# Patient Record
Sex: MALE | Race: BLACK OR AFRICAN AMERICAN | NOT HISPANIC OR LATINO | URBAN - METROPOLITAN AREA
[De-identification: names, ages, dates, MRNs, and addresses within clinical notes are randomized per-mention and may not be internally consistent; named-entity substitution may affect disease eponyms.]

---

## 2019-05-20 ENCOUNTER — INPATIENT (INPATIENT)
Facility: HOSPITAL | Age: 59
LOS: 24 days | Discharge: HOME | DRG: 853 | End: 2019-06-14
Attending: INTERNAL MEDICINE | Admitting: INTERNAL MEDICINE
Payer: COMMERCIAL

## 2019-05-20 VITALS
RESPIRATION RATE: 18 BRPM | DIASTOLIC BLOOD PRESSURE: 64 MMHG | OXYGEN SATURATION: 97 % | SYSTOLIC BLOOD PRESSURE: 121 MMHG | HEIGHT: 72 IN | HEART RATE: 99 BPM | TEMPERATURE: 99 F | WEIGHT: 246.04 LBS

## 2019-05-20 DIAGNOSIS — E11.621 TYPE 2 DIABETES MELLITUS WITH FOOT ULCER: ICD-10-CM

## 2019-05-20 DIAGNOSIS — Z90.49 ACQUIRED ABSENCE OF OTHER SPECIFIED PARTS OF DIGESTIVE TRACT: Chronic | ICD-10-CM

## 2019-05-20 LAB
ALBUMIN SERPL ELPH-MCNC: 3.6 G/DL — SIGNIFICANT CHANGE UP (ref 3.3–5)
ALP SERPL-CCNC: 130 U/L — HIGH (ref 40–120)
ALT FLD-CCNC: 12 U/L — SIGNIFICANT CHANGE UP (ref 10–45)
ANION GAP SERPL CALC-SCNC: 18 MMOL/L — HIGH (ref 5–17)
AST SERPL-CCNC: 12 U/L — SIGNIFICANT CHANGE UP (ref 10–40)
BASE EXCESS BLDV CALC-SCNC: -2.1 MMOL/L — LOW (ref -2–2)
BASOPHILS # BLD AUTO: 0.2 K/UL — SIGNIFICANT CHANGE UP (ref 0–0.2)
BASOPHILS NFR BLD AUTO: 1.1 % — SIGNIFICANT CHANGE UP (ref 0–2)
BILIRUB SERPL-MCNC: 0.7 MG/DL — SIGNIFICANT CHANGE UP (ref 0.2–1.2)
BUN SERPL-MCNC: 29 MG/DL — HIGH (ref 7–23)
CA-I SERPL-SCNC: 1.16 MMOL/L — SIGNIFICANT CHANGE UP (ref 1.12–1.3)
CALCIUM SERPL-MCNC: 9.5 MG/DL — SIGNIFICANT CHANGE UP (ref 8.4–10.5)
CHLORIDE BLDV-SCNC: 100 MMOL/L — SIGNIFICANT CHANGE UP (ref 96–108)
CHLORIDE SERPL-SCNC: 93 MMOL/L — LOW (ref 96–108)
CO2 BLDV-SCNC: 24 MMOL/L — SIGNIFICANT CHANGE UP (ref 22–30)
CO2 SERPL-SCNC: 21 MMOL/L — LOW (ref 22–31)
CREAT SERPL-MCNC: 1.99 MG/DL — HIGH (ref 0.5–1.3)
CRP SERPL-MCNC: 32.62 MG/DL — HIGH (ref 0–0.4)
EOSINOPHIL # BLD AUTO: 0 K/UL — SIGNIFICANT CHANGE UP (ref 0–0.5)
EOSINOPHIL NFR BLD AUTO: 0.2 % — SIGNIFICANT CHANGE UP (ref 0–6)
ERYTHROCYTE [SEDIMENTATION RATE] IN BLOOD: 83 MM/HR — HIGH (ref 0–20)
GAS PNL BLDV: 126 MMOL/L — LOW (ref 136–145)
GAS PNL BLDV: SIGNIFICANT CHANGE UP
GAS PNL BLDV: SIGNIFICANT CHANGE UP
GLUCOSE BLDC GLUCOMTR-MCNC: 395 MG/DL — HIGH (ref 70–99)
GLUCOSE BLDC GLUCOMTR-MCNC: 414 MG/DL — HIGH (ref 70–99)
GLUCOSE BLDC GLUCOMTR-MCNC: 439 MG/DL — HIGH (ref 70–99)
GLUCOSE BLDC GLUCOMTR-MCNC: 453 MG/DL — CRITICAL HIGH (ref 70–99)
GLUCOSE BLDV-MCNC: 409 MG/DL — HIGH (ref 70–99)
GLUCOSE SERPL-MCNC: 429 MG/DL — HIGH (ref 70–99)
HCO3 BLDV-SCNC: 23 MMOL/L — SIGNIFICANT CHANGE UP (ref 21–29)
HCT VFR BLD CALC: 41.6 % — SIGNIFICANT CHANGE UP (ref 39–50)
HCT VFR BLDA CALC: 45 % — SIGNIFICANT CHANGE UP (ref 39–50)
HGB BLD CALC-MCNC: 14.6 G/DL — SIGNIFICANT CHANGE UP (ref 13–17)
HGB BLD-MCNC: 14 G/DL — SIGNIFICANT CHANGE UP (ref 13–17)
LACTATE BLDV-MCNC: 2.2 MMOL/L — HIGH (ref 0.7–2)
LYMPHOCYTES # BLD AUTO: 0.3 K/UL — LOW (ref 1–3.3)
LYMPHOCYTES # BLD AUTO: 2.1 % — LOW (ref 13–44)
MCHC RBC-ENTMCNC: 30.8 PG — SIGNIFICANT CHANGE UP (ref 27–34)
MCHC RBC-ENTMCNC: 33.8 GM/DL — SIGNIFICANT CHANGE UP (ref 32–36)
MCV RBC AUTO: 91.3 FL — SIGNIFICANT CHANGE UP (ref 80–100)
MONOCYTES # BLD AUTO: 0.5 K/UL — SIGNIFICANT CHANGE UP (ref 0–0.9)
MONOCYTES NFR BLD AUTO: 3.6 % — SIGNIFICANT CHANGE UP (ref 2–14)
NEUTROPHILS # BLD AUTO: 13.8 K/UL — HIGH (ref 1.8–7.4)
NEUTROPHILS NFR BLD AUTO: 93.1 % — HIGH (ref 43–77)
OTHER CELLS CSF MANUAL: 10 ML/DL — LOW (ref 18–22)
PCO2 BLDV: 40 MMHG — SIGNIFICANT CHANGE UP (ref 35–50)
PH BLDV: 7.37 — SIGNIFICANT CHANGE UP (ref 7.35–7.45)
PLAT MORPH BLD: NORMAL — SIGNIFICANT CHANGE UP
PLATELET # BLD AUTO: 247 K/UL — SIGNIFICANT CHANGE UP (ref 150–400)
PO2 BLDV: 28 MMHG — SIGNIFICANT CHANGE UP (ref 25–45)
POTASSIUM BLDV-SCNC: 4.6 MMOL/L — SIGNIFICANT CHANGE UP (ref 3.5–5.3)
POTASSIUM SERPL-MCNC: 4.9 MMOL/L — SIGNIFICANT CHANGE UP (ref 3.5–5.3)
POTASSIUM SERPL-SCNC: 4.9 MMOL/L — SIGNIFICANT CHANGE UP (ref 3.5–5.3)
PROT SERPL-MCNC: 7.5 G/DL — SIGNIFICANT CHANGE UP (ref 6–8.3)
RBC # BLD: 4.56 M/UL — SIGNIFICANT CHANGE UP (ref 4.2–5.8)
RBC # FLD: 11.8 % — SIGNIFICANT CHANGE UP (ref 10.3–14.5)
RBC BLD AUTO: SIGNIFICANT CHANGE UP
SAO2 % BLDV: 48 % — LOW (ref 67–88)
SODIUM SERPL-SCNC: 132 MMOL/L — LOW (ref 135–145)
WBC # BLD: 14.8 K/UL — HIGH (ref 3.8–10.5)
WBC # FLD AUTO: 14.8 K/UL — HIGH (ref 3.8–10.5)

## 2019-05-20 PROCEDURE — 73630 X-RAY EXAM OF FOOT: CPT | Mod: 26,RT

## 2019-05-20 PROCEDURE — 99255 IP/OBS CONSLTJ NEW/EST HI 80: CPT | Mod: GC

## 2019-05-20 PROCEDURE — 99285 EMERGENCY DEPT VISIT HI MDM: CPT

## 2019-05-20 RX ORDER — INSULIN LISPRO 100/ML
20 VIAL (ML) SUBCUTANEOUS
Refills: 0 | Status: DISCONTINUED | OUTPATIENT
Start: 2019-05-20 | End: 2019-05-21

## 2019-05-20 RX ORDER — SODIUM CHLORIDE 9 MG/ML
1000 INJECTION INTRAMUSCULAR; INTRAVENOUS; SUBCUTANEOUS
Refills: 0 | Status: DISCONTINUED | OUTPATIENT
Start: 2019-05-20 | End: 2019-05-22

## 2019-05-20 RX ORDER — HEPARIN SODIUM 5000 [USP'U]/ML
5000 INJECTION INTRAVENOUS; SUBCUTANEOUS EVERY 8 HOURS
Refills: 0 | Status: DISCONTINUED | OUTPATIENT
Start: 2019-05-20 | End: 2019-05-22

## 2019-05-20 RX ORDER — INSULIN LISPRO 100/ML
VIAL (ML) SUBCUTANEOUS
Refills: 0 | Status: DISCONTINUED | OUTPATIENT
Start: 2019-05-20 | End: 2019-05-22

## 2019-05-20 RX ORDER — DEXTROSE 50 % IN WATER 50 %
25 SYRINGE (ML) INTRAVENOUS ONCE
Refills: 0 | Status: DISCONTINUED | OUTPATIENT
Start: 2019-05-20 | End: 2019-05-22

## 2019-05-20 RX ORDER — DEXTROSE 50 % IN WATER 50 %
15 SYRINGE (ML) INTRAVENOUS ONCE
Refills: 0 | Status: DISCONTINUED | OUTPATIENT
Start: 2019-05-20 | End: 2019-05-22

## 2019-05-20 RX ORDER — FUROSEMIDE 40 MG
80 TABLET ORAL DAILY
Refills: 0 | Status: DISCONTINUED | OUTPATIENT
Start: 2019-05-20 | End: 2019-05-20

## 2019-05-20 RX ORDER — DEXTROSE 50 % IN WATER 50 %
12.5 SYRINGE (ML) INTRAVENOUS ONCE
Refills: 0 | Status: DISCONTINUED | OUTPATIENT
Start: 2019-05-20 | End: 2019-05-22

## 2019-05-20 RX ORDER — SODIUM CHLORIDE 9 MG/ML
1000 INJECTION, SOLUTION INTRAVENOUS
Refills: 0 | Status: DISCONTINUED | OUTPATIENT
Start: 2019-05-20 | End: 2019-05-22

## 2019-05-20 RX ORDER — HYDROMORPHONE HYDROCHLORIDE 2 MG/ML
0.5 INJECTION INTRAMUSCULAR; INTRAVENOUS; SUBCUTANEOUS ONCE
Refills: 0 | Status: DISCONTINUED | OUTPATIENT
Start: 2019-05-20 | End: 2019-05-20

## 2019-05-20 RX ORDER — ACETAMINOPHEN 500 MG
650 TABLET ORAL EVERY 6 HOURS
Refills: 0 | Status: DISCONTINUED | OUTPATIENT
Start: 2019-05-20 | End: 2019-05-22

## 2019-05-20 RX ORDER — INSULIN DETEMIR 100/ML (3)
20 INSULIN PEN (ML) SUBCUTANEOUS AT BEDTIME
Refills: 0 | Status: DISCONTINUED | OUTPATIENT
Start: 2019-05-20 | End: 2019-05-20

## 2019-05-20 RX ORDER — SODIUM CHLORIDE 9 MG/ML
1000 INJECTION INTRAMUSCULAR; INTRAVENOUS; SUBCUTANEOUS ONCE
Refills: 0 | Status: COMPLETED | OUTPATIENT
Start: 2019-05-20 | End: 2019-05-20

## 2019-05-20 RX ORDER — HYDRALAZINE HCL 50 MG
10 TABLET ORAL ONCE
Refills: 0 | Status: COMPLETED | OUTPATIENT
Start: 2019-05-20 | End: 2019-05-20

## 2019-05-20 RX ORDER — SODIUM HYPOCHLORITE 0.125 %
1 SOLUTION, NON-ORAL MISCELLANEOUS DAILY
Refills: 0 | Status: DISCONTINUED | OUTPATIENT
Start: 2019-05-20 | End: 2019-05-22

## 2019-05-20 RX ORDER — LISINOPRIL 2.5 MG/1
40 TABLET ORAL DAILY
Refills: 0 | Status: DISCONTINUED | OUTPATIENT
Start: 2019-05-20 | End: 2019-05-22

## 2019-05-20 RX ORDER — INSULIN LISPRO 100/ML
VIAL (ML) SUBCUTANEOUS AT BEDTIME
Refills: 0 | Status: DISCONTINUED | OUTPATIENT
Start: 2019-05-20 | End: 2019-05-22

## 2019-05-20 RX ORDER — ASPIRIN/CALCIUM CARB/MAGNESIUM 324 MG
81 TABLET ORAL DAILY
Refills: 0 | Status: DISCONTINUED | OUTPATIENT
Start: 2019-05-20 | End: 2019-05-22

## 2019-05-20 RX ORDER — VANCOMYCIN HCL 1 G
1000 VIAL (EA) INTRAVENOUS EVERY 24 HOURS
Refills: 0 | Status: DISCONTINUED | OUTPATIENT
Start: 2019-05-21 | End: 2019-05-22

## 2019-05-20 RX ORDER — INSULIN GLARGINE 100 [IU]/ML
20 INJECTION, SOLUTION SUBCUTANEOUS AT BEDTIME
Refills: 0 | Status: DISCONTINUED | OUTPATIENT
Start: 2019-05-20 | End: 2019-05-21

## 2019-05-20 RX ORDER — PIPERACILLIN AND TAZOBACTAM 4; .5 G/20ML; G/20ML
3.38 INJECTION, POWDER, LYOPHILIZED, FOR SOLUTION INTRAVENOUS EVERY 8 HOURS
Refills: 0 | Status: DISCONTINUED | OUTPATIENT
Start: 2019-05-20 | End: 2019-05-22

## 2019-05-20 RX ORDER — GLUCAGON INJECTION, SOLUTION 0.5 MG/.1ML
1 INJECTION, SOLUTION SUBCUTANEOUS ONCE
Refills: 0 | Status: DISCONTINUED | OUTPATIENT
Start: 2019-05-20 | End: 2019-05-22

## 2019-05-20 RX ORDER — VANCOMYCIN HCL 1 G
1000 VIAL (EA) INTRAVENOUS ONCE
Refills: 0 | Status: COMPLETED | OUTPATIENT
Start: 2019-05-20 | End: 2019-05-20

## 2019-05-20 RX ORDER — PIPERACILLIN AND TAZOBACTAM 4; .5 G/20ML; G/20ML
3.38 INJECTION, POWDER, LYOPHILIZED, FOR SOLUTION INTRAVENOUS ONCE
Refills: 0 | Status: COMPLETED | OUTPATIENT
Start: 2019-05-20 | End: 2019-05-20

## 2019-05-20 RX ADMIN — Medication 250 MILLIGRAM(S): at 16:21

## 2019-05-20 RX ADMIN — PIPERACILLIN AND TAZOBACTAM 25 GRAM(S): 4; .5 INJECTION, POWDER, LYOPHILIZED, FOR SOLUTION INTRAVENOUS at 21:44

## 2019-05-20 RX ADMIN — PIPERACILLIN AND TAZOBACTAM 200 GRAM(S): 4; .5 INJECTION, POWDER, LYOPHILIZED, FOR SOLUTION INTRAVENOUS at 14:06

## 2019-05-20 RX ADMIN — Medication 3: at 23:30

## 2019-05-20 RX ADMIN — Medication 10 MILLIGRAM(S): at 19:02

## 2019-05-20 RX ADMIN — INSULIN GLARGINE 20 UNIT(S): 100 INJECTION, SOLUTION SUBCUTANEOUS at 23:29

## 2019-05-20 RX ADMIN — HYDROMORPHONE HYDROCHLORIDE 0.5 MILLIGRAM(S): 2 INJECTION INTRAMUSCULAR; INTRAVENOUS; SUBCUTANEOUS at 19:02

## 2019-05-20 RX ADMIN — Medication 650 MILLIGRAM(S): at 21:44

## 2019-05-20 RX ADMIN — Medication 20 UNIT(S): at 20:11

## 2019-05-20 RX ADMIN — Medication 650 MILLIGRAM(S): at 22:29

## 2019-05-20 RX ADMIN — Medication 6: at 20:11

## 2019-05-20 RX ADMIN — HEPARIN SODIUM 5000 UNIT(S): 5000 INJECTION INTRAVENOUS; SUBCUTANEOUS at 21:44

## 2019-05-20 RX ADMIN — SODIUM CHLORIDE 1000 MILLILITER(S): 9 INJECTION INTRAMUSCULAR; INTRAVENOUS; SUBCUTANEOUS at 14:06

## 2019-05-20 NOTE — H&P ADULT - NSICDXPASTSURGICALHX_GEN_ALL_CORE_FT
PAST SURGICAL HISTORY:  History of cholecystectomy     S/P laparoscopic cholecystectomy     Status post incision and drainage Rt groin abscess

## 2019-05-20 NOTE — H&P ADULT - HISTORY OF PRESENT ILLNESS
59 y/o male with history of IDDM, HTN, presents to the ED sent by Podiatrist (Dr. Giovanni Hanna) for evaluation and treatment of infected R foot ulcer, failing outpatient Augmentin. Patient reports that he has been followed by Podiatry outpatient for R plantar foot ulcer x3-4 weeks. Approx 2 weeks ago pt developed fever, chills, diaphoresis, malaise, decreased PO intake and nausea. Treated with Augmentin for the last 1 week without improvement. Unable to control DM at home. Also reports orthopnea . Denies chest pain, dyspnea on exertion, abdominal pain, vomiting, diarrhea, dysuria, hematuria, frequency, back pain.

## 2019-05-20 NOTE — H&P ADULT - NSHPLABSRESULTS_GEN_ALL_CORE
LABS:                        14.0   14.8  )-----------( 247      ( 20 May 2019 14:16 )             41.6     05-20    132<L>  |  93<L>  |  29<H>  ----------------------------<  429<H>  4.9   |  21<L>  |  1.99<H>    Ca    9.5      20 May 2019 14:16    TPro  7.5  /  Alb  3.6  /  TBili  0.7  /  DBili  x   /  AST  12  /  ALT  12  /  AlkPhos  130<H>  05-20              RADIOLOGY & ADDITIONAL TESTS:

## 2019-05-20 NOTE — CONSULT NOTE ADULT - ASSESSMENT
58 M with IDDM, neuropathy presenting with diabetic foor ulcer.   Failed outpatient augmentin.  Has had subjective fevers, and chills, nausea, decreased PO intake and uncontrollable sugars.   Here he is afebrile, leukocytosis 14k.  xray showing edema but not OM  Seen by podiatry who did a bedside debridement. No probe to bone.   Received one dose of vanco and zosyn in ED 58 M with IDDM, neuropathy presenting with diabetic foor ulcer.   Failed outpatient augmentin.  Has had subjective fevers, and chills, nausea, decreased PO intake and uncontrollable sugars.   Here he is afebrile, leukocytosis 14k.  xray showing edema but not OM  Seen by podiatry who did a bedside debridement. No probe to bone.   Received one dose of vanco and zosyn in ED    Overall, 58M with diabetic foot ulcer with surrounding cellulitis     Recommendations:  -Start Piperacillin/tazobactam 3.375 grams every 8 hours   -Start Vancomycin 1g q24hrs  -check vancomycin trough before the 3rd dose   -monitor creatinine   -follow up on blood and wound cultures   -patient may need MRI of the foot 58 M with IDDM, neuropathy presenting with diabetic foor ulcer.   Failed outpatient augmentin.  Has had subjective fevers, and chills, nausea, decreased PO intake and uncontrollable sugars.   Here he is afebrile, leukocytosis 14k.  xray showing edema but not OM  Seen by podiatry who did a bedside debridement. No probe to bone.   Received one dose of vanco and zosyn in ED    Overall, 58M with diabetic foot ulcer with surrounding cellulitis     Recommendations:  -Start Piperacillin/tazobactam 3.375 grams every 8 hours   -Start Vancomycin 1g q24hrs  -check vancomycin trough before the 3rd dose   -monitor creatinine   -follow up on blood and wound cultures   -patient may need MRI of the foot   -tight glycemic control to help with healing->consider endocrinology consult

## 2019-05-20 NOTE — ED ADULT NURSE NOTE - NSIMPLEMENTINTERV_GEN_ALL_ED
Implemented All Fall Risk Interventions:  [place] to call system. Call bell, personal items and telephone within reach. Instruct patient to call for assistance. Room bathroom lighting operational. Non-slip footwear when patient is off stretcher. Physically safe environment: no spills, clutter or unnecessary equipment. Stretcher in lowest position, wheels locked, appropriate side rails in place. Provide visual cue, wrist band, yellow gown, etc. Monitor gait and stability. Monitor for mental status changes and reorient to person, place, and time. Review medications for side effects contributing to fall risk. Reinforce activity limits and safety measures with patient and family.

## 2019-05-20 NOTE — H&P ADULT - NSICDXFAMILYHX_GEN_ALL_CORE_FT
FAMILY HISTORY:  Father  Still living? Unknown  Paternal family history of emphysema, Age at diagnosis: Age Unknown    Mother  Still living? Unknown  Family history of diabetes mellitus (DM), Age at diagnosis: Age Unknown    Grandparent  Still living? Unknown  Family history of diabetes mellitus (DM), Age at diagnosis: Age Unknown

## 2019-05-20 NOTE — PATIENT PROFILE ADULT - NSPROMEDSBROUGHTTOHOSP_GEN_A_NUR
Received message below on patient; forwarding message to Dr. Page [facility] office of whom is preforming surgery on patient. Please read message above; Dr Nabila Pedraza to perform surgery on patient he may have more answers to questions on patients surgery. no

## 2019-05-20 NOTE — CONSULT NOTE ADULT - SUBJECTIVE AND OBJECTIVE BOX
Patient is a 58y old  Male who presents with a chief complaint of     HPI: 57 y/o male with history of IDDM, HTN, presents to the ED sent by Podiatrist (Dr. Giovanni Hanna) for evaluation and treatment of infected R foot ulcer, failing outpatient Augmentin. Patient reports that he has been followed by Podiatry outpatient for R plantar foot ulcer x3-4 weeks. Approx 2 weeks ago pt developed fever, chills, diaphoresis, malaise, decreased PO intake and nausea. Treated with Augmentin for the last 1 week without improvement. Unable to control DM at home. Also reports orthopnea . Denies chest pain, dyspnea on exertion, abdominal pain, vomiting, diarrhea, dysuria, hematuria, frequency, back pain.      PAST MEDICAL & SURGICAL HISTORY:  Hypertension  Insulin dependent diabetes mellitus  History of cholecystectomy      MEDICATIONS  (STANDING):  vancomycin  IVPB 1000 milliGRAM(s) IV Intermittent once    MEDICATIONS  (PRN):      Allergies    No Known Allergies    Intolerances        VITALS:    Vital Signs Last 24 Hrs  T(C): 37.1 (20 May 2019 13:41), Max: 37.1 (20 May 2019 12:11)  T(F): 98.8 (20 May 2019 13:41), Max: 98.8 (20 May 2019 13:41)  HR: 91 (20 May 2019 14:07) (91 - 99)  BP: 163/95 (20 May 2019 14:07) (121/64 - 163/95)  BP(mean): --  RR: 17 (20 May 2019 14:07) (17 - 18)  SpO2: 100% (20 May 2019 14:07) (97% - 100%)    LABS:                          14.0   14.8  )-----------( 247      ( 20 May 2019 14:16 )             41.6       05-20    132<L>  |  93<L>  |  29<H>  ----------------------------<  429<H>  4.9   |  21<L>  |  1.99<H>    Ca    9.5      20 May 2019 14:16    TPro  7.5  /  Alb  3.6  /  TBili  0.7  /  DBili  x   /  AST  12  /  ALT  12  /  AlkPhos  130<H>  05-20      CAPILLARY BLOOD GLUCOSE              LOWER EXTREMITY PHYSICAL EXAM:    Vascular: DP/PT 2/4, B/L, CFT <3 seconds B/L, Temperature gradient increased to RLE, WNL to LLE.   Neuro: Epicritic sensation diminished  to the level of midfoot, B/L.  Skin: +2 pitting RLE, +1 pitting LLE, erythema focal to periwound with skin slough likely postinflammatory, serous drainage noted from ulceration  Wound #1:   Location: plantar midfoot right foot  Size: 3.0cm x 2.5cm  Depth: fascia  Wound bed: fibrogranular   Drainage: serous minimal scant purulence  Odor: none  Periwound: macerated/postinflammatory skin slough  Etiology: diabetic/neuropathic    RADIOLOGY & ADDITIONAL STUDIES:    < from: Xray Foot AP + Lateral + Oblique, Right (05.20.19 @ 14:41) >    EXAM:  FOOT COMPLETE RIGHT (MIN 3 VIEW)                            PROCEDURE DATE:  05/20/2019            INTERPRETATION:  EXAMINATION: 3 views of the right foot    CLINICAL INFORMATION: Evaluate for osteomyelitis, infection, fever.    COMPARISON: None available.    IMPRESSION:   Soft tissue defect, likely ulcer at the plantar aspect of the midfoot. No   acute cortical erosive changes are seen. No acute fracture or dislocation   is seen. If clinical concern for osteomyelitis persists, MRI is more   sensitive for evaluation.                    DRE JACK M.D., ATTENDING RADIOLOGIST  This document has been electronically signed. May 20 2019  3:05PM                < end of copied text >

## 2019-05-20 NOTE — ED PROVIDER NOTE - OBJECTIVE STATEMENT
59 y/o male with history of IDDM, HTN, presents to the ED sent by Podiatrist (Dr. Giovanni Hanna) for evaluation and treatment of infected R foot ulcer, failing outpatient Augmentin. Patient reports that he has been followed by Podiatry outpatient for R plantar foot ulcer x3-4 weeks. Approx 2 weeks ago pt developed fever, chills, diaphoresis, malaise, decreased PO intake and nausea. Treated with Augmentin for the last 1 week without improvement. Unable to control DM at home. Also reports orthopnea . Denies chest pain, dyspnea on exertion, abdominal pain, vomiting, diarrhea, dysuria, hematuria, frequency, back pain.  primary care doctor DR Qureshi

## 2019-05-20 NOTE — H&P ADULT - ASSESSMENT
59 y/o male with history of IDDM, HTN, presents to the ED sent by Podiatrist (Dr. Giovanni Hanna) for evaluation and treatment of infected R foot ulcer, failing outpatient Augmentin. Patient reports that he has been followed by Podiatry outpatient for R plantar foot ulcer x3-4 weeks. Approx 2 weeks ago pt developed fever, chills, diaphoresis, malaise, decreased PO intake and nausea. Treated with Augmentin for the last 1 week without improvement. Unable to control DM at home. Also reports orthopnea . Denies chest pain, dyspnea on exertion, abdominal pain, vomiting, diarrhea, dysuria, hematuria, frequency, back pain.      diabetic foot ulcer with surrounding cellulitis   -Start Piperacillin/tazobactam 3.375 grams every 8 hours   -Start Vancomycin 1g q24hrs  -check vancomycin trough before the 3rd dose   -monitor creatinine   -follow up on blood and wound cultures   -patient may need MRI of the foot     uncontrolled Diabetes  - cont levemir  - novolog 20 units qac  - hgb a1c  - diabetic diet  - FS qid  - endo consult     MARIKA  - stop lasix and lisinopril  - gentle hydration-    hyponatremia  - NO salt restriction  - c/w NS  - may be falsely low due to hyperglycemia 59 y/o male with history of IDDM, HTN, presents to the ED sent by Podiatrist (Dr. Giovanni Hanna) for evaluation and treatment of infected R foot ulcer, failing outpatient Augmentin. Patient reports that he has been followed by Podiatry outpatient for R plantar foot ulcer x3-4 weeks. Approx 2 weeks ago pt developed fever, chills, diaphoresis, malaise, decreased PO intake and nausea. Treated with Augmentin for the last 1 week without improvement. Unable to control DM at home. Also reports orthopnea . Denies chest pain, dyspnea on exertion, abdominal pain, vomiting, diarrhea, dysuria, hematuria, frequency, back pain.      diabetic foot ulcer with surrounding cellulitis   -Start Piperacillin/tazobactam 3.375 grams every 8 hours   -Start Vancomycin 1g q24hrs  -check vancomycin trough before the 3rd dose   -monitor creatinine   -follow up on blood and wound cultures   -patient may need MRI of the foot     uncontrolled Diabetes  - cont levemir  - novolog 20 units qac  - hgb a1c  - diabetic diet  - FS qid  - endo consult     MARIKA  - stop lasix and lisinopril  - gentle hydration-    uncontrolled HTN  -  start Norvasc    hyponatremia  - NO salt restriction  - c/w NS  - may be falsely low due to hyperglycemia

## 2019-05-20 NOTE — CONSULT NOTE ADULT - ASSESSMENT
59yo M w/ right foot infection w/ ulceration to fascia  ·	Pt seen evaluated  ·	Excisional debridement fascia right foot using sterile #15 blade  ·	Culture taken/ordered  ·	Continue IV abx, was on oral abx (amoxicillin only?) with possible improvement in appearance, although pt with persistent fevers at home  ·	No abscess appreciable after exam/debridement, likely only cellulitis with necrotic ulceration  ·	If no vast improvement on abx alone, will likely order MRI to further evaluate deep abscess  ·	Concern for OM remains low no probing deeper than fascia  ·	Admit for IV abx, no need to consult ID at this time, will follow cultures  ·	d/w attending

## 2019-05-20 NOTE — ED PROVIDER NOTE - GASTROINTESTINAL, MLM
Clear bilaterally, pupils equal, round and reactive to light. Abdomen soft, non-tender, no guarding.

## 2019-05-20 NOTE — CONSULT NOTE ADULT - SUBJECTIVE AND OBJECTIVE BOX
Patient is a 58y old  Male who presents with a chief complaint of     HPI:   59 y/o male with history of IDDM, HTN, presents to the ED sent by Podiatrist (Dr. Giovanni Hanna) for evaluation and treatment of infected R foot ulcer, failing outpatient Augmentin. Patient reports that he has been followed by Podiatry outpatient for R plantar foot ulcer x3-4 weeks. Approx 2 weeks ago pt developed fever, chills, diaphoresis, malaise, decreased PO intake and nausea. Treated with Augmentin for the last 1 week without improvement. Unable to control DM at home with very high finger sticks despite not eating. Also reports orthopnea . Denies chest pain, dyspnea on exertion, abdominal pain, vomiting, diarrhea, dysuria, hematuria, frequency, back pain. Pt denies any trauma to the foot but remembers there being a blister which eventually popped and then progressively got worse. He has had DM for 30 years and has been on insulin for the last 20 yers. His last A1C was >10. His ideal body weight is 77kg. This will be used to calculate vanco dosing.     ID consulted for antibiotics management.       PAST MEDICAL & SURGICAL HISTORY:  Hypertension  Insulin dependent diabetes mellitus  Venous insufficiency of both lower extremities: R &gt; L  HTN (hypertension)  BPH (benign prostatic hypertrophy)  Diabetes  History of cholecystectomy  S/P laparoscopic cholecystectomy  Status post incision and drainage: Rt groin abscess      Allergies  No Known Allergies        ANTIMICROBIALS:      MEDICATIONS  (STANDING):  piperacillin/tazobactam IVPB.   200 mL/Hr IV Intermittent (05-20-19 @ 14:06)    vancomycin  IVPB   250 mL/Hr IV Intermittent (05-20-19 @ 16:21)        OTHER MEDS: MEDICATIONS  (STANDING):      SOCIAL HISTORY:     Denies smoking drinking etoh or drugs     FAMILY HISTORY:  Paternal family history of emphysema (Father)  Family history of diabetes mellitus (DM) (Mother, Grandparent)      REVIEW OF SYSTEMS  [  ] ROS unobtainable because:    [ X ] All other systems negative except as noted below:	    Constitutional:  [X ] fever [X ] chills  [ ] weight loss  [ ] weakness  Skin:  [ ] rash [ ] phlebitis	  Eyes: [ ] icterus [ ] pain  [ ] discharge	  ENMT: [ ] sore throat  [ ] thrush [ ] ulcers [ ] exudates  Respiratory: [ ] dyspnea [ ] hemoptysis [ ] cough [ ] sputum	  Cardiovascular:  [ ] chest pain [ ] palpitations [ ] edema	  Gastrointestinal:  [X ] nausea [ ] vomiting [ ] diarrhea [ ] constipation [ ] pain	  Genitourinary:  [ ] dysuria [X ] frequency [ ] hematuria [ ] discharge [ ] flank pain  [ ] incontinence  Musculoskeletal:  [ ] myalgias [ ] arthralgias [ ] arthritis  [ ] foot pain and swelling   Neurological:  [ ] headache [ ] seizures  [ ] confusion/altered mental status  Psychiatric:  [ ] anxiety [ ] depression	  Hematology/Lymphatics:  [ ] lymphadenopathy  Endocrine:  [ ] adrenal [ ] thyroid  Allergic/Immunologic:	 [ ] transplant [ ] seasonal    Vital Signs Last 24 Hrs  T(F): 98.8 (05-20-19 @ 13:41), Max: 98.8 (05-20-19 @ 13:41)    Vital Signs Last 24 Hrs  HR: 91 (05-20-19 @ 14:07) (91 - 99)  BP: 163/95 (05-20-19 @ 14:07) (121/64 - 163/95)  RR: 17 (05-20-19 @ 14:07)  SpO2: 100% (05-20-19 @ 14:07) (97% - 100%)  Wt(kg): --    PHYSICAL EXAM:  General: non-toxic, obese AA male   HEAD/EYES: anicteric, PERRL  ENT:  supple  Cardiovascular:   S1, S2, no murmurs   Respiratory:  clear bilaterally  GI:  soft, non-tender, normal bowel sounds  :  no CVA tenderness   Musculoskeletal:  no synovitis  Neurologic:  grossly non-focal  Skin:  no rash, bleeding ulcer on the base of the right foot surrounded with gauze, non malodorous with surrounding edema and warmth    Lymph: no lymphadenopathy  Psychiatric:  appropriate affect  Vascular:  no phlebitis          WBC Count: 14.8 K/uL (05-20 @ 14:16)                            14.0   14.8  )-----------( 247      ( 20 May 2019 14:16 )             41.6       05-20    132<L>  |  93<L>  |  29<H>  ----------------------------<  429<H>  4.9   |  21<L>  |  1.99<H>    Ca    9.5      20 May 2019 14:16    TPro  7.5  /  Alb  3.6  /  TBili  0.7  /  DBili  x   /  AST  12  /  ALT  12  /  AlkPhos  130<H>  05-20      Creatinine Trend: 1.99<--        MICROBIOLOGY:          RADIOLOGY: Patient is a 58y old  Male who presents with a chief complaint of     HPI:   59 y/o male with history of IDDM, HTN, presents to the ED sent by Podiatrist (Dr. Giovanni Hanna) for evaluation and treatment of infected R foot ulcer, failing outpatient Augmentin. Patient reports that he has been followed by Podiatry outpatient for R plantar foot ulcer x3-4 weeks. Approx 2 weeks ago pt developed fever, chills, diaphoresis, malaise, decreased PO intake and nausea. Treated with Augmentin for the last 1 week without improvement. Unable to control DM at home with very high finger sticks despite not eating. Also reports orthopnea . Denies chest pain, dyspnea on exertion, abdominal pain, vomiting, diarrhea, dysuria, hematuria, frequency, back pain. Pt denies any trauma to the foot but remembers there being a blister which eventually popped and then progressively got worse. He has had DM for 30 years and has been on insulin for the last 20 yers. His last A1C was >10. His ideal body weight is 77kg. This will be used to calculate vanco dosing.     ID consulted for antibiotics management.       PAST MEDICAL & SURGICAL HISTORY:  Hypertension  Insulin dependent diabetes mellitus  Venous insufficiency of both lower extremities: R &gt; L  HTN (hypertension)  BPH (benign prostatic hypertrophy)  Diabetes  History of cholecystectomy  S/P laparoscopic cholecystectomy  Status post incision and drainage: Rt groin abscess      Allergies  No Known Allergies        ANTIMICROBIALS:      MEDICATIONS  (STANDING):  piperacillin/tazobactam IVPB.   200 mL/Hr IV Intermittent (05-20-19 @ 14:06)    vancomycin  IVPB   250 mL/Hr IV Intermittent (05-20-19 @ 16:21)        OTHER MEDS: MEDICATIONS  (STANDING):      SOCIAL HISTORY:     Denies smoking drinking etoh or drugs     FAMILY HISTORY:  Paternal family history of emphysema (Father)  Family history of diabetes mellitus (DM) (Mother, Grandparent)      REVIEW OF SYSTEMS  [  ] ROS unobtainable because:    [ X ] All other systems negative except as noted below:	    Constitutional:  [X ] fever [X ] chills  [ ] weight loss  [ ] weakness  Skin:  [ ] rash [ ] phlebitis	  Eyes: [ ] icterus [ ] pain  [ ] discharge	  ENMT: [ ] sore throat  [ ] thrush [ ] ulcers [ ] exudates  Respiratory: [ ] dyspnea [ ] hemoptysis [ ] cough [ ] sputum	  Cardiovascular:  [ ] chest pain [ ] palpitations [ ] edema	  Gastrointestinal:  [X ] nausea [ ] vomiting [ ] diarrhea [ ] constipation [ ] pain	  Genitourinary:  [ ] dysuria [X ] frequency [ ] hematuria [ ] discharge [ ] flank pain  [ ] incontinence  Musculoskeletal:  [ ] myalgias [ ] arthralgias [ ] arthritis  [ ] foot pain and swelling   Neurological:  [ ] headache [ ] seizures  [ ] confusion/altered mental status  Psychiatric:  [ ] anxiety [ ] depression	  Hematology/Lymphatics:  [ ] lymphadenopathy  Endocrine:  [ ] adrenal [ ] thyroid  Allergic/Immunologic:	 [ ] transplant [ ] seasonal    Vital Signs Last 24 Hrs  T(F): 98.8 (05-20-19 @ 13:41), Max: 98.8 (05-20-19 @ 13:41)    Vital Signs Last 24 Hrs  HR: 91 (05-20-19 @ 14:07) (91 - 99)  BP: 163/95 (05-20-19 @ 14:07) (121/64 - 163/95)  RR: 17 (05-20-19 @ 14:07)  SpO2: 100% (05-20-19 @ 14:07) (97% - 100%)  Wt(kg): --    PHYSICAL EXAM:  General: non-toxic, obese AA male   HEAD/EYES: anicteric, PERRL  ENT:  supple  Cardiovascular:   S1, S2, no murmurs   Respiratory:  clear bilaterally  GI:  soft, non-tender, normal bowel sounds  :  no CVA tenderness   Musculoskeletal:  no synovitis  Neurologic:  grossly non-focal  Skin:  no rash, bleeding ulcer on the base of the right foot surrounded with gauze, non malodorous with surrounding edema and warmth    Lymph: no lymphadenopathy  Psychiatric:  appropriate affect  Vascular:  no phlebitis          WBC Count: 14.8 K/uL (05-20 @ 14:16)                            14.0   14.8  )-----------( 247      ( 20 May 2019 14:16 )             41.6       05-20    132<L>  |  93<L>  |  29<H>  ----------------------------<  429<H>  4.9   |  21<L>  |  1.99<H>    Ca    9.5      20 May 2019 14:16    TPro  7.5  /  Alb  3.6  /  TBili  0.7  /  DBili  x   /  AST  12  /  ALT  12  /  AlkPhos  130<H>  05-20      Creatinine Trend: 1.99<--        MICROBIOLOGY:          RADIOLOGY:    < from: Xray Foot AP + Lateral + Oblique, Right (05.20.19 @ 14:41) >  Soft tissue defect, likely ulcer at the plantar aspect of the midfoot. No   acute cortical erosive changes are seen. No acute fracture or dislocation   is seen. If clinical concern for osteomyelitis persists, MRI is more   sensitive for evaluation.    < end of copied text >

## 2019-05-20 NOTE — ED ADULT NURSE NOTE - OBJECTIVE STATEMENT
59 y/o male PMH diabetes presents to ED reporting fever for two weeks and R foot pain. Pt reports going to podiatrist today for R foot ulcer who recommend pt come to ED. On exam, AOx3, speaking in complete sentences. R foot ulcer to midline, bottom of foot, ulcer covered by dressing but open site. R foot warmer than L foot, +2 peripheral pulses, capillary refill less than 2 seconds. Lung sounds CTA, NAD. Abdomen soft, non-tender, non-distended, normoactive bowel sounds in all 4 quadrants. Pt denies CP, SOB, n/v/d, urinary symptoms at this time. Family at bedside. Heplock placed, labs sent. Seen and evaluated by MD.

## 2019-05-20 NOTE — ED PROVIDER NOTE - CLINICAL SUMMARY MEDICAL DECISION MAKING FREE TEXT BOX
57 y/o male with a PMHx IDDM presents to the ED sent by Podiatrist (Dr. Giovanni Hanna) for further evaluation and management of infected diabetic foot ulcer of the R heel. Failing out patient Augmentin. Infected diabetic foot ulcer vs osteomyelitis. Will r/o osteomyelitis. Plan for blood work including blood cultures, ESR and CRP, x-ray of R foot. Will start IV Vancomycin and Zosyn. Will reach out to Dr. Issa per podiatry request. Admit. ZR

## 2019-05-20 NOTE — H&P ADULT - NSICDXPASTMEDICALHX_GEN_ALL_CORE_FT
PAST MEDICAL HISTORY:  BPH (benign prostatic hypertrophy)     Diabetes     HTN (hypertension)     Hypertension     Insulin dependent diabetes mellitus     Venous insufficiency of both lower extremities R > L

## 2019-05-20 NOTE — ED PROVIDER NOTE - SKIN, MLM
Complex multistage calcaneal ulcer with purulent drainage, surrounding erythema and warmth. No streaking.

## 2019-05-21 ENCOUNTER — TRANSCRIPTION ENCOUNTER (OUTPATIENT)
Age: 59
End: 2019-05-21

## 2019-05-21 DIAGNOSIS — E11.621 TYPE 2 DIABETES MELLITUS WITH FOOT ULCER: ICD-10-CM

## 2019-05-21 DIAGNOSIS — L03.119 CELLULITIS OF UNSPECIFIED PART OF LIMB: ICD-10-CM

## 2019-05-21 DIAGNOSIS — D72.829 ELEVATED WHITE BLOOD CELL COUNT, UNSPECIFIED: ICD-10-CM

## 2019-05-21 DIAGNOSIS — R50.81 FEVER PRESENTING WITH CONDITIONS CLASSIFIED ELSEWHERE: ICD-10-CM

## 2019-05-21 LAB
ANION GAP SERPL CALC-SCNC: 14 MMOL/L — SIGNIFICANT CHANGE UP (ref 5–17)
BUN SERPL-MCNC: 27 MG/DL — HIGH (ref 7–23)
CALCIUM SERPL-MCNC: 9.2 MG/DL — SIGNIFICANT CHANGE UP (ref 8.4–10.5)
CHLORIDE SERPL-SCNC: 98 MMOL/L — SIGNIFICANT CHANGE UP (ref 96–108)
CO2 SERPL-SCNC: 22 MMOL/L — SIGNIFICANT CHANGE UP (ref 22–31)
CREAT SERPL-MCNC: 2.08 MG/DL — HIGH (ref 0.5–1.3)
GLUCOSE BLDC GLUCOMTR-MCNC: 138 MG/DL — HIGH (ref 70–99)
GLUCOSE BLDC GLUCOMTR-MCNC: 201 MG/DL — HIGH (ref 70–99)
GLUCOSE BLDC GLUCOMTR-MCNC: 226 MG/DL — HIGH (ref 70–99)
GLUCOSE BLDC GLUCOMTR-MCNC: 318 MG/DL — HIGH (ref 70–99)
GLUCOSE SERPL-MCNC: 166 MG/DL — HIGH (ref 70–99)
HBA1C BLD-MCNC: 11.8 % — HIGH (ref 4–5.6)
HBA1C BLD-MCNC: 11.9 % — HIGH (ref 4–5.6)
HCT VFR BLD CALC: 38.1 % — LOW (ref 39–50)
HCT VFR BLD CALC: 39.7 % — SIGNIFICANT CHANGE UP (ref 39–50)
HCV AB S/CO SERPL IA: 0.05 S/CO — SIGNIFICANT CHANGE UP (ref 0–0.99)
HCV AB SERPL-IMP: SIGNIFICANT CHANGE UP
HGB BLD-MCNC: 12.5 G/DL — LOW (ref 13–17)
HGB BLD-MCNC: 13.1 G/DL — SIGNIFICANT CHANGE UP (ref 13–17)
MCHC RBC-ENTMCNC: 28.4 PG — SIGNIFICANT CHANGE UP (ref 27–34)
MCHC RBC-ENTMCNC: 31.4 GM/DL — LOW (ref 32–36)
MCHC RBC-ENTMCNC: 31.4 PG — SIGNIFICANT CHANGE UP (ref 27–34)
MCHC RBC-ENTMCNC: 34.3 GM/DL — SIGNIFICANT CHANGE UP (ref 32–36)
MCV RBC AUTO: 90.4 FL — SIGNIFICANT CHANGE UP (ref 80–100)
MCV RBC AUTO: 91.6 FL — SIGNIFICANT CHANGE UP (ref 80–100)
PLATELET # BLD AUTO: 259 K/UL — SIGNIFICANT CHANGE UP (ref 150–400)
PLATELET # BLD AUTO: 266 K/UL — SIGNIFICANT CHANGE UP (ref 150–400)
POTASSIUM SERPL-MCNC: 3.9 MMOL/L — SIGNIFICANT CHANGE UP (ref 3.5–5.3)
POTASSIUM SERPL-SCNC: 3.9 MMOL/L — SIGNIFICANT CHANGE UP (ref 3.5–5.3)
RBC # BLD: 4.15 M/UL — LOW (ref 4.2–5.8)
RBC # BLD: 4.39 M/UL — SIGNIFICANT CHANGE UP (ref 4.2–5.8)
RBC # FLD: 11.6 % — SIGNIFICANT CHANGE UP (ref 10.3–14.5)
RBC # FLD: 11.9 % — SIGNIFICANT CHANGE UP (ref 10.3–14.5)
SODIUM SERPL-SCNC: 134 MMOL/L — LOW (ref 135–145)
WBC # BLD: 15.8 K/UL — HIGH (ref 3.8–10.5)
WBC # BLD: 16.1 K/UL — HIGH (ref 3.8–10.5)
WBC # FLD AUTO: 15.8 K/UL — HIGH (ref 3.8–10.5)
WBC # FLD AUTO: 16.1 K/UL — HIGH (ref 3.8–10.5)

## 2019-05-21 PROCEDURE — 71045 X-RAY EXAM CHEST 1 VIEW: CPT | Mod: 26

## 2019-05-21 PROCEDURE — 99232 SBSQ HOSP IP/OBS MODERATE 35: CPT

## 2019-05-21 PROCEDURE — 73720 MRI LWR EXTREMITY W/O&W/DYE: CPT | Mod: 26,RT

## 2019-05-21 RX ORDER — INSULIN LISPRO 100/ML
20 VIAL (ML) SUBCUTANEOUS
Refills: 0 | Status: DISCONTINUED | OUTPATIENT
Start: 2019-05-21 | End: 2019-05-22

## 2019-05-21 RX ORDER — INSULIN GLARGINE 100 [IU]/ML
30 INJECTION, SOLUTION SUBCUTANEOUS AT BEDTIME
Refills: 0 | Status: DISCONTINUED | OUTPATIENT
Start: 2019-05-21 | End: 2019-05-21

## 2019-05-21 RX ORDER — HYDROMORPHONE HYDROCHLORIDE 2 MG/ML
0.5 INJECTION INTRAMUSCULAR; INTRAVENOUS; SUBCUTANEOUS ONCE
Refills: 0 | Status: DISCONTINUED | OUTPATIENT
Start: 2019-05-21 | End: 2019-05-21

## 2019-05-21 RX ORDER — ACETAMINOPHEN 500 MG
650 TABLET ORAL EVERY 6 HOURS
Refills: 0 | Status: DISCONTINUED | OUTPATIENT
Start: 2019-05-21 | End: 2019-05-22

## 2019-05-21 RX ORDER — INSULIN GLARGINE 100 [IU]/ML
25 INJECTION, SOLUTION SUBCUTANEOUS AT BEDTIME
Refills: 0 | Status: DISCONTINUED | OUTPATIENT
Start: 2019-05-21 | End: 2019-05-22

## 2019-05-21 RX ORDER — SODIUM CHLORIDE 9 MG/ML
1000 INJECTION INTRAMUSCULAR; INTRAVENOUS; SUBCUTANEOUS
Refills: 0 | Status: DISCONTINUED | OUTPATIENT
Start: 2019-05-21 | End: 2019-05-22

## 2019-05-21 RX ORDER — INSULIN LISPRO 100/ML
12 VIAL (ML) SUBCUTANEOUS ONCE
Refills: 0 | Status: DISCONTINUED | OUTPATIENT
Start: 2019-05-21 | End: 2019-05-21

## 2019-05-21 RX ORDER — INSULIN LISPRO 100/ML
23 VIAL (ML) SUBCUTANEOUS
Refills: 0 | Status: DISCONTINUED | OUTPATIENT
Start: 2019-05-21 | End: 2019-05-21

## 2019-05-21 RX ADMIN — Medication 650 MILLIGRAM(S): at 11:32

## 2019-05-21 RX ADMIN — PIPERACILLIN AND TAZOBACTAM 25 GRAM(S): 4; .5 INJECTION, POWDER, LYOPHILIZED, FOR SOLUTION INTRAVENOUS at 05:29

## 2019-05-21 RX ADMIN — Medication 2: at 13:26

## 2019-05-21 RX ADMIN — PIPERACILLIN AND TAZOBACTAM 25 GRAM(S): 4; .5 INJECTION, POWDER, LYOPHILIZED, FOR SOLUTION INTRAVENOUS at 22:29

## 2019-05-21 RX ADMIN — PIPERACILLIN AND TAZOBACTAM 25 GRAM(S): 4; .5 INJECTION, POWDER, LYOPHILIZED, FOR SOLUTION INTRAVENOUS at 13:25

## 2019-05-21 RX ADMIN — HYDROMORPHONE HYDROCHLORIDE 0.5 MILLIGRAM(S): 2 INJECTION INTRAMUSCULAR; INTRAVENOUS; SUBCUTANEOUS at 20:30

## 2019-05-21 RX ADMIN — HEPARIN SODIUM 5000 UNIT(S): 5000 INJECTION INTRAVENOUS; SUBCUTANEOUS at 13:26

## 2019-05-21 RX ADMIN — Medication 650 MILLIGRAM(S): at 10:32

## 2019-05-21 RX ADMIN — Medication 250 MILLIGRAM(S): at 16:06

## 2019-05-21 RX ADMIN — INSULIN GLARGINE 25 UNIT(S): 100 INJECTION, SOLUTION SUBCUTANEOUS at 22:42

## 2019-05-21 RX ADMIN — HYDROMORPHONE HYDROCHLORIDE 0.5 MILLIGRAM(S): 2 INJECTION INTRAMUSCULAR; INTRAVENOUS; SUBCUTANEOUS at 23:23

## 2019-05-21 RX ADMIN — Medication 20 UNIT(S): at 13:26

## 2019-05-21 RX ADMIN — Medication 4: at 08:55

## 2019-05-21 RX ADMIN — HYDROMORPHONE HYDROCHLORIDE 0.5 MILLIGRAM(S): 2 INJECTION INTRAMUSCULAR; INTRAVENOUS; SUBCUTANEOUS at 23:49

## 2019-05-21 RX ADMIN — Medication 81 MILLIGRAM(S): at 13:26

## 2019-05-21 RX ADMIN — HYDROMORPHONE HYDROCHLORIDE 0.5 MILLIGRAM(S): 2 INJECTION INTRAMUSCULAR; INTRAVENOUS; SUBCUTANEOUS at 20:02

## 2019-05-21 RX ADMIN — Medication 20 UNIT(S): at 08:55

## 2019-05-21 RX ADMIN — HEPARIN SODIUM 5000 UNIT(S): 5000 INJECTION INTRAVENOUS; SUBCUTANEOUS at 05:30

## 2019-05-21 RX ADMIN — SODIUM CHLORIDE 75 MILLILITER(S): 9 INJECTION INTRAMUSCULAR; INTRAVENOUS; SUBCUTANEOUS at 06:00

## 2019-05-21 RX ADMIN — HEPARIN SODIUM 5000 UNIT(S): 5000 INJECTION INTRAVENOUS; SUBCUTANEOUS at 22:30

## 2019-05-21 RX ADMIN — LISINOPRIL 40 MILLIGRAM(S): 2.5 TABLET ORAL at 05:29

## 2019-05-21 NOTE — PROGRESS NOTE ADULT - ASSESSMENT
57yo M w/ right foot infection w/ ulceration to fascia  ·	Pt seen evaluated  ·	Pt spiked fever to 102.2 overnight, CRP elevated at 32mg/dl, ESR 80, acute infection possibly resolving after bedside I&D although will need OR debridement and further I&D based on extent necrotic tissue, purulence expressed today scant, but persistent  ·	Booked for OR tomorrow 1PM, medical optimization requested appreciate if documented, if pt continues to be unstable may need to go regardless  ·	Culture pending  ·	Continue IV abx  ·	MRI today to determine if any further abscess/osseous involvement, preop orders placed  ·	d/w attending

## 2019-05-21 NOTE — PROGRESS NOTE ADULT - SUBJECTIVE AND OBJECTIVE BOX
Patient is a 58y old  Male who presents with a chief complaint of right foot ulcer (21 May 2019 09:02)      SUBJECTIVE / OVERNIGHT EVENTS: still having chills.  Tmax 102.2    MEDICATIONS  (STANDING):  aspirin enteric coated 81 milliGRAM(s) Oral daily  Dakins Solution - 1/4 Strength 1 Application(s) Topical daily  dextrose 5%. 1000 milliLiter(s) (50 mL/Hr) IV Continuous <Continuous>  dextrose 50% Injectable 12.5 Gram(s) IV Push once  dextrose 50% Injectable 25 Gram(s) IV Push once  dextrose 50% Injectable 25 Gram(s) IV Push once  heparin  Injectable 5000 Unit(s) SubCutaneous every 8 hours  insulin glargine Injectable (LANTUS) 20 Unit(s) SubCutaneous at bedtime  insulin lispro (HumaLOG) corrective regimen sliding scale   SubCutaneous three times a day before meals  insulin lispro (HumaLOG) corrective regimen sliding scale   SubCutaneous at bedtime  insulin lispro Injectable (HumaLOG) 20 Unit(s) SubCutaneous three times a day before meals  lisinopril 40 milliGRAM(s) Oral daily  piperacillin/tazobactam IVPB. 3.375 Gram(s) IV Intermittent every 8 hours  sodium chloride 0.9%. 1000 milliLiter(s) (75 mL/Hr) IV Continuous <Continuous>  sodium chloride 0.9%. 1000 milliLiter(s) (45 mL/Hr) IV Continuous <Continuous>  vancomycin  IVPB 1000 milliGRAM(s) IV Intermittent every 24 hours    MEDICATIONS  (PRN):  acetaminophen   Tablet .. 650 milliGRAM(s) Oral every 6 hours PRN Temp greater or equal to 38C (100.4F)  acetaminophen   Tablet .. 650 milliGRAM(s) Oral every 6 hours PRN Mild Pain (1 - 3)  dextrose 40% Gel 15 Gram(s) Oral once PRN Blood Glucose LESS THAN 70 milliGRAM(s)/deciliter  glucagon  Injectable 1 milliGRAM(s) IntraMuscular once PRN Glucose LESS THAN 70 milligrams/deciliter      Vital Signs Last 24 Hrs  T(C): 37.6 (21 May 2019 05:28), Max: 39 (20 May 2019 20:30)  T(F): 99.7 (21 May 2019 05:28), Max: 102.2 (20 May 2019 20:30)  HR: 96 (21 May 2019 05:28) (91 - 104)  BP: 175/92 (21 May 2019 05:28) (121/64 - 194/74)  BP(mean): --  RR: 18 (21 May 2019 05:28) (17 - 18)  SpO2: 95% (21 May 2019 05:28) (95% - 100%)  CAPILLARY BLOOD GLUCOSE      POCT Blood Glucose.: 318 mg/dL (21 May 2019 08:44)  POCT Blood Glucose.: 395 mg/dL (20 May 2019 22:47)  POCT Blood Glucose.: 414 mg/dL (20 May 2019 20:03)  POCT Blood Glucose.: 439 mg/dL (20 May 2019 18:42)  POCT Blood Glucose.: 453 mg/dL (20 May 2019 18:40)    I&O's Summary    20 May 2019 07:01  -  21 May 2019 07:00  --------------------------------------------------------  IN: 440 mL / OUT: 0 mL / NET: 440 mL    21 May 2019 07:01  -  21 May 2019 12:03  --------------------------------------------------------  IN: 240 mL / OUT: 100 mL / NET: 140 mL        PHYSICAL EXAM:  GENERAL: NAD, well-developed  HEAD:  Atraumatic, Normocephalic  EYES: EOMI, PERRLA, conjunctiva and sclera clear  NECK: Supple, No JVD  CHEST/LUNG: Clear to auscultation bilaterally; No wheeze  HEART: Regular rate and rhythm; No murmurs, rubs, or gallops  ABDOMEN: Soft, Nontender, Nondistended; Bowel sounds present  EXTREMITIES:  2+ Peripheral Pulses, No clubbing, cyanosis, or edema,  right foot ulcer  PSYCH: AAOx3  NEUROLOGY: non-focal  SKIN: No rashes or lesions    LABS:                        12.5   16.1  )-----------( 266      ( 21 May 2019 09:07 )             39.7     05-20    132<L>  |  93<L>  |  29<H>  ----------------------------<  429<H>  4.9   |  21<L>  |  1.99<H>    Ca    9.5      20 May 2019 14:16    TPro  7.5  /  Alb  3.6  /  TBili  0.7  /  DBili  x   /  AST  12  /  ALT  12  /  AlkPhos  130<H>  05-20              RADIOLOGY & ADDITIONAL TESTS:    Imaging Personally Reviewed:    Consultant(s) Notes Reviewed:      Care Discussed with Consultants/Other Providers:

## 2019-05-21 NOTE — PROGRESS NOTE ADULT - ASSESSMENT
59 y/o male with history of IDDM, HTN, presents to the ED sent by Podiatrist (Dr. Giovanni Hanna) for evaluation and treatment of infected R foot ulcer, failing outpatient Augmentin. Patient reports that he has been followed by Podiatry outpatient for R plantar foot ulcer x3-4 weeks. Approx 2 weeks ago pt developed fever, chills, diaphoresis, malaise, decreased PO intake and nausea. Treated with Augmentin for the last 1 week without improvement. Unable to control DM at home. Also reports orthopnea . Denies chest pain, dyspnea on exertion, abdominal pain, vomiting, diarrhea, dysuria, hematuria, frequency, back pain.      diabetic foot ulcer with surrounding cellulitis   -Piperacillin/tazobactam 3.375 grams every 8 hours   - Vancomycin 1g q24hrs  -check vancomycin trough before the 3rd dose   -monitor creatinine   -follow up on blood and wound cultures   -patient needs MRI of the foot   - poss or tomorrow for debridement  - pt has no medical contraindications for the planned procedure    uncontrolled Diabetes  - cont levemir  - novolog 20 units qac  - hgb a1c  11.9  - diabetic diet  - FS qid  - endo consult called    MARIKA  - stop lasix and lisinopril  - gentle hydration-  - follow creatinine    uncontrolled HTN  -  start Norvasc    hyponatremia  - NO salt restriction  - c/w NS  - may be falsely low due to hyperglycemia

## 2019-05-21 NOTE — PROGRESS NOTE ADULT - SUBJECTIVE AND OBJECTIVE BOX
Patient is a 58y old  Male who presents with a chief complaint of right foot ulcer (20 May 2019 18:44)       INTERVAL HPI/OVERNIGHT EVENTS:  Patient seen and evaluated at bedside.  Pt is resting comfortable in NAD. Denies N/V/F/C.      Allergies    No Known Allergies    Intolerances        Vital Signs Last 24 Hrs  T(C): 37.6 (21 May 2019 05:28), Max: 39 (20 May 2019 20:30)  T(F): 99.7 (21 May 2019 05:28), Max: 102.2 (20 May 2019 20:30)  HR: 96 (21 May 2019 05:28) (91 - 104)  BP: 175/92 (21 May 2019 05:28) (121/64 - 194/74)  BP(mean): --  RR: 18 (21 May 2019 05:28) (17 - 18)  SpO2: 95% (21 May 2019 05:28) (95% - 100%)    LABS:                        14.0   14.8  )-----------( 247      ( 20 May 2019 14:16 )             41.6     05-20    132<L>  |  93<L>  |  29<H>  ----------------------------<  429<H>  4.9   |  21<L>  |  1.99<H>    Ca    9.5      20 May 2019 14:16    TPro  7.5  /  Alb  3.6  /  TBili  0.7  /  DBili  x   /  AST  12  /  ALT  12  /  AlkPhos  130<H>  05-20        CAPILLARY BLOOD GLUCOSE      POCT Blood Glucose.: 318 mg/dL (21 May 2019 08:44)  POCT Blood Glucose.: 395 mg/dL (20 May 2019 22:47)  POCT Blood Glucose.: 414 mg/dL (20 May 2019 20:03)  POCT Blood Glucose.: 439 mg/dL (20 May 2019 18:42)  POCT Blood Glucose.: 453 mg/dL (20 May 2019 18:40)      Lower Extremity Physical Exam:  Vascular: DP/PT 2/4, B/L, CFT <3 seconds B/L, Temperature gradient increased to RLE, WNL to LLE.   Neuro: Epicritic sensation diminished  to the level of midfoot, B/L.  Skin: +2 pitting RLE, +1 pitting LLE, erythema focal to periwound with skin slough likely postinflammatory, serous drainage noted from ulceration  Wound #1:   Location: plantar midfoot right foot  Size: 3.0cm x 2.5cm  Depth: fascia  Wound bed: fibrogranular   Drainage: serous minimal scant purulence  Odor: none  Periwound: macerated/postinflammatory skin slough  Etiology: diabetic/neuropathic  RADIOLOGY & ADDITIONAL TESTS:

## 2019-05-21 NOTE — PROGRESS NOTE ADULT - ASSESSMENT
58 M with IDDM, neuropathy presenting with diabetic foor ulcer.   Failed outpatient augmentin.  Has had subjective fevers, and chills, nausea, decreased PO intake and uncontrollable sugars.   Here he is afebrile, leukocytosis 14k.  xray showing edema but not OM  Seen by podiatry who did a bedside debridement. No probe to bone.   Received one dose of vanco and zosyn in ED    Overall, 58M with diabetic foot ulcer with surrounding cellulitis     Recommendations:  -Start Piperacillin/tazobactam 3.375 grams every 8 hours   -Start Vancomycin 1g q24hrs  -check vancomycin trough before the 3rd dose   -monitor creatinine   -follow up on blood and wound cultures   -patient may need MRI of the foot   -tight glycemic control to help with healing->consider endocrinology consult

## 2019-05-21 NOTE — PROGRESS NOTE ADULT - SUBJECTIVE AND OBJECTIVE BOX
AGUSTIN BRAR 58y MRN-[id number]    Patient is a 58y old  Male who presents with a chief complaint of right foot ulcer (21 May 2019 12:03)      Follow Up/CC:  ID following for foot ulcer    Interval History/ROS: fever+, planning for OR tomorrow per podiatry    Allergies    No Known Allergies    Intolerances        ANTIMICROBIALS:  piperacillin/tazobactam IVPB. 3.375 every 8 hours  vancomycin  IVPB 1000 every 24 hours      MEDICATIONS  (STANDING):  aspirin enteric coated 81 milliGRAM(s) Oral daily  Dakins Solution - 1/4 Strength 1 Application(s) Topical daily  dextrose 5%. 1000 milliLiter(s) (50 mL/Hr) IV Continuous <Continuous>  dextrose 50% Injectable 12.5 Gram(s) IV Push once  dextrose 50% Injectable 25 Gram(s) IV Push once  dextrose 50% Injectable 25 Gram(s) IV Push once  heparin  Injectable 5000 Unit(s) SubCutaneous every 8 hours  insulin glargine Injectable (LANTUS) 30 Unit(s) SubCutaneous at bedtime  insulin lispro (HumaLOG) corrective regimen sliding scale   SubCutaneous three times a day before meals  insulin lispro (HumaLOG) corrective regimen sliding scale   SubCutaneous at bedtime  insulin lispro Injectable (HumaLOG) 23 Unit(s) SubCutaneous three times a day before meals  lisinopril 40 milliGRAM(s) Oral daily  piperacillin/tazobactam IVPB. 3.375 Gram(s) IV Intermittent every 8 hours  sodium chloride 0.9%. 1000 milliLiter(s) (75 mL/Hr) IV Continuous <Continuous>  sodium chloride 0.9%. 1000 milliLiter(s) (45 mL/Hr) IV Continuous <Continuous>  vancomycin  IVPB 1000 milliGRAM(s) IV Intermittent every 24 hours    MEDICATIONS  (PRN):  acetaminophen   Tablet .. 650 milliGRAM(s) Oral every 6 hours PRN Temp greater or equal to 38C (100.4F)  acetaminophen   Tablet .. 650 milliGRAM(s) Oral every 6 hours PRN Mild Pain (1 - 3)  dextrose 40% Gel 15 Gram(s) Oral once PRN Blood Glucose LESS THAN 70 milliGRAM(s)/deciliter  glucagon  Injectable 1 milliGRAM(s) IntraMuscular once PRN Glucose LESS THAN 70 milligrams/deciliter        Vital Signs Last 24 Hrs  T(C): 37.2 (21 May 2019 12:17), Max: 39 (20 May 2019 20:30)  T(F): 99 (21 May 2019 12:17), Max: 102.2 (20 May 2019 20:30)  HR: 83 (21 May 2019 12:17) (83 - 104)  BP: 124/77 (21 May 2019 12:17) (124/77 - 194/74)  BP(mean): --  RR: 18 (21 May 2019 12:17) (18 - 18)  SpO2: 96% (21 May 2019 12:17) (95% - 98%)    CBC Full  -  ( 21 May 2019 09:07 )  WBC Count : 16.1 K/uL  RBC Count : 4.39 M/uL  Hemoglobin : 12.5 g/dL  Hematocrit : 39.7 %  Platelet Count - Automated : 266 K/uL  Mean Cell Volume : 90.4 fl  Mean Cell Hemoglobin : 28.4 pg  Mean Cell Hemoglobin Concentration : 31.4 gm/dL  Auto Neutrophil # : x  Auto Lymphocyte # : x  Auto Monocyte # : x  Auto Eosinophil # : x  Auto Basophil # : x  Auto Neutrophil % : x  Auto Lymphocyte % : x  Auto Monocyte % : x  Auto Eosinophil % : x  Auto Basophil % : x    05-20    132<L>  |  93<L>  |  29<H>  ----------------------------<  429<H>  4.9   |  21<L>  |  1.99<H>    Ca    9.5      20 May 2019 14:16    TPro  7.5  /  Alb  3.6  /  TBili  0.7  /  DBili  x   /  AST  12  /  ALT  12  /  AlkPhos  130<H>  05-20    LIVER FUNCTIONS - ( 20 May 2019 14:16 )  Alb: 3.6 g/dL / Pro: 7.5 g/dL / ALK PHOS: 130 U/L / ALT: 12 U/L / AST: 12 U/L / GGT: x               MICROBIOLOGY:        RADIOLOGY    < from: Xray Chest 1 View- PORTABLE-Urgent (05.21.19 @ 09:52) >  The lungs are clear. There is no pleural effusion or pneumothorax.

## 2019-05-22 ENCOUNTER — RESULT REVIEW (OUTPATIENT)
Age: 59
End: 2019-05-22

## 2019-05-22 DIAGNOSIS — E11.9 TYPE 2 DIABETES MELLITUS WITHOUT COMPLICATIONS: ICD-10-CM

## 2019-05-22 DIAGNOSIS — I10 ESSENTIAL (PRIMARY) HYPERTENSION: ICD-10-CM

## 2019-05-22 DIAGNOSIS — I87.2 VENOUS INSUFFICIENCY (CHRONIC) (PERIPHERAL): ICD-10-CM

## 2019-05-22 LAB
-  AMIKACIN: SIGNIFICANT CHANGE UP
-  AMPICILLIN/SULBACTAM: SIGNIFICANT CHANGE UP
-  AMPICILLIN: SIGNIFICANT CHANGE UP
-  AZTREONAM: SIGNIFICANT CHANGE UP
-  CEFEPIME: SIGNIFICANT CHANGE UP
-  CEFOXITIN: SIGNIFICANT CHANGE UP
-  CEFTRIAXONE: SIGNIFICANT CHANGE UP
-  CIPROFLOXACIN: SIGNIFICANT CHANGE UP
-  ERTAPENEM: SIGNIFICANT CHANGE UP
-  GENTAMICIN: SIGNIFICANT CHANGE UP
-  IMIPENEM: SIGNIFICANT CHANGE UP
-  LEVOFLOXACIN: SIGNIFICANT CHANGE UP
-  MEROPENEM: SIGNIFICANT CHANGE UP
-  PIPERACILLIN/TAZOBACTAM: SIGNIFICANT CHANGE UP
-  TOBRAMYCIN: SIGNIFICANT CHANGE UP
-  TRIMETHOPRIM/SULFAMETHOXAZOLE: SIGNIFICANT CHANGE UP
24R-OH-CALCIDIOL SERPL-MCNC: 4.4 NG/ML — LOW (ref 30–80)
ANION GAP SERPL CALC-SCNC: 12 MMOL/L — SIGNIFICANT CHANGE UP (ref 5–17)
APTT BLD: 26.9 SEC — LOW (ref 27.5–36.3)
BLD GP AB SCN SERPL QL: NEGATIVE — SIGNIFICANT CHANGE UP
BUN SERPL-MCNC: 27 MG/DL — HIGH (ref 7–23)
CALCIUM SERPL-MCNC: 8.8 MG/DL — SIGNIFICANT CHANGE UP (ref 8.4–10.5)
CHLORIDE SERPL-SCNC: 99 MMOL/L — SIGNIFICANT CHANGE UP (ref 96–108)
CO2 SERPL-SCNC: 20 MMOL/L — LOW (ref 22–31)
CREAT SERPL-MCNC: 2.07 MG/DL — HIGH (ref 0.5–1.3)
GLUCOSE BLDC GLUCOMTR-MCNC: 260 MG/DL — HIGH (ref 70–99)
GLUCOSE BLDC GLUCOMTR-MCNC: 261 MG/DL — HIGH (ref 70–99)
GLUCOSE BLDC GLUCOMTR-MCNC: 266 MG/DL — HIGH (ref 70–99)
GLUCOSE BLDC GLUCOMTR-MCNC: 316 MG/DL — HIGH (ref 70–99)
GLUCOSE SERPL-MCNC: 281 MG/DL — HIGH (ref 70–99)
HCT VFR BLD CALC: 36.3 % — LOW (ref 39–50)
HGB BLD-MCNC: 11.9 G/DL — LOW (ref 13–17)
INR BLD: 1.13 RATIO — SIGNIFICANT CHANGE UP (ref 0.88–1.16)
MCHC RBC-ENTMCNC: 29.5 PG — SIGNIFICANT CHANGE UP (ref 27–34)
MCHC RBC-ENTMCNC: 32.8 GM/DL — SIGNIFICANT CHANGE UP (ref 32–36)
MCV RBC AUTO: 90.1 FL — SIGNIFICANT CHANGE UP (ref 80–100)
METHOD TYPE: SIGNIFICANT CHANGE UP
PLATELET # BLD AUTO: 247 K/UL — SIGNIFICANT CHANGE UP (ref 150–400)
POTASSIUM SERPL-MCNC: 4.1 MMOL/L — SIGNIFICANT CHANGE UP (ref 3.5–5.3)
POTASSIUM SERPL-SCNC: 4.1 MMOL/L — SIGNIFICANT CHANGE UP (ref 3.5–5.3)
PROTHROM AB SERPL-ACNC: 12.9 SEC — SIGNIFICANT CHANGE UP (ref 10–13.1)
RBC # BLD: 4.03 M/UL — LOW (ref 4.2–5.8)
RBC # FLD: 12.6 % — SIGNIFICANT CHANGE UP (ref 10.3–14.5)
RH IG SCN BLD-IMP: POSITIVE — SIGNIFICANT CHANGE UP
SODIUM SERPL-SCNC: 131 MMOL/L — LOW (ref 135–145)
VIT D25+D1,25 OH+D1,25 PNL SERPL-MCNC: 26 PG/ML — SIGNIFICANT CHANGE UP (ref 19.9–79.3)
WBC # BLD: 15.6 K/UL — HIGH (ref 3.8–10.5)
WBC # FLD AUTO: 15.6 K/UL — HIGH (ref 3.8–10.5)

## 2019-05-22 PROCEDURE — 88305 TISSUE EXAM BY PATHOLOGIST: CPT | Mod: 26

## 2019-05-22 PROCEDURE — 99232 SBSQ HOSP IP/OBS MODERATE 35: CPT

## 2019-05-22 RX ORDER — ACETAMINOPHEN 500 MG
650 TABLET ORAL EVERY 6 HOURS
Refills: 0 | Status: DISCONTINUED | OUTPATIENT
Start: 2019-05-22 | End: 2019-05-27

## 2019-05-22 RX ORDER — SODIUM CHLORIDE 9 MG/ML
1000 INJECTION INTRAMUSCULAR; INTRAVENOUS; SUBCUTANEOUS
Refills: 0 | Status: DISCONTINUED | OUTPATIENT
Start: 2019-05-22 | End: 2019-05-23

## 2019-05-22 RX ORDER — HYDROCHLOROTHIAZIDE 25 MG
12.5 TABLET ORAL DAILY
Refills: 0 | Status: DISCONTINUED | OUTPATIENT
Start: 2019-05-22 | End: 2019-05-23

## 2019-05-22 RX ORDER — LISINOPRIL 2.5 MG/1
40 TABLET ORAL DAILY
Refills: 0 | Status: DISCONTINUED | OUTPATIENT
Start: 2019-05-22 | End: 2019-05-26

## 2019-05-22 RX ORDER — GLUCAGON INJECTION, SOLUTION 0.5 MG/.1ML
1 INJECTION, SOLUTION SUBCUTANEOUS ONCE
Refills: 0 | Status: DISCONTINUED | OUTPATIENT
Start: 2019-05-22 | End: 2019-05-27

## 2019-05-22 RX ORDER — INSULIN LISPRO 100/ML
VIAL (ML) SUBCUTANEOUS
Refills: 0 | Status: DISCONTINUED | OUTPATIENT
Start: 2019-05-22 | End: 2019-05-27

## 2019-05-22 RX ORDER — PIPERACILLIN AND TAZOBACTAM 4; .5 G/20ML; G/20ML
3.38 INJECTION, POWDER, LYOPHILIZED, FOR SOLUTION INTRAVENOUS EVERY 8 HOURS
Refills: 0 | Status: DISCONTINUED | OUTPATIENT
Start: 2019-05-22 | End: 2019-05-24

## 2019-05-22 RX ORDER — DEXTROSE 50 % IN WATER 50 %
25 SYRINGE (ML) INTRAVENOUS ONCE
Refills: 0 | Status: DISCONTINUED | OUTPATIENT
Start: 2019-05-22 | End: 2019-05-27

## 2019-05-22 RX ORDER — ONDANSETRON 8 MG/1
4 TABLET, FILM COATED ORAL EVERY 4 HOURS
Refills: 0 | Status: DISCONTINUED | OUTPATIENT
Start: 2019-05-22 | End: 2019-05-22

## 2019-05-22 RX ORDER — DEXTROSE 50 % IN WATER 50 %
12.5 SYRINGE (ML) INTRAVENOUS ONCE
Refills: 0 | Status: DISCONTINUED | OUTPATIENT
Start: 2019-05-22 | End: 2019-05-27

## 2019-05-22 RX ORDER — MORPHINE SULFATE 50 MG/1
2 CAPSULE, EXTENDED RELEASE ORAL EVERY 4 HOURS
Refills: 0 | Status: DISCONTINUED | OUTPATIENT
Start: 2019-05-22 | End: 2019-05-23

## 2019-05-22 RX ORDER — INSULIN GLARGINE 100 [IU]/ML
25 INJECTION, SOLUTION SUBCUTANEOUS AT BEDTIME
Refills: 0 | Status: DISCONTINUED | OUTPATIENT
Start: 2019-05-22 | End: 2019-05-23

## 2019-05-22 RX ORDER — HEPARIN SODIUM 5000 [USP'U]/ML
5000 INJECTION INTRAVENOUS; SUBCUTANEOUS EVERY 8 HOURS
Refills: 0 | Status: DISCONTINUED | OUTPATIENT
Start: 2019-05-22 | End: 2019-05-27

## 2019-05-22 RX ORDER — INSULIN LISPRO 100/ML
VIAL (ML) SUBCUTANEOUS AT BEDTIME
Refills: 0 | Status: DISCONTINUED | OUTPATIENT
Start: 2019-05-22 | End: 2019-05-27

## 2019-05-22 RX ORDER — SODIUM CHLORIDE 9 MG/ML
1000 INJECTION, SOLUTION INTRAVENOUS
Refills: 0 | Status: DISCONTINUED | OUTPATIENT
Start: 2019-05-22 | End: 2019-05-27

## 2019-05-22 RX ORDER — METOCLOPRAMIDE HCL 10 MG
10 TABLET ORAL ONCE
Refills: 0 | Status: DISCONTINUED | OUTPATIENT
Start: 2019-05-22 | End: 2019-05-22

## 2019-05-22 RX ORDER — ASPIRIN/CALCIUM CARB/MAGNESIUM 324 MG
81 TABLET ORAL DAILY
Refills: 0 | Status: DISCONTINUED | OUTPATIENT
Start: 2019-05-22 | End: 2019-05-27

## 2019-05-22 RX ORDER — INSULIN LISPRO 100/ML
20 VIAL (ML) SUBCUTANEOUS
Refills: 0 | Status: DISCONTINUED | OUTPATIENT
Start: 2019-05-22 | End: 2019-05-23

## 2019-05-22 RX ORDER — VANCOMYCIN HCL 1 G
1000 VIAL (EA) INTRAVENOUS EVERY 24 HOURS
Refills: 0 | Status: DISCONTINUED | OUTPATIENT
Start: 2019-05-22 | End: 2019-05-23

## 2019-05-22 RX ORDER — ERGOCALCIFEROL 1.25 MG/1
50000 CAPSULE ORAL
Refills: 0 | Status: DISCONTINUED | OUTPATIENT
Start: 2019-05-22 | End: 2019-05-27

## 2019-05-22 RX ORDER — OXYCODONE AND ACETAMINOPHEN 5; 325 MG/1; MG/1
1 TABLET ORAL EVERY 4 HOURS
Refills: 0 | Status: DISCONTINUED | OUTPATIENT
Start: 2019-05-22 | End: 2019-05-27

## 2019-05-22 RX ORDER — DEXTROSE 50 % IN WATER 50 %
15 SYRINGE (ML) INTRAVENOUS ONCE
Refills: 0 | Status: DISCONTINUED | OUTPATIENT
Start: 2019-05-22 | End: 2019-05-27

## 2019-05-22 RX ADMIN — MORPHINE SULFATE 2 MILLIGRAM(S): 50 CAPSULE, EXTENDED RELEASE ORAL at 22:03

## 2019-05-22 RX ADMIN — Medication 2: at 21:31

## 2019-05-22 RX ADMIN — ERGOCALCIFEROL 50000 UNIT(S): 1.25 CAPSULE ORAL at 21:30

## 2019-05-22 RX ADMIN — PIPERACILLIN AND TAZOBACTAM 25 GRAM(S): 4; .5 INJECTION, POWDER, LYOPHILIZED, FOR SOLUTION INTRAVENOUS at 05:50

## 2019-05-22 RX ADMIN — Medication 3: at 09:15

## 2019-05-22 RX ADMIN — MORPHINE SULFATE 2 MILLIGRAM(S): 50 CAPSULE, EXTENDED RELEASE ORAL at 21:32

## 2019-05-22 RX ADMIN — PIPERACILLIN AND TAZOBACTAM 25 GRAM(S): 4; .5 INJECTION, POWDER, LYOPHILIZED, FOR SOLUTION INTRAVENOUS at 21:31

## 2019-05-22 RX ADMIN — HEPARIN SODIUM 5000 UNIT(S): 5000 INJECTION INTRAVENOUS; SUBCUTANEOUS at 21:30

## 2019-05-22 RX ADMIN — Medication 3: at 18:30

## 2019-05-22 RX ADMIN — Medication 20 UNIT(S): at 18:46

## 2019-05-22 RX ADMIN — Medication 250 MILLIGRAM(S): at 18:31

## 2019-05-22 RX ADMIN — Medication 3: at 13:27

## 2019-05-22 RX ADMIN — LISINOPRIL 40 MILLIGRAM(S): 2.5 TABLET ORAL at 05:50

## 2019-05-22 RX ADMIN — Medication 12.5 MILLIGRAM(S): at 21:30

## 2019-05-22 RX ADMIN — INSULIN GLARGINE 25 UNIT(S): 100 INJECTION, SOLUTION SUBCUTANEOUS at 21:31

## 2019-05-22 RX ADMIN — SODIUM CHLORIDE 50 MILLILITER(S): 9 INJECTION INTRAMUSCULAR; INTRAVENOUS; SUBCUTANEOUS at 18:31

## 2019-05-22 RX ADMIN — PIPERACILLIN AND TAZOBACTAM 25 GRAM(S): 4; .5 INJECTION, POWDER, LYOPHILIZED, FOR SOLUTION INTRAVENOUS at 13:26

## 2019-05-22 RX ADMIN — SODIUM CHLORIDE 45 MILLILITER(S): 9 INJECTION INTRAMUSCULAR; INTRAVENOUS; SUBCUTANEOUS at 00:08

## 2019-05-22 RX ADMIN — Medication 81 MILLIGRAM(S): at 18:30

## 2019-05-22 NOTE — CONSULT NOTE ADULT - PROBLEM SELECTOR RECOMMENDATION 3
Suggest to continue medications, monitoring, FU primary team recommendations. .
Add HCTZ 12/5 mg daily   cont with lisinopril

## 2019-05-22 NOTE — PROGRESS NOTE ADULT - ASSESSMENT
Pt is scheduled for right foot I&D debridement with Dr. Issa at 1PM.  CXR on sunrise.  EKG on sunrise.  Medical/Cardiac clearance since 5/21 and documented in chart.  Consent signed and in chart.  Procedure was explained to patient in detail. All alternatives, risks and complications were discussed. All questions answered.

## 2019-05-22 NOTE — CONSULT NOTE ADULT - ASSESSMENT
57 yo male admitted with foot ulcer now s/p Incision and drainage, foot, bursa. Found to be persistently hypertensive

## 2019-05-22 NOTE — CONSULT NOTE ADULT - SUBJECTIVE AND OBJECTIVE BOX
CHIEF COMPLAINT:  Ulcer     HISTORY OF PRESENT ILLNESS: 59 y/o male with history of IDDM, HTN, presented to the ED sent by Podiatrist (Dr. Giovanni Hanna) for evaluation and treatment of infected R foot ulcer, failing outpatient Augmentin. Patient reports that he has been followed by Podiatry outpatient for R plantar foot ulcer x3-4 weeks. Approx 2 weeks ago pt developed fever, chills, diaphoresis, malaise, decreased PO intake and nausea. Treated with Augmentin for the last 1 week without improvement. Unable to control DM at home. Also reports orthopnea . Denies chest pain, dyspnea on exertion, abdominal pain, vomiting, diarrhea, dysuria, hematuria, frequency, back pain.  He underwent successful Incision and drainage, foot, bursa,. Has been hypertensive.   Denies chest pain, shortness of breath or palpitations.       PAST MEDICAL & SURGICAL HISTORY:  Hypertension  Insulin dependent diabetes mellitus  Venous insufficiency of both lower extremities: R &gt; L  HTN (hypertension)  BPH (benign prostatic hypertrophy)  Diabetes  History of cholecystectomy  S/P laparoscopic cholecystectomy  Status post incision and drainage: Rt groin abscess          MEDICATIONS:  aspirin enteric coated 81 milliGRAM(s) Oral daily  heparin  Injectable 5000 Unit(s) SubCutaneous every 8 hours  lisinopril 40 milliGRAM(s) Oral daily    piperacillin/tazobactam IVPB. 3.375 Gram(s) IV Intermittent every 8 hours  vancomycin  IVPB 1000 milliGRAM(s) IV Intermittent every 24 hours      acetaminophen   Tablet .. 650 milliGRAM(s) Oral every 6 hours PRN  acetaminophen   Tablet .. 650 milliGRAM(s) Oral every 6 hours PRN  morphine  - Injectable 2 milliGRAM(s) IV Push every 4 hours PRN  oxyCODONE    5 mG/acetaminophen 325 mG 1 Tablet(s) Oral every 4 hours PRN      dextrose 40% Gel 15 Gram(s) Oral once PRN  dextrose 50% Injectable 12.5 Gram(s) IV Push once  dextrose 50% Injectable 25 Gram(s) IV Push once  dextrose 50% Injectable 25 Gram(s) IV Push once  glucagon  Injectable 1 milliGRAM(s) IntraMuscular once PRN  insulin glargine Injectable (LANTUS) 25 Unit(s) SubCutaneous at bedtime  insulin lispro (HumaLOG) corrective regimen sliding scale   SubCutaneous three times a day before meals  insulin lispro (HumaLOG) corrective regimen sliding scale   SubCutaneous at bedtime  insulin lispro Injectable (HumaLOG) 20 Unit(s) SubCutaneous three times a day before meals    dextrose 5%. 1000 milliLiter(s) IV Continuous <Continuous>  ergocalciferol 64477 Unit(s) Oral every week  sodium chloride 0.9%. 1000 milliLiter(s) IV Continuous <Continuous>      FAMILY HISTORY:  Paternal family history of emphysema (Father)  Family history of diabetes mellitus (DM) (Mother, Grandparent)      SOCIAL HISTORY:    [ ] Non-smoker  [ ] Smoker  [ ] Alcohol    Allergies    No Known Allergies    Intolerances    	    REVIEW OF SYSTEMS:  CONSTITUTIONAL: No fever, weight loss, + fatigue  EYES: No eye pain, visual disturbances, or discharge  ENMT:  No difficulty hearing, tinnitus, vertigo; No sinus or throat pain  NECK: No pain or stiffness  RESPIRATORY: No cough, wheezing, chills or hemoptysis; No Shortness of Breath  CARDIOVASCULAR: No chest pain, palpitations, passing out, dizziness, or leg swelling  GASTROINTESTINAL: No abdominal or epigastric pain. No nausea, vomiting, or hematemesis; No diarrhea or constipation. No melena or hematochezia.  GENITOURINARY: No dysuria, frequency, hematuria, or incontinence  NEUROLOGICAL: No headaches, memory loss, loss of strength, numbness, or tremors  SKIN: No itching, burning, rashes, or lesions   LYMPH Nodes: No enlarged glands  ENDOCRINE: No heat or cold intolerance; No hair loss  MUSCULOSKELETAL: + joint pain or swelling; No muscle, back, or extremity pain  PSYCHIATRIC: No depression, anxiety, mood swings, or difficulty sleeping  HEME/LYMPH: No easy bruising, or bleeding gums  ALLERY AND IMMUNOLOGIC: No hives or eczema	    [ ] All others negative	  [ ] Unable to obtain    PHYSICAL EXAM:  T(C): 36.8 (05-22-19 @ 18:00), Max: 37.8 (05-21-19 @ 20:45)  HR: 83 (05-22-19 @ 18:00) (76 - 90)  BP: 152/83 (05-22-19 @ 18:00) (121/67 - 166/90)  RR: 18 (05-22-19 @ 18:00) (16 - 20)  SpO2: 97% (05-22-19 @ 18:00) (94% - 100%)  Wt(kg): --  I&O's Summary    21 May 2019 07:01  -  22 May 2019 07:00  --------------------------------------------------------  IN: 2000 mL / OUT: 600 mL / NET: 1400 mL    22 May 2019 07:01  -  22 May 2019 18:56  --------------------------------------------------------  IN: 0 mL / OUT: 300 mL / NET: -300 mL        Appearance: Normal	  HEENT:   Normal oral mucosa, PERRL, EOMI	  Lymphatic: No lymphadenopathy  Cardiovascular: Normal S1 S2, No JVD, No murmurs, No edema  Respiratory: Lungs clear to auscultation	  Psychiatry: A & O x 3, Mood & affect appropriate  Gastrointestinal:  Soft, Non-tender, + BS	  Skin: No rashes, No ecchymoses, No cyanosis	  Neurologic: Non-focal  Extremities: Normal range of motion, No clubbing, cyanosis or edema  Vascular: Peripheral pulses palpable 2+ bilaterally    TELEMETRY: 	    ECG:  	  RADIOLOGY:  < from: MR Foot w/wo IV Cont, Right (05.21.19 @ 23:10) >    EXAM:  MR FOOT WAW IC RT                            PROCEDURE DATE:  05/21/2019            INTERPRETATION:  RIGHT FOOT MRI    CLINICAL INFORMATION: Chronic wound along the plantar aspect of the right   foot. Evaluate for associated osteomyelitis.  TECHNIQUE: Multiplanar, multisequence MRI was obtained of the RIGHT foot   before and after the intravenous administration of 10 ml Gadavist (0 ml   discarded) .  COMPARISON: Right foot radiographs 20 May 2019. MRI of the right foot 18 October 2007.    FINDINGS:    PERIPHERAL SOFT TISSUES: There is a soft tissue wound in the plantar   aspect of the midfoot at the level of the cuneiforms that measures   approximately 20 x 19 mm (4:10, 24:12). A moderate amount of granulation   tissue is seen along the deep surface of the soft tissue wound. The wound   extends to the fascia of the intrinsic musculature of the foot. There is   no associated drainable fluid collection or abscess. No additional soft   tissue wounds are identified. There is nonspecific edema in the   subcutaneous fat along the dorsum of the foot.  BONE MARROW: Subchondral cystic change in the inferior aspect of the   medial cuneiform, favored to be secondary to adjacent cartilage loss. No   additional focal marrow signal abnormalities or abnormal enhancement. No   MR evidence of acute osteomyelitis. No fracture or osteonecrosis.    MUSCLES AND TENDONS: Edema and atrophy of the intrinsic musculature of   the foot. Imaged tendons are intact.  LIGAMENTS AND CAPSULAR STRUCTURES: The Lisfranc ligament is intact.  CARTILAGE AND SUBCHONDRAL BONE: As mentioned above, there is chondral   loss with subchondral cystic change in the first TMT joint. Additional   chondral loss with subchondral cystic change is identified at the   naviculocuneiform and talonavicular joints.  SYNOVIUM/JOINT FLUID: No large joint effusion.      IMPRESSION:  1.  Soft tissue ulceration along the plantar aspect of the foot.  2.  No MR evidence of acute osteomyelitis.  3.  No abscess or other drainable fluid collection.                    NACHO WATTS M.D., ATTENDING RADIOLOGIST  This document has been electronically signed. May 22 2019  8:31AM                < end of copied text >        	  LABS:	 	    CARDIAC MARKERS:                                  11.9   15.60 )-----------( 247      ( 22 May 2019 07:58 )             36.3     05-22    131<L>  |  99  |  27<H>  ----------------------------<  281<H>  4.1   |  20<L>  |  2.07<H>    Ca    8.8      22 May 2019 06:38      proBNP:   Lipid Profile:   HgA1c:   TSH:

## 2019-05-22 NOTE — BRIEF OPERATIVE NOTE - OPERATION/FINDINGS
wound tracking laterally and plantar with purulence; flushed and drained, no residual purulence expressed

## 2019-05-22 NOTE — PROGRESS NOTE ADULT - SUBJECTIVE AND OBJECTIVE BOX
Patient is a 58y old  Male who presents with a chief complaint of right foot ulcer (21 May 2019 15:31)      INTERVAL HPI/OVERNIGHT EVENTS:   Pt is scheduled for right foot I&D debridement with Dr. Issa at 1PM. Patient is aware of procedure and is NPO since midnight.    MEDICATIONS  (STANDING):  aspirin enteric coated 81 milliGRAM(s) Oral daily  Dakins Solution - 1/4 Strength 1 Application(s) Topical daily  dextrose 5%. 1000 milliLiter(s) (50 mL/Hr) IV Continuous <Continuous>  dextrose 50% Injectable 12.5 Gram(s) IV Push once  dextrose 50% Injectable 25 Gram(s) IV Push once  dextrose 50% Injectable 25 Gram(s) IV Push once  heparin  Injectable 5000 Unit(s) SubCutaneous every 8 hours  insulin glargine Injectable (LANTUS) 25 Unit(s) SubCutaneous at bedtime  insulin lispro (HumaLOG) corrective regimen sliding scale   SubCutaneous three times a day before meals  insulin lispro (HumaLOG) corrective regimen sliding scale   SubCutaneous at bedtime  insulin lispro Injectable (HumaLOG) 20 Unit(s) SubCutaneous three times a day before meals  lisinopril 40 milliGRAM(s) Oral daily  piperacillin/tazobactam IVPB. 3.375 Gram(s) IV Intermittent every 8 hours  sodium chloride 0.9%. 1000 milliLiter(s) (75 mL/Hr) IV Continuous <Continuous>  sodium chloride 0.9%. 1000 milliLiter(s) (45 mL/Hr) IV Continuous <Continuous>  vancomycin  IVPB 1000 milliGRAM(s) IV Intermittent every 24 hours    MEDICATIONS  (PRN):  acetaminophen   Tablet .. 650 milliGRAM(s) Oral every 6 hours PRN Temp greater or equal to 38C (100.4F)  acetaminophen   Tablet .. 650 milliGRAM(s) Oral every 6 hours PRN Mild Pain (1 - 3)  dextrose 40% Gel 15 Gram(s) Oral once PRN Blood Glucose LESS THAN 70 milliGRAM(s)/deciliter  glucagon  Injectable 1 milliGRAM(s) IntraMuscular once PRN Glucose LESS THAN 70 milligrams/deciliter      Allergies    No Known Allergies    Intolerances        Vital Signs Last 24 Hrs  T(C): 37.6 (22 May 2019 05:30), Max: 37.8 (21 May 2019 20:45)  T(F): 99.7 (22 May 2019 05:30), Max: 100.1 (21 May 2019 20:45)  HR: 89 (22 May 2019 05:30) (83 - 89)  BP: 164/91 (22 May 2019 05:30) (124/77 - 165/82)  BP(mean): --  RR: 17 (22 May 2019 05:30) (17 - 18)  SpO2: 97% (22 May 2019 05:30) (96% - 97%)    LABS:                        13.1   15.8  )-----------( 259      ( 21 May 2019 20:28 )             38.1     05-22    131<L>  |  99  |  27<H>  ----------------------------<  281<H>  4.1   |  20<L>  |  2.07<H>    Ca    8.8      22 May 2019 06:38    TPro  7.5  /  Alb  3.6  /  TBili  0.7  /  DBili  x   /  AST  12  /  ALT  12  /  AlkPhos  130<H>  05-20        CAPILLARY BLOOD GLUCOSE      POCT Blood Glucose.: 201 mg/dL (21 May 2019 22:04)  POCT Blood Glucose.: 138 mg/dL (21 May 2019 17:46)  POCT Blood Glucose.: 226 mg/dL (21 May 2019 12:53)  POCT Blood Glucose.: 318 mg/dL (21 May 2019 08:44)      RADIOLOGY & ADDITIONAL TESTS:

## 2019-05-22 NOTE — CONSULT NOTE ADULT - SUBJECTIVE AND OBJECTIVE BOX
HPI:  59 y/o male with history of IDDM, HTN, presents to the ED sent by Podiatrist (Dr. Giovanni Hanna) for evaluation and treatment of infected R foot ulcer, failing outpatient Augmentin. Patient reports that he has been followed by Podiatry outpatient for R plantar foot ulcer x3-4 weeks. Approx 2 weeks ago pt developed fever, chills, diaphoresis, malaise, decreased PO intake and nausea. Treated with Augmentin for the last 1 week without improvement. Unable to control DM at home. Also reports orthopnea . Denies chest pain, dyspnea on exertion, abdominal pain, vomiting, diarrhea, dysuria, hematuria, frequency, back pain. (20 May 2019 18:44)  Patient has history of diabetes,  on large dose basal bolus insulin at home, no recent hypoglycemic episodes, no polyuria polydipsia. Patient follows up with PCP for diabetes management.    PAST MEDICAL & SURGICAL HISTORY:  Hypertension  Insulin dependent diabetes mellitus  Venous insufficiency of both lower extremities: R &gt; L  HTN (hypertension)  BPH (benign prostatic hypertrophy)  Diabetes  History of cholecystectomy  S/P laparoscopic cholecystectomy  Status post incision and drainage: Rt groin abscess      FAMILY HISTORY:  Paternal family history of emphysema (Father)  Family history of diabetes mellitus (DM) (Mother, Grandparent)      Social History:    Outpatient Medications:    MEDICATIONS  (STANDING):  aspirin enteric coated 81 milliGRAM(s) Oral daily  dextrose 5%. 1000 milliLiter(s) (50 mL/Hr) IV Continuous <Continuous>  dextrose 50% Injectable 12.5 Gram(s) IV Push once  dextrose 50% Injectable 25 Gram(s) IV Push once  dextrose 50% Injectable 25 Gram(s) IV Push once  ergocalciferol 67165 Unit(s) Oral every week  heparin  Injectable 5000 Unit(s) SubCutaneous every 8 hours  hydrochlorothiazide 12.5 milliGRAM(s) Oral daily  insulin glargine Injectable (LANTUS) 25 Unit(s) SubCutaneous at bedtime  insulin lispro (HumaLOG) corrective regimen sliding scale   SubCutaneous three times a day before meals  insulin lispro (HumaLOG) corrective regimen sliding scale   SubCutaneous at bedtime  insulin lispro Injectable (HumaLOG) 20 Unit(s) SubCutaneous three times a day before meals  lisinopril 40 milliGRAM(s) Oral daily  piperacillin/tazobactam IVPB. 3.375 Gram(s) IV Intermittent every 8 hours  sodium chloride 0.9%. 1000 milliLiter(s) (50 mL/Hr) IV Continuous <Continuous>  vancomycin  IVPB 1000 milliGRAM(s) IV Intermittent every 24 hours    MEDICATIONS  (PRN):  acetaminophen   Tablet .. 650 milliGRAM(s) Oral every 6 hours PRN Temp greater or equal to 38C (100.4F)  acetaminophen   Tablet .. 650 milliGRAM(s) Oral every 6 hours PRN Mild Pain (1 - 3)  dextrose 40% Gel 15 Gram(s) Oral once PRN Blood Glucose LESS THAN 70 milliGRAM(s)/deciliter  glucagon  Injectable 1 milliGRAM(s) IntraMuscular once PRN Glucose LESS THAN 70 milligrams/deciliter  morphine  - Injectable 2 milliGRAM(s) IV Push every 4 hours PRN Severe Pain (7 - 10)  oxyCODONE    5 mG/acetaminophen 325 mG 1 Tablet(s) Oral every 4 hours PRN Moderate Pain (4 - 6)      Allergies    No Known Allergies    Intolerances      Review of Systems:  Constitutional: No fever, no chills  Eyes: No blurry vision  Neuro: No tremors  HEENT: No pain, no neck swelling  Cardiovascular: No chest pain, no palpitations  Respiratory: Has SOB, no cough  GI: No nausea, vomiting, abdominal pain  : No dysuria  Skin: no rash  MSK: Has leg swelling.  Psych: no depression  Endocrine: no polyuria, polydipsia    ALL OTHER SYSTEMS REVIEWED AND NEGATIVE    UNABLE TO OBTAIN    PHYSICAL EXAM:  VITALS: T(C): 37.7 (05-22-19 @ 21:24)  T(F): 99.9 (05-22-19 @ 21:24), Max: 99.9 (05-21-19 @ 23:30)  HR: 100 (05-22-19 @ 21:24) (76 - 100)  BP: 170/95 (05-22-19 @ 21:24) (121/67 - 170/95)  RR:  (16 - 20)  SpO2:  (94% - 100%)  Wt(kg): --  GENERAL: NAD, well-groomed, well-developed  EYES: No proptosis, no lid lag  HEENT:  Atraumatic, Normocephalic  THYROID: Normal size, no palpable nodules  RESPIRATORY: Clear to auscultation bilaterally; No rales, rhonchi, wheezing  CARDIOVASCULAR: Si S2, No murmurs;  GI: Soft, non distended, normal bowel sounds  SKIN: Dry, intact, No rashes or lesions  MUSCULOSKELETAL: Has BL lower extremity edema.  NEURO:  no tremor, sensation decreased in feet BL,    POCT Blood Glucose.: 316 mg/dL (05-22-19 @ 21:18)  POCT Blood Glucose.: 261 mg/dL (05-22-19 @ 17:58)  POCT Blood Glucose.: 260 mg/dL (05-22-19 @ 13:25)  POCT Blood Glucose.: 266 mg/dL (05-22-19 @ 09:12)  POCT Blood Glucose.: 201 mg/dL (05-21-19 @ 22:04)  POCT Blood Glucose.: 138 mg/dL (05-21-19 @ 17:46)  POCT Blood Glucose.: 226 mg/dL (05-21-19 @ 12:53)  POCT Blood Glucose.: 318 mg/dL (05-21-19 @ 08:44)  POCT Blood Glucose.: 395 mg/dL (05-20-19 @ 22:47)  POCT Blood Glucose.: 414 mg/dL (05-20-19 @ 20:03)  POCT Blood Glucose.: 439 mg/dL (05-20-19 @ 18:42)  POCT Blood Glucose.: 453 mg/dL (05-20-19 @ 18:40)                            11.9   15.60 )-----------( 247      ( 22 May 2019 07:58 )             36.3       05-22    131<L>  |  99  |  27<H>  ----------------------------<  281<H>  4.1   |  20<L>  |  2.07<H>    EGFR if : 39<L>  EGFR if non : 34<L>    Ca    8.8      05-22    TPro  7.5  /  Alb  3.6  /  TBili  0.7  /  DBili  x   /  AST  12  /  ALT  12  /  AlkPhos  130<H>  05-20      Thyroid Function Tests:      Hemoglobin A1C, Whole Blood: 11.8 % <H> [4.0 - 5.6] (05-21-19 @ 11:29)  Hemoglobin A1C, Whole Blood: 11.9 % <H> [4.0 - 5.6] (05-20-19 @ 20:01)          Radiology:

## 2019-05-22 NOTE — PROGRESS NOTE ADULT - SUBJECTIVE AND OBJECTIVE BOX
Patient is a 58y old  Male who presents with a chief complaint of right foot ulcer (22 May 2019 11:39)      SUBJECTIVE / OVERNIGHT EVENTS: was seen earlier today  scheduled for debridement of right foot by podiatry today.  c/o dizziness.      MEDICATIONS  (STANDING):    MEDICATIONS  (PRN):      Vital Signs Last 24 Hrs  T(C): 36.4 (22 May 2019 15:52), Max: 37.8 (21 May 2019 20:45)  T(F): 97.5 (22 May 2019 15:52), Max: 100.1 (21 May 2019 20:45)  HR: 76 (22 May 2019 16:07) (76 - 90)  BP: 140/73 (22 May 2019 16:02) (127/69 - 166/90)  BP(mean): 100 (22 May 2019 16:02) (93 - 100)  RR: 16 (22 May 2019 16:07) (16 - 18)  SpO2: 98% (22 May 2019 16:07) (96% - 100%)  CAPILLARY BLOOD GLUCOSE      POCT Blood Glucose.: 260 mg/dL (22 May 2019 13:25)  POCT Blood Glucose.: 266 mg/dL (22 May 2019 09:12)  POCT Blood Glucose.: 201 mg/dL (21 May 2019 22:04)  POCT Blood Glucose.: 138 mg/dL (21 May 2019 17:46)    I&O's Summary    21 May 2019 07:01  -  22 May 2019 07:00  --------------------------------------------------------  IN: 2000 mL / OUT: 600 mL / NET: 1400 mL    22 May 2019 07:01  -  22 May 2019 16:38  --------------------------------------------------------  IN: 0 mL / OUT: 300 mL / NET: -300 mL        PHYSICAL EXAM:  GENERAL: NAD, well-developed  HEAD:  Atraumatic, Normocephalic  EYES: EOMI, PERRLA, conjunctiva and sclera clear  NECK: Supple, No JVD  CHEST/LUNG: Clear to auscultation bilaterally; No wheeze  HEART: Regular rate and rhythm; No murmurs, rubs, or gallops  ABDOMEN: Soft, Nontender, Nondistended; Bowel sounds present  EXTREMITIES:  2+ Peripheral Pulses, No clubbing, cyanosis, or edema  PSYCH: AAOx3  NEUROLOGY: non-focal  SKIN: No rashes or lesions    LABS:                        11.9   15.60 )-----------( 247      ( 22 May 2019 07:58 )             36.3     05-22    131<L>  |  99  |  27<H>  ----------------------------<  281<H>  4.1   |  20<L>  |  2.07<H>    Ca    8.8      22 May 2019 06:38      PT/INR - ( 22 May 2019 09:28 )   PT: 12.9 sec;   INR: 1.13 ratio         PTT - ( 22 May 2019 09:28 )  PTT:26.9 sec          RADIOLOGY & ADDITIONAL TESTS:    Imaging Personally Reviewed:  < from: MR Foot w/wo IV Cont, Right (05.21.19 @ 23:10) >  FINDINGS:    PERIPHERAL SOFT TISSUES: There is a soft tissue wound in the plantar   aspect of the midfoot at the level of the cuneiforms that measures   approximately 20 x 19 mm (4:10, 24:12). A moderate amount of granulation   tissue is seen along the deep surface of the soft tissue wound. The wound   extends to the fascia of the intrinsic musculature of the foot. There is   no associated drainable fluid collection or abscess. No additional soft   tissue wounds are identified. There is nonspecific edema in the   subcutaneous fat along the dorsum of the foot.  BONE MARROW: Subchondral cystic change in the inferior aspect of the   medial cuneiform, favored to be secondary to adjacent cartilage loss. No   additional focal marrow signal abnormalities or abnormal enhancement. No   MR evidence of acute osteomyelitis. No fracture or osteonecrosis.    MUSCLES AND TENDONS: Edema and atrophy of the intrinsic musculature of   the foot. Imaged tendons are intact.  LIGAMENTS AND CAPSULAR STRUCTURES: The Lisfranc ligament is intact.  CARTILAGE AND SUBCHONDRAL BONE: As mentioned above, there is chondral   loss with subchondral cystic change in the first TMT joint. Additional   chondral loss with subchondral cystic change is identified at the   naviculocuneiform and talonavicular joints.  SYNOVIUM/JOINT FLUID: No large joint effusion.      IMPRESSION:  1.  Soft tissue ulceration along the plantar aspect of the foot.  2.  No MR evidence of acute osteomyelitis.  3.  No abscess or other drainable fluid collection.      < end of copied text >      Consultant(s) Notes Reviewed:      Care Discussed with Consultants/Other Providers:

## 2019-05-22 NOTE — PROGRESS NOTE ADULT - ASSESSMENT
58 M with IDDM, neuropathy presenting with diabetic foor ulcer.   Failed outpatient augmentin.  Has had subjective fevers, and chills, nausea, decreased PO intake and uncontrollable sugars.   Here he is afebrile, leukocytosis 14k.  xray showing edema but not OM  Seen by podiatry who did a bedside debridement. No probe to bone.   Received one dose of vanco and zosyn in ED    Overall, 58M with diabetic foot ulcer with surrounding cellulitis

## 2019-05-22 NOTE — PROGRESS NOTE ADULT - ASSESSMENT
59 y/o male with history of IDDM, HTN, presents to the ED sent by Podiatrist (Dr. Giovanni Hanna) for evaluation and treatment of infected R foot ulcer, failing outpatient Augmentin. Patient reports that he has been followed by Podiatry outpatient for R plantar foot ulcer x3-4 weeks. Approx 2 weeks ago pt developed fever, chills, diaphoresis, malaise, decreased PO intake and nausea. Treated with Augmentin for the last 1 week without improvement. Unable to control DM at home. Also reports orthopnea . Denies chest pain, dyspnea on exertion, abdominal pain, vomiting, diarrhea, dysuria, hematuria, frequency, back pain.      diabetic foot ulcer with surrounding cellulitis   -Piperacillin/tazobactam 3.375 grams every 8 hours   - Vancomycin 1g q24hrs  -monitor creatinine   -follow up on blood and wound cultures   - MRI of the foot done  - for debridement today  - pt has no medical contraindications for the planned procedure    uncontrolled Diabetes  - cont levemir  - novolog 20 units qac  - hgb a1c  11.9  - diabetic diet  - FS qid  - endo consult called    MARIKA  - stop lasix and lisinopril  - gentle hydration-  - follow creatinine  - may need nephrology    uncontrolled HTN  -  c/w Norvasc    hyponatremia  - NO salt restriction  - c/w NS  - may be falsely low due to hyperglycemia

## 2019-05-22 NOTE — CONSULT NOTE ADULT - ASSESSMENT
Assessment  DMT2: 58y Male with DM T2 with hyperglycemia, was on  insulin at home, blood sugars running high, had surgical procedure today, no hypoglycemic episode,  eating meals, non compliant with low carb diet.  Foot wound: on medications, no chest pain, stable, monitored.  HTN: Controlled,  on antihypertensive medications.  Overweight/Obesity: No strict exercise routines, not on any weight loss program, neither on low calorie diet.          Andrzej Zhu MD  Cell: [phone]  Office: [phone]

## 2019-05-22 NOTE — CONSULT NOTE ADULT - PROBLEM SELECTOR RECOMMENDATION 9
Will increase Lantus to 25 units at bed time.  Will increase Humalog to 20 units before each meal in addition to Humalog correction scale coverage.  Patient counseled for compliance with consistent low carb diet.

## 2019-05-22 NOTE — PROGRESS NOTE ADULT - SUBJECTIVE AND OBJECTIVE BOX
AGUSTIN BRAR 58y [id number]    Patient is a 58y old  Male who presents with a chief complaint of right foot ulcer (22 May 2019 07:40)      Follow Up/CC:  ID following for foot ulcer    Interval History/ROS: planning for OR debridement today, no fever    Allergies    No Known Allergies    Intolerances        ANTIMICROBIALS:  piperacillin/tazobactam IVPB. 3.375 every 8 hours  vancomycin  IVPB 1000 every 24 hours      MEDICATIONS  (STANDING):  aspirin enteric coated 81 milliGRAM(s) Oral daily  Dakins Solution - 1/4 Strength 1 Application(s) Topical daily  dextrose 5%. 1000 milliLiter(s) (50 mL/Hr) IV Continuous <Continuous>  dextrose 50% Injectable 12.5 Gram(s) IV Push once  dextrose 50% Injectable 25 Gram(s) IV Push once  dextrose 50% Injectable 25 Gram(s) IV Push once  heparin  Injectable 5000 Unit(s) SubCutaneous every 8 hours  insulin glargine Injectable (LANTUS) 25 Unit(s) SubCutaneous at bedtime  insulin lispro (HumaLOG) corrective regimen sliding scale   SubCutaneous three times a day before meals  insulin lispro (HumaLOG) corrective regimen sliding scale   SubCutaneous at bedtime  insulin lispro Injectable (HumaLOG) 20 Unit(s) SubCutaneous three times a day before meals  lisinopril 40 milliGRAM(s) Oral daily  piperacillin/tazobactam IVPB. 3.375 Gram(s) IV Intermittent every 8 hours  sodium chloride 0.9%. 1000 milliLiter(s) (75 mL/Hr) IV Continuous <Continuous>  sodium chloride 0.9%. 1000 milliLiter(s) (45 mL/Hr) IV Continuous <Continuous>  vancomycin  IVPB 1000 milliGRAM(s) IV Intermittent every 24 hours    MEDICATIONS  (PRN):  acetaminophen   Tablet .. 650 milliGRAM(s) Oral every 6 hours PRN Temp greater or equal to 38C (100.4F)  acetaminophen   Tablet .. 650 milliGRAM(s) Oral every 6 hours PRN Mild Pain (1 - 3)  dextrose 40% Gel 15 Gram(s) Oral once PRN Blood Glucose LESS THAN 70 milliGRAM(s)/deciliter  glucagon  Injectable 1 milliGRAM(s) IntraMuscular once PRN Glucose LESS THAN 70 milligrams/deciliter        Vital Signs Last 24 Hrs  T(C): 37.6 (22 May 2019 05:30), Max: 37.8 (21 May 2019 20:45)  T(F): 99.7 (22 May 2019 05:30), Max: 100.1 (21 May 2019 20:45)  HR: 89 (22 May 2019 05:30) (83 - 89)  BP: 164/91 (22 May 2019 05:30) (124/77 - 165/82)  BP(mean): --  RR: 17 (22 May 2019 05:30) (17 - 18)  SpO2: 97% (22 May 2019 05:30) (96% - 97%)    CBC Full  -  ( 22 May 2019 07:58 )  WBC Count : 15.60 K/uL  RBC Count : 4.03 M/uL  Hemoglobin : 11.9 g/dL  Hematocrit : 36.3 %  Platelet Count - Automated : 247 K/uL  Mean Cell Volume : 90.1 fl  Mean Cell Hemoglobin : 29.5 pg  Mean Cell Hemoglobin Concentration : 32.8 gm/dL  Auto Neutrophil # : x  Auto Lymphocyte # : x  Auto Monocyte # : x  Auto Eosinophil # : x  Auto Basophil # : x  Auto Neutrophil % : x  Auto Lymphocyte % : x  Auto Monocyte % : x  Auto Eosinophil % : x  Auto Basophil % : x    05-22    131<L>  |  99  |  27<H>  ----------------------------<  281<H>  4.1   |  20<L>  |  2.07<H>    Ca    8.8      22 May 2019 06:38    TPro  7.5  /  Alb  3.6  /  TBili  0.7  /  DBili  x   /  AST  12  /  ALT  12  /  AlkPhos  130<H>  05-20    LIVER FUNCTIONS - ( 20 May 2019 14:16 )  Alb: 3.6 g/dL / Pro: 7.5 g/dL / ALK PHOS: 130 U/L / ALT: 12 U/L / AST: 12 U/L / GGT: x               MICROBIOLOGY:  .Blood  05-20-19   No growth to date.  --  --      .Abscess  05-20-19   Numerous Gram positive cocci in pairs  Numerous Gram Negative Rods  --  --      RADIOLOGY    MR Foot w/wo IV Cont, Right (05.21.19 @ 23:10) >  1.  Soft tissue ulceration along the plantar aspect of the foot.  2.  No MR evidence of acute osteomyelitis.  3.  No abscess or other drainable fluid collection.

## 2019-05-23 DIAGNOSIS — N17.9 ACUTE KIDNEY FAILURE, UNSPECIFIED: ICD-10-CM

## 2019-05-23 DIAGNOSIS — E55.9 VITAMIN D DEFICIENCY, UNSPECIFIED: ICD-10-CM

## 2019-05-23 LAB
-  AMPICILLIN: SIGNIFICANT CHANGE UP
-  TETRACYCLINE: SIGNIFICANT CHANGE UP
-  VANCOMYCIN: SIGNIFICANT CHANGE UP
ANION GAP SERPL CALC-SCNC: 12 MMOL/L — SIGNIFICANT CHANGE UP (ref 5–17)
BUN SERPL-MCNC: 22 MG/DL — SIGNIFICANT CHANGE UP (ref 7–23)
CALCIUM SERPL-MCNC: 8.8 MG/DL — SIGNIFICANT CHANGE UP (ref 8.4–10.5)
CHLORIDE SERPL-SCNC: 99 MMOL/L — SIGNIFICANT CHANGE UP (ref 96–108)
CO2 SERPL-SCNC: 21 MMOL/L — LOW (ref 22–31)
CREAT SERPL-MCNC: 1.87 MG/DL — HIGH (ref 0.5–1.3)
GLUCOSE BLDC GLUCOMTR-MCNC: 138 MG/DL — HIGH (ref 70–99)
GLUCOSE BLDC GLUCOMTR-MCNC: 178 MG/DL — HIGH (ref 70–99)
GLUCOSE BLDC GLUCOMTR-MCNC: 246 MG/DL — HIGH (ref 70–99)
GLUCOSE BLDC GLUCOMTR-MCNC: 287 MG/DL — HIGH (ref 70–99)
GLUCOSE SERPL-MCNC: 313 MG/DL — HIGH (ref 70–99)
GRAM STN FLD: SIGNIFICANT CHANGE UP
HCT VFR BLD CALC: 34.4 % — LOW (ref 39–50)
HGB BLD-MCNC: 11.3 G/DL — LOW (ref 13–17)
MCHC RBC-ENTMCNC: 29.4 PG — SIGNIFICANT CHANGE UP (ref 27–34)
MCHC RBC-ENTMCNC: 32.8 GM/DL — SIGNIFICANT CHANGE UP (ref 32–36)
MCV RBC AUTO: 89.6 FL — SIGNIFICANT CHANGE UP (ref 80–100)
METHOD TYPE: SIGNIFICANT CHANGE UP
PLATELET # BLD AUTO: 247 K/UL — SIGNIFICANT CHANGE UP (ref 150–400)
POTASSIUM SERPL-MCNC: 4.1 MMOL/L — SIGNIFICANT CHANGE UP (ref 3.5–5.3)
POTASSIUM SERPL-SCNC: 4.1 MMOL/L — SIGNIFICANT CHANGE UP (ref 3.5–5.3)
RBC # BLD: 3.84 M/UL — LOW (ref 4.2–5.8)
RBC # FLD: 12.8 % — SIGNIFICANT CHANGE UP (ref 10.3–14.5)
SODIUM SERPL-SCNC: 132 MMOL/L — LOW (ref 135–145)
SPECIMEN SOURCE: SIGNIFICANT CHANGE UP
WBC # BLD: 15.14 K/UL — HIGH (ref 3.8–10.5)
WBC # FLD AUTO: 15.14 K/UL — HIGH (ref 3.8–10.5)

## 2019-05-23 PROCEDURE — 99223 1ST HOSP IP/OBS HIGH 75: CPT | Mod: GC

## 2019-05-23 PROCEDURE — 99232 SBSQ HOSP IP/OBS MODERATE 35: CPT

## 2019-05-23 RX ORDER — OXYCODONE AND ACETAMINOPHEN 5; 325 MG/1; MG/1
2 TABLET ORAL EVERY 4 HOURS
Refills: 0 | Status: DISCONTINUED | OUTPATIENT
Start: 2019-05-23 | End: 2019-05-27

## 2019-05-23 RX ORDER — INSULIN GLARGINE 100 [IU]/ML
38 INJECTION, SOLUTION SUBCUTANEOUS AT BEDTIME
Refills: 0 | Status: DISCONTINUED | OUTPATIENT
Start: 2019-05-23 | End: 2019-05-26

## 2019-05-23 RX ORDER — HYDRALAZINE HCL 50 MG
25 TABLET ORAL
Refills: 0 | Status: DISCONTINUED | OUTPATIENT
Start: 2019-05-23 | End: 2019-05-24

## 2019-05-23 RX ORDER — HYDROCHLOROTHIAZIDE 25 MG
25 TABLET ORAL DAILY
Refills: 0 | Status: DISCONTINUED | OUTPATIENT
Start: 2019-05-23 | End: 2019-05-23

## 2019-05-23 RX ORDER — INSULIN GLARGINE 100 [IU]/ML
35 INJECTION, SOLUTION SUBCUTANEOUS AT BEDTIME
Refills: 0 | Status: DISCONTINUED | OUTPATIENT
Start: 2019-05-23 | End: 2019-05-23

## 2019-05-23 RX ORDER — INSULIN LISPRO 100/ML
26 VIAL (ML) SUBCUTANEOUS
Refills: 0 | Status: DISCONTINUED | OUTPATIENT
Start: 2019-05-23 | End: 2019-05-26

## 2019-05-23 RX ORDER — ERGOCALCIFEROL 1.25 MG/1
50000 CAPSULE ORAL
Refills: 0 | Status: DISCONTINUED | OUTPATIENT
Start: 2019-05-23 | End: 2019-05-27

## 2019-05-23 RX ADMIN — MORPHINE SULFATE 2 MILLIGRAM(S): 50 CAPSULE, EXTENDED RELEASE ORAL at 04:13

## 2019-05-23 RX ADMIN — PIPERACILLIN AND TAZOBACTAM 25 GRAM(S): 4; .5 INJECTION, POWDER, LYOPHILIZED, FOR SOLUTION INTRAVENOUS at 05:12

## 2019-05-23 RX ADMIN — Medication 20 UNIT(S): at 09:35

## 2019-05-23 RX ADMIN — ERGOCALCIFEROL 50000 UNIT(S): 1.25 CAPSULE ORAL at 13:07

## 2019-05-23 RX ADMIN — MORPHINE SULFATE 2 MILLIGRAM(S): 50 CAPSULE, EXTENDED RELEASE ORAL at 03:33

## 2019-05-23 RX ADMIN — LISINOPRIL 40 MILLIGRAM(S): 2.5 TABLET ORAL at 05:12

## 2019-05-23 RX ADMIN — Medication 20 UNIT(S): at 13:51

## 2019-05-23 RX ADMIN — Medication 3: at 09:35

## 2019-05-23 RX ADMIN — OXYCODONE AND ACETAMINOPHEN 1 TABLET(S): 5; 325 TABLET ORAL at 14:30

## 2019-05-23 RX ADMIN — HEPARIN SODIUM 5000 UNIT(S): 5000 INJECTION INTRAVENOUS; SUBCUTANEOUS at 13:07

## 2019-05-23 RX ADMIN — OXYCODONE AND ACETAMINOPHEN 1 TABLET(S): 5; 325 TABLET ORAL at 05:18

## 2019-05-23 RX ADMIN — MORPHINE SULFATE 2 MILLIGRAM(S): 50 CAPSULE, EXTENDED RELEASE ORAL at 08:00

## 2019-05-23 RX ADMIN — Medication 0: at 21:32

## 2019-05-23 RX ADMIN — Medication 12.5 MILLIGRAM(S): at 05:12

## 2019-05-23 RX ADMIN — OXYCODONE AND ACETAMINOPHEN 1 TABLET(S): 5; 325 TABLET ORAL at 05:59

## 2019-05-23 RX ADMIN — OXYCODONE AND ACETAMINOPHEN 2 TABLET(S): 5; 325 TABLET ORAL at 18:30

## 2019-05-23 RX ADMIN — Medication 81 MILLIGRAM(S): at 13:07

## 2019-05-23 RX ADMIN — Medication 2: at 13:51

## 2019-05-23 RX ADMIN — MORPHINE SULFATE 2 MILLIGRAM(S): 50 CAPSULE, EXTENDED RELEASE ORAL at 16:49

## 2019-05-23 RX ADMIN — Medication 26 UNIT(S): at 18:34

## 2019-05-23 RX ADMIN — HEPARIN SODIUM 5000 UNIT(S): 5000 INJECTION INTRAVENOUS; SUBCUTANEOUS at 21:16

## 2019-05-23 RX ADMIN — MORPHINE SULFATE 2 MILLIGRAM(S): 50 CAPSULE, EXTENDED RELEASE ORAL at 17:10

## 2019-05-23 RX ADMIN — PIPERACILLIN AND TAZOBACTAM 25 GRAM(S): 4; .5 INJECTION, POWDER, LYOPHILIZED, FOR SOLUTION INTRAVENOUS at 13:17

## 2019-05-23 RX ADMIN — MORPHINE SULFATE 2 MILLIGRAM(S): 50 CAPSULE, EXTENDED RELEASE ORAL at 11:56

## 2019-05-23 RX ADMIN — OXYCODONE AND ACETAMINOPHEN 2 TABLET(S): 5; 325 TABLET ORAL at 18:57

## 2019-05-23 RX ADMIN — Medication 25 MILLIGRAM(S): at 18:32

## 2019-05-23 RX ADMIN — INSULIN GLARGINE 38 UNIT(S): 100 INJECTION, SOLUTION SUBCUTANEOUS at 21:32

## 2019-05-23 RX ADMIN — HEPARIN SODIUM 5000 UNIT(S): 5000 INJECTION INTRAVENOUS; SUBCUTANEOUS at 05:12

## 2019-05-23 RX ADMIN — MORPHINE SULFATE 2 MILLIGRAM(S): 50 CAPSULE, EXTENDED RELEASE ORAL at 07:44

## 2019-05-23 RX ADMIN — MORPHINE SULFATE 2 MILLIGRAM(S): 50 CAPSULE, EXTENDED RELEASE ORAL at 12:15

## 2019-05-23 RX ADMIN — PIPERACILLIN AND TAZOBACTAM 25 GRAM(S): 4; .5 INJECTION, POWDER, LYOPHILIZED, FOR SOLUTION INTRAVENOUS at 21:16

## 2019-05-23 RX ADMIN — OXYCODONE AND ACETAMINOPHEN 1 TABLET(S): 5; 325 TABLET ORAL at 13:56

## 2019-05-23 RX ADMIN — Medication 1: at 18:33

## 2019-05-23 NOTE — PROGRESS NOTE ADULT - PROBLEM SELECTOR PLAN 3
Still labile   Increase HCTZ to 25 Still labile   Dc HCTZ and start Hydralazine 25 mg BID   Low salt diet

## 2019-05-23 NOTE — PHYSICAL THERAPY INITIAL EVALUATION ADULT - GAIT TRAINING, PT EVAL
GOAL: Pt will ambulate 50 feet w/contact guard assist, w/use of appropriate assistive device in 2 weeks

## 2019-05-23 NOTE — PROGRESS NOTE ADULT - ASSESSMENT
Assessment  DMT2: 58y Male with DM T2 with hyperglycemia, on basal bolus insulin, s/p foot surgery,  blood sugars running high, no hypoglycemic episode, eating meals, non compliant with low carb diet.  Foot wound: on medications, no chest pain, stable, monitored.  HTN: Controlled,  on antihypertensive medications.  Overweight/Obesity: No strict exercise routines, not on any weight loss program, neither on low calorie diet.          Andrzej Zhu MD  Cell: [phone]  Office: [phone]

## 2019-05-23 NOTE — PROGRESS NOTE ADULT - SUBJECTIVE AND OBJECTIVE BOX
Patient is a 58y old  Male who presents with a chief complaint of right foot ulcer (23 May 2019 09:28)      SUBJECTIVE / OVERNIGHT EVENTS:  No chest pain. No shortness of breath. No complaints. No events overnight.     MEDICATIONS  (STANDING):  aspirin enteric coated 81 milliGRAM(s) Oral daily  dextrose 5%. 1000 milliLiter(s) (50 mL/Hr) IV Continuous <Continuous>  dextrose 50% Injectable 12.5 Gram(s) IV Push once  dextrose 50% Injectable 25 Gram(s) IV Push once  dextrose 50% Injectable 25 Gram(s) IV Push once  ergocalciferol 73655 Unit(s) Oral every week  ergocalciferol 68727 Unit(s) Oral every week  heparin  Injectable 5000 Unit(s) SubCutaneous every 8 hours  hydrALAZINE 25 milliGRAM(s) Oral two times a day  insulin glargine Injectable (LANTUS) 35 Unit(s) SubCutaneous at bedtime  insulin lispro (HumaLOG) corrective regimen sliding scale   SubCutaneous three times a day before meals  insulin lispro (HumaLOG) corrective regimen sliding scale   SubCutaneous at bedtime  insulin lispro Injectable (HumaLOG) 20 Unit(s) SubCutaneous three times a day before meals  lisinopril 40 milliGRAM(s) Oral daily  piperacillin/tazobactam IVPB. 3.375 Gram(s) IV Intermittent every 8 hours  vancomycin  IVPB 1000 milliGRAM(s) IV Intermittent every 24 hours    MEDICATIONS  (PRN):  acetaminophen   Tablet .. 650 milliGRAM(s) Oral every 6 hours PRN Temp greater or equal to 38C (100.4F)  acetaminophen   Tablet .. 650 milliGRAM(s) Oral every 6 hours PRN Mild Pain (1 - 3)  dextrose 40% Gel 15 Gram(s) Oral once PRN Blood Glucose LESS THAN 70 milliGRAM(s)/deciliter  glucagon  Injectable 1 milliGRAM(s) IntraMuscular once PRN Glucose LESS THAN 70 milligrams/deciliter  morphine  - Injectable 2 milliGRAM(s) IV Push every 4 hours PRN Severe Pain (7 - 10)  oxyCODONE    5 mG/acetaminophen 325 mG 1 Tablet(s) Oral every 4 hours PRN Moderate Pain (4 - 6)      Vital Signs Last 24 Hrs  T(C): 36.8 (23 May 2019 08:23), Max: 37.7 (22 May 2019 21:24)  T(F): 98.2 (23 May 2019 08:23), Max: 99.9 (22 May 2019 21:24)  HR: 76 (23 May 2019 08:23) (76 - 100)  BP: 138/74 (23 May 2019 08:23) (121/67 - 170/95)  BP(mean): 93 (22 May 2019 16:45) (93 - 101)  RR: 18 (23 May 2019 08:23) (16 - 20)  SpO2: 96% (23 May 2019 08:23) (94% - 100%)  CAPILLARY BLOOD GLUCOSE      POCT Blood Glucose.: 287 mg/dL (23 May 2019 09:10)  POCT Blood Glucose.: 316 mg/dL (22 May 2019 21:18)  POCT Blood Glucose.: 261 mg/dL (22 May 2019 17:58)  POCT Blood Glucose.: 260 mg/dL (22 May 2019 13:25)    I&O's Summary    22 May 2019 07:01  -  23 May 2019 07:00  --------------------------------------------------------  IN: 710 mL / OUT: 800 mL / NET: -90 mL    23 May 2019 07:01  -  23 May 2019 12:59  --------------------------------------------------------  IN: 240 mL / OUT: 0 mL / NET: 240 mL        PHYSICAL EXAM:  GENERAL: NAD, well-developed  HEAD:  Atraumatic, Normocephalic  EYES: EOMI, PERRLA, conjunctiva and sclera clear  NECK: Supple, No JVD  CHEST/LUNG: Clear to auscultation bilaterally; No wheeze  HEART: Regular rate and rhythm; No murmurs, rubs, or gallops  ABDOMEN: Soft, Nontender, Nondistended; Bowel sounds present  EXTREMITIES:  2+ Peripheral Pulses, No clubbing, cyanosis, or edema, dressing right foot c/d/i  PSYCH: AAOx3  NEUROLOGY: non-focal  SKIN: No rashes or lesions    LABS:                        11.3   15.14 )-----------( 247      ( 23 May 2019 08:30 )             34.4     05-23    132<L>  |  99  |  22  ----------------------------<  313<H>  4.1   |  21<L>  |  1.87<H>    Ca    8.8      23 May 2019 07:01      PT/INR - ( 22 May 2019 09:28 )   PT: 12.9 sec;   INR: 1.13 ratio         PTT - ( 22 May 2019 09:28 )  PTT:26.9 sec          RADIOLOGY & ADDITIONAL TESTS:    Imaging Personally Reviewed:    Consultant(s) Notes Reviewed:      Care Discussed with Consultants/Other Providers:

## 2019-05-23 NOTE — CONSULT NOTE ADULT - SUBJECTIVE AND OBJECTIVE BOX
[facility] DIVISION OF KIDNEY DISEASES AND HYPERTENSION -- INITIAL CONSULT NOTE  --------------------------------------------------------------------------------  HPI:  57 y/o male with history of IDDM, HTN, presents to the ED sent by Podiatrist (Dr. Giovanni Hanna) for evaluation and treatment of infected R foot ulcer, failing outpatient Augmentin. Patient reports that he has been followed by Podiatry outpatient for R plantar foot ulcer x3-4 weeks. Approx 2 weeks ago pt developed fever, chills, diaphoresis, malaise, decreased PO intake and nausea. Treated with Augmentin for the last 1 week without improvement. Unable to control DM at home. Also reports orthopnea . Denies chest pain, dyspnea on exertion, abdominal pain, vomiting, diarrhea, dysuria, hematuria, frequency, back pain. (20 May 2019 18:44)    Patient now s/p debridement of foot wound. Creatinine elevated on admission to 1.99 from baseline of 1.69 in 2017. Currently creatinine is 1.87.      PAST HISTORY  --------------------------------------------------------------------------------  PAST MEDICAL & SURGICAL HISTORY:  Hypertension  Insulin dependent diabetes mellitus  Venous insufficiency of both lower extremities: R &gt; L  HTN (hypertension)  BPH (benign prostatic hypertrophy)  Diabetes  History of cholecystectomy  S/P laparoscopic cholecystectomy  Status post incision and drainage: Rt groin abscess    FAMILY HISTORY:  Paternal family history of emphysema (Father)  Family history of diabetes mellitus (DM) (Mother, Grandparent)    PAST SOCIAL HISTORY:    ALLERGIES & MEDICATIONS  --------------------------------------------------------------------------------  Allergies    No Known Allergies    Intolerances      Standing Inpatient Medications  aspirin enteric coated 81 milliGRAM(s) Oral daily  dextrose 5%. 1000 milliLiter(s) IV Continuous <Continuous>  dextrose 50% Injectable 12.5 Gram(s) IV Push once  dextrose 50% Injectable 25 Gram(s) IV Push once  dextrose 50% Injectable 25 Gram(s) IV Push once  ergocalciferol 96873 Unit(s) Oral every week  ergocalciferol 68651 Unit(s) Oral every week  heparin  Injectable 5000 Unit(s) SubCutaneous every 8 hours  hydrochlorothiazide 25 milliGRAM(s) Oral daily  insulin glargine Injectable (LANTUS) 35 Unit(s) SubCutaneous at bedtime  insulin lispro (HumaLOG) corrective regimen sliding scale   SubCutaneous three times a day before meals  insulin lispro (HumaLOG) corrective regimen sliding scale   SubCutaneous at bedtime  insulin lispro Injectable (HumaLOG) 20 Unit(s) SubCutaneous three times a day before meals  lisinopril 40 milliGRAM(s) Oral daily  piperacillin/tazobactam IVPB. 3.375 Gram(s) IV Intermittent every 8 hours  sodium chloride 0.9%. 1000 milliLiter(s) IV Continuous <Continuous>  vancomycin  IVPB 1000 milliGRAM(s) IV Intermittent every 24 hours    PRN Inpatient Medications  acetaminophen   Tablet .. 650 milliGRAM(s) Oral every 6 hours PRN  acetaminophen   Tablet .. 650 milliGRAM(s) Oral every 6 hours PRN  dextrose 40% Gel 15 Gram(s) Oral once PRN  glucagon  Injectable 1 milliGRAM(s) IntraMuscular once PRN  morphine  - Injectable 2 milliGRAM(s) IV Push every 4 hours PRN  oxyCODONE    5 mG/acetaminophen 325 mG 1 Tablet(s) Oral every 4 hours PRN      REVIEW OF SYSTEMS  --------------------------------------------------------------------------------  Gen: No weight changes, fatigue, fevers/chills, weakness  Skin: No rashes  Head/Eyes/Ears/Mouth: No headache; Normal hearing; Normal vision w/o blurriness; No sinus pain/discomfort, sore throat  Respiratory: No dyspnea, cough, wheezing, hemoptysis  CV: No chest pain, PND, orthopnea  GI: No abdominal pain, diarrhea, constipation, nausea, vomiting, melena, hematochezia  : No increased frequency, dysuria, hematuria, nocturia  MSK: No joint pain/swelling; no back pain; no edema  Neuro: No dizziness/lightheadedness, weakness, seizures, numbness, tingling  Heme: No easy bruising or bleeding  Endo: No heat/cold intolerance  Psych: No significant nervousness, anxiety, stress, depression    All other systems were reviewed and are negative, except as noted.    VITALS/PHYSICAL EXAM  --------------------------------------------------------------------------------  T(C): 36.8 (05-23-19 @ 08:23), Max: 37.7 (05-22-19 @ 21:24)  HR: 76 (05-23-19 @ 08:23) (76 - 100)  BP: 138/74 (05-23-19 @ 08:23) (121/67 - 170/95)  RR: 18 (05-23-19 @ 08:23) (16 - 20)  SpO2: 96% (05-23-19 @ 08:23) (94% - 100%)  Wt(kg): --  Height (cm): 182.88 (05-22-19 @ 13:05)  Weight (kg): 111.6 (05-22-19 @ 13:05)  BMI (kg/m2): 33.4 (05-22-19 @ 13:05)  BSA (m2): 2.33 (05-22-19 @ 13:05)      05-22-19 @ 07:01  -  05-23-19 @ 07:00  --------------------------------------------------------  IN: 710 mL / OUT: 800 mL / NET: -90 mL      Physical Exam:  	Gen: NAD, well-appearing  	HEENT: PERRL, supple neck, clear oropharynx  	Pulm: CTA B/L  	CV: RRR, S1S2; no rub  	Back: No spinal or CVA tenderness; no sacral edema  	Abd: +BS, soft, nontender/nondistended  	: No suprapubic tenderness  	UE: Warm, FROM, no clubbing, intact strength; no edema; no asterixis  	LE: Warm, FROM, no clubbing, intact strength; no edema  	Neuro: No focal deficits, intact gait  	Psych: Normal affect and mood  	Skin: Warm, without rashes  	Vascular access:    LABS/STUDIES  --------------------------------------------------------------------------------              11.3   15.14 >-----------<  247      [05-23-19 @ 08:30]              34.4     132  |  99  |  22  ----------------------------<  313      [05-23-19 @ 07:01]  4.1   |  21  |  1.87        Ca     8.8     [05-23-19 @ 07:01]      PT/INR: PT 12.9 , INR 1.13       [05-22-19 @ 09:28]  PTT: 26.9       [05-22-19 @ 09:28]      Creatinine Trend:  SCr 1.87 [05-23 @ 07:01]  SCr 2.07 [05-22 @ 06:38]  SCr 2.08 [05-21 @ 20:28]  SCr 1.99 [05-20 @ 14:16]    Urinalysis - [08-10-15 @ 07:43]      Color YELLOW / Appearance CLEAR / SG 1.010 / pH 6.0      Gluc NEGATIVE / Ketone NEGATIVE  / Bili NEGATIVE / Urobili 4       Blood NEGATIVE / Protein 30 / Leuk Est NEGATIVE / Nitrite NEGATIVE      RBC 0-2 / WBC 5-10 / Hyaline 2-5 / Gran  / Sq Epi OCC / Non Sq Epi  / Bacteria       Vitamin D (25OH) 4.4      [05-22-19 @ 09:57]  HbA1c 11.8      [05-21-19 @ 11:29]    HCV 0.05, Nonreact      [05-21-19 @ 11:35] [facility] DIVISION OF KIDNEY DISEASES AND HYPERTENSION -- INITIAL CONSULT NOTE  --------------------------------------------------------------------------------  HPI:  57 y/o male with history of IDDM, HTN, presents to the ED sent by Podiatrist (Dr. Giovanni Hanna) for evaluation and treatment of infected R foot ulcer, failing outpatient Augmentin. Patient reports that he has been followed by Podiatry outpatient for R plantar foot ulcer x3-4 weeks. Approx 2 weeks ago pt developed fever, chills, diaphoresis, malaise, decreased PO intake and nausea. Treated with Augmentin for the last 1 week without improvement. Unable to control DM at home. Also reports orthopnea . Denies chest pain, dyspnea on exertion, abdominal pain, vomiting, diarrhea, dysuria, hematuria, frequency, back pain. (20 May 2019 18:44)    Patient now s/p I&D of foot wound. Creatinine elevated on admission to 1.99 from baseline of 1.69 in 2017. Currently creatinine is 1.87.      PAST HISTORY  --------------------------------------------------------------------------------  PAST MEDICAL & SURGICAL HISTORY:  Hypertension  Insulin dependent diabetes mellitus  Venous insufficiency of both lower extremities: R &gt; L  HTN (hypertension)  BPH (benign prostatic hypertrophy)  Diabetes  History of cholecystectomy  S/P laparoscopic cholecystectomy  Status post incision and drainage: Rt groin abscess    FAMILY HISTORY:  Paternal family history of emphysema (Father)  Family history of diabetes mellitus (DM) (Mother, Grandparent)    PAST SOCIAL HISTORY:    ALLERGIES & MEDICATIONS  --------------------------------------------------------------------------------  Allergies    No Known Allergies    Intolerances      Standing Inpatient Medications  aspirin enteric coated 81 milliGRAM(s) Oral daily  dextrose 5%. 1000 milliLiter(s) IV Continuous <Continuous>  dextrose 50% Injectable 12.5 Gram(s) IV Push once  dextrose 50% Injectable 25 Gram(s) IV Push once  dextrose 50% Injectable 25 Gram(s) IV Push once  ergocalciferol 82710 Unit(s) Oral every week  ergocalciferol 19967 Unit(s) Oral every week  heparin  Injectable 5000 Unit(s) SubCutaneous every 8 hours  hydrochlorothiazide 25 milliGRAM(s) Oral daily  insulin glargine Injectable (LANTUS) 35 Unit(s) SubCutaneous at bedtime  insulin lispro (HumaLOG) corrective regimen sliding scale   SubCutaneous three times a day before meals  insulin lispro (HumaLOG) corrective regimen sliding scale   SubCutaneous at bedtime  insulin lispro Injectable (HumaLOG) 20 Unit(s) SubCutaneous three times a day before meals  lisinopril 40 milliGRAM(s) Oral daily  piperacillin/tazobactam IVPB. 3.375 Gram(s) IV Intermittent every 8 hours  sodium chloride 0.9%. 1000 milliLiter(s) IV Continuous <Continuous>  vancomycin  IVPB 1000 milliGRAM(s) IV Intermittent every 24 hours    PRN Inpatient Medications  acetaminophen   Tablet .. 650 milliGRAM(s) Oral every 6 hours PRN  acetaminophen   Tablet .. 650 milliGRAM(s) Oral every 6 hours PRN  dextrose 40% Gel 15 Gram(s) Oral once PRN  glucagon  Injectable 1 milliGRAM(s) IntraMuscular once PRN  morphine  - Injectable 2 milliGRAM(s) IV Push every 4 hours PRN  oxyCODONE    5 mG/acetaminophen 325 mG 1 Tablet(s) Oral every 4 hours PRN      REVIEW OF SYSTEMS  --------------------------------------------------------------------------------  Gen: No weight changes, fatigue, fevers/chills, weakness  Skin: No rashes  Head/Eyes/Ears/Mouth: No headache; Normal hearing; Normal vision w/o blurriness; No sinus pain/discomfort, sore throat  Respiratory: No dyspnea, cough, wheezing, hemoptysis  CV: No chest pain, PND, orthopnea  GI: No abdominal pain, diarrhea, constipation, nausea, vomiting, melena, hematochezia  : No increased frequency, dysuria, hematuria, nocturia  MSK: No joint pain/swelling; no back pain; no edema  Neuro: No dizziness/lightheadedness, weakness, seizures, numbness, tingling  Heme: No easy bruising or bleeding  Endo: No heat/cold intolerance  Psych: No significant nervousness, anxiety, stress, depression    All other systems were reviewed and are negative, except as noted.    VITALS/PHYSICAL EXAM  --------------------------------------------------------------------------------  T(C): 36.8 (05-23-19 @ 08:23), Max: 37.7 (05-22-19 @ 21:24)  HR: 76 (05-23-19 @ 08:23) (76 - 100)  BP: 138/74 (05-23-19 @ 08:23) (121/67 - 170/95)  RR: 18 (05-23-19 @ 08:23) (16 - 20)  SpO2: 96% (05-23-19 @ 08:23) (94% - 100%)  Wt(kg): --  Height (cm): 182.88 (05-22-19 @ 13:05)  Weight (kg): 111.6 (05-22-19 @ 13:05)  BMI (kg/m2): 33.4 (05-22-19 @ 13:05)  BSA (m2): 2.33 (05-22-19 @ 13:05)      05-22-19 @ 07:01  -  05-23-19 @ 07:00  --------------------------------------------------------  IN: 710 mL / OUT: 800 mL / NET: -90 mL      Physical Exam:  	Gen: NAD, well-appearing  	HEENT: PERRL, supple neck, clear oropharynx  	Pulm: CTA B/L  	CV: RRR, S1S2; no rub  	Back: No spinal or CVA tenderness; no sacral edema  	Abd: +BS, soft, nontender/nondistended  	: No suprapubic tenderness  	UE: Warm, FROM, no clubbing, intact strength; no edema; no asterixis  	LE: RLE wrapped s/p I&D  	Neuro: No focal deficits, intact gait  	Psych: Normal affect and mood  	Skin: Warm, without rashes  	Vascular access:    LABS/STUDIES  --------------------------------------------------------------------------------              11.3   15.14 >-----------<  247      [05-23-19 @ 08:30]              34.4     132  |  99  |  22  ----------------------------<  313      [05-23-19 @ 07:01]  4.1   |  21  |  1.87        Ca     8.8     [05-23-19 @ 07:01]      PT/INR: PT 12.9 , INR 1.13       [05-22-19 @ 09:28]  PTT: 26.9       [05-22-19 @ 09:28]      Creatinine Trend:  SCr 1.87 [05-23 @ 07:01]  SCr 2.07 [05-22 @ 06:38]  SCr 2.08 [05-21 @ 20:28]  SCr 1.99 [05-20 @ 14:16]    Urinalysis - [08-10-15 @ 07:43]      Color YELLOW / Appearance CLEAR / SG 1.010 / pH 6.0      Gluc NEGATIVE / Ketone NEGATIVE  / Bili NEGATIVE / Urobili 4       Blood NEGATIVE / Protein 30 / Leuk Est NEGATIVE / Nitrite NEGATIVE      RBC 0-2 / WBC 5-10 / Hyaline 2-5 / Gran  / Sq Epi OCC / Non Sq Epi  / Bacteria       Vitamin D (25OH) 4.4      [05-22-19 @ 09:57]  HbA1c 11.8      [05-21-19 @ 11:29]    HCV 0.05, Nonreact      [05-21-19 @ 11:35]

## 2019-05-23 NOTE — CONSULT NOTE ADULT - ASSESSMENT
58M with IDDM, HTN, and CKD presenting with a diabetic foot ulcer s/p debridement. Renal consulted for MARIKA on CKD.    ***NOTE IN PROGRESS*** 58M with IDDM, HTN, and CKD presenting with a diabetic foot ulcer s/p debridement. Renal consulted for MARIKA on CKD.

## 2019-05-23 NOTE — PROGRESS NOTE ADULT - ASSESSMENT
1. S/P 1 day following I & D / Debridement of R plantar midfoot infection w/ Stravix graft on Vac Tx  2. DM w/ Profound Neuropathy    Plan: 1. Vac Chk today          2. Re evaluate R foot wound 5/24/2019          3. Continue IV ABX          4. Sx OR Cx & Path Pending

## 2019-05-23 NOTE — PHYSICAL THERAPY INITIAL EVALUATION ADULT - LIVES WITH, PROFILE
spouse/Pt lives in an apartment with elevator access with his spouse. Prior to admission pt independent with all functional mobility including ambulation without AD. spouse/Pt lives in an apartment with elevator access with his spouse. Prior to admission pt independent with all functional mobility including ambulation without AD.  Pt reports he has used crutches in the past due to previous LE injury.

## 2019-05-23 NOTE — PHYSICAL THERAPY INITIAL EVALUATION ADULT - ADDITIONAL COMMENTS
Lives in an apartment with an elevator with his spouse. Patient was independent with all ADL's/self care/mobility.
5/20/19 Xray Foot AP + Lateral + Oblique, Right: Soft tissue defect, likely ulcer at the plantar aspect of the midfoot. No acute cortical erosive changes are seen. No acute fracture or dislocation is seen. If clinical concern for osteomyelitis persists, MRI is more sensitive for evaluation.    5/21/19 MR FOOT: 1. Soft tissue ulceration along the plantar aspect of the foot.  2. No MR evidence of acute osteomyelitis. 3. No abscess or other drainable fluid collection.

## 2019-05-23 NOTE — PROGRESS NOTE ADULT - SUBJECTIVE AND OBJECTIVE BOX
Patient is a 58y old  Male s/p day 1  I & D & debridement along w/ application of Stravix graft & application of Vac.        INTERVAL HPI/OVERNIGHT EVENTS:  Patient seen and evaluated at bedside.  Pt is resting comfortable in NAD. Denies N/V/F/C.  Pain rated at 0/10. States he is feeling better post sx.    Allergies    No Known Allergies    Intolerances        Vital Signs Last 24 Hrs  T(C): 37 (23 May 2019 20:39), Max: 37.6 (23 May 2019 05:01)  T(F): 98.6 (23 May 2019 20:39), Max: 99.7 (23 May 2019 05:01)  HR: 76 (23 May 2019 20:39) (76 - 92)  BP: 103/64 (23 May 2019 20:39) (103/64 - 165/83)  BP(mean): --  RR: 18 (23 May 2019 20:39) (18 - 18)  SpO2: 93% (23 May 2019 20:39) (93% - 96%)    LABS:                        11.3   15.14 )-----------( 247      ( 23 May 2019 08:30 )             34.4     05-23    132<L>  |  99  |  22  ----------------------------<  313<H>  4.1   |  21<L>  |  1.87<H>    Ca    8.8      23 May 2019 07:01      PT/INR - ( 22 May 2019 09:28 )   PT: 12.9 sec;   INR: 1.13 ratio         PTT - ( 22 May 2019 09:28 )  PTT:26.9 sec    CAPILLARY BLOOD GLUCOSE      POCT Blood Glucose.: 138 mg/dL (23 May 2019 21:25)  POCT Blood Glucose.: 178 mg/dL (23 May 2019 18:32)  POCT Blood Glucose.: 246 mg/dL (23 May 2019 13:26)  POCT Blood Glucose.: 287 mg/dL (23 May 2019 09:10)    Lower Extremity Physical Exam:  Vascular: DP/PT 2/4, B/L, CFT <3 seconds B/L, Temperature gradient Less heat on the RLE compared to the initial presentation, LLE TG N/L.   Neuro: Epicritic sensation diminished  to the level of midfoot, B/L.  Skin: +2 pitting RLE, +1 pitting LLE    Wound #1:  Location: R plantar midfoot Vac intact w/ sponge compressed @ 125 mmHg continuous therapy  RADIOLOGY & ADDITIONAL TESTS:    Culture Results:   Few Gram Negative Rods (05.23.19 @ 01:23)

## 2019-05-23 NOTE — PROGRESS NOTE ADULT - PROBLEM SELECTOR PLAN 1
Will increase Lantus to 38 units at bed time.  Will increase Humalog to 26  units before each meal in addition to Humalog correction scale coverage.  Patient counseled for compliance with consistent low carb diet.

## 2019-05-23 NOTE — PROGRESS NOTE ADULT - SUBJECTIVE AND OBJECTIVE BOX
Chief complaint  Patient is a 58y old  Male who presents with a chief complaint of right foot ulcer (23 May 2019 14:44)   Review of systems  Patient in bed, looks comfortable, no fever, no hypoglycemia.    Labs and Fingersticks  CAPILLARY BLOOD GLUCOSE    POCT Blood Glucose.: 246 mg/dL (23 May 2019 13:26)  POCT Blood Glucose.: 287 mg/dL (23 May 2019 09:10)  POCT Blood Glucose.: 316 mg/dL (22 May 2019 21:18)  POCT Blood Glucose.: 261 mg/dL (22 May 2019 17:58)      Anion Gap, Serum: 12 (05-23 @ 07:01)  Anion Gap, Serum: 12 (05-22 @ 06:38)  Anion Gap, Serum: 14 (05-21 @ 20:28)      Calcium, Total Serum: 8.8 (05-23 @ 07:01)  Calcium, Total Serum: 8.8 (05-22 @ 06:38)  Calcium, Total Serum: 9.2 (05-21 @ 20:28)  Vitamin D, 25-Hydroxy: 4.4 <L> (05-22 @ 09:57)  Vitamin D, 1, 25-Dihydroxy: 26.0 (05-22 @ 09:57)          05-23    132<L>  |  99  |  22  ----------------------------<  313<H>  4.1   |  21<L>  |  1.87<H>    Ca    8.8      23 May 2019 07:01                          11.3   15.14 )-----------( 247      ( 23 May 2019 08:30 )             34.4     Medications  MEDICATIONS  (STANDING):  aspirin enteric coated 81 milliGRAM(s) Oral daily  dextrose 5%. 1000 milliLiter(s) (50 mL/Hr) IV Continuous <Continuous>  dextrose 50% Injectable 12.5 Gram(s) IV Push once  dextrose 50% Injectable 25 Gram(s) IV Push once  dextrose 50% Injectable 25 Gram(s) IV Push once  ergocalciferol 68733 Unit(s) Oral every week  ergocalciferol 50631 Unit(s) Oral every week  heparin  Injectable 5000 Unit(s) SubCutaneous every 8 hours  hydrALAZINE 25 milliGRAM(s) Oral two times a day  insulin glargine Injectable (LANTUS) 38 Unit(s) SubCutaneous at bedtime  insulin lispro (HumaLOG) corrective regimen sliding scale   SubCutaneous three times a day before meals  insulin lispro (HumaLOG) corrective regimen sliding scale   SubCutaneous at bedtime  insulin lispro Injectable (HumaLOG) 26 Unit(s) SubCutaneous three times a day before meals  lisinopril 40 milliGRAM(s) Oral daily  piperacillin/tazobactam IVPB. 3.375 Gram(s) IV Intermittent every 8 hours      Physical Exam  Culture - Tissue with Gram Stain (collected 05-23-19 @ 01:23)  Source: .Tissue  Gram Stain (05-23-19 @ 02:52):    No polymorphonuclear cells seen per low power field    Rare Gram positive cocci in pairs seen per oil power field      General: Patient comfortable in bed  Vital Signs Last 12 Hrs  T(F): 98.2 (05-23-19 @ 08:23), Max: 99.7 (05-23-19 @ 05:01)  HR: 76 (05-23-19 @ 08:23) (76 - 92)  BP: 138/74 (05-23-19 @ 08:23) (138/74 - 165/83)  BP(mean): --  RR: 18 (05-23-19 @ 08:23) (18 - 18)  SpO2: 96% (05-23-19 @ 14:45) (96% - 96%)  Neck: No palpable thyroid nodules.  CVS: S1S2, No murmurs  Respiratory: No wheezing, no crepitations  GI: Abdomen soft, bowel sounds positive  Musculoskeletal:  edema lower extremities.   Skin: No skin rashes, no ecchymosis    Diagnostics    Vitamin D, 1, 25-Dihydroxy: AM Sched. Collection: 22-May-2019 06:00 (05-21 @ 18:59)  Vitamin D, 25-Hydroxy: AM Sched. Collection: 22-May-2019 06:00 (05-21 @ 18:59)

## 2019-05-23 NOTE — PROGRESS NOTE ADULT - SUBJECTIVE AND OBJECTIVE BOX
AGUSTIN BRAR 58y [id number]    Patient is a 58y old  Male who presents with a chief complaint of right foot ulcer (23 May 2019 12:59)      Follow Up/CC:  ID following for foot ulcer    Interval History/ROS: s/p debridement 5/22, no fever    Allergies    No Known Allergies    Intolerances        ANTIMICROBIALS:  piperacillin/tazobactam IVPB. 3.375 every 8 hours      MEDICATIONS  (STANDING):  aspirin enteric coated 81 milliGRAM(s) Oral daily  dextrose 5%. 1000 milliLiter(s) (50 mL/Hr) IV Continuous <Continuous>  dextrose 50% Injectable 12.5 Gram(s) IV Push once  dextrose 50% Injectable 25 Gram(s) IV Push once  dextrose 50% Injectable 25 Gram(s) IV Push once  ergocalciferol 47788 Unit(s) Oral every week  ergocalciferol 61096 Unit(s) Oral every week  heparin  Injectable 5000 Unit(s) SubCutaneous every 8 hours  hydrALAZINE 25 milliGRAM(s) Oral two times a day  insulin glargine Injectable (LANTUS) 35 Unit(s) SubCutaneous at bedtime  insulin lispro (HumaLOG) corrective regimen sliding scale   SubCutaneous three times a day before meals  insulin lispro (HumaLOG) corrective regimen sliding scale   SubCutaneous at bedtime  insulin lispro Injectable (HumaLOG) 20 Unit(s) SubCutaneous three times a day before meals  lisinopril 40 milliGRAM(s) Oral daily  piperacillin/tazobactam IVPB. 3.375 Gram(s) IV Intermittent every 8 hours    MEDICATIONS  (PRN):  acetaminophen   Tablet .. 650 milliGRAM(s) Oral every 6 hours PRN Temp greater or equal to 38C (100.4F)  acetaminophen   Tablet .. 650 milliGRAM(s) Oral every 6 hours PRN Mild Pain (1 - 3)  dextrose 40% Gel 15 Gram(s) Oral once PRN Blood Glucose LESS THAN 70 milliGRAM(s)/deciliter  glucagon  Injectable 1 milliGRAM(s) IntraMuscular once PRN Glucose LESS THAN 70 milligrams/deciliter  morphine  - Injectable 2 milliGRAM(s) IV Push every 4 hours PRN Severe Pain (7 - 10)  oxyCODONE    5 mG/acetaminophen 325 mG 1 Tablet(s) Oral every 4 hours PRN Moderate Pain (4 - 6)        Vital Signs Last 24 Hrs  T(C): 36.8 (23 May 2019 08:23), Max: 37.7 (22 May 2019 21:24)  T(F): 98.2 (23 May 2019 08:23), Max: 99.9 (22 May 2019 21:24)  HR: 76 (23 May 2019 08:23) (76 - 100)  BP: 138/74 (23 May 2019 08:23) (121/67 - 170/95)  BP(mean): 93 (22 May 2019 16:45) (93 - 101)  RR: 18 (23 May 2019 08:23) (16 - 20)  SpO2: 96% (23 May 2019 08:23) (94% - 100%)    CBC Full  -  ( 23 May 2019 08:30 )  WBC Count : 15.14 K/uL  RBC Count : 3.84 M/uL  Hemoglobin : 11.3 g/dL  Hematocrit : 34.4 %  Platelet Count - Automated : 247 K/uL  Mean Cell Volume : 89.6 fl  Mean Cell Hemoglobin : 29.4 pg  Mean Cell Hemoglobin Concentration : 32.8 gm/dL  Auto Neutrophil # : x  Auto Lymphocyte # : x  Auto Monocyte # : x  Auto Eosinophil # : x  Auto Basophil # : x  Auto Neutrophil % : x  Auto Lymphocyte % : x  Auto Monocyte % : x  Auto Eosinophil % : x  Auto Basophil % : x    05-23    132<L>  |  99  |  22  ----------------------------<  313<H>  4.1   |  21<L>  |  1.87<H>    Ca    8.8      23 May 2019 07:01            MICROBIOLOGY:  .Tissue  05-23-19 --  --    No polymorphonuclear cells seen per low power field  Rare Gram positive cocci in pairs seen per oil power field      .Blood  05-20-19   No growth to date.  --  --      .Abscess  05-20-19   Numerous Escherichia coli  Numerous Enterococcus faecalis  --  Enterococcus faecalis  Escherichia coli        RADIOLOGY    < from: MR Foot w/wo IV Cont, Right (05.21.19 @ 23:10) >  1.  Soft tissue ulceration along the plantar aspect of the foot.  2.  No MR evidence of acute osteomyelitis.  3.  No abscess or other drainable fluid collection.    < end of copied text >

## 2019-05-23 NOTE — PHYSICAL THERAPY INITIAL EVALUATION ADULT - ACTIVE RANGE OF MOTION EXAMINATION, REHAB EVAL
bilateral upper extremity Active ROM was WFL (within functional limits)/bilateral  lower extremity Active ROM was WFL (within functional limits)/RLE assessment limited, ace wrapped +VAC

## 2019-05-23 NOTE — PROGRESS NOTE ADULT - PROBLEM SELECTOR PLAN 1
-cont zosyn  -MRI foot noted - no OM  -s/p debridement per podiatry  -DM control  -dc vanco - no MRSA in cx  -po abx on DC

## 2019-05-23 NOTE — CONSULT NOTE ADULT - PROBLEM SELECTOR RECOMMENDATION 9
Creatinine elevated on admission to 1.99 from baseline of 1.69 in 2017. Currently creatinine is 1.87.  - Likely pre-renal in setting of decreased PO  - Patient currently on HCTZ and lisinopril. Lasix held. Creatinine elevated on admission to 1.99 from baseline of 1.69 in 2017. Currently creatinine is 1.87.  - Likely pre-renal in setting of decreased PO  - Patient currently on HCTZ and lisinopril. Lasix held. Can continue to monitor.  - Please try to avoid vanc/zosyn combination as can be nephrotoxic  - Urine protein/creatinine Creatinine elevated on admission to 1.99 from baseline of 1.69 in 2017. Currently creatinine is 1.87.  - Likely pre-renal in setting of decreased PO  - Patient currently on HCTZ and lisinopril. Lasix held. Would D/C lisinopril.  - Please try to avoid vanc/zosyn combination as can be nephrotoxic. Would consider vanc and a 4th generation cephalosporin such as cefepime of ceftazidime   - Urine protein/creatinine Creatinine elevated on admission to 1.99 from baseline of 1.69 in 2017. Currently creatinine is 1.87.  - Likely pre-renal in setting of decreased PO  - Patient currently on HCTZ and lisinopril. Lasix held. Would D/C lisinopril and HCTZ.   - Would switch hydralazine to amlodipine 5mg   - Please try to avoid vanc/zosyn combination as can be nephrotoxic. Would consider vanc and a 4th generation cephalosporin such as cefepime of ceftazidime   - Urine protein/creatinine Creatinine elevated on admission to 1.99 from baseline of 1.69 in 2017. Currently creatinine is 1.87.  - Likely pre-renal in setting of decreased PO  - Patient currently on HCTZ and lisinopril. Lasix held. Would D/C lisinopril and HCTZ.   - Would switch hydralazine to amlodipine 5mg   - Please try to avoid vanc/zosyn combination as can be nephrotoxic. Would consider vanc and a 4th generation cephalosporin such as cefepime of ceftazidime   - Urine protein/creatinine; urinalysis  - D/C fluids

## 2019-05-23 NOTE — PROGRESS NOTE ADULT - SUBJECTIVE AND OBJECTIVE BOX
Subjective: Patient seen and examined. No new events except as noted.     REVIEW OF SYSTEMS:    CONSTITUTIONAL: No weakness, fevers or chills  EYES/ENT: No visual changes;  No vertigo or throat pain   NECK: No pain or stiffness  RESPIRATORY: No cough, wheezing, hemoptysis; No shortness of breath  CARDIOVASCULAR: No chest pain or palpitations  GASTROINTESTINAL: No abdominal or epigastric pain. No nausea, vomiting, or hematemesis; No diarrhea or constipation. No melena or hematochezia.  GENITOURINARY: No dysuria, frequency or hematuria  NEUROLOGICAL: No numbness or weakness  SKIN: No itching, burning, rashes, or lesions   All other review of systems is negative unless indicated above.    MEDICATIONS:  MEDICATIONS  (STANDING):  aspirin enteric coated 81 milliGRAM(s) Oral daily  dextrose 5%. 1000 milliLiter(s) (50 mL/Hr) IV Continuous <Continuous>  dextrose 50% Injectable 12.5 Gram(s) IV Push once  dextrose 50% Injectable 25 Gram(s) IV Push once  dextrose 50% Injectable 25 Gram(s) IV Push once  ergocalciferol 95526 Unit(s) Oral every week  heparin  Injectable 5000 Unit(s) SubCutaneous every 8 hours  hydrochlorothiazide 12.5 milliGRAM(s) Oral daily  insulin glargine Injectable (LANTUS) 25 Unit(s) SubCutaneous at bedtime  insulin lispro (HumaLOG) corrective regimen sliding scale   SubCutaneous three times a day before meals  insulin lispro (HumaLOG) corrective regimen sliding scale   SubCutaneous at bedtime  insulin lispro Injectable (HumaLOG) 20 Unit(s) SubCutaneous three times a day before meals  lisinopril 40 milliGRAM(s) Oral daily  piperacillin/tazobactam IVPB. 3.375 Gram(s) IV Intermittent every 8 hours  sodium chloride 0.9%. 1000 milliLiter(s) (50 mL/Hr) IV Continuous <Continuous>  vancomycin  IVPB 1000 milliGRAM(s) IV Intermittent every 24 hours      PHYSICAL EXAM:  T(C): 36.8 (05-23-19 @ 08:23), Max: 37.7 (05-22-19 @ 21:24)  HR: 76 (05-23-19 @ 08:23) (76 - 100)  BP: 138/74 (05-23-19 @ 08:23) (121/67 - 170/95)  RR: 18 (05-23-19 @ 08:23) (16 - 20)  SpO2: 96% (05-23-19 @ 08:23) (94% - 100%)  Wt(kg): --  I&O's Summary    22 May 2019 07:01  -  23 May 2019 07:00  --------------------------------------------------------  IN: 710 mL / OUT: 800 mL / NET: -90 mL      Height (cm): 182.88 (05-22 @ 13:05)  Weight (kg): 111.6 (05-22 @ 13:05)  BMI (kg/m2): 33.4 (05-22 @ 13:05)  BSA (m2): 2.33 (05-22 @ 13:05)    Appearance: NAD	  HEENT:   Normal oral mucosa, PERRL, EOMI	  Lymphatic: No lymphadenopathy , no edema  Cardiovascular: Normal S1 S2, No JVD, No murmurs , Peripheral pulses palpable 2+ bilaterally  Respiratory: Lungs clear to auscultation, normal effort 	  Gastrointestinal:  Soft, Non-tender, + BS	  Skin: No rashes, No ecchymoses, No cyanosis, warm to touch  Musculoskeletal: Normal range of motion, normal strength  Psychiatry:  Mood & affect appropriate  Ext: No edema      LABS:    CARDIAC MARKERS:                                11.3   15.14 )-----------( 247      ( 23 May 2019 08:30 )             34.4     05-23    132<L>  |  99  |  22  ----------------------------<  313<H>  4.1   |  21<L>  |  1.87<H>    Ca    8.8      23 May 2019 07:01      proBNP:   Lipid Profile:   HgA1c:   TSH:             TELEMETRY: 	    ECG:  	  RADIOLOGY:   DIAGNOSTIC TESTING:  [ ] Echocardiogram:  [ ]  Catheterization:  [ ] Stress Test:    OTHER:

## 2019-05-23 NOTE — PROGRESS NOTE ADULT - ASSESSMENT
57 y/o male with history of IDDM, HTN, presents to the ED sent by Podiatrist (Dr. Giovanni Hanna) for evaluation and treatment of infected R foot ulcer, failing outpatient Augmentin. Patient reports that he has been followed by Podiatry outpatient for R plantar foot ulcer x3-4 weeks. Approx 2 weeks ago pt developed fever, chills, diaphoresis, malaise, decreased PO intake and nausea. Treated with Augmentin for the last 1 week without improvement. Unable to control DM at home. Also reports orthopnea . Denies chest pain, dyspnea on exertion, abdominal pain, vomiting, diarrhea, dysuria, hematuria, frequency, back pain.      diabetic foot ulcer with surrounding cellulitis   - Piperacillin/tazobactam 3.375 grams every 8 hours   - Vancomycin 1g q24hrs  - monitor creatinine   - follow up on blood and wound cultures   - MRI of the foot done  - s/p  debridement     uncontrolled Diabetes  - cont levemir  - novolog 20 units qac  - hgb a1c  11.9  - diabetic diet  - FS qid  - endo consult called    MARIKA  - stop lasix and lisinopril  - d/c hydration  - follow creatinine  - nephrology following    uncontrolled HTN  -   hydralazine vs norvasc    hyponatremia  - NO salt restriction  - c/w NS  - no HCTZ

## 2019-05-24 LAB
-  AMIKACIN: SIGNIFICANT CHANGE UP
-  AMPICILLIN/SULBACTAM: SIGNIFICANT CHANGE UP
-  AMPICILLIN: SIGNIFICANT CHANGE UP
-  AZTREONAM: SIGNIFICANT CHANGE UP
-  CEFEPIME: SIGNIFICANT CHANGE UP
-  CEFOXITIN: SIGNIFICANT CHANGE UP
-  CEFTRIAXONE: SIGNIFICANT CHANGE UP
-  CIPROFLOXACIN: SIGNIFICANT CHANGE UP
-  ERTAPENEM: SIGNIFICANT CHANGE UP
-  GENTAMICIN: SIGNIFICANT CHANGE UP
-  IMIPENEM: SIGNIFICANT CHANGE UP
-  LEVOFLOXACIN: SIGNIFICANT CHANGE UP
-  MEROPENEM: SIGNIFICANT CHANGE UP
-  PIPERACILLIN/TAZOBACTAM: SIGNIFICANT CHANGE UP
-  TOBRAMYCIN: SIGNIFICANT CHANGE UP
-  TRIMETHOPRIM/SULFAMETHOXAZOLE: SIGNIFICANT CHANGE UP
ANION GAP SERPL CALC-SCNC: 13 MMOL/L — SIGNIFICANT CHANGE UP (ref 5–17)
BUN SERPL-MCNC: 28 MG/DL — HIGH (ref 7–23)
CALCIUM SERPL-MCNC: 8.6 MG/DL — SIGNIFICANT CHANGE UP (ref 8.4–10.5)
CHLORIDE SERPL-SCNC: 98 MMOL/L — SIGNIFICANT CHANGE UP (ref 96–108)
CO2 SERPL-SCNC: 21 MMOL/L — LOW (ref 22–31)
CREAT SERPL-MCNC: 2.58 MG/DL — HIGH (ref 0.5–1.3)
CULTURE RESULTS: SIGNIFICANT CHANGE UP
GLUCOSE BLDC GLUCOMTR-MCNC: 110 MG/DL — HIGH (ref 70–99)
GLUCOSE BLDC GLUCOMTR-MCNC: 131 MG/DL — HIGH (ref 70–99)
GLUCOSE BLDC GLUCOMTR-MCNC: 178 MG/DL — HIGH (ref 70–99)
GLUCOSE BLDC GLUCOMTR-MCNC: 81 MG/DL — SIGNIFICANT CHANGE UP (ref 70–99)
GLUCOSE SERPL-MCNC: 189 MG/DL — HIGH (ref 70–99)
HCT VFR BLD CALC: 33.5 % — LOW (ref 39–50)
HGB BLD-MCNC: 11.1 G/DL — LOW (ref 13–17)
LACTATE SERPL-SCNC: 0.8 MMOL/L — SIGNIFICANT CHANGE UP (ref 0.7–2)
MCHC RBC-ENTMCNC: 29.7 PG — SIGNIFICANT CHANGE UP (ref 27–34)
MCHC RBC-ENTMCNC: 33.1 GM/DL — SIGNIFICANT CHANGE UP (ref 32–36)
MCV RBC AUTO: 89.6 FL — SIGNIFICANT CHANGE UP (ref 80–100)
METHOD TYPE: SIGNIFICANT CHANGE UP
ORGANISM # SPEC MICROSCOPIC CNT: SIGNIFICANT CHANGE UP
PLATELET # BLD AUTO: 248 K/UL — SIGNIFICANT CHANGE UP (ref 150–400)
POTASSIUM SERPL-MCNC: 3.7 MMOL/L — SIGNIFICANT CHANGE UP (ref 3.5–5.3)
POTASSIUM SERPL-SCNC: 3.7 MMOL/L — SIGNIFICANT CHANGE UP (ref 3.5–5.3)
RBC # BLD: 3.74 M/UL — LOW (ref 4.2–5.8)
RBC # FLD: 13.2 % — SIGNIFICANT CHANGE UP (ref 10.3–14.5)
SODIUM SERPL-SCNC: 132 MMOL/L — LOW (ref 135–145)
SPECIMEN SOURCE: SIGNIFICANT CHANGE UP
WBC # BLD: 13.53 K/UL — HIGH (ref 3.8–10.5)
WBC # FLD AUTO: 13.53 K/UL — HIGH (ref 3.8–10.5)

## 2019-05-24 PROCEDURE — 99232 SBSQ HOSP IP/OBS MODERATE 35: CPT

## 2019-05-24 PROCEDURE — 99233 SBSQ HOSP IP/OBS HIGH 50: CPT | Mod: GC

## 2019-05-24 RX ORDER — CIPROFLOXACIN LACTATE 400MG/40ML
250 VIAL (ML) INTRAVENOUS
Refills: 0 | Status: DISCONTINUED | OUTPATIENT
Start: 2019-05-24 | End: 2019-05-25

## 2019-05-24 RX ORDER — SODIUM CHLORIDE 9 MG/ML
1000 INJECTION INTRAMUSCULAR; INTRAVENOUS; SUBCUTANEOUS
Refills: 0 | Status: DISCONTINUED | OUTPATIENT
Start: 2019-05-24 | End: 2019-05-26

## 2019-05-24 RX ORDER — HYDRALAZINE HCL 50 MG
50 TABLET ORAL THREE TIMES A DAY
Refills: 0 | Status: DISCONTINUED | OUTPATIENT
Start: 2019-05-24 | End: 2019-05-26

## 2019-05-24 RX ORDER — LACTULOSE 10 G/15ML
20 SOLUTION ORAL EVERY 4 HOURS
Refills: 0 | Status: COMPLETED | OUTPATIENT
Start: 2019-05-24 | End: 2019-05-24

## 2019-05-24 RX ADMIN — Medication 1: at 09:30

## 2019-05-24 RX ADMIN — Medication 25 MILLIGRAM(S): at 05:39

## 2019-05-24 RX ADMIN — HEPARIN SODIUM 5000 UNIT(S): 5000 INJECTION INTRAVENOUS; SUBCUTANEOUS at 14:37

## 2019-05-24 RX ADMIN — Medication 50 MILLIGRAM(S): at 21:27

## 2019-05-24 RX ADMIN — HEPARIN SODIUM 5000 UNIT(S): 5000 INJECTION INTRAVENOUS; SUBCUTANEOUS at 21:26

## 2019-05-24 RX ADMIN — SODIUM CHLORIDE 125 MILLILITER(S): 9 INJECTION INTRAMUSCULAR; INTRAVENOUS; SUBCUTANEOUS at 14:45

## 2019-05-24 RX ADMIN — Medication 81 MILLIGRAM(S): at 14:37

## 2019-05-24 RX ADMIN — Medication 50 MILLIGRAM(S): at 16:57

## 2019-05-24 RX ADMIN — LISINOPRIL 40 MILLIGRAM(S): 2.5 TABLET ORAL at 05:39

## 2019-05-24 RX ADMIN — LACTULOSE 20 GRAM(S): 10 SOLUTION ORAL at 17:36

## 2019-05-24 RX ADMIN — Medication 26 UNIT(S): at 18:10

## 2019-05-24 RX ADMIN — HEPARIN SODIUM 5000 UNIT(S): 5000 INJECTION INTRAVENOUS; SUBCUTANEOUS at 05:39

## 2019-05-24 RX ADMIN — LACTULOSE 20 GRAM(S): 10 SOLUTION ORAL at 21:27

## 2019-05-24 RX ADMIN — Medication 250 MILLIGRAM(S): at 17:48

## 2019-05-24 RX ADMIN — Medication 26 UNIT(S): at 13:19

## 2019-05-24 RX ADMIN — PIPERACILLIN AND TAZOBACTAM 25 GRAM(S): 4; .5 INJECTION, POWDER, LYOPHILIZED, FOR SOLUTION INTRAVENOUS at 05:39

## 2019-05-24 RX ADMIN — Medication 26 UNIT(S): at 09:29

## 2019-05-24 RX ADMIN — Medication 650 MILLIGRAM(S): at 21:26

## 2019-05-24 RX ADMIN — INSULIN GLARGINE 38 UNIT(S): 100 INJECTION, SOLUTION SUBCUTANEOUS at 21:27

## 2019-05-24 NOTE — PROGRESS NOTE ADULT - SUBJECTIVE AND OBJECTIVE BOX
[facility] DIVISION OF KIDNEY DISEASES AND HYPERTENSION -- FOLLOW UP NOTE  --------------------------------------------------------------------------------  Chief Complaint: MARIKA on CKD    24 hour events/subjective: Patient off IV fluids, decreased appetite, but drinking fluids.        PAST HISTORY  --------------------------------------------------------------------------------  No significant changes to PMH, PSH, FHx, SHx, unless otherwise noted    ALLERGIES & MEDICATIONS  --------------------------------------------------------------------------------  Allergies    No Known Allergies    Intolerances      Standing Inpatient Medications  aspirin enteric coated 81 milliGRAM(s) Oral daily  dextrose 5%. 1000 milliLiter(s) IV Continuous <Continuous>  dextrose 50% Injectable 12.5 Gram(s) IV Push once  dextrose 50% Injectable 25 Gram(s) IV Push once  dextrose 50% Injectable 25 Gram(s) IV Push once  ergocalciferol 42225 Unit(s) Oral every week  ergocalciferol 82188 Unit(s) Oral every week  heparin  Injectable 5000 Unit(s) SubCutaneous every 8 hours  hydrALAZINE 50 milliGRAM(s) Oral three times a day  insulin glargine Injectable (LANTUS) 38 Unit(s) SubCutaneous at bedtime  insulin lispro (HumaLOG) corrective regimen sliding scale   SubCutaneous three times a day before meals  insulin lispro (HumaLOG) corrective regimen sliding scale   SubCutaneous at bedtime  insulin lispro Injectable (HumaLOG) 26 Unit(s) SubCutaneous three times a day before meals  lactulose Syrup 20 Gram(s) Oral every 4 hours  lisinopril 40 milliGRAM(s) Oral daily  piperacillin/tazobactam IVPB. 3.375 Gram(s) IV Intermittent every 8 hours  sodium chloride 0.9%. 1000 milliLiter(s) IV Continuous <Continuous>    PRN Inpatient Medications  acetaminophen   Tablet .. 650 milliGRAM(s) Oral every 6 hours PRN  acetaminophen   Tablet .. 650 milliGRAM(s) Oral every 6 hours PRN  dextrose 40% Gel 15 Gram(s) Oral once PRN  glucagon  Injectable 1 milliGRAM(s) IntraMuscular once PRN  oxyCODONE    5 mG/acetaminophen 325 mG 1 Tablet(s) Oral every 4 hours PRN  oxyCODONE    5 mG/acetaminophen 325 mG 2 Tablet(s) Oral every 4 hours PRN      REVIEW OF SYSTEMS  --------------------------------------------------------------------------------  Gen: No weight changes, fatigue, fevers/chills, weakness  Skin: No rashes  Head/Eyes/Ears/Mouth: No headache; Normal hearing; Normal vision w/o blurriness; No sinus pain/discomfort, sore throat  Respiratory: No dyspnea, cough, wheezing, hemoptysis  CV: No chest pain, PND, orthopnea  GI: No abdominal pain, diarrhea, constipation, nausea, vomiting, melena, hematochezia  : No increased frequency, dysuria, hematuria, nocturia  MSK: No joint pain/swelling; no back pain; no edema  Neuro: No dizziness/lightheadedness, weakness, seizures, numbness, tingling  Heme: No easy bruising or bleeding  Endo: No heat/cold intolerance  Psych: No significant nervousness, anxiety, stress, depression    All other systems were reviewed and are negative, except as noted.    VITALS/PHYSICAL EXAM  --------------------------------------------------------------------------------  T(C): 36.9 (05-24-19 @ 09:34), Max: 37.4 (05-23-19 @ 18:29)  HR: 90 (05-24-19 @ 09:34) (76 - 90)  BP: 161/79 (05-24-19 @ 09:34) (103/64 - 161/79)  RR: 18 (05-24-19 @ 09:34) (18 - 18)  SpO2: 97% (05-24-19 @ 09:34) (93% - 97%)  Wt(kg): --  Height (cm): 182.88 (05-22-19 @ 13:05)  Weight (kg): 111.6 (05-22-19 @ 13:05)  BMI (kg/m2): 33.4 (05-22-19 @ 13:05)  BSA (m2): 2.33 (05-22-19 @ 13:05)      05-23-19 @ 07:01  -  05-24-19 @ 07:00  --------------------------------------------------------  IN: 440 mL / OUT: 500 mL / NET: -60 mL      Physical Exam:  	Gen: NAD, well-appearing  	HEENT: PERRL, supple neck, clear oropharynx  	Pulm: CTA B/L  	CV: RRR, S1S2; no rub  	Back: No spinal or CVA tenderness; no sacral edema  	Abd: +BS, soft, nontender/nondistended  	: No suprapubic tenderness  	UE: Warm, FROM, no clubbing, intact strength; no edema; no asterixis  	LE: RLE wrapped  	Neuro: No focal deficits, intact gait  	Psych: Normal affect and mood  	Skin: Warm, without rashes  	Vascular access:    LABS/STUDIES  --------------------------------------------------------------------------------              11.3   15.14 >-----------<  247      [05-23-19 @ 08:30]              34.4     132  |  98  |  28  ----------------------------<  189      [05-24-19 @ 11:01]  3.7   |  21  |  2.58        Ca     8.6     [05-24-19 @ 11:01]            Creatinine Trend:  SCr 2.58 [05-24 @ 11:01]  SCr 1.87 [05-23 @ 07:01]  SCr 2.07 [05-22 @ 06:38]  SCr 2.08 [05-21 @ 20:28]  SCr 1.99 [05-20 @ 14:16]        Vitamin D (25OH) 4.4      [05-22-19 @ 09:57]  HbA1c 11.8      [05-21-19 @ 11:29]    HCV 0.05, Nonreact      [05-21-19 @ 11:35]

## 2019-05-24 NOTE — PROGRESS NOTE ADULT - ASSESSMENT
59 y/o male with history of IDDM, HTN, presents to the ED sent by Podiatrist (Dr. Giovanni Hanna) for evaluation and treatment of infected R foot ulcer, failing outpatient Augmentin. Patient reports that he has been followed by Podiatry outpatient for R plantar foot ulcer x3-4 weeks. Approx 2 weeks ago pt developed fever, chills, diaphoresis, malaise, decreased PO intake and nausea. Treated with Augmentin for the last 1 week without improvement. Unable to control DM at home. Also reports orthopnea . Denies chest pain, dyspnea on exertion, abdominal pain, vomiting, diarrhea, dysuria, hematuria, frequency, back pain.      diabetic foot ulcer with surrounding cellulitis   - Piperacillin/tazobactam 3.375 grams every 8 hours   - off Vancomycin   - monitor creatinine   - follow up on blood and wound cultures   - MRI of the foot done  - s/p  debridement     uncontrolled Diabetes  - cont levemir  - novolog 20 units qac  - hgb a1c  11.9  - diabetic diet  - FS qid  - endo consult called    MARIKA  - stop lasix and lisinopril  - d/c hydration  - follow creatinine  - nephrology following  - change zosyn to a cephalosporin per nephrology     HTN better controlled  -   hydralazine     hyponatremia  - NO salt restriction  - no HCTZ    constipation  - lactulose x 2 doses

## 2019-05-24 NOTE — PROGRESS NOTE ADULT - ASSESSMENT
59 y/o M s/p right foot I&D w/ stravix graft application (DOS 5/22/19)  -Pt seen and evaluated, POD #2  -Sutures and graft intact, graft incorporating well, no purulence or drainage expressed, mild surrounding maceration  -VAC change today- to be changed next on Monday  -Fevers downtrending; will continue to trend WBC  -Will monitor over weekend  -Discussed w/ attending

## 2019-05-24 NOTE — PROGRESS NOTE ADULT - SUBJECTIVE AND OBJECTIVE BOX
Subjective: Patient seen and examined. No new events except as noted.     REVIEW OF SYSTEMS:    CONSTITUTIONAL: No weakness, fevers or chills  EYES/ENT: No visual changes;  No vertigo or throat pain   NECK: No pain or stiffness  RESPIRATORY: No cough, wheezing, hemoptysis; No shortness of breath  CARDIOVASCULAR: No chest pain or palpitations  GASTROINTESTINAL: No abdominal or epigastric pain. No nausea, vomiting, or hematemesis; No diarrhea or constipation. No melena or hematochezia.  GENITOURINARY: No dysuria, frequency or hematuria  NEUROLOGICAL: No numbness or weakness  SKIN: No itching, burning, rashes, or lesions   All other review of systems is negative unless indicated above.    MEDICATIONS:  MEDICATIONS  (STANDING):  aspirin enteric coated 81 milliGRAM(s) Oral daily  dextrose 5%. 1000 milliLiter(s) (50 mL/Hr) IV Continuous <Continuous>  dextrose 50% Injectable 12.5 Gram(s) IV Push once  dextrose 50% Injectable 25 Gram(s) IV Push once  dextrose 50% Injectable 25 Gram(s) IV Push once  ergocalciferol 35015 Unit(s) Oral every week  ergocalciferol 49394 Unit(s) Oral every week  heparin  Injectable 5000 Unit(s) SubCutaneous every 8 hours  hydrALAZINE 25 milliGRAM(s) Oral two times a day  insulin glargine Injectable (LANTUS) 38 Unit(s) SubCutaneous at bedtime  insulin lispro (HumaLOG) corrective regimen sliding scale   SubCutaneous three times a day before meals  insulin lispro (HumaLOG) corrective regimen sliding scale   SubCutaneous at bedtime  insulin lispro Injectable (HumaLOG) 26 Unit(s) SubCutaneous three times a day before meals  lactulose Syrup 20 Gram(s) Oral every 4 hours  lisinopril 40 milliGRAM(s) Oral daily  piperacillin/tazobactam IVPB. 3.375 Gram(s) IV Intermittent every 8 hours      PHYSICAL EXAM:  T(C): 36.9 (05-24-19 @ 09:34), Max: 37.4 (05-23-19 @ 18:29)  HR: 90 (05-24-19 @ 09:34) (76 - 90)  BP: 161/79 (05-24-19 @ 09:34) (103/64 - 161/79)  RR: 18 (05-24-19 @ 09:34) (18 - 18)  SpO2: 97% (05-24-19 @ 09:34) (93% - 97%)  Wt(kg): --  I&O's Summary    23 May 2019 07:01  -  24 May 2019 07:00  --------------------------------------------------------  IN: 440 mL / OUT: 500 mL / NET: -60 mL          Appearance: NAD sleepy 	  HEENT:   Normal oral mucosa, PERRL, EOMI	  Lymphatic: No lymphadenopathy , no edema  Cardiovascular: Normal S1 S2, No JVD, No murmurs , Peripheral pulses palpable 2+ bilaterally  Respiratory: Lungs clear to auscultation, normal effort 	  Gastrointestinal:  Soft, Non-tender, + BS	  Skin: No rashes, No ecchymoses, No cyanosis, warm to touch  Musculoskeletal: Decreased range of motion, normal strength  Psychiatry:  Mood & affect appropriate  Ext: +edema , chronic venous stasis skin discoloration   right foot wrapped       LABS:    CARDIAC MARKERS:                                11.3   15.14 )-----------( 247      ( 23 May 2019 08:30 )             34.4     05-24    132<L>  |  98  |  28<H>  ----------------------------<  189<H>  3.7   |  21<L>  |  2.58<H>    Ca    8.6      24 May 2019 11:01      proBNP:   Lipid Profile:   HgA1c:   TSH:             TELEMETRY: 	    ECG:  	  RADIOLOGY:   DIAGNOSTIC TESTING:  [ ] Echocardiogram:  [ ]  Catheterization:  [ ] Stress Test:    OTHER:

## 2019-05-24 NOTE — PROGRESS NOTE ADULT - SUBJECTIVE AND OBJECTIVE BOX
Chief complaint  Patient is a 58y old  Male who presents with a chief complaint of right foot ulcer (24 May 2019 12:42)   Review of systems  Patient in bed, looks comfortable, no fever,  no hypoglycemia.    Labs and Fingersticks  CAPILLARY BLOOD GLUCOSE      POCT Blood Glucose.: 178 mg/dL (24 May 2019 09:01)  POCT Blood Glucose.: 138 mg/dL (23 May 2019 21:25)  POCT Blood Glucose.: 178 mg/dL (23 May 2019 18:32)  POCT Blood Glucose.: 246 mg/dL (23 May 2019 13:26)      Anion Gap, Serum: 13 (05-24 @ 11:01)  Anion Gap, Serum: 12 (05-23 @ 07:01)      Calcium, Total Serum: 8.6 (05-24 @ 11:01)  Calcium, Total Serum: 8.8 (05-23 @ 07:01)          05-24    132<L>  |  98  |  28<H>  ----------------------------<  189<H>  3.7   |  21<L>  |  2.58<H>    Ca    8.6      24 May 2019 11:01                          11.3   15.14 )-----------( 247      ( 23 May 2019 08:30 )             34.4     Medications  MEDICATIONS  (STANDING):  aspirin enteric coated 81 milliGRAM(s) Oral daily  dextrose 5%. 1000 milliLiter(s) (50 mL/Hr) IV Continuous <Continuous>  dextrose 50% Injectable 12.5 Gram(s) IV Push once  dextrose 50% Injectable 25 Gram(s) IV Push once  dextrose 50% Injectable 25 Gram(s) IV Push once  ergocalciferol 96059 Unit(s) Oral every week  ergocalciferol 41305 Unit(s) Oral every week  heparin  Injectable 5000 Unit(s) SubCutaneous every 8 hours  hydrALAZINE 50 milliGRAM(s) Oral three times a day  insulin glargine Injectable (LANTUS) 38 Unit(s) SubCutaneous at bedtime  insulin lispro (HumaLOG) corrective regimen sliding scale   SubCutaneous three times a day before meals  insulin lispro (HumaLOG) corrective regimen sliding scale   SubCutaneous at bedtime  insulin lispro Injectable (HumaLOG) 26 Unit(s) SubCutaneous three times a day before meals  lactulose Syrup 20 Gram(s) Oral every 4 hours  lisinopril 40 milliGRAM(s) Oral daily  piperacillin/tazobactam IVPB. 3.375 Gram(s) IV Intermittent every 8 hours  sodium chloride 0.9%. 1000 milliLiter(s) (125 mL/Hr) IV Continuous <Continuous>      Physical Exam  General: Patient comfortable in bed  Vital Signs Last 12 Hrs  T(F): 98.5 (05-24-19 @ 09:34), Max: 98.6 (05-24-19 @ 05:12)  HR: 90 (05-24-19 @ 09:34) (76 - 90)  BP: 161/79 (05-24-19 @ 09:34) (139/84 - 161/79)  BP(mean): --  RR: 18 (05-24-19 @ 09:34) (18 - 18)  SpO2: 97% (05-24-19 @ 09:34) (93% - 97%)  Neck: No palpable thyroid nodules.  CVS: S1S2, No murmurs  Respiratory: No wheezing, no crepitations  GI: Abdomen soft, bowel sounds positive  Musculoskeletal:  edema lower extremities.   Skin: No skin rashes, no ecchymosis    Diagnostics    Vitamin D, 1, 25-Dihydroxy: AM Sched. Collection: 22-May-2019 06:00 (05-21 @ 18:59)  Vitamin D, 25-Hydroxy: AM Sched. Collection: 22-May-2019 06:00 (05-21 @ 18:59)

## 2019-05-24 NOTE — PROGRESS NOTE ADULT - SUBJECTIVE AND OBJECTIVE BOX
Patient is a 58y old  Male who presents with a chief complaint of right foot ulcer (24 May 2019 10:19)      SUBJECTIVE / OVERNIGHT EVENTS:  c/o poor apetite.    MEDICATIONS  (STANDING):  aspirin enteric coated 81 milliGRAM(s) Oral daily  dextrose 5%. 1000 milliLiter(s) (50 mL/Hr) IV Continuous <Continuous>  dextrose 50% Injectable 12.5 Gram(s) IV Push once  dextrose 50% Injectable 25 Gram(s) IV Push once  dextrose 50% Injectable 25 Gram(s) IV Push once  ergocalciferol 83359 Unit(s) Oral every week  ergocalciferol 05344 Unit(s) Oral every week  heparin  Injectable 5000 Unit(s) SubCutaneous every 8 hours  hydrALAZINE 25 milliGRAM(s) Oral two times a day  insulin glargine Injectable (LANTUS) 38 Unit(s) SubCutaneous at bedtime  insulin lispro (HumaLOG) corrective regimen sliding scale   SubCutaneous three times a day before meals  insulin lispro (HumaLOG) corrective regimen sliding scale   SubCutaneous at bedtime  insulin lispro Injectable (HumaLOG) 26 Unit(s) SubCutaneous three times a day before meals  lisinopril 40 milliGRAM(s) Oral daily  piperacillin/tazobactam IVPB. 3.375 Gram(s) IV Intermittent every 8 hours    MEDICATIONS  (PRN):  acetaminophen   Tablet .. 650 milliGRAM(s) Oral every 6 hours PRN Temp greater or equal to 38C (100.4F)  acetaminophen   Tablet .. 650 milliGRAM(s) Oral every 6 hours PRN Mild Pain (1 - 3)  dextrose 40% Gel 15 Gram(s) Oral once PRN Blood Glucose LESS THAN 70 milliGRAM(s)/deciliter  glucagon  Injectable 1 milliGRAM(s) IntraMuscular once PRN Glucose LESS THAN 70 milligrams/deciliter  oxyCODONE    5 mG/acetaminophen 325 mG 1 Tablet(s) Oral every 4 hours PRN Moderate Pain (4 - 6)  oxyCODONE    5 mG/acetaminophen 325 mG 2 Tablet(s) Oral every 4 hours PRN Severe Pain (7 - 10)      Vital Signs Last 24 Hrs  T(C): 36.9 (24 May 2019 09:34), Max: 37.4 (23 May 2019 18:29)  T(F): 98.5 (24 May 2019 09:34), Max: 99.3 (23 May 2019 18:29)  HR: 90 (24 May 2019 09:34) (76 - 90)  BP: 161/79 (24 May 2019 09:34) (103/64 - 161/79)  BP(mean): --  RR: 18 (24 May 2019 09:34) (18 - 18)  SpO2: 97% (24 May 2019 09:34) (93% - 97%)  CAPILLARY BLOOD GLUCOSE      POCT Blood Glucose.: 178 mg/dL (24 May 2019 09:01)  POCT Blood Glucose.: 138 mg/dL (23 May 2019 21:25)  POCT Blood Glucose.: 178 mg/dL (23 May 2019 18:32)  POCT Blood Glucose.: 246 mg/dL (23 May 2019 13:26)    I&O's Summary    23 May 2019 07:01  -  24 May 2019 07:00  --------------------------------------------------------  IN: 440 mL / OUT: 500 mL / NET: -60 mL        PHYSICAL EXAM:  GENERAL: NAD, well-developed  HEAD:  Atraumatic, Normocephalic  EYES: EOMI, PERRLA, conjunctiva and sclera clear  NECK: Supple, No JVD  CHEST/LUNG: Clear to auscultation bilaterally; No wheeze  HEART: Regular rate and rhythm; No murmurs, rubs, or gallops  ABDOMEN: Soft, Nontender, Nondistended; Bowel sounds present  EXTREMITIES:  2+ Peripheral Pulses, No clubbing, cyanosis, or edema  PSYCH: AAOx3  NEUROLOGY: non-focal  SKIN: No rashes or lesions    LABS:                        11.3   15.14 )-----------( 247      ( 23 May 2019 08:30 )             34.4     05-24    132<L>  |  98  |  28<H>  ----------------------------<  189<H>  3.7   |  21<L>  |  2.58<H>    Ca    8.6      24 May 2019 11:01                RADIOLOGY & ADDITIONAL TESTS:    Imaging Personally Reviewed:    Consultant(s) Notes Reviewed:      Care Discussed with Consultants/Other Providers:

## 2019-05-24 NOTE — PROGRESS NOTE ADULT - PROBLEM SELECTOR PLAN 1
Creatinine elevated on admission to 1.99 from baseline of 1.69 in 2017. Yamilex to 2.58 today after fluids D/C'ed  - Likely pre-renal in setting of decreased PO  - Continue holding diuretics  - Would switch hydralazine to amlodipine 5mg   - Please try to avoid vanc/zosyn combination as can be nephrotoxic. Would consider vanc and a 4th generation cephalosporin such as cefepime of ceftazidime   - Urine protein/creatinine; urinalysis  - NS @ 125cc/hr  - Avoid nephrotoxins, NSAIDs  - Consider bladder scan to r/o residual urine

## 2019-05-24 NOTE — PROGRESS NOTE ADULT - ASSESSMENT
Assessment  DMT2: 58y Male with DM T2 with hyperglycemia, on basal bolus insulin, s/p foot surgery, blood sugars trending down, no hypoglycemic episode, eating meals, non compliant with low carb diet.  Foot wound: on medications, no chest pain, stable, monitored.  HTN: Controlled,  on antihypertensive medications.  Overweight/Obesity: No strict exercise routines, not on any weight loss program, neither on low calorie diet.    Andrzej Zhu MD  Cell: [phone]  Office: [phone]

## 2019-05-24 NOTE — PROGRESS NOTE ADULT - ASSESSMENT
59 yo male admitted with foot ulcer now s/p Incision and drainage, foot, bursa. Found to be persistently hypertensive

## 2019-05-24 NOTE — PROGRESS NOTE ADULT - SUBJECTIVE AND OBJECTIVE BOX
Podiatry pager #: [phone] ([place])/ [phone] ([facility])    Patient is a 58y old  Male who presents with a chief complaint of right foot ulcer (24 May 2019 08:41)       INTERVAL HPI/OVERNIGHT EVENTS:  Patient seen and evaluated at bedside.  Pt is resting comfortable in NAD. Denies N/V/F/C.     Allergies    No Known Allergies    Intolerances        Vital Signs Last 24 Hrs  T(C): 36.9 (24 May 2019 09:34), Max: 37.4 (23 May 2019 18:29)  T(F): 98.5 (24 May 2019 09:34), Max: 99.3 (23 May 2019 18:29)  HR: 90 (24 May 2019 09:34) (76 - 90)  BP: 161/79 (24 May 2019 09:34) (103/64 - 161/79)  BP(mean): --  RR: 18 (24 May 2019 09:34) (18 - 18)  SpO2: 97% (24 May 2019 09:34) (93% - 97%)    LABS:                        11.3   15.14 )-----------( 247      ( 23 May 2019 08:30 )             34.4     05-23    132<L>  |  99  |  22  ----------------------------<  313<H>  4.1   |  21<L>  |  1.87<H>    Ca    8.8      23 May 2019 07:01          CAPILLARY BLOOD GLUCOSE      POCT Blood Glucose.: 178 mg/dL (24 May 2019 09:01)  POCT Blood Glucose.: 138 mg/dL (23 May 2019 21:25)  POCT Blood Glucose.: 178 mg/dL (23 May 2019 18:32)  POCT Blood Glucose.: 246 mg/dL (23 May 2019 13:26)      Lower Extremity Physical Exam:  s/p right foot I&D w/ stravix graft application   sutures and graft intact, graft incorporating well, no purulence or drainage expressed, mild surrounding maceration

## 2019-05-24 NOTE — PROGRESS NOTE ADULT - SUBJECTIVE AND OBJECTIVE BOX
AGUSTIN BRAR 58y MRN-[id number]    Patient is a 58y old  Male who presents with a chief complaint of right foot ulcer (23 May 2019 23:46)      Follow Up/CC:  ID following for fever    Interval History/ROS: no fever, feels ok, VAC placed on foot    Allergies    No Known Allergies    Intolerances        ANTIMICROBIALS:  piperacillin/tazobactam IVPB. 3.375 every 8 hours      MEDICATIONS  (STANDING):  aspirin enteric coated 81 milliGRAM(s) Oral daily  dextrose 5%. 1000 milliLiter(s) (50 mL/Hr) IV Continuous <Continuous>  dextrose 50% Injectable 12.5 Gram(s) IV Push once  dextrose 50% Injectable 25 Gram(s) IV Push once  dextrose 50% Injectable 25 Gram(s) IV Push once  ergocalciferol 85610 Unit(s) Oral every week  ergocalciferol 45680 Unit(s) Oral every week  heparin  Injectable 5000 Unit(s) SubCutaneous every 8 hours  hydrALAZINE 25 milliGRAM(s) Oral two times a day  insulin glargine Injectable (LANTUS) 38 Unit(s) SubCutaneous at bedtime  insulin lispro (HumaLOG) corrective regimen sliding scale   SubCutaneous three times a day before meals  insulin lispro (HumaLOG) corrective regimen sliding scale   SubCutaneous at bedtime  insulin lispro Injectable (HumaLOG) 26 Unit(s) SubCutaneous three times a day before meals  lisinopril 40 milliGRAM(s) Oral daily  piperacillin/tazobactam IVPB. 3.375 Gram(s) IV Intermittent every 8 hours    MEDICATIONS  (PRN):  acetaminophen   Tablet .. 650 milliGRAM(s) Oral every 6 hours PRN Temp greater or equal to 38C (100.4F)  acetaminophen   Tablet .. 650 milliGRAM(s) Oral every 6 hours PRN Mild Pain (1 - 3)  dextrose 40% Gel 15 Gram(s) Oral once PRN Blood Glucose LESS THAN 70 milliGRAM(s)/deciliter  glucagon  Injectable 1 milliGRAM(s) IntraMuscular once PRN Glucose LESS THAN 70 milligrams/deciliter  oxyCODONE    5 mG/acetaminophen 325 mG 1 Tablet(s) Oral every 4 hours PRN Moderate Pain (4 - 6)  oxyCODONE    5 mG/acetaminophen 325 mG 2 Tablet(s) Oral every 4 hours PRN Severe Pain (7 - 10)        Vital Signs Last 24 Hrs  T(C): 37 (24 May 2019 05:12), Max: 37.4 (23 May 2019 18:29)  T(F): 98.6 (24 May 2019 05:12), Max: 99.3 (23 May 2019 18:29)  HR: 76 (24 May 2019 05:12) (76 - 77)  BP: 139/84 (24 May 2019 05:12) (103/64 - 139/84)  BP(mean): --  RR: 18 (24 May 2019 05:12) (18 - 18)  SpO2: 93% (24 May 2019 05:12) (93% - 96%)    CBC Full  -  ( 23 May 2019 08:30 )  WBC Count : 15.14 K/uL  RBC Count : 3.84 M/uL  Hemoglobin : 11.3 g/dL  Hematocrit : 34.4 %  Platelet Count - Automated : 247 K/uL  Mean Cell Volume : 89.6 fl  Mean Cell Hemoglobin : 29.4 pg  Mean Cell Hemoglobin Concentration : 32.8 gm/dL  Auto Neutrophil # : x  Auto Lymphocyte # : x  Auto Monocyte # : x  Auto Eosinophil # : x  Auto Basophil # : x  Auto Neutrophil % : x  Auto Lymphocyte % : x  Auto Monocyte % : x  Auto Eosinophil % : x  Auto Basophil % : x    05-23    132<L>  |  99  |  22  ----------------------------<  313<H>  4.1   |  21<L>  |  1.87<H>    Ca    8.8      23 May 2019 07:01            MICROBIOLOGY:  .Tissue  05-23-19   Few Gram Negative Rods  --    No polymorphonuclear cells seen per low power field  Rare Gram positive cocci in pairs seen per oil power field      .Blood  05-20-19   No growth to date.  --  --      .Abscess  05-20-19   Numerous Escherichia coli  Numerous Enterococcus faecalis  --  Enterococcus faecalis  Escherichia coli        RADIOLOGY    < from: MR Foot w/wo IV Cont, Right (05.21.19 @ 23:10) >  1.  Soft tissue ulceration along the plantar aspect of the foot.  2.  No MR evidence of acute osteomyelitis.  3.  No abscess or other drainable fluid collection.    < end of copied text >

## 2019-05-25 LAB
-  AMPICILLIN: SIGNIFICANT CHANGE UP
-  TETRACYCLINE: SIGNIFICANT CHANGE UP
-  VANCOMYCIN: SIGNIFICANT CHANGE UP
ANION GAP SERPL CALC-SCNC: 15 MMOL/L — SIGNIFICANT CHANGE UP (ref 5–17)
APPEARANCE UR: ABNORMAL
BILIRUB UR-MCNC: NEGATIVE — SIGNIFICANT CHANGE UP
BUN SERPL-MCNC: 21 MG/DL — SIGNIFICANT CHANGE UP (ref 7–23)
CALCIUM SERPL-MCNC: 8.6 MG/DL — SIGNIFICANT CHANGE UP (ref 8.4–10.5)
CHLORIDE SERPL-SCNC: 100 MMOL/L — SIGNIFICANT CHANGE UP (ref 96–108)
CO2 SERPL-SCNC: 20 MMOL/L — LOW (ref 22–31)
COLOR SPEC: YELLOW — SIGNIFICANT CHANGE UP
CREAT SERPL-MCNC: 1.99 MG/DL — HIGH (ref 0.5–1.3)
CULTURE RESULTS: SIGNIFICANT CHANGE UP
CULTURE RESULTS: SIGNIFICANT CHANGE UP
DIFF PNL FLD: NEGATIVE — SIGNIFICANT CHANGE UP
GLUCOSE BLDC GLUCOMTR-MCNC: 104 MG/DL — HIGH (ref 70–99)
GLUCOSE BLDC GLUCOMTR-MCNC: 123 MG/DL — HIGH (ref 70–99)
GLUCOSE BLDC GLUCOMTR-MCNC: 147 MG/DL — HIGH (ref 70–99)
GLUCOSE BLDC GLUCOMTR-MCNC: 75 MG/DL — SIGNIFICANT CHANGE UP (ref 70–99)
GLUCOSE SERPL-MCNC: 134 MG/DL — HIGH (ref 70–99)
GLUCOSE UR QL: NEGATIVE — SIGNIFICANT CHANGE UP
KETONES UR-MCNC: SIGNIFICANT CHANGE UP
LEUKOCYTE ESTERASE UR-ACNC: NEGATIVE — SIGNIFICANT CHANGE UP
MAGNESIUM SERPL-MCNC: 1.9 MG/DL — SIGNIFICANT CHANGE UP (ref 1.6–2.6)
METHOD TYPE: SIGNIFICANT CHANGE UP
NITRITE UR-MCNC: NEGATIVE — SIGNIFICANT CHANGE UP
PH UR: 6 — SIGNIFICANT CHANGE UP (ref 5–8)
PHOSPHATE SERPL-MCNC: 3 MG/DL — SIGNIFICANT CHANGE UP (ref 2.5–4.5)
POTASSIUM SERPL-MCNC: 4 MMOL/L — SIGNIFICANT CHANGE UP (ref 3.5–5.3)
POTASSIUM SERPL-SCNC: 4 MMOL/L — SIGNIFICANT CHANGE UP (ref 3.5–5.3)
PROT UR-MCNC: ABNORMAL
SODIUM SERPL-SCNC: 135 MMOL/L — SIGNIFICANT CHANGE UP (ref 135–145)
SP GR SPEC: 1.02 — SIGNIFICANT CHANGE UP (ref 1.01–1.02)
SPECIMEN SOURCE: SIGNIFICANT CHANGE UP
SPECIMEN SOURCE: SIGNIFICANT CHANGE UP
UROBILINOGEN FLD QL: ABNORMAL

## 2019-05-25 PROCEDURE — 71045 X-RAY EXAM CHEST 1 VIEW: CPT | Mod: 26

## 2019-05-25 PROCEDURE — 99232 SBSQ HOSP IP/OBS MODERATE 35: CPT

## 2019-05-25 RX ORDER — ACETAMINOPHEN 500 MG
325 TABLET ORAL ONCE
Refills: 0 | Status: COMPLETED | OUTPATIENT
Start: 2019-05-25 | End: 2019-05-25

## 2019-05-25 RX ORDER — ACETAMINOPHEN 500 MG
650 TABLET ORAL ONCE
Refills: 0 | Status: COMPLETED | OUTPATIENT
Start: 2019-05-25 | End: 2019-05-25

## 2019-05-25 RX ORDER — PIPERACILLIN AND TAZOBACTAM 4; .5 G/20ML; G/20ML
3.38 INJECTION, POWDER, LYOPHILIZED, FOR SOLUTION INTRAVENOUS EVERY 8 HOURS
Refills: 0 | Status: DISCONTINUED | OUTPATIENT
Start: 2019-05-25 | End: 2019-05-27

## 2019-05-25 RX ADMIN — SODIUM CHLORIDE 125 MILLILITER(S): 9 INJECTION INTRAMUSCULAR; INTRAVENOUS; SUBCUTANEOUS at 06:34

## 2019-05-25 RX ADMIN — Medication 26 UNIT(S): at 14:03

## 2019-05-25 RX ADMIN — Medication 50 MILLIGRAM(S): at 14:00

## 2019-05-25 RX ADMIN — OXYCODONE AND ACETAMINOPHEN 2 TABLET(S): 5; 325 TABLET ORAL at 05:31

## 2019-05-25 RX ADMIN — HEPARIN SODIUM 5000 UNIT(S): 5000 INJECTION INTRAVENOUS; SUBCUTANEOUS at 21:14

## 2019-05-25 RX ADMIN — Medication 250 MILLIGRAM(S): at 05:26

## 2019-05-25 RX ADMIN — PIPERACILLIN AND TAZOBACTAM 25 GRAM(S): 4; .5 INJECTION, POWDER, LYOPHILIZED, FOR SOLUTION INTRAVENOUS at 09:02

## 2019-05-25 RX ADMIN — OXYCODONE AND ACETAMINOPHEN 2 TABLET(S): 5; 325 TABLET ORAL at 10:55

## 2019-05-25 RX ADMIN — HEPARIN SODIUM 5000 UNIT(S): 5000 INJECTION INTRAVENOUS; SUBCUTANEOUS at 14:00

## 2019-05-25 RX ADMIN — Medication 650 MILLIGRAM(S): at 01:23

## 2019-05-25 RX ADMIN — Medication 650 MILLIGRAM(S): at 01:12

## 2019-05-25 RX ADMIN — Medication 50 MILLIGRAM(S): at 05:26

## 2019-05-25 RX ADMIN — Medication 26 UNIT(S): at 09:17

## 2019-05-25 RX ADMIN — INSULIN GLARGINE 38 UNIT(S): 100 INJECTION, SOLUTION SUBCUTANEOUS at 22:05

## 2019-05-25 RX ADMIN — PIPERACILLIN AND TAZOBACTAM 25 GRAM(S): 4; .5 INJECTION, POWDER, LYOPHILIZED, FOR SOLUTION INTRAVENOUS at 17:06

## 2019-05-25 RX ADMIN — Medication 325 MILLIGRAM(S): at 06:33

## 2019-05-25 RX ADMIN — OXYCODONE AND ACETAMINOPHEN 2 TABLET(S): 5; 325 TABLET ORAL at 21:13

## 2019-05-25 RX ADMIN — OXYCODONE AND ACETAMINOPHEN 2 TABLET(S): 5; 325 TABLET ORAL at 06:16

## 2019-05-25 RX ADMIN — OXYCODONE AND ACETAMINOPHEN 2 TABLET(S): 5; 325 TABLET ORAL at 11:40

## 2019-05-25 RX ADMIN — HEPARIN SODIUM 5000 UNIT(S): 5000 INJECTION INTRAVENOUS; SUBCUTANEOUS at 05:26

## 2019-05-25 RX ADMIN — Medication 50 MILLIGRAM(S): at 21:14

## 2019-05-25 RX ADMIN — Medication 81 MILLIGRAM(S): at 14:00

## 2019-05-25 RX ADMIN — OXYCODONE AND ACETAMINOPHEN 2 TABLET(S): 5; 325 TABLET ORAL at 21:43

## 2019-05-25 RX ADMIN — LISINOPRIL 40 MILLIGRAM(S): 2.5 TABLET ORAL at 05:26

## 2019-05-25 RX ADMIN — OXYCODONE AND ACETAMINOPHEN 2 TABLET(S): 5; 325 TABLET ORAL at 15:31

## 2019-05-25 RX ADMIN — OXYCODONE AND ACETAMINOPHEN 2 TABLET(S): 5; 325 TABLET ORAL at 16:00

## 2019-05-25 NOTE — PROGRESS NOTE ADULT - SUBJECTIVE AND OBJECTIVE BOX
Chief complaint  Patient is a 58y old  Male who presents with a chief complaint of right foot ulcer (25 May 2019 16:55)   Review of systems  Patient in bed, looks comfortable, no fever, no hypoglycemia.    Labs and Fingersticks  CAPILLARY BLOOD GLUCOSE      POCT Blood Glucose.: 123 mg/dL (25 May 2019 13:55)  POCT Blood Glucose.: 147 mg/dL (25 May 2019 09:11)  POCT Blood Glucose.: 110 mg/dL (24 May 2019 21:10)      Anion Gap, Serum: 15 (05-25 @ 06:58)  Anion Gap, Serum: 13 (05-24 @ 11:01)      Calcium, Total Serum: 8.6 (05-25 @ 06:58)  Calcium, Total Serum: 8.6 (05-24 @ 11:01)          05-25    135  |  100  |  21  ----------------------------<  134<H>  4.0   |  20<L>  |  1.99<H>    Ca    8.6      25 May 2019 06:58  Phos  3.0     05-25  Mg     1.9     05-25                          11.1   13.53 )-----------( 248      ( 24 May 2019 14:50 )             33.5     Medications  MEDICATIONS  (STANDING):  aspirin enteric coated 81 milliGRAM(s) Oral daily  dextrose 5%. 1000 milliLiter(s) (50 mL/Hr) IV Continuous <Continuous>  dextrose 50% Injectable 12.5 Gram(s) IV Push once  dextrose 50% Injectable 25 Gram(s) IV Push once  dextrose 50% Injectable 25 Gram(s) IV Push once  ergocalciferol 92546 Unit(s) Oral every week  ergocalciferol 63953 Unit(s) Oral every week  heparin  Injectable 5000 Unit(s) SubCutaneous every 8 hours  hydrALAZINE 50 milliGRAM(s) Oral three times a day  insulin glargine Injectable (LANTUS) 38 Unit(s) SubCutaneous at bedtime  insulin lispro (HumaLOG) corrective regimen sliding scale   SubCutaneous three times a day before meals  insulin lispro (HumaLOG) corrective regimen sliding scale   SubCutaneous at bedtime  insulin lispro Injectable (HumaLOG) 26 Unit(s) SubCutaneous three times a day before meals  lisinopril 40 milliGRAM(s) Oral daily  piperacillin/tazobactam IVPB. 3.375 Gram(s) IV Intermittent every 8 hours  sodium chloride 0.9%. 1000 milliLiter(s) (125 mL/Hr) IV Continuous <Continuous>      Physical Exam  General: Patient comfortable in bed  Vital Signs Last 12 Hrs  T(F): 98.4 (05-25-19 @ 15:26), Max: 99.4 (05-25-19 @ 13:58)  HR: 87 (05-25-19 @ 15:26) (86 - 92)  BP: 121/68 (05-25-19 @ 15:26) (121/68 - 139/90)  BP(mean): --  RR: 17 (05-25-19 @ 15:26) (16 - 17)  SpO2: 95% (05-25-19 @ 15:26) (94% - 98%)  Neck: No palpable thyroid nodules.  CVS: S1S2, No murmurs  Respiratory: No wheezing, no crepitations  GI: Abdomen soft, bowel sounds positive  Musculoskeletal:  edema lower extremities.   Skin: No skin rashes, no ecchymosis    Diagnostics    Vitamin D, 1, 25-Dihydroxy: AM Sched. Collection: 22-May-2019 06:00 (05-21 @ 18:59)  Vitamin D, 25-Hydroxy: AM Sched. Collection: 22-May-2019 06:00 (05-21 @ 18:59)

## 2019-05-25 NOTE — PROGRESS NOTE ADULT - SUBJECTIVE AND OBJECTIVE BOX
Patient is a 58y old  Male who presents with a chief complaint of right foot ulcer (25 May 2019 16:14)       INTERVAL HPI/OVERNIGHT EVENTS:  Patient seen and evaluated at bedside.  Pt is resting comfortable in NAD. Denies N/V/F/C.  Pain rated at 0/10  The patient was seen w/ Family members present.  Allergies    No Known Allergies    Intolerances        Vital Signs Last 24 Hrs  T(C): 36.9 (25 May 2019 15:26), Max: 39.2 (24 May 2019 20:42)  T(F): 98.4 (25 May 2019 15:26), Max: 102.6 (24 May 2019 20:42)  HR: 87 (25 May 2019 15:26) (86 - 102)  BP: 121/68 (25 May 2019 15:26) (121/68 - 176/78)  BP(mean): --  RR: 17 (25 May 2019 15:26) (16 - 18)  SpO2: 95% (25 May 2019 15:26) (94% - 98%)    LABS:                        11.1   13.53 )-----------( 248      ( 24 May 2019 14:50 )             33.5     05-25    135  |  100  |  21  ----------------------------<  134<H>  4.0   |  20<L>  |  1.99<H>    Ca    8.6      25 May 2019 06:58  Phos  3.0     05-25  Mg     1.9     05-25        Urinalysis Basic - ( 25 May 2019 14:07 )    Color: Yellow / Appearance: Slightly Turbid / S.018 / pH: x  Gluc: x / Ketone: Trace  / Bili: Negative / Urobili: 3 mg/dL   Blood: x / Protein: 100 mg/dL / Nitrite: Negative   Leuk Esterase: Negative / RBC: 2 /HPF / WBC 7 /HPF   Sq Epi: x / Non Sq Epi: 2 /HPF / Bacteria: Negative      CAPILLARY BLOOD GLUCOSE      POCT Blood Glucose.: 123 mg/dL (25 May 2019 13:55)  POCT Blood Glucose.: 147 mg/dL (25 May 2019 09:11)  POCT Blood Glucose.: 110 mg/dL (24 May 2019 21:10)  POCT Blood Glucose.: 81 mg/dL (24 May 2019 17:40)    Lower Extremity Physical Exam:  Vascular: DP/PT 2/4, B/L, CFT <3 seconds B/L, Temperature gradient Less heat on the RLE compared to the initial presentation, LLE TG N/L.   Neuro: Epicritic sensation diminished  to the level of midfoot, B/L.  Skin: +2 pitting RLE, +1 pitting LLE  Wound #1:  Location: R plantar midfoot Vac intact w/ sponge compressed @ 125 mmHg continuous therapy & no fluid in Vac container  Skin: Upon removing the Vac today hill wound maceration noted w/ sloughing skin underlying normal skin noted, Adaptic intact and secured w/ nylon sutures protecting Stravix graft which is secured also by nylon sutures. No Hematoma, No purulence expressed, No odor, No fluctuance     ADDITIONAL TESTS:  Urine Microscopic-Add On (NC) (19 @ 14:07)    Red Blood Cell - Urine: 2 /HPF    White Blood Cell - Urine: 7 /HPF    Hyaline Casts: 3 /LPF    Bacteria: Negative    Epithelial Cells: 2 /HPF  < from: Xray Chest 1 View- PORTABLE-Urgent (19 @ 06:30) >    EXAM:  XR CHEST PORTABLE URGENT 1V                            PROCEDURE DATE:  2019            INTERPRETATION:  AP chest with comparison to 2019.    Clinical indications: Fever.    IMPRESSION: The heart and lungs are normal. The bones are intact.                    SKIP CERON M.D., ATTENDING RADIOLOGIST  This document has been electronically signed. May 25 2019  8:26AM        < end of copied text >

## 2019-05-25 NOTE — PROGRESS NOTE ADULT - ASSESSMENT
Diagnosis:  1. S/P I & D & Debridement using Versa Jet w/ application of Stravix graft currently on Vac Tx 125 mmHg continuous therapy R plantar midfoot infection with WBC trending down, however, w/ current Fever spikes. Foot clinically is stable from observation today.    2. DM with Neuropathy      Plan: 1. Took patient off Vac today to examine R foot Sx site-stable          2. Recommend Continuing w/ Vac Tx 125 mm Hg R foot- Called PT to replace Vac          3. Continue IV ABX as per ID          4. Removed macerated tissue w/ sterile scissors and  plantar R foot & Re bandaged R foot w/ well padded DSD           5. Will continue to follow

## 2019-05-25 NOTE — PROGRESS NOTE ADULT - ASSESSMENT
Assessment  DMT2: 58y Male with DM T2 with hyperglycemia, on basal bolus insulin, s/p foot surgery, blood sugars improving, no hypoglycemic episode, eating meals, non compliant with low carb diet.  Foot wound: on medications, no chest pain, stable, monitored.  HTN: Controlled,  on antihypertensive medications.  Overweight/Obesity: No strict exercise routines, not on any weight loss program, neither on low calorie diet.    Andrzej Zhu MD  Cell: [phone]  Office: [phone]

## 2019-05-25 NOTE — PROGRESS NOTE ADULT - SUBJECTIVE AND OBJECTIVE BOX
Subjective: Patient seen and examined. No new events except as noted.   +fever   resting comfortably       REVIEW OF SYSTEMS:    CONSTITUTIONAL: + weakness, +fevers or chills  EYES/ENT: No visual changes;  No vertigo or throat pain   NECK: No pain or stiffness  RESPIRATORY: No cough, wheezing, hemoptysis; No shortness of breath  CARDIOVASCULAR: No chest pain or palpitations  GASTROINTESTINAL: No abdominal or epigastric pain. No nausea, vomiting, or hematemesis; No diarrhea or constipation. No melena or hematochezia.  GENITOURINARY: No dysuria, frequency or hematuria  NEUROLOGICAL: No numbness or weakness  SKIN: No itching, burning, rashes, or lesions   All other review of systems is negative unless indicated above.    MEDICATIONS:  MEDICATIONS  (STANDING):  aspirin enteric coated 81 milliGRAM(s) Oral daily  dextrose 5%. 1000 milliLiter(s) (50 mL/Hr) IV Continuous <Continuous>  dextrose 50% Injectable 12.5 Gram(s) IV Push once  dextrose 50% Injectable 25 Gram(s) IV Push once  dextrose 50% Injectable 25 Gram(s) IV Push once  ergocalciferol 10725 Unit(s) Oral every week  ergocalciferol 64270 Unit(s) Oral every week  heparin  Injectable 5000 Unit(s) SubCutaneous every 8 hours  hydrALAZINE 50 milliGRAM(s) Oral three times a day  insulin glargine Injectable (LANTUS) 38 Unit(s) SubCutaneous at bedtime  insulin lispro (HumaLOG) corrective regimen sliding scale   SubCutaneous three times a day before meals  insulin lispro (HumaLOG) corrective regimen sliding scale   SubCutaneous at bedtime  insulin lispro Injectable (HumaLOG) 26 Unit(s) SubCutaneous three times a day before meals  lisinopril 40 milliGRAM(s) Oral daily  piperacillin/tazobactam IVPB. 3.375 Gram(s) IV Intermittent every 8 hours  sodium chloride 0.9%. 1000 milliLiter(s) (125 mL/Hr) IV Continuous <Continuous>      PHYSICAL EXAM:  T(C): 38.3 (05-25-19 @ 21:05), Max: 38.3 (05-25-19 @ 21:05)  HR: 100 (05-25-19 @ 21:05) (86 - 100)  BP: 144/72 (05-25-19 @ 21:05) (121/68 - 176/78)  RR: 18 (05-25-19 @ 21:05) (16 - 18)  SpO2: 94% (05-25-19 @ 21:05) (94% - 98%)  Wt(kg): --  I&O's Summary    24 May 2019 07:01  -  25 May 2019 07:00  --------------------------------------------------------  IN: 1500 mL / OUT: 1400 mL / NET: 100 mL    25 May 2019 07:01  -  25 May 2019 22:55  --------------------------------------------------------  IN: 360 mL / OUT: 0 mL / NET: 360 mL          Appearance: NAD sleepy 	  HEENT:   Normal oral mucosa, PERRL, EOMI	  Lymphatic: No lymphadenopathy , no edema  Cardiovascular: Normal S1 S2, No JVD, No murmurs , Peripheral pulses palpable 2+ bilaterally  Respiratory: Lungs clear to auscultation, normal effort 	  Gastrointestinal:  Soft, Non-tender, + BS	  Skin: No rashes, No ecchymoses, No cyanosis, warm to touch  Musculoskeletal: Decreased range of motion, normal strength  Psychiatry:  Mood & affect appropriate  Ext: +edema , chronic venous stasis skin discoloration   right foot wrapped           LABS:    CARDIAC MARKERS:                                11.1   13.53 )-----------( 248      ( 24 May 2019 14:50 )             33.5     05-25    135  |  100  |  21  ----------------------------<  134<H>  4.0   |  20<L>  |  1.99<H>    Ca    8.6      25 May 2019 06:58  Phos  3.0     05-25  Mg     1.9     05-25      proBNP:   Lipid Profile:   HgA1c:   TSH:     3          TELEMETRY: 	    ECG:  	  RADIOLOGY:   DIAGNOSTIC TESTING:  [ ] Echocardiogram:  [ ]  Catheterization:  [ ] Stress Test:    OTHER:

## 2019-05-25 NOTE — PROGRESS NOTE ADULT - ASSESSMENT
58M with diabetic foot ulcer with surrounding cellulitis  Fever overnight, leukocytosis  Doubtful that fever due to recent antibiotic switch (considering recency), however, broaden considering multiple bacteria in culture  S/p debridement with podiatry 5/22  Culture polymicrobial--E coli, enterococcus, strep  Overall, fever, leukocytosis, infected diabetic foot ulcer, post operative state  - Zosyn 3.375g q 8  - DC Cipro  - Monitor for other possible source infection such as developing diarrhea  - F/U podiatry, wound care per podiatry    Javi Coe MD  Pager [phone]  After 5pm and on weekends call [phone] 58M with diabetic foot ulcer with surrounding cellulitis  Fever overnight, leukocytosis  Doubtful that fever due to recent antibiotic switch (considering recency), however, broaden considering multiple bacteria in culture  S/p debridement with podiatry 5/22  Culture polymicrobial--E coli, enterococcus, strep  Overall, fever, leukocytosis, infected diabetic foot ulcer, post operative state  - Zosyn 3.375g q 8  - DC Cipro  - Monitor for other possible source infection such as developing diarrhea  - F/U podiatry, wound care per podiatry  - F/U BCXs    Javi Coe MD  Pager [phone]  After 5pm and on weekends call [phone]

## 2019-05-25 NOTE — PROGRESS NOTE ADULT - SUBJECTIVE AND OBJECTIVE BOX
CC: F/U for Fever    Saw/spoke to patient. Feels well. No new complaints. Fevers.    Allergies  No Known Allergies    ANTIMICROBIALS:  piperacillin/tazobactam IVPB. 3.375 every 8 hours    PE:    Vital Signs Last 24 Hrs  T(C): 37 (25 May 2019 10:35), Max: 39.2 (24 May 2019 20:42)  T(F): 98.6 (25 May 2019 10:35), Max: 102.6 (24 May 2019 20:42)  HR: 86 (25 May 2019 10:35) (86 - 102)  BP: 139/90 (25 May 2019 10:35) (135/76 - 176/78)  RR: 16 (25 May 2019 10:35) (16 - 18)  SpO2: 98% (25 May 2019 10:35) (97% - 98%)    Gen: AOx3, NAD, non-toxic, pleasant  CV: S1+S2 normal, nontachycardic  Resp: Clear bilat, no resp distress, no crackles/wheezes  Abd: Soft, nontender, +BS  Ext: No LE edema, no wounds    LABS:                        11.1   13.53 )-----------( 248      ( 24 May 2019 14:50 )             33.5     05-25    135  |  100  |  21  ----------------------------<  134<H>  4.0   |  20<L>  |  1.99<H>    Ca    8.6      25 May 2019 06:58  Phos  3.0     05-25  Mg     1.9     05-25    MICROBIOLOGY:    .Tissue  05-23-19   Few Escherichia coli  Growth in fluid media only Enterococcus species  Rare Streptococcus agalactiae (Group B) isolated  Group B streptococci are susceptible to ampicillin,  penicillin and cefazolin, but may be resistant to  erythromycin and clindamycin.  Recommendations for intrapartum prophylaxis for Group B  streptococci are penicillin or ampicillin.  --  Escherichia coli    .Blood  05-20-19   No growth to date.     .Abscess  05-20-19   Numerous Escherichia coli  Numerous Enterococcus faecalis  Moderate Prevotella bivia "Susceptibilities not performed"  --  Enterococcus faecalis  Escherichia coli    (otherwise reviewed)    RADIOLOGY:    5/25 CXR:    Clinical indications: Fever.    IMPRESSION: The heart and lungs are normal. The bones are intact.    5/21 MR:    IMPRESSION:  1.  Soft tissue ulceration along the plantar aspect of the foot.  2.  No MR evidence of acute osteomyelitis.  3.  No abscess or other drainable fluid collection.

## 2019-05-25 NOTE — PROGRESS NOTE ADULT - ASSESSMENT
59 y/o male with history of IDDM, HTN, presents to the ED sent by Podiatrist (Dr. Giovanni Hanna) for evaluation and treatment of infected R foot ulcer, failing outpatient Augmentin. Patient reports that he has been followed by Podiatry outpatient for R plantar foot ulcer x3-4 weeks. Approx 2 weeks ago pt developed fever, chills, diaphoresis, malaise, decreased PO intake and nausea. Treated with Augmentin for the last 1 week without improvement. Unable to control DM at home. Also reports orthopnea . Denies chest pain, dyspnea on exertion, abdominal pain, vomiting, diarrhea, dysuria, hematuria, frequency, back pain.      diabetic foot ulcer with surrounding cellulitis   - Fever overnight, leukocytosis  - Doubtful that fever due to recent antibiotic switch (considering recency), however, broaden considering multiple bacteria in culture  - S/p debridement with podiatry 5/22  - Culture polymicrobial--E coli, enterococcus, strep  - Overall, fever, leukocytosis, infected diabetic foot ulcer, post operative state  - Zosyn 3.375g q 8  - DC Cipro  - Monitor for other possible source infection such as developing diarrhea  - F/U podiatry, wound care per podiatry  - F/U BCXs    uncontrolled Diabetes  - cont levemir  - novolog 20 units qac  - hgb a1c  11.9  - diabetic diet  - FS qid  - endo consult appreciated    MARIKA improved  - stop lasix and lisinopril  - ivf hydration  - follow creatinine  - nephrology following  - change zosyn to a cephalosporin per nephrology     HTN better controlled  -   hydralazine     hyponatremia  - NO salt restriction  - no HCTZ    constipation  - s/p lactulose x 2 doses

## 2019-05-26 ENCOUNTER — TRANSCRIPTION ENCOUNTER (OUTPATIENT)
Age: 59
End: 2019-05-26

## 2019-05-26 LAB
GLUCOSE BLDC GLUCOMTR-MCNC: 126 MG/DL — HIGH (ref 70–99)
GLUCOSE BLDC GLUCOMTR-MCNC: 168 MG/DL — HIGH (ref 70–99)
GLUCOSE BLDC GLUCOMTR-MCNC: 180 MG/DL — HIGH (ref 70–99)
GLUCOSE BLDC GLUCOMTR-MCNC: 188 MG/DL — HIGH (ref 70–99)
GLUCOSE BLDC GLUCOMTR-MCNC: 96 MG/DL — SIGNIFICANT CHANGE UP (ref 70–99)
HCT VFR BLD CALC: 37.7 % — LOW (ref 39–50)
HGB BLD-MCNC: 11.7 G/DL — LOW (ref 13–17)
MCHC RBC-ENTMCNC: 29.3 PG — SIGNIFICANT CHANGE UP (ref 27–34)
MCHC RBC-ENTMCNC: 31.1 GM/DL — LOW (ref 32–36)
MCV RBC AUTO: 94.1 FL — SIGNIFICANT CHANGE UP (ref 80–100)
PLATELET # BLD AUTO: 293 K/UL — SIGNIFICANT CHANGE UP (ref 150–400)
RBC # BLD: 4.01 M/UL — LOW (ref 4.2–5.8)
RBC # FLD: 12.5 % — SIGNIFICANT CHANGE UP (ref 10.3–14.5)
WBC # BLD: 23.3 K/UL — HIGH (ref 3.8–10.5)
WBC # FLD AUTO: 23.3 K/UL — HIGH (ref 3.8–10.5)

## 2019-05-26 PROCEDURE — 99232 SBSQ HOSP IP/OBS MODERATE 35: CPT

## 2019-05-26 RX ORDER — INSULIN LISPRO 100/ML
15 VIAL (ML) SUBCUTANEOUS
Refills: 0 | Status: DISCONTINUED | OUTPATIENT
Start: 2019-05-26 | End: 2019-05-27

## 2019-05-26 RX ORDER — HYDRALAZINE HCL 50 MG
25 TABLET ORAL THREE TIMES A DAY
Refills: 0 | Status: DISCONTINUED | OUTPATIENT
Start: 2019-05-26 | End: 2019-05-26

## 2019-05-26 RX ORDER — VANCOMYCIN HCL 1 G
750 VIAL (EA) INTRAVENOUS EVERY 12 HOURS
Refills: 0 | Status: DISCONTINUED | OUTPATIENT
Start: 2019-05-26 | End: 2019-05-27

## 2019-05-26 RX ORDER — SODIUM CHLORIDE 9 MG/ML
1000 INJECTION INTRAMUSCULAR; INTRAVENOUS; SUBCUTANEOUS
Refills: 0 | Status: COMPLETED | OUTPATIENT
Start: 2019-05-26 | End: 2019-05-27

## 2019-05-26 RX ORDER — SODIUM CHLORIDE 9 MG/ML
250 INJECTION INTRAMUSCULAR; INTRAVENOUS; SUBCUTANEOUS ONCE
Refills: 0 | Status: COMPLETED | OUTPATIENT
Start: 2019-05-26 | End: 2019-05-26

## 2019-05-26 RX ORDER — INSULIN GLARGINE 100 [IU]/ML
30 INJECTION, SOLUTION SUBCUTANEOUS AT BEDTIME
Refills: 0 | Status: DISCONTINUED | OUTPATIENT
Start: 2019-05-26 | End: 2019-05-27

## 2019-05-26 RX ADMIN — Medication 1: at 09:03

## 2019-05-26 RX ADMIN — HEPARIN SODIUM 5000 UNIT(S): 5000 INJECTION INTRAVENOUS; SUBCUTANEOUS at 05:32

## 2019-05-26 RX ADMIN — Medication 650 MILLIGRAM(S): at 13:46

## 2019-05-26 RX ADMIN — OXYCODONE AND ACETAMINOPHEN 2 TABLET(S): 5; 325 TABLET ORAL at 22:21

## 2019-05-26 RX ADMIN — OXYCODONE AND ACETAMINOPHEN 2 TABLET(S): 5; 325 TABLET ORAL at 21:51

## 2019-05-26 RX ADMIN — Medication 81 MILLIGRAM(S): at 13:46

## 2019-05-26 RX ADMIN — Medication 50 MILLIGRAM(S): at 05:31

## 2019-05-26 RX ADMIN — HEPARIN SODIUM 5000 UNIT(S): 5000 INJECTION INTRAVENOUS; SUBCUTANEOUS at 13:46

## 2019-05-26 RX ADMIN — Medication 15 UNIT(S): at 17:08

## 2019-05-26 RX ADMIN — Medication 26 UNIT(S): at 09:04

## 2019-05-26 RX ADMIN — Medication 650 MILLIGRAM(S): at 02:06

## 2019-05-26 RX ADMIN — PIPERACILLIN AND TAZOBACTAM 25 GRAM(S): 4; .5 INJECTION, POWDER, LYOPHILIZED, FOR SOLUTION INTRAVENOUS at 08:36

## 2019-05-26 RX ADMIN — LISINOPRIL 40 MILLIGRAM(S): 2.5 TABLET ORAL at 05:31

## 2019-05-26 RX ADMIN — PIPERACILLIN AND TAZOBACTAM 25 GRAM(S): 4; .5 INJECTION, POWDER, LYOPHILIZED, FOR SOLUTION INTRAVENOUS at 00:19

## 2019-05-26 RX ADMIN — OXYCODONE AND ACETAMINOPHEN 2 TABLET(S): 5; 325 TABLET ORAL at 06:00

## 2019-05-26 RX ADMIN — INSULIN GLARGINE 30 UNIT(S): 100 INJECTION, SOLUTION SUBCUTANEOUS at 21:34

## 2019-05-26 RX ADMIN — OXYCODONE AND ACETAMINOPHEN 2 TABLET(S): 5; 325 TABLET ORAL at 05:31

## 2019-05-26 RX ADMIN — SODIUM CHLORIDE 75 MILLILITER(S): 9 INJECTION INTRAMUSCULAR; INTRAVENOUS; SUBCUTANEOUS at 13:54

## 2019-05-26 RX ADMIN — SODIUM CHLORIDE 250 MILLILITER(S): 9 INJECTION INTRAMUSCULAR; INTRAVENOUS; SUBCUTANEOUS at 13:54

## 2019-05-26 RX ADMIN — Medication 1: at 17:08

## 2019-05-26 RX ADMIN — Medication 250 MILLIGRAM(S): at 13:54

## 2019-05-26 RX ADMIN — PIPERACILLIN AND TAZOBACTAM 25 GRAM(S): 4; .5 INJECTION, POWDER, LYOPHILIZED, FOR SOLUTION INTRAVENOUS at 17:08

## 2019-05-26 RX ADMIN — HEPARIN SODIUM 5000 UNIT(S): 5000 INJECTION INTRAVENOUS; SUBCUTANEOUS at 21:34

## 2019-05-26 NOTE — PROGRESS NOTE ADULT - ASSESSMENT
57 y/o male with history of IDDM, HTN, presents to the ED sent by Podiatrist (Dr. Giovanni Hanna) for evaluation and treatment of infected R foot ulcer, failing outpatient Augmentin. Patient reports that he has been followed by Podiatry outpatient for R plantar foot ulcer x3-4 weeks. Approx 2 weeks ago pt developed fever, chills, diaphoresis, malaise, decreased PO intake and nausea. Treated with Augmentin for the last 1 week without improvement. Unable to control DM at home. Also reports orthopnea . Denies chest pain, dyspnea on exertion, abdominal pain, vomiting, diarrhea, dysuria, hematuria, frequency, back pain.      diabetic foot ulcer with surrounding cellulitis   Fever overnight, leukocytosis  S/p debridement with podiatry 5/22  Culture polymicrobial--E coli, enterococcus, strep  Fevers, rising leukocytosis; no apparent focal source aside from foot  Overall, fever, leukocytosis, infected diabetic foot ulcer, post operative state  - Zosyn 3.375g q 8  - Add Vanco 750mg q 12 (monitor trough)  - F/U podiatry--reeval to foot; any further abscess to drain?  - F/U BCXs; repeat BCXs x 2  - Monitor for other possible source infection such as developing diarrhea  - If worsening, escalate Zosyn to Tomasa    uncontrolled Diabetes  - cont levemir  - novolog 20 units qac  - hgb a1c  11.9  - diabetic diet  - FS qid  - endo consult appreciated    MARIKA improved  - stop lasix and lisinopril  - ivf hydration  - follow creatinine  - nephrology following  - change zosyn to a cephalosporin per nephrology     HTN   -   hold hydralazine     hyponatremia  - NO salt restriction  - no HCTZ    constipation  - s/p lactulose x 2 doses  - dulcolax x 1

## 2019-05-26 NOTE — PROGRESS NOTE ADULT - ASSESSMENT
58M with diabetic foot ulcer with surrounding cellulitis  Fever overnight, leukocytosis  S/p debridement with podiatry 5/22  Culture polymicrobial--E coli, enterococcus, strep  Fevers, rising leukocytosis; no apparent focal source aside from foot  Overall, fever, leukocytosis, infected diabetic foot ulcer, post operative state  - Zosyn 3.375g q 8  - Add Vanco 750mg q 12 (monitor trough)  - F/U podiatry--reeval to foot; any further abscess to drain?  - F/U BCXs; repeat BCXs x 2  - Monitor for other possible source infection such as developing diarrhea  - If worsening, escalate Zosyn to Tomasa    Javi Coe MD  Pager [phone]  After 5pm and on weekends call [phone]

## 2019-05-26 NOTE — CHART NOTE - NSCHARTNOTEFT_GEN_A_CORE
CC: Hypotension    Event Summary: Asked by nurse to evaluate patient for hypotension (BP: . Patient seen and examined at the bedside.    Vital Signs Last 24 Hrs  T(C): 37.6 (26 May 2019 11:45), Max: 38.3 (25 May 2019 21:05)  T(F): 99.7 (26 May 2019 11:45), Max: 101 (25 May 2019 21:05)  HR: 80 (26 May 2019 11:45) (80 - 100)  BP: 90/60 (26 May 2019 11:47) (90/60 - 144/72)  BP(mean): --  RR: 16 (26 May 2019 11:45) (16 - 18)  SpO2: 94% (26 May 2019 11:45) (93% - 95%)    PHYSICAL EXAM:  Genearl: NAD, A&Ox3  Respiratory: Lungs clear to auscultation bilaterally. No wheezes, rales, rhonchi. Normal respiratory effort.   Cardiovascular: S1, S2 present. Regular rate and rhythm. No murmurs, wheezes, or gallops  Gastrointestinal: BS x4 normoactive. Soft, non-tender, non-distended.   Extremities: 2+ peripheral pulses. No edema, cyanosis.    A/P  HPI:  57 y/o male with history of IDDM, HTN, presents to the ED sent by Podiatrist (Dr. Giovanni Hanna) for evaluation and treatment of infected R foot ulcer, failing outpatient Augmentin. Patient reports that he has been followed by Podiatry outpatient for R plantar foot ulcer x3-4 weeks. Approx 2 weeks ago pt developed fever, chills, diaphoresis, malaise, decreased PO intake and nausea. Treated with Augmentin for the last 1 week without improvement. Unable to control DM at home. Also reports orthopnea . Denies chest pain, dyspnea on exertion, abdominal pain, vomiting, diarrhea, dysuria, hematuria, frequency, back pain. (20 May 2019 18:44)      -Repeat vitals  -Discussed with Dr. Mona Mchugh, PA-C  # CC: Hypotension    Event Summary: Asked by nurse to evaluate patient for hypotension (BP: 90/60). Patient seen and examined at the bedside.    Vital Signs Last 24 Hrs  T(C): 37.6 (26 May 2019 11:45), Max: 38.3 (25 May 2019 21:05)  T(F): 99.7 (26 May 2019 11:45), Max: 101 (25 May 2019 21:05)  HR: 80 (26 May 2019 11:45) (80 - 100)  BP: 90/60 (26 May 2019 11:47) (90/60 - 144/72)  BP(mean): --  RR: 16 (26 May 2019 11:45) (16 - 18)  SpO2: 94% (26 May 2019 11:45) (93% - 95%)    PHYSICAL EXAM:  Genearl: NAD, A&Ox3  Respiratory: Lungs clear to auscultation bilaterally. No wheezes, rales, rhonchi. Normal respiratory effort.   Cardiovascular: S1, S2 present. Regular rate and rhythm. No murmurs, wheezes, or gallops  Gastrointestinal: BS x4 normoactive. Soft, non-tender, non-distended.   Extremities: 2+ peripheral pulses. No edema, cyanosis.    A/P  HPI:  59 y/o male with history of IDDM, HTN, presents to the ED sent by Podiatrist (Dr. Giovanni Hanna) for evaluation and treatment of infected R foot ulcer, failing outpatient Augmentin. Patient reports that he has been followed by Podiatry outpatient for R plantar foot ulcer x3-4 weeks. Approx 2 weeks ago pt developed fever, chills, diaphoresis, malaise, decreased PO intake and nausea. Treated with Augmentin for the last 1 week without improvement. Unable to control DM at home. Also reports orthopnea . Denies chest pain, dyspnea on exertion, abdominal pain, vomiting, diarrhea, dysuria, hematuria, frequency, back pain. (20 May 2019 18:44)      -Repeat vitals  -Discussed with Dr. Mona Mchugh, PA-C  # CC: Hypotension    Event Summary: Asked by nurse to evaluate patient for hypotension (BP: 90/60). Patient seen and examined at the bedside. Pt has a low grade fever of 99.7 with a tmax of 101 at 21:05 last night. Pt's last glucose level was 126. Patient reports feeling tired and weak, but is otherwise symptomatic. Denies chills, rigors, headaches, dizziness, chest pain, palpitations, abdominal pain, N/V/D.    Vital Signs Last 24 Hrs  T(C): 37.6 (26 May 2019 11:45), Max: 38.3 (25 May 2019 21:05)  T(F): 99.7 (26 May 2019 11:45), Max: 101 (25 May 2019 21:05)  HR: 80 (26 May 2019 11:45) (80 - 100)  BP: 90/60 (26 May 2019 11:47) (90/60 - 144/72)  RR: 16 (26 May 2019 11:45) (16 - 18)  SpO2: 94% (26 May 2019 11:45) (93% - 95%)    PHYSICAL EXAM:  Genearl: NAD, A&Ox3  Respiratory: Lungs clear to auscultation bilaterally. No wheezes, rales, rhonchi. Normal respiratory effort.   Cardiovascular: S1, S2 present. Regular rate and rhythm. No murmurs, wheezes, or gallops  Gastrointestinal: BS x4 normoactive. Soft, non-tender, non-distended.   Extremities: 2+ peripheral pulses. No edema, cyanosis.    A/P  59 y/o male with history of IDDM, HTN, presents to the ED sent by Podiatrist (Dr. Giovanni Hanna) for evaluation and treatment of infected R foot ulcer, failing outpatient Augmentin. Pt now hypotensive with a low grade fever of 99.7    -s/p NS bolus  -Start NS @ 75 ml/hr for 24 hours  -D/c hydralazine and lisinopril   -Repeat BCx sent  -Continue to [facility]    **ADDENDUM**  -Repeat BP was 148/76    Mona Mchugh PA-C  #[id number] CC: Hypotension    Event Summary: Asked by nurse to evaluate patient for hypotension (BP: 90/60). Patient seen and examined at the bedside. Pt has a low grade fever of 99.7 with a tmax of 101 at 21:05 last night. Pt's last glucose level was 126. Pt's last blood pressure medication doses were given at 05:00 this morning. Patient reports feeling tired and weak, but is otherwise symptomatic. Denies chills, rigors, headaches, dizziness, chest pain, palpitations, abdominal pain, N/V/D.    Vital Signs Last 24 Hrs  T(C): 37.6 (26 May 2019 11:45), Max: 38.3 (25 May 2019 21:05)  T(F): 99.7 (26 May 2019 11:45), Max: 101 (25 May 2019 21:05)  HR: 80 (26 May 2019 11:45) (80 - 100)  BP: 90/60 (26 May 2019 11:47) (90/60 - 144/72)  RR: 16 (26 May 2019 11:45) (16 - 18)  SpO2: 94% (26 May 2019 11:45) (93% - 95%)    PHYSICAL EXAM:  Genearl: NAD, A&Ox3  Respiratory: Lungs clear to auscultation bilaterally. No wheezes, rales, rhonchi. Normal respiratory effort.   Cardiovascular: S1, S2 present. Regular rate and rhythm. No murmurs, wheezes, or gallops  Gastrointestinal: BS x4 normoactive. Soft, non-tender, non-distended.   Extremities: 2+ peripheral pulses. No edema, cyanosis.    A/P  57 y/o male with history of IDDM, HTN, presents to the ED sent by Podiatrist (Dr. Giovanni Hanna) for evaluation and treatment of infected R foot ulcer, failing outpatient Augmentin. Pt now hypotensive with a low grade fever of 99.7    -s/p NS bolus  -Start NS @ 75 ml/hr for 24 hours  -D/c hydralazine and lisinopril   -Repeat BCx sent  -Continue to monitor    **ADDENDUM**  -Repeat BP was 148/76    Mona Mchugh PA-C  #[id number] CC: Hypotension    Event Summary: Asked by nurse to evaluate patient for hypotension (BP: 90/60). Patient seen and examined at the bedside. Pt has a low grade fever of 99.7 with a tmax of 101 at 21:05 last night. Pt's last glucose level was 126. Pt's last blood pressure medication doses were given at 05:00 this morning. Patient reports feeling tired and weak, but is otherwise symptomatic. Denies chills, rigors, headaches, dizziness, chest pain, palpitations, abdominal pain, N/V/D.    Vital Signs Last 24 Hrs  T(C): 37.6 (26 May 2019 11:45), Max: 38.3 (25 May 2019 21:05)  T(F): 99.7 (26 May 2019 11:45), Max: 101 (25 May 2019 21:05)  HR: 80 (26 May 2019 11:45) (80 - 100)  BP: 90/60 (26 May 2019 11:47) (90/60 - 144/72)  RR: 16 (26 May 2019 11:45) (16 - 18)  SpO2: 94% (26 May 2019 11:45) (93% - 95%)    PHYSICAL EXAM:  Genearl: NAD, A&Ox3  Respiratory: Lungs clear to auscultation bilaterally. No wheezes, rales, rhonchi. Normal respiratory effort.   Cardiovascular: S1, S2 present. Regular rate and rhythm. No murmurs, wheezes, or gallops  Gastrointestinal: BS x4 normoactive. Soft, non-tender, non-distended.   Extremities: 2+ peripheral pulses. No edema, cyanosis.    A/P  57 y/o male with history of IDDM, HTN, presents to the ED sent by Podiatrist (Dr. Giovanni Hanna) for evaluation and treatment of infected R foot ulcer, failing outpatient Augmentin. Pt now hypotensive with a low grade fever of 99.7    -s/p NS bolus  -Start NS @ 75 ml/hr for 24 hours  -D/c hydralazine and lisinopril   -Repeat BCx sent  -Discussed with Dr. Broderick, and will continue to hold off hydralazine and lisinopril  -Continue to monitor    **ADDENDUM**  -Repeat BP was 148/76    GEMMA AguirreC  #[id number]

## 2019-05-26 NOTE — PROGRESS NOTE ADULT - ASSESSMENT
Assessment  DMT2: 58y Male with DM T2 with hyperglycemia, on basal bolus insulin, s/p foot surgery, blood sugars trending down, lethargic, no hypoglycemic episode, eating partial meals, compliant with low carb diet.  Foot wound: on medications, no chest pain, stable, monitored.  HTN: Controlled,  on antihypertensive medications.  Overweight/Obesity: No strict exercise routines, not on any weight loss program, neither on low calorie diet.    Andrzej Zhu MD  Cell: [phone]  Office: [phone]

## 2019-05-26 NOTE — PROVIDER CONTACT NOTE (OTHER) - BACKGROUND
Pt. Pt. s/p plantar ulcer. Pt. was febrile earlier today. Blood cultures drawn. Chest xray performed. Pt. is on IV ABT Zosyn.

## 2019-05-26 NOTE — PROVIDER CONTACT NOTE (OTHER) - BACKGROUND
Pt. s/p plantar ulcer. Pt. was febrile earlier today. Blood cultures drawn. Chest xray performed. Pt. is on IV ABT Zosyn.

## 2019-05-26 NOTE — PROGRESS NOTE ADULT - SUBJECTIVE AND OBJECTIVE BOX
Patient is a 58y old  Male who presents with a chief complaint of right foot ulcer (26 May 2019 12:06)      SUBJECTIVE / OVERNIGHT EVENTS:   c/o weakness. had fever. has  dry cough.  no bm. no dysuria    MEDICATIONS  (STANDING):  aspirin enteric coated 81 milliGRAM(s) Oral daily  dextrose 5%. 1000 milliLiter(s) (50 mL/Hr) IV Continuous <Continuous>  dextrose 50% Injectable 12.5 Gram(s) IV Push once  dextrose 50% Injectable 25 Gram(s) IV Push once  dextrose 50% Injectable 25 Gram(s) IV Push once  ergocalciferol 75294 Unit(s) Oral every week  ergocalciferol 25874 Unit(s) Oral every week  heparin  Injectable 5000 Unit(s) SubCutaneous every 8 hours  insulin glargine Injectable (LANTUS) 30 Unit(s) SubCutaneous at bedtime  insulin lispro (HumaLOG) corrective regimen sliding scale   SubCutaneous three times a day before meals  insulin lispro (HumaLOG) corrective regimen sliding scale   SubCutaneous at bedtime  insulin lispro Injectable (HumaLOG) 15 Unit(s) SubCutaneous three times a day before meals  piperacillin/tazobactam IVPB. 3.375 Gram(s) IV Intermittent every 8 hours  sodium chloride 0.9%. 1000 milliLiter(s) (75 mL/Hr) IV Continuous <Continuous>  vancomycin  IVPB 750 milliGRAM(s) IV Intermittent every 12 hours    MEDICATIONS  (PRN):  acetaminophen   Tablet .. 650 milliGRAM(s) Oral every 6 hours PRN Temp greater or equal to 38C (100.4F)  acetaminophen   Tablet .. 650 milliGRAM(s) Oral every 6 hours PRN Mild Pain (1 - 3)  dextrose 40% Gel 15 Gram(s) Oral once PRN Blood Glucose LESS THAN 70 milliGRAM(s)/deciliter  glucagon  Injectable 1 milliGRAM(s) IntraMuscular once PRN Glucose LESS THAN 70 milligrams/deciliter  oxyCODONE    5 mG/acetaminophen 325 mG 1 Tablet(s) Oral every 4 hours PRN Moderate Pain (4 - 6)  oxyCODONE    5 mG/acetaminophen 325 mG 2 Tablet(s) Oral every 4 hours PRN Severe Pain (7 - 10)      Vital Signs Last 24 Hrs  T(C): 37.2 (26 May 2019 16:47), Max: 39.1 (26 May 2019 16:39)  T(F): 99 (26 May 2019 16:47), Max: 102.4 (26 May 2019 16:39)  HR: 100 (26 May 2019 16:39) (80 - 100)  BP: 148/78 (26 May 2019 16:39) (90/60 - 148/78)  BP(mean): --  RR: 18 (26 May 2019 16:39) (16 - 18)  SpO2: 93% (26 May 2019 16:39) (93% - 94%)  CAPILLARY BLOOD GLUCOSE      POCT Blood Glucose.: 168 mg/dL (26 May 2019 16:59)  POCT Blood Glucose.: 96 mg/dL (26 May 2019 12:53)  POCT Blood Glucose.: 126 mg/dL (26 May 2019 11:02)  POCT Blood Glucose.: 180 mg/dL (26 May 2019 08:59)  POCT Blood Glucose.: 104 mg/dL (25 May 2019 21:36)    I&O's Summary    25 May 2019 07:  -  26 May 2019 07:00  --------------------------------------------------------  IN: 965 mL / OUT: 550 mL / NET: 415 mL    26 May 2019 07:01  -  26 May 2019 19:11  --------------------------------------------------------  IN: 100 mL / OUT: 0 mL / NET: 100 mL        PHYSICAL EXAM:  GENERAL: NAD, well-developed  HEAD:  Atraumatic, Normocephalic  EYES: EOMI, PERRLA, conjunctiva and sclera clear  NECK: Supple, No JVD  CHEST/LUNG: Clear to auscultation bilaterally; No wheeze  HEART: Regular rate and rhythm; No murmurs, rubs, or gallops  ABDOMEN: Soft, Nontender, Nondistended; Bowel sounds present  EXTREMITIES:  2+ Peripheral Pulses, No clubbing, cyanosis, or edema  PSYCH: AAOx3  NEUROLOGY: non-focal  SKIN: No rashes or lesions    LABS:                        11.7   23.3  )-----------( 293      ( 26 May 2019 10:40 )             37.7         135  |  100  |  21  ----------------------------<  134<H>  4.0   |  20<L>  |  1.99<H>    Ca    8.6      25 May 2019 06:58  Phos  3.0       Mg     1.9                 Urinalysis Basic - ( 25 May 2019 14:07 )    Color: Yellow / Appearance: Slightly Turbid / S.018 / pH: x  Gluc: x / Ketone: Trace  / Bili: Negative / Urobili: 3 mg/dL   Blood: x / Protein: 100 mg/dL / Nitrite: Negative   Leuk Esterase: Negative / RBC: 2 /HPF / WBC 7 /HPF   Sq Epi: x / Non Sq Epi: 2 /HPF / Bacteria: Negative        RADIOLOGY & ADDITIONAL TESTS:    Imaging Personally Reviewed:    Consultant(s) Notes Reviewed:      Care Discussed with Consultants/Other Providers:

## 2019-05-26 NOTE — PROGRESS NOTE ADULT - PROBLEM SELECTOR PLAN 1
Will decrease Lantus to 30 units at bed time.  Will decrease Humalog to 15 units before each meal in addition to Humalog correction scale coverage.  Patient counseled for compliance with consistent low carb diet.

## 2019-05-26 NOTE — PROGRESS NOTE ADULT - SUBJECTIVE AND OBJECTIVE BOX
CC: F/U for Fever    Saw/spoke to patient. Still fevers, no chills. No new complaints. Otherwise no cough/dysuria, no diarrhea.    Allergies  No Known Allergies    ANTIMICROBIALS:  piperacillin/tazobactam IVPB. 3.375 every 8 hours    PE:    Vital Signs Last 24 Hrs  T(C): 37.6 (26 May 2019 11:45), Max: 38.3 (25 May 2019 21:05)  T(F): 99.7 (26 May 2019 11:45), Max: 101 (25 May 2019 21:05)  HR: 80 (26 May 2019 11:45) (80 - 100)  BP: 90/60 (26 May 2019 11:47) (90/60 - 144/72)  RR: 16 (26 May 2019 11:45) (16 - 18)  SpO2: 94% (26 May 2019 11:45) (93% - 95%)    Gen: AOx3, NAD, non-toxic, pleasant  CV: S1+S2 normal, nontachycardic  Resp: Clear bilat, no resp distress, no crackles/wheezes  Abd: Soft, nontender, +BS  Ext: LLE wound, bandaged    LABS:                        11.7   23.3  )-----------( 293      ( 26 May 2019 10:40 )             37.7     0525    135  |  100  |  21  ----------------------------<  134<H>  4.0   |  20<L>  |  1.99<H>    Ca    8.6      25 May 2019 06:58  Phos  3.0       Mg     1.9         Urinalysis Basic - ( 25 May 2019 14:07 )    Color: Yellow / Appearance: Slightly Turbid / S.018 / pH: x  Gluc: x / Ketone: Trace  / Bili: Negative / Urobili: 3 mg/dL   Blood: x / Protein: 100 mg/dL / Nitrite: Negative   Leuk Esterase: Negative / RBC: 2 /HPF / WBC 7 /HPF   Sq Epi: x / Non Sq Epi: 2 /HPF / Bacteria: Negative    MICROBIOLOGY:    .Blood  19   No growth to date.     .Tissue  19   Few Escherichia coli  Growth in fluid media only Enterococcus faecalis  Rare Streptococcus agalactiae (Group B) isolated  Group B streptococci are susceptible to ampicillin,  penicillin and cefazolin, but may be resistant to  erythromycin and clindamycin.  Recommendations for intrapartum prophylaxis for Group B  streptococci are penicillin or ampicillin.  --  Escherichia coli  Enterococcus faecalis    .Blood  19   No growth at 5 days.      .Abscess  19   Numerous Escherichia coli  Numerous Enterococcus faecalis  Moderate Prevotella bivia "Susceptibilities not performed"  --  Enterococcus faecalis  Escherichia coli    (otherwise reviewed)    RADIOLOGY:    CXR:    IMPRESSION: The heart and lungs are normal. The bones are intact.

## 2019-05-26 NOTE — PROGRESS NOTE ADULT - SUBJECTIVE AND OBJECTIVE BOX
Subjective: Patient seen and examined. No new events except as noted.     REVIEW OF SYSTEMS:    CONSTITUTIONAL: No weakness, fevers or chills  EYES/ENT: No visual changes;  No vertigo or throat pain   NECK: No pain or stiffness  RESPIRATORY: No cough, wheezing, hemoptysis; No shortness of breath  CARDIOVASCULAR: No chest pain or palpitations  GASTROINTESTINAL: No abdominal or epigastric pain. No nausea, vomiting, or hematemesis; No diarrhea or constipation. No melena or hematochezia.  GENITOURINARY: No dysuria, frequency or hematuria  NEUROLOGICAL: No numbness or weakness  SKIN: No itching, burning, rashes, or lesions   All other review of systems is negative unless indicated above.    MEDICATIONS:  MEDICATIONS  (STANDING):  aspirin enteric coated 81 milliGRAM(s) Oral daily  dextrose 5%. 1000 milliLiter(s) (50 mL/Hr) IV Continuous <Continuous>  dextrose 50% Injectable 12.5 Gram(s) IV Push once  dextrose 50% Injectable 25 Gram(s) IV Push once  dextrose 50% Injectable 25 Gram(s) IV Push once  ergocalciferol 54861 Unit(s) Oral every week  ergocalciferol 91902 Unit(s) Oral every week  heparin  Injectable 5000 Unit(s) SubCutaneous every 8 hours  hydrALAZINE 50 milliGRAM(s) Oral three times a day  insulin glargine Injectable (LANTUS) 30 Unit(s) SubCutaneous at bedtime  insulin lispro (HumaLOG) corrective regimen sliding scale   SubCutaneous three times a day before meals  insulin lispro (HumaLOG) corrective regimen sliding scale   SubCutaneous at bedtime  insulin lispro Injectable (HumaLOG) 15 Unit(s) SubCutaneous three times a day before meals  lisinopril 40 milliGRAM(s) Oral daily  piperacillin/tazobactam IVPB. 3.375 Gram(s) IV Intermittent every 8 hours  sodium chloride 0.9% Bolus 250 milliLiter(s) IV Bolus once  sodium chloride 0.9%. 1000 milliLiter(s) (125 mL/Hr) IV Continuous <Continuous>      PHYSICAL EXAM:  T(C): 37.6 (05-26-19 @ 11:45), Max: 38.3 (05-25-19 @ 21:05)  HR: 80 (05-26-19 @ 11:45) (80 - 100)  BP: 90/60 (05-26-19 @ 11:47) (90/60 - 144/72)  RR: 16 (05-26-19 @ 11:45) (16 - 18)  SpO2: 94% (05-26-19 @ 11:45) (93% - 95%)  Wt(kg): --  I&O's Summary    25 May 2019 07:01  -  26 May 2019 07:00  --------------------------------------------------------  IN: 965 mL / OUT: 550 mL / NET: 415 mL        Appearance: NAD   HEENT:   Normal oral mucosa, PERRL, EOMI	  Lymphatic: No lymphadenopathy , no edema  Cardiovascular: Normal S1 S2, No JVD, No murmurs , Peripheral pulses palpable 2+ bilaterally  Respiratory: Lungs clear to auscultation, normal effort 	  Gastrointestinal:  Soft, Non-tender, + BS	  Skin: No rashes, No ecchymoses, No cyanosis, warm to touch  Musculoskeletal: Decreased range of motion, normal strength  Psychiatry:  Mood & affect appropriate  Ext: +edema , chronic venous stasis skin discoloration   right foot wrapped         LABS:    CARDIAC MARKERS:                                11.7   23.3  )-----------( 293      ( 26 May 2019 10:40 )             37.7     05-25    135  |  100  |  21  ----------------------------<  134<H>  4.0   |  20<L>  |  1.99<H>    Ca    8.6      25 May 2019 06:58  Phos  3.0     05-25  Mg     1.9     05-25      proBNP:   Lipid Profile:   HgA1c:   TSH:     3          TELEMETRY: 	    ECG:  	  RADIOLOGY:   DIAGNOSTIC TESTING:  [ ] Echocardiogram:  [ ]  Catheterization:  [ ] Stress Test:    OTHER:

## 2019-05-26 NOTE — PROGRESS NOTE ADULT - SUBJECTIVE AND OBJECTIVE BOX
Chief complaint  Patient is a 58y old  Male who presents with a chief complaint of right foot ulcer (26 May 2019 19:11)   Review of systems  Patient in bed, looks comfortable, no fever, no hypoglycemia.    Labs and Fingersticks  CAPILLARY BLOOD GLUCOSE      POCT Blood Glucose.: 168 mg/dL (26 May 2019 16:59)  POCT Blood Glucose.: 96 mg/dL (26 May 2019 12:53)  POCT Blood Glucose.: 126 mg/dL (26 May 2019 11:02)  POCT Blood Glucose.: 180 mg/dL (26 May 2019 08:59)  POCT Blood Glucose.: 104 mg/dL (25 May 2019 21:36)      Anion Gap, Serum: 15 (05-25 @ 06:58)      Calcium, Total Serum: 8.6 (05-25 @ 06:58)          05-25    135  |  100  |  21  ----------------------------<  134<H>  4.0   |  20<L>  |  1.99<H>    Ca    8.6      25 May 2019 06:58  Phos  3.0     05-25  Mg     1.9     05-25                          11.7   23.3  )-----------( 293      ( 26 May 2019 10:40 )             37.7     Medications  MEDICATIONS  (STANDING):  aspirin enteric coated 81 milliGRAM(s) Oral daily  dextrose 5%. 1000 milliLiter(s) (50 mL/Hr) IV Continuous <Continuous>  dextrose 50% Injectable 12.5 Gram(s) IV Push once  dextrose 50% Injectable 25 Gram(s) IV Push once  dextrose 50% Injectable 25 Gram(s) IV Push once  ergocalciferol 10448 Unit(s) Oral every week  ergocalciferol 10095 Unit(s) Oral every week  heparin  Injectable 5000 Unit(s) SubCutaneous every 8 hours  insulin glargine Injectable (LANTUS) 30 Unit(s) SubCutaneous at bedtime  insulin lispro (HumaLOG) corrective regimen sliding scale   SubCutaneous three times a day before meals  insulin lispro (HumaLOG) corrective regimen sliding scale   SubCutaneous at bedtime  insulin lispro Injectable (HumaLOG) 15 Unit(s) SubCutaneous three times a day before meals  piperacillin/tazobactam IVPB. 3.375 Gram(s) IV Intermittent every 8 hours  sodium chloride 0.9%. 1000 milliLiter(s) (75 mL/Hr) IV Continuous <Continuous>  vancomycin  IVPB 750 milliGRAM(s) IV Intermittent every 12 hours      Physical Exam  General: Patient comfortable in bed  Vital Signs Last 12 Hrs  T(F): 99.8 (05-26-19 @ 21:22), Max: 102.4 (05-26-19 @ 16:39)  HR: 93 (05-26-19 @ 21:22) (80 - 100)  BP: 146/80 (05-26-19 @ 21:22) (90/60 - 148/78)  BP(mean): --  RR: 18 (05-26-19 @ 21:22) (16 - 18)  SpO2: 94% (05-26-19 @ 21:22) (93% - 94%)  Neck: No palpable thyroid nodules.  CVS: S1S2, No murmurs  Respiratory: No wheezing, no crepitations  GI: Abdomen soft, bowel sounds positive  Musculoskeletal:  edema lower extremities.   Skin: No skin rashes, no ecchymosis    Diagnostics    Vitamin D, 1, 25-Dihydroxy: AM Sched. Collection: 22-May-2019 06:00 (05-21 @ 18:59)  Vitamin D, 25-Hydroxy: AM Sched. Collection: 22-May-2019 06:00 (05-21 @ 18:59)

## 2019-05-27 LAB
ANION GAP SERPL CALC-SCNC: 13 MMOL/L — SIGNIFICANT CHANGE UP (ref 5–17)
APTT BLD: 27.6 SEC — SIGNIFICANT CHANGE UP (ref 27.5–36.3)
BLD GP AB SCN SERPL QL: NEGATIVE — SIGNIFICANT CHANGE UP
BUN SERPL-MCNC: 39 MG/DL — HIGH (ref 7–23)
CALCIUM SERPL-MCNC: 8.6 MG/DL — SIGNIFICANT CHANGE UP (ref 8.4–10.5)
CHLORIDE SERPL-SCNC: 100 MMOL/L — SIGNIFICANT CHANGE UP (ref 96–108)
CO2 SERPL-SCNC: 20 MMOL/L — LOW (ref 22–31)
CREAT SERPL-MCNC: 2.92 MG/DL — HIGH (ref 0.5–1.3)
CULTURE RESULTS: SIGNIFICANT CHANGE UP
GLUCOSE BLDC GLUCOMTR-MCNC: 205 MG/DL — HIGH (ref 70–99)
GLUCOSE BLDC GLUCOMTR-MCNC: 226 MG/DL — HIGH (ref 70–99)
GLUCOSE BLDC GLUCOMTR-MCNC: 263 MG/DL — HIGH (ref 70–99)
GLUCOSE BLDC GLUCOMTR-MCNC: 269 MG/DL — HIGH (ref 70–99)
GLUCOSE BLDC GLUCOMTR-MCNC: 316 MG/DL — HIGH (ref 70–99)
GLUCOSE SERPL-MCNC: 268 MG/DL — HIGH (ref 70–99)
HCT VFR BLD CALC: 31.8 % — LOW (ref 39–50)
HGB BLD-MCNC: 10.2 G/DL — LOW (ref 13–17)
INR BLD: 1.31 RATIO — HIGH (ref 0.88–1.16)
MCHC RBC-ENTMCNC: 30.1 PG — SIGNIFICANT CHANGE UP (ref 27–34)
MCHC RBC-ENTMCNC: 32 GM/DL — SIGNIFICANT CHANGE UP (ref 32–36)
MCV RBC AUTO: 94.1 FL — SIGNIFICANT CHANGE UP (ref 80–100)
ORGANISM # SPEC MICROSCOPIC CNT: SIGNIFICANT CHANGE UP
PLATELET # BLD AUTO: 301 K/UL — SIGNIFICANT CHANGE UP (ref 150–400)
POTASSIUM SERPL-MCNC: 4.3 MMOL/L — SIGNIFICANT CHANGE UP (ref 3.5–5.3)
POTASSIUM SERPL-SCNC: 4.3 MMOL/L — SIGNIFICANT CHANGE UP (ref 3.5–5.3)
PROTHROM AB SERPL-ACNC: 15 SEC — HIGH (ref 10–12.9)
RBC # BLD: 3.38 M/UL — LOW (ref 4.2–5.8)
RBC # FLD: 12.5 % — SIGNIFICANT CHANGE UP (ref 10.3–14.5)
RH IG SCN BLD-IMP: POSITIVE — SIGNIFICANT CHANGE UP
SODIUM SERPL-SCNC: 133 MMOL/L — LOW (ref 135–145)
SPECIMEN SOURCE: SIGNIFICANT CHANGE UP
WBC # BLD: 17.4 K/UL — HIGH (ref 3.8–10.5)
WBC # FLD AUTO: 17.4 K/UL — HIGH (ref 3.8–10.5)

## 2019-05-27 PROCEDURE — 99233 SBSQ HOSP IP/OBS HIGH 50: CPT

## 2019-05-27 RX ORDER — SODIUM CHLORIDE 9 MG/ML
1000 INJECTION INTRAMUSCULAR; INTRAVENOUS; SUBCUTANEOUS
Refills: 0 | Status: DISCONTINUED | OUTPATIENT
Start: 2019-05-27 | End: 2019-06-03

## 2019-05-27 RX ORDER — VANCOMYCIN HCL 1 G
1000 VIAL (EA) INTRAVENOUS EVERY 24 HOURS
Refills: 0 | Status: DISCONTINUED | OUTPATIENT
Start: 2019-05-27 | End: 2019-06-01

## 2019-05-27 RX ORDER — VANCOMYCIN HCL 1 G
750 VIAL (EA) INTRAVENOUS EVERY 12 HOURS
Refills: 0 | Status: DISCONTINUED | OUTPATIENT
Start: 2019-05-27 | End: 2019-05-27

## 2019-05-27 RX ORDER — ACETAMINOPHEN 500 MG
650 TABLET ORAL EVERY 6 HOURS
Refills: 0 | Status: DISCONTINUED | OUTPATIENT
Start: 2019-05-27 | End: 2019-06-05

## 2019-05-27 RX ORDER — INSULIN LISPRO 100/ML
VIAL (ML) SUBCUTANEOUS AT BEDTIME
Refills: 0 | Status: DISCONTINUED | OUTPATIENT
Start: 2019-05-27 | End: 2019-06-05

## 2019-05-27 RX ORDER — INSULIN LISPRO 100/ML
VIAL (ML) SUBCUTANEOUS
Refills: 0 | Status: DISCONTINUED | OUTPATIENT
Start: 2019-05-27 | End: 2019-05-27

## 2019-05-27 RX ORDER — SODIUM CHLORIDE 9 MG/ML
1000 INJECTION INTRAMUSCULAR; INTRAVENOUS; SUBCUTANEOUS
Refills: 0 | Status: DISCONTINUED | OUTPATIENT
Start: 2019-05-27 | End: 2019-05-27

## 2019-05-27 RX ORDER — DEXTROSE 50 % IN WATER 50 %
15 SYRINGE (ML) INTRAVENOUS ONCE
Refills: 0 | Status: DISCONTINUED | OUTPATIENT
Start: 2019-05-27 | End: 2019-06-05

## 2019-05-27 RX ORDER — INSULIN LISPRO 100/ML
20 VIAL (ML) SUBCUTANEOUS
Refills: 0 | Status: DISCONTINUED | OUTPATIENT
Start: 2019-05-27 | End: 2019-05-28

## 2019-05-27 RX ORDER — GLUCAGON INJECTION, SOLUTION 0.5 MG/.1ML
1 INJECTION, SOLUTION SUBCUTANEOUS ONCE
Refills: 0 | Status: DISCONTINUED | OUTPATIENT
Start: 2019-05-27 | End: 2019-06-05

## 2019-05-27 RX ORDER — INSULIN LISPRO 100/ML
VIAL (ML) SUBCUTANEOUS
Refills: 0 | Status: DISCONTINUED | OUTPATIENT
Start: 2019-05-27 | End: 2019-06-05

## 2019-05-27 RX ORDER — SODIUM HYPOCHLORITE 0.125 %
1 SOLUTION, NON-ORAL MISCELLANEOUS DAILY
Refills: 0 | Status: DISCONTINUED | OUTPATIENT
Start: 2019-05-27 | End: 2019-06-05

## 2019-05-27 RX ORDER — ASPIRIN/CALCIUM CARB/MAGNESIUM 324 MG
81 TABLET ORAL DAILY
Refills: 0 | Status: DISCONTINUED | OUTPATIENT
Start: 2019-05-27 | End: 2019-05-27

## 2019-05-27 RX ORDER — SODIUM CHLORIDE 9 MG/ML
1000 INJECTION, SOLUTION INTRAVENOUS
Refills: 0 | Status: DISCONTINUED | OUTPATIENT
Start: 2019-05-27 | End: 2019-06-05

## 2019-05-27 RX ORDER — MORPHINE SULFATE 50 MG/1
2 CAPSULE, EXTENDED RELEASE ORAL EVERY 6 HOURS
Refills: 0 | Status: DISCONTINUED | OUTPATIENT
Start: 2019-05-27 | End: 2019-06-02

## 2019-05-27 RX ORDER — INSULIN GLARGINE 100 [IU]/ML
30 INJECTION, SOLUTION SUBCUTANEOUS AT BEDTIME
Refills: 0 | Status: DISCONTINUED | OUTPATIENT
Start: 2019-05-27 | End: 2019-05-28

## 2019-05-27 RX ORDER — DEXTROSE 50 % IN WATER 50 %
25 SYRINGE (ML) INTRAVENOUS ONCE
Refills: 0 | Status: DISCONTINUED | OUTPATIENT
Start: 2019-05-27 | End: 2019-06-05

## 2019-05-27 RX ORDER — MEROPENEM 1 G/30ML
1000 INJECTION INTRAVENOUS EVERY 8 HOURS
Refills: 0 | Status: DISCONTINUED | OUTPATIENT
Start: 2019-05-27 | End: 2019-05-27

## 2019-05-27 RX ORDER — METOCLOPRAMIDE HCL 10 MG
10 TABLET ORAL ONCE
Refills: 0 | Status: DISCONTINUED | OUTPATIENT
Start: 2019-05-27 | End: 2019-05-27

## 2019-05-27 RX ORDER — IBUPROFEN 200 MG
400 TABLET ORAL ONCE
Refills: 0 | Status: COMPLETED | OUTPATIENT
Start: 2019-05-27 | End: 2019-05-27

## 2019-05-27 RX ORDER — DEXTROSE 50 % IN WATER 50 %
12.5 SYRINGE (ML) INTRAVENOUS ONCE
Refills: 0 | Status: DISCONTINUED | OUTPATIENT
Start: 2019-05-27 | End: 2019-06-05

## 2019-05-27 RX ORDER — ACETAMINOPHEN 500 MG
1000 TABLET ORAL ONCE
Refills: 0 | Status: COMPLETED | OUTPATIENT
Start: 2019-05-27 | End: 2019-05-27

## 2019-05-27 RX ORDER — OXYCODONE AND ACETAMINOPHEN 5; 325 MG/1; MG/1
2 TABLET ORAL EVERY 4 HOURS
Refills: 0 | Status: DISCONTINUED | OUTPATIENT
Start: 2019-05-27 | End: 2019-06-03

## 2019-05-27 RX ORDER — MEROPENEM 1 G/30ML
500 INJECTION INTRAVENOUS EVERY 8 HOURS
Refills: 0 | Status: DISCONTINUED | OUTPATIENT
Start: 2019-05-27 | End: 2019-05-27

## 2019-05-27 RX ORDER — ERGOCALCIFEROL 1.25 MG/1
50000 CAPSULE ORAL
Refills: 0 | Status: DISCONTINUED | OUTPATIENT
Start: 2019-05-27 | End: 2019-05-27

## 2019-05-27 RX ORDER — HEPARIN SODIUM 5000 [USP'U]/ML
5000 INJECTION INTRAVENOUS; SUBCUTANEOUS EVERY 8 HOURS
Refills: 0 | Status: DISCONTINUED | OUTPATIENT
Start: 2019-05-27 | End: 2019-06-05

## 2019-05-27 RX ORDER — ERGOCALCIFEROL 1.25 MG/1
50000 CAPSULE ORAL
Refills: 0 | Status: DISCONTINUED | OUTPATIENT
Start: 2019-05-27 | End: 2019-06-05

## 2019-05-27 RX ORDER — ASPIRIN/CALCIUM CARB/MAGNESIUM 324 MG
81 TABLET ORAL DAILY
Refills: 0 | Status: DISCONTINUED | OUTPATIENT
Start: 2019-05-27 | End: 2019-06-05

## 2019-05-27 RX ORDER — PIPERACILLIN AND TAZOBACTAM 4; .5 G/20ML; G/20ML
3.38 INJECTION, POWDER, LYOPHILIZED, FOR SOLUTION INTRAVENOUS EVERY 8 HOURS
Refills: 0 | Status: DISCONTINUED | OUTPATIENT
Start: 2019-05-27 | End: 2019-06-05

## 2019-05-27 RX ORDER — HYDRALAZINE HCL 50 MG
25 TABLET ORAL
Refills: 0 | Status: DISCONTINUED | OUTPATIENT
Start: 2019-05-27 | End: 2019-05-31

## 2019-05-27 RX ORDER — HEPARIN SODIUM 5000 [USP'U]/ML
5000 INJECTION INTRAVENOUS; SUBCUTANEOUS EVERY 8 HOURS
Refills: 0 | Status: DISCONTINUED | OUTPATIENT
Start: 2019-05-27 | End: 2019-05-27

## 2019-05-27 RX ADMIN — OXYCODONE AND ACETAMINOPHEN 2 TABLET(S): 5; 325 TABLET ORAL at 09:41

## 2019-05-27 RX ADMIN — SODIUM CHLORIDE 20 MILLILITER(S): 9 INJECTION INTRAMUSCULAR; INTRAVENOUS; SUBCUTANEOUS at 15:59

## 2019-05-27 RX ADMIN — MORPHINE SULFATE 2 MILLIGRAM(S): 50 CAPSULE, EXTENDED RELEASE ORAL at 16:27

## 2019-05-27 RX ADMIN — OXYCODONE AND ACETAMINOPHEN 2 TABLET(S): 5; 325 TABLET ORAL at 18:09

## 2019-05-27 RX ADMIN — PIPERACILLIN AND TAZOBACTAM 25 GRAM(S): 4; .5 INJECTION, POWDER, LYOPHILIZED, FOR SOLUTION INTRAVENOUS at 01:02

## 2019-05-27 RX ADMIN — OXYCODONE AND ACETAMINOPHEN 2 TABLET(S): 5; 325 TABLET ORAL at 09:11

## 2019-05-27 RX ADMIN — Medication 400 MILLIGRAM(S): at 22:04

## 2019-05-27 RX ADMIN — HEPARIN SODIUM 5000 UNIT(S): 5000 INJECTION INTRAVENOUS; SUBCUTANEOUS at 05:18

## 2019-05-27 RX ADMIN — Medication 250 MILLIGRAM(S): at 01:03

## 2019-05-27 RX ADMIN — Medication 250 MILLIGRAM(S): at 15:24

## 2019-05-27 RX ADMIN — Medication 650 MILLIGRAM(S): at 05:18

## 2019-05-27 RX ADMIN — Medication 2: at 16:40

## 2019-05-27 RX ADMIN — Medication 3: at 12:01

## 2019-05-27 RX ADMIN — Medication 400 MILLIGRAM(S): at 20:05

## 2019-05-27 RX ADMIN — Medication 2: at 22:05

## 2019-05-27 RX ADMIN — OXYCODONE AND ACETAMINOPHEN 2 TABLET(S): 5; 325 TABLET ORAL at 17:39

## 2019-05-27 RX ADMIN — PIPERACILLIN AND TAZOBACTAM 25 GRAM(S): 4; .5 INJECTION, POWDER, LYOPHILIZED, FOR SOLUTION INTRAVENOUS at 22:04

## 2019-05-27 RX ADMIN — Medication 25 MILLIGRAM(S): at 18:48

## 2019-05-27 RX ADMIN — INSULIN GLARGINE 30 UNIT(S): 100 INJECTION, SOLUTION SUBCUTANEOUS at 22:04

## 2019-05-27 RX ADMIN — MEROPENEM 100 MILLIGRAM(S): 1 INJECTION INTRAVENOUS at 08:23

## 2019-05-27 RX ADMIN — HEPARIN SODIUM 5000 UNIT(S): 5000 INJECTION INTRAVENOUS; SUBCUTANEOUS at 22:04

## 2019-05-27 RX ADMIN — MORPHINE SULFATE 2 MILLIGRAM(S): 50 CAPSULE, EXTENDED RELEASE ORAL at 16:38

## 2019-05-27 NOTE — PROGRESS NOTE ADULT - ASSESSMENT
Assessment  DMT2: 58y Male with DM T2 with hyperglycemia, on basal bolus insulin, had procedure today, was NPO, blood sugars high, no hypoglycemic episode, afebrile.  Foot wound: on medications, no chest pain, stable, monitored.  HTN: Controlled,  on antihypertensive medications.  Overweight/Obesity: No strict exercise routines, not on any weight loss program, neither on low calorie diet.    Andrzej Zhu MD  Cell: [phone]  Office: [phone]

## 2019-05-27 NOTE — PROGRESS NOTE ADULT - ASSESSMENT
58 M with diabetic foot ulcer with surrounding cellulitis  Fever overnight, leukocytosis  S/p debridement with podiatry 5/22  Culture polymicrobial--E coli, enterococcus, strep  Fevers, rising leukocytosis  Concern for persistent focus in foot?  Overall, fever, leukocytosis, infected diabetic foot ulcer, post operative state  - Zosyn 3.375g q 8  - Vanco 750mg q 12 (monitor trough)  - F/U podiatry--tentative plan for OR today for further I+D  - F/U pending cultures  - If worsening, escalate Zosyn to Tomasa    Javi Coe MD  Pager [phone]  After 5pm and on weekends call [phone] 58 M with diabetic foot ulcer with surrounding cellulitis  Fever overnight, leukocytosis  S/p debridement with podiatry 5/22  Culture polymicrobial--E coli, enterococcus, strep  Fevers, rising leukocytosis  Concern for persistent focus in foot?  Overall, fever, leukocytosis, infected diabetic foot ulcer, post operative state  - Tomasa 1g q 8 (agree with escalation)  - Vanco 750mg q 12 (monitor trough)  - F/U podiatry--tentative plan for OR today for further I+D  - F/U pending cultures    Javi Coe MD  Pager [phone]  After 5pm and on weekends call [phone] 58 M with diabetic foot ulcer with surrounding cellulitis  Fever overnight, leukocytosis  S/p debridement with podiatry 5/22  Culture polymicrobial--E coli, enterococcus, strep  Fevers, rising leukocytosis  Concern for persistent focus in foot?  Overall, fever, leukocytosis, infected diabetic foot ulcer, post operative state  - Tomasa 1g q 8 (agree with escalation)  - Vanco 750mg q 12 (monitor trough)  - F/U podiatry--tentative plan for OR today for further I+D  - F/U pending cultures  - Discussed case with medicine attending    Javi Coe MD  Pager [phone]  After 5pm and on weekends call [phone]

## 2019-05-27 NOTE — PROGRESS NOTE ADULT - ASSESSMENT
57 y/o male with history of IDDM, HTN, presents to the ED sent by Podiatrist (Dr. Giovanni Hanna) for evaluation and treatment of infected R foot ulcer, failing outpatient Augmentin. Patient reports that he has been followed by Podiatry outpatient for R plantar foot ulcer x3-4 weeks. Approx 2 weeks ago pt developed fever, chills, diaphoresis, malaise, decreased PO intake and nausea. Treated with Augmentin for the last 1 week without improvement. Unable to control DM at home. Also reports orthopnea . Denies chest pain, dyspnea on exertion, abdominal pain, vomiting, diarrhea, dysuria, hematuria, frequency, back pain.      diabetic foot ulcer with surrounding cellulitis   Fever overnight, leukocytosis  S/p debridement with podiatry 5/22  Culture polymicrobial--E coli, enterococcus, strep  Fevers, rising leukocytosis; no apparent focal source aside from foot  Overall, fever, leukocytosis, infected diabetic foot ulcer, post operative state  - Zosyn changed to meropenem  - Add Vanco 750mg q 12 (monitor trough)  - i spoke with podiatry who will take pt to OR today  - F/U BCXs; repeat BCXs x 2  - Monitor for other possible source infection such as developing diarrhea    uncontrolled Diabetes  - cont levemir  - novolog 20 units qac  - hgb a1c  11.9  - diabetic diet  - FS qid  - endo consult appreciated    MARIKA improved  - stop lasix and lisinopril  - ivf hydration  - follow creatinine  - nephrology following  - change zosyn to a cephalosporin per nephrology     HTN   -   hold hydralazine     hyponatremia  - NO salt restriction  - no HCTZ    constipation  - s/p lactulose x 2 doses  - dulcolax x 1

## 2019-05-27 NOTE — PROGRESS NOTE ADULT - SUBJECTIVE AND OBJECTIVE BOX
Patient is a 58y old  Male who presents with a chief complaint of right foot ulcer (27 May 2019 12:31)       INTERVAL HPI/OVERNIGHT EVENTS:  Patient seen and evaluated at bedside.  Pt is resting comfortable in NAD. Febrile overnight.    Allergies    No Known Allergies    Intolerances        Vital Signs Last 24 Hrs  T(C): 37.7 (27 May 2019 09:08), Max: 39.1 (26 May 2019 16:39)  T(F): 99.9 (27 May 2019 09:08), Max: 102.4 (26 May 2019 16:39)  HR: 90 (27 May 2019 09:08) (90 - 101)  BP: 154/82 (27 May 2019 09:08) (128/64 - 162/72)  BP(mean): --  RR: 18 (27 May 2019 09:08) (17 - 18)  SpO2: 93% (27 May 2019 09:08) (93% - 99%)    LABS:                        10.2   17.4  )-----------( 301      ( 27 May 2019 12:35 )             31.8     05-27    133<L>  |  100  |  39<H>  ----------------------------<  268<H>  4.3   |  20<L>  |  2.92<H>    Ca    8.6      27 May 2019 12:35      PT/INR - ( 27 May 2019 12:35 )   PT: 15.0 sec;   INR: 1.31 ratio         PTT - ( 27 May 2019 12:35 )  PTT:27.6 sec  Urinalysis Basic - ( 25 May 2019 14:07 )    Color: Yellow / Appearance: Slightly Turbid / S.018 / pH: x  Gluc: x / Ketone: Trace  / Bili: Negative / Urobili: 3 mg/dL   Blood: x / Protein: 100 mg/dL / Nitrite: Negative   Leuk Esterase: Negative / RBC: 2 /HPF / WBC 7 /HPF   Sq Epi: x / Non Sq Epi: 2 /HPF / Bacteria: Negative      CAPILLARY BLOOD GLUCOSE      POCT Blood Glucose.: 263 mg/dL (27 May 2019 11:36)  POCT Blood Glucose.: 188 mg/dL (26 May 2019 21:33)  POCT Blood Glucose.: 168 mg/dL (26 May 2019 16:59)      Lower Extremity Physical Exam:  Vascular: DP/PT 2/4, B/L, CFT <3 seconds B/L, Temperature gradient RLE warm to warm, LLE TG N/L.   Neuro: Epicritic sensation diminished  to the level of midfoot, B/L.  Skin: +2 pitting RLE, +1 pitting LLE  Wound #1:  Location: R plantar midfoot Vac intact w/ sponge compressed @ 125 mmHg continuous therapy & no fluid in Vac container  Skin: Upon removing the Vac today hill wound maceration noted w/ sloughing skin underlying normal skin noted, Adaptic intact and secured w/ nylon sutures protecting Stravix graft which is secured also by nylon sutures. No Hematoma, fluctuance noted centrally with purulent drainage, No odor.

## 2019-05-27 NOTE — PROGRESS NOTE ADULT - ASSESSMENT
57 y/o male pt s/p  I&D & Debridement using Versa Jet w/ application of Stravix graft  - pt continue to spike fevers, WBC 17 today  - Continue IV ABX as per ID  - pt added on for OR I&D with debridement today w/ Dr. Issa   - pt and family aware, all risks, benefits, and possible complications explained, pt consented for procedure  - NPO since last night  - seen by attending

## 2019-05-27 NOTE — PROGRESS NOTE ADULT - SUBJECTIVE AND OBJECTIVE BOX
Subjective: Patient seen and examined. No new events except as noted.   febrile overnight  resting in bed   no cp or sob     REVIEW OF SYSTEMS:    CONSTITUTIONAL: + weakness, +fevers or chills  EYES/ENT: No visual changes;  No vertigo or throat pain   NECK: No pain or stiffness  RESPIRATORY: No cough, wheezing, hemoptysis; No shortness of breath  CARDIOVASCULAR: No chest pain or palpitations  GASTROINTESTINAL: No abdominal or epigastric pain. No nausea, vomiting, or hematemesis; No diarrhea or constipation. No melena or hematochezia.  GENITOURINARY: No dysuria, frequency or hematuria  NEUROLOGICAL: No numbness or weakness  SKIN: No itching, burning, rashes, or lesions   All other review of systems is negative unless indicated above.    MEDICATIONS:  MEDICATIONS  (STANDING):  aspirin enteric coated 81 milliGRAM(s) Oral daily  dextrose 5%. 1000 milliLiter(s) (50 mL/Hr) IV Continuous <Continuous>  dextrose 50% Injectable 12.5 Gram(s) IV Push once  dextrose 50% Injectable 25 Gram(s) IV Push once  dextrose 50% Injectable 25 Gram(s) IV Push once  ergocalciferol 07418 Unit(s) Oral every week  ergocalciferol 68352 Unit(s) Oral every week  heparin  Injectable 5000 Unit(s) SubCutaneous every 8 hours  insulin glargine Injectable (LANTUS) 30 Unit(s) SubCutaneous at bedtime  insulin lispro (HumaLOG) corrective regimen sliding scale   SubCutaneous three times a day before meals  insulin lispro (HumaLOG) corrective regimen sliding scale   SubCutaneous at bedtime  insulin lispro Injectable (HumaLOG) 15 Unit(s) SubCutaneous three times a day before meals  meropenem  IVPB 500 milliGRAM(s) IV Intermittent every 8 hours  sodium chloride 0.9%. 1000 milliLiter(s) (75 mL/Hr) IV Continuous <Continuous>  vancomycin  IVPB 750 milliGRAM(s) IV Intermittent every 12 hours      PHYSICAL EXAM:  T(C): 37.7 (05-27-19 @ 09:08), Max: 39.1 (05-26-19 @ 16:39)  HR: 90 (05-27-19 @ 09:08) (80 - 101)  BP: 154/82 (05-27-19 @ 09:08) (90/60 - 162/72)  RR: 18 (05-27-19 @ 09:08) (16 - 18)  SpO2: 93% (05-27-19 @ 09:08) (93% - 99%)  Wt(kg): --  I&O's Summary    26 May 2019 07:01  -  27 May 2019 07:00  --------------------------------------------------------  IN: 2050 mL / OUT: 900 mL / NET: 1150 mL          Appearance: NAD   HEENT:   Normal oral mucosa, PERRL, EOMI	  Lymphatic: No lymphadenopathy , no edema  Cardiovascular: Normal S1 S2, No JVD, No murmurs , Peripheral pulses palpable 2+ bilaterally  Respiratory: Lungs clear to auscultation, normal effort 	  Gastrointestinal:  Soft, Non-tender, + BS	  Skin: No rashes, No ecchymoses, No cyanosis, warm to touch  Musculoskeletal: Decreased range of motion, normal strength  Psychiatry:  Mood & affect appropriate  Ext: +edema , chronic venous stasis skin discoloration   right foot wrapped             LABS:    CARDIAC MARKERS:                                11.7   23.3  )-----------( 293      ( 26 May 2019 10:40 )             37.7           proBNP:   Lipid Profile:   HgA1c:   TSH:             TELEMETRY: 	    ECG:  	  RADIOLOGY:   DIAGNOSTIC TESTING:  [ ] Echocardiogram:  [ ]  Catheterization:  [ ] Stress Test:    OTHER:

## 2019-05-27 NOTE — PROGRESS NOTE ADULT - SUBJECTIVE AND OBJECTIVE BOX
CC: F/U for Fever    Saw/spoke to patient. Still fever. No new localizing symptoms. Planned for OR today.     Allergies  No Known Allergies    ANTIMICROBIALS:  meropenem  IVPB 500 every 8 hours  vancomycin  IVPB 750 every 12 hours    PE:    Vital Signs Last 24 Hrs  T(C): 37.7 (27 May 2019 09:08), Max: 39.1 (26 May 2019 16:39)  T(F): 99.9 (27 May 2019 09:08), Max: 102.4 (26 May 2019 16:39)  HR: 90 (27 May 2019 09:08) (80 - 101)  BP: 154/82 (27 May 2019 09:08) (90/60 - 162/72)  RR: 18 (27 May 2019 09:08) (16 - 18)  SpO2: 93% (27 May 2019 09:08) (93% - 99%)    Gen: AOx3, NAD, non-toxic, pleasant  CV: S1+S2 normal, nontachycardic  Resp: Clear bilat, no resp distress, no crackles/wheezes  Abd: Soft, nontender, +BS  Ext: R bandaged foot, unchanged    LABS:                        11.7   23.3  )-----------( 293      ( 26 May 2019 10:40 )             37.7     Urinalysis Basic - ( 25 May 2019 14:07 )    Color: Yellow / Appearance: Slightly Turbid / S.018 / pH: x  Gluc: x / Ketone: Trace  / Bili: Negative / Urobili: 3 mg/dL   Blood: x / Protein: 100 mg/dL / Nitrite: Negative   Leuk Esterase: Negative / RBC: 2 /HPF / WBC 7 /HPF   Sq Epi: x / Non Sq Epi: 2 /HPF / Bacteria: Negative    MICROBIOLOGY:    .Blood  19   No growth to date.      .Tissue  19   Few Escherichia coli  Growth in fluid media only Enterococcus faecalis  Rare Streptococcus agalactiae (Group B) isolated  Group B streptococci are susceptible to ampicillin,  penicillin and cefazolin, but may be resistant to  erythromycin and clindamycin.  Recommendations for intrapartum prophylaxis for Group B  streptococci are penicillin or ampicillin.  --  Escherichia coli  Enterococcus faecalis    .Blood  19   No growth at 5 days.     .Abscess  19   Numerous Escherichia coli  Numerous Enterococcus faecalis  Moderate Prevotella bivia "Susceptibilities not performed"  --  Enterococcus faecalis  Escherichia coli    (otherwise reviewed)    RADIOLOGY:     CXR:    IMPRESSION: The heart and lungs are normal. The bones are intact.

## 2019-05-27 NOTE — PROGRESS NOTE ADULT - SUBJECTIVE AND OBJECTIVE BOX
Chief complaint  Patient is a 58y old  Male who presents with a chief complaint of right foot ulcer (27 May 2019 13:47)   Review of systems  Patient in bed, looks comfortable, no fever, no hypoglycemia.    Labs and Fingersticks  CAPILLARY BLOOD GLUCOSE      POCT Blood Glucose.: 263 mg/dL (27 May 2019 11:36)  POCT Blood Glucose.: 188 mg/dL (26 May 2019 21:33)  POCT Blood Glucose.: 168 mg/dL (26 May 2019 16:59)      Anion Gap, Serum: 13 (05-27 @ 12:35)      Calcium, Total Serum: 8.6 (05-27 @ 12:35)          05-27    133<L>  |  100  |  39<H>  ----------------------------<  268<H>  4.3   |  20<L>  |  2.92<H>    Ca    8.6      27 May 2019 12:35                          10.2   17.4  )-----------( 301      ( 27 May 2019 12:35 )             31.8     Medications  MEDICATIONS  (STANDING):  sodium chloride 0.9%. 1000 milliLiter(s) (20 mL/Hr) IV Continuous <Continuous>      Physical Exam  General: Patient comfortable in bed  Vital Signs Last 12 Hrs  T(F): 96.8 (05-27-19 @ 15:08), Max: 101.8 (05-27-19 @ 05:06)  HR: 72 (05-27-19 @ 16:15) (68 - 101)  BP: 152/78 (05-27-19 @ 16:15) (112/59 - 162/72)  BP(mean): 108 (05-27-19 @ 16:15) (79 - 108)  RR: 16 (05-27-19 @ 16:15) (14 - 18)  SpO2: 99% (05-27-19 @ 16:15) (93% - 99%)  Neck: No palpable thyroid nodules.  CVS: S1S2, No murmurs  Respiratory: No wheezing, no crepitations  GI: Abdomen soft, bowel sounds positive  Musculoskeletal:  edema lower extremities.   Skin: No skin rashes, no ecchymosis    Diagnostics    Vitamin D, 1, 25-Dihydroxy: AM Sched. Collection: 22-May-2019 06:00 (05-21 @ 18:59)  Vitamin D, 25-Hydroxy: AM Sched. Collection: 22-May-2019 06:00 (05-21 @ 18:59)

## 2019-05-27 NOTE — PROGRESS NOTE ADULT - SUBJECTIVE AND OBJECTIVE BOX
Patient is a 58y old  Male who presents with a chief complaint of right foot ulcer (27 May 2019 10:02)      SUBJECTIVE / OVERNIGHT EVENTS:  febrile    MEDICATIONS  (STANDING):  aspirin enteric coated 81 milliGRAM(s) Oral daily  dextrose 5%. 1000 milliLiter(s) (50 mL/Hr) IV Continuous <Continuous>  dextrose 50% Injectable 12.5 Gram(s) IV Push once  dextrose 50% Injectable 25 Gram(s) IV Push once  dextrose 50% Injectable 25 Gram(s) IV Push once  ergocalciferol 07885 Unit(s) Oral every week  ergocalciferol 60406 Unit(s) Oral every week  heparin  Injectable 5000 Unit(s) SubCutaneous every 8 hours  insulin glargine Injectable (LANTUS) 30 Unit(s) SubCutaneous at bedtime  insulin lispro (HumaLOG) corrective regimen sliding scale   SubCutaneous three times a day before meals  insulin lispro (HumaLOG) corrective regimen sliding scale   SubCutaneous at bedtime  meropenem  IVPB 1000 milliGRAM(s) IV Intermittent every 8 hours  sodium chloride 0.9%. 1000 milliLiter(s) (75 mL/Hr) IV Continuous <Continuous>  vancomycin  IVPB 750 milliGRAM(s) IV Intermittent every 12 hours    MEDICATIONS  (PRN):  acetaminophen   Tablet .. 650 milliGRAM(s) Oral every 6 hours PRN Temp greater or equal to 38C (100.4F)  acetaminophen   Tablet .. 650 milliGRAM(s) Oral every 6 hours PRN Mild Pain (1 - 3)  dextrose 40% Gel 15 Gram(s) Oral once PRN Blood Glucose LESS THAN 70 milliGRAM(s)/deciliter  glucagon  Injectable 1 milliGRAM(s) IntraMuscular once PRN Glucose LESS THAN 70 milligrams/deciliter  oxyCODONE    5 mG/acetaminophen 325 mG 1 Tablet(s) Oral every 4 hours PRN Moderate Pain (4 - 6)  oxyCODONE    5 mG/acetaminophen 325 mG 2 Tablet(s) Oral every 4 hours PRN Severe Pain (7 - 10)      Vital Signs Last 24 Hrs  T(C): 37.7 (27 May 2019 09:08), Max: 39.1 (26 May 2019 16:39)  T(F): 99.9 (27 May 2019 09:08), Max: 102.4 (26 May 2019 16:39)  HR: 90 (27 May 2019 09:08) (90 - 101)  BP: 154/82 (27 May 2019 09:08) (128/64 - 162/72)  BP(mean): --  RR: 18 (27 May 2019 09:08) (17 - 18)  SpO2: 93% (27 May 2019 09:08) (93% - 99%)  CAPILLARY BLOOD GLUCOSE      POCT Blood Glucose.: 263 mg/dL (27 May 2019 11:36)  POCT Blood Glucose.: 188 mg/dL (26 May 2019 21:33)  POCT Blood Glucose.: 168 mg/dL (26 May 2019 16:59)  POCT Blood Glucose.: 96 mg/dL (26 May 2019 12:53)    I&O's Summary    26 May 2019 07:01  -  27 May 2019 07:00  --------------------------------------------------------  IN: 2050 mL / OUT: 900 mL / NET: 1150 mL        PHYSICAL EXAM:  GENERAL: NAD, well-developed  HEAD:  Atraumatic, Normocephalic  EYES: EOMI, PERRLA, conjunctiva and sclera clear  NECK: Supple, No JVD  CHEST/LUNG: Clear to auscultation bilaterally; No wheeze  HEART: Regular rate and rhythm; No murmurs, rubs, or gallops  ABDOMEN: Soft, Nontender, Nondistended; Bowel sounds present  EXTREMITIES:  2+ Peripheral Pulses, No clubbing, cyanosis, or edema  PSYCH: AAOx3  NEUROLOGY: non-focal  SKIN: No rashes or lesions    LABS:                        11.7   23.3  )-----------( 293      ( 26 May 2019 10:40 )             37.7                 Urinalysis Basic - ( 25 May 2019 14:07 )    Color: Yellow / Appearance: Slightly Turbid / S.018 / pH: x  Gluc: x / Ketone: Trace  / Bili: Negative / Urobili: 3 mg/dL   Blood: x / Protein: 100 mg/dL / Nitrite: Negative   Leuk Esterase: Negative / RBC: 2 /HPF / WBC 7 /HPF   Sq Epi: x / Non Sq Epi: 2 /HPF / Bacteria: Negative        RADIOLOGY & ADDITIONAL TESTS:    Imaging Personally Reviewed:    Consultant(s) Notes Reviewed:      Care Discussed with Consultants/Other Providers:

## 2019-05-27 NOTE — CHART NOTE - NSCHARTNOTEFT_GEN_A_CORE
Notified by RN patient with temperature of 102.3 F. Patient seen and examined  by me at bedside.   Patient is alert, NAD.  Patient denied headache, dizziness, chest pain, shortness of breath, cough, rhinorrhea, N/V/D, urinary symptoms, or abdominal pain.      ROS:  CONSTITUTIONAL:  + fever, NO chills, rigors  CARDIOVASCULAR:  No chest pain or palpitations  RESPIRATORY:   No SOB, cough, dyspnea on exertion.  No wheezing  GASTROINTESTINAL:  No abd pain, N/V, diarrhea/constipation  EXTREMITIES:  No swelling or joint pain  GENITOURINARY:  No burning on urination, increased frequency or urgency.  No flank pain  NEUROLOGIC:  No HA, visual disturbances  SKIN: No rashes          VITAL SIGNS:  T(C): 39.2 (05-27-19 @ 19:56), Max: 39.2 (05-27-19 @ 19:56)  HR: 93 (05-27-19 @ 21:15) (68 - 102)  BP: 147/72 (05-27-19 @ 21:15) (112/59 - 174/85)  RR: 22 (05-27-19 @ 21:15) (14 - 22)  SpO2: 90% (05-27-19 @ 21:15) (90% - 99%)  Wt(kg): --      Physical Exam:  General: WN/WD NAD, AOx3, appears  Head:  NC/AT  CV: RRR, S1S2   Respiratory: CTA B/L, nonlabored. No rales/rhonchi/wheezes.  Abdominal: (+) bowel sounds x4. Soft, NT, ND, no palpable mass, no guarding, or rebound tenderness  MSK: No BLLE edema, + peripheral pulses, FROM all 4 extremity  Skin: (+) warm, dry   Psych: Appropriate affect       LABORATORY:                        10.2   17.4  )-----------( 301      ( 27 May 2019 12:35 )             31.8       05-27    133<L>  |  100  |  39<H>  ----------------------------<  268<H>  4.3   |  20<L>  |  2.92<H>    Ca    8.6      27 May 2019 12:35          RADIOLOGY: CXR from 5/25  IMPRESSION: The heart and lungs are normal. The bones are intact.            ASSESSMENT/PLAN:   HPI:  59 y/o male with history of IDDM, HTN, presents to the ED sent by Podiatrist (Dr. Giovanni Hanna) for evaluation and treatment of infected R foot ulcer, failing outpatient Augmentin. Patient reports that he has been followed by Podiatry outpatient for R plantar foot ulcer x3-4 weeks. Approx 2 weeks ago pt developed fever, chills, diaphoresis, malaise, decreased PO intake and nausea. Treated with Augmentin for the last 1 week without improvement. Unable to control DM at home. Also reports orthopnea . Denies chest pain, dyspnea on exertion, abdominal pain, vomiting, diarrhea, dysuria, hematuria, frequency, back pain. (20 May 2019 18:44)      Patient now with temperature of 102.6F,     1) Fever  -Tylenol  IV and cooling measures prn for pyrexia, cooling blacket ordered   -IVF  -BC x2 still pending  -UA negative  - c/w kee and vanco   -Will continue to closely monitor patient/vitals   -Will endorse to primary team in AM

## 2019-05-28 LAB
ALBUMIN SERPL ELPH-MCNC: 2.6 G/DL — LOW (ref 3.3–5)
ALP SERPL-CCNC: 162 U/L — HIGH (ref 40–120)
ALT FLD-CCNC: 31 U/L — SIGNIFICANT CHANGE UP (ref 10–45)
ANION GAP SERPL CALC-SCNC: 17 MMOL/L — SIGNIFICANT CHANGE UP (ref 5–17)
AST SERPL-CCNC: 50 U/L — HIGH (ref 10–40)
BILIRUB SERPL-MCNC: 0.4 MG/DL — SIGNIFICANT CHANGE UP (ref 0.2–1.2)
BUN SERPL-MCNC: 46 MG/DL — HIGH (ref 7–23)
CALCIUM SERPL-MCNC: 8.6 MG/DL — SIGNIFICANT CHANGE UP (ref 8.4–10.5)
CHLORIDE SERPL-SCNC: 98 MMOL/L — SIGNIFICANT CHANGE UP (ref 96–108)
CO2 SERPL-SCNC: 19 MMOL/L — LOW (ref 22–31)
CREAT SERPL-MCNC: 3.11 MG/DL — HIGH (ref 0.5–1.3)
GLUCOSE BLDC GLUCOMTR-MCNC: 176 MG/DL — HIGH (ref 70–99)
GLUCOSE BLDC GLUCOMTR-MCNC: 184 MG/DL — HIGH (ref 70–99)
GLUCOSE BLDC GLUCOMTR-MCNC: 317 MG/DL — HIGH (ref 70–99)
GLUCOSE BLDC GLUCOMTR-MCNC: 321 MG/DL — HIGH (ref 70–99)
GLUCOSE BLDC GLUCOMTR-MCNC: 331 MG/DL — HIGH (ref 70–99)
GLUCOSE SERPL-MCNC: 347 MG/DL — HIGH (ref 70–99)
HCT VFR BLD CALC: 34.6 % — LOW (ref 39–50)
HGB BLD-MCNC: 11.1 G/DL — LOW (ref 13–17)
MCHC RBC-ENTMCNC: 30.4 PG — SIGNIFICANT CHANGE UP (ref 27–34)
MCHC RBC-ENTMCNC: 32 GM/DL — SIGNIFICANT CHANGE UP (ref 32–36)
MCV RBC AUTO: 94.8 FL — SIGNIFICANT CHANGE UP (ref 80–100)
PLATELET # BLD AUTO: 286 K/UL — SIGNIFICANT CHANGE UP (ref 150–400)
POTASSIUM SERPL-MCNC: 4.6 MMOL/L — SIGNIFICANT CHANGE UP (ref 3.5–5.3)
POTASSIUM SERPL-SCNC: 4.6 MMOL/L — SIGNIFICANT CHANGE UP (ref 3.5–5.3)
PROT SERPL-MCNC: 6.9 G/DL — SIGNIFICANT CHANGE UP (ref 6–8.3)
RBC # BLD: 3.65 M/UL — LOW (ref 4.2–5.8)
RBC # FLD: 12.7 % — SIGNIFICANT CHANGE UP (ref 10.3–14.5)
SODIUM SERPL-SCNC: 134 MMOL/L — LOW (ref 135–145)
VANCOMYCIN TROUGH SERPL-MCNC: 12 UG/ML — SIGNIFICANT CHANGE UP (ref 10–20)
WBC # BLD: 14.2 K/UL — HIGH (ref 3.8–10.5)
WBC # FLD AUTO: 14.2 K/UL — HIGH (ref 3.8–10.5)

## 2019-05-28 PROCEDURE — 99232 SBSQ HOSP IP/OBS MODERATE 35: CPT

## 2019-05-28 PROCEDURE — 99233 SBSQ HOSP IP/OBS HIGH 50: CPT

## 2019-05-28 RX ORDER — INSULIN LISPRO 100/ML
25 VIAL (ML) SUBCUTANEOUS
Refills: 0 | Status: DISCONTINUED | OUTPATIENT
Start: 2019-05-28 | End: 2019-05-29

## 2019-05-28 RX ORDER — INSULIN GLARGINE 100 [IU]/ML
30 INJECTION, SOLUTION SUBCUTANEOUS ONCE
Refills: 0 | Status: COMPLETED | OUTPATIENT
Start: 2019-05-28 | End: 2019-05-28

## 2019-05-28 RX ORDER — INSULIN GLARGINE 100 [IU]/ML
38 INJECTION, SOLUTION SUBCUTANEOUS AT BEDTIME
Refills: 0 | Status: DISCONTINUED | OUTPATIENT
Start: 2019-05-28 | End: 2019-06-03

## 2019-05-28 RX ORDER — LACTULOSE 10 G/15ML
20 SOLUTION ORAL EVERY 4 HOURS
Refills: 0 | Status: COMPLETED | OUTPATIENT
Start: 2019-05-28 | End: 2019-05-29

## 2019-05-28 RX ADMIN — PIPERACILLIN AND TAZOBACTAM 25 GRAM(S): 4; .5 INJECTION, POWDER, LYOPHILIZED, FOR SOLUTION INTRAVENOUS at 14:21

## 2019-05-28 RX ADMIN — Medication 25 MILLIGRAM(S): at 18:07

## 2019-05-28 RX ADMIN — PIPERACILLIN AND TAZOBACTAM 25 GRAM(S): 4; .5 INJECTION, POWDER, LYOPHILIZED, FOR SOLUTION INTRAVENOUS at 23:43

## 2019-05-28 RX ADMIN — OXYCODONE AND ACETAMINOPHEN 2 TABLET(S): 5; 325 TABLET ORAL at 05:13

## 2019-05-28 RX ADMIN — Medication 25 MILLIGRAM(S): at 05:13

## 2019-05-28 RX ADMIN — HEPARIN SODIUM 5000 UNIT(S): 5000 INJECTION INTRAVENOUS; SUBCUTANEOUS at 05:13

## 2019-05-28 RX ADMIN — HEPARIN SODIUM 5000 UNIT(S): 5000 INJECTION INTRAVENOUS; SUBCUTANEOUS at 14:20

## 2019-05-28 RX ADMIN — Medication 20 UNIT(S): at 15:50

## 2019-05-28 RX ADMIN — PIPERACILLIN AND TAZOBACTAM 25 GRAM(S): 4; .5 INJECTION, POWDER, LYOPHILIZED, FOR SOLUTION INTRAVENOUS at 05:13

## 2019-05-28 RX ADMIN — MORPHINE SULFATE 2 MILLIGRAM(S): 50 CAPSULE, EXTENDED RELEASE ORAL at 15:00

## 2019-05-28 RX ADMIN — Medication 10 MILLIGRAM(S): at 22:48

## 2019-05-28 RX ADMIN — HEPARIN SODIUM 5000 UNIT(S): 5000 INJECTION INTRAVENOUS; SUBCUTANEOUS at 22:39

## 2019-05-28 RX ADMIN — Medication 250 MILLIGRAM(S): at 15:11

## 2019-05-28 RX ADMIN — Medication 81 MILLIGRAM(S): at 12:16

## 2019-05-28 RX ADMIN — OXYCODONE AND ACETAMINOPHEN 2 TABLET(S): 5; 325 TABLET ORAL at 12:17

## 2019-05-28 RX ADMIN — INSULIN GLARGINE 30 UNIT(S): 100 INJECTION, SOLUTION SUBCUTANEOUS at 22:38

## 2019-05-28 RX ADMIN — ERGOCALCIFEROL 50000 UNIT(S): 1.25 CAPSULE ORAL at 12:16

## 2019-05-28 RX ADMIN — Medication 20 UNIT(S): at 09:01

## 2019-05-28 RX ADMIN — MORPHINE SULFATE 2 MILLIGRAM(S): 50 CAPSULE, EXTENDED RELEASE ORAL at 14:20

## 2019-05-28 RX ADMIN — Medication 4: at 09:01

## 2019-05-28 RX ADMIN — Medication 4: at 15:50

## 2019-05-28 RX ADMIN — OXYCODONE AND ACETAMINOPHEN 2 TABLET(S): 5; 325 TABLET ORAL at 13:00

## 2019-05-28 RX ADMIN — LACTULOSE 20 GRAM(S): 10 SOLUTION ORAL at 18:07

## 2019-05-28 NOTE — PROGRESS NOTE ADULT - SUBJECTIVE AND OBJECTIVE BOX
Chief complaint  Patient is a 58y old  Male who presents with a chief complaint of right foot ulcer (28 May 2019 17:52)   Review of systems  Patient in bed, looks comfortable, no fever, no hypoglycemia.    Labs and Fingersticks  CAPILLARY BLOOD GLUCOSE      POCT Blood Glucose.: 317 mg/dL (28 May 2019 15:46)  POCT Blood Glucose.: 321 mg/dL (28 May 2019 12:43)  POCT Blood Glucose.: 331 mg/dL (28 May 2019 08:31)  POCT Blood Glucose.: 316 mg/dL (27 May 2019 21:53)      Anion Gap, Serum: 17 (05-28 @ 06:36)  Anion Gap, Serum: 13 (05-27 @ 12:35)      Calcium, Total Serum: 8.6 (05-28 @ 06:36)  Calcium, Total Serum: 8.6 (05-27 @ 12:35)  Albumin, Serum: 2.6 <L> (05-28 @ 06:36)    Alanine Aminotransferase (ALT/SGPT): 31 (05-28 @ 06:36)  Alkaline Phosphatase, Serum: 162 <H> (05-28 @ 06:36)  Aspartate Aminotransferase (AST/SGOT): 50 <H> (05-28 @ 06:36)        05-28    134<L>  |  98  |  46<H>  ----------------------------<  347<H>  4.6   |  19<L>  |  3.11<H>    Ca    8.6      28 May 2019 06:36    TPro  6.9  /  Alb  2.6<L>  /  TBili  0.4  /  DBili  x   /  AST  50<H>  /  ALT  31  /  AlkPhos  162<H>  05-28                        11.1   14.2  )-----------( 286      ( 28 May 2019 06:36 )             34.6     Medications  MEDICATIONS  (STANDING):  aspirin enteric coated 81 milliGRAM(s) Oral daily  bisacodyl Suppository 10 milliGRAM(s) Rectal at bedtime  Dakins Solution - 1/4 Strength 1 Application(s) Topical daily  dextrose 5%. 1000 milliLiter(s) (50 mL/Hr) IV Continuous <Continuous>  dextrose 5%. 1000 milliLiter(s) (50 mL/Hr) IV Continuous <Continuous>  dextrose 50% Injectable 12.5 Gram(s) IV Push once  dextrose 50% Injectable 25 Gram(s) IV Push once  dextrose 50% Injectable 25 Gram(s) IV Push once  dextrose 50% Injectable 12.5 Gram(s) IV Push once  dextrose 50% Injectable 25 Gram(s) IV Push once  dextrose 50% Injectable 25 Gram(s) IV Push once  ergocalciferol 76707 Unit(s) Oral every week  heparin  Injectable 5000 Unit(s) SubCutaneous every 8 hours  hydrALAZINE 25 milliGRAM(s) Oral two times a day  insulin glargine Injectable (LANTUS) 38 Unit(s) SubCutaneous at bedtime  insulin lispro (HumaLOG) corrective regimen sliding scale   SubCutaneous three times a day before meals  insulin lispro (HumaLOG) corrective regimen sliding scale   SubCutaneous at bedtime  insulin lispro Injectable (HumaLOG) 25 Unit(s) SubCutaneous three times a day before meals  lactulose Syrup 20 Gram(s) Oral every 4 hours  piperacillin/tazobactam IVPB. 3.375 Gram(s) IV Intermittent every 8 hours  sodium chloride 0.9%. 1000 milliLiter(s) (50 mL/Hr) IV Continuous <Continuous>  vancomycin  IVPB 1000 milliGRAM(s) IV Intermittent every 24 hours      Physical Exam  General: Patient comfortable in bed  Vital Signs Last 12 Hrs  T(F): 99.3 (05-28-19 @ 16:42), Max: 99.3 (05-28-19 @ 16:42)  HR: 77 (05-28-19 @ 16:42) (74 - 84)  BP: 122/77 (05-28-19 @ 16:42) (122/77 - 145/72)  BP(mean): --  RR: 18 (05-28-19 @ 16:42) (16 - 18)  SpO2: 93% (05-28-19 @ 16:42) (93% - 95%)  Neck: No palpable thyroid nodules.  CVS: S1S2, No murmurs  Respiratory: No wheezing, no crepitations  GI: Abdomen soft, bowel sounds positive  Musculoskeletal:  edema lower extremities.   Skin: No skin rashes, no ecchymosis    Diagnostics    Vitamin D, 1, 25-Dihydroxy: AM Sched. Collection: 22-May-2019 06:00 (05-21 @ 18:59)  Vitamin D, 25-Hydroxy: AM Sched. Collection: 22-May-2019 06:00 (05-21 @ 18:59)

## 2019-05-28 NOTE — PROGRESS NOTE ADULT - ASSESSMENT
Assessment  DMT2: 58y Male with DM T2 with hyperglycemia, on basal bolus insulin, s/p procedure eating full meals, blood sugars high, no hypoglycemic episode, afebrile, non compliant with low carb diet.  Foot wound: on medications, no chest pain, stable, monitored.  HTN: Controlled,  on antihypertensive medications.  Overweight/Obesity: No strict exercise routines, not on any weight loss program, neither on low calorie diet.    Andrzej Zhu MD  Cell: [phone]  Office: [phone]

## 2019-05-28 NOTE — PROGRESS NOTE ADULT - SUBJECTIVE AND OBJECTIVE BOX
Patient is a 58y old  Male who presents with a chief complaint of right foot ulcer (27 May 2019 16:20)       INTERVAL HPI/OVERNIGHT EVENTS:  Patient seen and evaluated at bedside.  Pt is resting comfortable in NAD. Denies N/V/F/C.      Allergies    No Known Allergies    Intolerances        Vital Signs Last 24 Hrs  T(C): 36.6 (28 May 2019 09:18), Max: 39.4 (27 May 2019 21:15)  T(F): 97.9 (28 May 2019 09:18), Max: 103 (27 May 2019 21:15)  HR: 74 (28 May 2019 09:18) (68 - 102)  BP: 138/81 (28 May 2019 09:18) (112/59 - 183/82)  BP(mean): 122 (27 May 2019 17:00) (79 - 122)  RR: 16 (28 May 2019 09:18) (14 - 22)  SpO2: 94% (28 May 2019 09:18) (90% - 99%)    LABS:                        11.1   14.2  )-----------( 286      ( 28 May 2019 06:36 )             34.6     05-28    134<L>  |  98  |  46<H>  ----------------------------<  347<H>  4.6   |  19<L>  |  3.11<H>    Ca    8.6      28 May 2019 06:36    TPro  6.9  /  Alb  2.6<L>  /  TBili  0.4  /  DBili  x   /  AST  50<H>  /  ALT  31  /  AlkPhos  162<H>  05-28    PT/INR - ( 27 May 2019 12:35 )   PT: 15.0 sec;   INR: 1.31 ratio         PTT - ( 27 May 2019 12:35 )  PTT:27.6 sec    CAPILLARY BLOOD GLUCOSE      POCT Blood Glucose.: 331 mg/dL (28 May 2019 08:31)  POCT Blood Glucose.: 316 mg/dL (27 May 2019 21:53)  POCT Blood Glucose.: 269 mg/dL (27 May 2019 18:54)  POCT Blood Glucose.: 226 mg/dL (27 May 2019 17:22)  POCT Blood Glucose.: 205 mg/dL (27 May 2019 16:36)  POCT Blood Glucose.: 263 mg/dL (27 May 2019 11:36)      Lower Extremity Physical Exam:  Vascular: DP/PT 2/4, B/L, CFT <3 seconds B/L, Temperature gradient within N/L.   Neuro: Epicritic sensation diminished  to the level of midfoot, B/L.  Skin: R plantar midfoot I&D site with no residual purulence, no active bleeding, no fluctuance, No odor, wound probes down to plantar fascia

## 2019-05-28 NOTE — PROGRESS NOTE ADULT - SUBJECTIVE AND OBJECTIVE BOX
AGUSTIN BRAR 58y [id number]    Patient is a 58y old  Male who presents with a chief complaint of right foot ulcer (28 May 2019 12:17)      Follow Up/CC:  ID following for foot infection    Interval History/ROS: fever o/n, s/p foot debridement yesterday    Allergies    No Known Allergies    Intolerances        ANTIMICROBIALS:  piperacillin/tazobactam IVPB. 3.375 every 8 hours  vancomycin  IVPB 1000 every 24 hours      MEDICATIONS  (STANDING):  aspirin enteric coated 81 milliGRAM(s) Oral daily  Dakins Solution - 1/4 Strength 1 Application(s) Topical daily  dextrose 5%. 1000 milliLiter(s) (50 mL/Hr) IV Continuous <Continuous>  dextrose 5%. 1000 milliLiter(s) (50 mL/Hr) IV Continuous <Continuous>  dextrose 50% Injectable 12.5 Gram(s) IV Push once  dextrose 50% Injectable 25 Gram(s) IV Push once  dextrose 50% Injectable 25 Gram(s) IV Push once  dextrose 50% Injectable 12.5 Gram(s) IV Push once  dextrose 50% Injectable 25 Gram(s) IV Push once  dextrose 50% Injectable 25 Gram(s) IV Push once  ergocalciferol 59262 Unit(s) Oral every week  heparin  Injectable 5000 Unit(s) SubCutaneous every 8 hours  hydrALAZINE 25 milliGRAM(s) Oral two times a day  insulin glargine Injectable (LANTUS) 30 Unit(s) SubCutaneous at bedtime  insulin lispro (HumaLOG) corrective regimen sliding scale   SubCutaneous three times a day before meals  insulin lispro (HumaLOG) corrective regimen sliding scale   SubCutaneous at bedtime  insulin lispro Injectable (HumaLOG) 20 Unit(s) SubCutaneous three times a day before meals  piperacillin/tazobactam IVPB. 3.375 Gram(s) IV Intermittent every 8 hours  sodium chloride 0.9%. 1000 milliLiter(s) (50 mL/Hr) IV Continuous <Continuous>  vancomycin  IVPB 1000 milliGRAM(s) IV Intermittent every 24 hours    MEDICATIONS  (PRN):  acetaminophen   Tablet .. 650 milliGRAM(s) Oral every 6 hours PRN Mild Pain (1 - 3)  dextrose 40% Gel 15 Gram(s) Oral once PRN Blood Glucose LESS THAN 70 milliGRAM(s)/deciliter  dextrose 40% Gel 15 Gram(s) Oral once PRN Blood Glucose LESS THAN 70 milliGRAM(s)/deciliter  glucagon  Injectable 1 milliGRAM(s) IntraMuscular once PRN Glucose LESS THAN 70 milligrams/deciliter  morphine  - Injectable 2 milliGRAM(s) IV Push every 6 hours PRN Moderate Pain (4 - 6)  oxyCODONE    5 mG/acetaminophen 325 mG 2 Tablet(s) Oral every 4 hours PRN Moderate Pain (4 - 6)        Vital Signs Last 24 Hrs  T(C): 37.2 (28 May 2019 14:16), Max: 39.4 (27 May 2019 21:15)  T(F): 99 (28 May 2019 14:16), Max: 103 (27 May 2019 21:15)  HR: 74 (28 May 2019 09:18) (68 - 102)  BP: 138/81 (28 May 2019 09:18) (112/59 - 183/82)  BP(mean): 122 (27 May 2019 17:00) (79 - 122)  RR: 18 (28 May 2019 14:16) (14 - 22)  SpO2: 94% (28 May 2019 14:16) (90% - 99%)    CBC Full  -  ( 28 May 2019 06:36 )  WBC Count : 14.2 K/uL  RBC Count : 3.65 M/uL  Hemoglobin : 11.1 g/dL  Hematocrit : 34.6 %  Platelet Count - Automated : 286 K/uL  Mean Cell Volume : 94.8 fl  Mean Cell Hemoglobin : 30.4 pg  Mean Cell Hemoglobin Concentration : 32.0 gm/dL  Auto Neutrophil # : x  Auto Lymphocyte # : x  Auto Monocyte # : x  Auto Eosinophil # : x  Auto Basophil # : x  Auto Neutrophil % : x  Auto Lymphocyte % : x  Auto Monocyte % : x  Auto Eosinophil % : x  Auto Basophil % : x    05-28    134<L>  |  98  |  46<H>  ----------------------------<  347<H>  4.6   |  19<L>  |  3.11<H>    Ca    8.6      28 May 2019 06:36    TPro  6.9  /  Alb  2.6<L>  /  TBili  0.4  /  DBili  x   /  AST  50<H>  /  ALT  31  /  AlkPhos  162<H>  05-28    LIVER FUNCTIONS - ( 28 May 2019 06:36 )  Alb: 2.6 g/dL / Pro: 6.9 g/dL / ALK PHOS: 162 U/L / ALT: 31 U/L / AST: 50 U/L / GGT: x               MICROBIOLOGY:  .Blood  05-26-19   No growth to date.  --  --      .Blood  05-25-19   No growth to date.  --  --      .Tissue  05-23-19   Few Escherichia coli  Growth in fluid media only Enterococcus faecalis  Rare Streptococcus agalactiae (Group B) isolated  Group B streptococci are susceptible to ampicillin,  penicillin and cefazolin, but may be resistant to  erythromycin and clindamycin.  Recommendations for intrapartum prophylaxis for Group B  streptococci are penicillin or ampicillin.  --  Escherichia coli  Enterococcus faecalis      .Blood  05-20-19   No growth at 5 days.  --  --      .Abscess  05-20-19   Numerous Escherichia coli  Numerous Enterococcus faecalis  Moderate Prevotella bivia "Susceptibilities not performed"  --  Enterococcus faecalis  Escherichia coli    RADIOLOGY    < from: Xray Chest 1 View- PORTABLE-Urgent (05.25.19 @ 06:30) >  The heart and lungs are normal. The bones are intact.    < end of copied text >

## 2019-05-28 NOTE — PROGRESS NOTE ADULT - PROBLEM SELECTOR PLAN 1
Will increase Lantus to 38 units at bed time.  Will increase Humalog to 25 units before each meal in addition to Humalog correction scale coverage.  Patient counseled for compliance with consistent low carb diet.

## 2019-05-28 NOTE — PROGRESS NOTE ADULT - ASSESSMENT
57 y/o male pt s/p I&D & Debridement, POD #1  - pt seen and evaluated  - Continue IV ABX as per ID  - no residual purulence noted, no active bleeding  - WBC down to 14  - will cont to monitor   - d/w attending

## 2019-05-28 NOTE — PROGRESS NOTE ADULT - ASSESSMENT
59 y/o male with history of IDDM, HTN, presents to the ED sent by Podiatrist (Dr. Giovanni Hanna) for evaluation and treatment of infected R foot ulcer, failing outpatient Augmentin. Patient reports that he has been followed by Podiatry outpatient for R plantar foot ulcer x3-4 weeks. Approx 2 weeks ago pt developed fever, chills, diaphoresis, malaise, decreased PO intake and nausea. Treated with Augmentin for the last 1 week without improvement. Unable to control DM at home. Also reports orthopnea . Denies chest pain, dyspnea on exertion, abdominal pain, vomiting, diarrhea, dysuria, hematuria, frequency, back pain.      diabetic foot ulcer with surrounding cellulitis   Fever resolved , leukocytosis persists  S/p debridement with podiatry 5/22 and 5/27  Culture polymicrobial--E coli, enterococcus, strep  - meropenem and vanco per ID  - F/U BCXs; repeat BCXs x 2  - Monitor for other possible source infection such as developing diarrhea    uncontrolled Diabetes  - cont levemir  - novolog 20 units qac  - hgb a1c  11.9  - diabetic diet  - FS qid  - endo consult appreciated    MARIKA   - stop lasix and lisinopril  - ivf hydration  - follow creatinine  - nephrology following    HTN   -   hold hydralazine     hyponatremia  - NO salt restriction  - no HCTZ    constipation  - s/p lactulose x 2 doses  - dulcolax x 1

## 2019-05-28 NOTE — PROGRESS NOTE ADULT - SUBJECTIVE AND OBJECTIVE BOX
Patient is a 58y old  Male who presents with a chief complaint of right foot ulcer (28 May 2019 12:27)      SUBJECTIVE / OVERNIGHT EVENTS:  No chest pain. No shortness of breath. No complaints. No events overnight. no bm for 7 days.    MEDICATIONS  (STANDING):  aspirin enteric coated 81 milliGRAM(s) Oral daily  bisacodyl Suppository 10 milliGRAM(s) Rectal at bedtime  Dakins Solution - 1/4 Strength 1 Application(s) Topical daily  dextrose 5%. 1000 milliLiter(s) (50 mL/Hr) IV Continuous <Continuous>  dextrose 5%. 1000 milliLiter(s) (50 mL/Hr) IV Continuous <Continuous>  dextrose 50% Injectable 12.5 Gram(s) IV Push once  dextrose 50% Injectable 25 Gram(s) IV Push once  dextrose 50% Injectable 25 Gram(s) IV Push once  dextrose 50% Injectable 12.5 Gram(s) IV Push once  dextrose 50% Injectable 25 Gram(s) IV Push once  dextrose 50% Injectable 25 Gram(s) IV Push once  ergocalciferol 17315 Unit(s) Oral every week  heparin  Injectable 5000 Unit(s) SubCutaneous every 8 hours  hydrALAZINE 25 milliGRAM(s) Oral two times a day  insulin glargine Injectable (LANTUS) 30 Unit(s) SubCutaneous at bedtime  insulin lispro (HumaLOG) corrective regimen sliding scale   SubCutaneous three times a day before meals  insulin lispro (HumaLOG) corrective regimen sliding scale   SubCutaneous at bedtime  insulin lispro Injectable (HumaLOG) 20 Unit(s) SubCutaneous three times a day before meals  lactulose Syrup 20 Gram(s) Oral every 4 hours  piperacillin/tazobactam IVPB. 3.375 Gram(s) IV Intermittent every 8 hours  sodium chloride 0.9%. 1000 milliLiter(s) (50 mL/Hr) IV Continuous <Continuous>  vancomycin  IVPB 1000 milliGRAM(s) IV Intermittent every 24 hours    MEDICATIONS  (PRN):  acetaminophen   Tablet .. 650 milliGRAM(s) Oral every 6 hours PRN Mild Pain (1 - 3)  dextrose 40% Gel 15 Gram(s) Oral once PRN Blood Glucose LESS THAN 70 milliGRAM(s)/deciliter  dextrose 40% Gel 15 Gram(s) Oral once PRN Blood Glucose LESS THAN 70 milliGRAM(s)/deciliter  glucagon  Injectable 1 milliGRAM(s) IntraMuscular once PRN Glucose LESS THAN 70 milligrams/deciliter  morphine  - Injectable 2 milliGRAM(s) IV Push every 6 hours PRN Moderate Pain (4 - 6)  oxyCODONE    5 mG/acetaminophen 325 mG 2 Tablet(s) Oral every 4 hours PRN Moderate Pain (4 - 6)      Vital Signs Last 24 Hrs  T(C): 37.2 (28 May 2019 14:16), Max: 39.4 (27 May 2019 21:15)  T(F): 99 (28 May 2019 14:16), Max: 103 (27 May 2019 21:15)  HR: 74 (28 May 2019 09:18) (68 - 102)  BP: 138/81 (28 May 2019 09:18) (112/59 - 183/82)  BP(mean): 122 (27 May 2019 17:00) (79 - 122)  RR: 18 (28 May 2019 14:16) (14 - 22)  SpO2: 94% (28 May 2019 14:16) (90% - 99%)  CAPILLARY BLOOD GLUCOSE      POCT Blood Glucose.: 321 mg/dL (28 May 2019 12:43)  POCT Blood Glucose.: 331 mg/dL (28 May 2019 08:31)  POCT Blood Glucose.: 316 mg/dL (27 May 2019 21:53)  POCT Blood Glucose.: 269 mg/dL (27 May 2019 18:54)  POCT Blood Glucose.: 226 mg/dL (27 May 2019 17:22)  POCT Blood Glucose.: 205 mg/dL (27 May 2019 16:36)    I&O's Summary    27 May 2019 07:01  -  28 May 2019 07:00  --------------------------------------------------------  IN: 1980 mL / OUT: 1300 mL / NET: 680 mL    28 May 2019 07:01  -  28 May 2019 14:53  --------------------------------------------------------  IN: 600 mL / OUT: 0 mL / NET: 600 mL        PHYSICAL EXAM:  GENERAL: NAD, well-developed  HEAD:  Atraumatic, Normocephalic  EYES: EOMI, PERRLA, conjunctiva and sclera clear  NECK: Supple, No JVD  CHEST/LUNG: Clear to auscultation bilaterally; No wheeze  HEART: Regular rate and rhythm; No murmurs, rubs, or gallops  ABDOMEN: Soft, Nontender, Nondistended; Bowel sounds present  EXTREMITIES:  2+ Peripheral Pulses, No clubbing, cyanosis, or edema  PSYCH: AAOx3  NEUROLOGY: non-focal  SKIN: No rashes or lesions    LABS:                        11.1   14.2  )-----------( 286      ( 28 May 2019 06:36 )             34.6     05-28    134<L>  |  98  |  46<H>  ----------------------------<  347<H>  4.6   |  19<L>  |  3.11<H>    Ca    8.6      28 May 2019 06:36    TPro  6.9  /  Alb  2.6<L>  /  TBili  0.4  /  DBili  x   /  AST  50<H>  /  ALT  31  /  AlkPhos  162<H>  05-28    PT/INR - ( 27 May 2019 12:35 )   PT: 15.0 sec;   INR: 1.31 ratio         PTT - ( 27 May 2019 12:35 )  PTT:27.6 sec          RADIOLOGY & ADDITIONAL TESTS:    Imaging Personally Reviewed:    Consultant(s) Notes Reviewed:      Care Discussed with Consultants/Other Providers:

## 2019-05-28 NOTE — PROGRESS NOTE ADULT - SUBJECTIVE AND OBJECTIVE BOX
Subjective: Patient seen and examined. No new events except as noted.   + chills but feels much better today than any recent day     REVIEW OF SYSTEMS:    CONSTITUTIONAL: +weakness, fevers or chills  EYES/ENT: No visual changes;  No vertigo or throat pain   NECK: No pain or stiffness  RESPIRATORY: No cough, wheezing, hemoptysis; No shortness of breath  CARDIOVASCULAR: No chest pain or palpitations  GASTROINTESTINAL: No abdominal or epigastric pain. No nausea, vomiting, or hematemesis; No diarrhea or constipation. No melena or hematochezia.  GENITOURINARY: No dysuria, frequency or hematuria  NEUROLOGICAL: No numbness or weakness  SKIN: No itching, burning, rashes, or lesions   All other review of systems is negative unless indicated above.    MEDICATIONS:  MEDICATIONS  (STANDING):  aspirin enteric coated 81 milliGRAM(s) Oral daily  Dakins Solution - 1/4 Strength 1 Application(s) Topical daily  dextrose 5%. 1000 milliLiter(s) (50 mL/Hr) IV Continuous <Continuous>  dextrose 5%. 1000 milliLiter(s) (50 mL/Hr) IV Continuous <Continuous>  dextrose 50% Injectable 12.5 Gram(s) IV Push once  dextrose 50% Injectable 25 Gram(s) IV Push once  dextrose 50% Injectable 25 Gram(s) IV Push once  dextrose 50% Injectable 12.5 Gram(s) IV Push once  dextrose 50% Injectable 25 Gram(s) IV Push once  dextrose 50% Injectable 25 Gram(s) IV Push once  ergocalciferol 70709 Unit(s) Oral every week  heparin  Injectable 5000 Unit(s) SubCutaneous every 8 hours  hydrALAZINE 25 milliGRAM(s) Oral two times a day  insulin glargine Injectable (LANTUS) 30 Unit(s) SubCutaneous at bedtime  insulin lispro (HumaLOG) corrective regimen sliding scale   SubCutaneous three times a day before meals  insulin lispro (HumaLOG) corrective regimen sliding scale   SubCutaneous at bedtime  insulin lispro Injectable (HumaLOG) 20 Unit(s) SubCutaneous three times a day before meals  piperacillin/tazobactam IVPB. 3.375 Gram(s) IV Intermittent every 8 hours  sodium chloride 0.9%. 1000 milliLiter(s) (50 mL/Hr) IV Continuous <Continuous>  vancomycin  IVPB 1000 milliGRAM(s) IV Intermittent every 24 hours      PHYSICAL EXAM:  T(C): 36.6 (05-28-19 @ 09:18), Max: 39.4 (05-27-19 @ 21:15)  HR: 74 (05-28-19 @ 09:18) (68 - 102)  BP: 138/81 (05-28-19 @ 09:18) (112/59 - 183/82)  RR: 16 (05-28-19 @ 09:18) (14 - 22)  SpO2: 94% (05-28-19 @ 09:18) (90% - 99%)  Wt(kg): --  I&O's Summary    27 May 2019 07:01  -  28 May 2019 07:00  --------------------------------------------------------  IN: 1980 mL / OUT: 1300 mL / NET: 680 mL      Height (cm): 182.88 (05-27 @ 14:30)  Weight (kg): 111.6 (05-27 @ 14:30)  BMI (kg/m2): 33.4 (05-27 @ 14:30)  BSA (m2): 2.33 (05-27 @ 14:30)        Appearance: NAD   HEENT:   Normal oral mucosa, PERRL, EOMI	  Lymphatic: No lymphadenopathy , no edema  Cardiovascular: Normal S1 S2, No JVD, No murmurs , Peripheral pulses palpable 2+ bilaterally  Respiratory: Lungs clear to auscultation, normal effort 	  Gastrointestinal:  Soft, Non-tender, + BS	  Skin: No rashes, No ecchymoses, No cyanosis, warm to touch  Musculoskeletal: Decreased range of motion, normal strength  Psychiatry:  Mood & affect appropriate  Ext: +edema , chronic venous stasis skin discoloration   right foot wrapped         LABS:    CARDIAC MARKERS:                                11.1   14.2  )-----------( 286      ( 28 May 2019 06:36 )             34.6     05-28    134<L>  |  98  |  46<H>  ----------------------------<  347<H>  4.6   |  19<L>  |  3.11<H>    Ca    8.6      28 May 2019 06:36    TPro  6.9  /  Alb  2.6<L>  /  TBili  0.4  /  DBili  x   /  AST  50<H>  /  ALT  31  /  AlkPhos  162<H>  05-28    proBNP:   Lipid Profile:   HgA1c:   TSH:     3          TELEMETRY: 	    ECG:  	  RADIOLOGY:   DIAGNOSTIC TESTING:  [ ] Echocardiogram:  [ ]  Catheterization:  [ ] Stress Test:    OTHER:

## 2019-05-28 NOTE — PROGRESS NOTE ADULT - SUBJECTIVE AND OBJECTIVE BOX
[facility] Division of Kidney Diseases & Hypertension  FOLLOW UP NOTE  --------------------------------------------------------------------------------  Chief Complaint:    24 hour events/subjective:    has foot pain.   BPs have been low.        PAST HISTORY  --------------------------------------------------------------------------------  No significant changes to PMH, PSH, FHx, SHx, unless otherwise noted    ALLERGIES & MEDICATIONS  --------------------------------------------------------------------------------  Allergies    No Known Allergies    Intolerances      Standing Inpatient Medications  aspirin enteric coated 81 milliGRAM(s) Oral daily  bisacodyl Suppository 10 milliGRAM(s) Rectal at bedtime  Dakins Solution - 1/4 Strength 1 Application(s) Topical daily  dextrose 5%. 1000 milliLiter(s) IV Continuous <Continuous>  dextrose 5%. 1000 milliLiter(s) IV Continuous <Continuous>  dextrose 50% Injectable 12.5 Gram(s) IV Push once  dextrose 50% Injectable 25 Gram(s) IV Push once  dextrose 50% Injectable 25 Gram(s) IV Push once  dextrose 50% Injectable 12.5 Gram(s) IV Push once  dextrose 50% Injectable 25 Gram(s) IV Push once  dextrose 50% Injectable 25 Gram(s) IV Push once  ergocalciferol 53111 Unit(s) Oral every week  heparin  Injectable 5000 Unit(s) SubCutaneous every 8 hours  hydrALAZINE 25 milliGRAM(s) Oral two times a day  insulin glargine Injectable (LANTUS) 38 Unit(s) SubCutaneous at bedtime  insulin lispro (HumaLOG) corrective regimen sliding scale   SubCutaneous three times a day before meals  insulin lispro (HumaLOG) corrective regimen sliding scale   SubCutaneous at bedtime  insulin lispro Injectable (HumaLOG) 25 Unit(s) SubCutaneous three times a day before meals  lactulose Syrup 20 Gram(s) Oral every 4 hours  piperacillin/tazobactam IVPB. 3.375 Gram(s) IV Intermittent every 8 hours  sodium chloride 0.9%. 1000 milliLiter(s) IV Continuous <Continuous>  vancomycin  IVPB 1000 milliGRAM(s) IV Intermittent every 24 hours    PRN Inpatient Medications  acetaminophen   Tablet .. 650 milliGRAM(s) Oral every 6 hours PRN  dextrose 40% Gel 15 Gram(s) Oral once PRN  dextrose 40% Gel 15 Gram(s) Oral once PRN  glucagon  Injectable 1 milliGRAM(s) IntraMuscular once PRN  morphine  - Injectable 2 milliGRAM(s) IV Push every 6 hours PRN  oxyCODONE    5 mG/acetaminophen 325 mG 2 Tablet(s) Oral every 4 hours PRN      All other systems were reviewed and are negative, except as noted.    VITALS/PHYSICAL EXAM  --------------------------------------------------------------------------------  T(C): 37.4 (05-28-19 @ 16:42), Max: 39.4 (05-27-19 @ 21:15)  HR: 77 (05-28-19 @ 16:42) (74 - 102)  BP: 122/77 (05-28-19 @ 16:42) (122/77 - 183/82)  RR: 18 (05-28-19 @ 16:42) (16 - 22)  SpO2: 93% (05-28-19 @ 16:42) (90% - 96%)  Wt(kg): --  Height (cm): 182.88 (05-27-19 @ 14:30)  Weight (kg): 111.6 (05-27-19 @ 14:30)  BMI (kg/m2): 33.4 (05-27-19 @ 14:30)  BSA (m2): 2.33 (05-27-19 @ 14:30)      05-27-19 @ 07:01  -  05-28-19 @ 07:00  --------------------------------------------------------  IN: 1980 mL / OUT: 1300 mL / NET: 680 mL    05-28-19 @ 07:01  -  05-28-19 @ 17:52  --------------------------------------------------------  IN: 850 mL / OUT: 600 mL / NET: 250 mL      Physical Exam:  	Gen: NAD, ill appearing  	HEENT: supple neck, clear oropharynx  	Pulm: CTA B/L  	CV: RRR, S1S2; no rub  	Back: No spinal or CVA tenderness; no sacral edema  	Abd: +BS, soft, nontender/nondistended  	: No suprapubic tenderness  	LE: Warm, no edema  	Skin: Warm, without rashes  	    LABS/STUDIES  --------------------------------------------------------------------------------              11.1   14.2  >-----------<  286      [05-28-19 @ 06:36]              34.6     134  |  98  |  46  ----------------------------<  347      [05-28-19 @ 06:36]  4.6   |  19  |  3.11        Ca     8.6     [05-28-19 @ 06:36]    TPro  6.9  /  Alb  2.6  /  TBili  0.4  /  DBili  x   /  AST  50  /  ALT  31  /  AlkPhos  162  [05-28-19 @ 06:36]    PT/INR: PT 15.0 , INR 1.31       [05-27-19 @ 12:35]  PTT: 27.6       [05-27-19 @ 12:35]      Creatinine Trend:  SCr 3.11 [05-28 @ 06:36]  SCr 2.92 [05-27 @ 12:35]  SCr 1.99 [05-25 @ 06:58]  SCr 2.58 [05-24 @ 11:01]  SCr 1.87 [05-23 @ 07:01]    Urinalysis - [05-25-19 @ 14:07]      Color Yellow / Appearance Slightly Turbid / SG 1.018 / pH 6.0      Gluc Negative / Ketone Trace  / Bili Negative / Urobili 3 mg/dL       Blood Negative / Protein 100 mg/dL / Leuk Est Negative / Nitrite Negative      RBC 2 / WBC 7 / Hyaline 3 / Gran  / Sq Epi  / Non Sq Epi 2 / Bacteria Negative      Vitamin D (25OH) 4.4      [05-22-19 @ 09:57]

## 2019-05-29 LAB
ALBUMIN SERPL ELPH-MCNC: 2.6 G/DL — LOW (ref 3.3–5)
ALP SERPL-CCNC: 148 U/L — HIGH (ref 40–120)
ALT FLD-CCNC: 40 U/L — SIGNIFICANT CHANGE UP (ref 10–45)
ANION GAP SERPL CALC-SCNC: 16 MMOL/L — SIGNIFICANT CHANGE UP (ref 5–17)
AST SERPL-CCNC: 68 U/L — HIGH (ref 10–40)
BILIRUB SERPL-MCNC: 0.5 MG/DL — SIGNIFICANT CHANGE UP (ref 0.2–1.2)
BUN SERPL-MCNC: 45 MG/DL — HIGH (ref 7–23)
CALCIUM SERPL-MCNC: 8.7 MG/DL — SIGNIFICANT CHANGE UP (ref 8.4–10.5)
CHLORIDE SERPL-SCNC: 100 MMOL/L — SIGNIFICANT CHANGE UP (ref 96–108)
CO2 SERPL-SCNC: 19 MMOL/L — LOW (ref 22–31)
CREAT SERPL-MCNC: 2.84 MG/DL — HIGH (ref 0.5–1.3)
GLUCOSE BLDC GLUCOMTR-MCNC: 102 MG/DL — HIGH (ref 70–99)
GLUCOSE BLDC GLUCOMTR-MCNC: 117 MG/DL — HIGH (ref 70–99)
GLUCOSE BLDC GLUCOMTR-MCNC: 123 MG/DL — HIGH (ref 70–99)
GLUCOSE BLDC GLUCOMTR-MCNC: 129 MG/DL — HIGH (ref 70–99)
GLUCOSE BLDC GLUCOMTR-MCNC: 211 MG/DL — HIGH (ref 70–99)
GLUCOSE SERPL-MCNC: 184 MG/DL — HIGH (ref 70–99)
HCT VFR BLD CALC: 31.4 % — LOW (ref 39–50)
HGB BLD-MCNC: 10 G/DL — LOW (ref 13–17)
MCHC RBC-ENTMCNC: 29.7 PG — SIGNIFICANT CHANGE UP (ref 27–34)
MCHC RBC-ENTMCNC: 32 GM/DL — SIGNIFICANT CHANGE UP (ref 32–36)
MCV RBC AUTO: 92.7 FL — SIGNIFICANT CHANGE UP (ref 80–100)
PLATELET # BLD AUTO: 315 K/UL — SIGNIFICANT CHANGE UP (ref 150–400)
POTASSIUM SERPL-MCNC: 4.2 MMOL/L — SIGNIFICANT CHANGE UP (ref 3.5–5.3)
POTASSIUM SERPL-SCNC: 4.2 MMOL/L — SIGNIFICANT CHANGE UP (ref 3.5–5.3)
PROT SERPL-MCNC: 6.7 G/DL — SIGNIFICANT CHANGE UP (ref 6–8.3)
RBC # BLD: 3.39 M/UL — LOW (ref 4.2–5.8)
RBC # FLD: 12.6 % — SIGNIFICANT CHANGE UP (ref 10.3–14.5)
SODIUM SERPL-SCNC: 135 MMOL/L — SIGNIFICANT CHANGE UP (ref 135–145)
WBC # BLD: 14.3 K/UL — HIGH (ref 3.8–10.5)
WBC # FLD AUTO: 14.3 K/UL — HIGH (ref 3.8–10.5)

## 2019-05-29 PROCEDURE — 99253 IP/OBS CNSLTJ NEW/EST LOW 45: CPT

## 2019-05-29 PROCEDURE — 99232 SBSQ HOSP IP/OBS MODERATE 35: CPT

## 2019-05-29 RX ORDER — SENNA PLUS 8.6 MG/1
2 TABLET ORAL AT BEDTIME
Refills: 0 | Status: DISCONTINUED | OUTPATIENT
Start: 2019-05-29 | End: 2019-06-05

## 2019-05-29 RX ORDER — INSULIN LISPRO 100/ML
20 VIAL (ML) SUBCUTANEOUS
Refills: 0 | Status: DISCONTINUED | OUTPATIENT
Start: 2019-05-29 | End: 2019-06-03

## 2019-05-29 RX ORDER — INSULIN GLARGINE 100 [IU]/ML
22 INJECTION, SOLUTION SUBCUTANEOUS ONCE
Refills: 0 | Status: COMPLETED | OUTPATIENT
Start: 2019-05-29 | End: 2019-05-29

## 2019-05-29 RX ORDER — POLYETHYLENE GLYCOL 3350 17 G/17G
17 POWDER, FOR SOLUTION ORAL DAILY
Refills: 0 | Status: DISCONTINUED | OUTPATIENT
Start: 2019-05-29 | End: 2019-06-05

## 2019-05-29 RX ADMIN — PIPERACILLIN AND TAZOBACTAM 25 GRAM(S): 4; .5 INJECTION, POWDER, LYOPHILIZED, FOR SOLUTION INTRAVENOUS at 22:34

## 2019-05-29 RX ADMIN — PIPERACILLIN AND TAZOBACTAM 25 GRAM(S): 4; .5 INJECTION, POWDER, LYOPHILIZED, FOR SOLUTION INTRAVENOUS at 13:21

## 2019-05-29 RX ADMIN — Medication 2: at 10:38

## 2019-05-29 RX ADMIN — HEPARIN SODIUM 5000 UNIT(S): 5000 INJECTION INTRAVENOUS; SUBCUTANEOUS at 22:34

## 2019-05-29 RX ADMIN — MORPHINE SULFATE 2 MILLIGRAM(S): 50 CAPSULE, EXTENDED RELEASE ORAL at 10:37

## 2019-05-29 RX ADMIN — OXYCODONE AND ACETAMINOPHEN 2 TABLET(S): 5; 325 TABLET ORAL at 18:02

## 2019-05-29 RX ADMIN — Medication 25 MILLIGRAM(S): at 06:03

## 2019-05-29 RX ADMIN — Medication 20 UNIT(S): at 18:02

## 2019-05-29 RX ADMIN — INSULIN GLARGINE 22 UNIT(S): 100 INJECTION, SOLUTION SUBCUTANEOUS at 23:35

## 2019-05-29 RX ADMIN — HEPARIN SODIUM 5000 UNIT(S): 5000 INJECTION INTRAVENOUS; SUBCUTANEOUS at 06:02

## 2019-05-29 RX ADMIN — Medication 25 UNIT(S): at 10:39

## 2019-05-29 RX ADMIN — OXYCODONE AND ACETAMINOPHEN 2 TABLET(S): 5; 325 TABLET ORAL at 18:32

## 2019-05-29 RX ADMIN — Medication 250 MILLIGRAM(S): at 17:06

## 2019-05-29 RX ADMIN — HEPARIN SODIUM 5000 UNIT(S): 5000 INJECTION INTRAVENOUS; SUBCUTANEOUS at 13:20

## 2019-05-29 RX ADMIN — Medication 81 MILLIGRAM(S): at 12:37

## 2019-05-29 RX ADMIN — MORPHINE SULFATE 2 MILLIGRAM(S): 50 CAPSULE, EXTENDED RELEASE ORAL at 11:07

## 2019-05-29 RX ADMIN — PIPERACILLIN AND TAZOBACTAM 25 GRAM(S): 4; .5 INJECTION, POWDER, LYOPHILIZED, FOR SOLUTION INTRAVENOUS at 06:00

## 2019-05-29 RX ADMIN — Medication 25 MILLIGRAM(S): at 17:06

## 2019-05-29 RX ADMIN — OXYCODONE AND ACETAMINOPHEN 2 TABLET(S): 5; 325 TABLET ORAL at 13:07

## 2019-05-29 RX ADMIN — OXYCODONE AND ACETAMINOPHEN 2 TABLET(S): 5; 325 TABLET ORAL at 12:37

## 2019-05-29 NOTE — CONSULT NOTE ADULT - PROBLEM SELECTOR RECOMMENDATION 2
Continue treatment, wound care, podiatry FU
outpatient follow up
Patient with low vitamin D  - Would supplement
I performed a history and physical exam of the patient and discussed  the findings and plan with the house officer. I reviewed the resident note and agree with the findings and plan

## 2019-05-29 NOTE — PROGRESS NOTE ADULT - ASSESSMENT
57 y/o male with history of IDDM, HTN, presents to the ED sent by Podiatrist (Dr. Giovanni Hanna) for evaluation and treatment of infected R foot ulcer, failing outpatient Augmentin. Patient reports that he has been followed by Podiatry outpatient for R plantar foot ulcer x3-4 weeks. Approx 2 weeks ago pt developed fever, chills, diaphoresis, malaise, decreased PO intake and nausea. Treated with Augmentin for the last 1 week without improvement. Unable to control DM at home. Also reports orthopnea . Denies chest pain, dyspnea on exertion, abdominal pain, vomiting, diarrhea, dysuria, hematuria, frequency, back pain.      diabetic foot ulcer with surrounding cellulitis   Fever resolved , leukocytosis persists  S/p debridement with podiatry 5/22 and 5/27  Culture polymicrobial--E coli, enterococcus, strep  - meropenem and vanco per ID  - F/U BCXs; repeat BCXs x 2  - Monitor for other possible source infection such as developing diarrhea  - repeat MRI as per podiatry  - vascular consult called    uncontrolled Diabetes  - cont lantus  - novolog 20 units qac  - hgb a1c  11.9  - diabetic diet  - FS qid  - endo consult appreciated    MARIKA   - stop lasix and lisinopril  - ivf hydration  - follow creatinine  - nephrology following    HTN   -   hold hydralazine     hyponatremia  - NO salt restriction  - no HCTZ    constipation  - s/p BM  - miralax daily

## 2019-05-29 NOTE — PROGRESS NOTE ADULT - SUBJECTIVE AND OBJECTIVE BOX
Patient is a 58y old  Male who presents with a chief complaint of right foot ulcer (29 May 2019 13:45)      SUBJECTIVE / OVERNIGHT EVENTS:  feeling better.  had BM.  no more fevers.    MEDICATIONS  (STANDING):  aspirin enteric coated 81 milliGRAM(s) Oral daily  Dakins Solution - 1/4 Strength 1 Application(s) Topical daily  dextrose 5%. 1000 milliLiter(s) (50 mL/Hr) IV Continuous <Continuous>  dextrose 5%. 1000 milliLiter(s) (50 mL/Hr) IV Continuous <Continuous>  dextrose 50% Injectable 12.5 Gram(s) IV Push once  dextrose 50% Injectable 25 Gram(s) IV Push once  dextrose 50% Injectable 25 Gram(s) IV Push once  dextrose 50% Injectable 12.5 Gram(s) IV Push once  dextrose 50% Injectable 25 Gram(s) IV Push once  dextrose 50% Injectable 25 Gram(s) IV Push once  ergocalciferol 56379 Unit(s) Oral every week  heparin  Injectable 5000 Unit(s) SubCutaneous every 8 hours  hydrALAZINE 25 milliGRAM(s) Oral two times a day  insulin glargine Injectable (LANTUS) 38 Unit(s) SubCutaneous at bedtime  insulin lispro (HumaLOG) corrective regimen sliding scale   SubCutaneous three times a day before meals  insulin lispro (HumaLOG) corrective regimen sliding scale   SubCutaneous at bedtime  insulin lispro Injectable (HumaLOG) 20 Unit(s) SubCutaneous three times a day before meals  piperacillin/tazobactam IVPB. 3.375 Gram(s) IV Intermittent every 8 hours  sodium chloride 0.9%. 1000 milliLiter(s) (50 mL/Hr) IV Continuous <Continuous>  vancomycin  IVPB 1000 milliGRAM(s) IV Intermittent every 24 hours    MEDICATIONS  (PRN):  acetaminophen   Tablet .. 650 milliGRAM(s) Oral every 6 hours PRN Mild Pain (1 - 3)  dextrose 40% Gel 15 Gram(s) Oral once PRN Blood Glucose LESS THAN 70 milliGRAM(s)/deciliter  dextrose 40% Gel 15 Gram(s) Oral once PRN Blood Glucose LESS THAN 70 milliGRAM(s)/deciliter  glucagon  Injectable 1 milliGRAM(s) IntraMuscular once PRN Glucose LESS THAN 70 milligrams/deciliter  morphine  - Injectable 2 milliGRAM(s) IV Push every 6 hours PRN Moderate Pain (4 - 6)  oxyCODONE    5 mG/acetaminophen 325 mG 2 Tablet(s) Oral every 4 hours PRN Moderate Pain (4 - 6)      Vital Signs Last 24 Hrs  T(C): 37.1 (29 May 2019 17:05), Max: 37.7 (29 May 2019 05:24)  T(F): 98.7 (29 May 2019 17:05), Max: 99.9 (29 May 2019 05:24)  HR: 69 (29 May 2019 17:05) (69 - 85)  BP: 134/76 (29 May 2019 17:05) (134/76 - 182/84)  BP(mean): --  RR: 18 (29 May 2019 17:05) (16 - 18)  SpO2: 95% (29 May 2019 17:05) (94% - 96%)  CAPILLARY BLOOD GLUCOSE      POCT Blood Glucose.: 117 mg/dL (29 May 2019 17:55)  POCT Blood Glucose.: 123 mg/dL (29 May 2019 13:18)  POCT Blood Glucose.: 211 mg/dL (29 May 2019 10:34)  POCT Blood Glucose.: 176 mg/dL (28 May 2019 21:50)  POCT Blood Glucose.: 184 mg/dL (28 May 2019 20:06)    I&O's Summary    28 May 2019 07:01  -  29 May 2019 07:00  --------------------------------------------------------  IN: 1210 mL / OUT: 600 mL / NET: 610 mL    29 May 2019 07:01  -  29 May 2019 18:01  --------------------------------------------------------  IN: 300 mL / OUT: 650 mL / NET: -350 mL        PHYSICAL EXAM:  GENERAL: NAD, well-developed  HEAD:  Atraumatic, Normocephalic  EYES: EOMI, PERRLA, conjunctiva and sclera clear  NECK: Supple, No JVD  CHEST/LUNG: Clear to auscultation bilaterally; No wheeze  HEART: Regular rate and rhythm; No murmurs, rubs, or gallops  ABDOMEN: Soft, Nontender, Nondistended; Bowel sounds present  EXTREMITIES:  2+ Peripheral Pulses, No clubbing, cyanosis, or edema  PSYCH: AAOx3  NEUROLOGY: non-focal  SKIN: No rashes or lesions    LABS:                        10.0   14.3  )-----------( 315      ( 29 May 2019 06:19 )             31.4     05-29    135  |  100  |  45<H>  ----------------------------<  184<H>  4.2   |  19<L>  |  2.84<H>    Ca    8.7      29 May 2019 06:18    TPro  6.7  /  Alb  2.6<L>  /  TBili  0.5  /  DBili  x   /  AST  68<H>  /  ALT  40  /  AlkPhos  148<H>  05-29              RADIOLOGY & ADDITIONAL TESTS:    Imaging Personally Reviewed:    Consultant(s) Notes Reviewed:      Care Discussed with Consultants/Other Providers:

## 2019-05-29 NOTE — CONSULT NOTE ADULT - PROBLEM SELECTOR PROBLEM 2
Diabetic foot ulcer
Venous insufficiency of both lower extremities
Vitamin D deficiency
Foot infection

## 2019-05-29 NOTE — CONSULT NOTE ADULT - SUBJECTIVE AND OBJECTIVE BOX
Vascular Surgery Consultation Note  =====================================================  HPI:  57 y/o male with history of IDDM, HTN, presents to the ED sent by Podiatrist (Dr. Giovanni Hanna) for evaluation and treatment of infected R foot ulcer, failing outpatient Augmentin. Patient reports that he has been followed by Podiatry outpatient for R plantar foot ulcer x3-4 weeks. Approx 2 weeks ago pt developed fever, chills, diaphoresis, malaise, decreased PO intake and nausea. Treated with Augmentin for the last 1 week without improvement. Unable to control DM at home. Also reports orthopnea . Denies chest pain, dyspnea on exertion, abdominal pain, vomiting, diarrhea, dysuria, hematuria, frequency, back pain. (20 May 2019 18:44)    Patient admitted for concern of non-healing RLE foot ulcer. Patient's wound debrided 2x by podiatry (, ) with continued poor wound healing, elevated WBC, and concern that wound is not viable. He reports that he is prone to forming abscesses and has previously required multiple I&D including shoulder abscess drainage with Dr. Osorio in 2015. Vascular surgery consulted for evaluation for PVD given poor wound healing.     Allergies:   PAST MEDICAL & SURGICAL HISTORY:  Hypertension  Insulin dependent diabetes mellitus  Venous insufficiency of both lower extremities: R &gt; L  HTN (hypertension)  BPH (benign prostatic hypertrophy)  Diabetes  History of cholecystectomy  S/P laparoscopic cholecystectomy  Status post incision and drainage: Rt groin abscess    FAMILY HISTORY:  Paternal family history of emphysema (Father)  Family history of diabetes mellitus (DM) (Mother, Grandparent)    ADVANCE DIRECTIVES: Presumed Full Code    REVIEW OF SYSTEMS:   As per HPI    HOME MEDICATIONS:   Home Medications:  acetaminophen 500 mg oral tablet: 3 tab(s) orally once a day, As Needed (20 May 2019 17:32)  amoxicillin-clavulanate 875 mg-125 mg oral tablet: 1 tab(s) orally every 12 hours for 28 days  Note: per pt completed on 19 (20 May 2019 17:32)  aspirin 81 mg oral tablet: 1 tab(s) orally once a day (20 May 2019 17:32)  glimepiride 2 mg oral tablet: 1 tab(s) orally once a day (20 May 2019 17:32)  Lasix 40 mg oral tablet: 2 tab(s) orally once a day (20 May 2019 17:32)  Levemir 100 units/mL subcutaneous solution: 20 to 25 unit(s) subcutaneous once a day (at bedtime) (20 May 2019 17:32)  lisinopril 40 mg oral tablet: 1 tab(s) orally once a day (20 May 2019 17:32)  NovoLOG 100 units/mL subcutaneous solution: 20 to 30 unit(s) subcutaneous 3 times a day (before meals) (20 May 2019 17:32)    CURRENT MEDICATIONS:   --------------------------------------------------------------------------------------  Neurologic Medications  acetaminophen   Tablet .. 650 milliGRAM(s) Oral every 6 hours PRN Mild Pain (1 - 3)  morphine  - Injectable 2 milliGRAM(s) IV Push every 6 hours PRN Moderate Pain (4 - 6)  oxyCODONE    5 mG/acetaminophen 325 mG 2 Tablet(s) Oral every 4 hours PRN Moderate Pain (4 - 6)    Respiratory Medications    Cardiovascular Medications  hydrALAZINE 25 milliGRAM(s) Oral two times a day    Gastrointestinal Medications  dextrose 5%. 1000 milliLiter(s) IV Continuous <Continuous>  dextrose 5%. 1000 milliLiter(s) IV Continuous <Continuous>  ergocalciferol 55914 Unit(s) Oral every week  polyethylene glycol 3350 17 Gram(s) Oral daily  senna 2 Tablet(s) Oral at bedtime  sodium chloride 0.9%. 1000 milliLiter(s) IV Continuous <Continuous>    Genitourinary Medications    Hematologic/Oncologic Medications  aspirin enteric coated 81 milliGRAM(s) Oral daily  heparin  Injectable 5000 Unit(s) SubCutaneous every 8 hours    Antimicrobial/Immunologic Medications  piperacillin/tazobactam IVPB. 3.375 Gram(s) IV Intermittent every 8 hours  vancomycin  IVPB 1000 milliGRAM(s) IV Intermittent every 24 hours    Endocrine/Metabolic Medications  dextrose 40% Gel 15 Gram(s) Oral once PRN Blood Glucose LESS THAN 70 milliGRAM(s)/deciliter  dextrose 40% Gel 15 Gram(s) Oral once PRN Blood Glucose LESS THAN 70 milliGRAM(s)/deciliter  dextrose 50% Injectable 12.5 Gram(s) IV Push once  dextrose 50% Injectable 25 Gram(s) IV Push once  dextrose 50% Injectable 25 Gram(s) IV Push once  dextrose 50% Injectable 12.5 Gram(s) IV Push once  dextrose 50% Injectable 25 Gram(s) IV Push once  dextrose 50% Injectable 25 Gram(s) IV Push once  glucagon  Injectable 1 milliGRAM(s) IntraMuscular once PRN Glucose LESS THAN 70 milligrams/deciliter  insulin glargine Injectable (LANTUS) 38 Unit(s) SubCutaneous at bedtime  insulin lispro (HumaLOG) corrective regimen sliding scale   SubCutaneous three times a day before meals  insulin lispro (HumaLOG) corrective regimen sliding scale   SubCutaneous at bedtime  insulin lispro Injectable (HumaLOG) 20 Unit(s) SubCutaneous three times a day before meals    Topical/Other Medications  Dakins Solution - /4 Strength 1 Application(s) Topical daily    --------------------------------------------------------------------------------------  VITAL SIGNS, INS/OUTS (last 24 hours):  --------------------------------------------------------------------------------------  Vital Signs Last 24 Hrs  T(C): 37.1 (29 May 2019 17:05), Max: 37.7 (29 May 2019 05:24)  T(F): 98.7 (29 May 2019 17:05), Max: 99.9 (29 May 2019 05:24)  HR: 69 (29 May 2019 17:05) (69 - 85)  BP: 134/76 (29 May 2019 17:05) (134/76 - 182/84)  BP(mean): --  RR: 18 (29 May 2019 17:05) (16 - 18)  SpO2: 95% (29 May 2019 17:05) (94% - 96%)  --------------------------------------------------------------------------------------    EXAM  General: NAD, resting comfortably  Resp: unlabored breathing on RA  CV: HDS, rrr  Abd: soft, nontender, nondistended  Extr: b/l LE grossly swollen, R heel w/ extensive wound and visible musculature, packing placed w/in, palp AT pulses b/l    LABS  --------------------------------------------------------------------------------------  CBC ( @ 06:19)                          10.0<L>                   14.3<H>  )--------------(  315        --    % Neuts, --    % Lymphs, ANC: --                              31.4<L>    BMP ( @ 06:18)       135     |  100     |  45<H> 			Ca++ --      Ca 8.7          ---------------------------------( 184<H>		Mg --           4.2     |  19<L>   |  2.84<H>			Ph --        LFTs ( @ 06:18)      TPro 6.7 / Alb 2.6<L> / TBili 0.5 / DBili -- / AST 68<H> / ALT 40 / AlkPhos 148<H>    Urinalysis ( @ 14:07):     Color: Yellow / Appearance: Slightly Turbid<!> / S.018 / pH: 6.0 / Gluc: Negative / Ketones: Trace / Bili: Negative / Urobili: 3 mg/dL<!> / Protein :100 mg/dL<!> / Nitrites: Negative / Leuk.Est: Negative / RBC: 2 / WBC: 7<H> / Sq Epi:  / Non Sq Epi: 2 / Bacteria Negative       -> .Other Culture ( @ 04:59)     NG    NG    Testing in progress    -> .Blood Culture ( @ 17:09)     NG    NG    No growth to date.    -> .Blood Culture ( @ 00:50)     NG    NG    No growth to date.    -> .Tissue Culture ( @ 01:23)       No polymorphonuclear cells seen per low power field  Rare Gram positive cocci in pairs seen per oil power field    Escherichia coli  Enterococcus faecalis    Few Escherichia coli  Growth in fluid media only Enterococcus faecalis  Rare Streptococcus agalactiae (Group B) isolated  Group B streptococci are susceptible to ampicillin,  penicillin and cefazolin, but may be resistant to  erythromycin and clindamycin.  Recommendations for intrapartum prophylaxis for Group B  streptococci are penicillin or ampicillin.    -> .Blood Culture ( @ 20:56)     NG    NG    No growth at 5 days.    -> .Abscess Culture ( @ 19:59)     NG    Enterococcus faecalis  Escherichia coli    Numerous Escherichia coli  Numerous Enterococcus faecalis  Moderate Prevotella bivia "Susceptibilities not performed"  --------------------------------------------------------------------------------------    OTHER LABS    IMAGING RESULTS  < from: Xray Foot AP + Lateral + Oblique, Right (19 @ 14:41) >  IMPRESSION:   Soft tissue defect, likely ulcer at the plantar aspect of the midfoot. No   acute cortical erosive changes are seen. No acute fracture or dislocation   is seen. If clinical concern for osteomyelitis persists, MRI is more   sensitive for evaluation.    DRE JACK M.D., ATTENDING RADIOLOGIST  This document has been electronically signed. May 20 2019  3:05PM        < end of copied text >

## 2019-05-29 NOTE — PROGRESS NOTE ADULT - ASSESSMENT
Assessment  DMT2: 58y Male with DM T2 with hyperglycemia, on basal bolus insulin, s/p procedure not eating meals at times, blood sugars trending down, no hypoglycemic episode, afebrile, non compliant with low carb diet.  Foot wound: on medications, no chest pain, stable, monitored.  HTN: Controlled,  on antihypertensive medications.  Overweight/Obesity: No strict exercise routines, not on any weight loss program, neither on low calorie diet.    Andrzej Zhu MD  Cell: [phone]  Office: [phone]
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
## 2019-05-29 NOTE — PROGRESS NOTE ADULT - SUBJECTIVE AND OBJECTIVE BOX
Chief complaint  Patient is a 58y old  Male who presents with a chief complaint of right foot ulcer (29 May 2019 11:15)   Review of systems  Patient in bed, looks comfortable, no fever, no hypoglycemia.    Labs and Fingersticks  CAPILLARY BLOOD GLUCOSE      POCT Blood Glucose.: 123 mg/dL (29 May 2019 13:18)  POCT Blood Glucose.: 211 mg/dL (29 May 2019 10:34)  POCT Blood Glucose.: 176 mg/dL (28 May 2019 21:50)  POCT Blood Glucose.: 184 mg/dL (28 May 2019 20:06)  POCT Blood Glucose.: 317 mg/dL (28 May 2019 15:46)      Anion Gap, Serum: 16 (05-29 @ 06:18)  Anion Gap, Serum: 17 (05-28 @ 06:36)      Calcium, Total Serum: 8.7 (05-29 @ 06:18)  Calcium, Total Serum: 8.6 (05-28 @ 06:36)  Albumin, Serum: 2.6 <L> (05-29 @ 06:18)  Albumin, Serum: 2.6 <L> (05-28 @ 06:36)    Alanine Aminotransferase (ALT/SGPT): 40 (05-29 @ 06:18)  Alanine Aminotransferase (ALT/SGPT): 31 (05-28 @ 06:36)  Alkaline Phosphatase, Serum: 148 <H> (05-29 @ 06:18)  Alkaline Phosphatase, Serum: 162 <H> (05-28 @ 06:36)  Aspartate Aminotransferase (AST/SGOT): 68 <H> (05-29 @ 06:18)  Aspartate Aminotransferase (AST/SGOT): 50 <H> (05-28 @ 06:36)        05-29    135  |  100  |  45<H>  ----------------------------<  184<H>  4.2   |  19<L>  |  2.84<H>    Ca    8.7      29 May 2019 06:18    TPro  6.7  /  Alb  2.6<L>  /  TBili  0.5  /  DBili  x   /  AST  68<H>  /  ALT  40  /  AlkPhos  148<H>  05-29                        10.0   14.3  )-----------( 315      ( 29 May 2019 06:19 )             31.4     Medications  MEDICATIONS  (STANDING):  aspirin enteric coated 81 milliGRAM(s) Oral daily  Dakins Solution - 1/4 Strength 1 Application(s) Topical daily  dextrose 5%. 1000 milliLiter(s) (50 mL/Hr) IV Continuous <Continuous>  dextrose 5%. 1000 milliLiter(s) (50 mL/Hr) IV Continuous <Continuous>  dextrose 50% Injectable 12.5 Gram(s) IV Push once  dextrose 50% Injectable 25 Gram(s) IV Push once  dextrose 50% Injectable 25 Gram(s) IV Push once  dextrose 50% Injectable 12.5 Gram(s) IV Push once  dextrose 50% Injectable 25 Gram(s) IV Push once  dextrose 50% Injectable 25 Gram(s) IV Push once  ergocalciferol 68690 Unit(s) Oral every week  heparin  Injectable 5000 Unit(s) SubCutaneous every 8 hours  hydrALAZINE 25 milliGRAM(s) Oral two times a day  insulin glargine Injectable (LANTUS) 38 Unit(s) SubCutaneous at bedtime  insulin lispro (HumaLOG) corrective regimen sliding scale   SubCutaneous three times a day before meals  insulin lispro (HumaLOG) corrective regimen sliding scale   SubCutaneous at bedtime  insulin lispro Injectable (HumaLOG) 20 Unit(s) SubCutaneous three times a day before meals  piperacillin/tazobactam IVPB. 3.375 Gram(s) IV Intermittent every 8 hours  sodium chloride 0.9%. 1000 milliLiter(s) (50 mL/Hr) IV Continuous <Continuous>  vancomycin  IVPB 1000 milliGRAM(s) IV Intermittent every 24 hours      Physical Exam  General: Patient comfortable in bed  Vital Signs Last 12 Hrs  T(F): 98.3 (05-29-19 @ 13:24), Max: 99.9 (05-29-19 @ 05:24)  HR: 85 (05-29-19 @ 13:24) (77 - 85)  BP: 174/82 (05-29-19 @ 13:24) (138/79 - 182/84)  BP(mean): --  RR: 18 (05-29-19 @ 13:24) (16 - 18)  SpO2: 95% (05-29-19 @ 13:24) (95% - 96%)  Neck: No palpable thyroid nodules.  CVS: S1S2, No murmurs  Respiratory: No wheezing, no crepitations  GI: Abdomen soft, bowel sounds positive  Musculoskeletal:  edema lower extremities.   Skin: No skin rashes, no ecchymosis    Diagnostics    Vitamin D, 1, 25-Dihydroxy: AM Sched. Collection: 22-May-2019 06:00 (05-21 @ 18:59)  Vitamin D, 25-Hydroxy: AM Sched. Collection: 22-May-2019 06:00 (05-21 @ 18:59)

## 2019-05-29 NOTE — PROGRESS NOTE ADULT - ASSESSMENT
59 y/o male pt s/p I&D & Debridement, POD #2  - pt seen and evaluated  - Continue IV ABX as per ID  - no residual purulence noted, no active bleeding, increased erythema and fibronecrotic tissue  - WBC remains at 14  - Wound not improving- will order MR to evaluate for any remaining abscess.   - Rec vasc consult to evaluate blood flow- wound increasing necrosis. JOSIANE/PVR ordered.   - seen with attending 59 y/o male pt s/p I&D & Debridement, POD #2  - pt seen and evaluated  - Continue IV ABX as per ID  - no residual purulence noted, no active bleeding,  fibronecrotic tissue  - WBC remains at 14  - Wound stagnant- Recommend  MR to evaluate for any remaining abscess.   - Rec vasc consult to evaluate blood flow- Questionable viability of the wound. JOSIANE/PVR ordered.   - seen with attending

## 2019-05-29 NOTE — PROGRESS NOTE ADULT - SUBJECTIVE AND OBJECTIVE BOX
Podiatry pager #: [place] Pager:  [phone], [facility] Pager: [phone]    Patient is a 58y old  Male who presents with a chief complaint of right foot ulcer (29 May 2019 13:39)       INTERVAL HPI/OVERNIGHT EVENTS:  Patient seen and evaluated at bedside.  Pt is resting comfortable in NAD. Denies N/V/F/C.  Pain rated at X/10    Allergies    No Known Allergies    Intolerances        Vital Signs Last 24 Hrs  T(C): 36.8 (29 May 2019 13:24), Max: 37.7 (29 May 2019 05:24)  T(F): 98.3 (29 May 2019 13:24), Max: 99.9 (29 May 2019 05:24)  HR: 85 (29 May 2019 13:24) (77 - 85)  BP: 174/82 (29 May 2019 13:24) (122/77 - 182/84)  BP(mean): --  RR: 18 (29 May 2019 13:24) (16 - 18)  SpO2: 95% (29 May 2019 13:24) (93% - 96%)    LABS:                        10.0   14.3  )-----------( 315      ( 29 May 2019 06:19 )             31.4     05-29    135  |  100  |  45<H>  ----------------------------<  184<H>  4.2   |  19<L>  |  2.84<H>    Ca    8.7      29 May 2019 06:18    TPro  6.7  /  Alb  2.6<L>  /  TBili  0.5  /  DBili  x   /  AST  68<H>  /  ALT  40  /  AlkPhos  148<H>  05-29        CAPILLARY BLOOD GLUCOSE      POCT Blood Glucose.: 123 mg/dL (29 May 2019 13:18)  POCT Blood Glucose.: 211 mg/dL (29 May 2019 10:34)  POCT Blood Glucose.: 176 mg/dL (28 May 2019 21:50)  POCT Blood Glucose.: 184 mg/dL (28 May 2019 20:06)  POCT Blood Glucose.: 317 mg/dL (28 May 2019 15:46)      Lower Extremity Physical Exam:  Vascular: DP/PT 2/4, B/L, CFT <3 seconds B/L, Temperature gradient within N/L.   Neuro: Epicritic sensation diminished  to the level of midfoot, B/L.  Skin: R plantar midfoot I&D site with no residual purulence, no active bleeding, no fluctuance, No odor, wound probes down to plantar fascia     RADIOLOGY & ADDITIONAL TESTS: Podiatry pager #: [place] Pager:  [phone], [facility] Pager: [phone]    Patient is a 58y old  Male who presents with a chief complaint of right foot ulcer (29 May 2019 13:39)       INTERVAL HPI/OVERNIGHT EVENTS:  Patient seen and evaluated at bedside.  Pt is resting comfortable in NAD. Denies N/V/F/C.  Pain rated at 0/10    Allergies    No Known Allergies    Intolerances        Vital Signs Last 24 Hrs  T(C): 36.8 (29 May 2019 13:24), Max: 37.7 (29 May 2019 05:24)  T(F): 98.3 (29 May 2019 13:24), Max: 99.9 (29 May 2019 05:24)  HR: 85 (29 May 2019 13:24) (77 - 85)  BP: 174/82 (29 May 2019 13:24) (122/77 - 182/84)  BP(mean): --  RR: 18 (29 May 2019 13:24) (16 - 18)  SpO2: 95% (29 May 2019 13:24) (93% - 96%)    LABS:       	                 10.0   14.3  )-----------( 315      ( 29 May 2019 06:19 )             31.4     05-29    135  |  100  |  45<H>  ----------------------------<  184<H>  4.2   |  19<L>  |  2.84<H>    Ca    8.7      29 May 2019 06:18    TPro  6.7  /  Alb  2.6<L>  /  TBili  0.5  /  DBili  x   /  AST  68<H>  /  ALT  40  /  AlkPhos  148<H>  05-29        CAPILLARY BLOOD GLUCOSE      POCT Blood Glucose.: 123 mg/dL (29 May 2019 13:18)  POCT Blood Glucose.: 211 mg/dL (29 May 2019 10:34)  POCT Blood Glucose.: 176 mg/dL (28 May 2019 21:50)  POCT Blood Glucose.: 184 mg/dL (28 May 2019 20:06)  POCT Blood Glucose.: 317 mg/dL (28 May 2019 15:46)      Lower Extremity Physical Exam:  Vascular: DP/PT 2/4, B/L, CFT <3 seconds B/L, Temperature gradient within N/L.   Neuro: Epicritic sensation diminished  to the level of midfoot, B/L.  Skin: R plantar midfoot I&D site with no residual purulence, no active bleeding, no fluctuance, No odor, wound probes down to plantar fascia     RADIOLOGY & ADDITIONAL TESTS:

## 2019-05-29 NOTE — CONSULT NOTE ADULT - CONSULT REASON
Cardiac Management  HTN
Diabetic foot ulcer
MARIKA on CKD
R foot ulcer
right foot infection
High Blood Sugars/DMT2

## 2019-05-29 NOTE — CONSULT NOTE ADULT - CONSULT REQUESTED DATE/TIME
20-May-2019 15:12
20-May-2019 17:03
22-May-2019 18:56
23-May-2019 09:29
29-May-2019 19:56
22-May-2019 21:45

## 2019-05-29 NOTE — CONSULT NOTE ADULT - ASSESSMENT
ASSESSMENT:  58M w/ DM (A1C 11.9), HTN, presents with nonhealing RLE heel ulcer s/p multiple debridements.     - Patient's vasculature previously evaluated without concern of poor blood flow, palpable pulses  - Wound does not appear viable, will follow up possible repeat debridement  - Pending MRI RLE  - If patient's wound more extensive, may require BKA    Discussed with Dr. Jo Kumar PGY-2  Surgery Pager [phone] ASSESSMENT:  58M w/ DM (A1C 11.9), HTN, presents with nonhealing RLE heel ulcer s/p multiple debridements.     - Patient's vasculature previously evaluated without concern of poor blood flow, palpable pulses  - Wound does not appear viable, will follow up possible repeat debridement  - Pending MRI RLE  - If patient's wound more extensive on MRI, may require BKA    Discussed with Dr. Jo Kumar PGY-2  Surgery Pager [phone] ASSESSMENT:  58M w/ DM (A1C 11.9), HTN, presents with nonhealing RLE heel ulcer s/p multiple debridements.     - Patient's vasculature previously evaluated without concern of poor blood flow, palpable pulses  -- Pending MRI RLE  -will follow

## 2019-05-29 NOTE — PROGRESS NOTE ADULT - PROBLEM SELECTOR PLAN 4
-trend temps  -from foot infection  -now s/p debridement 5/27  -if not improving, consider repeat imaging

## 2019-05-29 NOTE — PROGRESS NOTE ADULT - PROBLEM SELECTOR PLAN 1
Will continue Lantus 38 units at bed time.  Will decrease Humalog to 20 units before each meal in addition to Humalog correction scale coverage.  Patient counseled for compliance with consistent low carb diet.

## 2019-05-29 NOTE — PROGRESS NOTE ADULT - SUBJECTIVE AND OBJECTIVE BOX
Subjective: Patient seen and examined. No new events except as noted.   resting comfortably   Low grade temperature     REVIEW OF SYSTEMS:    CONSTITUTIONAL: + weakness, fevers or chills  EYES/ENT: No visual changes;  No vertigo or throat pain   NECK: No pain or stiffness  RESPIRATORY: No cough, wheezing, hemoptysis; No shortness of breath  CARDIOVASCULAR: No chest pain or palpitations  GASTROINTESTINAL: No abdominal or epigastric pain. No nausea, vomiting, or hematemesis; No diarrhea or constipation. No melena or hematochezia.  GENITOURINARY: No dysuria, frequency or hematuria  NEUROLOGICAL: No numbness or weakness  SKIN: No itching, burning, rashes, or lesions   All other review of systems is negative unless indicated above.    MEDICATIONS:  MEDICATIONS  (STANDING):  aspirin enteric coated 81 milliGRAM(s) Oral daily  Dakins Solution - 1/4 Strength 1 Application(s) Topical daily  dextrose 5%. 1000 milliLiter(s) (50 mL/Hr) IV Continuous <Continuous>  dextrose 5%. 1000 milliLiter(s) (50 mL/Hr) IV Continuous <Continuous>  dextrose 50% Injectable 12.5 Gram(s) IV Push once  dextrose 50% Injectable 25 Gram(s) IV Push once  dextrose 50% Injectable 25 Gram(s) IV Push once  dextrose 50% Injectable 12.5 Gram(s) IV Push once  dextrose 50% Injectable 25 Gram(s) IV Push once  dextrose 50% Injectable 25 Gram(s) IV Push once  ergocalciferol 38748 Unit(s) Oral every week  heparin  Injectable 5000 Unit(s) SubCutaneous every 8 hours  hydrALAZINE 25 milliGRAM(s) Oral two times a day  insulin glargine Injectable (LANTUS) 38 Unit(s) SubCutaneous at bedtime  insulin lispro (HumaLOG) corrective regimen sliding scale   SubCutaneous three times a day before meals  insulin lispro (HumaLOG) corrective regimen sliding scale   SubCutaneous at bedtime  insulin lispro Injectable (HumaLOG) 25 Unit(s) SubCutaneous three times a day before meals  piperacillin/tazobactam IVPB. 3.375 Gram(s) IV Intermittent every 8 hours  sodium chloride 0.9%. 1000 milliLiter(s) (50 mL/Hr) IV Continuous <Continuous>  vancomycin  IVPB 1000 milliGRAM(s) IV Intermittent every 24 hours      PHYSICAL EXAM:  T(C): 37.7 (05-29-19 @ 10:20), Max: 37.7 (05-29-19 @ 05:24)  HR: 77 (05-29-19 @ 10:20) (77 - 84)  BP: 182/84 (05-29-19 @ 10:20) (122/77 - 182/84)  RR: 16 (05-29-19 @ 10:20) (16 - 18)  SpO2: 95% (05-29-19 @ 10:20) (93% - 96%)  Wt(kg): --  I&O's Summary    28 May 2019 07:01  -  29 May 2019 07:00  --------------------------------------------------------  IN: 1210 mL / OUT: 600 mL / NET: 610 mL          Appearance: NAD   HEENT:   Normal oral mucosa, PERRL, EOMI	  Lymphatic: No lymphadenopathy , no edema  Cardiovascular: Normal S1 S2, No JVD, No murmurs , Peripheral pulses palpable 2+ bilaterally  Respiratory: Lungs clear to auscultation, normal effort 	  Gastrointestinal:  Soft, Non-tender, + BS	  Skin: No rashes, No ecchymoses, No cyanosis, warm to touch  Musculoskeletal: Decreased range of motion, normal strength  Psychiatry:  Mood & affect appropriate  Ext: +edema , chronic venous stasis skin discoloration   right foot wrapped         LABS:    CARDIAC MARKERS:                                10.0   14.3  )-----------( 315      ( 29 May 2019 06:19 )             31.4     05-29    135  |  100  |  45<H>  ----------------------------<  184<H>  4.2   |  19<L>  |  2.84<H>    Ca    8.7      29 May 2019 06:18    TPro  6.7  /  Alb  2.6<L>  /  TBili  0.5  /  DBili  x   /  AST  68<H>  /  ALT  40  /  AlkPhos  148<H>  05-29    proBNP:   Lipid Profile:   HgA1c:   TSH:             TELEMETRY: 	    ECG:  	  RADIOLOGY:   DIAGNOSTIC TESTING:  [ ] Echocardiogram:  [ ]  Catheterization:  [ ] Stress Test:    OTHER:

## 2019-05-29 NOTE — PROGRESS NOTE ADULT - SUBJECTIVE AND OBJECTIVE BOX
AGUSTIN BRAR 58y [id number]    Patient is a 58y old  Male who presents with a chief complaint of right foot ulcer (29 May 2019 11:07)      Follow Up/CC:  ID following for fever    Interval History/ROS: some pain in foot, planning possible foot MRI, no fever    Allergies    No Known Allergies    Intolerances        ANTIMICROBIALS:  piperacillin/tazobactam IVPB. 3.375 every 8 hours  vancomycin  IVPB 1000 every 24 hours      MEDICATIONS  (STANDING):  aspirin enteric coated 81 milliGRAM(s) Oral daily  Dakins Solution - 1/4 Strength 1 Application(s) Topical daily  dextrose 5%. 1000 milliLiter(s) (50 mL/Hr) IV Continuous <Continuous>  dextrose 5%. 1000 milliLiter(s) (50 mL/Hr) IV Continuous <Continuous>  dextrose 50% Injectable 12.5 Gram(s) IV Push once  dextrose 50% Injectable 25 Gram(s) IV Push once  dextrose 50% Injectable 25 Gram(s) IV Push once  dextrose 50% Injectable 12.5 Gram(s) IV Push once  dextrose 50% Injectable 25 Gram(s) IV Push once  dextrose 50% Injectable 25 Gram(s) IV Push once  ergocalciferol 48646 Unit(s) Oral every week  heparin  Injectable 5000 Unit(s) SubCutaneous every 8 hours  hydrALAZINE 25 milliGRAM(s) Oral two times a day  insulin glargine Injectable (LANTUS) 38 Unit(s) SubCutaneous at bedtime  insulin lispro (HumaLOG) corrective regimen sliding scale   SubCutaneous three times a day before meals  insulin lispro (HumaLOG) corrective regimen sliding scale   SubCutaneous at bedtime  insulin lispro Injectable (HumaLOG) 25 Unit(s) SubCutaneous three times a day before meals  piperacillin/tazobactam IVPB. 3.375 Gram(s) IV Intermittent every 8 hours  sodium chloride 0.9%. 1000 milliLiter(s) (50 mL/Hr) IV Continuous <Continuous>  vancomycin  IVPB 1000 milliGRAM(s) IV Intermittent every 24 hours    MEDICATIONS  (PRN):  acetaminophen   Tablet .. 650 milliGRAM(s) Oral every 6 hours PRN Mild Pain (1 - 3)  dextrose 40% Gel 15 Gram(s) Oral once PRN Blood Glucose LESS THAN 70 milliGRAM(s)/deciliter  dextrose 40% Gel 15 Gram(s) Oral once PRN Blood Glucose LESS THAN 70 milliGRAM(s)/deciliter  glucagon  Injectable 1 milliGRAM(s) IntraMuscular once PRN Glucose LESS THAN 70 milligrams/deciliter  morphine  - Injectable 2 milliGRAM(s) IV Push every 6 hours PRN Moderate Pain (4 - 6)  oxyCODONE    5 mG/acetaminophen 325 mG 2 Tablet(s) Oral every 4 hours PRN Moderate Pain (4 - 6)        Vital Signs Last 24 Hrs  T(C): 37.7 (29 May 2019 10:20), Max: 37.7 (29 May 2019 05:24)  T(F): 99.9 (29 May 2019 10:20), Max: 99.9 (29 May 2019 05:24)  HR: 77 (29 May 2019 10:20) (77 - 84)  BP: 182/84 (29 May 2019 10:20) (122/77 - 182/84)  BP(mean): --  RR: 16 (29 May 2019 10:20) (16 - 18)  SpO2: 95% (29 May 2019 10:20) (93% - 96%)    CBC Full  -  ( 29 May 2019 06:19 )  WBC Count : 14.3 K/uL  RBC Count : 3.39 M/uL  Hemoglobin : 10.0 g/dL  Hematocrit : 31.4 %  Platelet Count - Automated : 315 K/uL  Mean Cell Volume : 92.7 fl  Mean Cell Hemoglobin : 29.7 pg  Mean Cell Hemoglobin Concentration : 32.0 gm/dL  Auto Neutrophil # : x  Auto Lymphocyte # : x  Auto Monocyte # : x  Auto Eosinophil # : x  Auto Basophil # : x  Auto Neutrophil % : x  Auto Lymphocyte % : x  Auto Monocyte % : x  Auto Eosinophil % : x  Auto Basophil % : x    05-29    135  |  100  |  45<H>  ----------------------------<  184<H>  4.2   |  19<L>  |  2.84<H>    Ca    8.7      29 May 2019 06:18    TPro  6.7  /  Alb  2.6<L>  /  TBili  0.5  /  DBili  x   /  AST  68<H>  /  ALT  40  /  AlkPhos  148<H>  05-29    LIVER FUNCTIONS - ( 29 May 2019 06:18 )  Alb: 2.6 g/dL / Pro: 6.7 g/dL / ALK PHOS: 148 U/L / ALT: 40 U/L / AST: 68 U/L / GGT: x               MICROBIOLOGY:  .Other  05-28-19   Testing in progress  --  --      .Blood  05-26-19   No growth to date.  --  --      .Blood  05-25-19   No growth to date.  --  --      .Tissue  05-23-19   Few Escherichia coli  Growth in fluid media only Enterococcus faecalis  Rare Streptococcus agalactiae (Group B) isolated  Group B streptococci are susceptible to ampicillin,  penicillin and cefazolin, but may be resistant to  erythromycin and clindamycin.  Recommendations for intrapartum prophylaxis for Group B  streptococci are penicillin or ampicillin.  --  Escherichia coli  Enterococcus faecalis      .Blood  05-20-19   No growth at 5 days.  --  --      .Abscess  05-20-19   Numerous Escherichia coli  Numerous Enterococcus faecalis  Moderate Prevotella bivia "Susceptibilities not performed"  --  Enterococcus faecalis  Escherichia coli      Vancomycin Level, Trough: 12.0 ug/mL (05-28-19 @ 14:25)      RADIOLOGY  < from: Xray Chest 1 View- PORTABLE-Urgent (05.25.19 @ 06:30) >  The heart and lungs are normal. The bones are intact.

## 2019-05-30 DIAGNOSIS — L08.9 LOCAL INFECTION OF THE SKIN AND SUBCUTANEOUS TISSUE, UNSPECIFIED: ICD-10-CM

## 2019-05-30 DIAGNOSIS — I77.1 STRICTURE OF ARTERY: ICD-10-CM

## 2019-05-30 LAB
ALBUMIN SERPL ELPH-MCNC: 2.7 G/DL — LOW (ref 3.3–5)
ALP SERPL-CCNC: 137 U/L — HIGH (ref 40–120)
ALT FLD-CCNC: 54 U/L — HIGH (ref 10–45)
ANION GAP SERPL CALC-SCNC: 13 MMOL/L — SIGNIFICANT CHANGE UP (ref 5–17)
AST SERPL-CCNC: 112 U/L — HIGH (ref 10–40)
BASOPHILS # BLD AUTO: 0 K/UL — SIGNIFICANT CHANGE UP (ref 0–0.2)
BASOPHILS NFR BLD AUTO: 0 % — SIGNIFICANT CHANGE UP (ref 0–2)
BILIRUB SERPL-MCNC: 0.4 MG/DL — SIGNIFICANT CHANGE UP (ref 0.2–1.2)
BUN SERPL-MCNC: 46 MG/DL — HIGH (ref 7–23)
CALCIUM SERPL-MCNC: 8.5 MG/DL — SIGNIFICANT CHANGE UP (ref 8.4–10.5)
CHLORIDE SERPL-SCNC: 100 MMOL/L — SIGNIFICANT CHANGE UP (ref 96–108)
CO2 SERPL-SCNC: 21 MMOL/L — LOW (ref 22–31)
CREAT SERPL-MCNC: 2.72 MG/DL — HIGH (ref 0.5–1.3)
CULTURE RESULTS: SIGNIFICANT CHANGE UP
CULTURE RESULTS: SIGNIFICANT CHANGE UP
EOSINOPHIL # BLD AUTO: 0.1 K/UL — SIGNIFICANT CHANGE UP (ref 0–0.5)
EOSINOPHIL NFR BLD AUTO: 0 % — SIGNIFICANT CHANGE UP (ref 0–6)
GLUCOSE BLDC GLUCOMTR-MCNC: 136 MG/DL — HIGH (ref 70–99)
GLUCOSE BLDC GLUCOMTR-MCNC: 179 MG/DL — HIGH (ref 70–99)
GLUCOSE BLDC GLUCOMTR-MCNC: 202 MG/DL — HIGH (ref 70–99)
GLUCOSE SERPL-MCNC: 192 MG/DL — HIGH (ref 70–99)
HCT VFR BLD CALC: 29.8 % — LOW (ref 39–50)
HGB BLD-MCNC: 9.8 G/DL — LOW (ref 13–17)
LYMPHOCYTES # BLD AUTO: 0.9 K/UL — LOW (ref 1–3.3)
LYMPHOCYTES # BLD AUTO: 3 % — LOW (ref 13–44)
MCHC RBC-ENTMCNC: 30.9 PG — SIGNIFICANT CHANGE UP (ref 27–34)
MCHC RBC-ENTMCNC: 32.8 GM/DL — SIGNIFICANT CHANGE UP (ref 32–36)
MCV RBC AUTO: 94.3 FL — SIGNIFICANT CHANGE UP (ref 80–100)
MONOCYTES # BLD AUTO: 0.5 K/UL — SIGNIFICANT CHANGE UP (ref 0–0.9)
MONOCYTES NFR BLD AUTO: 6 % — SIGNIFICANT CHANGE UP (ref 2–14)
NEUTROPHILS # BLD AUTO: 11 K/UL — HIGH (ref 1.8–7.4)
NEUTROPHILS NFR BLD AUTO: 87 % — HIGH (ref 43–77)
NEUTS BAND # BLD: 2 % — SIGNIFICANT CHANGE UP (ref 0–8)
PLAT MORPH BLD: NORMAL — SIGNIFICANT CHANGE UP
PLATELET # BLD AUTO: 292 K/UL — SIGNIFICANT CHANGE UP (ref 150–400)
POTASSIUM SERPL-MCNC: 4.3 MMOL/L — SIGNIFICANT CHANGE UP (ref 3.5–5.3)
POTASSIUM SERPL-SCNC: 4.3 MMOL/L — SIGNIFICANT CHANGE UP (ref 3.5–5.3)
PROT SERPL-MCNC: 6.7 G/DL — SIGNIFICANT CHANGE UP (ref 6–8.3)
RBC # BLD: 3.16 M/UL — LOW (ref 4.2–5.8)
RBC # FLD: 12.8 % — SIGNIFICANT CHANGE UP (ref 10.3–14.5)
RBC BLD AUTO: SIGNIFICANT CHANGE UP
SODIUM SERPL-SCNC: 134 MMOL/L — LOW (ref 135–145)
SPECIMEN SOURCE: SIGNIFICANT CHANGE UP
SPECIMEN SOURCE: SIGNIFICANT CHANGE UP
VARIANT LYMPHS # BLD: 2 % — SIGNIFICANT CHANGE UP (ref 0–6)
WBC # BLD: 12.5 K/UL — HIGH (ref 3.8–10.5)
WBC # FLD AUTO: 12.5 K/UL — HIGH (ref 3.8–10.5)

## 2019-05-30 PROCEDURE — 99232 SBSQ HOSP IP/OBS MODERATE 35: CPT

## 2019-05-30 PROCEDURE — 73720 MRI LWR EXTREMITY W/O&W/DYE: CPT | Mod: 26,RT

## 2019-05-30 RX ORDER — OXYCODONE HYDROCHLORIDE 5 MG/1
10 TABLET ORAL ONCE
Refills: 0 | Status: DISCONTINUED | OUTPATIENT
Start: 2019-05-30 | End: 2019-06-05

## 2019-05-30 RX ADMIN — INSULIN GLARGINE 38 UNIT(S): 100 INJECTION, SOLUTION SUBCUTANEOUS at 23:12

## 2019-05-30 RX ADMIN — Medication 20 UNIT(S): at 16:52

## 2019-05-30 RX ADMIN — OXYCODONE AND ACETAMINOPHEN 2 TABLET(S): 5; 325 TABLET ORAL at 17:20

## 2019-05-30 RX ADMIN — Medication 81 MILLIGRAM(S): at 11:26

## 2019-05-30 RX ADMIN — Medication 25 MILLIGRAM(S): at 05:13

## 2019-05-30 RX ADMIN — OXYCODONE AND ACETAMINOPHEN 2 TABLET(S): 5; 325 TABLET ORAL at 09:56

## 2019-05-30 RX ADMIN — Medication 1: at 10:05

## 2019-05-30 RX ADMIN — POLYETHYLENE GLYCOL 3350 17 GRAM(S): 17 POWDER, FOR SOLUTION ORAL at 11:26

## 2019-05-30 RX ADMIN — PIPERACILLIN AND TAZOBACTAM 25 GRAM(S): 4; .5 INJECTION, POWDER, LYOPHILIZED, FOR SOLUTION INTRAVENOUS at 23:12

## 2019-05-30 RX ADMIN — Medication 25 MILLIGRAM(S): at 18:38

## 2019-05-30 RX ADMIN — PIPERACILLIN AND TAZOBACTAM 25 GRAM(S): 4; .5 INJECTION, POWDER, LYOPHILIZED, FOR SOLUTION INTRAVENOUS at 14:42

## 2019-05-30 RX ADMIN — Medication 20 UNIT(S): at 10:05

## 2019-05-30 RX ADMIN — OXYCODONE AND ACETAMINOPHEN 2 TABLET(S): 5; 325 TABLET ORAL at 16:51

## 2019-05-30 RX ADMIN — HEPARIN SODIUM 5000 UNIT(S): 5000 INJECTION INTRAVENOUS; SUBCUTANEOUS at 21:04

## 2019-05-30 RX ADMIN — OXYCODONE AND ACETAMINOPHEN 2 TABLET(S): 5; 325 TABLET ORAL at 00:53

## 2019-05-30 RX ADMIN — OXYCODONE AND ACETAMINOPHEN 2 TABLET(S): 5; 325 TABLET ORAL at 10:26

## 2019-05-30 RX ADMIN — HEPARIN SODIUM 5000 UNIT(S): 5000 INJECTION INTRAVENOUS; SUBCUTANEOUS at 05:13

## 2019-05-30 RX ADMIN — Medication 250 MILLIGRAM(S): at 21:03

## 2019-05-30 RX ADMIN — Medication 2: at 16:51

## 2019-05-30 RX ADMIN — PIPERACILLIN AND TAZOBACTAM 25 GRAM(S): 4; .5 INJECTION, POWDER, LYOPHILIZED, FOR SOLUTION INTRAVENOUS at 05:12

## 2019-05-30 RX ADMIN — HEPARIN SODIUM 5000 UNIT(S): 5000 INJECTION INTRAVENOUS; SUBCUTANEOUS at 14:42

## 2019-05-30 RX ADMIN — Medication 1 APPLICATION(S): at 14:43

## 2019-05-30 NOTE — PROGRESS NOTE ADULT - PROBLEM SELECTOR PLAN 1
s/p I&D  Seen again by vascular surgery. Awaiting MRI. May need BKA  Monitor WBC  IV abx   Wound care   ID following   keep legs elevated

## 2019-05-30 NOTE — PROGRESS NOTE ADULT - SUBJECTIVE AND OBJECTIVE BOX
Subjective: Patient seen and examined. No new events except as noted.   resting comfortably   Low grade temperature       REVIEW OF SYSTEMS:    CONSTITUTIONAL: +weakness, fevers or chills  EYES/ENT: No visual changes;  No vertigo or throat pain   NECK: No pain or stiffness  RESPIRATORY: No cough, wheezing, hemoptysis; No shortness of breath  CARDIOVASCULAR: No chest pain or palpitations  GASTROINTESTINAL: No abdominal or epigastric pain. No nausea, vomiting, or hematemesis; No diarrhea or constipation. No melena or hematochezia.  GENITOURINARY: No dysuria, frequency or hematuria  NEUROLOGICAL: No numbness or weakness  SKIN: No itching, burning, rashes, or lesions   All other review of systems is negative unless indicated above.    MEDICATIONS:  MEDICATIONS  (STANDING):  aspirin enteric coated 81 milliGRAM(s) Oral daily  Dakins Solution - 1/4 Strength 1 Application(s) Topical daily  dextrose 5%. 1000 milliLiter(s) (50 mL/Hr) IV Continuous <Continuous>  dextrose 5%. 1000 milliLiter(s) (50 mL/Hr) IV Continuous <Continuous>  dextrose 50% Injectable 12.5 Gram(s) IV Push once  dextrose 50% Injectable 25 Gram(s) IV Push once  dextrose 50% Injectable 25 Gram(s) IV Push once  dextrose 50% Injectable 12.5 Gram(s) IV Push once  dextrose 50% Injectable 25 Gram(s) IV Push once  dextrose 50% Injectable 25 Gram(s) IV Push once  ergocalciferol 23648 Unit(s) Oral every week  heparin  Injectable 5000 Unit(s) SubCutaneous every 8 hours  hydrALAZINE 25 milliGRAM(s) Oral two times a day  insulin glargine Injectable (LANTUS) 38 Unit(s) SubCutaneous at bedtime  insulin lispro (HumaLOG) corrective regimen sliding scale   SubCutaneous three times a day before meals  insulin lispro (HumaLOG) corrective regimen sliding scale   SubCutaneous at bedtime  insulin lispro Injectable (HumaLOG) 20 Unit(s) SubCutaneous three times a day before meals  piperacillin/tazobactam IVPB. 3.375 Gram(s) IV Intermittent every 8 hours  polyethylene glycol 3350 17 Gram(s) Oral daily  senna 2 Tablet(s) Oral at bedtime  sodium chloride 0.9%. 1000 milliLiter(s) (50 mL/Hr) IV Continuous <Continuous>  vancomycin  IVPB 1000 milliGRAM(s) IV Intermittent every 24 hours      PHYSICAL EXAM:  T(C): 36.9 (05-30-19 @ 09:33), Max: 37.1 (05-29-19 @ 17:05)  HR: 81 (05-30-19 @ 09:33) (54 - 85)  BP: 145/53 (05-30-19 @ 09:33) (106/68 - 174/82)  RR: 18 (05-30-19 @ 09:33) (18 - 18)  SpO2: 95% (05-30-19 @ 09:33) (93% - 97%)  Wt(kg): --  I&O's Summary    29 May 2019 07:01  -  30 May 2019 07:00  --------------------------------------------------------  IN: 600 mL / OUT: 1150 mL / NET: -550 mL            Appearance: NAD   HEENT:   Normal oral mucosa, PERRL, EOMI	  Lymphatic: No lymphadenopathy , no edema  Cardiovascular: Normal S1 S2, No JVD, No murmurs , Peripheral pulses palpable 2+ bilaterally  Respiratory: Lungs clear to auscultation, normal effort 	  Gastrointestinal:  Soft, Non-tender, + BS	  Skin: No rashes, No ecchymoses, No cyanosis, warm to touch  Musculoskeletal: Decreased range of motion, normal strength  Psychiatry:  Mood & affect appropriate  Ext: +edema , chronic venous stasis skin discoloration   right foot wrapped , + ulcer           LABS:    CARDIAC MARKERS:                                9.8    12.5  )-----------( 292      ( 30 May 2019 07:01 )             29.8     05-30    134<L>  |  100  |  46<H>  ----------------------------<  192<H>  4.3   |  21<L>  |  2.72<H>    Ca    8.5      30 May 2019 07:01    TPro  6.7  /  Alb  2.7<L>  /  TBili  0.4  /  DBili  x   /  AST  112<H>  /  ALT  54<H>  /  AlkPhos  137<H>  05-30    proBNP:   Lipid Profile:   HgA1c:   TSH:             TELEMETRY: 	    ECG:  	  RADIOLOGY:     DIAGNOSTIC TESTING:  [ ] Echocardiogram:  [ ]  Catheterization:  [ ] Stress Test:    OTHER:

## 2019-05-30 NOTE — PROGRESS NOTE ADULT - ASSESSMENT
diabetic foot ulcer with surrounding cellulitis   Fever resolved , leukocytosis persists  S/p debridement with podiatry 5/22 and 5/27  Culture polymicrobial--E coli, enterococcus, strep  - meropenem and vanco per ID  - F/U BCXs; repeat BCXs x 2  - Monitor for other possible source infection such as developing diarrhea  - repeat MRI as per podiatry  - vascular consult called    uncontrolled Diabetes  - cont lantus- novolog 20 units qac  - hgb a1c  11.9  - diabetic diet  - FS qid  - endo consult appreciated    MARIKA   - stop lasix and lisinopril  - ivf hydration  - follow creatinine  - nephrology following    HTN   -   hold hydralazine     hyponatremia  - NO salt restriction  - no HCTZ    constipation  - s/p BM  - miralax daily

## 2019-05-30 NOTE — PROGRESS NOTE ADULT - SUBJECTIVE AND OBJECTIVE BOX
Patient is a 58y old  Male who presents with a chief complaint of right foot ulcer (30 May 2019 14:38)      INTERVAL HPI/OVERNIGHT EVENTS:  T(C): 36.7 (05-30-19 @ 13:50), Max: 37.1 (05-29-19 @ 17:05)  HR: 66 (05-30-19 @ 13:50) (54 - 81)  BP: 126/70 (05-30-19 @ 13:50) (106/68 - 158/83)  RR: 18 (05-30-19 @ 13:50) (18 - 18)  SpO2: 96% (05-30-19 @ 13:50) (93% - 97%)  Wt(kg): --  I&O's Summary    29 May 2019 07:01  -  30 May 2019 07:00  --------------------------------------------------------  IN: 600 mL / OUT: 1150 mL / NET: -550 mL    30 May 2019 07:01  -  30 May 2019 15:19  --------------------------------------------------------  IN: 0 mL / OUT: 600 mL / NET: -600 mL        LABS:                        9.8    12.5  )-----------( 292      ( 30 May 2019 07:01 )             29.8     05-30    134<L>  |  100  |  46<H>  ----------------------------<  192<H>  4.3   |  21<L>  |  2.72<H>    Ca    8.5      30 May 2019 07:01    TPro  6.7  /  Alb  2.7<L>  /  TBili  0.4  /  DBili  x   /  AST  112<H>  /  ALT  54<H>  /  AlkPhos  137<H>  05-30        CAPILLARY BLOOD GLUCOSE      POCT Blood Glucose.: 179 mg/dL (30 May 2019 10:00)  POCT Blood Glucose.: 129 mg/dL (29 May 2019 22:41)  POCT Blood Glucose.: 102 mg/dL (29 May 2019 21:10)  POCT Blood Glucose.: 117 mg/dL (29 May 2019 17:55)            MEDICATIONS  (STANDING):  aspirin enteric coated 81 milliGRAM(s) Oral daily  Dakins Solution - 1/4 Strength 1 Application(s) Topical daily  dextrose 5%. 1000 milliLiter(s) (50 mL/Hr) IV Continuous <Continuous>  dextrose 5%. 1000 milliLiter(s) (50 mL/Hr) IV Continuous <Continuous>  dextrose 50% Injectable 12.5 Gram(s) IV Push once  dextrose 50% Injectable 25 Gram(s) IV Push once  dextrose 50% Injectable 25 Gram(s) IV Push once  dextrose 50% Injectable 12.5 Gram(s) IV Push once  dextrose 50% Injectable 25 Gram(s) IV Push once  dextrose 50% Injectable 25 Gram(s) IV Push once  ergocalciferol 51602 Unit(s) Oral every week  heparin  Injectable 5000 Unit(s) SubCutaneous every 8 hours  hydrALAZINE 25 milliGRAM(s) Oral two times a day  insulin glargine Injectable (LANTUS) 38 Unit(s) SubCutaneous at bedtime  insulin lispro (HumaLOG) corrective regimen sliding scale   SubCutaneous three times a day before meals  insulin lispro (HumaLOG) corrective regimen sliding scale   SubCutaneous at bedtime  insulin lispro Injectable (HumaLOG) 20 Unit(s) SubCutaneous three times a day before meals  oxyCODONE    IR 10 milliGRAM(s) Oral once  piperacillin/tazobactam IVPB. 3.375 Gram(s) IV Intermittent every 8 hours  polyethylene glycol 3350 17 Gram(s) Oral daily  senna 2 Tablet(s) Oral at bedtime  sodium chloride 0.9%. 1000 milliLiter(s) (50 mL/Hr) IV Continuous <Continuous>  vancomycin  IVPB 1000 milliGRAM(s) IV Intermittent every 24 hours    MEDICATIONS  (PRN):  acetaminophen   Tablet .. 650 milliGRAM(s) Oral every 6 hours PRN Mild Pain (1 - 3)  dextrose 40% Gel 15 Gram(s) Oral once PRN Blood Glucose LESS THAN 70 milliGRAM(s)/deciliter  dextrose 40% Gel 15 Gram(s) Oral once PRN Blood Glucose LESS THAN 70 milliGRAM(s)/deciliter  glucagon  Injectable 1 milliGRAM(s) IntraMuscular once PRN Glucose LESS THAN 70 milligrams/deciliter  morphine  - Injectable 2 milliGRAM(s) IV Push every 6 hours PRN Moderate Pain (4 - 6)  oxyCODONE    5 mG/acetaminophen 325 mG 2 Tablet(s) Oral every 4 hours PRN Moderate Pain (4 - 6)          PHYSICAL EXAM:  GENERAL: NAD, well-groomed, well-developed  HEAD:  Atraumatic, Normocephalic  CHEST/LUNG: Clear to percussion bilaterally; No rales, rhonchi, wheezing, or rubs  HEART: Regular rate and rhythm; No murmurs, rubs, or gallops  ABDOMEN: Soft, Nontender, Nondistended; Bowel sounds present  EXTREMITIES:  2+ Peripheral Pulses, No clubbing, cyanosis, or edema  LYMPH: No lymphadenopathy noted  SKIN: No rashes or lesions    Care Discussed with Consultants/Other Providers [ ] YES  [ ] NO

## 2019-05-30 NOTE — PROGRESS NOTE ADULT - PROBLEM SELECTOR PLAN 1
-cont abx  -initial MRI foot noted - no OM  -s/p debridement per podiatry 5/27  -DM control  -local care per podiatry

## 2019-05-30 NOTE — PROGRESS NOTE ADULT - ASSESSMENT
58M w/ DM (A1C 11.9), HTN, presents with nonhealing RLE heel ulcer s/p multiple debridements.     - JOSIANE/PVRs ordered, will f/u results  - WBC down today (14.3-->12.5) no fevers overnight  - Wound does not appear viable, will follow up possible repeat debridement  - Pending MRI RLE scheduled for 5 PM tonight  - If patient's wound more extensive on MRI, may require BKA  - Wound care as per podiatry        Vascular Surgery   [phone] 58M w/ DM (A1C 11.9), HTN, presents with nonhealing RLE heel ulcer s/p multiple debridements.     - JOSIANE/PVRs ordered, will f/u results  -- Pending MRI RLE done await reading  -d/w pt that based on the MRI findings will determine plan of woundcare vs possible amputation  will follow

## 2019-05-30 NOTE — PROGRESS NOTE ADULT - SUBJECTIVE AND OBJECTIVE BOX
Chief complaint  Patient is a 58y old  Male who presents with a chief complaint of right foot ulcer (30 May 2019 16:27)   Review of systems  Patient in bed, looks comfortable, no fever, no hypoglycemia.    Labs and Fingersticks  CAPILLARY BLOOD GLUCOSE      POCT Blood Glucose.: 202 mg/dL (30 May 2019 16:26)  POCT Blood Glucose.: 179 mg/dL (30 May 2019 10:00)  POCT Blood Glucose.: 129 mg/dL (29 May 2019 22:41)  POCT Blood Glucose.: 102 mg/dL (29 May 2019 21:10)      Anion Gap, Serum: 13 (05-30 @ 07:01)  Anion Gap, Serum: 16 (05-29 @ 06:18)      Calcium, Total Serum: 8.5 (05-30 @ 07:01)  Calcium, Total Serum: 8.7 (05-29 @ 06:18)  Albumin, Serum: 2.7 <L> (05-30 @ 07:01)  Albumin, Serum: 2.6 <L> (05-29 @ 06:18)    Alanine Aminotransferase (ALT/SGPT): 54 <H> (05-30 @ 07:01)  Alanine Aminotransferase (ALT/SGPT): 40 (05-29 @ 06:18)  Alkaline Phosphatase, Serum: 137 <H> (05-30 @ 07:01)  Alkaline Phosphatase, Serum: 148 <H> (05-29 @ 06:18)  Aspartate Aminotransferase (AST/SGOT): 112 <H> (05-30 @ 07:01)  Aspartate Aminotransferase (AST/SGOT): 68 <H> (05-29 @ 06:18)        05-30    134<L>  |  100  |  46<H>  ----------------------------<  192<H>  4.3   |  21<L>  |  2.72<H>    Ca    8.5      30 May 2019 07:01    TPro  6.7  /  Alb  2.7<L>  /  TBili  0.4  /  DBili  x   /  AST  112<H>  /  ALT  54<H>  /  AlkPhos  137<H>  05-30                        9.8    12.5  )-----------( 292      ( 30 May 2019 07:01 )             29.8     Medications  MEDICATIONS  (STANDING):  aspirin enteric coated 81 milliGRAM(s) Oral daily  Dakins Solution - 1/4 Strength 1 Application(s) Topical daily  dextrose 5%. 1000 milliLiter(s) (50 mL/Hr) IV Continuous <Continuous>  dextrose 5%. 1000 milliLiter(s) (50 mL/Hr) IV Continuous <Continuous>  dextrose 50% Injectable 12.5 Gram(s) IV Push once  dextrose 50% Injectable 25 Gram(s) IV Push once  dextrose 50% Injectable 25 Gram(s) IV Push once  dextrose 50% Injectable 12.5 Gram(s) IV Push once  dextrose 50% Injectable 25 Gram(s) IV Push once  dextrose 50% Injectable 25 Gram(s) IV Push once  ergocalciferol 75190 Unit(s) Oral every week  heparin  Injectable 5000 Unit(s) SubCutaneous every 8 hours  hydrALAZINE 25 milliGRAM(s) Oral two times a day  insulin glargine Injectable (LANTUS) 38 Unit(s) SubCutaneous at bedtime  insulin lispro (HumaLOG) corrective regimen sliding scale   SubCutaneous three times a day before meals  insulin lispro (HumaLOG) corrective regimen sliding scale   SubCutaneous at bedtime  insulin lispro Injectable (HumaLOG) 20 Unit(s) SubCutaneous three times a day before meals  oxyCODONE    IR 10 milliGRAM(s) Oral once  piperacillin/tazobactam IVPB. 3.375 Gram(s) IV Intermittent every 8 hours  polyethylene glycol 3350 17 Gram(s) Oral daily  senna 2 Tablet(s) Oral at bedtime  sodium chloride 0.9%. 1000 milliLiter(s) (50 mL/Hr) IV Continuous <Continuous>  vancomycin  IVPB 1000 milliGRAM(s) IV Intermittent every 24 hours      Physical Exam  General: Patient comfortable in bed  Vital Signs Last 12 Hrs  T(F): 98 (05-30-19 @ 18:47), Max: 98.4 (05-30-19 @ 09:33)  HR: 68 (05-30-19 @ 18:47) (66 - 81)  BP: 166/88 (05-30-19 @ 18:47) (126/70 - 166/88)  BP(mean): --  RR: 18 (05-30-19 @ 18:47) (18 - 18)  SpO2: 94% (05-30-19 @ 18:47) (94% - 96%)  Neck: No palpable thyroid nodules.  CVS: S1S2, No murmurs  Respiratory: No wheezing, no crepitations  GI: Abdomen soft, bowel sounds positive  Musculoskeletal:  edema lower extremities.   Skin: No skin rashes, no ecchymosis    Diagnostics    Vitamin D, 1, 25-Dihydroxy: AM Sched. Collection: 22-May-2019 06:00 (05-21 @ 18:59)  Vitamin D, 25-Hydroxy: AM Sched. Collection: 22-May-2019 06:00 (05-21 @ 18:59)

## 2019-05-30 NOTE — PROGRESS NOTE ADULT - SUBJECTIVE AND OBJECTIVE BOX
Patient is a 58y old  Male who presents with a chief complaint of right foot ulcer (30 May 2019 11:13)       INTERVAL HPI/OVERNIGHT EVENTS:  Patient seen and evaluated at bedside.  Pt is resting comfortable in NAD. Denies N/V/F/C.    Allergies    No Known Allergies    Intolerances        Vital Signs Last 24 Hrs  T(C): 36.9 (30 May 2019 09:33), Max: 37.1 (29 May 2019 17:05)  T(F): 98.4 (30 May 2019 09:33), Max: 98.7 (29 May 2019 17:05)  HR: 81 (30 May 2019 09:33) (54 - 81)  BP: 145/53 (30 May 2019 09:33) (106/68 - 158/83)  BP(mean): --  RR: 18 (30 May 2019 09:33) (18 - 18)  SpO2: 95% (30 May 2019 09:33) (93% - 97%)    LABS:                        9.8    12.5  )-----------( 292      ( 30 May 2019 07:01 )             29.8     05-30    134<L>  |  100  |  46<H>  ----------------------------<  192<H>  4.3   |  21<L>  |  2.72<H>    Ca    8.5      30 May 2019 07:01    TPro  6.7  /  Alb  2.7<L>  /  TBili  0.4  /  DBili  x   /  AST  112<H>  /  ALT  54<H>  /  AlkPhos  137<H>  05-30        CAPILLARY BLOOD GLUCOSE      POCT Blood Glucose.: 179 mg/dL (30 May 2019 10:00)  POCT Blood Glucose.: 129 mg/dL (29 May 2019 22:41)  POCT Blood Glucose.: 102 mg/dL (29 May 2019 21:10)  POCT Blood Glucose.: 117 mg/dL (29 May 2019 17:55)      Lower Extremity Physical Exam:  Vascular: DP/PT 2/4, B/L, CFT <3 seconds B/L, Temperature gradient within N/L.   Neuro: Epicritic sensation diminished  to the level of midfoot, B/L.  Skin: R plantar midfoot I&D site with no residual purulence, no active bleeding, no fluctuance, No odor, wound probes down to plantar fascia

## 2019-05-30 NOTE — PROGRESS NOTE ADULT - ASSESSMENT
59 y/o male pt s/p I&D & Debridement, POD #3  - pt seen and evaluated  - Continue IV ABX as per ID  - no residual purulence noted, no active bleeding, appearance slightly improved  - WBC down today, no fevers overnight  - Wound stagnant- awaiting MR to evaluate for any remaining abscess  - D/w attending 57 y/o male pt s/p I&D & Debridement, POD #3  - pt seen and evaluated  - Continue IV ABX as per ID  - no residual purulence noted, no active bleeding, appearance slightly improved  - WBC down today, no fevers overnight  - awaiting MR to evaluate for any remaining abscess  - D/w attending

## 2019-05-30 NOTE — PROGRESS NOTE ADULT - SUBJECTIVE AND OBJECTIVE BOX
VASCULAR SURGERY DAILY PROGRESS NOTE:       SUBJECTIVE/ROS: Patient seen and examined.  Patient states pain in his foot is constant and radiates up his leg.  He denies n/v/f/c.         MEDICATIONS  (STANDING):  aspirin enteric coated 81 milliGRAM(s) Oral daily  Dakins Solution - 1/4 Strength 1 Application(s) Topical daily  dextrose 5%. 1000 milliLiter(s) (50 mL/Hr) IV Continuous <Continuous>  dextrose 5%. 1000 milliLiter(s) (50 mL/Hr) IV Continuous <Continuous>  dextrose 50% Injectable 12.5 Gram(s) IV Push once  dextrose 50% Injectable 25 Gram(s) IV Push once  dextrose 50% Injectable 25 Gram(s) IV Push once  dextrose 50% Injectable 12.5 Gram(s) IV Push once  dextrose 50% Injectable 25 Gram(s) IV Push once  dextrose 50% Injectable 25 Gram(s) IV Push once  ergocalciferol 20865 Unit(s) Oral every week  heparin  Injectable 5000 Unit(s) SubCutaneous every 8 hours  hydrALAZINE 25 milliGRAM(s) Oral two times a day  insulin glargine Injectable (LANTUS) 38 Unit(s) SubCutaneous at bedtime  insulin lispro (HumaLOG) corrective regimen sliding scale   SubCutaneous three times a day before meals  insulin lispro (HumaLOG) corrective regimen sliding scale   SubCutaneous at bedtime  insulin lispro Injectable (HumaLOG) 20 Unit(s) SubCutaneous three times a day before meals  oxyCODONE    IR 10 milliGRAM(s) Oral once  piperacillin/tazobactam IVPB. 3.375 Gram(s) IV Intermittent every 8 hours  polyethylene glycol 3350 17 Gram(s) Oral daily  senna 2 Tablet(s) Oral at bedtime  sodium chloride 0.9%. 1000 milliLiter(s) (50 mL/Hr) IV Continuous <Continuous>  vancomycin  IVPB 1000 milliGRAM(s) IV Intermittent every 24 hours    MEDICATIONS  (PRN):  acetaminophen   Tablet .. 650 milliGRAM(s) Oral every 6 hours PRN Mild Pain (1 - 3)  dextrose 40% Gel 15 Gram(s) Oral once PRN Blood Glucose LESS THAN 70 milliGRAM(s)/deciliter  dextrose 40% Gel 15 Gram(s) Oral once PRN Blood Glucose LESS THAN 70 milliGRAM(s)/deciliter  glucagon  Injectable 1 milliGRAM(s) IntraMuscular once PRN Glucose LESS THAN 70 milligrams/deciliter  morphine  - Injectable 2 milliGRAM(s) IV Push every 6 hours PRN Moderate Pain (4 - 6)  oxyCODONE    5 mG/acetaminophen 325 mG 2 Tablet(s) Oral every 4 hours PRN Moderate Pain (4 - 6)      OBJECTIVE:    Vital Signs Last 24 Hrs  T(C): 36.7 (30 May 2019 13:50), Max: 37.1 (29 May 2019 17:05)  T(F): 98.1 (30 May 2019 13:50), Max: 98.7 (29 May 2019 17:05)  HR: 66 (30 May 2019 13:50) (54 - 81)  BP: 126/70 (30 May 2019 13:50) (106/68 - 158/83)  BP(mean): --  RR: 18 (30 May 2019 13:50) (18 - 18)  SpO2: 96% (30 May 2019 13:50) (93% - 97%)        I&O's Detail    29 May 2019 07:01  -  30 May 2019 07:00  --------------------------------------------------------  IN:    IV PiggyBack: 100 mL    Oral Fluid: 500 mL  Total IN: 600 mL    OUT:    Voided: 1150 mL  Total OUT: 1150 mL    Total NET: -550 mL      30 May 2019 07:01  -  30 May 2019 16:27  --------------------------------------------------------  IN:  Total IN: 0 mL    OUT:    Voided: 600 mL  Total OUT: 600 mL    Total NET: -600 mL          Daily     Daily     LABS:                        9.8    12.5  )-----------( 292      ( 30 May 2019 07:01 )             29.8     05-30    134<L>  |  100  |  46<H>  ----------------------------<  192<H>  4.3   |  21<L>  |  2.72<H>    Ca    8.5      30 May 2019 07:01    TPro  6.7  /  Alb  2.7<L>  /  TBili  0.4  /  DBili  x   /  AST  112<H>  /  ALT  54<H>  /  AlkPhos  137<H>  05-30                  PHYSICAL EXAM:  General: Resting comfortably, NAD  Respiratory: No increased WOB  Vascular: b/l LE grossly swollen, R. heel wound with dressing in place c/d/i,  palp AT pulses b/l  sensation diminished to the level of midfoot, B/L  Psych: A&Ox3 VASCULAR SURGERY DAILY PROGRESS NOTE:       SUBJECTIVE/ROS: Patient seen and examined.  Patient states pain in his foot is constant and radiates up his leg.  He denies n/v/f/c.         MEDICATIONS  (STANDING):  aspirin enteric coated 81 milliGRAM(s) Oral daily  Dakins Solution - 1/4 Strength 1 Application(s) Topical daily  dextrose 5%. 1000 milliLiter(s) (50 mL/Hr) IV Continuous <Continuous>  dextrose 5%. 1000 milliLiter(s) (50 mL/Hr) IV Continuous <Continuous>  dextrose 50% Injectable 12.5 Gram(s) IV Push once  dextrose 50% Injectable 25 Gram(s) IV Push once  dextrose 50% Injectable 25 Gram(s) IV Push once  dextrose 50% Injectable 12.5 Gram(s) IV Push once  dextrose 50% Injectable 25 Gram(s) IV Push once  dextrose 50% Injectable 25 Gram(s) IV Push once  ergocalciferol 74817 Unit(s) Oral every week  heparin  Injectable 5000 Unit(s) SubCutaneous every 8 hours  hydrALAZINE 25 milliGRAM(s) Oral two times a day  insulin glargine Injectable (LANTUS) 38 Unit(s) SubCutaneous at bedtime  insulin lispro (HumaLOG) corrective regimen sliding scale   SubCutaneous three times a day before meals  insulin lispro (HumaLOG) corrective regimen sliding scale   SubCutaneous at bedtime  insulin lispro Injectable (HumaLOG) 20 Unit(s) SubCutaneous three times a day before meals  oxyCODONE    IR 10 milliGRAM(s) Oral once  piperacillin/tazobactam IVPB. 3.375 Gram(s) IV Intermittent every 8 hours  polyethylene glycol 3350 17 Gram(s) Oral daily  senna 2 Tablet(s) Oral at bedtime  sodium chloride 0.9%. 1000 milliLiter(s) (50 mL/Hr) IV Continuous <Continuous>  vancomycin  IVPB 1000 milliGRAM(s) IV Intermittent every 24 hours    MEDICATIONS  (PRN):  acetaminophen   Tablet .. 650 milliGRAM(s) Oral every 6 hours PRN Mild Pain (1 - 3)  dextrose 40% Gel 15 Gram(s) Oral once PRN Blood Glucose LESS THAN 70 milliGRAM(s)/deciliter  dextrose 40% Gel 15 Gram(s) Oral once PRN Blood Glucose LESS THAN 70 milliGRAM(s)/deciliter  glucagon  Injectable 1 milliGRAM(s) IntraMuscular once PRN Glucose LESS THAN 70 milligrams/deciliter  morphine  - Injectable 2 milliGRAM(s) IV Push every 6 hours PRN Moderate Pain (4 - 6)  oxyCODONE    5 mG/acetaminophen 325 mG 2 Tablet(s) Oral every 4 hours PRN Moderate Pain (4 - 6)      OBJECTIVE:    Vital Signs Last 24 Hrs  T(C): 36.7 (30 May 2019 13:50), Max: 37.1 (29 May 2019 17:05)  T(F): 98.1 (30 May 2019 13:50), Max: 98.7 (29 May 2019 17:05)  HR: 66 (30 May 2019 13:50) (54 - 81)  BP: 126/70 (30 May 2019 13:50) (106/68 - 158/83)  BP(mean): --  RR: 18 (30 May 2019 13:50) (18 - 18)  SpO2: 96% (30 May 2019 13:50) (93% - 97%)        I&O's Detail    29 May 2019 07:01  -  30 May 2019 07:00  --------------------------------------------------------  IN:    IV PiggyBack: 100 mL    Oral Fluid: 500 mL  Total IN: 600 mL    OUT:    Voided: 1150 mL  Total OUT: 1150 mL    Total NET: -550 mL      30 May 2019 07:01  -  30 May 2019 16:27  --------------------------------------------------------  IN:  Total IN: 0 mL    OUT:    Voided: 600 mL  Total OUT: 600 mL    Total NET: -600 mL    Daily     LABS:                        9.8    12.5  )-----------( 292      ( 30 May 2019 07:01 )             29.8     05-30    134<L>  |  100  |  46<H>  ----------------------------<  192<H>  4.3   |  21<L>  |  2.72<H>    Ca    8.5      30 May 2019 07:01    TPro  6.7  /  Alb  2.7<L>  /  TBili  0.4  /  DBili  x   /  AST  112<H>  /  ALT  54<H>  /  AlkPhos  137<H>  05-30        PHYSICAL EXAM:  General: Resting comfortably, NAD  Respiratory: No increased WOB  Vascular: b/l LE grossly swollen, R. heel wound with dressing in place c/d/i unchanged   palp AT pulses b/l  sensation diminished to the level of midfoot, B/L  Psych: A&Ox3

## 2019-05-30 NOTE — PROGRESS NOTE ADULT - SUBJECTIVE AND OBJECTIVE BOX
AGUSTIN BRAR 58y MRN-[id number]    Patient is a 58y old  Male who presents with a chief complaint of right foot ulcer (30 May 2019 13:24)      Follow Up/CC:  ID following for fever    Interval History/ROS: for repeat MRI, no fever    Allergies    No Known Allergies    Intolerances        ANTIMICROBIALS:  piperacillin/tazobactam IVPB. 3.375 every 8 hours  vancomycin  IVPB 1000 every 24 hours      MEDICATIONS  (STANDING):  aspirin enteric coated 81 milliGRAM(s) Oral daily  Dakins Solution - 1/4 Strength 1 Application(s) Topical daily  dextrose 5%. 1000 milliLiter(s) (50 mL/Hr) IV Continuous <Continuous>  dextrose 5%. 1000 milliLiter(s) (50 mL/Hr) IV Continuous <Continuous>  dextrose 50% Injectable 12.5 Gram(s) IV Push once  dextrose 50% Injectable 25 Gram(s) IV Push once  dextrose 50% Injectable 25 Gram(s) IV Push once  dextrose 50% Injectable 12.5 Gram(s) IV Push once  dextrose 50% Injectable 25 Gram(s) IV Push once  dextrose 50% Injectable 25 Gram(s) IV Push once  ergocalciferol 72615 Unit(s) Oral every week  heparin  Injectable 5000 Unit(s) SubCutaneous every 8 hours  hydrALAZINE 25 milliGRAM(s) Oral two times a day  insulin glargine Injectable (LANTUS) 38 Unit(s) SubCutaneous at bedtime  insulin lispro (HumaLOG) corrective regimen sliding scale   SubCutaneous three times a day before meals  insulin lispro (HumaLOG) corrective regimen sliding scale   SubCutaneous at bedtime  insulin lispro Injectable (HumaLOG) 20 Unit(s) SubCutaneous three times a day before meals  oxyCODONE    IR 10 milliGRAM(s) Oral once  piperacillin/tazobactam IVPB. 3.375 Gram(s) IV Intermittent every 8 hours  polyethylene glycol 3350 17 Gram(s) Oral daily  senna 2 Tablet(s) Oral at bedtime  sodium chloride 0.9%. 1000 milliLiter(s) (50 mL/Hr) IV Continuous <Continuous>  vancomycin  IVPB 1000 milliGRAM(s) IV Intermittent every 24 hours    MEDICATIONS  (PRN):  acetaminophen   Tablet .. 650 milliGRAM(s) Oral every 6 hours PRN Mild Pain (1 - 3)  dextrose 40% Gel 15 Gram(s) Oral once PRN Blood Glucose LESS THAN 70 milliGRAM(s)/deciliter  dextrose 40% Gel 15 Gram(s) Oral once PRN Blood Glucose LESS THAN 70 milliGRAM(s)/deciliter  glucagon  Injectable 1 milliGRAM(s) IntraMuscular once PRN Glucose LESS THAN 70 milligrams/deciliter  morphine  - Injectable 2 milliGRAM(s) IV Push every 6 hours PRN Moderate Pain (4 - 6)  oxyCODONE    5 mG/acetaminophen 325 mG 2 Tablet(s) Oral every 4 hours PRN Moderate Pain (4 - 6)        Vital Signs Last 24 Hrs  T(C): 36.7 (30 May 2019 13:50), Max: 37.1 (29 May 2019 17:05)  T(F): 98.1 (30 May 2019 13:50), Max: 98.7 (29 May 2019 17:05)  HR: 66 (30 May 2019 13:50) (54 - 81)  BP: 126/70 (30 May 2019 13:50) (106/68 - 158/83)  BP(mean): --  RR: 18 (30 May 2019 13:50) (18 - 18)  SpO2: 96% (30 May 2019 13:50) (93% - 97%)    CBC Full  -  ( 30 May 2019 07:01 )  WBC Count : 12.5 K/uL  RBC Count : 3.16 M/uL  Hemoglobin : 9.8 g/dL  Hematocrit : 29.8 %  Platelet Count - Automated : 292 K/uL  Mean Cell Volume : 94.3 fl  Mean Cell Hemoglobin : 30.9 pg  Mean Cell Hemoglobin Concentration : 32.8 gm/dL  Auto Neutrophil # : 11.0 K/uL  Auto Lymphocyte # : 0.9 K/uL  Auto Monocyte # : 0.5 K/uL  Auto Eosinophil # : 0.1 K/uL  Auto Basophil # : 0.0 K/uL  Auto Neutrophil % : 87.0 %  Auto Lymphocyte % : 3.0 %  Auto Monocyte % : 6.0 %  Auto Eosinophil % : 0.0 %  Auto Basophil % : 0.0 %    05-30    134<L>  |  100  |  46<H>  ----------------------------<  192<H>  4.3   |  21<L>  |  2.72<H>    Ca    8.5      30 May 2019 07:01    TPro  6.7  /  Alb  2.7<L>  /  TBili  0.4  /  DBili  x   /  AST  112<H>  /  ALT  54<H>  /  AlkPhos  137<H>  05-30    LIVER FUNCTIONS - ( 30 May 2019 07:01 )  Alb: 2.7 g/dL / Pro: 6.7 g/dL / ALK PHOS: 137 U/L / ALT: 54 U/L / AST: 112 U/L / GGT: x               MICROBIOLOGY:  .Other  05-28-19   Testing in progress  --  --      .Blood  05-26-19   No growth to date.  --  --      .Blood  05-25-19   No growth at 5 days.  --  --      .Tissue  05-23-19   Few Escherichia coli  Growth in fluid media only Enterococcus faecalis  Rare Streptococcus agalactiae (Group B) isolated  Group B streptococci are susceptible to ampicillin,  penicillin and cefazolin, but may be resistant to  erythromycin and clindamycin.  Recommendations for intrapartum prophylaxis for Group B  streptococci are penicillin or ampicillin.  --  Escherichia coli  Enterococcus faecalis      .Blood  05-20-19   No growth at 5 days.  --  --      .Abscess  05-20-19   Numerous Escherichia coli  Numerous Enterococcus faecalis  Moderate Prevotella bivia "Susceptibilities not performed"  --  Enterococcus faecalis  Escherichia coli      RADIOLOGY    < from: Xray Chest 1 View- PORTABLE-Urgent (05.25.19 @ 06:30) >  The heart and lungs are normal. The bones are intact.    < end of copied text >

## 2019-05-30 NOTE — PROGRESS NOTE ADULT - ASSESSMENT
Assessment  DMT2: 58y Male with DM T2 with hyperglycemia, on insulin, blood sugars trending down, no hypoglycemic episode, afebrile, non compliant with low carb diet.  Foot wound: on medications, no chest pain, stable, monitored.  HTN: Controlled,  on antihypertensive medications.  Overweight/Obesity: No strict exercise routines, not on any weight loss program, neither on low calorie diet.    Andrzej Zhu MD  Cell: [phone]  Office: [phone]

## 2019-05-31 LAB
-  AMIKACIN: SIGNIFICANT CHANGE UP
-  AMPICILLIN/SULBACTAM: SIGNIFICANT CHANGE UP
-  AMPICILLIN: SIGNIFICANT CHANGE UP
-  AMPICILLIN: SIGNIFICANT CHANGE UP
-  AZTREONAM: SIGNIFICANT CHANGE UP
-  CEFEPIME: SIGNIFICANT CHANGE UP
-  CEFOXITIN: SIGNIFICANT CHANGE UP
-  CEFTRIAXONE: SIGNIFICANT CHANGE UP
-  CIPROFLOXACIN: SIGNIFICANT CHANGE UP
-  ERTAPENEM: SIGNIFICANT CHANGE UP
-  GENTAMICIN: SIGNIFICANT CHANGE UP
-  IMIPENEM: SIGNIFICANT CHANGE UP
-  LEVOFLOXACIN: SIGNIFICANT CHANGE UP
-  MEROPENEM: SIGNIFICANT CHANGE UP
-  PIPERACILLIN/TAZOBACTAM: SIGNIFICANT CHANGE UP
-  TETRACYCLINE: SIGNIFICANT CHANGE UP
-  TOBRAMYCIN: SIGNIFICANT CHANGE UP
-  TRIMETHOPRIM/SULFAMETHOXAZOLE: SIGNIFICANT CHANGE UP
-  VANCOMYCIN: SIGNIFICANT CHANGE UP
ALBUMIN SERPL ELPH-MCNC: 2.7 G/DL — LOW (ref 3.3–5)
ALP SERPL-CCNC: 146 U/L — HIGH (ref 40–120)
ALT FLD-CCNC: 53 U/L — HIGH (ref 10–45)
ANION GAP SERPL CALC-SCNC: 14 MMOL/L — SIGNIFICANT CHANGE UP (ref 5–17)
AST SERPL-CCNC: 65 U/L — HIGH (ref 10–40)
BILIRUB SERPL-MCNC: 0.4 MG/DL — SIGNIFICANT CHANGE UP (ref 0.2–1.2)
BUN SERPL-MCNC: 41 MG/DL — HIGH (ref 7–23)
CALCIUM SERPL-MCNC: 8.7 MG/DL — SIGNIFICANT CHANGE UP (ref 8.4–10.5)
CHLORIDE SERPL-SCNC: 101 MMOL/L — SIGNIFICANT CHANGE UP (ref 96–108)
CO2 SERPL-SCNC: 21 MMOL/L — LOW (ref 22–31)
CREAT SERPL-MCNC: 2.23 MG/DL — HIGH (ref 0.5–1.3)
CULTURE RESULTS: SIGNIFICANT CHANGE UP
CULTURE RESULTS: SIGNIFICANT CHANGE UP
GLUCOSE BLDC GLUCOMTR-MCNC: 119 MG/DL — HIGH (ref 70–99)
GLUCOSE BLDC GLUCOMTR-MCNC: 128 MG/DL — HIGH (ref 70–99)
GLUCOSE BLDC GLUCOMTR-MCNC: 91 MG/DL — SIGNIFICANT CHANGE UP (ref 70–99)
GLUCOSE SERPL-MCNC: 125 MG/DL — HIGH (ref 70–99)
HCT VFR BLD CALC: 29.8 % — LOW (ref 39–50)
HGB BLD-MCNC: 10 G/DL — LOW (ref 13–17)
MCHC RBC-ENTMCNC: 31.5 PG — SIGNIFICANT CHANGE UP (ref 27–34)
MCHC RBC-ENTMCNC: 33.7 GM/DL — SIGNIFICANT CHANGE UP (ref 32–36)
MCV RBC AUTO: 93.8 FL — SIGNIFICANT CHANGE UP (ref 80–100)
METHOD TYPE: SIGNIFICANT CHANGE UP
METHOD TYPE: SIGNIFICANT CHANGE UP
PLATELET # BLD AUTO: 308 K/UL — SIGNIFICANT CHANGE UP (ref 150–400)
POTASSIUM SERPL-MCNC: 4.1 MMOL/L — SIGNIFICANT CHANGE UP (ref 3.5–5.3)
POTASSIUM SERPL-SCNC: 4.1 MMOL/L — SIGNIFICANT CHANGE UP (ref 3.5–5.3)
PROT SERPL-MCNC: 6.9 G/DL — SIGNIFICANT CHANGE UP (ref 6–8.3)
RBC # BLD: 3.18 M/UL — LOW (ref 4.2–5.8)
RBC # FLD: 12.7 % — SIGNIFICANT CHANGE UP (ref 10.3–14.5)
SODIUM SERPL-SCNC: 136 MMOL/L — SIGNIFICANT CHANGE UP (ref 135–145)
SPECIMEN SOURCE: SIGNIFICANT CHANGE UP
SPECIMEN SOURCE: SIGNIFICANT CHANGE UP
VANCOMYCIN TROUGH SERPL-MCNC: 12.6 UG/ML — SIGNIFICANT CHANGE UP (ref 10–20)
WBC # BLD: 10.8 K/UL — HIGH (ref 3.8–10.5)
WBC # FLD AUTO: 10.8 K/UL — HIGH (ref 3.8–10.5)

## 2019-05-31 PROCEDURE — 93923 UPR/LXTR ART STDY 3+ LVLS: CPT | Mod: 26

## 2019-05-31 PROCEDURE — 99232 SBSQ HOSP IP/OBS MODERATE 35: CPT

## 2019-05-31 RX ORDER — HYDRALAZINE HCL 50 MG
50 TABLET ORAL
Refills: 0 | Status: DISCONTINUED | OUTPATIENT
Start: 2019-05-31 | End: 2019-06-01

## 2019-05-31 RX ADMIN — MORPHINE SULFATE 2 MILLIGRAM(S): 50 CAPSULE, EXTENDED RELEASE ORAL at 01:45

## 2019-05-31 RX ADMIN — OXYCODONE AND ACETAMINOPHEN 2 TABLET(S): 5; 325 TABLET ORAL at 20:42

## 2019-05-31 RX ADMIN — PIPERACILLIN AND TAZOBACTAM 25 GRAM(S): 4; .5 INJECTION, POWDER, LYOPHILIZED, FOR SOLUTION INTRAVENOUS at 06:12

## 2019-05-31 RX ADMIN — OXYCODONE AND ACETAMINOPHEN 2 TABLET(S): 5; 325 TABLET ORAL at 06:50

## 2019-05-31 RX ADMIN — OXYCODONE AND ACETAMINOPHEN 2 TABLET(S): 5; 325 TABLET ORAL at 20:12

## 2019-05-31 RX ADMIN — MORPHINE SULFATE 2 MILLIGRAM(S): 50 CAPSULE, EXTENDED RELEASE ORAL at 01:30

## 2019-05-31 RX ADMIN — Medication 20 UNIT(S): at 13:14

## 2019-05-31 RX ADMIN — POLYETHYLENE GLYCOL 3350 17 GRAM(S): 17 POWDER, FOR SOLUTION ORAL at 13:15

## 2019-05-31 RX ADMIN — OXYCODONE AND ACETAMINOPHEN 2 TABLET(S): 5; 325 TABLET ORAL at 06:18

## 2019-05-31 RX ADMIN — HEPARIN SODIUM 5000 UNIT(S): 5000 INJECTION INTRAVENOUS; SUBCUTANEOUS at 13:15

## 2019-05-31 RX ADMIN — HEPARIN SODIUM 5000 UNIT(S): 5000 INJECTION INTRAVENOUS; SUBCUTANEOUS at 21:09

## 2019-05-31 RX ADMIN — HEPARIN SODIUM 5000 UNIT(S): 5000 INJECTION INTRAVENOUS; SUBCUTANEOUS at 06:12

## 2019-05-31 RX ADMIN — Medication 81 MILLIGRAM(S): at 13:15

## 2019-05-31 RX ADMIN — PIPERACILLIN AND TAZOBACTAM 25 GRAM(S): 4; .5 INJECTION, POWDER, LYOPHILIZED, FOR SOLUTION INTRAVENOUS at 17:23

## 2019-05-31 RX ADMIN — Medication 50 MILLIGRAM(S): at 18:18

## 2019-05-31 RX ADMIN — Medication 25 MILLIGRAM(S): at 06:13

## 2019-05-31 RX ADMIN — Medication 1 APPLICATION(S): at 13:15

## 2019-05-31 RX ADMIN — Medication 250 MILLIGRAM(S): at 21:09

## 2019-05-31 RX ADMIN — INSULIN GLARGINE 38 UNIT(S): 100 INJECTION, SOLUTION SUBCUTANEOUS at 21:55

## 2019-05-31 NOTE — PROGRESS NOTE ADULT - SUBJECTIVE AND OBJECTIVE BOX
Patient is a 58y old  Male who presents with a chief complaint of right foot ulcer (31 May 2019 12:27)       INTERVAL HPI/OVERNIGHT EVENTS:  Patient seen and evaluated at bedside.  Pt is resting comfortable in NAD. Denies N/V/F/C.     Allergies    No Known Allergies    Intolerances        Vital Signs Last 24 Hrs  T(C): 37.3 (31 May 2019 05:48), Max: 37.6 (31 May 2019 01:26)  T(F): 99.1 (31 May 2019 05:48), Max: 99.6 (31 May 2019 01:26)  HR: 81 (31 May 2019 05:48) (66 - 89)  BP: 180/84 (31 May 2019 05:48) (126/70 - 180/84)  BP(mean): --  RR: 18 (31 May 2019 05:48) (18 - 18)  SpO2: 94% (31 May 2019 05:48) (90% - 96%)    LABS:                        10.0   10.8  )-----------( 308      ( 31 May 2019 07:12 )             29.8     05-31    136  |  101  |  41<H>  ----------------------------<  125<H>  4.1   |  21<L>  |  2.23<H>    Ca    8.7      31 May 2019 07:12    TPro  6.9  /  Alb  2.7<L>  /  TBili  0.4  /  DBili  x   /  AST  65<H>  /  ALT  53<H>  /  AlkPhos  146<H>  05-31        CAPILLARY BLOOD GLUCOSE      POCT Blood Glucose.: 136 mg/dL (30 May 2019 22:19)  POCT Blood Glucose.: 202 mg/dL (30 May 2019 16:26)      Lower Extremity Physical Exam:  \  Vascular: DP/PT 2/4, B/L, CFT <3 seconds B/L, Temperature gradient within N/L.   Neuro: Epicritic sensation diminished  to the level of midfoot, B/L.  Skin: R plantar midfoot I&D site with no residual purulence, no active bleeding, no fluctuance, No odor, wound probes down to plantar fascia Patient is a 58y old  Male who presents with a chief complaint of right foot ulcer (31 May 2019 12:27)       INTERVAL HPI/OVERNIGHT EVENTS:  Patient seen and evaluated at bedside.  Pt is resting comfortable in NAD. Denies N/V/F/C.     Allergies    No Known Allergies    Intolerances        Vital Signs Last 24 Hrs  T(C): 37.3 (31 May 2019 05:48), Max: 37.6 (31 May 2019 01:26)  T(F): 99.1 (31 May 2019 05:48), Max: 99.6 (31 May 2019 01:26)  HR: 81 (31 May 2019 05:48) (66 - 89)  BP: 180/84 (31 May 2019 05:48) (126/70 - 180/84)  BP(mean): --  RR: 18 (31 May 2019 05:48) (18 - 18)  SpO2: 94% (31 May 2019 05:48) (90% - 96%)    LABS:                        10.0   10.8  )-----------( 308      ( 31 May 2019 07:12 )             29.8     05-31    136  |  101  |  41<H>  ----------------------------<  125<H>  4.1   |  21<L>  |  2.23<H>    Ca    8.7      31 May 2019 07:12    TPro  6.9  /  Alb  2.7<L>  /  TBili  0.4  /  DBili  x   /  AST  65<H>  /  ALT  53<H>  /  AlkPhos  146<H>  05-31        CAPILLARY BLOOD GLUCOSE      POCT Blood Glucose.: 136 mg/dL (30 May 2019 22:19)  POCT Blood Glucose.: 202 mg/dL (30 May 2019 16:26)      Lower Extremity Physical Exam:  Vascular: DP/PT 2/4, B/L, CFT <3 seconds B/L, Temperature gradient within N/L.   Neuro: Epicritic sensation diminished  to the level of midfoot, B/L.  Skin: R plantar midfoot I&D site with no residual purulence, no active bleeding, no fluctuance, No odor, wound probes down to plantar fascia Patient is a 58y old  Male who presents with a chief complaint of right foot ulcer (31 May 2019 12:27)       INTERVAL HPI/OVERNIGHT EVENTS:  Patient seen and evaluated at bedside.  Pt is resting comfortable in NAD. Denies N/V/F/C.     Allergies    No Known Allergies    Intolerances        Vital Signs Last 24 Hrs  T(C): 37.3 (31 May 2019 05:48), Max: 37.6 (31 May 2019 01:26)  T(F): 99.1 (31 May 2019 05:48), Max: 99.6 (31 May 2019 01:26)  HR: 81 (31 May 2019 05:48) (66 - 89)  BP: 180/84 (31 May 2019 05:48) (126/70 - 180/84)  BP(mean): --  RR: 18 (31 May 2019 05:48) (18 - 18)  SpO2: 94% (31 May 2019 05:48) (90% - 96%)  		  LABS:                        10.0   10.8  )-----------( 308      ( 31 May 2019 07:12 )                 29.8     05-31    136  |  101  |  41<H>  ----------------------------<  125<H>  4.1   |  21<L>  |  2.23<H>    Ca    8.7      31 May 2019 07:12    TPro  6.9  /  Alb  2.7<L>  /  TBili  0.4  /  DBili  x   /  AST  65<H>  /  ALT  53<H>  /  AlkPhos  146<H>  05-31        CAPILLARY BLOOD GLUCOSE      POCT Blood Glucose.: 136 mg/dL (30 May 2019 22:19)  POCT Blood Glucose.: 202 mg/dL (30 May 2019 16:26)      Lower Extremity Physical Exam:  Vascular: DP/PT 2/4, B/L, CFT <3 seconds B/L, Temperature gradient within N/L.   Neuro: Epicritic sensation diminished  to the level of midfoot, B/L.  Skin: R plantar midfoot I&D site with no residual purulence, no active bleeding, no fluctuance, No odor, wound probes down to plantar fascia

## 2019-05-31 NOTE — PROGRESS NOTE ADULT - ASSESSMENT
57 y/o male pt s/p I&D & Debridement, POD #4  - Continue IV ABX as per ID  - no residual purulence noted, no active bleeding, appearance slightly improved  - MRI showing large abscess in plantar foot  - WBC downtrending, no fevers  - Will d/w attending for final plan 57 y/o male pt s/p I&D & Debridement, POD #4  - Continue IV ABX as per ID  - no residual purulence noted, no active bleeding, appearance slightly improved  - WBC downtrending, no fevers  - Will d/w attending for final plan

## 2019-05-31 NOTE — PROGRESS NOTE ADULT - SUBJECTIVE AND OBJECTIVE BOX
[facility] Division of Kidney Diseases & Hypertension  FOLLOW UP NOTE  --------------------------------------------------------------------------------  Chief Complaint:    24 hour events/subjective:    no new events         PAST HISTORY  --------------------------------------------------------------------------------  No significant changes to PMH, PSH, FHx, SHx, unless otherwise noted    ALLERGIES & MEDICATIONS  --------------------------------------------------------------------------------  Allergies    No Known Allergies    Intolerances      Standing Inpatient Medications  aspirin enteric coated 81 milliGRAM(s) Oral daily  Dakins Solution - 1/4 Strength 1 Application(s) Topical daily  dextrose 5%. 1000 milliLiter(s) IV Continuous <Continuous>  dextrose 5%. 1000 milliLiter(s) IV Continuous <Continuous>  dextrose 50% Injectable 12.5 Gram(s) IV Push once  dextrose 50% Injectable 25 Gram(s) IV Push once  dextrose 50% Injectable 25 Gram(s) IV Push once  dextrose 50% Injectable 12.5 Gram(s) IV Push once  dextrose 50% Injectable 25 Gram(s) IV Push once  dextrose 50% Injectable 25 Gram(s) IV Push once  ergocalciferol 50326 Unit(s) Oral every week  heparin  Injectable 5000 Unit(s) SubCutaneous every 8 hours  hydrALAZINE 50 milliGRAM(s) Oral two times a day  insulin glargine Injectable (LANTUS) 38 Unit(s) SubCutaneous at bedtime  insulin lispro (HumaLOG) corrective regimen sliding scale   SubCutaneous three times a day before meals  insulin lispro (HumaLOG) corrective regimen sliding scale   SubCutaneous at bedtime  insulin lispro Injectable (HumaLOG) 20 Unit(s) SubCutaneous three times a day before meals  oxyCODONE    IR 10 milliGRAM(s) Oral once  piperacillin/tazobactam IVPB. 3.375 Gram(s) IV Intermittent every 8 hours  polyethylene glycol 3350 17 Gram(s) Oral daily  senna 2 Tablet(s) Oral at bedtime  sodium chloride 0.9%. 1000 milliLiter(s) IV Continuous <Continuous>  vancomycin  IVPB 1000 milliGRAM(s) IV Intermittent every 24 hours    PRN Inpatient Medications  acetaminophen   Tablet .. 650 milliGRAM(s) Oral every 6 hours PRN  dextrose 40% Gel 15 Gram(s) Oral once PRN  dextrose 40% Gel 15 Gram(s) Oral once PRN  glucagon  Injectable 1 milliGRAM(s) IntraMuscular once PRN  morphine  - Injectable 2 milliGRAM(s) IV Push every 6 hours PRN  oxyCODONE    5 mG/acetaminophen 325 mG 2 Tablet(s) Oral every 4 hours PRN    VITALS/PHYSICAL EXAM  --------------------------------------------------------------------------------  T(C): 36.9 (05-31-19 @ 13:40), Max: 37.6 (05-31-19 @ 01:26)  HR: 76 (05-31-19 @ 13:40) (68 - 89)  BP: 145/82 (05-31-19 @ 13:40) (136/74 - 180/84)  RR: 18 (05-31-19 @ 13:40) (18 - 18)  SpO2: 95% (05-31-19 @ 13:40) (90% - 95%)  Wt(kg): --        05-30-19 @ 07:01  -  05-31-19 @ 07:00  --------------------------------------------------------  IN: 730 mL / OUT: 1650 mL / NET: -920 mL      Physical Exam:  	Gen: NAD  	HEENT: PERRL, supple neck, clear oropharynx  	Pulm: CTA B/L  	CV: RRR, S1S2; no rub  	Back: No spinal or CVA tenderness; no sacral edema  	Abd: +BS, soft, nontender/nondistended  	: No suprapubic tenderness  	Psych: Normal affect and mood  	Skin: Warm, without rashes  	  LABS/STUDIES  --------------------------------------------------------------------------------              10.0   10.8  >-----------<  308      [05-31-19 @ 07:12]              29.8     136  |  101  |  41  ----------------------------<  125      [05-31-19 @ 07:12]  4.1   |  21  |  2.23        Ca     8.7     [05-31-19 @ 07:12]    TPro  6.9  /  Alb  2.7  /  TBili  0.4  /  DBili  x   /  AST  65  /  ALT  53  /  AlkPhos  146  [05-31-19 @ 07:12]          Creatinine Trend:  SCr 2.23 [05-31 @ 07:12]  SCr 2.72 [05-30 @ 07:01]  SCr 2.84 [05-29 @ 06:18]  SCr 3.11 [05-28 @ 06:36]  SCr 2.92 [05-27 @ 12:35]    Urinalysis - [05-25-19 @ 14:07]      Color Yellow / Appearance Slightly Turbid / SG 1.018 / pH 6.0      Gluc Negative / Ketone Trace  / Bili Negative / Urobili 3 mg/dL       Blood Negative / Protein 100 mg/dL / Leuk Est Negative / Nitrite Negative      RBC 2 / WBC 7 / Hyaline 3 / Gran  / Sq Epi  / Non Sq Epi 2 / Bacteria Negative

## 2019-05-31 NOTE — PROGRESS NOTE ADULT - SUBJECTIVE AND OBJECTIVE BOX
Chief complaint  Patient is a 58y old  Male who presents with a chief complaint of right foot ulcer (31 May 2019 11:16)   Review of systems  Patient in bed, looks comfortable, no fever,  no hypoglycemia.    Labs and Fingersticks  CAPILLARY BLOOD GLUCOSE      POCT Blood Glucose.: 136 mg/dL (30 May 2019 22:19)  POCT Blood Glucose.: 202 mg/dL (30 May 2019 16:26)      Anion Gap, Serum: 14 (05-31 @ 07:12)  Anion Gap, Serum: 13 (05-30 @ 07:01)      Calcium, Total Serum: 8.7 (05-31 @ 07:12)  Calcium, Total Serum: 8.5 (05-30 @ 07:01)  Albumin, Serum: 2.7 <L> (05-31 @ 07:12)  Albumin, Serum: 2.7 <L> (05-30 @ 07:01)    Alanine Aminotransferase (ALT/SGPT): 53 <H> (05-31 @ 07:12)  Alanine Aminotransferase (ALT/SGPT): 54 <H> (05-30 @ 07:01)  Alkaline Phosphatase, Serum: 146 <H> (05-31 @ 07:12)  Alkaline Phosphatase, Serum: 137 <H> (05-30 @ 07:01)  Aspartate Aminotransferase (AST/SGOT): 65 <H> (05-31 @ 07:12)  Aspartate Aminotransferase (AST/SGOT): 112 <H> (05-30 @ 07:01)        05-31    136  |  101  |  41<H>  ----------------------------<  125<H>  4.1   |  21<L>  |  2.23<H>    Ca    8.7      31 May 2019 07:12    TPro  6.9  /  Alb  2.7<L>  /  TBili  0.4  /  DBili  x   /  AST  65<H>  /  ALT  53<H>  /  AlkPhos  146<H>  05-31                        10.0   10.8  )-----------( 308      ( 31 May 2019 07:12 )             29.8     Medications  MEDICATIONS  (STANDING):  aspirin enteric coated 81 milliGRAM(s) Oral daily  Dakins Solution - 1/4 Strength 1 Application(s) Topical daily  dextrose 5%. 1000 milliLiter(s) (50 mL/Hr) IV Continuous <Continuous>  dextrose 5%. 1000 milliLiter(s) (50 mL/Hr) IV Continuous <Continuous>  dextrose 50% Injectable 12.5 Gram(s) IV Push once  dextrose 50% Injectable 25 Gram(s) IV Push once  dextrose 50% Injectable 25 Gram(s) IV Push once  dextrose 50% Injectable 12.5 Gram(s) IV Push once  dextrose 50% Injectable 25 Gram(s) IV Push once  dextrose 50% Injectable 25 Gram(s) IV Push once  ergocalciferol 77667 Unit(s) Oral every week  heparin  Injectable 5000 Unit(s) SubCutaneous every 8 hours  hydrALAZINE 50 milliGRAM(s) Oral two times a day  insulin glargine Injectable (LANTUS) 38 Unit(s) SubCutaneous at bedtime  insulin lispro (HumaLOG) corrective regimen sliding scale   SubCutaneous three times a day before meals  insulin lispro (HumaLOG) corrective regimen sliding scale   SubCutaneous at bedtime  insulin lispro Injectable (HumaLOG) 20 Unit(s) SubCutaneous three times a day before meals  oxyCODONE    IR 10 milliGRAM(s) Oral once  piperacillin/tazobactam IVPB. 3.375 Gram(s) IV Intermittent every 8 hours  polyethylene glycol 3350 17 Gram(s) Oral daily  senna 2 Tablet(s) Oral at bedtime  sodium chloride 0.9%. 1000 milliLiter(s) (50 mL/Hr) IV Continuous <Continuous>  vancomycin  IVPB 1000 milliGRAM(s) IV Intermittent every 24 hours      Physical Exam  General: Patient comfortable in bed  Vital Signs Last 12 Hrs  T(F): 99.1 (05-31-19 @ 05:48), Max: 99.6 (05-31-19 @ 01:26)  HR: 81 (05-31-19 @ 05:48) (81 - 89)  BP: 180/84 (05-31-19 @ 05:48) (177/72 - 180/84)  BP(mean): --  RR: 18 (05-31-19 @ 05:48) (18 - 18)  SpO2: 94% (05-31-19 @ 05:48) (94% - 94%)  Neck: No palpable thyroid nodules.  CVS: S1S2, No murmurs  Respiratory: No wheezing, no crepitations  GI: Abdomen soft, bowel sounds positive  Musculoskeletal:  edema lower extremities.   Skin: No skin rashes, no ecchymosis    Diagnostics    Vitamin D, 1, 25-Dihydroxy: AM Sched. Collection: 22-May-2019 06:00 (05-21 @ 18:59)  Vitamin D, 25-Hydroxy: AM Sched. Collection: 22-May-2019 06:00 (05-21 @ 18:59)

## 2019-05-31 NOTE — PROGRESS NOTE ADULT - ASSESSMENT
58M w/ DM (A1C 11.9), HTN, presents with nonhealing RLE heel ulcer s/p multiple debridements.     - will d/w pt  MRI findings  will follow

## 2019-05-31 NOTE — PROGRESS NOTE ADULT - SUBJECTIVE AND OBJECTIVE BOX
Patient is a 58y old  Male who presents with a chief complaint of right foot ulcer (31 May 2019 18:24)      Vascular Surgery Attending Progress Note    Interval HPI: pt w/o c/o     Medications:  acetaminophen   Tablet .. 650 milliGRAM(s) Oral every 6 hours PRN  aspirin enteric coated 81 milliGRAM(s) Oral daily  Dakins Solution - 1/4 Strength 1 Application(s) Topical daily  dextrose 40% Gel 15 Gram(s) Oral once PRN  dextrose 40% Gel 15 Gram(s) Oral once PRN  dextrose 5%. 1000 milliLiter(s) IV Continuous <Continuous>  dextrose 5%. 1000 milliLiter(s) IV Continuous <Continuous>  dextrose 50% Injectable 12.5 Gram(s) IV Push once  dextrose 50% Injectable 25 Gram(s) IV Push once  dextrose 50% Injectable 25 Gram(s) IV Push once  dextrose 50% Injectable 12.5 Gram(s) IV Push once  dextrose 50% Injectable 25 Gram(s) IV Push once  dextrose 50% Injectable 25 Gram(s) IV Push once  ergocalciferol 38555 Unit(s) Oral every week  glucagon  Injectable 1 milliGRAM(s) IntraMuscular once PRN  heparin  Injectable 5000 Unit(s) SubCutaneous every 8 hours  hydrALAZINE 50 milliGRAM(s) Oral two times a day  insulin glargine Injectable (LANTUS) 38 Unit(s) SubCutaneous at bedtime  insulin lispro (HumaLOG) corrective regimen sliding scale   SubCutaneous three times a day before meals  insulin lispro (HumaLOG) corrective regimen sliding scale   SubCutaneous at bedtime  insulin lispro Injectable (HumaLOG) 20 Unit(s) SubCutaneous three times a day before meals  morphine  - Injectable 2 milliGRAM(s) IV Push every 6 hours PRN  oxyCODONE    5 mG/acetaminophen 325 mG 2 Tablet(s) Oral every 4 hours PRN  oxyCODONE    IR 10 milliGRAM(s) Oral once  piperacillin/tazobactam IVPB. 3.375 Gram(s) IV Intermittent every 8 hours  polyethylene glycol 3350 17 Gram(s) Oral daily  senna 2 Tablet(s) Oral at bedtime  sodium chloride 0.9%. 1000 milliLiter(s) IV Continuous <Continuous>  vancomycin  IVPB 1000 milliGRAM(s) IV Intermittent every 24 hours      Vital Signs Last 24 Hrs  T(C): 37.4 (31 May 2019 18:41), Max: 37.6 (31 May 2019 01:26)  T(F): 99.3 (31 May 2019 18:41), Max: 99.6 (31 May 2019 01:26)  HR: 74 (31 May 2019 18:41) (74 - 89)  BP: 163/78 (31 May 2019 18:41) (136/74 - 180/84)  BP(mean): --  RR: 18 (31 May 2019 18:41) (18 - 18)  SpO2: 96% (31 May 2019 18:41) (90% - 96%)  I&O's Summary    30 May 2019 07:01  -  31 May 2019 07:00  --------------------------------------------------------  IN: 730 mL / OUT: 1650 mL / NET: -920 mL        Physical Exam:  Neuro  A&Ox3 VSS  Vascular:  stable     LABS:                        10.0   10.8  )-----------( 308      ( 31 May 2019 07:12 )             29.8     05-31    136  |  101  |  41<H>  ----------------------------<  125<H>  4.1   |  21<L>  |  2.23<H>    Ca    8.7      31 May 2019 07:12    TPro  6.9  /  Alb  2.7<L>  /  TBili  0.4  /  DBili  x   /  AST  65<H>  /  ALT  53<H>  /  AlkPhos  146<H>  05-31    MRI reviewed  JOSIANE/PVR reviewed     CHRISTIANO KERR MD  [phone]

## 2019-05-31 NOTE — CHART NOTE - NSCHARTNOTEFT_GEN_A_CORE
MR right foot reported b y Radiology: Interval increase in size of a skin defect along the plantar surface   of the midfoot and forefoot. mixture of ulcer with prior surgery. Adjacent   edema suggest cellulitis. Large fluid collection within the soft tissues of the plantar midfoot   and forefoot contacting the overlying metatarsals. Given the overlying   skin ulcer and several foci of intermixed gas, findings are concerning   for an evolving abscess. possible osteitis and early osteomyelitis----------results discussed with Podiatry Dr. Armas and Vascular Dr. Valencia. Med attending called, awaiting call back .

## 2019-05-31 NOTE — PROGRESS NOTE ADULT - PROBLEM SELECTOR PLAN 1
-cont abx  -initial MRI foot noted - no OM  -s/p debridement per podiatry 5/27  -DM control  -local care per podiatry  -await podiatry plans regarding recent MRI findings

## 2019-05-31 NOTE — PROGRESS NOTE ADULT - ASSESSMENT
Assessment  DMT2: 58y Male with DM T2 with hyperglycemia, on insulin, blood sugars trending down, no hypoglycemic episode, afebrile, non compliant with low carb diet.  Foot wound: on medications, no chest pain, stable, monitored.  HTN: Controlled,  on antihypertensive medications.  Overweight/Obesity: No strict exercise routines, not on any weight loss program, neither on low calorie diet.    Andrzej Zhu MD  Cell: [phone]  Office: [phone]

## 2019-05-31 NOTE — PROGRESS NOTE ADULT - PROBLEM SELECTOR PLAN 1
s/p I&D  Seen again by vascular surgery. Worsening findings on MRI. Podiatry, ID and vascular follow up.   Monitor WBC  IV abx   Wound care   ID following   keep legs elevated

## 2019-05-31 NOTE — PROGRESS NOTE ADULT - SUBJECTIVE AND OBJECTIVE BOX
Patient is a 58y old  Male who presents with a chief complaint of right foot ulcer (31 May 2019 13:24)      INTERVAL HPI/OVERNIGHT EVENTS:  T(C): 36.9 (05-31-19 @ 13:40), Max: 37.6 (05-31-19 @ 01:26)  HR: 76 (05-31-19 @ 13:40) (68 - 89)  BP: 145/82 (05-31-19 @ 13:40) (136/74 - 180/84)  RR: 18 (05-31-19 @ 13:40) (18 - 18)  SpO2: 95% (05-31-19 @ 13:40) (90% - 95%)  Wt(kg): --  I&O's Summary    30 May 2019 07:01  -  31 May 2019 07:00  --------------------------------------------------------  IN: 730 mL / OUT: 1650 mL / NET: -920 mL        LABS:                        10.0   10.8  )-----------( 308      ( 31 May 2019 07:12 )             29.8     05-31    136  |  101  |  41<H>  ----------------------------<  125<H>  4.1   |  21<L>  |  2.23<H>    Ca    8.7      31 May 2019 07:12    TPro  6.9  /  Alb  2.7<L>  /  TBili  0.4  /  DBili  x   /  AST  65<H>  /  ALT  53<H>  /  AlkPhos  146<H>  05-31        CAPILLARY BLOOD GLUCOSE      POCT Blood Glucose.: 128 mg/dL (31 May 2019 13:13)  POCT Blood Glucose.: 136 mg/dL (30 May 2019 22:19)            MEDICATIONS  (STANDING):  aspirin enteric coated 81 milliGRAM(s) Oral daily  Dakins Solution - 1/4 Strength 1 Application(s) Topical daily  dextrose 5%. 1000 milliLiter(s) (50 mL/Hr) IV Continuous <Continuous>  dextrose 5%. 1000 milliLiter(s) (50 mL/Hr) IV Continuous <Continuous>  dextrose 50% Injectable 12.5 Gram(s) IV Push once  dextrose 50% Injectable 25 Gram(s) IV Push once  dextrose 50% Injectable 25 Gram(s) IV Push once  dextrose 50% Injectable 12.5 Gram(s) IV Push once  dextrose 50% Injectable 25 Gram(s) IV Push once  dextrose 50% Injectable 25 Gram(s) IV Push once  ergocalciferol 59207 Unit(s) Oral every week  heparin  Injectable 5000 Unit(s) SubCutaneous every 8 hours  hydrALAZINE 50 milliGRAM(s) Oral two times a day  insulin glargine Injectable (LANTUS) 38 Unit(s) SubCutaneous at bedtime  insulin lispro (HumaLOG) corrective regimen sliding scale   SubCutaneous three times a day before meals  insulin lispro (HumaLOG) corrective regimen sliding scale   SubCutaneous at bedtime  insulin lispro Injectable (HumaLOG) 20 Unit(s) SubCutaneous three times a day before meals  oxyCODONE    IR 10 milliGRAM(s) Oral once  piperacillin/tazobactam IVPB. 3.375 Gram(s) IV Intermittent every 8 hours  polyethylene glycol 3350 17 Gram(s) Oral daily  senna 2 Tablet(s) Oral at bedtime  sodium chloride 0.9%. 1000 milliLiter(s) (50 mL/Hr) IV Continuous <Continuous>  vancomycin  IVPB 1000 milliGRAM(s) IV Intermittent every 24 hours    MEDICATIONS  (PRN):  acetaminophen   Tablet .. 650 milliGRAM(s) Oral every 6 hours PRN Mild Pain (1 - 3)  dextrose 40% Gel 15 Gram(s) Oral once PRN Blood Glucose LESS THAN 70 milliGRAM(s)/deciliter  dextrose 40% Gel 15 Gram(s) Oral once PRN Blood Glucose LESS THAN 70 milliGRAM(s)/deciliter  glucagon  Injectable 1 milliGRAM(s) IntraMuscular once PRN Glucose LESS THAN 70 milligrams/deciliter  morphine  - Injectable 2 milliGRAM(s) IV Push every 6 hours PRN Moderate Pain (4 - 6)  oxyCODONE    5 mG/acetaminophen 325 mG 2 Tablet(s) Oral every 4 hours PRN Moderate Pain (4 - 6)          PHYSICAL EXAM:  GENERAL: NAD, well-groomed, well-developed  HEAD:  Atraumatic, Normocephalic  CHEST/LUNG: Clear to percussion bilaterally; No rales, rhonchi, wheezing, or rubs  HEART: Regular rate and rhythm; No murmurs, rubs, or gallops  ABDOMEN: Soft, Nontender, Nondistended; Bowel sounds present  EXTREMITIES:  2+ Peripheral Pulses, No clubbing, cyanosis, or edema  LYMPH: No lymphadenopathy noted  SKIN: No rashes or lesions    Care Discussed with Consultants/Other Providers [ ] YES  [ ] NO

## 2019-05-31 NOTE — PROGRESS NOTE ADULT - SUBJECTIVE AND OBJECTIVE BOX
AGUSTIN BRAR 58y MRN-[id number]    Patient is a 58y old  Male who presents with a chief complaint of right foot ulcer (31 May 2019 12:56)      Follow Up/CC:  ID following for foot infection    Interval History/ROS: no fever, MRI noted    Allergies    No Known Allergies    Intolerances        ANTIMICROBIALS:  piperacillin/tazobactam IVPB. 3.375 every 8 hours  vancomycin  IVPB 1000 every 24 hours      MEDICATIONS  (STANDING):  aspirin enteric coated 81 milliGRAM(s) Oral daily  Dakins Solution - 1/4 Strength 1 Application(s) Topical daily  dextrose 5%. 1000 milliLiter(s) (50 mL/Hr) IV Continuous <Continuous>  dextrose 5%. 1000 milliLiter(s) (50 mL/Hr) IV Continuous <Continuous>  dextrose 50% Injectable 12.5 Gram(s) IV Push once  dextrose 50% Injectable 25 Gram(s) IV Push once  dextrose 50% Injectable 25 Gram(s) IV Push once  dextrose 50% Injectable 12.5 Gram(s) IV Push once  dextrose 50% Injectable 25 Gram(s) IV Push once  dextrose 50% Injectable 25 Gram(s) IV Push once  ergocalciferol 48062 Unit(s) Oral every week  heparin  Injectable 5000 Unit(s) SubCutaneous every 8 hours  hydrALAZINE 50 milliGRAM(s) Oral two times a day  insulin glargine Injectable (LANTUS) 38 Unit(s) SubCutaneous at bedtime  insulin lispro (HumaLOG) corrective regimen sliding scale   SubCutaneous three times a day before meals  insulin lispro (HumaLOG) corrective regimen sliding scale   SubCutaneous at bedtime  insulin lispro Injectable (HumaLOG) 20 Unit(s) SubCutaneous three times a day before meals  oxyCODONE    IR 10 milliGRAM(s) Oral once  piperacillin/tazobactam IVPB. 3.375 Gram(s) IV Intermittent every 8 hours  polyethylene glycol 3350 17 Gram(s) Oral daily  senna 2 Tablet(s) Oral at bedtime  sodium chloride 0.9%. 1000 milliLiter(s) (50 mL/Hr) IV Continuous <Continuous>  vancomycin  IVPB 1000 milliGRAM(s) IV Intermittent every 24 hours    MEDICATIONS  (PRN):  acetaminophen   Tablet .. 650 milliGRAM(s) Oral every 6 hours PRN Mild Pain (1 - 3)  dextrose 40% Gel 15 Gram(s) Oral once PRN Blood Glucose LESS THAN 70 milliGRAM(s)/deciliter  dextrose 40% Gel 15 Gram(s) Oral once PRN Blood Glucose LESS THAN 70 milliGRAM(s)/deciliter  glucagon  Injectable 1 milliGRAM(s) IntraMuscular once PRN Glucose LESS THAN 70 milligrams/deciliter  morphine  - Injectable 2 milliGRAM(s) IV Push every 6 hours PRN Moderate Pain (4 - 6)  oxyCODONE    5 mG/acetaminophen 325 mG 2 Tablet(s) Oral every 4 hours PRN Moderate Pain (4 - 6)        Vital Signs Last 24 Hrs  T(C): 36.9 (31 May 2019 13:40), Max: 37.6 (31 May 2019 01:26)  T(F): 98.4 (31 May 2019 13:40), Max: 99.6 (31 May 2019 01:26)  HR: 76 (31 May 2019 13:40) (68 - 89)  BP: 145/82 (31 May 2019 13:40) (136/74 - 180/84)  BP(mean): --  RR: 18 (31 May 2019 13:40) (18 - 18)  SpO2: 95% (31 May 2019 13:40) (90% - 95%)    CBC Full  -  ( 31 May 2019 07:12 )  WBC Count : 10.8 K/uL  RBC Count : 3.18 M/uL  Hemoglobin : 10.0 g/dL  Hematocrit : 29.8 %  Platelet Count - Automated : 308 K/uL  Mean Cell Volume : 93.8 fl  Mean Cell Hemoglobin : 31.5 pg  Mean Cell Hemoglobin Concentration : 33.7 gm/dL  Auto Neutrophil # : x  Auto Lymphocyte # : x  Auto Monocyte # : x  Auto Eosinophil # : x  Auto Basophil # : x  Auto Neutrophil % : x  Auto Lymphocyte % : x  Auto Monocyte % : x  Auto Eosinophil % : x  Auto Basophil % : x    05-31    136  |  101  |  41<H>  ----------------------------<  125<H>  4.1   |  21<L>  |  2.23<H>    Ca    8.7      31 May 2019 07:12    TPro  6.9  /  Alb  2.7<L>  /  TBili  0.4  /  DBili  x   /  AST  65<H>  /  ALT  53<H>  /  AlkPhos  146<H>  05-31    LIVER FUNCTIONS - ( 31 May 2019 07:12 )  Alb: 2.7 g/dL / Pro: 6.9 g/dL / ALK PHOS: 146 U/L / ALT: 53 U/L / AST: 65 U/L / GGT: x               MICROBIOLOGY:  .Other  05-28-19   Testing in progress  --  Enterococcus faecalis  Escherichia coli      .Blood  05-26-19   No growth to date.  --  --      .Blood  05-25-19   No growth at 5 days.  --  --      .Tissue  05-23-19   Few Escherichia coli  Growth in fluid media only Enterococcus faecalis  Rare Streptococcus agalactiae (Group B) isolated  Group B streptococci are susceptible to ampicillin,  penicillin and cefazolin, but may be resistant to  erythromycin and clindamycin.  Recommendations for intrapartum prophylaxis for Group B  streptococci are penicillin or ampicillin.  --  Escherichia coli  Enterococcus faecalis      .Blood  05-20-19   No growth at 5 days.  --  --      .Abscess  05-20-19   Numerous Escherichia coli  Numerous Enterococcus faecalis  Moderate Prevotella bivia "Susceptibilities not performed"  --  Enterococcus faecalis  Escherichia coli      RADIOLOGY    < from: MR Foot w/wo IV Cont, Right (05.30.19 @ 18:53) >  1.  Interval increase in size of a skin defect along the plantar surface   of the midfoot and forefoot which is partially imaged on this exam.   Findings likely represent mixture of ulcer with prior surgery. Adjacent   edema suggest cellulitis.  2.  Large fluid collection within the soft tissues of the plantar midfoot   and forefoot contacting the overlying metatarsals. Given the overlying   skin ulcer and several foci of intermixed gas, findings are concerning   for an evolving abscess. Differential includes postoperative seroma.  3.  Periosteal reaction involving the second, third, and fourth   metatarsals with subtle and ostial edema/enhancement of the second   through fourth metatarsals. Differential considerations include reactive   osteitis and early osteomyelitis.

## 2019-05-31 NOTE — PROGRESS NOTE ADULT - SUBJECTIVE AND OBJECTIVE BOX
Subjective: Patient seen and examined. No new events except as noted.     REVIEW OF SYSTEMS:    CONSTITUTIONAL: + weakness, fevers or chills  EYES/ENT: No visual changes;  No vertigo or throat pain   NECK: No pain or stiffness  RESPIRATORY: No cough, wheezing, hemoptysis; No shortness of breath  CARDIOVASCULAR: No chest pain or palpitations  GASTROINTESTINAL: No abdominal or epigastric pain. No nausea, vomiting, or hematemesis; No diarrhea or constipation. No melena or hematochezia.  GENITOURINARY: No dysuria, frequency or hematuria  NEUROLOGICAL: No numbness or weakness  SKIN: No itching, burning, rashes, or lesions   All other review of systems is negative unless indicated above.    MEDICATIONS:  MEDICATIONS  (STANDING):  aspirin enteric coated 81 milliGRAM(s) Oral daily  Dakins Solution - 1/4 Strength 1 Application(s) Topical daily  dextrose 5%. 1000 milliLiter(s) (50 mL/Hr) IV Continuous <Continuous>  dextrose 5%. 1000 milliLiter(s) (50 mL/Hr) IV Continuous <Continuous>  dextrose 50% Injectable 12.5 Gram(s) IV Push once  dextrose 50% Injectable 25 Gram(s) IV Push once  dextrose 50% Injectable 25 Gram(s) IV Push once  dextrose 50% Injectable 12.5 Gram(s) IV Push once  dextrose 50% Injectable 25 Gram(s) IV Push once  dextrose 50% Injectable 25 Gram(s) IV Push once  ergocalciferol 09492 Unit(s) Oral every week  heparin  Injectable 5000 Unit(s) SubCutaneous every 8 hours  hydrALAZINE 25 milliGRAM(s) Oral two times a day  insulin glargine Injectable (LANTUS) 38 Unit(s) SubCutaneous at bedtime  insulin lispro (HumaLOG) corrective regimen sliding scale   SubCutaneous three times a day before meals  insulin lispro (HumaLOG) corrective regimen sliding scale   SubCutaneous at bedtime  insulin lispro Injectable (HumaLOG) 20 Unit(s) SubCutaneous three times a day before meals  oxyCODONE    IR 10 milliGRAM(s) Oral once  piperacillin/tazobactam IVPB. 3.375 Gram(s) IV Intermittent every 8 hours  polyethylene glycol 3350 17 Gram(s) Oral daily  senna 2 Tablet(s) Oral at bedtime  sodium chloride 0.9%. 1000 milliLiter(s) (50 mL/Hr) IV Continuous <Continuous>  vancomycin  IVPB 1000 milliGRAM(s) IV Intermittent every 24 hours      PHYSICAL EXAM:  T(C): 37.3 (05-31-19 @ 05:48), Max: 37.6 (05-31-19 @ 01:26)  HR: 81 (05-31-19 @ 05:48) (66 - 89)  BP: 180/84 (05-31-19 @ 05:48) (126/70 - 180/84)  RR: 18 (05-31-19 @ 05:48) (18 - 18)  SpO2: 94% (05-31-19 @ 05:48) (90% - 96%)  Wt(kg): --  I&O's Summary    30 May 2019 07:01  -  31 May 2019 07:00  --------------------------------------------------------  IN: 730 mL / OUT: 1650 mL / NET: -920 mL          Appearance: NAD   HEENT:   Normal oral mucosa, PERRL, EOMI	  Lymphatic: No lymphadenopathy , no edema  Cardiovascular: Normal S1 S2, No JVD, No murmurs , Peripheral pulses palpable 2+ bilaterally  Respiratory: Lungs clear to auscultation, normal effort 	  Gastrointestinal:  Soft, Non-tender, + BS	  Skin: No rashes, No ecchymoses, No cyanosis, warm to touch  Musculoskeletal: Decreased range of motion, normal strength  Psychiatry:  Mood & affect appropriate  Ext: +edema , chronic venous stasis skin discoloration   right foot wrapped , + ulcer     LABS:    CARDIAC MARKERS:                                10.0   10.8  )-----------( 308      ( 31 May 2019 07:12 )             29.8     05-31    136  |  101  |  41<H>  ----------------------------<  125<H>  4.1   |  21<L>  |  2.23<H>    Ca    8.7      31 May 2019 07:12    TPro  6.9  /  Alb  2.7<L>  /  TBili  0.4  /  DBili  x   /  AST  65<H>  /  ALT  53<H>  /  AlkPhos  146<H>  05-31    proBNP:   Lipid Profile:   HgA1c:   TSH:             TELEMETRY: 	    ECG:  	  RADIOLOGY:   < from: MR Foot w/wo IV Cont, Right (05.30.19 @ 18:53) >    EXAM:  MR FOOT WAW IC RT                            PROCEDURE DATE:  05/30/2019            INTERPRETATION:  MR FOOT WITHOUT AND WITH IV CONTRAST RIGHT dated   5/30/2019 6:53 PM     INDICATION: Continued pain status post operation.    COMPARISON: Right foot MRI dated 5/21/2019. Foot radiographs dated   5/20/2019    TECHNIQUE: Multi-sequential, multiplanar MRI imaging of the midfoot and   forefoot was performed per standard protocol. Images were obtained before   and after the administration of IV contrast.  Contrast: Gadavist. Administered: 10 cc. Discarded: 0 cc.    FINDINGS:    BONE MARROW: There there is periosteal reaction along the second, third,   and fourth metatarsals with suggestion of mild endosteal   edema/enhancement along the undersurface of the second through fourth   metatarsals. There is subchondral cystic change along the inferior aspect   of the medial cuneiform without change. No fracture or osteonecrosis is   noted.  SYNOVIUM/ JOINT FLUID: There is no large joint effusion.  TENDONS: The tendons are intact. No full-thickness tear or retraction is   seen. No tenosynovitis is appreciated.  MUSCLES: There is moderate to severe atrophy of the intrinsic musculature   the foot. There is moderate edema within the musculature. This is   nonspecific but can be seen in the setting of neuropathic change.  NEUROVASCULAR STRUCTURES: Preserved  SUBCUTANEOUS SOFT TISSUES: When compared to the prior exam, the skin   defect along the plantar surface of the foot is increased in size.   Previously this skin defect measures up to 2.8 cm when remeasured.   Currently, the skin defect measures larger than 7.6 cm and is only   partially imaged on this exam. The hindfoot and extent of the skin ulcer   is not included. There is associated skin thickening. There is exposure   the underlying musculature. There is adjacent edema and enhancement.   There is a large fluid collection within the plantar aspect of the   midfoot and forefoot centrally measuring approximate 0.7 x 2.8 x 1.1 cm.   This fluid collection borders the undersurface of the second, third, and   fourth metatarsals. Several foci of gas are intermixed with the fluid.   There is a focal area of nonenhancement within the soft tissues of the   plantar foot deep to the second and third metatarsals.    IMPRESSION:    1.  Interval increase in size of a skin defect along the plantar surface   of the midfoot and forefoot which is partially imaged on this exam.   Findings likely represent mixture of ulcer with prior surgery. Adjacent   edema suggest cellulitis.  2.  Large fluid collection within the soft tissues of the plantar midfoot   and forefoot contacting the overlying metatarsals. Given the overlying   skin ulcer and several foci of intermixed gas, findings are concerning   for an evolving abscess. Differential includes postoperative seroma.  3.  Periosteal reaction involving the second, third, and fourth   metatarsals with subtle and ostial edema/enhancement of the second   through fourth metatarsals. Differential considerations include reactive   osteitis and early osteomyelitis.    Findings were discussed with DHEERAJ Gilbert on 5/31/2019 9:35 AM  with read   back.                    NICK AHMADI M.D., ATTENDING RADIOLOGIST  This document has been electronically signed. May 31 2019  9:35AM                < end of copied text >    DIAGNOSTIC TESTING:  [ ] Echocardiogram:  [ ]  Catheterization:  [ ] Stress Test:    OTHER:

## 2019-05-31 NOTE — PROGRESS NOTE ADULT - ASSESSMENT
diabetic foot ulcer with surrounding cellulitis   Fever resolved , leukocytosis persists  S/p debridement with podiatry 5/22 and 5/27  Culture polymicrobial--E coli, enterococcus, strep  - meropenem and vanco per ID  - F/U BCXs; repeat BCXs x 2  - Monitor for other possible source infection such as developing diarrhea  - podiatry and vascular fu     uncontrolled Diabetes  - cont lantus- novolog 20 units qac  - hgb a1c  11.9  - diabetic diet  - FS qid  - endo consult appreciated    MARIKA   - stop lasix and lisinopril  - ivf hydration  - follow creatinine  - nephrology following    HTN   -   hold hydralazine     hyponatremia  - NO salt restriction- no HCTZ    constipation  - s/p BM  - miralax daily

## 2019-06-01 DIAGNOSIS — Z51.81 ENCOUNTER FOR THERAPEUTIC DRUG LEVEL MONITORING: ICD-10-CM

## 2019-06-01 DIAGNOSIS — Z79.2 LONG TERM (CURRENT) USE OF ANTIBIOTICS: ICD-10-CM

## 2019-06-01 DIAGNOSIS — R93.89 ABNORMAL FINDINGS ON DIAGNOSTIC IMAGING OF OTHER SPECIFIED BODY STRUCTURES: ICD-10-CM

## 2019-06-01 LAB
ANION GAP SERPL CALC-SCNC: 12 MMOL/L — SIGNIFICANT CHANGE UP (ref 5–17)
BUN SERPL-MCNC: 38 MG/DL — HIGH (ref 7–23)
CALCIUM SERPL-MCNC: 8.5 MG/DL — SIGNIFICANT CHANGE UP (ref 8.4–10.5)
CHLORIDE SERPL-SCNC: 101 MMOL/L — SIGNIFICANT CHANGE UP (ref 96–108)
CO2 SERPL-SCNC: 22 MMOL/L — SIGNIFICANT CHANGE UP (ref 22–31)
CREAT SERPL-MCNC: 2.21 MG/DL — HIGH (ref 0.5–1.3)
GLUCOSE BLDC GLUCOMTR-MCNC: 161 MG/DL — HIGH (ref 70–99)
GLUCOSE BLDC GLUCOMTR-MCNC: 177 MG/DL — HIGH (ref 70–99)
GLUCOSE BLDC GLUCOMTR-MCNC: 80 MG/DL — SIGNIFICANT CHANGE UP (ref 70–99)
GLUCOSE SERPL-MCNC: 198 MG/DL — HIGH (ref 70–99)
HCT VFR BLD CALC: 28 % — LOW (ref 39–50)
HGB BLD-MCNC: 9.6 G/DL — LOW (ref 13–17)
MCHC RBC-ENTMCNC: 32.1 PG — SIGNIFICANT CHANGE UP (ref 27–34)
MCHC RBC-ENTMCNC: 34.2 GM/DL — SIGNIFICANT CHANGE UP (ref 32–36)
MCV RBC AUTO: 93.8 FL — SIGNIFICANT CHANGE UP (ref 80–100)
PLATELET # BLD AUTO: 321 K/UL — SIGNIFICANT CHANGE UP (ref 150–400)
POTASSIUM SERPL-MCNC: 4.2 MMOL/L — SIGNIFICANT CHANGE UP (ref 3.5–5.3)
POTASSIUM SERPL-SCNC: 4.2 MMOL/L — SIGNIFICANT CHANGE UP (ref 3.5–5.3)
RBC # BLD: 2.99 M/UL — LOW (ref 4.2–5.8)
RBC # FLD: 12.7 % — SIGNIFICANT CHANGE UP (ref 10.3–14.5)
SODIUM SERPL-SCNC: 135 MMOL/L — SIGNIFICANT CHANGE UP (ref 135–145)
WBC # BLD: 10.5 K/UL — SIGNIFICANT CHANGE UP (ref 3.8–10.5)
WBC # FLD AUTO: 10.5 K/UL — SIGNIFICANT CHANGE UP (ref 3.8–10.5)

## 2019-06-01 PROCEDURE — 99233 SBSQ HOSP IP/OBS HIGH 50: CPT

## 2019-06-01 RX ORDER — HYDRALAZINE HCL 50 MG
50 TABLET ORAL THREE TIMES A DAY
Refills: 0 | Status: DISCONTINUED | OUTPATIENT
Start: 2019-06-01 | End: 2019-06-02

## 2019-06-01 RX ADMIN — OXYCODONE AND ACETAMINOPHEN 2 TABLET(S): 5; 325 TABLET ORAL at 22:41

## 2019-06-01 RX ADMIN — Medication 81 MILLIGRAM(S): at 12:56

## 2019-06-01 RX ADMIN — OXYCODONE AND ACETAMINOPHEN 2 TABLET(S): 5; 325 TABLET ORAL at 01:22

## 2019-06-01 RX ADMIN — Medication 1: at 12:54

## 2019-06-01 RX ADMIN — PIPERACILLIN AND TAZOBACTAM 25 GRAM(S): 4; .5 INJECTION, POWDER, LYOPHILIZED, FOR SOLUTION INTRAVENOUS at 18:24

## 2019-06-01 RX ADMIN — Medication 20 UNIT(S): at 12:55

## 2019-06-01 RX ADMIN — HEPARIN SODIUM 5000 UNIT(S): 5000 INJECTION INTRAVENOUS; SUBCUTANEOUS at 06:09

## 2019-06-01 RX ADMIN — Medication 1: at 10:53

## 2019-06-01 RX ADMIN — PIPERACILLIN AND TAZOBACTAM 25 GRAM(S): 4; .5 INJECTION, POWDER, LYOPHILIZED, FOR SOLUTION INTRAVENOUS at 08:13

## 2019-06-01 RX ADMIN — PIPERACILLIN AND TAZOBACTAM 25 GRAM(S): 4; .5 INJECTION, POWDER, LYOPHILIZED, FOR SOLUTION INTRAVENOUS at 00:46

## 2019-06-01 RX ADMIN — POLYETHYLENE GLYCOL 3350 17 GRAM(S): 17 POWDER, FOR SOLUTION ORAL at 12:56

## 2019-06-01 RX ADMIN — OXYCODONE AND ACETAMINOPHEN 2 TABLET(S): 5; 325 TABLET ORAL at 01:52

## 2019-06-01 RX ADMIN — Medication 20 UNIT(S): at 18:27

## 2019-06-01 RX ADMIN — Medication 1: at 18:27

## 2019-06-01 RX ADMIN — Medication 50 MILLIGRAM(S): at 06:09

## 2019-06-01 RX ADMIN — OXYCODONE AND ACETAMINOPHEN 2 TABLET(S): 5; 325 TABLET ORAL at 22:11

## 2019-06-01 RX ADMIN — Medication 1 APPLICATION(S): at 08:38

## 2019-06-01 RX ADMIN — HEPARIN SODIUM 5000 UNIT(S): 5000 INJECTION INTRAVENOUS; SUBCUTANEOUS at 12:59

## 2019-06-01 RX ADMIN — Medication 50 MILLIGRAM(S): at 18:25

## 2019-06-01 NOTE — PROGRESS NOTE ADULT - ASSESSMENT
Diagnosis:  1. 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 Clinically slowly improving R plantar foot infection w/ Osteitis Vs early OM 2,3,4 Mets  3. No apparent clinical Abscess seen upon thorough evaluation today w/ WBC continuing to trend downward (10.5 from 17) & patient w/o spiking fevers & plantar wound w/ mixed fibro granular tissue    Plan: 1. Exam          2. The patient was seen & evaluated w/ Inf Dz today in the am          3. Cleaned the R plantar wound w/ Sterile Sx scrub brush w/ NS & [employer] antibacterial soap. Flushed w/ copious     amounts of NS & packed w/ Medi Honey Sheath & dressed w/ well padded DSD          4. In agreement w/ Inf Dz that the patient will need to have Picc Line for IV ABx x 6 weeks to Tx early signs of OM          5. The patient will need Revisional debridement and plan for OR Next week          6. IV ABX as per Infectious Dz           7. Explained to the patient we are attempting Foot salvage, and he will require possible multiple Sx debridement w/ Vac Tx. The patient understands this is a long course for attempted foot salvage, w/ the possibility of failure which could lead to a more proximal amputation          8. Will discuss case w/ Vascular Dr. Osorio in regards to patients healing potential

## 2019-06-01 NOTE — PROGRESS NOTE ADULT - SUBJECTIVE AND OBJECTIVE BOX
Chief complaint  Patient is a 58y old  Male who presents with a chief complaint of right foot ulcer (01 Jun 2019 09:54)   Review of systems  Patient in bed, looks comfortable, no fever, no hypoglycemia.    Labs and Fingersticks  CAPILLARY BLOOD GLUCOSE      POCT Blood Glucose.: 177 mg/dL (01 Jun 2019 12:51)  POCT Blood Glucose.: 119 mg/dL (31 May 2019 21:37)  POCT Blood Glucose.: 91 mg/dL (31 May 2019 19:36)      Anion Gap, Serum: 12 (06-01 @ 06:56)  Anion Gap, Serum: 14 (05-31 @ 07:12)      Calcium, Total Serum: 8.5 (06-01 @ 06:56)  Calcium, Total Serum: 8.7 (05-31 @ 07:12)  Albumin, Serum: 2.7 <L> (05-31 @ 07:12)    Alanine Aminotransferase (ALT/SGPT): 53 <H> (05-31 @ 07:12)  Alkaline Phosphatase, Serum: 146 <H> (05-31 @ 07:12)  Aspartate Aminotransferase (AST/SGOT): 65 <H> (05-31 @ 07:12)        06-01    135  |  101  |  38<H>  ----------------------------<  198<H>  4.2   |  22  |  2.21<H>    Ca    8.5      01 Jun 2019 06:56    TPro  6.9  /  Alb  2.7<L>  /  TBili  0.4  /  DBili  x   /  AST  65<H>  /  ALT  53<H>  /  AlkPhos  146<H>  05-31                        9.6    10.5  )-----------( 321      ( 01 Jun 2019 06:56 )             28.0     Medications  MEDICATIONS  (STANDING):  aspirin enteric coated 81 milliGRAM(s) Oral daily  Dakins Solution - 1/4 Strength 1 Application(s) Topical daily  dextrose 5%. 1000 milliLiter(s) (50 mL/Hr) IV Continuous <Continuous>  dextrose 5%. 1000 milliLiter(s) (50 mL/Hr) IV Continuous <Continuous>  dextrose 50% Injectable 12.5 Gram(s) IV Push once  dextrose 50% Injectable 25 Gram(s) IV Push once  dextrose 50% Injectable 25 Gram(s) IV Push once  dextrose 50% Injectable 12.5 Gram(s) IV Push once  dextrose 50% Injectable 25 Gram(s) IV Push once  dextrose 50% Injectable 25 Gram(s) IV Push once  ergocalciferol 69135 Unit(s) Oral every week  heparin  Injectable 5000 Unit(s) SubCutaneous every 8 hours  hydrALAZINE 50 milliGRAM(s) Oral two times a day  insulin glargine Injectable (LANTUS) 38 Unit(s) SubCutaneous at bedtime  insulin lispro (HumaLOG) corrective regimen sliding scale   SubCutaneous three times a day before meals  insulin lispro (HumaLOG) corrective regimen sliding scale   SubCutaneous at bedtime  insulin lispro Injectable (HumaLOG) 20 Unit(s) SubCutaneous three times a day before meals  oxyCODONE    IR 10 milliGRAM(s) Oral once  piperacillin/tazobactam IVPB. 3.375 Gram(s) IV Intermittent every 8 hours  polyethylene glycol 3350 17 Gram(s) Oral daily  senna 2 Tablet(s) Oral at bedtime  sodium chloride 0.9%. 1000 milliLiter(s) (50 mL/Hr) IV Continuous <Continuous>      Physical Exam  General: Patient comfortable in bed  Vital Signs Last 12 Hrs  T(F): 99 (06-01-19 @ 09:07), Max: 99.2 (06-01-19 @ 06:00)  HR: 72 (06-01-19 @ 09:07) (72 - 78)  BP: 133/74 (06-01-19 @ 09:07) (133/74 - 149/80)  BP(mean): --  RR: 18 (06-01-19 @ 09:07) (18 - 18)  SpO2: 93% (06-01-19 @ 09:07) (93% - 94%)  Neck: No palpable thyroid nodules.  CVS: S1S2, No murmurs  Respiratory: No wheezing, no crepitations  GI: Abdomen soft, bowel sounds positive  Musculoskeletal:  edema lower extremities.   Skin: No skin rashes, no ecchymosis    Diagnostics    Vitamin D, 1, 25-Dihydroxy: AM Sched. Collection: 22-May-2019 06:00 (05-21 @ 18:59)  Vitamin D, 25-Hydroxy: AM Sched. Collection: 22-May-2019 06:00 (05-21 @ 18:59)

## 2019-06-01 NOTE — PROGRESS NOTE ADULT - SUBJECTIVE AND OBJECTIVE BOX
Patient is a 58y old  Male who presents with a chief complaint of right foot ulcer (01 Jun 2019 14:14)      INTERVAL HPI/OVERNIGHT EVENTS:  T(C): 37.3 (06-01-19 @ 16:52), Max: 37.4 (05-31-19 @ 18:41)  HR: 75 (06-01-19 @ 16:52) (71 - 80)  BP: 168/84 (06-01-19 @ 16:52) (122/71 - 168/84)  RR: 17 (06-01-19 @ 16:52) (17 - 18)  SpO2: 95% (06-01-19 @ 16:52) (89% - 96%)  Wt(kg): --  I&O's Summary    31 May 2019 07:01  -  01 Jun 2019 07:00  --------------------------------------------------------  IN: 620 mL / OUT: 1500 mL / NET: -880 mL    01 Jun 2019 07:01  -  01 Jun 2019 18:26  --------------------------------------------------------  IN: 760 mL / OUT: 800 mL / NET: -40 mL        LABS:                        9.6    10.5  )-----------( 321      ( 01 Jun 2019 06:56 )             28.0     06-01    135  |  101  |  38<H>  ----------------------------<  198<H>  4.2   |  22  |  2.21<H>    Ca    8.5      01 Jun 2019 06:56    TPro  6.9  /  Alb  2.7<L>  /  TBili  0.4  /  DBili  x   /  AST  65<H>  /  ALT  53<H>  /  AlkPhos  146<H>  05-31        CAPILLARY BLOOD GLUCOSE      POCT Blood Glucose.: 177 mg/dL (01 Jun 2019 12:51)  POCT Blood Glucose.: 119 mg/dL (31 May 2019 21:37)  POCT Blood Glucose.: 91 mg/dL (31 May 2019 19:36)            MEDICATIONS  (STANDING):  aspirin enteric coated 81 milliGRAM(s) Oral daily  Dakins Solution - 1/4 Strength 1 Application(s) Topical daily  dextrose 5%. 1000 milliLiter(s) (50 mL/Hr) IV Continuous <Continuous>  dextrose 5%. 1000 milliLiter(s) (50 mL/Hr) IV Continuous <Continuous>  dextrose 50% Injectable 12.5 Gram(s) IV Push once  dextrose 50% Injectable 25 Gram(s) IV Push once  dextrose 50% Injectable 25 Gram(s) IV Push once  dextrose 50% Injectable 12.5 Gram(s) IV Push once  dextrose 50% Injectable 25 Gram(s) IV Push once  dextrose 50% Injectable 25 Gram(s) IV Push once  ergocalciferol 84406 Unit(s) Oral every week  heparin  Injectable 5000 Unit(s) SubCutaneous every 8 hours  hydrALAZINE 50 milliGRAM(s) Oral two times a day  insulin glargine Injectable (LANTUS) 38 Unit(s) SubCutaneous at bedtime  insulin lispro (HumaLOG) corrective regimen sliding scale   SubCutaneous three times a day before meals  insulin lispro (HumaLOG) corrective regimen sliding scale   SubCutaneous at bedtime  insulin lispro Injectable (HumaLOG) 20 Unit(s) SubCutaneous three times a day before meals  oxyCODONE    IR 10 milliGRAM(s) Oral once  piperacillin/tazobactam IVPB. 3.375 Gram(s) IV Intermittent every 8 hours  polyethylene glycol 3350 17 Gram(s) Oral daily  senna 2 Tablet(s) Oral at bedtime  sodium chloride 0.9%. 1000 milliLiter(s) (50 mL/Hr) IV Continuous <Continuous>    MEDICATIONS  (PRN):  acetaminophen   Tablet .. 650 milliGRAM(s) Oral every 6 hours PRN Mild Pain (1 - 3)  dextrose 40% Gel 15 Gram(s) Oral once PRN Blood Glucose LESS THAN 70 milliGRAM(s)/deciliter  dextrose 40% Gel 15 Gram(s) Oral once PRN Blood Glucose LESS THAN 70 milliGRAM(s)/deciliter  glucagon  Injectable 1 milliGRAM(s) IntraMuscular once PRN Glucose LESS THAN 70 milligrams/deciliter  morphine  - Injectable 2 milliGRAM(s) IV Push every 6 hours PRN Moderate Pain (4 - 6)  oxyCODONE    5 mG/acetaminophen 325 mG 2 Tablet(s) Oral every 4 hours PRN Moderate Pain (4 - 6)          PHYSICAL EXAM:  GENERAL: NAD, well-groomed, well-developed  HEAD:  Atraumatic, Normocephalic  CHEST/LUNG: Clear to percussion bilaterally; No rales, rhonchi, wheezing, or rubs  HEART: Regular rate and rhythm; No murmurs, rubs, or gallops  ABDOMEN: Soft, Nontender, Nondistended; Bowel sounds present  EXTREMITIES:  2+ Peripheral Pulses, No clubbing, cyanosis, or edema  LYMPH: No lymphadenopathy noted  SKIN: No rashes or lesions    Care Discussed with Consultants/Other Providers [ ] YES  [ ] NO

## 2019-06-01 NOTE — PROGRESS NOTE ADULT - SUBJECTIVE AND OBJECTIVE BOX
Subjective: Patient seen and examined. No new events except as noted.     REVIEW OF SYSTEMS:    CONSTITUTIONAL:+o weakness, fevers or chills  EYES/ENT: No visual changes;  No vertigo or throat pain   NECK: No pain or stiffness  RESPIRATORY: No cough, wheezing, hemoptysis; No shortness of breath  CARDIOVASCULAR: No chest pain or palpitations  GASTROINTESTINAL: No abdominal or epigastric pain. No nausea, vomiting, or hematemesis; No diarrhea or constipation. No melena or hematochezia.  GENITOURINARY: No dysuria, frequency or hematuria  NEUROLOGICAL: No numbness or weakness  SKIN: No itching, burning, rashes, or lesions   All other review of systems is negative unless indicated above.    MEDICATIONS:  MEDICATIONS  (STANDING):  aspirin enteric coated 81 milliGRAM(s) Oral daily  Dakins Solution - 1/4 Strength 1 Application(s) Topical daily  dextrose 5%. 1000 milliLiter(s) (50 mL/Hr) IV Continuous <Continuous>  dextrose 5%. 1000 milliLiter(s) (50 mL/Hr) IV Continuous <Continuous>  dextrose 50% Injectable 12.5 Gram(s) IV Push once  dextrose 50% Injectable 25 Gram(s) IV Push once  dextrose 50% Injectable 25 Gram(s) IV Push once  dextrose 50% Injectable 12.5 Gram(s) IV Push once  dextrose 50% Injectable 25 Gram(s) IV Push once  dextrose 50% Injectable 25 Gram(s) IV Push once  ergocalciferol 18781 Unit(s) Oral every week  heparin  Injectable 5000 Unit(s) SubCutaneous every 8 hours  hydrALAZINE 50 milliGRAM(s) Oral two times a day  insulin glargine Injectable (LANTUS) 38 Unit(s) SubCutaneous at bedtime  insulin lispro (HumaLOG) corrective regimen sliding scale   SubCutaneous three times a day before meals  insulin lispro (HumaLOG) corrective regimen sliding scale   SubCutaneous at bedtime  insulin lispro Injectable (HumaLOG) 20 Unit(s) SubCutaneous three times a day before meals  oxyCODONE    IR 10 milliGRAM(s) Oral once  piperacillin/tazobactam IVPB. 3.375 Gram(s) IV Intermittent every 8 hours  polyethylene glycol 3350 17 Gram(s) Oral daily  senna 2 Tablet(s) Oral at bedtime  sodium chloride 0.9%. 1000 milliLiter(s) (50 mL/Hr) IV Continuous <Continuous>      PHYSICAL EXAM:  T(C): 37.7 (06-01-19 @ 21:55), Max: 37.7 (06-01-19 @ 21:55)  HR: 79 (06-01-19 @ 21:55) (72 - 80)  BP: 178/88 (06-01-19 @ 21:55) (122/71 - 178/88)  RR: 17 (06-01-19 @ 21:55) (17 - 18)  SpO2: 98% (06-01-19 @ 21:55) (93% - 98%)  Wt(kg): --  I&O's Summary    31 May 2019 07:01  -  01 Jun 2019 07:00  --------------------------------------------------------  IN: 620 mL / OUT: 1500 mL / NET: -880 mL    01 Jun 2019 07:01  -  01 Jun 2019 23:13  --------------------------------------------------------  IN: 1360 mL / OUT: 1780 mL / NET: -420 mL              Appearance: NAD   HEENT:   Normal oral mucosa, PERRL, EOMI	  Lymphatic: No lymphadenopathy , no edema  Cardiovascular: Normal S1 S2, No JVD, No murmurs , Peripheral pulses palpable 2+ bilaterally  Respiratory: Lungs clear to auscultation, normal effort 	  Gastrointestinal:  Soft, Non-tender, + BS	  Skin: No rashes, No ecchymoses, No cyanosis, warm to touch  Musculoskeletal: Decreased range of motion, normal strength  Psychiatry:  Mood & affect appropriate  Ext: +edema , chronic venous stasis skin discoloration   right foot wrapped , + ulcer       LABS:    CARDIAC MARKERS:                                9.6    10.5  )-----------( 321      ( 01 Jun 2019 06:56 )             28.0     06-01    135  |  101  |  38<H>  ----------------------------<  198<H>  4.2   |  22  |  2.21<H>    Ca    8.5      01 Jun 2019 06:56    TPro  6.9  /  Alb  2.7<L>  /  TBili  0.4  /  DBili  x   /  AST  65<H>  /  ALT  53<H>  /  AlkPhos  146<H>  05-31    proBNP:   Lipid Profile:   HgA1c:   TSH:             TELEMETRY: 	    ECG:  	  RADIOLOGY:   DIAGNOSTIC TESTING:  [ ] Echocardiogram:  [ ]  Catheterization:  [ ] Stress Test:    OTHER:

## 2019-06-01 NOTE — PROGRESS NOTE ADULT - ASSESSMENT
diabetic foot ulcer with surrounding cellulitis   Fever resolved , leukocytosis persists  S/p debridement with podiatry 5/22 and 5/27  Culture polymicrobial--E coli, enterococcus, strep  - meropenem and vanco per ID  - F/U BCXs; repeat BCXs x 2  - Monitor for other possible source infection such as developing diarrhea  - podiatry and vascular fu     uncontrolled Diabetes  - cont lantus- novolog 20 units qac  - hgb a1c  11.9  - diabetic diet  - FS qid  - endo consult appreciated    MARIKA   - stop lasix and lisinopril  - ivf hydration  - follow creatinine  - nephrology following    HTN   -   hold hydralazine     hyponatremia  - NO salt restriction- no HCTZ

## 2019-06-01 NOTE — PROGRESS NOTE ADULT - SUBJECTIVE AND OBJECTIVE BOX
AGUSTIN BRAR 58y MRN-[id number]    Patient is a 58y old  Male who presents with a chief complaint of right foot ulcer (31 May 2019 19:40)      Follow Up/CC:  ID following for foot infection    Interval History/ROS: no fever, pt feels better    Allergies    No Known Allergies    Intolerances        ANTIMICROBIALS:  piperacillin/tazobactam IVPB. 3.375 every 8 hours      MEDICATIONS  (STANDING):  aspirin enteric coated 81 milliGRAM(s) Oral daily  Dakins Solution - 1/4 Strength 1 Application(s) Topical daily  dextrose 5%. 1000 milliLiter(s) (50 mL/Hr) IV Continuous <Continuous>  dextrose 5%. 1000 milliLiter(s) (50 mL/Hr) IV Continuous <Continuous>  dextrose 50% Injectable 12.5 Gram(s) IV Push once  dextrose 50% Injectable 25 Gram(s) IV Push once  dextrose 50% Injectable 25 Gram(s) IV Push once  dextrose 50% Injectable 12.5 Gram(s) IV Push once  dextrose 50% Injectable 25 Gram(s) IV Push once  dextrose 50% Injectable 25 Gram(s) IV Push once  ergocalciferol 28046 Unit(s) Oral every week  heparin  Injectable 5000 Unit(s) SubCutaneous every 8 hours  hydrALAZINE 50 milliGRAM(s) Oral two times a day  insulin glargine Injectable (LANTUS) 38 Unit(s) SubCutaneous at bedtime  insulin lispro (HumaLOG) corrective regimen sliding scale   SubCutaneous three times a day before meals  insulin lispro (HumaLOG) corrective regimen sliding scale   SubCutaneous at bedtime  insulin lispro Injectable (HumaLOG) 20 Unit(s) SubCutaneous three times a day before meals  oxyCODONE    IR 10 milliGRAM(s) Oral once  piperacillin/tazobactam IVPB. 3.375 Gram(s) IV Intermittent every 8 hours  polyethylene glycol 3350 17 Gram(s) Oral daily  senna 2 Tablet(s) Oral at bedtime  sodium chloride 0.9%. 1000 milliLiter(s) (50 mL/Hr) IV Continuous <Continuous>    MEDICATIONS  (PRN):  acetaminophen   Tablet .. 650 milliGRAM(s) Oral every 6 hours PRN Mild Pain (1 - 3)  dextrose 40% Gel 15 Gram(s) Oral once PRN Blood Glucose LESS THAN 70 milliGRAM(s)/deciliter  dextrose 40% Gel 15 Gram(s) Oral once PRN Blood Glucose LESS THAN 70 milliGRAM(s)/deciliter  glucagon  Injectable 1 milliGRAM(s) IntraMuscular once PRN Glucose LESS THAN 70 milligrams/deciliter  morphine  - Injectable 2 milliGRAM(s) IV Push every 6 hours PRN Moderate Pain (4 - 6)  oxyCODONE    5 mG/acetaminophen 325 mG 2 Tablet(s) Oral every 4 hours PRN Moderate Pain (4 - 6)        Vital Signs Last 24 Hrs  T(C): 37.3 (01 Jun 2019 06:00), Max: 37.4 (31 May 2019 18:41)  T(F): 99.2 (01 Jun 2019 06:00), Max: 99.3 (31 May 2019 18:41)  HR: 78 (01 Jun 2019 06:00) (71 - 80)  BP: 149/80 (01 Jun 2019 06:00) (122/71 - 163/78)  BP(mean): --  RR: 18 (01 Jun 2019 06:00) (18 - 18)  SpO2: 94% (01 Jun 2019 06:00) (89% - 96%)    CBC Full  -  ( 01 Jun 2019 06:56 )  WBC Count : 10.5 K/uL  RBC Count : 2.99 M/uL  Hemoglobin : 9.6 g/dL  Hematocrit : 28.0 %  Platelet Count - Automated : 321 K/uL  Mean Cell Volume : 93.8 fl  Mean Cell Hemoglobin : 32.1 pg  Mean Cell Hemoglobin Concentration : 34.2 gm/dL  Auto Neutrophil # : x  Auto Lymphocyte # : x  Auto Monocyte # : x  Auto Eosinophil # : x  Auto Basophil # : x  Auto Neutrophil % : x  Auto Lymphocyte % : x  Auto Monocyte % : x  Auto Eosinophil % : x  Auto Basophil % : x    06-01    135  |  101  |  38<H>  ----------------------------<  198<H>  4.2   |  22  |  2.21<H>    Ca    8.5      01 Jun 2019 06:56    TPro  6.9  /  Alb  2.7<L>  /  TBili  0.4  /  DBili  x   /  AST  65<H>  /  ALT  53<H>  /  AlkPhos  146<H>  05-31    LIVER FUNCTIONS - ( 31 May 2019 07:12 )  Alb: 2.7 g/dL / Pro: 6.9 g/dL / ALK PHOS: 146 U/L / ALT: 53 U/L / AST: 65 U/L / GGT: x               MICROBIOLOGY:  .Other  05-28-19   Testing in progress  --  Enterococcus faecalis  Escherichia coli      .Blood  05-26-19   No growth at 5 days.  --  --      .Blood  05-25-19   No growth at 5 days.  --  --      .Tissue  05-23-19   Few Escherichia coli  Growth in fluid media only Enterococcus faecalis  Rare Streptococcus agalactiae (Group B) isolated  Group B streptococci are susceptible to ampicillin,  penicillin and cefazolin, but may be resistant to  erythromycin and clindamycin.  Recommendations for intrapartum prophylaxis for Group B  streptococci are penicillin or ampicillin.  --  Escherichia coli  Enterococcus faecalis      .Blood  05-20-19   No growth at 5 days.  --  --      .Abscess  05-20-19   Numerous Escherichia coli  Numerous Enterococcus faecalis  Moderate Prevotella bivia "Susceptibilities not performed"  --  Enterococcus faecalis  Escherichia coli      Vancomycin Level, Trough: 12.6 ug/mL (05-31-19 @ 19:29)        RADIOLOGY    < from: [facility] Physiol Extremity Lower 3+ Level, BI (05.31.19 @ 09:10) >  Limited study due to noncompressible vessels and elevated   pressures. There is no evidence of significant arterial occlusive disease   of either lower extremity at rest.      < from: MR Foot w/wo IV Cont, Right (05.30.19 @ 18:53) >  1.  Interval increase in size of a skin defect along the plantar surface   of the midfoot and forefoot which is partially imaged on this exam.   Findings likely represent mixture of ulcer with prior surgery. Adjacent   edema suggest cellulitis.  2.  Large fluid collection within the soft tissues of the plantar midfoot   and forefoot contacting the overlying metatarsals. Given the overlying   skin ulcer and several foci of intermixed gas, findings are concerning   for an evolving abscess. Differential includes postoperative seroma.  3.  Periosteal reaction involving the second, third, and fourth   metatarsals with subtle and ostial edema/enhancement of the second   through fourth metatarsals. Differential considerations include reactive   osteitis and early osteomyelitis.    < end of copied text >

## 2019-06-01 NOTE — PROGRESS NOTE ADULT - PROBLEM SELECTOR PLAN 1
s/p I&D  No OR plan by  vascular surgery.    Monitor WBC  IV abx   Wound care   ID following   keep legs elevated

## 2019-06-01 NOTE — PROGRESS NOTE ADULT - PROBLEM SELECTOR PLAN 3
Normal superficial inspection and palpation of back and vertebral bodies. -better  -trend cbc  -from infection

## 2019-06-01 NOTE — PROGRESS NOTE ADULT - ASSESSMENT
58M w/ DM (A1C 11.9), HTN, presents with nonhealing RLE heel ulcer s/p multiple debridements. No occlusive arterial disease on JOSIANE/PVR.  MRI showing likely evolving abscess vs seroma, early osteo vs reactive osteitis.  Continues to have palpable pulses.     - no change in plan  - will continue to follow  - FU podiatry, ID recs  - appreciate care per primary team  - discussed with vascular surgery fellow

## 2019-06-01 NOTE — PROGRESS NOTE ADULT - SUBJECTIVE AND OBJECTIVE BOX
Vascular Surgery Progress Note    Subjective:   Pt seen & examined at bedside. Pain is well controlled.  No F/C, N/V, CP/SOB.    Medications:  piperacillin/tazobactam IVPB. 3.375  aspirin enteric coated 81  heparin  Injectable 5000  hydrALAZINE 50  piperacillin/tazobactam IVPB. 3.375      Vital Signs Last 24 Hrs  T(C): 37.2 (01 Jun 2019 09:07), Max: 37.4 (31 May 2019 18:41)  T(F): 99 (01 Jun 2019 09:07), Max: 99.3 (31 May 2019 18:41)  HR: 72 (01 Jun 2019 09:07) (71 - 80)  BP: 133/74 (01 Jun 2019 09:07) (122/71 - 163/78)  BP(mean): --  RR: 18 (01 Jun 2019 09:07) (18 - 18)  SpO2: 93% (01 Jun 2019 09:07) (89% - 96%)    I&O's Summary    31 May 2019 07:01  -  01 Jun 2019 07:00  --------------------------------------------------------  IN: 620 mL / OUT: 1500 mL / NET: -880 mL        Physical Exam:  General: Resting comfortably, NAD  Respiratory: No increased WOB  Vascular: b/l LE grossly swollen, R. heel wound with dressing in place c/d/i, unchanged     RLE: palpable DP pulse, faintly palpable PT pulse, good PT doppler signal  sensation diminished to the level of midfoot, B/L    LABS:                        9.6    10.5  )-----------( 321      ( 01 Jun 2019 06:56 )             28.0     06-01    135  |  101  |  38<H>  ----------------------------<  198<H>  4.2   |  22  |  2.21<H>    Ca    8.5      01 Jun 2019 06:56    TPro  6.9  /  Alb  2.7<L>  /  TBili  0.4  /  DBili  x   /  AST  65<H>  /  ALT  53<H>  /  AlkPhos  146<H>  05-31      Imaging:  JOSIANE/PVR 5/31: no evidence of occlusive arterial disease bilaterally    < from: MR Foot w/wo IV Cont, Right (05.30.19 @ 18:53) >  IMPRESSION:    1.  Interval increase in size of a skin defect along the plantar surface   of the midfoot and forefoot which is partially imaged on this exam.   Findings likely represent mixture of ulcer with prior surgery. Adjacent   edema suggest cellulitis.  2.  Large fluid collection within the soft tissues of the plantar midfoot   and forefoot contacting the overlying metatarsals. Given the overlying   skin ulcer and several foci of intermixed gas, findings are concerning   for an evolving abscess. Differential includes postoperative seroma.  3.  Periosteal reaction involving the second, third, and fourth   metatarsals with subtle and ostial edema/enhancement of the second   through fourth metatarsals. Differential considerations include reactive   osteitis and early osteomyelitis.    < end of copied text >

## 2019-06-01 NOTE — PROGRESS NOTE ADULT - SUBJECTIVE AND OBJECTIVE BOX
Patient is a 58y old  Male who presents with a chief complaint of right foot ulcer (01 Jun 2019 23:13)       INTERVAL HPI/OVERNIGHT EVENTS:  Patient seen and evaluated at bedside.  Pt is resting comfortable in NAD. Denies N/V/F/C.  Pain rated at 0/10    Allergies    No Known Allergies    Intolerances        Vital Signs Last 24 Hrs  T(C): 37.3 (01 Jun 2019 23:33), Max: 37.7 (01 Jun 2019 21:55)  T(F): 99.2 (01 Jun 2019 23:33), Max: 99.8 (01 Jun 2019 21:55)  HR: 77 (01 Jun 2019 23:33) (72 - 80)  BP: 118/63 (01 Jun 2019 23:33) (118/63 - 178/88)  BP(mean): --  RR: 16 (01 Jun 2019 23:33) (16 - 18)  SpO2: 95% (01 Jun 2019 23:33) (93% - 98%)    LABS:                        9.6    10.5  )-----------( 321      ( 01 Jun 2019 06:56 )             28.0     06-01    135  |  101  |  38<H>  ----------------------------<  198<H>  4.2   |  22  |  2.21<H>    Ca    8.5      01 Jun 2019 06:56    TPro  6.9  /  Alb  2.7<L>  /  TBili  0.4  /  DBili  x   /  AST  65<H>  /  ALT  53<H>  /  AlkPhos  146<H>  05-31        CAPILLARY BLOOD GLUCOSE      POCT Blood Glucose.: 80 mg/dL (01 Jun 2019 22:10)  POCT Blood Glucose.: 161 mg/dL (01 Jun 2019 18:26)  POCT Blood Glucose.: 177 mg/dL (01 Jun 2019 12:51)    Patient is a 58y old  Male who presents with a chief complaint of right foot ulcer (01 Jun 2019 23:13)      INTERVAL HPI/OVERNIGHT EVENTS:    MEDICATIONS  (STANDING):  aspirin enteric coated 81 milliGRAM(s) Oral daily  Dakins Solution - 1/4 Strength 1 Application(s) Topical daily  dextrose 5%. 1000 milliLiter(s) (50 mL/Hr) IV Continuous <Continuous>  dextrose 5%. 1000 milliLiter(s) (50 mL/Hr) IV Continuous <Continuous>  dextrose 50% Injectable 12.5 Gram(s) IV Push once  dextrose 50% Injectable 25 Gram(s) IV Push once  dextrose 50% Injectable 25 Gram(s) IV Push once  dextrose 50% Injectable 12.5 Gram(s) IV Push once  dextrose 50% Injectable 25 Gram(s) IV Push once  dextrose 50% Injectable 25 Gram(s) IV Push once  ergocalciferol 76826 Unit(s) Oral every week  heparin  Injectable 5000 Unit(s) SubCutaneous every 8 hours  hydrALAZINE 50 milliGRAM(s) Oral three times a day  insulin glargine Injectable (LANTUS) 38 Unit(s) SubCutaneous at bedtime  insulin lispro (HumaLOG) corrective regimen sliding scale   SubCutaneous three times a day before meals  insulin lispro (HumaLOG) corrective regimen sliding scale   SubCutaneous at bedtime  insulin lispro Injectable (HumaLOG) 20 Unit(s) SubCutaneous three times a day before meals  oxyCODONE    IR 10 milliGRAM(s) Oral once  piperacillin/tazobactam IVPB. 3.375 Gram(s) IV Intermittent every 8 hours  polyethylene glycol 3350 17 Gram(s) Oral daily  senna 2 Tablet(s) Oral at bedtime  sodium chloride 0.9%. 1000 milliLiter(s) (50 mL/Hr) IV Continuous <Continuous>    MEDICATIONS  (PRN):  acetaminophen   Tablet .. 650 milliGRAM(s) Oral every 6 hours PRN Mild Pain (1 - 3)  dextrose 40% Gel 15 Gram(s) Oral once PRN Blood Glucose LESS THAN 70 milliGRAM(s)/deciliter  dextrose 40% Gel 15 Gram(s) Oral once PRN Blood Glucose LESS THAN 70 milliGRAM(s)/deciliter  glucagon  Injectable 1 milliGRAM(s) IntraMuscular once PRN Glucose LESS THAN 70 milligrams/deciliter  morphine  - Injectable 2 milliGRAM(s) IV Push every 6 hours PRN Moderate Pain (4 - 6)  oxyCODONE    5 mG/acetaminophen 325 mG 2 Tablet(s) Oral every 4 hours PRN Moderate Pain (4 - 6)      Allergies    No Known Allergies    Intolerances        REVIEW OF SYSTEMS:  CONSTITUTIONAL: No fever, weight loss, or fatigue  EYES: No eye pain, visual disturbances, or discharge  ENMT:  No difficulty hearing, tinnitus, vertigo; No sinus or throat pain  NECK: No pain or stiffness  BREASTS: No pain, masses, or nipple discharge  RESPIRATORY: No cough, wheezing, chills or hemoptysis; No shortness of breath  CARDIOVASCULAR: No chest pain, palpitations, dizziness, or leg swelling  GASTROINTESTINAL: No abdominal or epigastric pain. No nausea, vomiting, or hematemesis; No diarrhea or constipation. No melena or hematochezia.  GENITOURINARY: No dysuria, frequency, hematuria, or incontinence  NEUROLOGICAL: No headaches, memory loss, loss of strength, numbness, or tremors  SKIN: No itching, burning, rashes, or lesions   LYMPH NODES: No enlarged glands  ENDOCRINE: No heat or cold intolerance; No hair loss  MUSCULOSKELETAL: No joint pain or swelling; No muscle, back, or extremity pain  PSYCHIATRIC: No depression, anxiety, mood swings, or difficulty sleeping  HEME/LYMPH: No easy bruising, or bleeding gums  ALLERY AND IMMUNOLOGIC: No hives or eczema    Vital Signs Last 24 Hrs  T(C): 37.3 (01 Jun 2019 23:33), Max: 37.7 (01 Jun 2019 21:55)  T(F): 99.2 (01 Jun 2019 23:33), Max: 99.8 (01 Jun 2019 21:55)  HR: 77 (01 Jun 2019 23:33) (72 - 80)  BP: 118/63 (01 Jun 2019 23:33) (118/63 - 178/88)  BP(mean): --  RR: 16 (01 Jun 2019 23:33) (16 - 18)  SpO2: 95% (01 Jun 2019 23:33) (93% - 98%)    PHYSICAL EXAM:  GENERAL: NAD, well-groomed, well-developed  HEAD:  Atraumatic, Normocephalic  EYES: EOMI, PERRLA, conjunctiva and sclera clear  ENMT: No tonsillar erythema, exudates, or enlargement; Moist mucous membranes, Good dentition, No lesions  NECK: Supple, No JVD, Normal thyroid  NERVOUS SYSTEM:  Alert & Oriented X3, Good concentration; Motor Strength 5/5 B/L upper and lower extremities; DTRs 2+ intact and symmetric Sensorium present absent  CHEST/LUNG: Clear to percussion bilaterally; No rales, rhonchi, wheezing, or rubs  HEART: Regular rate and rhythm; No murmurs, rubs, or gallops  ABDOMEN: Soft, Nontender, Nondistended; Bowel sounds present  EXTREMITIES:  2+ Peripheral Pulses, No clubbing, cyanosis, or edema Vascularity Skin appearance hair absent or present  LYMPH: No lymphadenopathy noted  SKIN: No rashes or lesions  ORTHOPEDIC: foot deformities  ULCER site width length depth  appearence base drainage odor cellulitis granulation necrotic tissue   PAIN:    I&O's Detail    31 May 2019 07:01  -  01 Jun 2019 07:00  --------------------------------------------------------  IN:    Oral Fluid: 620 mL  Total IN: 620 mL    OUT:    Voided: 1500 mL  Total OUT: 1500 mL    Total NET: -880 mL      01 Jun 2019 07:01  -  01 Jun 2019 23:57  --------------------------------------------------------  IN:    Oral Fluid: 1360 mL  Total IN: 1360 mL    OUT:    Voided: 1780 mL  Total OUT: 1780 mL    Total NET: -420 mL          LABS:                        9.6    10.5  )-----------( 321      ( 01 Jun 2019 06:56 )             28.0     06-01    135  |  101  |  38<H>  ----------------------------<  198<H>  4.2   |  22  |  2.21<H>    Ca    8.5      01 Jun 2019 06:56    TPro  6.9  /  Alb  2.7<L>  /  TBili  0.4  /  DBili  x   /  AST  65<H>  /  ALT  53<H>  /  AlkPhos  146<H>  05-31        CAPILLARY BLOOD GLUCOSE      POCT Blood Glucose.: 80 mg/dL (01 Jun 2019 22:10)  POCT Blood Glucose.: 161 mg/dL (01 Jun 2019 18:26)  POCT Blood Glucose.: 177 mg/dL (01 Jun 2019 12:51)    Skin: R plantar full thickness ulcer midfoot 5 cm (d) w/ Vertical Sx incision post I & D 14 cm (L) extending distally to met heads w/ Plantar Fascia & muscle belly exposed as well as proximal w/ Sx incision extending to heel w/ mixed fibrogranular tissue, no abscess expressed, no hematoma, no odor, air seen in tissue most likely due to large open wound. Patient does has a large amount of packing running the course of the entire Sx incision which was removed today to inspect the wound, however, did remain when recent MRI was taken. mild-moderate edema noted about the foot     RADIOLOGY & ADDITIONAL TESTS:  < from: MR Foot w/wo IV Cont, Right (05.30.19 @ 18:53) >  EXAM:  MR FOOT w/wo IC RT                        PROCEDURE DATE:  05/30/2019    INTERPRETATION:  MR FOOT WITHOUT AND WITH IV CONTRAST RIGHT dated   5/30/2019 6:53 PM     INDICATION: Continued pain status post operation.    COMPARISON: Right foot MRI dated 5/21/2019. Foot radiographs dated   5/20/2019    TECHNIQUE: Multi-sequential, multiplanar MRI imaging of the midfoot and   forefoot was performed per standard protocol. Images were obtained before   and after the administration of IV contrast.  Contrast: Gadavist. Administered: 10 cc. Discarded: 0 cc.    FINDINGS:    BONE MARROW: There is periosteal reaction along the second, third,   and fourth metatarsals with suggestion of mild endosteal   edema/enhancement along the undersurface of the second through fourth   metatarsals. There is subchondral cystic change along the inferior aspect   of the medial cuneiform without change. No fracture or osteonecrosis is   noted.  SYNOVIUM/ JOINT FLUID: There is no large joint effusion.  TENDONS: The tendons are intact. No full-thickness tear or retraction is   seen. No tenosynovitis is appreciated.  MUSCLES: There is moderate to severe atrophy of the intrinsic musculature   the foot. There is moderate edema within the musculature. This is   nonspecific but can be seen in the setting of neuropathic change.  NEUROVASCULAR STRUCTURES: Preserved  SUBCUTANEOUS SOFT TISSUES: When compared to the prior exam, the skin   defect along the plantar surface of the foot is increased in size.   Previously this skin defect measures up to 2.8 cm when remeasured.   Currently, the skin defect measures larger than 7.6 cm and is only   partially imaged on this exam. The hindfoot and extent of the skin ulcer   is not included. There is associated skin thickening. There is exposure   the underlying musculature. There is adjacent edema and enhancement.   There is a large fluid collection within the plantar aspect of the   midfoot and forefoot centrally measuring approximate 0.7 x 2.8 x 1.1 cm.   This fluid collection borders the undersurface of the second, third, and   fourth metatarsals. Several foci of gas are intermixed with the fluid.   There is a focal area of nonenhancement within the soft tissues of the   plantar foot deep to the second and third metatarsals.    IMPRESSION:    1.  Interval increase in size of a skin defect along the plantar surface   of the midfoot and forefoot which is partially imaged on this exam.   Findings likely represent mixture of ulcer with prior surgery. Adjacent   edema suggest cellulitis.  2.  Large fluid collection within the soft tissues of the plantar midfoot   and forefoot contacting the overlying metatarsals. Given the overlying   skin ulcer and several foci of intermixed gas, findings are concerning   for an evolving abscess. Differential includes postoperative seroma.  3.  Periosteal reaction involving the second, third, and fourth   metatarsals with subtle and ostial edema/enhancement of the second   through fourth metatarsals. Differential considerations include reactive   osteitis and early osteomyelitis.    Findings were discussed with DHEERAJ Gilbert on 5/31/2019 9:35 AM  with read   back.  < end of copied text >    < from: [facility] Physiol Extremity Lower 3+ Level, BI (05.31.19 @ 09:10) >  EXAM:  PHYSIOL EXTREM LOW 3+ LEV BI                          PROCEDURE DATE:  05/31/2019    INTERPRETATION:  Clinical information: 58-year-old with 25 year history   of diabetes with nonhealing right foot ulceration. Assess arterial   occlusive disease    The right ankle-brachial index is 1.17. Left ankle-brachial index is   1.21. The right toe brachial index is 0.73. Left toe brachial index is   0.84.    Segmental pressure measurements are limited by noncompressible vessels at   the thigh levels and elevated pressures.    Pulse volume recordings demonstrate normal amplitude at all levels.    Impression: Limited study due to noncompressible vessels and elevated   pressures. There is no evidence of significant arterial occlusive disease   of either lower extremity at rest.    CHAITANYA CARNES M.D., ATTENDING RADIOLOGIST  This document has been electronically signed. May 31 2019 10:18AM    < end of copied text >

## 2019-06-01 NOTE — PROGRESS NOTE ADULT - ASSESSMENT
Assessment  DMT2: 58y Male with DM T2 with hyperglycemia, on insulin, blood sugars improving, no hypoglycemic episode, non compliant with low carb diet.  Foot wound: on medications, no chest pain, stable, monitored.  HTN: Controlled,  on antihypertensive medications.  Overweight/Obesity: No strict exercise routines, not on any weight loss program, neither on low calorie diet.    Andrzej Zhu MD  Cell: [phone]  Office: [phone]

## 2019-06-01 NOTE — PROGRESS NOTE ADULT - PROBLEM SELECTOR PLAN 1
-cont abx  -initial MRI foot noted - no OM  -s/p debridement per podiatry 5/27  -DM control  -local care per podiatry  -D/w Dr Issa - may take back to OR in 2-3 days

## 2019-06-02 LAB
ANION GAP SERPL CALC-SCNC: 12 MMOL/L — SIGNIFICANT CHANGE UP (ref 5–17)
BUN SERPL-MCNC: 35 MG/DL — HIGH (ref 7–23)
CALCIUM SERPL-MCNC: 8.8 MG/DL — SIGNIFICANT CHANGE UP (ref 8.4–10.5)
CHLORIDE SERPL-SCNC: 104 MMOL/L — SIGNIFICANT CHANGE UP (ref 96–108)
CO2 SERPL-SCNC: 22 MMOL/L — SIGNIFICANT CHANGE UP (ref 22–31)
CREAT SERPL-MCNC: 2.19 MG/DL — HIGH (ref 0.5–1.3)
CULTURE RESULTS: SIGNIFICANT CHANGE UP
GLUCOSE BLDC GLUCOMTR-MCNC: 112 MG/DL — HIGH (ref 70–99)
GLUCOSE BLDC GLUCOMTR-MCNC: 116 MG/DL — HIGH (ref 70–99)
GLUCOSE BLDC GLUCOMTR-MCNC: 134 MG/DL — HIGH (ref 70–99)
GLUCOSE BLDC GLUCOMTR-MCNC: 145 MG/DL — HIGH (ref 70–99)
GLUCOSE BLDC GLUCOMTR-MCNC: 99 MG/DL — SIGNIFICANT CHANGE UP (ref 70–99)
GLUCOSE SERPL-MCNC: 155 MG/DL — HIGH (ref 70–99)
HCT VFR BLD CALC: 30.3 % — LOW (ref 39–50)
HGB BLD-MCNC: 9.4 G/DL — LOW (ref 13–17)
MCHC RBC-ENTMCNC: 29.4 PG — SIGNIFICANT CHANGE UP (ref 27–34)
MCHC RBC-ENTMCNC: 31.2 GM/DL — LOW (ref 32–36)
MCV RBC AUTO: 94.3 FL — SIGNIFICANT CHANGE UP (ref 80–100)
ORGANISM # SPEC MICROSCOPIC CNT: SIGNIFICANT CHANGE UP
PLATELET # BLD AUTO: 365 K/UL — SIGNIFICANT CHANGE UP (ref 150–400)
POTASSIUM SERPL-MCNC: 4.4 MMOL/L — SIGNIFICANT CHANGE UP (ref 3.5–5.3)
POTASSIUM SERPL-SCNC: 4.4 MMOL/L — SIGNIFICANT CHANGE UP (ref 3.5–5.3)
RBC # BLD: 3.21 M/UL — LOW (ref 4.2–5.8)
RBC # FLD: 12.5 % — SIGNIFICANT CHANGE UP (ref 10.3–14.5)
SODIUM SERPL-SCNC: 138 MMOL/L — SIGNIFICANT CHANGE UP (ref 135–145)
SPECIMEN SOURCE: SIGNIFICANT CHANGE UP
WBC # BLD: 11.6 K/UL — HIGH (ref 3.8–10.5)
WBC # FLD AUTO: 11.6 K/UL — HIGH (ref 3.8–10.5)

## 2019-06-02 PROCEDURE — 99232 SBSQ HOSP IP/OBS MODERATE 35: CPT

## 2019-06-02 RX ORDER — HYDRALAZINE HCL 50 MG
75 TABLET ORAL THREE TIMES A DAY
Refills: 0 | Status: DISCONTINUED | OUTPATIENT
Start: 2019-06-02 | End: 2019-06-03

## 2019-06-02 RX ADMIN — Medication 20 UNIT(S): at 08:48

## 2019-06-02 RX ADMIN — Medication 75 MILLIGRAM(S): at 13:48

## 2019-06-02 RX ADMIN — INSULIN GLARGINE 38 UNIT(S): 100 INJECTION, SOLUTION SUBCUTANEOUS at 00:12

## 2019-06-02 RX ADMIN — PIPERACILLIN AND TAZOBACTAM 25 GRAM(S): 4; .5 INJECTION, POWDER, LYOPHILIZED, FOR SOLUTION INTRAVENOUS at 17:54

## 2019-06-02 RX ADMIN — HEPARIN SODIUM 5000 UNIT(S): 5000 INJECTION INTRAVENOUS; SUBCUTANEOUS at 13:42

## 2019-06-02 RX ADMIN — OXYCODONE AND ACETAMINOPHEN 2 TABLET(S): 5; 325 TABLET ORAL at 18:42

## 2019-06-02 RX ADMIN — OXYCODONE AND ACETAMINOPHEN 2 TABLET(S): 5; 325 TABLET ORAL at 14:10

## 2019-06-02 RX ADMIN — Medication 81 MILLIGRAM(S): at 13:42

## 2019-06-02 RX ADMIN — OXYCODONE AND ACETAMINOPHEN 2 TABLET(S): 5; 325 TABLET ORAL at 13:41

## 2019-06-02 RX ADMIN — INSULIN GLARGINE 38 UNIT(S): 100 INJECTION, SOLUTION SUBCUTANEOUS at 22:22

## 2019-06-02 RX ADMIN — PIPERACILLIN AND TAZOBACTAM 25 GRAM(S): 4; .5 INJECTION, POWDER, LYOPHILIZED, FOR SOLUTION INTRAVENOUS at 08:48

## 2019-06-02 RX ADMIN — MORPHINE SULFATE 2 MILLIGRAM(S): 50 CAPSULE, EXTENDED RELEASE ORAL at 15:41

## 2019-06-02 RX ADMIN — HEPARIN SODIUM 5000 UNIT(S): 5000 INJECTION INTRAVENOUS; SUBCUTANEOUS at 00:11

## 2019-06-02 RX ADMIN — HEPARIN SODIUM 5000 UNIT(S): 5000 INJECTION INTRAVENOUS; SUBCUTANEOUS at 22:21

## 2019-06-02 RX ADMIN — OXYCODONE AND ACETAMINOPHEN 2 TABLET(S): 5; 325 TABLET ORAL at 19:15

## 2019-06-02 RX ADMIN — POLYETHYLENE GLYCOL 3350 17 GRAM(S): 17 POWDER, FOR SOLUTION ORAL at 13:42

## 2019-06-02 RX ADMIN — MORPHINE SULFATE 2 MILLIGRAM(S): 50 CAPSULE, EXTENDED RELEASE ORAL at 15:11

## 2019-06-02 RX ADMIN — PIPERACILLIN AND TAZOBACTAM 25 GRAM(S): 4; .5 INJECTION, POWDER, LYOPHILIZED, FOR SOLUTION INTRAVENOUS at 00:11

## 2019-06-02 RX ADMIN — HEPARIN SODIUM 5000 UNIT(S): 5000 INJECTION INTRAVENOUS; SUBCUTANEOUS at 05:00

## 2019-06-02 RX ADMIN — Medication 50 MILLIGRAM(S): at 05:01

## 2019-06-02 RX ADMIN — Medication 75 MILLIGRAM(S): at 22:22

## 2019-06-02 NOTE — PROGRESS NOTE ADULT - ASSESSMENT
Assessment  DMT2: 58y Male with DM T2 with hyperglycemia, on insulin, blood sugars in acceptable range, comfortable,  no hypoglycemic episode, non compliant with low carb diet.  Foot wound: on medications, no chest pain, stable, monitored.  HTN: Controlled,  on antihypertensive medications.  Overweight/Obesity: No strict exercise routines, not on any weight loss program, neither on low calorie diet.    Andrzej Zhu MD  Cell: [phone]  Office: [phone]

## 2019-06-02 NOTE — PROGRESS NOTE ADULT - SUBJECTIVE AND OBJECTIVE BOX
Patient is a 58y old  Male who presents with a chief complaint of right foot ulcer (02 Jun 2019 17:03)       INTERVAL HPI/OVERNIGHT EVENTS:  Patient seen and evaluated at bedside.  Pt is resting comfortable in NAD. Denies N/V/F/C.     Allergies    No Known Allergies    Intolerances        Vital Signs Last 24 Hrs  T(C): 36.6 (02 Jun 2019 16:59), Max: 37.7 (01 Jun 2019 21:55)  T(F): 97.9 (02 Jun 2019 16:59), Max: 99.8 (01 Jun 2019 21:55)  HR: 75 (02 Jun 2019 16:59) (73 - 92)  BP: 134/69 (02 Jun 2019 16:59) (118/63 - 178/88)  BP(mean): --  RR: 17 (02 Jun 2019 16:59) (16 - 18)  SpO2: 93% (02 Jun 2019 16:59) (93% - 98%)    LABS:                        9.4    11.6  )-----------( 365      ( 02 Jun 2019 06:22 )             30.3     06-02    138  |  104  |  35<H>  ----------------------------<  155<H>  4.4   |  22  |  2.19<H>    Ca    8.8      02 Jun 2019 06:22          CAPILLARY BLOOD GLUCOSE      POCT Blood Glucose.: 112 mg/dL (02 Jun 2019 17:48)  POCT Blood Glucose.: 99 mg/dL (02 Jun 2019 14:03)  POCT Blood Glucose.: 145 mg/dL (02 Jun 2019 08:40)  POCT Blood Glucose.: 134 mg/dL (01 Jun 2019 23:56)  POCT Blood Glucose.: 80 mg/dL (01 Jun 2019 22:10)      Lower Extremity Physical Exam:  Skin: R plantar full thickness ulcer midfoot 5 cm (d) w/ Vertical Sx incision post I & D extending distally to met heads w/ Plantar Fascia & muscle belly exposed as well as proximal w/ Sx incision extending to heel w/ mixed fibrogranular tissue, no abscess expressed, no hematoma, no odor, air seen in tissue most likely due to large open wound. Patient does have a large amount of packing running the course of the entire Sx incision which was removed today to inspect the wound, further I&D done today distally and through the plantar fascia to underside of mets 2-4, 3 cc of purulence expressed, mild-moderate edema noted about the foot Patient is a 58y old  Male who presents with a chief complaint of right foot ulcer (02 Jun 2019 17:03)       INTERVAL HPI/OVERNIGHT EVENTS:  Patient seen and evaluated at bedside.  Pt is resting comfortable in NAD. Denies N/V/F/C. No pain R foot.    Allergies    No Known Allergies    Intolerances        Vital Signs Last 24 Hrs  T(C): 36.6 (02 Jun 2019 16:59), Max: 37.7 (01 Jun 2019 21:55)  T(F): 97.9 (02 Jun 2019 16:59), Max: 99.8 (01 Jun 2019 21:55)  HR: 75 (02 Jun 2019 16:59) (73 - 92)  BP: 134/69 (02 Jun 2019 16:59) (118/63 - 178/88)  BP(mean): --  RR: 17 (02 Jun 2019 16:59) (16 - 18)  SpO2: 93% (02 Jun 2019 16:59) (93% - 98%)    LABS:                        9.4    11.6  )-----------( 365      ( 02 Jun 2019 06:22 )             30.3     06-02    138  |  104  |  35<H>  ----------------------------<  155<H>  4.4   |  22  |  2.19<H>    Ca    8.8      02 Jun 2019 06:22          CAPILLARY BLOOD GLUCOSE      POCT Blood Glucose.: 112 mg/dL (02 Jun 2019 17:48)  POCT Blood Glucose.: 99 mg/dL (02 Jun 2019 14:03)  POCT Blood Glucose.: 145 mg/dL (02 Jun 2019 08:40)  POCT Blood Glucose.: 134 mg/dL (01 Jun 2019 23:56)  POCT Blood Glucose.: 80 mg/dL (01 Jun 2019 22:10)      Lower Extremity Physical Exam:  Skin: R plantar full thickness ulcer midfoot 5 cm (d) w/ Vertical Sx incision post I & D extending distally to met heads w/ Plantar Fascia & muscle belly exposed as well as proximal w/ Sx incision extending to heel w/ mixed fibrogranular tissue w/ tota length of Sx wound 14 cm (L). Upon exploratory aseptic sharp debridement today 6/2/19 performed bedside I & D using Hemostat & # 15 blade scalpel extended  the Sx incision distally underneath 2nd,3rd,4th mets region & using Sx scissors cut through plantar fascia & expressed mixed 3 cc sero purulent fluid plantar ball of foot consistent w/ recent MRI findings. Also extended  incision proximal in the heel w/ no purulence, ,mild edema noted about the foot today, no malodor, normal skin temp

## 2019-06-02 NOTE — PROGRESS NOTE ADULT - SUBJECTIVE AND OBJECTIVE BOX
Patient is a 58y old  Male who presents with a chief complaint of right foot ulcer (02 Jun 2019 11:42)      INTERVAL HPI/OVERNIGHT EVENTS:  T(C): 36.6 (06-02-19 @ 16:59), Max: 37.7 (06-01-19 @ 21:55)  HR: 75 (06-02-19 @ 16:59) (73 - 92)  BP: 134/69 (06-02-19 @ 16:59) (118/63 - 178/88)  RR: 17 (06-02-19 @ 16:59) (16 - 18)  SpO2: 93% (06-02-19 @ 16:59) (93% - 98%)  Wt(kg): --  I&O's Summary    01 Jun 2019 07:01  -  02 Jun 2019 07:00  --------------------------------------------------------  IN: 1460 mL / OUT: 1780 mL / NET: -320 mL    02 Jun 2019 07:01  -  02 Jun 2019 17:04  --------------------------------------------------------  IN: 590 mL / OUT: 600 mL / NET: -10 mL        LABS:                        9.4    11.6  )-----------( 365      ( 02 Jun 2019 06:22 )             30.3     06-02    138  |  104  |  35<H>  ----------------------------<  155<H>  4.4   |  22  |  2.19<H>    Ca    8.8      02 Jun 2019 06:22          CAPILLARY BLOOD GLUCOSE      POCT Blood Glucose.: 99 mg/dL (02 Jun 2019 14:03)  POCT Blood Glucose.: 145 mg/dL (02 Jun 2019 08:40)  POCT Blood Glucose.: 134 mg/dL (01 Jun 2019 23:56)  POCT Blood Glucose.: 80 mg/dL (01 Jun 2019 22:10)  POCT Blood Glucose.: 161 mg/dL (01 Jun 2019 18:26)            MEDICATIONS  (STANDING):  aspirin enteric coated 81 milliGRAM(s) Oral daily  Dakins Solution - 1/4 Strength 1 Application(s) Topical daily  dextrose 5%. 1000 milliLiter(s) (50 mL/Hr) IV Continuous <Continuous>  dextrose 5%. 1000 milliLiter(s) (50 mL/Hr) IV Continuous <Continuous>  dextrose 50% Injectable 12.5 Gram(s) IV Push once  dextrose 50% Injectable 25 Gram(s) IV Push once  dextrose 50% Injectable 25 Gram(s) IV Push once  dextrose 50% Injectable 12.5 Gram(s) IV Push once  dextrose 50% Injectable 25 Gram(s) IV Push once  dextrose 50% Injectable 25 Gram(s) IV Push once  ergocalciferol 00302 Unit(s) Oral every week  heparin  Injectable 5000 Unit(s) SubCutaneous every 8 hours  hydrALAZINE 75 milliGRAM(s) Oral three times a day  insulin glargine Injectable (LANTUS) 38 Unit(s) SubCutaneous at bedtime  insulin lispro (HumaLOG) corrective regimen sliding scale   SubCutaneous three times a day before meals  insulin lispro (HumaLOG) corrective regimen sliding scale   SubCutaneous at bedtime  insulin lispro Injectable (HumaLOG) 20 Unit(s) SubCutaneous three times a day before meals  oxyCODONE    IR 10 milliGRAM(s) Oral once  piperacillin/tazobactam IVPB. 3.375 Gram(s) IV Intermittent every 8 hours  polyethylene glycol 3350 17 Gram(s) Oral daily  senna 2 Tablet(s) Oral at bedtime  sodium chloride 0.9%. 1000 milliLiter(s) (50 mL/Hr) IV Continuous <Continuous>    MEDICATIONS  (PRN):  acetaminophen   Tablet .. 650 milliGRAM(s) Oral every 6 hours PRN Mild Pain (1 - 3)  dextrose 40% Gel 15 Gram(s) Oral once PRN Blood Glucose LESS THAN 70 milliGRAM(s)/deciliter  dextrose 40% Gel 15 Gram(s) Oral once PRN Blood Glucose LESS THAN 70 milliGRAM(s)/deciliter  glucagon  Injectable 1 milliGRAM(s) IntraMuscular once PRN Glucose LESS THAN 70 milligrams/deciliter  morphine  - Injectable 2 milliGRAM(s) IV Push every 6 hours PRN Moderate Pain (4 - 6)  oxyCODONE    5 mG/acetaminophen 325 mG 2 Tablet(s) Oral every 4 hours PRN Moderate Pain (4 - 6)          PHYSICAL EXAM:  GENERAL: NAD, well-groomed, well-developed  HEAD:  Atraumatic, Normocephalic  CHEST/LUNG: Clear to percussion bilaterally; No rales, rhonchi, wheezing, or rubs  HEART: Regular rate and rhythm; No murmurs, rubs, or gallops  ABDOMEN: Soft, Nontender, Nondistended; Bowel sounds present  EXTREMITIES:  foot dressing present     Care Discussed with Consultants/Other Providers [ ] YES  [ ] NO

## 2019-06-02 NOTE — PROGRESS NOTE ADULT - SUBJECTIVE AND OBJECTIVE BOX
Chief complaint  Patient is a 58y old  Male who presents with a chief complaint of right foot ulcer (02 Jun 2019 10:51)   Review of systems  Patient in bed, looks comfortable, no fever, no hypoglycemia.    Labs and Fingersticks  CAPILLARY BLOOD GLUCOSE      POCT Blood Glucose.: 145 mg/dL (02 Jun 2019 08:40)  POCT Blood Glucose.: 134 mg/dL (01 Jun 2019 23:56)  POCT Blood Glucose.: 80 mg/dL (01 Jun 2019 22:10)  POCT Blood Glucose.: 161 mg/dL (01 Jun 2019 18:26)  POCT Blood Glucose.: 177 mg/dL (01 Jun 2019 12:51)      Anion Gap, Serum: 12 (06-02 @ 06:22)  Anion Gap, Serum: 12 (06-01 @ 06:56)      Calcium, Total Serum: 8.8 (06-02 @ 06:22)  Calcium, Total Serum: 8.5 (06-01 @ 06:56)          06-02    138  |  104  |  35<H>  ----------------------------<  155<H>  4.4   |  22  |  2.19<H>    Ca    8.8      02 Jun 2019 06:22                          9.4    11.6  )-----------( 365      ( 02 Jun 2019 06:22 )             30.3     Medications  MEDICATIONS  (STANDING):  aspirin enteric coated 81 milliGRAM(s) Oral daily  Dakins Solution - 1/4 Strength 1 Application(s) Topical daily  dextrose 5%. 1000 milliLiter(s) (50 mL/Hr) IV Continuous <Continuous>  dextrose 5%. 1000 milliLiter(s) (50 mL/Hr) IV Continuous <Continuous>  dextrose 50% Injectable 12.5 Gram(s) IV Push once  dextrose 50% Injectable 25 Gram(s) IV Push once  dextrose 50% Injectable 25 Gram(s) IV Push once  dextrose 50% Injectable 12.5 Gram(s) IV Push once  dextrose 50% Injectable 25 Gram(s) IV Push once  dextrose 50% Injectable 25 Gram(s) IV Push once  ergocalciferol 41824 Unit(s) Oral every week  heparin  Injectable 5000 Unit(s) SubCutaneous every 8 hours  hydrALAZINE 50 milliGRAM(s) Oral three times a day  insulin glargine Injectable (LANTUS) 38 Unit(s) SubCutaneous at bedtime  insulin lispro (HumaLOG) corrective regimen sliding scale   SubCutaneous three times a day before meals  insulin lispro (HumaLOG) corrective regimen sliding scale   SubCutaneous at bedtime  insulin lispro Injectable (HumaLOG) 20 Unit(s) SubCutaneous three times a day before meals  oxyCODONE    IR 10 milliGRAM(s) Oral once  piperacillin/tazobactam IVPB. 3.375 Gram(s) IV Intermittent every 8 hours  polyethylene glycol 3350 17 Gram(s) Oral daily  senna 2 Tablet(s) Oral at bedtime  sodium chloride 0.9%. 1000 milliLiter(s) (50 mL/Hr) IV Continuous <Continuous>      Physical Exam  General: Patient comfortable in bed  Vital Signs Last 12 Hrs  T(F): 99.1 (06-02-19 @ 09:35), Max: 99.2 (06-01-19 @ 23:33)  HR: 85 (06-02-19 @ 09:35) (73 - 85)  BP: 172/85 (06-02-19 @ 09:35) (118/63 - 172/85)  BP(mean): --  RR: 18 (06-02-19 @ 09:35) (16 - 18)  SpO2: 93% (06-02-19 @ 09:35) (93% - 95%)  Neck: No palpable thyroid nodules.  CVS: S1S2, No murmurs  Respiratory: No wheezing, no crepitations  GI: Abdomen soft, bowel sounds positive  Musculoskeletal:  edema lower extremities.   Skin: No skin rashes, no ecchymosis    Diagnostics    Vitamin D, 1, 25-Dihydroxy: AM Sched. Collection: 22-May-2019 06:00 (05-21 @ 18:59)  Vitamin D, 25-Hydroxy: AM Sched. Collection: 22-May-2019 06:00 (05-21 @ 18:59)

## 2019-06-02 NOTE — PROGRESS NOTE ADULT - SUBJECTIVE AND OBJECTIVE BOX
AGUSTIN BRAR 58y MRN-[id number]    Patient is a 58y old  Male who presents with a chief complaint of right foot ulcer (01 Jun 2019 23:54)      Follow Up/CC:  ID following for foot infection    Interval History/ROS: no fever, no foot pain    Allergies    No Known Allergies    Intolerances        ANTIMICROBIALS:  piperacillin/tazobactam IVPB. 3.375 every 8 hours      MEDICATIONS  (STANDING):  aspirin enteric coated 81 milliGRAM(s) Oral daily  Dakins Solution - 1/4 Strength 1 Application(s) Topical daily  dextrose 5%. 1000 milliLiter(s) (50 mL/Hr) IV Continuous <Continuous>  dextrose 5%. 1000 milliLiter(s) (50 mL/Hr) IV Continuous <Continuous>  dextrose 50% Injectable 12.5 Gram(s) IV Push once  dextrose 50% Injectable 25 Gram(s) IV Push once  dextrose 50% Injectable 25 Gram(s) IV Push once  dextrose 50% Injectable 12.5 Gram(s) IV Push once  dextrose 50% Injectable 25 Gram(s) IV Push once  dextrose 50% Injectable 25 Gram(s) IV Push once  ergocalciferol 92454 Unit(s) Oral every week  heparin  Injectable 5000 Unit(s) SubCutaneous every 8 hours  hydrALAZINE 50 milliGRAM(s) Oral three times a day  insulin glargine Injectable (LANTUS) 38 Unit(s) SubCutaneous at bedtime  insulin lispro (HumaLOG) corrective regimen sliding scale   SubCutaneous three times a day before meals  insulin lispro (HumaLOG) corrective regimen sliding scale   SubCutaneous at bedtime  insulin lispro Injectable (HumaLOG) 20 Unit(s) SubCutaneous three times a day before meals  oxyCODONE    IR 10 milliGRAM(s) Oral once  piperacillin/tazobactam IVPB. 3.375 Gram(s) IV Intermittent every 8 hours  polyethylene glycol 3350 17 Gram(s) Oral daily  senna 2 Tablet(s) Oral at bedtime  sodium chloride 0.9%. 1000 milliLiter(s) (50 mL/Hr) IV Continuous <Continuous>    MEDICATIONS  (PRN):  acetaminophen   Tablet .. 650 milliGRAM(s) Oral every 6 hours PRN Mild Pain (1 - 3)  dextrose 40% Gel 15 Gram(s) Oral once PRN Blood Glucose LESS THAN 70 milliGRAM(s)/deciliter  dextrose 40% Gel 15 Gram(s) Oral once PRN Blood Glucose LESS THAN 70 milliGRAM(s)/deciliter  glucagon  Injectable 1 milliGRAM(s) IntraMuscular once PRN Glucose LESS THAN 70 milligrams/deciliter  morphine  - Injectable 2 milliGRAM(s) IV Push every 6 hours PRN Moderate Pain (4 - 6)  oxyCODONE    5 mG/acetaminophen 325 mG 2 Tablet(s) Oral every 4 hours PRN Moderate Pain (4 - 6)        Vital Signs Last 24 Hrs  T(C): 37.3 (02 Jun 2019 09:35), Max: 37.7 (01 Jun 2019 21:55)  T(F): 99.1 (02 Jun 2019 09:35), Max: 99.8 (01 Jun 2019 21:55)  HR: 85 (02 Jun 2019 09:35) (73 - 85)  BP: 172/85 (02 Jun 2019 09:35) (118/63 - 178/88)  BP(mean): --  RR: 18 (02 Jun 2019 09:35) (16 - 18)  SpO2: 93% (02 Jun 2019 09:35) (93% - 98%)    CBC Full  -  ( 02 Jun 2019 06:22 )  WBC Count : 11.6 K/uL  RBC Count : 3.21 M/uL  Hemoglobin : 9.4 g/dL  Hematocrit : 30.3 %  Platelet Count - Automated : 365 K/uL  Mean Cell Volume : 94.3 fl  Mean Cell Hemoglobin : 29.4 pg  Mean Cell Hemoglobin Concentration : 31.2 gm/dL  Auto Neutrophil # : x  Auto Lymphocyte # : x  Auto Monocyte # : x  Auto Eosinophil # : x  Auto Basophil # : x  Auto Neutrophil % : x  Auto Lymphocyte % : x  Auto Monocyte % : x  Auto Eosinophil % : x  Auto Basophil % : x    06-02    138  |  104  |  35<H>  ----------------------------<  155<H>  4.4   |  22  |  2.19<H>    Ca    8.8      02 Jun 2019 06:22            MICROBIOLOGY:  .Other  05-28-19   Testing in progress  --  Enterococcus faecalis  Escherichia coli      .Blood  05-26-19   No growth at 5 days.  --  --      .Blood  05-25-19   No growth at 5 days.  --  --      .Tissue  05-23-19   Few Escherichia coli  Growth in fluid media only Enterococcus faecalis  Rare Streptococcus agalactiae (Group B) isolated  Group B streptococci are susceptible to ampicillin,  penicillin and cefazolin, but may be resistant to  erythromycin and clindamycin.  Recommendations for intrapartum prophylaxis for Group B  streptococci are penicillin or ampicillin.  --  Escherichia coli  Enterococcus faecalis      .Blood  05-20-19   No growth at 5 days.  --  --      .Abscess  05-20-19   Numerous Escherichia coli  Numerous Enterococcus faecalis  Moderate Prevotella bivia "Susceptibilities not performed"  --  Enterococcus faecalis  Escherichia coli      RADIOLOGY    < from: MR Foot w/wo IV Cont, Right (05.30.19 @ 18:53) >  1.  Interval increase in size of a skin defect along the plantar surface   of the midfoot and forefoot which is partially imaged on this exam.   Findings likely represent mixture of ulcer with prior surgery. Adjacent   edema suggest cellulitis.  2.  Large fluid collection within the soft tissues of the plantar midfoot   and forefoot contacting the overlying metatarsals. Given the overlying   skin ulcer and several foci of intermixed gas, findings are concerning   for an evolving abscess. Differential includes postoperative seroma.  3.  Periosteal reaction involving the second, third, and fourth   metatarsals with subtle and ostial edema/enhancement of the second   through fourth metatarsals. Differential considerations include reactive   osteitis and early osteomyelitis.    < end of copied text >

## 2019-06-02 NOTE — PROGRESS NOTE ADULT - SUBJECTIVE AND OBJECTIVE BOX
Subjective: Patient seen and examined. No new events except as noted.   back pain   no cp or sob      REVIEW OF SYSTEMS:    CONSTITUTIONAL: + weakness, fevers or chills  EYES/ENT: No visual changes;  No vertigo or throat pain   NECK: No pain or stiffness  RESPIRATORY: No cough, wheezing, hemoptysis; No shortness of breath  CARDIOVASCULAR: No chest pain or palpitations  GASTROINTESTINAL: No abdominal or epigastric pain. No nausea, vomiting, or hematemesis; No diarrhea or constipation. No melena or hematochezia.  GENITOURINARY: No dysuria, frequency or hematuria  NEUROLOGICAL: No numbness or weakness  SKIN: No itching, burning, rashes, or lesions   All other review of systems is negative unless indicated above.    MEDICATIONS:  MEDICATIONS  (STANDING):  aspirin enteric coated 81 milliGRAM(s) Oral daily  Dakins Solution - 1/4 Strength 1 Application(s) Topical daily  dextrose 5%. 1000 milliLiter(s) (50 mL/Hr) IV Continuous <Continuous>  dextrose 5%. 1000 milliLiter(s) (50 mL/Hr) IV Continuous <Continuous>  dextrose 50% Injectable 12.5 Gram(s) IV Push once  dextrose 50% Injectable 25 Gram(s) IV Push once  dextrose 50% Injectable 25 Gram(s) IV Push once  dextrose 50% Injectable 12.5 Gram(s) IV Push once  dextrose 50% Injectable 25 Gram(s) IV Push once  dextrose 50% Injectable 25 Gram(s) IV Push once  ergocalciferol 12431 Unit(s) Oral every week  heparin  Injectable 5000 Unit(s) SubCutaneous every 8 hours  hydrALAZINE 50 milliGRAM(s) Oral three times a day  insulin glargine Injectable (LANTUS) 38 Unit(s) SubCutaneous at bedtime  insulin lispro (HumaLOG) corrective regimen sliding scale   SubCutaneous three times a day before meals  insulin lispro (HumaLOG) corrective regimen sliding scale   SubCutaneous at bedtime  insulin lispro Injectable (HumaLOG) 20 Unit(s) SubCutaneous three times a day before meals  oxyCODONE    IR 10 milliGRAM(s) Oral once  piperacillin/tazobactam IVPB. 3.375 Gram(s) IV Intermittent every 8 hours  polyethylene glycol 3350 17 Gram(s) Oral daily  senna 2 Tablet(s) Oral at bedtime  sodium chloride 0.9%. 1000 milliLiter(s) (50 mL/Hr) IV Continuous <Continuous>      PHYSICAL EXAM:  T(C): 37.3 (06-02-19 @ 09:35), Max: 37.7 (06-01-19 @ 21:55)  HR: 85 (06-02-19 @ 09:35) (73 - 85)  BP: 172/85 (06-02-19 @ 09:35) (118/63 - 178/88)  RR: 18 (06-02-19 @ 09:35) (16 - 18)  SpO2: 93% (06-02-19 @ 09:35) (93% - 98%)  Wt(kg): --  I&O's Summary    01 Jun 2019 07:01  -  02 Jun 2019 07:00  --------------------------------------------------------  IN: 1460 mL / OUT: 1780 mL / NET: -320 mL              Appearance: NAD   HEENT:   Normal oral mucosa, PERRL, EOMI	  Lymphatic: No lymphadenopathy , no edema  Cardiovascular: Normal S1 S2, No JVD, No murmurs , Peripheral pulses palpable 2+ bilaterally  Respiratory: Lungs clear to auscultation, normal effort 	  Gastrointestinal:  Soft, Non-tender, + BS	  Skin: No rashes, No ecchymoses, No cyanosis, warm to touch  Musculoskeletal: Decreased range of motion, normal strength  Psychiatry:  Mood & affect appropriate  Ext: +edema , chronic venous stasis skin discoloration   right foot wrapped , + ulcer         LABS:    CARDIAC MARKERS:                                9.4    11.6  )-----------( 365      ( 02 Jun 2019 06:22 )             30.3     06-02    138  |  104  |  35<H>  ----------------------------<  155<H>  4.4   |  22  |  2.19<H>    Ca    8.8      02 Jun 2019 06:22      proBNP:   Lipid Profile:   HgA1c:   TSH:             TELEMETRY: 	    ECG:  	  RADIOLOGY:   DIAGNOSTIC TESTING:  [ ] Echocardiogram:  [ ]  Catheterization:  [ ] Stress Test:    OTHER:

## 2019-06-02 NOTE — PROGRESS NOTE ADULT - ASSESSMENT
diabetic foot ulcer with surrounding cellulitis   Fever resolved , leukocytosis persists  S/p debridement with podiatry 5/22 and 5/27  Culture polymicrobial--E coli, enterococcus, strep  - meropenem and vanco per ID  - F/U BCXs; repeat BCXs x 2  - Monitor for other possible source infection such as developing diarrhea  - podiatry and vascular fu     uncontrolled Diabetes  - cont lantus- novolog 20 units qac  - hgb a1c  11.9  - diabetic diet  - FS qid  - endo consult appreciated    MARIKA   - stop lasix and lisinopril  - ivf hydration  - follow creatinine  - nephrology following    HTN   -   hold hydralazine     hyponatremia- NO salt restriction- no HCTZ

## 2019-06-02 NOTE — PROGRESS NOTE ADULT - ASSESSMENT
57 y/o male pt s/p I&D & Debridement  - pt seen and evaluated   - Continue IV ABX as per ID  - further incision and drainage with debridement preformed at bedside distally through skin, subQ, fascia and not beyond, sharp excisional debridement through skin, subQ, fascia and not beyond, 3 cc of purulence expressed  - irrigated with copious amounts of saline solution and repacked   - will continue to monitor   - seen w/ attending 57 y/o male pt s/p I&D & Debridement  - pt seen and evaluated   - Continue IV ABX as per ID  - further incision and drainage with debridement preformed at bedside distally/proximal through R plantar foot skin, subQ, fascia and expressed, 3 cc of sero- purulent fluid expressed consistent w/ recent MRI findings  - irrigated with copious amounts of saline solution and repacked w/ Iodosorb packing soaked w/ NS & applied well padded DSD  - will continue to monitor closely  - The patient was seen today w/ attending

## 2019-06-03 DIAGNOSIS — E87.2 ACIDOSIS: ICD-10-CM

## 2019-06-03 DIAGNOSIS — D64.9 ANEMIA, UNSPECIFIED: ICD-10-CM

## 2019-06-03 DIAGNOSIS — N18.3 CHRONIC KIDNEY DISEASE, STAGE 3 (MODERATE): ICD-10-CM

## 2019-06-03 LAB
ANION GAP SERPL CALC-SCNC: 14 MMOL/L — SIGNIFICANT CHANGE UP (ref 5–17)
BUN SERPL-MCNC: 28 MG/DL — HIGH (ref 7–23)
CALCIUM SERPL-MCNC: 9.1 MG/DL — SIGNIFICANT CHANGE UP (ref 8.4–10.5)
CHLORIDE SERPL-SCNC: 105 MMOL/L — SIGNIFICANT CHANGE UP (ref 96–108)
CO2 SERPL-SCNC: 21 MMOL/L — LOW (ref 22–31)
CREAT SERPL-MCNC: 2.11 MG/DL — HIGH (ref 0.5–1.3)
GLUCOSE BLDC GLUCOMTR-MCNC: 101 MG/DL — HIGH (ref 70–99)
GLUCOSE BLDC GLUCOMTR-MCNC: 125 MG/DL — HIGH (ref 70–99)
GLUCOSE BLDC GLUCOMTR-MCNC: 136 MG/DL — HIGH (ref 70–99)
GLUCOSE BLDC GLUCOMTR-MCNC: 164 MG/DL — HIGH (ref 70–99)
GLUCOSE BLDC GLUCOMTR-MCNC: 84 MG/DL — SIGNIFICANT CHANGE UP (ref 70–99)
GLUCOSE SERPL-MCNC: 71 MG/DL — SIGNIFICANT CHANGE UP (ref 70–99)
HCT VFR BLD CALC: 31.1 % — LOW (ref 39–50)
HGB BLD-MCNC: 9.4 G/DL — LOW (ref 13–17)
MCHC RBC-ENTMCNC: 28.6 PG — SIGNIFICANT CHANGE UP (ref 27–34)
MCHC RBC-ENTMCNC: 30.4 GM/DL — LOW (ref 32–36)
MCV RBC AUTO: 94.2 FL — SIGNIFICANT CHANGE UP (ref 80–100)
PLATELET # BLD AUTO: 483 K/UL — HIGH (ref 150–400)
POTASSIUM SERPL-MCNC: 4.8 MMOL/L — SIGNIFICANT CHANGE UP (ref 3.5–5.3)
POTASSIUM SERPL-SCNC: 4.8 MMOL/L — SIGNIFICANT CHANGE UP (ref 3.5–5.3)
RBC # BLD: 3.3 M/UL — LOW (ref 4.2–5.8)
RBC # FLD: 12.6 % — SIGNIFICANT CHANGE UP (ref 10.3–14.5)
SODIUM SERPL-SCNC: 140 MMOL/L — SIGNIFICANT CHANGE UP (ref 135–145)
WBC # BLD: 14.4 K/UL — HIGH (ref 3.8–10.5)
WBC # FLD AUTO: 14.4 K/UL — HIGH (ref 3.8–10.5)

## 2019-06-03 PROCEDURE — 99232 SBSQ HOSP IP/OBS MODERATE 35: CPT

## 2019-06-03 PROCEDURE — 99233 SBSQ HOSP IP/OBS HIGH 50: CPT

## 2019-06-03 RX ORDER — INSULIN GLARGINE 100 [IU]/ML
32 INJECTION, SOLUTION SUBCUTANEOUS AT BEDTIME
Refills: 0 | Status: DISCONTINUED | OUTPATIENT
Start: 2019-06-03 | End: 2019-06-05

## 2019-06-03 RX ORDER — ASCORBIC ACID 60 MG
500 TABLET,CHEWABLE ORAL DAILY
Refills: 0 | Status: DISCONTINUED | OUTPATIENT
Start: 2019-06-03 | End: 2019-06-05

## 2019-06-03 RX ORDER — HYDRALAZINE HCL 50 MG
100 TABLET ORAL THREE TIMES A DAY
Refills: 0 | Status: DISCONTINUED | OUTPATIENT
Start: 2019-06-03 | End: 2019-06-05

## 2019-06-03 RX ORDER — INSULIN LISPRO 100/ML
18 VIAL (ML) SUBCUTANEOUS
Refills: 0 | Status: DISCONTINUED | OUTPATIENT
Start: 2019-06-03 | End: 2019-06-05

## 2019-06-03 RX ORDER — LACTULOSE 10 G/15ML
20 SOLUTION ORAL EVERY 4 HOURS
Refills: 0 | Status: DISCONTINUED | OUTPATIENT
Start: 2019-06-03 | End: 2019-06-05

## 2019-06-03 RX ADMIN — POLYETHYLENE GLYCOL 3350 17 GRAM(S): 17 POWDER, FOR SOLUTION ORAL at 13:15

## 2019-06-03 RX ADMIN — OXYCODONE AND ACETAMINOPHEN 2 TABLET(S): 5; 325 TABLET ORAL at 00:48

## 2019-06-03 RX ADMIN — Medication 100 MILLIGRAM(S): at 22:23

## 2019-06-03 RX ADMIN — Medication 100 MILLIGRAM(S): at 13:14

## 2019-06-03 RX ADMIN — LACTULOSE 20 GRAM(S): 10 SOLUTION ORAL at 13:15

## 2019-06-03 RX ADMIN — PIPERACILLIN AND TAZOBACTAM 25 GRAM(S): 4; .5 INJECTION, POWDER, LYOPHILIZED, FOR SOLUTION INTRAVENOUS at 08:19

## 2019-06-03 RX ADMIN — OXYCODONE AND ACETAMINOPHEN 2 TABLET(S): 5; 325 TABLET ORAL at 13:43

## 2019-06-03 RX ADMIN — HEPARIN SODIUM 5000 UNIT(S): 5000 INJECTION INTRAVENOUS; SUBCUTANEOUS at 22:23

## 2019-06-03 RX ADMIN — Medication 81 MILLIGRAM(S): at 13:14

## 2019-06-03 RX ADMIN — INSULIN GLARGINE 32 UNIT(S): 100 INJECTION, SOLUTION SUBCUTANEOUS at 22:23

## 2019-06-03 RX ADMIN — Medication 75 MILLIGRAM(S): at 06:07

## 2019-06-03 RX ADMIN — HEPARIN SODIUM 5000 UNIT(S): 5000 INJECTION INTRAVENOUS; SUBCUTANEOUS at 13:14

## 2019-06-03 RX ADMIN — PIPERACILLIN AND TAZOBACTAM 25 GRAM(S): 4; .5 INJECTION, POWDER, LYOPHILIZED, FOR SOLUTION INTRAVENOUS at 16:47

## 2019-06-03 RX ADMIN — OXYCODONE AND ACETAMINOPHEN 2 TABLET(S): 5; 325 TABLET ORAL at 14:13

## 2019-06-03 RX ADMIN — HEPARIN SODIUM 5000 UNIT(S): 5000 INJECTION INTRAVENOUS; SUBCUTANEOUS at 06:03

## 2019-06-03 RX ADMIN — OXYCODONE AND ACETAMINOPHEN 2 TABLET(S): 5; 325 TABLET ORAL at 01:18

## 2019-06-03 RX ADMIN — Medication 500 MILLIGRAM(S): at 13:18

## 2019-06-03 RX ADMIN — PIPERACILLIN AND TAZOBACTAM 25 GRAM(S): 4; .5 INJECTION, POWDER, LYOPHILIZED, FOR SOLUTION INTRAVENOUS at 00:49

## 2019-06-03 RX ADMIN — Medication 1 TABLET(S): at 13:18

## 2019-06-03 RX ADMIN — Medication 1 APPLICATION(S): at 13:15

## 2019-06-03 NOTE — DIETITIAN INITIAL EVALUATION ADULT. - NS AS NUTRI INTERV ED CONTENT
Simple diet teaching for wound healing as well as diabetes but patient unable to engage./Other (specify)

## 2019-06-03 NOTE — PROGRESS NOTE ADULT - SUBJECTIVE AND OBJECTIVE BOX
Patient is a 58y old  Male who presents with a chief complaint of right foot ulcer (03 Jun 2019 16:17)      Vascular Surgery Attending Progress Note    Interval HPI: pt w/o c/o     Medications:  acetaminophen   Tablet .. 650 milliGRAM(s) Oral every 6 hours PRN  ascorbic acid 500 milliGRAM(s) Oral daily  aspirin enteric coated 81 milliGRAM(s) Oral daily  Dakins Solution - 1/4 Strength 1 Application(s) Topical daily  dextrose 40% Gel 15 Gram(s) Oral once PRN  dextrose 40% Gel 15 Gram(s) Oral once PRN  dextrose 5%. 1000 milliLiter(s) IV Continuous <Continuous>  dextrose 5%. 1000 milliLiter(s) IV Continuous <Continuous>  dextrose 50% Injectable 12.5 Gram(s) IV Push once  dextrose 50% Injectable 25 Gram(s) IV Push once  dextrose 50% Injectable 25 Gram(s) IV Push once  dextrose 50% Injectable 12.5 Gram(s) IV Push once  dextrose 50% Injectable 25 Gram(s) IV Push once  dextrose 50% Injectable 25 Gram(s) IV Push once  ergocalciferol 78787 Unit(s) Oral every week  glucagon  Injectable 1 milliGRAM(s) IntraMuscular once PRN  heparin  Injectable 5000 Unit(s) SubCutaneous every 8 hours  hydrALAZINE 100 milliGRAM(s) Oral three times a day  insulin glargine Injectable (LANTUS) 32 Unit(s) SubCutaneous at bedtime  insulin lispro (HumaLOG) corrective regimen sliding scale   SubCutaneous three times a day before meals  insulin lispro (HumaLOG) corrective regimen sliding scale   SubCutaneous at bedtime  insulin lispro Injectable (HumaLOG) 18 Unit(s) SubCutaneous three times a day before meals  lactulose Syrup 20 Gram(s) Oral every 4 hours  morphine  - Injectable 2 milliGRAM(s) IV Push every 6 hours PRN  multivitamin 1 Tablet(s) Oral daily  oxyCODONE    5 mG/acetaminophen 325 mG 2 Tablet(s) Oral every 4 hours PRN  oxyCODONE    IR 10 milliGRAM(s) Oral once  piperacillin/tazobactam IVPB. 3.375 Gram(s) IV Intermittent every 8 hours  polyethylene glycol 3350 17 Gram(s) Oral daily  senna 2 Tablet(s) Oral at bedtime      Vital Signs Last 24 Hrs  T(C): 37.2 (03 Jun 2019 13:13), Max: 38.1 (03 Jun 2019 01:20)  T(F): 99 (03 Jun 2019 13:13), Max: 100.6 (03 Jun 2019 01:20)  HR: 93 (03 Jun 2019 13:13) (75 - 93)  BP: 172/83 (03 Jun 2019 13:13) (134/69 - 206/70)  BP(mean): --  RR: 17 (03 Jun 2019 13:13) (16 - 17)  SpO2: 93% (03 Jun 2019 13:13) (92% - 95%)  I&O's Summary    02 Jun 2019 07:01  -  03 Jun 2019 07:00  --------------------------------------------------------  IN: 740 mL / OUT: 1350 mL / NET: -610 mL    03 Jun 2019 07:01  -  03 Jun 2019 16:26  --------------------------------------------------------  IN: 200 mL / OUT: 600 mL / NET: -400 mL        Physical Exam:  Neuro  A&Ox3 VSS  Vascular:  stable dressing c/d/i    LABS:                        9.4    14.4  )-----------( 483      ( 03 Jun 2019 07:15 )             31.1     06-03    140  |  105  |  28<H>  ----------------------------<  71  4.8   |  21<L>  |  2.11<H>    Ca    9.1      03 Jun 2019 07:12          CHRISTIANO KERR MD  [phone]

## 2019-06-03 NOTE — DIETITIAN INITIAL EVALUATION ADULT. - NS AS NUTRI DX NUTRIENT
Protein, calories and micronutrients/Inadequate protein-energy intake/Increased nutrient needs (specify)

## 2019-06-03 NOTE — PROGRESS NOTE ADULT - SUBJECTIVE AND OBJECTIVE BOX
AGUSTIN BRAR 58y [id number]    Patient is a 58y old  Male who presents with a chief complaint of right foot ulcer (03 Jun 2019 11:45)      Follow Up/CC:  ID following for fever    Interval History/ROS: no fever, possible debridement on wednesday    Allergies    No Known Allergies    Intolerances        ANTIMICROBIALS:  piperacillin/tazobactam IVPB. 3.375 every 8 hours      MEDICATIONS  (STANDING):  ascorbic acid 500 milliGRAM(s) Oral daily  aspirin enteric coated 81 milliGRAM(s) Oral daily  Dakins Solution - 1/4 Strength 1 Application(s) Topical daily  dextrose 5%. 1000 milliLiter(s) (50 mL/Hr) IV Continuous <Continuous>  dextrose 5%. 1000 milliLiter(s) (50 mL/Hr) IV Continuous <Continuous>  dextrose 50% Injectable 12.5 Gram(s) IV Push once  dextrose 50% Injectable 25 Gram(s) IV Push once  dextrose 50% Injectable 25 Gram(s) IV Push once  dextrose 50% Injectable 12.5 Gram(s) IV Push once  dextrose 50% Injectable 25 Gram(s) IV Push once  dextrose 50% Injectable 25 Gram(s) IV Push once  ergocalciferol 93560 Unit(s) Oral every week  heparin  Injectable 5000 Unit(s) SubCutaneous every 8 hours  hydrALAZINE 100 milliGRAM(s) Oral three times a day  insulin glargine Injectable (LANTUS) 32 Unit(s) SubCutaneous at bedtime  insulin lispro (HumaLOG) corrective regimen sliding scale   SubCutaneous three times a day before meals  insulin lispro (HumaLOG) corrective regimen sliding scale   SubCutaneous at bedtime  insulin lispro Injectable (HumaLOG) 18 Unit(s) SubCutaneous three times a day before meals  lactulose Syrup 20 Gram(s) Oral every 4 hours  multivitamin 1 Tablet(s) Oral daily  oxyCODONE    IR 10 milliGRAM(s) Oral once  piperacillin/tazobactam IVPB. 3.375 Gram(s) IV Intermittent every 8 hours  polyethylene glycol 3350 17 Gram(s) Oral daily  senna 2 Tablet(s) Oral at bedtime    MEDICATIONS  (PRN):  acetaminophen   Tablet .. 650 milliGRAM(s) Oral every 6 hours PRN Mild Pain (1 - 3)  dextrose 40% Gel 15 Gram(s) Oral once PRN Blood Glucose LESS THAN 70 milliGRAM(s)/deciliter  dextrose 40% Gel 15 Gram(s) Oral once PRN Blood Glucose LESS THAN 70 milliGRAM(s)/deciliter  glucagon  Injectable 1 milliGRAM(s) IntraMuscular once PRN Glucose LESS THAN 70 milligrams/deciliter  morphine  - Injectable 2 milliGRAM(s) IV Push every 6 hours PRN Moderate Pain (4 - 6)  oxyCODONE    5 mG/acetaminophen 325 mG 2 Tablet(s) Oral every 4 hours PRN Moderate Pain (4 - 6)        Vital Signs Last 24 Hrs  T(C): 37.2 (03 Jun 2019 13:13), Max: 38.1 (03 Jun 2019 01:20)  T(F): 99 (03 Jun 2019 13:13), Max: 100.6 (03 Jun 2019 01:20)  HR: 93 (03 Jun 2019 13:13) (75 - 93)  BP: 172/83 (03 Jun 2019 13:13) (134/69 - 206/70)  BP(mean): --  RR: 17 (03 Jun 2019 13:13) (16 - 17)  SpO2: 93% (03 Jun 2019 13:13) (92% - 95%)    CBC Full  -  ( 03 Jun 2019 07:15 )  WBC Count : 14.4 K/uL  RBC Count : 3.30 M/uL  Hemoglobin : 9.4 g/dL  Hematocrit : 31.1 %  Platelet Count - Automated : 483 K/uL  Mean Cell Volume : 94.2 fl  Mean Cell Hemoglobin : 28.6 pg  Mean Cell Hemoglobin Concentration : 30.4 gm/dL  Auto Neutrophil # : x  Auto Lymphocyte # : x  Auto Monocyte # : x  Auto Eosinophil # : x  Auto Basophil # : x  Auto Neutrophil % : x  Auto Lymphocyte % : x  Auto Monocyte % : x  Auto Eosinophil % : x  Auto Basophil % : x    06-03    140  |  105  |  28<H>  ----------------------------<  71  4.8   |  21<L>  |  2.11<H>    Ca    9.1      03 Jun 2019 07:12            MICROBIOLOGY:  .Other  05-28-19   Testing in progress  --  Enterococcus faecalis  Escherichia coli      .Blood  05-26-19   No growth at 5 days.  --  --      .Blood  05-25-19   No growth at 5 days.  --  --      .Tissue  05-23-19   Few Escherichia coli  Growth in fluid media only Enterococcus faecalis  Rare Streptococcus agalactiae (Group B) isolated  Group B streptococci are susceptible to ampicillin,  penicillin and cefazolin, but may be resistant to  erythromycin and clindamycin.  Recommendations for intrapartum prophylaxis for Group B  streptococci are penicillin or ampicillin.  --  Escherichia coli  Enterococcus faecalis      .Blood  05-20-19   No growth at 5 days.  --  --      .Abscess  05-20-19   Numerous Escherichia coli  Numerous Enterococcus faecalis  Moderate Prevotella bivia "Susceptibilities not performed"  --  Enterococcus faecalis  Escherichia coli      RADIOLOGY

## 2019-06-03 NOTE — DIETITIAN INITIAL EVALUATION ADULT. - PERTINENT MEDS FT
MEDICATIONS  (STANDING):  aspirin enteric coated 81 milliGRAM(s) Oral daily  Dakins Solution - 1/4 Strength 1 Application(s) Topical daily  dextrose 5%. 1000 milliLiter(s) (50 mL/Hr) IV Continuous <Continuous>  dextrose 5%. 1000 milliLiter(s) (50 mL/Hr) IV Continuous <Continuous>  dextrose 50% Injectable 12.5 Gram(s) IV Push once  dextrose 50% Injectable 25 Gram(s) IV Push once  dextrose 50% Injectable 25 Gram(s) IV Push once  dextrose 50% Injectable 12.5 Gram(s) IV Push once  dextrose 50% Injectable 25 Gram(s) IV Push once  dextrose 50% Injectable 25 Gram(s) IV Push once  ergocalciferol 91900 Unit(s) Oral every week  heparin  Injectable 5000 Unit(s) SubCutaneous every 8 hours  hydrALAZINE 100 milliGRAM(s) Oral three times a day  insulin glargine Injectable (LANTUS) 32 Unit(s) SubCutaneous at bedtime  insulin lispro (HumaLOG) corrective regimen sliding scale   SubCutaneous three times a day before meals  insulin lispro (HumaLOG) corrective regimen sliding scale   SubCutaneous at bedtime  insulin lispro Injectable (HumaLOG) 18 Unit(s) SubCutaneous three times a day before meals  oxyCODONE    IR 10 milliGRAM(s) Oral once  piperacillin/tazobactam IVPB. 3.375 Gram(s) IV Intermittent every 8 hours  polyethylene glycol 3350 17 Gram(s) Oral daily  senna 2 Tablet(s) Oral at bedtime  sodium chloride 0.9%. 1000 milliLiter(s) (50 mL/Hr) IV Continuous <Continuous>    MEDICATIONS  (PRN):  acetaminophen   Tablet .. 650 milliGRAM(s) Oral every 6 hours PRN Mild Pain (1 - 3)  dextrose 40% Gel 15 Gram(s) Oral once PRN Blood Glucose LESS THAN 70 milliGRAM(s)/deciliter  dextrose 40% Gel 15 Gram(s) Oral once PRN Blood Glucose LESS THAN 70 milliGRAM(s)/deciliter  glucagon  Injectable 1 milliGRAM(s) IntraMuscular once PRN Glucose LESS THAN 70 milligrams/deciliter  morphine  - Injectable 2 milliGRAM(s) IV Push every 6 hours PRN Moderate Pain (4 - 6)  oxyCODONE    5 mG/acetaminophen 325 mG 2 Tablet(s) Oral every 4 hours PRN Moderate Pain (4 - 6)

## 2019-06-03 NOTE — PROGRESS NOTE ADULT - ASSESSMENT
Assessment  DMT2: 58y Male with DM T2 with hyperglycemia, on insulin, blood sugars trending down now, not eating full meals, no hypoglycemic episode, non compliant with low carb diet.  Foot wound: on medications, no chest pain, stable, monitored.  HTN: Controlled,  on antihypertensive medications.  Overweight/Obesity: No strict exercise routines, not on any weight loss program, neither on low calorie diet.    Andrzej Zhu MD  Cell: [phone]  Office: [phone]

## 2019-06-03 NOTE — DIETITIAN INITIAL EVALUATION ADULT. - PERTINENT LABORATORY DATA
Na 140, K+ 4.8, BUN 28, Cr 2.11, BG 71, Phos --, Alk Phos --, AST --, ALT --, Mg --, Ca 9.1, HbA1c --

## 2019-06-03 NOTE — PROGRESS NOTE ADULT - PROBLEM SELECTOR PLAN 1
Will decrease Lantus to 32 units at bed time.  Will decrease Humalog to 18 units before each meal in addition to Humalog correction scale coverage.  Patient counseled for compliance with consistent low carb diet.

## 2019-06-03 NOTE — PROGRESS NOTE ADULT - SUBJECTIVE AND OBJECTIVE BOX
Chief complaint  Patient is a 58y old  Male who presents with a chief complaint of right foot ulcer (03 Jun 2019 07:52)   Review of systems  Patient in bed, looks comfortable, no fever, no hypoglycemia.    Labs and Fingersticks  CAPILLARY BLOOD GLUCOSE      POCT Blood Glucose.: 84 mg/dL (03 Jun 2019 06:27)  POCT Blood Glucose.: 116 mg/dL (02 Jun 2019 22:11)  POCT Blood Glucose.: 112 mg/dL (02 Jun 2019 17:48)  POCT Blood Glucose.: 99 mg/dL (02 Jun 2019 14:03)      Anion Gap, Serum: 14 (06-03 @ 07:12)  Anion Gap, Serum: 12 (06-02 @ 06:22)      Calcium, Total Serum: 9.1 (06-03 @ 07:12)  Calcium, Total Serum: 8.8 (06-02 @ 06:22)          06-03    140  |  105  |  28<H>  ----------------------------<  71  4.8   |  21<L>  |  2.11<H>    Ca    9.1      03 Jun 2019 07:12                          9.4    14.4  )-----------( 483      ( 03 Jun 2019 07:15 )             31.1     Medications  MEDICATIONS  (STANDING):  aspirin enteric coated 81 milliGRAM(s) Oral daily  Dakins Solution - 1/4 Strength 1 Application(s) Topical daily  dextrose 5%. 1000 milliLiter(s) (50 mL/Hr) IV Continuous <Continuous>  dextrose 5%. 1000 milliLiter(s) (50 mL/Hr) IV Continuous <Continuous>  dextrose 50% Injectable 12.5 Gram(s) IV Push once  dextrose 50% Injectable 25 Gram(s) IV Push once  dextrose 50% Injectable 25 Gram(s) IV Push once  dextrose 50% Injectable 12.5 Gram(s) IV Push once  dextrose 50% Injectable 25 Gram(s) IV Push once  dextrose 50% Injectable 25 Gram(s) IV Push once  ergocalciferol 42457 Unit(s) Oral every week  heparin  Injectable 5000 Unit(s) SubCutaneous every 8 hours  hydrALAZINE 75 milliGRAM(s) Oral three times a day  insulin glargine Injectable (LANTUS) 32 Unit(s) SubCutaneous at bedtime  insulin lispro (HumaLOG) corrective regimen sliding scale   SubCutaneous three times a day before meals  insulin lispro (HumaLOG) corrective regimen sliding scale   SubCutaneous at bedtime  insulin lispro Injectable (HumaLOG) 18 Unit(s) SubCutaneous three times a day before meals  oxyCODONE    IR 10 milliGRAM(s) Oral once  piperacillin/tazobactam IVPB. 3.375 Gram(s) IV Intermittent every 8 hours  polyethylene glycol 3350 17 Gram(s) Oral daily  senna 2 Tablet(s) Oral at bedtime  sodium chloride 0.9%. 1000 milliLiter(s) (50 mL/Hr) IV Continuous <Continuous>      Physical Exam  General: Patient comfortable in bed  Vital Signs Last 12 Hrs  T(F): 99.5 (06-03-19 @ 05:58), Max: 100.6 (06-03-19 @ 01:20)  HR: 92 (06-03-19 @ 05:58) (86 - 93)  BP: 178/78 (06-03-19 @ 05:58) (160/76 - 206/70)  BP(mean): --  RR: 16 (06-03-19 @ 05:58) (16 - 17)  SpO2: 95% (06-03-19 @ 05:58) (92% - 95%)  Neck: No palpable thyroid nodules.  CVS: S1S2, No murmurs  Respiratory: No wheezing, no crepitations  GI: Abdomen soft, bowel sounds positive  Musculoskeletal:  edema lower extremities.   Skin: No skin rashes, no ecchymosis    Diagnostics    Vitamin D, 1, 25-Dihydroxy: AM Sched. Collection: 22-May-2019 06:00 (05-21 @ 18:59)  Vitamin D, 25-Hydroxy: AM Sched. Collection: 22-May-2019 06:00 (05-21 @ 18:59)

## 2019-06-03 NOTE — PROGRESS NOTE ADULT - SUBJECTIVE AND OBJECTIVE BOX
Subjective: Patient seen and examined. No new events except as noted.   no cp or sob   feels hungry   Mild foot pain     REVIEW OF SYSTEMS:    CONSTITUTIONAL: + weakness, fevers or chills  EYES/ENT: No visual changes;  No vertigo or throat pain   NECK: No pain or stiffness  RESPIRATORY: No cough, wheezing, hemoptysis; No shortness of breath  CARDIOVASCULAR: No chest pain or palpitations  GASTROINTESTINAL: No abdominal or epigastric pain. No nausea, vomiting, or hematemesis; No diarrhea or constipation. No melena or hematochezia.  GENITOURINARY: No dysuria, frequency or hematuria  NEUROLOGICAL: No numbness or weakness  SKIN: No itching, burning, rashes, or lesions   All other review of systems is negative unless indicated above.    MEDICATIONS:  MEDICATIONS  (STANDING):  aspirin enteric coated 81 milliGRAM(s) Oral daily  Dakins Solution - 1/4 Strength 1 Application(s) Topical daily  dextrose 5%. 1000 milliLiter(s) (50 mL/Hr) IV Continuous <Continuous>  dextrose 5%. 1000 milliLiter(s) (50 mL/Hr) IV Continuous <Continuous>  dextrose 50% Injectable 12.5 Gram(s) IV Push once  dextrose 50% Injectable 25 Gram(s) IV Push once  dextrose 50% Injectable 25 Gram(s) IV Push once  dextrose 50% Injectable 12.5 Gram(s) IV Push once  dextrose 50% Injectable 25 Gram(s) IV Push once  dextrose 50% Injectable 25 Gram(s) IV Push once  ergocalciferol 84182 Unit(s) Oral every week  heparin  Injectable 5000 Unit(s) SubCutaneous every 8 hours  hydrALAZINE 75 milliGRAM(s) Oral three times a day  insulin glargine Injectable (LANTUS) 32 Unit(s) SubCutaneous at bedtime  insulin lispro (HumaLOG) corrective regimen sliding scale   SubCutaneous three times a day before meals  insulin lispro (HumaLOG) corrective regimen sliding scale   SubCutaneous at bedtime  insulin lispro Injectable (HumaLOG) 18 Unit(s) SubCutaneous three times a day before meals  oxyCODONE    IR 10 milliGRAM(s) Oral once  piperacillin/tazobactam IVPB. 3.375 Gram(s) IV Intermittent every 8 hours  polyethylene glycol 3350 17 Gram(s) Oral daily  senna 2 Tablet(s) Oral at bedtime  sodium chloride 0.9%. 1000 milliLiter(s) (50 mL/Hr) IV Continuous <Continuous>      PHYSICAL EXAM:  T(C): 37.5 (06-03-19 @ 05:58), Max: 38.1 (06-03-19 @ 01:20)  HR: 92 (06-03-19 @ 05:58) (75 - 93)  BP: 178/78 (06-03-19 @ 05:58) (134/69 - 206/70)  RR: 16 (06-03-19 @ 05:58) (16 - 18)  SpO2: 95% (06-03-19 @ 05:58) (92% - 95%)  Wt(kg): --  I&O's Summary    02 Jun 2019 07:01  -  03 Jun 2019 07:00  --------------------------------------------------------  IN: 740 mL / OUT: 1350 mL / NET: -610 mL    03 Jun 2019 07:01  -  03 Jun 2019 09:42  --------------------------------------------------------  IN: 100 mL / OUT: 0 mL / NET: 100 mL            Appearance: NAD   HEENT:   Normal oral mucosa, PERRL, EOMI	  Lymphatic: No lymphadenopathy , no edema  Cardiovascular: Normal S1 S2, No JVD, No murmurs , Peripheral pulses palpable 2+ bilaterally  Respiratory: Lungs clear to auscultation, normal effort 	  Gastrointestinal:  Soft, Non-tender, + BS	  Skin: No rashes, No ecchymoses, No cyanosis, warm to touch  Musculoskeletal: Decreased range of motion, normal strength  Psychiatry:  Mood & affect appropriate  Ext: +edema , chronic venous stasis skin discoloration   right foot wrapped , + ulcer         LABS:    CARDIAC MARKERS:                                9.4    14.4  )-----------( 483      ( 03 Jun 2019 07:15 )             31.1     06-03    140  |  105  |  28<H>  ----------------------------<  71  4.8   |  21<L>  |  2.11<H>    Ca    9.1      03 Jun 2019 07:12      proBNP:   Lipid Profile:   HgA1c:   TSH:             TELEMETRY: 	    ECG:  	  RADIOLOGY:   DIAGNOSTIC TESTING:  [ ] Echocardiogram:  [ ]  Catheterization:  [ ] Stress Test:    OTHER:

## 2019-06-03 NOTE — PROGRESS NOTE ADULT - SUBJECTIVE AND OBJECTIVE BOX
[facility] DIVISION OF KIDNEY DISEASES AND HYPERTENSION -- FOLLOW UP NOTE  --------------------------------------------------------------------------------  Chief Complaint:/subjective: no acute events, decreased po, decreased appetite per pt    24 hour events: as above        PAST HISTORY  --------------------------------------------------------------------------------  No significant changes to PMH, PSH, FHx, SHx, unless otherwise noted    ALLERGIES & MEDICATIONS  --------------------------------------------------------------------------------  Allergies    No Known Allergies    Intolerances      Standing Inpatient Medications  aspirin enteric coated 81 milliGRAM(s) Oral daily  Dakins Solution - 1/4 Strength 1 Application(s) Topical daily  dextrose 5%. 1000 milliLiter(s) IV Continuous <Continuous>  dextrose 5%. 1000 milliLiter(s) IV Continuous <Continuous>  dextrose 50% Injectable 12.5 Gram(s) IV Push once  dextrose 50% Injectable 25 Gram(s) IV Push once  dextrose 50% Injectable 25 Gram(s) IV Push once  dextrose 50% Injectable 12.5 Gram(s) IV Push once  dextrose 50% Injectable 25 Gram(s) IV Push once  dextrose 50% Injectable 25 Gram(s) IV Push once  ergocalciferol 42968 Unit(s) Oral every week  heparin  Injectable 5000 Unit(s) SubCutaneous every 8 hours  hydrALAZINE 100 milliGRAM(s) Oral three times a day  insulin glargine Injectable (LANTUS) 32 Unit(s) SubCutaneous at bedtime  insulin lispro (HumaLOG) corrective regimen sliding scale   SubCutaneous three times a day before meals  insulin lispro (HumaLOG) corrective regimen sliding scale   SubCutaneous at bedtime  insulin lispro Injectable (HumaLOG) 18 Unit(s) SubCutaneous three times a day before meals  oxyCODONE    IR 10 milliGRAM(s) Oral once  piperacillin/tazobactam IVPB. 3.375 Gram(s) IV Intermittent every 8 hours  polyethylene glycol 3350 17 Gram(s) Oral daily  senna 2 Tablet(s) Oral at bedtime  sodium chloride 0.9%. 1000 milliLiter(s) IV Continuous <Continuous>    PRN Inpatient Medications  acetaminophen   Tablet .. 650 milliGRAM(s) Oral every 6 hours PRN  dextrose 40% Gel 15 Gram(s) Oral once PRN  dextrose 40% Gel 15 Gram(s) Oral once PRN  glucagon  Injectable 1 milliGRAM(s) IntraMuscular once PRN  morphine  - Injectable 2 milliGRAM(s) IV Push every 6 hours PRN  oxyCODONE    5 mG/acetaminophen 325 mG 2 Tablet(s) Oral every 4 hours PRN      REVIEW OF SYSTEMS  --------------------------------------------------------------------------------  Gen: No weight changes, fatigue, fevers/chills, weakness  Skin: No rashes  Head/Eyes/Ears/Mouth: No headache;   Respiratory: No dyspnea, cough  CV: No chest pain, PND, orthopnea  GI: No abdominal pain, diarrhea, constipation, nausea, vomiting  : No increased frequency, dysuria, hematuria, nocturia  MSK: No joint pain/swelling; no back pain; no edema  Neuro: No dizziness/lightheadedness, weakness  Heme: No easy bruising or bleeding  Psych: No significant nervousness, anxiety, stress, depression    All other systems were reviewed and are negative, except as noted.    VITALS/PHYSICAL EXAM  --------------------------------------------------------------------------------  T(C): 37.5 (06-03-19 @ 05:58), Max: 38.1 (06-03-19 @ 01:20)  HR: 92 (06-03-19 @ 05:58) (75 - 93)  BP: 178/78 (06-03-19 @ 05:58) (134/69 - 206/70)  RR: 16 (06-03-19 @ 05:58) (16 - 18)  SpO2: 95% (06-03-19 @ 05:58) (92% - 95%)  Wt(kg): --  Adult Advanced Hemodynamics Last 24 Hrs  ABP: --  ABP(mean): --  CVP(mm Hg): --  CO: --  CI: --  PA: --  PA(mean): --  PCWP: --  SVR: --  SVRI: --        06-02-19 @ 07:01  -  06-03-19 @ 07:00  --------------------------------------------------------  IN: 740 mL / OUT: 1350 mL / NET: -610 mL    06-03-19 @ 07:01  -  06-03-19 @ 10:10  --------------------------------------------------------  IN: 100 mL / OUT: 0 mL / NET: 100 mL      Physical Exam:  	Gen: NAD,    	HEENT:   no jvp  	Pulm: CTA B/L  	CV: RRR, S1S2; no rub  	Back:   no sacral edema  	Abd: +BS, soft, nontender/nondistended  	: No suprapubic tenderness  	Ext: no edema; r leg/foot bandaged  	Neuro:awake  	Psych: alert  	Skin: Warm,   	Vascular access:    LABS/STUDIES  --------------------------------------------------------------------------------              9.4    14.4  >-----------<  483      [06-03-19 @ 07:15]              31.1     Hemoglobin: 9.4 g/dL (06-03-19 @ 07:15)  Hemoglobin: 9.4 g/dL (06-02-19 @ 06:22)    Platelet Count - Automated: 483 K/uL (06-03-19 @ 07:15)  Platelet Count - Automated: 365 K/uL (06-02-19 @ 06:22)    140  |  105  |  28  ----------------------------<  71      [06-03-19 @ 07:12]  4.8   |  21  |  2.11        Ca     9.1     [06-03-19 @ 07:12]            Creatinine Trend:  SCr 2.11 [06-03 @ 07:12]  SCr 2.19 [06-02 @ 06:22]  SCr 2.21 [06-01 @ 06:56]  SCr 2.23 [05-31 @ 07:12]  SCr 2.72 [05-30 @ 07:01]    Urinalysis - [05-25-19 @ 14:07]      Color Yellow / Appearance Slightly Turbid / SG 1.018 / pH 6.0      Gluc Negative / Ketone Trace  / Bili Negative / Urobili 3 mg/dL       Blood Negative / Protein 100 mg/dL / Leuk Est Negative / Nitrite Negative      RBC 2 / WBC 7 / Hyaline 3 / Gran  / Sq Epi  / Non Sq Epi 2 / Bacteria Negative      Vitamin D (25OH) 4.4      [05-22-19 @ 09:57]  HbA1c 11.8      [05-21-19 @ 11:29]    HCV 0.05, Nonreact      [05-21-19 @ 11:35]

## 2019-06-03 NOTE — PROGRESS NOTE ADULT - ASSESSMENT
diabetic foot ulcer with surrounding cellulitis   Fever resolved , leukocytosis persists  S/p debridement with podiatry 5/22 and 5/27  Culture polymicrobial--E coli, enterococcus, strep  - meropenem and vanco per ID  - F/U BCXs; repeat BCXs x 2  - Monitor for other possible source infection such as developing diarrhea  - podiatry and vascular fu   - scheduled for poss redebridement  - picc line ordered  - will need total 6 weeks of iv abx    uncontrolled Diabetes  - cont lantus 32 - novolog 18 units qac  - hgb a1c  11.9  - diabetic diet  - FS qid  - endo consult appreciated    MARIKA   - stop lasix and lisinopril  - ivf hydration  - follow creatinine  - nephrology following    HTN   -   hold hydralazine     hyponatremia  - NO salt restriction  - no HCTZ    anorexia  - add glucerna diabetic foot ulcer with surrounding cellulitis   Fever resolved , leukocytosis persists  S/p debridement with podiatry 5/22 and 5/27  Culture polymicrobial--E coli, enterococcus, strep  - on zosyn  per ID  - F/U BCXs; repeat BCXs x 2  - Monitor for other possible source infection such as developing diarrhea  - podiatry and vascular fu   - scheduled for poss redebridement  - picc line ordered  - will need total 6 weeks of iv abx    uncontrolled Diabetes  - cont lantus 32 - novolog 18 units qac  - hgb a1c  11.9  - diabetic diet  - FS qid  - endo consult appreciated    MARIKA   - stop lasix and lisinopril  - ivf hydration  - follow creatinine  - nephrology following    HTN   -   hold hydralazine     hyponatremia  - NO salt restriction  - no HCTZ    anorexia  - add glucerna

## 2019-06-03 NOTE — PROGRESS NOTE ADULT - ASSESSMENT
58M w/ DM (A1C 11.9), HTN, presents with nonhealing RLE heel ulcer s/p multiple debridements.     - reviewed MRI foot findings  continue woundcare and lyndsay as per podiatry   will follow

## 2019-06-03 NOTE — DIETITIAN INITIAL EVALUATION ADULT. - ENERGY NEEDS
HT 72 inches,  pounds, WT in 2015  232 pounds.  BMI 33.4,  pounds.   Dxd with Right foot ulcer, Stage 3 MARIKA, Poorly controlled diabetes on insulin.  Patient admits to not having an endocrinologist. Skin- Right foot ulcer s/p debridement.   A1c 2015 was 11.1% and now 11.8 %.

## 2019-06-03 NOTE — DIETITIAN INITIAL EVALUATION ADULT. - OTHER INFO
Patient referred for nutrition consult on rounds,  for poor po intake.  Patient found resting in bed but unable to fully engage with this RDN.  Patient unable to answer simple questions as to what he eats at home.  Reports to live with wife and mother.  Aware that A1c of 11.8% is  "not good".   Patient unable to state why he was not able to engage with the RDN but admitted that he was not up to education right now. Receptive to trying Glucerna Shakes in Strawberry flavor. At this time, there is no need for a strict renal Dash diet based on his labs.  Most important, patient has the need to eat more.  As of 11A.M., patient had not eaten breakfast tray.

## 2019-06-03 NOTE — PROGRESS NOTE ADULT - ASSESSMENT
58M with IDDM, HTN, and CKD 3 presenting admitted with a diabetic foot ulcer s/p debridement. creat in 2017 was 1.6, now with MARIKA

## 2019-06-03 NOTE — PROGRESS NOTE ADULT - SUBJECTIVE AND OBJECTIVE BOX
Patient is a 58y old  Male who presents with a chief complaint of right foot ulcer (03 Jun 2019 10:10)      SUBJECTIVE / OVERNIGHT EVENTS:  c/o poor appetite.    MEDICATIONS  (STANDING):  aspirin enteric coated 81 milliGRAM(s) Oral daily  Dakins Solution - 1/4 Strength 1 Application(s) Topical daily  dextrose 5%. 1000 milliLiter(s) (50 mL/Hr) IV Continuous <Continuous>  dextrose 5%. 1000 milliLiter(s) (50 mL/Hr) IV Continuous <Continuous>  dextrose 50% Injectable 12.5 Gram(s) IV Push once  dextrose 50% Injectable 25 Gram(s) IV Push once  dextrose 50% Injectable 25 Gram(s) IV Push once  dextrose 50% Injectable 12.5 Gram(s) IV Push once  dextrose 50% Injectable 25 Gram(s) IV Push once  dextrose 50% Injectable 25 Gram(s) IV Push once  ergocalciferol 70690 Unit(s) Oral every week  heparin  Injectable 5000 Unit(s) SubCutaneous every 8 hours  hydrALAZINE 100 milliGRAM(s) Oral three times a day  insulin glargine Injectable (LANTUS) 32 Unit(s) SubCutaneous at bedtime  insulin lispro (HumaLOG) corrective regimen sliding scale   SubCutaneous three times a day before meals  insulin lispro (HumaLOG) corrective regimen sliding scale   SubCutaneous at bedtime  insulin lispro Injectable (HumaLOG) 18 Unit(s) SubCutaneous three times a day before meals  oxyCODONE    IR 10 milliGRAM(s) Oral once  piperacillin/tazobactam IVPB. 3.375 Gram(s) IV Intermittent every 8 hours  polyethylene glycol 3350 17 Gram(s) Oral daily  senna 2 Tablet(s) Oral at bedtime  sodium chloride 0.9%. 1000 milliLiter(s) (50 mL/Hr) IV Continuous <Continuous>    MEDICATIONS  (PRN):  acetaminophen   Tablet .. 650 milliGRAM(s) Oral every 6 hours PRN Mild Pain (1 - 3)  dextrose 40% Gel 15 Gram(s) Oral once PRN Blood Glucose LESS THAN 70 milliGRAM(s)/deciliter  dextrose 40% Gel 15 Gram(s) Oral once PRN Blood Glucose LESS THAN 70 milliGRAM(s)/deciliter  glucagon  Injectable 1 milliGRAM(s) IntraMuscular once PRN Glucose LESS THAN 70 milligrams/deciliter  morphine  - Injectable 2 milliGRAM(s) IV Push every 6 hours PRN Moderate Pain (4 - 6)  oxyCODONE    5 mG/acetaminophen 325 mG 2 Tablet(s) Oral every 4 hours PRN Moderate Pain (4 - 6)      Vital Signs Last 24 Hrs  T(C): 37.5 (03 Jun 2019 05:58), Max: 38.1 (03 Jun 2019 01:20)  T(F): 99.5 (03 Jun 2019 05:58), Max: 100.6 (03 Jun 2019 01:20)  HR: 92 (03 Jun 2019 05:58) (75 - 93)  BP: 178/78 (03 Jun 2019 05:58) (134/69 - 206/70)  BP(mean): --  RR: 16 (03 Jun 2019 05:58) (16 - 18)  SpO2: 95% (03 Jun 2019 05:58) (92% - 95%)  CAPILLARY BLOOD GLUCOSE      POCT Blood Glucose.: 101 mg/dL (03 Jun 2019 09:34)  POCT Blood Glucose.: 84 mg/dL (03 Jun 2019 06:27)  POCT Blood Glucose.: 116 mg/dL (02 Jun 2019 22:11)  POCT Blood Glucose.: 112 mg/dL (02 Jun 2019 17:48)  POCT Blood Glucose.: 99 mg/dL (02 Jun 2019 14:03)    I&O's Summary    02 Jun 2019 07:01  -  03 Jun 2019 07:00  --------------------------------------------------------  IN: 740 mL / OUT: 1350 mL / NET: -610 mL    03 Jun 2019 07:01  -  03 Jun 2019 11:46  --------------------------------------------------------  IN: 100 mL / OUT: 0 mL / NET: 100 mL        PHYSICAL EXAM:  GENERAL: NAD, well-developed  HEAD:  Atraumatic, Normocephalic  EYES: EOMI, PERRLA, conjunctiva and sclera clear  NECK: Supple, No JVD  CHEST/LUNG: Clear to auscultation bilaterally; No wheeze  HEART: Regular rate and rhythm; No murmurs, rubs, or gallops  ABDOMEN: Soft, Nontender, Nondistended; Bowel sounds present  EXTREMITIES:  2+ Peripheral Pulses, No clubbing, cyanosis, or edema  PSYCH: AAOx3  NEUROLOGY: non-focal  SKIN: No rashes or lesions    LABS:                        9.4    14.4  )-----------( 483      ( 03 Jun 2019 07:15 )             31.1     06-03    140  |  105  |  28<H>  ----------------------------<  71  4.8   |  21<L>  |  2.11<H>    Ca    9.1      03 Jun 2019 07:12                RADIOLOGY & ADDITIONAL TESTS:    Imaging Personally Reviewed:    Consultant(s) Notes Reviewed:      Care Discussed with Consultants/Other Providers:

## 2019-06-03 NOTE — PROGRESS NOTE ADULT - SUBJECTIVE AND OBJECTIVE BOX
Patient is a 58y old  Male who presents with a chief complaint of right foot ulcer (02 Jun 2019 17:03)       INTERVAL HPI/OVERNIGHT EVENTS:  Patient seen and evaluated at bedside.  Pt is resting comfortable in NAD.    Allergies    No Known Allergies    Intolerances        Vital Signs Last 24 Hrs  T(C): 37.5 (03 Jun 2019 05:58), Max: 38.1 (03 Jun 2019 01:20)  T(F): 99.5 (03 Jun 2019 05:58), Max: 100.6 (03 Jun 2019 01:20)  HR: 92 (03 Jun 2019 05:58) (75 - 93)  BP: 178/78 (03 Jun 2019 05:58) (134/69 - 206/70)  BP(mean): --  RR: 16 (03 Jun 2019 05:58) (16 - 18)  SpO2: 95% (03 Jun 2019 05:58) (92% - 95%)    LABS:                        9.4    14.4  )-----------( 483      ( 03 Jun 2019 07:15 )             31.1     06-03    140  |  105  |  28<H>  ----------------------------<  71  4.8   |  21<L>  |  2.11<H>    Ca    9.1      03 Jun 2019 07:12          CAPILLARY BLOOD GLUCOSE      POCT Blood Glucose.: 84 mg/dL (03 Jun 2019 06:27)  POCT Blood Glucose.: 116 mg/dL (02 Jun 2019 22:11)  POCT Blood Glucose.: 112 mg/dL (02 Jun 2019 17:48)  POCT Blood Glucose.: 99 mg/dL (02 Jun 2019 14:03)  POCT Blood Glucose.: 145 mg/dL (02 Jun 2019 08:40)      Lower Extremity Physical Exam:  Skin: R plantar full thickness ulcer midfoot 5 cm (d) w/ Vertical Sx incision post I & D extending distally to met heads w/ Plantar Fascia & muscle belly exposed as well as proximal w/ Sx incision extending to heel w/ mixed fibrogranular tissue w/ tota length of Sx wound 14 cm (L). Minimal purulence expressed distally at forefoot, none otherwise noted  RADIOLOGY & ADDITIONAL TESTS:

## 2019-06-03 NOTE — PROGRESS NOTE ADULT - ASSESSMENT
57 y/o male pt s/p I&D & Debridement  - pt seen and evaluated   - Continue IV ABX as per ID  - minimal to no purulence expressed from forefoot, none proximally, viability of tissue plantarly diffusely is guarded at best with diffuse necrosis/fibrosis within subcutaneous tissue  - irrigated with copious amounts of saline solution and repacked w/ Iodosorb packing soaked w/ NS & applied well padded DSD  - will continue to monitor closely  - plan for OR likely Tuesday or Wednesday for revisional debridement, possible VAC if no further infectious process noted  - d/w attending

## 2019-06-04 ENCOUNTER — TRANSCRIPTION ENCOUNTER (OUTPATIENT)
Age: 59
End: 2019-06-04

## 2019-06-04 DIAGNOSIS — F32.1 MAJOR DEPRESSIVE DISORDER, SINGLE EPISODE, MODERATE: ICD-10-CM

## 2019-06-04 LAB
ANION GAP SERPL CALC-SCNC: 13 MMOL/L — SIGNIFICANT CHANGE UP (ref 5–17)
BUN SERPL-MCNC: 24 MG/DL — HIGH (ref 7–23)
CALCIUM SERPL-MCNC: 8.8 MG/DL — SIGNIFICANT CHANGE UP (ref 8.4–10.5)
CHLORIDE SERPL-SCNC: 104 MMOL/L — SIGNIFICANT CHANGE UP (ref 96–108)
CO2 SERPL-SCNC: 22 MMOL/L — SIGNIFICANT CHANGE UP (ref 22–31)
CREAT SERPL-MCNC: 1.96 MG/DL — HIGH (ref 0.5–1.3)
GLUCOSE BLDC GLUCOMTR-MCNC: 153 MG/DL — HIGH (ref 70–99)
GLUCOSE BLDC GLUCOMTR-MCNC: 185 MG/DL — HIGH (ref 70–99)
GLUCOSE BLDC GLUCOMTR-MCNC: 216 MG/DL — HIGH (ref 70–99)
GLUCOSE SERPL-MCNC: 199 MG/DL — HIGH (ref 70–99)
HCT VFR BLD CALC: 30.3 % — LOW (ref 39–50)
HGB BLD-MCNC: 9.3 G/DL — LOW (ref 13–17)
MCHC RBC-ENTMCNC: 28.9 PG — SIGNIFICANT CHANGE UP (ref 27–34)
MCHC RBC-ENTMCNC: 30.8 GM/DL — LOW (ref 32–36)
MCV RBC AUTO: 94 FL — SIGNIFICANT CHANGE UP (ref 80–100)
PLATELET # BLD AUTO: 480 K/UL — HIGH (ref 150–400)
POTASSIUM SERPL-MCNC: 4.9 MMOL/L — SIGNIFICANT CHANGE UP (ref 3.5–5.3)
POTASSIUM SERPL-SCNC: 4.9 MMOL/L — SIGNIFICANT CHANGE UP (ref 3.5–5.3)
RBC # BLD: 3.23 M/UL — LOW (ref 4.2–5.8)
RBC # FLD: 12.4 % — SIGNIFICANT CHANGE UP (ref 10.3–14.5)
SODIUM SERPL-SCNC: 139 MMOL/L — SIGNIFICANT CHANGE UP (ref 135–145)
WBC # BLD: 17 K/UL — HIGH (ref 3.8–10.5)
WBC # FLD AUTO: 17 K/UL — HIGH (ref 3.8–10.5)

## 2019-06-04 PROCEDURE — 99233 SBSQ HOSP IP/OBS HIGH 50: CPT

## 2019-06-04 PROCEDURE — 99223 1ST HOSP IP/OBS HIGH 75: CPT | Mod: GC

## 2019-06-04 PROCEDURE — 99232 SBSQ HOSP IP/OBS MODERATE 35: CPT

## 2019-06-04 RX ORDER — VANCOMYCIN HCL 1 G
750 VIAL (EA) INTRAVENOUS EVERY 12 HOURS
Refills: 0 | Status: DISCONTINUED | OUTPATIENT
Start: 2019-06-04 | End: 2019-06-05

## 2019-06-04 RX ORDER — SODIUM CHLORIDE 9 MG/ML
1000 INJECTION INTRAMUSCULAR; INTRAVENOUS; SUBCUTANEOUS
Refills: 0 | Status: DISCONTINUED | OUTPATIENT
Start: 2019-06-04 | End: 2019-06-05

## 2019-06-04 RX ORDER — OXYCODONE AND ACETAMINOPHEN 5; 325 MG/1; MG/1
2 TABLET ORAL EVERY 4 HOURS
Refills: 0 | Status: DISCONTINUED | OUTPATIENT
Start: 2019-06-04 | End: 2019-06-05

## 2019-06-04 RX ORDER — AMLODIPINE BESYLATE 2.5 MG/1
10 TABLET ORAL DAILY
Refills: 0 | Status: DISCONTINUED | OUTPATIENT
Start: 2019-06-04 | End: 2019-06-05

## 2019-06-04 RX ORDER — VANCOMYCIN HCL 1 G
1000 VIAL (EA) INTRAVENOUS EVERY 24 HOURS
Refills: 0 | Status: DISCONTINUED | OUTPATIENT
Start: 2019-06-04 | End: 2019-06-04

## 2019-06-04 RX ADMIN — Medication 250 MILLIGRAM(S): at 18:03

## 2019-06-04 RX ADMIN — PIPERACILLIN AND TAZOBACTAM 25 GRAM(S): 4; .5 INJECTION, POWDER, LYOPHILIZED, FOR SOLUTION INTRAVENOUS at 08:14

## 2019-06-04 RX ADMIN — INSULIN GLARGINE 32 UNIT(S): 100 INJECTION, SOLUTION SUBCUTANEOUS at 23:03

## 2019-06-04 RX ADMIN — ERGOCALCIFEROL 50000 UNIT(S): 1.25 CAPSULE ORAL at 13:28

## 2019-06-04 RX ADMIN — HEPARIN SODIUM 5000 UNIT(S): 5000 INJECTION INTRAVENOUS; SUBCUTANEOUS at 13:28

## 2019-06-04 RX ADMIN — Medication 1 TABLET(S): at 13:28

## 2019-06-04 RX ADMIN — PIPERACILLIN AND TAZOBACTAM 25 GRAM(S): 4; .5 INJECTION, POWDER, LYOPHILIZED, FOR SOLUTION INTRAVENOUS at 18:02

## 2019-06-04 RX ADMIN — Medication 18 UNIT(S): at 09:52

## 2019-06-04 RX ADMIN — PIPERACILLIN AND TAZOBACTAM 25 GRAM(S): 4; .5 INJECTION, POWDER, LYOPHILIZED, FOR SOLUTION INTRAVENOUS at 01:17

## 2019-06-04 RX ADMIN — Medication 1 APPLICATION(S): at 13:28

## 2019-06-04 RX ADMIN — Medication 100 MILLIGRAM(S): at 13:28

## 2019-06-04 RX ADMIN — Medication 100 MILLIGRAM(S): at 23:07

## 2019-06-04 RX ADMIN — SENNA PLUS 2 TABLET(S): 8.6 TABLET ORAL at 23:04

## 2019-06-04 RX ADMIN — Medication 500 MILLIGRAM(S): at 13:28

## 2019-06-04 RX ADMIN — AMLODIPINE BESYLATE 10 MILLIGRAM(S): 2.5 TABLET ORAL at 23:09

## 2019-06-04 RX ADMIN — PIPERACILLIN AND TAZOBACTAM 25 GRAM(S): 4; .5 INJECTION, POWDER, LYOPHILIZED, FOR SOLUTION INTRAVENOUS at 23:05

## 2019-06-04 RX ADMIN — Medication 100 MILLIGRAM(S): at 06:21

## 2019-06-04 RX ADMIN — Medication 81 MILLIGRAM(S): at 13:28

## 2019-06-04 RX ADMIN — HEPARIN SODIUM 5000 UNIT(S): 5000 INJECTION INTRAVENOUS; SUBCUTANEOUS at 06:22

## 2019-06-04 RX ADMIN — POLYETHYLENE GLYCOL 3350 17 GRAM(S): 17 POWDER, FOR SOLUTION ORAL at 13:28

## 2019-06-04 RX ADMIN — Medication 1: at 09:52

## 2019-06-04 RX ADMIN — HEPARIN SODIUM 5000 UNIT(S): 5000 INJECTION INTRAVENOUS; SUBCUTANEOUS at 23:03

## 2019-06-04 NOTE — PROGRESS NOTE ADULT - SUBJECTIVE AND OBJECTIVE BOX
AGUSTIN BRAR 58y [id number]    Patient is a 58y old  Male who presents with a chief complaint of right foot ulcer (04 Jun 2019 09:22)      Follow Up/CC:  ID following for foot infection    Interval History/ROS: no fever, tired     Allergies    No Known Allergies    Intolerances        ANTIMICROBIALS:  piperacillin/tazobactam IVPB. 3.375 every 8 hours  vancomycin  IVPB 1000 every 24 hours      MEDICATIONS  (STANDING):  amLODIPine   Tablet 10 milliGRAM(s) Oral daily  ascorbic acid 500 milliGRAM(s) Oral daily  aspirin enteric coated 81 milliGRAM(s) Oral daily  Dakins Solution - 1/4 Strength 1 Application(s) Topical daily  dextrose 5%. 1000 milliLiter(s) (50 mL/Hr) IV Continuous <Continuous>  dextrose 5%. 1000 milliLiter(s) (50 mL/Hr) IV Continuous <Continuous>  dextrose 50% Injectable 12.5 Gram(s) IV Push once  dextrose 50% Injectable 25 Gram(s) IV Push once  dextrose 50% Injectable 25 Gram(s) IV Push once  dextrose 50% Injectable 12.5 Gram(s) IV Push once  dextrose 50% Injectable 25 Gram(s) IV Push once  dextrose 50% Injectable 25 Gram(s) IV Push once  ergocalciferol 41061 Unit(s) Oral every week  heparin  Injectable 5000 Unit(s) SubCutaneous every 8 hours  hydrALAZINE 100 milliGRAM(s) Oral three times a day  insulin glargine Injectable (LANTUS) 32 Unit(s) SubCutaneous at bedtime  insulin lispro (HumaLOG) corrective regimen sliding scale   SubCutaneous three times a day before meals  insulin lispro (HumaLOG) corrective regimen sliding scale   SubCutaneous at bedtime  insulin lispro Injectable (HumaLOG) 18 Unit(s) SubCutaneous three times a day before meals  lactulose Syrup 20 Gram(s) Oral every 4 hours  multivitamin 1 Tablet(s) Oral daily  oxyCODONE    IR 10 milliGRAM(s) Oral once  piperacillin/tazobactam IVPB. 3.375 Gram(s) IV Intermittent every 8 hours  polyethylene glycol 3350 17 Gram(s) Oral daily  senna 2 Tablet(s) Oral at bedtime  sodium chloride 0.9%. 1000 milliLiter(s) (30 mL/Hr) IV Continuous <Continuous>  vancomycin  IVPB 1000 milliGRAM(s) IV Intermittent every 24 hours    MEDICATIONS  (PRN):  acetaminophen   Tablet .. 650 milliGRAM(s) Oral every 6 hours PRN Mild Pain (1 - 3)  dextrose 40% Gel 15 Gram(s) Oral once PRN Blood Glucose LESS THAN 70 milliGRAM(s)/deciliter  dextrose 40% Gel 15 Gram(s) Oral once PRN Blood Glucose LESS THAN 70 milliGRAM(s)/deciliter  glucagon  Injectable 1 milliGRAM(s) IntraMuscular once PRN Glucose LESS THAN 70 milligrams/deciliter  oxyCODONE    5 mG/acetaminophen 325 mG 2 Tablet(s) Oral every 4 hours PRN Moderate Pain (4 - 6)        Vital Signs Last 24 Hrs  T(C): 37.5 (04 Jun 2019 13:26), Max: 37.7 (04 Jun 2019 06:20)  T(F): 99.5 (04 Jun 2019 13:26), Max: 99.8 (04 Jun 2019 06:20)  HR: 88 (04 Jun 2019 13:26) (85 - 100)  BP: 133/75 (04 Jun 2019 13:26) (133/75 - 196/81)  BP(mean): --  RR: 17 (04 Jun 2019 13:26) (16 - 17)  SpO2: 95% (04 Jun 2019 13:26) (90% - 95%)    CBC Full  -  ( 04 Jun 2019 09:18 )  WBC Count : 17.0 K/uL  RBC Count : 3.23 M/uL  Hemoglobin : 9.3 g/dL  Hematocrit : 30.3 %  Platelet Count - Automated : 480 K/uL  Mean Cell Volume : 94.0 fl  Mean Cell Hemoglobin : 28.9 pg  Mean Cell Hemoglobin Concentration : 30.8 gm/dL  Auto Neutrophil # : x  Auto Lymphocyte # : x  Auto Monocyte # : x  Auto Eosinophil # : x  Auto Basophil # : x  Auto Neutrophil % : x  Auto Lymphocyte % : x  Auto Monocyte % : x  Auto Eosinophil % : x  Auto Basophil % : x    06-04    139  |  104  |  24<H>  ----------------------------<  199<H>  4.9   |  22  |  1.96<H>    Ca    8.8      04 Jun 2019 09:18            MICROBIOLOGY:  .Other  05-28-19   Testing in progress  --  Enterococcus faecalis  Escherichia coli      .Blood  05-26-19   No growth at 5 days.  --  --      .Blood  05-25-19   No growth at 5 days.  --  --      .Tissue  05-23-19   Few Escherichia coli  Growth in fluid media only Enterococcus faecalis  Rare Streptococcus agalactiae (Group B) isolated  Group B streptococci are susceptible to ampicillin,  penicillin and cefazolin, but may be resistant to  erythromycin and clindamycin.  Recommendations for intrapartum prophylaxis for Group B  streptococci are penicillin or ampicillin.  --  Escherichia coli  Enterococcus faecalis      .Blood  05-20-19   No growth at 5 days.  --  --      .Abscess  05-20-19   Numerous Escherichia coli  Numerous Enterococcus faecalis  Moderate Prevotella bivia "Susceptibilities not performed"  --  Enterococcus faecalis  Escherichia coli        RADIOLOGY    < from: [facility] Physiol Extremity Lower 3+ Level, BI (05.31.19 @ 09:10) >  Limited study due to noncompressible vessels and elevated   pressures. There is no evidence of significant arterial occlusive disease   of either lower extremity at rest.    < end of copied text >

## 2019-06-04 NOTE — PROGRESS NOTE ADULT - ASSESSMENT
diabetic foot ulcer with surrounding cellulitis   Fever resolved , leukocytosis persists  S/p debridement with podiatry 5/22 and 5/27  Culture polymicrobial--E coli, enterococcus, strep  - on zosyn  per ID  -  repeat BCXs NGTD  - Monitor for other possible source infection such as developing diarrhea  - podiatry and vascular fu   - scheduled for poss redebridement tomorrow  - will likely need total 6 weeks of iv abx    uncontrolled Diabetes  - cont lantus 32 - novolog 18 units qac  - hgb a1c  11.9  - diabetic diet  - FS qid  - endo consult appreciated    MARIKA   - stop lasix and lisinopril  - ivf hydration  - follow creatinine  - nephrology following    HTN   -   hold hydralazine     hyponatremia  - NO salt restriction  - no HCTZ    anorexia  - add Glucerna    depression  - seen by psych

## 2019-06-04 NOTE — BEHAVIORAL HEALTH ASSESSMENT NOTE - NSBHCHARTREVIEWIMAGING_PSY_A_CORE FT
IMPRESSION:    1.  Interval increase in size of a skin defect along the plantar surface   of the midfoot and forefoot which is partially imaged on this exam.   Findings likely represent mixture of ulcer with prior surgery. Adjacent   edema suggest cellulitis.  2.  Large fluid collection within the soft tissues of the plantar midfoot   and forefoot contacting the overlying metatarsals. Given the overlying   skin ulcer and several foci of intermixed gas, findings are concerning   for an evolving abscess. Differential includes postoperative seroma.  3.  Periosteal reaction involving the second, third, and fourth   metatarsals with subtle and ostial edema/enhancement of the second   through fourth metatarsals. Differential considerations include reactive   osteitis and early osteomyelitis.    < end of copied text >

## 2019-06-04 NOTE — PROGRESS NOTE ADULT - ASSESSMENT
57 y/o male pt s/p I&D & Debridement x2  - pt seen and evaluated   - Continue IV ABX as per ID, would recommend WBC scan for other possible source w/u besides foot, clinically appears to be stabilizing but WBC count & fevers continue to fluctuate  - minimal to no purulence expressed from forefoot, none proximally, viability of tissue plantarly diffusely is guarded at best with diffuse necrosis/fibrosis within subcutaneous tissue  - irrigated with copious amounts of saline solution and repacked w/ Iodosorb packing soaked w/ Dakins & applied well padded DSD  - will continue to monitor closely  - plan for OR tomorrow 7:30AM for debridement  - d/w attending 57 y/o male pt s/p I&D & Debridement x2  - pt seen and evaluated   - Continue IV ABX as per ID, would recommend WBC scan for other possible source w/u besides foot, clinically appears to be stabilizing but WBC count & fevers continue to fluctuate  - minimal to no purulence expressed from forefoot, none proximally, viability of tissue plantarly diffusely is guarded at best with diffuse necrosis/fibrosis within subcutaneous tissue  - irrigated with copious amounts of saline solution and repacked w/ Iodosorb packing soaked w/ Dakins & applied well padded DSD  - will continue to monitor closely  - plan for OR tomorrow 7:30AM for debridement, also recommended WBC scan & discussed w/ ID and medicine, medicine to order, r/o other source in lower extremity or whole body  - d/w attending

## 2019-06-04 NOTE — PROGRESS NOTE ADULT - SUBJECTIVE AND OBJECTIVE BOX
Patient is a 58y old  Male who presents with a chief complaint of right foot ulcer (04 Jun 2019 19:40)      SUBJECTIVE / OVERNIGHT EVENTS:  No chest pain. No shortness of breath.  No events overnight. c/o depression    MEDICATIONS  (STANDING):  amLODIPine   Tablet 10 milliGRAM(s) Oral daily  ascorbic acid 500 milliGRAM(s) Oral daily  aspirin enteric coated 81 milliGRAM(s) Oral daily  Dakins Solution - 1/4 Strength 1 Application(s) Topical daily  dextrose 5%. 1000 milliLiter(s) (50 mL/Hr) IV Continuous <Continuous>  dextrose 5%. 1000 milliLiter(s) (50 mL/Hr) IV Continuous <Continuous>  dextrose 50% Injectable 12.5 Gram(s) IV Push once  dextrose 50% Injectable 25 Gram(s) IV Push once  dextrose 50% Injectable 25 Gram(s) IV Push once  dextrose 50% Injectable 12.5 Gram(s) IV Push once  dextrose 50% Injectable 25 Gram(s) IV Push once  dextrose 50% Injectable 25 Gram(s) IV Push once  ergocalciferol 31814 Unit(s) Oral every week  heparin  Injectable 5000 Unit(s) SubCutaneous every 8 hours  hydrALAZINE 100 milliGRAM(s) Oral three times a day  insulin glargine Injectable (LANTUS) 32 Unit(s) SubCutaneous at bedtime  insulin lispro (HumaLOG) corrective regimen sliding scale   SubCutaneous three times a day before meals  insulin lispro (HumaLOG) corrective regimen sliding scale   SubCutaneous at bedtime  insulin lispro Injectable (HumaLOG) 18 Unit(s) SubCutaneous three times a day before meals  lactulose Syrup 20 Gram(s) Oral every 4 hours  multivitamin 1 Tablet(s) Oral daily  oxyCODONE    IR 10 milliGRAM(s) Oral once  piperacillin/tazobactam IVPB. 3.375 Gram(s) IV Intermittent every 8 hours  polyethylene glycol 3350 17 Gram(s) Oral daily  senna 2 Tablet(s) Oral at bedtime  sodium chloride 0.9%. 1000 milliLiter(s) (30 mL/Hr) IV Continuous <Continuous>  vancomycin  IVPB 750 milliGRAM(s) IV Intermittent every 12 hours    MEDICATIONS  (PRN):  acetaminophen   Tablet .. 650 milliGRAM(s) Oral every 6 hours PRN Mild Pain (1 - 3)  dextrose 40% Gel 15 Gram(s) Oral once PRN Blood Glucose LESS THAN 70 milliGRAM(s)/deciliter  dextrose 40% Gel 15 Gram(s) Oral once PRN Blood Glucose LESS THAN 70 milliGRAM(s)/deciliter  glucagon  Injectable 1 milliGRAM(s) IntraMuscular once PRN Glucose LESS THAN 70 milligrams/deciliter  oxyCODONE    5 mG/acetaminophen 325 mG 2 Tablet(s) Oral every 4 hours PRN Moderate Pain (4 - 6)      Vital Signs Last 24 Hrs  T(C): 37.5 (04 Jun 2019 13:26), Max: 37.7 (04 Jun 2019 06:20)  T(F): 99.5 (04 Jun 2019 13:26), Max: 99.8 (04 Jun 2019 06:20)  HR: 88 (04 Jun 2019 13:26) (85 - 100)  BP: 133/75 (04 Jun 2019 13:26) (133/75 - 196/81)  BP(mean): --  RR: 17 (04 Jun 2019 13:26) (16 - 17)  SpO2: 95% (04 Jun 2019 13:26) (90% - 95%)  CAPILLARY BLOOD GLUCOSE      POCT Blood Glucose.: 153 mg/dL (04 Jun 2019 14:27)  POCT Blood Glucose.: 185 mg/dL (04 Jun 2019 09:16)  POCT Blood Glucose.: 164 mg/dL (03 Jun 2019 22:04)    I&O's Summary    03 Jun 2019 07:01  -  04 Jun 2019 07:00  --------------------------------------------------------  IN: 700 mL / OUT: 1500 mL / NET: -800 mL    04 Jun 2019 07:01  -  04 Jun 2019 20:53  --------------------------------------------------------  IN: 810 mL / OUT: 550 mL / NET: 260 mL        PHYSICAL EXAM:  GENERAL: NAD, well-developed  HEAD:  Atraumatic, Normocephalic  EYES: EOMI, PERRLA, conjunctiva and sclera clear  NECK: Supple, No JVD  CHEST/LUNG: Clear to auscultation bilaterally; No wheeze  HEART: Regular rate and rhythm; No murmurs, rubs, or gallops  ABDOMEN: Soft, Nontender, Nondistended; Bowel sounds present  EXTREMITIES:  2+ Peripheral Pulses, No clubbing, cyanosis, or edema  PSYCH: AAOx3  NEUROLOGY: non-focal  SKIN: No rashes or lesions    LABS:                        9.3    17.0  )-----------( 480      ( 04 Jun 2019 09:18 )             30.3     06-04    139  |  104  |  24<H>  ----------------------------<  199<H>  4.9   |  22  |  1.96<H>    Ca    8.8      04 Jun 2019 09:18                RADIOLOGY & ADDITIONAL TESTS:    Imaging Personally Reviewed:    Consultant(s) Notes Reviewed:      Care Discussed with Consultants/Other Providers:

## 2019-06-04 NOTE — BEHAVIORAL HEALTH ASSESSMENT NOTE - NSBHCHARTREVIEWVS_PSY_A_CORE FT
Vital Signs Last 24 Hrs  T(C): 37.5 (04 Jun 2019 13:26), Max: 37.7 (04 Jun 2019 06:20)  T(F): 99.5 (04 Jun 2019 13:26), Max: 99.8 (04 Jun 2019 06:20)  HR: 88 (04 Jun 2019 13:26) (85 - 100)  BP: 133/75 (04 Jun 2019 13:26) (133/75 - 196/81)  BP(mean): --  RR: 17 (04 Jun 2019 13:26) (16 - 17)  SpO2: 95% (04 Jun 2019 13:26) (90% - 95%)

## 2019-06-04 NOTE — PROGRESS NOTE ADULT - SUBJECTIVE AND OBJECTIVE BOX
[facility] DIVISION OF KIDNEY DISEASES AND HYPERTENSION -- FOLLOW UP NOTE  --------------------------------------------------------------------------------  Chief Complaint:/subjective: poor appetite but hydrating po    24 hour events:as above        PAST HISTORY  --------------------------------------------------------------------------------  No significant changes to PMH, PSH, FHx, SHx, unless otherwise noted    ALLERGIES & MEDICATIONS  --------------------------------------------------------------------------------  Allergies    No Known Allergies    Intolerances      Standing Inpatient Medications  amLODIPine   Tablet 10 milliGRAM(s) Oral daily  ascorbic acid 500 milliGRAM(s) Oral daily  aspirin enteric coated 81 milliGRAM(s) Oral daily  Dakins Solution - 1/4 Strength 1 Application(s) Topical daily  dextrose 5%. 1000 milliLiter(s) IV Continuous <Continuous>  dextrose 5%. 1000 milliLiter(s) IV Continuous <Continuous>  dextrose 50% Injectable 12.5 Gram(s) IV Push once  dextrose 50% Injectable 25 Gram(s) IV Push once  dextrose 50% Injectable 25 Gram(s) IV Push once  dextrose 50% Injectable 12.5 Gram(s) IV Push once  dextrose 50% Injectable 25 Gram(s) IV Push once  dextrose 50% Injectable 25 Gram(s) IV Push once  ergocalciferol 87979 Unit(s) Oral every week  heparin  Injectable 5000 Unit(s) SubCutaneous every 8 hours  hydrALAZINE 100 milliGRAM(s) Oral three times a day  insulin glargine Injectable (LANTUS) 32 Unit(s) SubCutaneous at bedtime  insulin lispro (HumaLOG) corrective regimen sliding scale   SubCutaneous three times a day before meals  insulin lispro (HumaLOG) corrective regimen sliding scale   SubCutaneous at bedtime  insulin lispro Injectable (HumaLOG) 18 Unit(s) SubCutaneous three times a day before meals  lactulose Syrup 20 Gram(s) Oral every 4 hours  multivitamin 1 Tablet(s) Oral daily  oxyCODONE    IR 10 milliGRAM(s) Oral once  piperacillin/tazobactam IVPB. 3.375 Gram(s) IV Intermittent every 8 hours  polyethylene glycol 3350 17 Gram(s) Oral daily  senna 2 Tablet(s) Oral at bedtime    PRN Inpatient Medications  acetaminophen   Tablet .. 650 milliGRAM(s) Oral every 6 hours PRN  dextrose 40% Gel 15 Gram(s) Oral once PRN  dextrose 40% Gel 15 Gram(s) Oral once PRN  glucagon  Injectable 1 milliGRAM(s) IntraMuscular once PRN  oxyCODONE    5 mG/acetaminophen 325 mG 2 Tablet(s) Oral every 4 hours PRN      REVIEW OF SYSTEMS  --------------------------------------------------------------------------------  Gen: No weight changes, fatigue, fevers/chills, weakness  Skin: No rashes  Head/Eyes/Ears/Mouth: No headache;   Respiratory: No dyspnea, cough  CV: No chest pain, PND, orthopnea  GI: No abdominal pain, diarrhea, constipation, nausea, vomiting  : No increased frequency, dysuria, hematuria, nocturia  MSK: No joint pain/swelling; no back pain; no edema  Neuro: No dizziness/lightheadedness, weakness  Heme: No easy bruising or bleeding  Psych: No significant nervousness, anxiety, stress, depression    All other systems were reviewed and are negative, except as noted.    VITALS/PHYSICAL EXAM  --------------------------------------------------------------------------------  T(C): 37.7 (06-04-19 @ 06:20), Max: 37.7 (06-04-19 @ 06:20)  HR: 98 (06-04-19 @ 06:20) (85 - 100)  BP: 180/76 (06-04-19 @ 06:20) (150/69 - 196/81)  RR: 16 (06-04-19 @ 06:20) (16 - 17)  SpO2: 94% (06-04-19 @ 06:20) (90% - 94%)  Wt(kg): --  Adult Advanced Hemodynamics Last 24 Hrs  ABP: --  ABP(mean): --  CVP(mm Hg): --  CO: --  CI: --  PA: --  PA(mean): --  PCWP: --  SVR: --  SVRI: --        06-03-19 @ 07:01  -  06-04-19 @ 07:00  --------------------------------------------------------  IN: 700 mL / OUT: 1500 mL / NET: -800 mL    06-04-19 @ 07:01  -  06-04-19 @ 09:22  --------------------------------------------------------  IN: 100 mL / OUT: 0 mL / NET: 100 mL      Physical Exam:  	Gen: NAD,    	HEENT:  no jvp  	Pulm: CTA B/L  	CV: RRR, S1S2; no rub  	Back:   no sacral edema  	Abd: +BS, soft, nontender/nondistended  	: No suprapubic tenderness  	Ext:pedal edema; right foot/leg bandaged  	Neuro: awake  	Psych: alert  	Skin: Warm,    	Vascular access:    LABS/STUDIES  --------------------------------------------------------------------------------              9.4    14.4  >-----------<  483      [06-03-19 @ 07:15]              31.1     Hemoglobin: 9.4 g/dL (06-03-19 @ 07:15)    Platelet Count - Automated: 483 K/uL (06-03-19 @ 07:15)    140  |  105  |  28  ----------------------------<  71      [06-03-19 @ 07:12]  4.8   |  21  |  2.11        Ca     9.1     [06-03-19 @ 07:12]            Creatinine Trend:  SCr 2.11 [06-03 @ 07:12]  SCr 2.19 [06-02 @ 06:22]  SCr 2.21 [06-01 @ 06:56]  SCr 2.23 [05-31 @ 07:12]  SCr 2.72 [05-30 @ 07:01]    Urinalysis - [05-25-19 @ 14:07]      Color Yellow / Appearance Slightly Turbid / SG 1.018 / pH 6.0      Gluc Negative / Ketone Trace  / Bili Negative / Urobili 3 mg/dL       Blood Negative / Protein 100 mg/dL / Leuk Est Negative / Nitrite Negative      RBC 2 / WBC 7 / Hyaline 3 / Gran  / Sq Epi  / Non Sq Epi 2 / Bacteria Negative      Vitamin D (25OH) 4.4      [05-22-19 @ 09:57]  HbA1c 11.8      [05-21-19 @ 11:29]    HCV 0.05, Nonreact      [05-21-19 @ 11:35]

## 2019-06-04 NOTE — BEHAVIORAL HEALTH ASSESSMENT NOTE - SUMMARY
Patient is a 58 year old man, , domiciled with wife and mother, employed as a  for [employer], no PPH, never seen a psychiatrist or therapist, no prior psychiatric hospitalizations, no prior SIB, SI or suicide attempts, no history of violence or legal issues, no history of substance abuse, PMH of DM with current R foot ulcer and HTN, admitted for treatment of worsening R foot ulcer, now s/p debridement and scheduled for OR washout tomorrow. Psychiatry consulted for patient report of worsening depression and very poor appetite.    Patient's presentation is c/w major depressive episode related to stressor of being diagnosed with foot ulcer which continues to worsen, and possibility of needed BKA per report has not been entirely ruled out. However, patient does not have SI, no acute safety concerns, and is future oriented.    Patient says he asked to speak with psychiatry to "see what all his options are" but currently adamantly declines psychotherapy or antidepressant medication. He is, however, willing to speak with the holistic nurse to see what they offer. He says he will "look up" the medication Effexor (offered to patient) and is open to follow up in a few days to readdress the possibility of starting it

## 2019-06-04 NOTE — PROGRESS NOTE ADULT - PROBLEM SELECTOR PLAN 1
prerenal--> in setting of infection/ACE/  cr improving daily  monitor lytes  encourage po hydration  awaiting bmp today

## 2019-06-04 NOTE — PROGRESS NOTE ADULT - ASSESSMENT
Assessment  DMT2: 58y Male with DM T2 with hyperglycemia, on insulin, blood sugars trending down now, not eating full meals, no hypoglycemic episode, non compliant with low carb diet.  Foot wound: on medications, no chest pain, stable, monitored.  HTN: Controlled,  on antihypertensive medications.  Overweight/Obesity: No strict exercise routines, not on any weight loss program, neither on low calorie diet.    Andrzej Zhu MD  Cell: [phone]  Office: [phone]

## 2019-06-04 NOTE — PROGRESS NOTE ADULT - SUBJECTIVE AND OBJECTIVE BOX
Subjective: Patient seen and examined. No new events except as noted.   resting comfortably in bed     REVIEW OF SYSTEMS:    CONSTITUTIONAL: + weakness, fevers or chills  EYES/ENT: No visual changes;  No vertigo or throat pain   NECK: No pain or stiffness  RESPIRATORY: No cough, wheezing, hemoptysis; No shortness of breath  CARDIOVASCULAR: No chest pain or palpitations  GASTROINTESTINAL: No abdominal or epigastric pain. No nausea, vomiting, or hematemesis; No diarrhea or constipation. No melena or hematochezia.  GENITOURINARY: No dysuria, frequency or hematuria  NEUROLOGICAL: No numbness or weakness  SKIN: No itching, burning, rashes, or lesions   All other review of systems is negative unless indicated above.    MEDICATIONS:  MEDICATIONS  (STANDING):  ascorbic acid 500 milliGRAM(s) Oral daily  aspirin enteric coated 81 milliGRAM(s) Oral daily  Dakins Solution - 1/4 Strength 1 Application(s) Topical daily  dextrose 5%. 1000 milliLiter(s) (50 mL/Hr) IV Continuous <Continuous>  dextrose 5%. 1000 milliLiter(s) (50 mL/Hr) IV Continuous <Continuous>  dextrose 50% Injectable 12.5 Gram(s) IV Push once  dextrose 50% Injectable 25 Gram(s) IV Push once  dextrose 50% Injectable 25 Gram(s) IV Push once  dextrose 50% Injectable 12.5 Gram(s) IV Push once  dextrose 50% Injectable 25 Gram(s) IV Push once  dextrose 50% Injectable 25 Gram(s) IV Push once  ergocalciferol 34418 Unit(s) Oral every week  heparin  Injectable 5000 Unit(s) SubCutaneous every 8 hours  hydrALAZINE 100 milliGRAM(s) Oral three times a day  insulin glargine Injectable (LANTUS) 32 Unit(s) SubCutaneous at bedtime  insulin lispro (HumaLOG) corrective regimen sliding scale   SubCutaneous three times a day before meals  insulin lispro (HumaLOG) corrective regimen sliding scale   SubCutaneous at bedtime  insulin lispro Injectable (HumaLOG) 18 Unit(s) SubCutaneous three times a day before meals  lactulose Syrup 20 Gram(s) Oral every 4 hours  multivitamin 1 Tablet(s) Oral daily  oxyCODONE    IR 10 milliGRAM(s) Oral once  piperacillin/tazobactam IVPB. 3.375 Gram(s) IV Intermittent every 8 hours  polyethylene glycol 3350 17 Gram(s) Oral daily  senna 2 Tablet(s) Oral at bedtime      PHYSICAL EXAM:  T(C): 37.7 (06-04-19 @ 06:20), Max: 37.7 (06-04-19 @ 06:20)  HR: 98 (06-04-19 @ 06:20) (85 - 100)  BP: 180/76 (06-04-19 @ 06:20) (150/69 - 196/81)  RR: 16 (06-04-19 @ 06:20) (16 - 17)  SpO2: 94% (06-04-19 @ 06:20) (90% - 94%)  Wt(kg): --  I&O's Summary    03 Jun 2019 07:01  -  04 Jun 2019 07:00  --------------------------------------------------------  IN: 700 mL / OUT: 1500 mL / NET: -800 mL    04 Jun 2019 07:01  -  04 Jun 2019 08:55  --------------------------------------------------------  IN: 100 mL / OUT: 0 mL / NET: 100 mL            Appearance: NAD   HEENT:   Normal oral mucosa, PERRL, EOMI	  Lymphatic: No lymphadenopathy , no edema  Cardiovascular: Normal S1 S2, No JVD, No murmurs , Peripheral pulses palpable 2+ bilaterally  Respiratory: Lungs clear to auscultation, normal effort 	  Gastrointestinal:  Soft, Non-tender, + BS	  Skin: No rashes, No ecchymoses, No cyanosis, warm to touch  Musculoskeletal: Decreased range of motion, normal strength  Psychiatry:  Mood & affect appropriate  Ext: +edema , chronic venous stasis skin discoloration   right foot wrapped , + ulcer   LABS:    CARDIAC MARKERS:                                9.4    14.4  )-----------( 483      ( 03 Jun 2019 07:15 )             31.1     06-03    140  |  105  |  28<H>  ----------------------------<  71  4.8   |  21<L>  |  2.11<H>    Ca    9.1      03 Jun 2019 07:12      proBNP:   Lipid Profile:   HgA1c:   TSH:             TELEMETRY: 	    ECG:  	  RADIOLOGY:   DIAGNOSTIC TESTING:  [ ] Echocardiogram:  [ ]  Catheterization:  [ ] Stress Test:    OTHER:

## 2019-06-04 NOTE — BEHAVIORAL HEALTH ASSESSMENT NOTE - CASE SUMMARY
58 year old man, , domiciled with wife and mother, employed as a  for [employer], no PPH, never seen a psychiatrist or therapist, no prior psychiatric hospitalizations, no prior SIB, SI or suicide attempts, no history of violence or legal issues, no history of substance abuse, PMH of DM with current R foot ulcer and HTN, admitted for treatment of worsening R foot ulcer, now s/p debridement and scheduled for OR washout tomorrow. Psychiatry consulted for patient report of worsening depression and very poor appetite.  Pt endorsing depressive sx in the setting of recent health issues, anxiety over not being able to return to work, but no SI/HI.  C/o poor appetite, nausea, physical discomfort.  Pt ambivalent about starting meds; would prefer to think about it for now.  Dx: MDD moderate.  R/o adjustment disorder.  Agree with resident's assessment and plan as above.

## 2019-06-04 NOTE — PROGRESS NOTE ADULT - SUBJECTIVE AND OBJECTIVE BOX
Patient is a 58y old  Male who presents with a chief complaint of right foot ulcer (04 Jun 2019 18:18)      Vascular Surgery Attending Progress Note    Interval HPI: pt w/o new c/o     Medications:  acetaminophen   Tablet .. 650 milliGRAM(s) Oral every 6 hours PRN  amLODIPine   Tablet 10 milliGRAM(s) Oral daily  ascorbic acid 500 milliGRAM(s) Oral daily  aspirin enteric coated 81 milliGRAM(s) Oral daily  Dakins Solution - 1/4 Strength 1 Application(s) Topical daily  dextrose 40% Gel 15 Gram(s) Oral once PRN  dextrose 40% Gel 15 Gram(s) Oral once PRN  dextrose 5%. 1000 milliLiter(s) IV Continuous <Continuous>  dextrose 5%. 1000 milliLiter(s) IV Continuous <Continuous>  dextrose 50% Injectable 12.5 Gram(s) IV Push once  dextrose 50% Injectable 25 Gram(s) IV Push once  dextrose 50% Injectable 25 Gram(s) IV Push once  dextrose 50% Injectable 12.5 Gram(s) IV Push once  dextrose 50% Injectable 25 Gram(s) IV Push once  dextrose 50% Injectable 25 Gram(s) IV Push once  ergocalciferol 97033 Unit(s) Oral every week  glucagon  Injectable 1 milliGRAM(s) IntraMuscular once PRN  heparin  Injectable 5000 Unit(s) SubCutaneous every 8 hours  hydrALAZINE 100 milliGRAM(s) Oral three times a day  insulin glargine Injectable (LANTUS) 32 Unit(s) SubCutaneous at bedtime  insulin lispro (HumaLOG) corrective regimen sliding scale   SubCutaneous three times a day before meals  insulin lispro (HumaLOG) corrective regimen sliding scale   SubCutaneous at bedtime  insulin lispro Injectable (HumaLOG) 18 Unit(s) SubCutaneous three times a day before meals  lactulose Syrup 20 Gram(s) Oral every 4 hours  multivitamin 1 Tablet(s) Oral daily  oxyCODONE    5 mG/acetaminophen 325 mG 2 Tablet(s) Oral every 4 hours PRN  oxyCODONE    IR 10 milliGRAM(s) Oral once  piperacillin/tazobactam IVPB. 3.375 Gram(s) IV Intermittent every 8 hours  polyethylene glycol 3350 17 Gram(s) Oral daily  senna 2 Tablet(s) Oral at bedtime  sodium chloride 0.9%. 1000 milliLiter(s) IV Continuous <Continuous>  vancomycin  IVPB 750 milliGRAM(s) IV Intermittent every 12 hours      Vital Signs Last 24 Hrs  T(C): 37.5 (04 Jun 2019 13:26), Max: 37.7 (04 Jun 2019 06:20)  T(F): 99.5 (04 Jun 2019 13:26), Max: 99.8 (04 Jun 2019 06:20)  HR: 88 (04 Jun 2019 13:26) (85 - 100)  BP: 133/75 (04 Jun 2019 13:26) (133/75 - 196/81)  BP(mean): --  RR: 17 (04 Jun 2019 13:26) (16 - 17)  SpO2: 95% (04 Jun 2019 13:26) (90% - 95%)  I&O's Summary    03 Jun 2019 07:01  -  04 Jun 2019 07:00  --------------------------------------------------------  IN: 700 mL / OUT: 1500 mL / NET: -800 mL    04 Jun 2019 07:01  -  04 Jun 2019 19:40  --------------------------------------------------------  IN: 810 mL / OUT: 550 mL / NET: 260 mL        Physical Exam:  Neuro  A&Ox3 VSS  Vascular:  dressing c/d/i    LABS:                        9.3    17.0  )-----------( 480      ( 04 Jun 2019 09:18 )             30.3     06-04    139  |  104  |  24<H>  ----------------------------<  199<H>  4.9   |  22  |  1.96<H>    Ca    8.8      04 Jun 2019 09:18          CHRISTIANO KERR MD  [phone]

## 2019-06-04 NOTE — PROGRESS NOTE ADULT - SUBJECTIVE AND OBJECTIVE BOX
Chief complaint  Patient is a 58y old  Male who presents with a chief complaint of right foot ulcer (04 Jun 2019 12:37)   Review of systems  Patient in bed, looks comfortable, no fever,  no hypoglycemia.    Labs and Fingersticks  CAPILLARY BLOOD GLUCOSE      POCT Blood Glucose.: 153 mg/dL (04 Jun 2019 14:27)  POCT Blood Glucose.: 185 mg/dL (04 Jun 2019 09:16)  POCT Blood Glucose.: 164 mg/dL (03 Jun 2019 22:04)  POCT Blood Glucose.: 136 mg/dL (03 Jun 2019 19:19)      Anion Gap, Serum: 13 (06-04 @ 09:18)  Anion Gap, Serum: 14 (06-03 @ 07:12)      Calcium, Total Serum: 8.8 (06-04 @ 09:18)  Calcium, Total Serum: 9.1 (06-03 @ 07:12)          06-04    139  |  104  |  24<H>  ----------------------------<  199<H>  4.9   |  22  |  1.96<H>    Ca    8.8      04 Jun 2019 09:18                          9.3    17.0  )-----------( 480      ( 04 Jun 2019 09:18 )             30.3     Medications  MEDICATIONS  (STANDING):  amLODIPine   Tablet 10 milliGRAM(s) Oral daily  ascorbic acid 500 milliGRAM(s) Oral daily  aspirin enteric coated 81 milliGRAM(s) Oral daily  Dakins Solution - 1/4 Strength 1 Application(s) Topical daily  dextrose 5%. 1000 milliLiter(s) (50 mL/Hr) IV Continuous <Continuous>  dextrose 5%. 1000 milliLiter(s) (50 mL/Hr) IV Continuous <Continuous>  dextrose 50% Injectable 12.5 Gram(s) IV Push once  dextrose 50% Injectable 25 Gram(s) IV Push once  dextrose 50% Injectable 25 Gram(s) IV Push once  dextrose 50% Injectable 12.5 Gram(s) IV Push once  dextrose 50% Injectable 25 Gram(s) IV Push once  dextrose 50% Injectable 25 Gram(s) IV Push once  ergocalciferol 96768 Unit(s) Oral every week  heparin  Injectable 5000 Unit(s) SubCutaneous every 8 hours  hydrALAZINE 100 milliGRAM(s) Oral three times a day  insulin glargine Injectable (LANTUS) 32 Unit(s) SubCutaneous at bedtime  insulin lispro (HumaLOG) corrective regimen sliding scale   SubCutaneous three times a day before meals  insulin lispro (HumaLOG) corrective regimen sliding scale   SubCutaneous at bedtime  insulin lispro Injectable (HumaLOG) 18 Unit(s) SubCutaneous three times a day before meals  lactulose Syrup 20 Gram(s) Oral every 4 hours  multivitamin 1 Tablet(s) Oral daily  oxyCODONE    IR 10 milliGRAM(s) Oral once  piperacillin/tazobactam IVPB. 3.375 Gram(s) IV Intermittent every 8 hours  polyethylene glycol 3350 17 Gram(s) Oral daily  senna 2 Tablet(s) Oral at bedtime  sodium chloride 0.9%. 1000 milliLiter(s) (30 mL/Hr) IV Continuous <Continuous>  vancomycin  IVPB 750 milliGRAM(s) IV Intermittent every 12 hours      Physical Exam  General: Patient comfortable in bed  Vital Signs Last 12 Hrs  T(F): 99.5 (06-04-19 @ 13:26), Max: 99.8 (06-04-19 @ 06:20)  HR: 88 (06-04-19 @ 13:26) (88 - 98)  BP: 133/75 (06-04-19 @ 13:26) (133/75 - 180/76)  BP(mean): --  RR: 17 (06-04-19 @ 13:26) (16 - 17)  SpO2: 95% (06-04-19 @ 13:26) (94% - 95%)  Neck: No palpable thyroid nodules.  CVS: S1S2, No murmurs  Respiratory: No wheezing, no crepitations  GI: Abdomen soft, bowel sounds positive  Musculoskeletal:  edema lower extremities.   Skin: No skin rashes, no ecchymosis    Diagnostics    Vitamin D, 1, 25-Dihydroxy: AM Sched. Collection: 22-May-2019 06:00 (05-21 @ 18:59)  Vitamin D, 25-Hydroxy: AM Sched. Collection: 22-May-2019 06:00 (05-21 @ 18:59)

## 2019-06-04 NOTE — PROGRESS NOTE ADULT - PROBLEM SELECTOR PLAN 1
-cont abx  -initial MRI foot noted - no OM  -s/p debridement per podiatry 5/27  -DM control  -local care per podiatry  -possible revision tomorrow in OR

## 2019-06-04 NOTE — BEHAVIORAL HEALTH ASSESSMENT NOTE - RISK ASSESSMENT
Risk factors: depression, anxiety, functional decline, absence of outpatient follow-up, medical stressors, difficulty expressing emotions    Protective factors: denies any active suicidal ideation/intent/plan, no hx of prior attempts, no self-harm behaviors, identifies reasons for living, future oriented, supportive social network or family, engaged in work when not hospitalized, no active substance use, no access to firearms, no legal issues    Based on risk assessment evaluation, patient does not appear to be a high risk of harm to self or others at this time.

## 2019-06-04 NOTE — BEHAVIORAL HEALTH ASSESSMENT NOTE - NSBHCONSULTRECOMMENDOTHER_PSY_A_CORE FT
Recommend holistic nurse to speak with patient about services that can provided while in the hospital

## 2019-06-04 NOTE — PROGRESS NOTE ADULT - SUBJECTIVE AND OBJECTIVE BOX
Patient is a 58y old  Male who presents with a chief complaint of right foot ulcer (04 Jun 2019 08:55)       INTERVAL HPI/OVERNIGHT EVENTS:  Patient seen and evaluated at bedside.  Pt is resting comfortable in NAD. Denies N/V/F/C.     Allergies    No Known Allergies    Intolerances        Vital Signs Last 24 Hrs  T(C): 37.7 (04 Jun 2019 06:20), Max: 37.7 (04 Jun 2019 06:20)  T(F): 99.8 (04 Jun 2019 06:20), Max: 99.8 (04 Jun 2019 06:20)  HR: 98 (04 Jun 2019 06:20) (85 - 100)  BP: 180/76 (04 Jun 2019 06:20) (150/69 - 196/81)  BP(mean): --  RR: 16 (04 Jun 2019 06:20) (16 - 17)  SpO2: 94% (04 Jun 2019 06:20) (90% - 94%)    LABS:                        9.4    14.4  )-----------( 483      ( 03 Jun 2019 07:15 )             31.1     06-03    140  |  105  |  28<H>  ----------------------------<  71  4.8   |  21<L>  |  2.11<H>    Ca    9.1      03 Jun 2019 07:12          CAPILLARY BLOOD GLUCOSE      POCT Blood Glucose.: 164 mg/dL (03 Jun 2019 22:04)  POCT Blood Glucose.: 136 mg/dL (03 Jun 2019 19:19)  POCT Blood Glucose.: 125 mg/dL (03 Jun 2019 13:12)  POCT Blood Glucose.: 101 mg/dL (03 Jun 2019 09:34)      Lower Extremity Physical Exam:  Skin: R plantar full thickness ulcer midfoot 5 cm (d) w/ Vertical Sx incision post I & D extending distally to met heads w/ Plantar Fascia & muscle belly exposed as well as proximal w/ Sx incision extending to heel w/ mixed fibrogranular tissue w/ tota length of Sx wound 14 cm (L). Minimal purulence expressed distally at forefoot, none otherwise noted    RADIOLOGY & ADDITIONAL TESTS:

## 2019-06-04 NOTE — BEHAVIORAL HEALTH ASSESSMENT NOTE - HPI (INCLUDE ILLNESS QUALITY, SEVERITY, DURATION, TIMING, CONTEXT, MODIFYING FACTORS, ASSOCIATED SIGNS AND SYMPTOMS)
Patient is a 58 year old man, , domiciled with wife and mother, employed as a  for [employer], no PPH, never seen a psychiatrist or therapist, no prior psychiatric hospitalizations, no prior SIB, SI or suicide attempts, no history of violence or legal issues, no history of substance abuse, PMH of DM with current R foot ulcer and HTN, admitted for treatment of worsening R foot ulcer, now s/p debridement and scheduled for OR washout tomorrow. Psychiatry consulted for patient report of worsening depression and very poor appetite.    Patient says that his baseline prior to late April was "very happy" with no mental health issues at any point in his life, no depression, and was functional well at his job and at home. However, patient found out suddenly at his podiatrist's office that he has a right foot ulcer in late April, and since then he endorses severe depressed mood (despite initial chief complaint of being "a little depressed"), anhedonia (lost interest in Yazdanism), anergia, very poor appetite, poor concentration, "somewhat" guilty, PMR, and increased sleep since that time. Denies any passive or active SI, intent, or plan, denies hopelessness. Reports future-orientation, looks forward to going back to work and to eventually going home. Endorses anxiety about when he can get back to work but denies baseline anxiety before April. Patient denies current on past manic symptoms including elevated mood, increased irritability, mood lability, distractibility, grandiosity, pressured speech, increase in goal-directed activity, or decreased need for sleep. Patient denies any current or past psychotic symptoms including paranoia, ideas of reference, thought insertion/broadcasting, or auditory/visual/olfactory/tactile/gustatory hallucinations. Patient denies current or past substance abuse.

## 2019-06-04 NOTE — BEHAVIORAL HEALTH ASSESSMENT NOTE - NSBHSUICPROTECTFACT_PSY_A_CORE
High spirituality/Responsibility to family and others/Supportive social network or family/Fear of death or dying due to pain/suffering/Ability to cope with stress/Identifies reasons for living/Future oriented/Engaged in work or school

## 2019-06-04 NOTE — BEHAVIORAL HEALTH ASSESSMENT NOTE - NSBHCHARTREVIEWLAB_PSY_A_CORE FT
9.3    17.0  )-----------( 480      ( 04 Jun 2019 09:18 )             30.3     06-04    139  |  104  |  24<H>  ----------------------------<  199<H>  4.9   |  22  |  1.96<H>    Ca    8.8      04 Jun 2019 09:18

## 2019-06-04 NOTE — PROGRESS NOTE ADULT - ASSESSMENT
58M w/ DM (A1C 11.9), HTN, presents with nonhealing RLE heel ulcer s/p multiple debridements.     - reviewed MRI foot findings w pt and fam   continue woundcare and lyndsay as per podiatry   d/w pt that given the OM there is the possibility that the right foot is not salvageable and may need a rle bka   pt to decide   will follow

## 2019-06-05 ENCOUNTER — RESULT REVIEW (OUTPATIENT)
Age: 59
End: 2019-06-05

## 2019-06-05 LAB
ALBUMIN SERPL ELPH-MCNC: 2.5 G/DL — LOW (ref 3.3–5)
ALP SERPL-CCNC: 133 U/L — HIGH (ref 40–120)
ALT FLD-CCNC: 30 U/L — SIGNIFICANT CHANGE UP (ref 10–45)
ANION GAP SERPL CALC-SCNC: 14 MMOL/L — SIGNIFICANT CHANGE UP (ref 5–17)
APTT BLD: 27.5 SEC — SIGNIFICANT CHANGE UP (ref 27.5–36.3)
AST SERPL-CCNC: 20 U/L — SIGNIFICANT CHANGE UP (ref 10–40)
BILIRUB SERPL-MCNC: 0.5 MG/DL — SIGNIFICANT CHANGE UP (ref 0.2–1.2)
BLD GP AB SCN SERPL QL: NEGATIVE — SIGNIFICANT CHANGE UP
BUN SERPL-MCNC: 25 MG/DL — HIGH (ref 7–23)
CALCIUM SERPL-MCNC: 8.8 MG/DL — SIGNIFICANT CHANGE UP (ref 8.4–10.5)
CHLORIDE SERPL-SCNC: 100 MMOL/L — SIGNIFICANT CHANGE UP (ref 96–108)
CO2 SERPL-SCNC: 21 MMOL/L — LOW (ref 22–31)
CREAT SERPL-MCNC: 1.97 MG/DL — HIGH (ref 0.5–1.3)
GLUCOSE BLDC GLUCOMTR-MCNC: 205 MG/DL — HIGH (ref 70–99)
GLUCOSE BLDC GLUCOMTR-MCNC: 260 MG/DL — HIGH (ref 70–99)
GLUCOSE BLDC GLUCOMTR-MCNC: 263 MG/DL — HIGH (ref 70–99)
GLUCOSE BLDC GLUCOMTR-MCNC: 275 MG/DL — HIGH (ref 70–99)
GLUCOSE BLDC GLUCOMTR-MCNC: 282 MG/DL — HIGH (ref 70–99)
GLUCOSE SERPL-MCNC: 272 MG/DL — HIGH (ref 70–99)
GRAM STN FLD: SIGNIFICANT CHANGE UP
HCT VFR BLD CALC: 28.8 % — LOW (ref 39–50)
HGB BLD-MCNC: 8.5 G/DL — LOW (ref 13–17)
INR BLD: 1.42 RATIO — HIGH (ref 0.88–1.16)
MCHC RBC-ENTMCNC: 27.7 PG — SIGNIFICANT CHANGE UP (ref 27–34)
MCHC RBC-ENTMCNC: 29.6 GM/DL — LOW (ref 32–36)
MCV RBC AUTO: 93.6 FL — SIGNIFICANT CHANGE UP (ref 80–100)
PLATELET # BLD AUTO: 500 K/UL — HIGH (ref 150–400)
POTASSIUM SERPL-MCNC: 5 MMOL/L — SIGNIFICANT CHANGE UP (ref 3.5–5.3)
POTASSIUM SERPL-SCNC: 5 MMOL/L — SIGNIFICANT CHANGE UP (ref 3.5–5.3)
PROT SERPL-MCNC: 6.7 G/DL — SIGNIFICANT CHANGE UP (ref 6–8.3)
PROTHROM AB SERPL-ACNC: 16.5 SEC — HIGH (ref 10–12.9)
RBC # BLD: 3.07 M/UL — LOW (ref 4.2–5.8)
RBC # FLD: 12.3 % — SIGNIFICANT CHANGE UP (ref 10.3–14.5)
RH IG SCN BLD-IMP: POSITIVE — SIGNIFICANT CHANGE UP
SODIUM SERPL-SCNC: 135 MMOL/L — SIGNIFICANT CHANGE UP (ref 135–145)
SPECIMEN SOURCE: SIGNIFICANT CHANGE UP
SURGICAL PATHOLOGY STUDY: SIGNIFICANT CHANGE UP
WBC # BLD: 18.2 K/UL — HIGH (ref 3.8–10.5)
WBC # FLD AUTO: 18.2 K/UL — HIGH (ref 3.8–10.5)

## 2019-06-05 PROCEDURE — 99233 SBSQ HOSP IP/OBS HIGH 50: CPT

## 2019-06-05 PROCEDURE — 78999A: CUSTOM | Mod: 26

## 2019-06-05 PROCEDURE — 78807: CPT | Mod: 26

## 2019-06-05 PROCEDURE — 88304 TISSUE EXAM BY PATHOLOGIST: CPT | Mod: 26

## 2019-06-05 PROCEDURE — 78806: CPT | Mod: 26

## 2019-06-05 PROCEDURE — 99232 SBSQ HOSP IP/OBS MODERATE 35: CPT

## 2019-06-05 RX ORDER — ASPIRIN/CALCIUM CARB/MAGNESIUM 324 MG
81 TABLET ORAL DAILY
Refills: 0 | Status: DISCONTINUED | OUTPATIENT
Start: 2019-06-05 | End: 2019-06-05

## 2019-06-05 RX ORDER — LACTULOSE 10 G/15ML
20 SOLUTION ORAL EVERY 4 HOURS
Refills: 0 | Status: DISCONTINUED | OUTPATIENT
Start: 2019-06-05 | End: 2019-06-08

## 2019-06-05 RX ORDER — SODIUM CHLORIDE 9 MG/ML
1000 INJECTION, SOLUTION INTRAVENOUS
Refills: 0 | Status: DISCONTINUED | OUTPATIENT
Start: 2019-06-05 | End: 2019-06-14

## 2019-06-05 RX ORDER — OXYCODONE AND ACETAMINOPHEN 5; 325 MG/1; MG/1
1 TABLET ORAL EVERY 6 HOURS
Refills: 0 | Status: DISCONTINUED | OUTPATIENT
Start: 2019-06-05 | End: 2019-06-11

## 2019-06-05 RX ORDER — DEXTROSE 50 % IN WATER 50 %
25 SYRINGE (ML) INTRAVENOUS ONCE
Refills: 0 | Status: DISCONTINUED | OUTPATIENT
Start: 2019-06-05 | End: 2019-06-05

## 2019-06-05 RX ORDER — ERGOCALCIFEROL 1.25 MG/1
50000 CAPSULE ORAL
Refills: 0 | Status: DISCONTINUED | OUTPATIENT
Start: 2019-06-05 | End: 2019-06-05

## 2019-06-05 RX ORDER — INSULIN LISPRO 100/ML
VIAL (ML) SUBCUTANEOUS AT BEDTIME
Refills: 0 | Status: DISCONTINUED | OUTPATIENT
Start: 2019-06-05 | End: 2019-06-14

## 2019-06-05 RX ORDER — DEXTROSE 50 % IN WATER 50 %
12.5 SYRINGE (ML) INTRAVENOUS ONCE
Refills: 0 | Status: DISCONTINUED | OUTPATIENT
Start: 2019-06-05 | End: 2019-06-14

## 2019-06-05 RX ORDER — ACETAMINOPHEN 500 MG
650 TABLET ORAL EVERY 6 HOURS
Refills: 0 | Status: DISCONTINUED | OUTPATIENT
Start: 2019-06-05 | End: 2019-06-05

## 2019-06-05 RX ORDER — INSULIN LISPRO 100/ML
VIAL (ML) SUBCUTANEOUS
Refills: 0 | Status: DISCONTINUED | OUTPATIENT
Start: 2019-06-05 | End: 2019-06-14

## 2019-06-05 RX ORDER — DEXTROSE 50 % IN WATER 50 %
15 SYRINGE (ML) INTRAVENOUS ONCE
Refills: 0 | Status: DISCONTINUED | OUTPATIENT
Start: 2019-06-05 | End: 2019-06-05

## 2019-06-05 RX ORDER — SENNA PLUS 8.6 MG/1
2 TABLET ORAL AT BEDTIME
Refills: 0 | Status: DISCONTINUED | OUTPATIENT
Start: 2019-06-05 | End: 2019-06-05

## 2019-06-05 RX ORDER — ERGOCALCIFEROL 1.25 MG/1
50000 CAPSULE ORAL
Refills: 0 | Status: DISCONTINUED | OUTPATIENT
Start: 2019-06-05 | End: 2019-06-14

## 2019-06-05 RX ORDER — SENNA PLUS 8.6 MG/1
2 TABLET ORAL AT BEDTIME
Refills: 0 | Status: DISCONTINUED | OUTPATIENT
Start: 2019-06-05 | End: 2019-06-14

## 2019-06-05 RX ORDER — SODIUM CHLORIDE 9 MG/ML
1000 INJECTION INTRAMUSCULAR; INTRAVENOUS; SUBCUTANEOUS
Refills: 0 | Status: DISCONTINUED | OUTPATIENT
Start: 2019-06-05 | End: 2019-06-09

## 2019-06-05 RX ORDER — DEXTROSE 50 % IN WATER 50 %
25 SYRINGE (ML) INTRAVENOUS ONCE
Refills: 0 | Status: DISCONTINUED | OUTPATIENT
Start: 2019-06-05 | End: 2019-06-14

## 2019-06-05 RX ORDER — METOCLOPRAMIDE HCL 10 MG
10 TABLET ORAL ONCE
Refills: 0 | Status: DISCONTINUED | OUTPATIENT
Start: 2019-06-05 | End: 2019-06-05

## 2019-06-05 RX ORDER — SODIUM HYPOCHLORITE 0.125 %
1 SOLUTION, NON-ORAL MISCELLANEOUS DAILY
Refills: 0 | Status: DISCONTINUED | OUTPATIENT
Start: 2019-06-05 | End: 2019-06-05

## 2019-06-05 RX ORDER — DEXTROSE 50 % IN WATER 50 %
12.5 SYRINGE (ML) INTRAVENOUS ONCE
Refills: 0 | Status: DISCONTINUED | OUTPATIENT
Start: 2019-06-05 | End: 2019-06-05

## 2019-06-05 RX ORDER — INSULIN GLARGINE 100 [IU]/ML
32 INJECTION, SOLUTION SUBCUTANEOUS AT BEDTIME
Refills: 0 | Status: DISCONTINUED | OUTPATIENT
Start: 2019-06-05 | End: 2019-06-05

## 2019-06-05 RX ORDER — VANCOMYCIN HCL 1 G
750 VIAL (EA) INTRAVENOUS EVERY 12 HOURS
Refills: 0 | Status: DISCONTINUED | OUTPATIENT
Start: 2019-06-05 | End: 2019-06-05

## 2019-06-05 RX ORDER — ASCORBIC ACID 60 MG
500 TABLET,CHEWABLE ORAL DAILY
Refills: 0 | Status: DISCONTINUED | OUTPATIENT
Start: 2019-06-05 | End: 2019-06-05

## 2019-06-05 RX ORDER — AMLODIPINE BESYLATE 2.5 MG/1
10 TABLET ORAL DAILY
Refills: 0 | Status: DISCONTINUED | OUTPATIENT
Start: 2019-06-05 | End: 2019-06-05

## 2019-06-05 RX ORDER — INSULIN GLARGINE 100 [IU]/ML
30 INJECTION, SOLUTION SUBCUTANEOUS AT BEDTIME
Refills: 0 | Status: DISCONTINUED | OUTPATIENT
Start: 2019-06-05 | End: 2019-06-05

## 2019-06-05 RX ORDER — MEROPENEM 1 G/30ML
1000 INJECTION INTRAVENOUS EVERY 8 HOURS
Refills: 0 | Status: DISCONTINUED | OUTPATIENT
Start: 2019-06-05 | End: 2019-06-05

## 2019-06-05 RX ORDER — INSULIN HUMAN 100 [IU]/ML
INJECTION, SOLUTION SUBCUTANEOUS EVERY 6 HOURS
Refills: 0 | Status: DISCONTINUED | OUTPATIENT
Start: 2019-06-05 | End: 2019-06-05

## 2019-06-05 RX ORDER — AMLODIPINE BESYLATE 2.5 MG/1
10 TABLET ORAL DAILY
Refills: 0 | Status: DISCONTINUED | OUTPATIENT
Start: 2019-06-05 | End: 2019-06-14

## 2019-06-05 RX ORDER — GLUCAGON INJECTION, SOLUTION 0.5 MG/.1ML
1 INJECTION, SOLUTION SUBCUTANEOUS ONCE
Refills: 0 | Status: DISCONTINUED | OUTPATIENT
Start: 2019-06-05 | End: 2019-06-05

## 2019-06-05 RX ORDER — HYDRALAZINE HCL 50 MG
100 TABLET ORAL THREE TIMES A DAY
Refills: 0 | Status: DISCONTINUED | OUTPATIENT
Start: 2019-06-05 | End: 2019-06-14

## 2019-06-05 RX ORDER — SODIUM CHLORIDE 9 MG/ML
1000 INJECTION INTRAMUSCULAR; INTRAVENOUS; SUBCUTANEOUS
Refills: 0 | Status: DISCONTINUED | OUTPATIENT
Start: 2019-06-05 | End: 2019-06-05

## 2019-06-05 RX ORDER — INSULIN LISPRO 100/ML
20 VIAL (ML) SUBCUTANEOUS
Refills: 0 | Status: DISCONTINUED | OUTPATIENT
Start: 2019-06-05 | End: 2019-06-05

## 2019-06-05 RX ORDER — HEPARIN SODIUM 5000 [USP'U]/ML
5000 INJECTION INTRAVENOUS; SUBCUTANEOUS EVERY 8 HOURS
Refills: 0 | Status: DISCONTINUED | OUTPATIENT
Start: 2019-06-05 | End: 2019-06-05

## 2019-06-05 RX ORDER — HYDROMORPHONE HYDROCHLORIDE 2 MG/ML
0.5 INJECTION INTRAMUSCULAR; INTRAVENOUS; SUBCUTANEOUS ONCE
Refills: 0 | Status: DISCONTINUED | OUTPATIENT
Start: 2019-06-05 | End: 2019-06-05

## 2019-06-05 RX ORDER — OXYCODONE HYDROCHLORIDE 5 MG/1
10 TABLET ORAL ONCE
Refills: 0 | Status: DISCONTINUED | OUTPATIENT
Start: 2019-06-05 | End: 2019-06-05

## 2019-06-05 RX ORDER — POLYETHYLENE GLYCOL 3350 17 G/17G
17 POWDER, FOR SOLUTION ORAL DAILY
Refills: 0 | Status: DISCONTINUED | OUTPATIENT
Start: 2019-06-05 | End: 2019-06-05

## 2019-06-05 RX ORDER — SODIUM CHLORIDE 9 MG/ML
1000 INJECTION, SOLUTION INTRAVENOUS
Refills: 0 | Status: DISCONTINUED | OUTPATIENT
Start: 2019-06-05 | End: 2019-06-05

## 2019-06-05 RX ORDER — GLUCAGON INJECTION, SOLUTION 0.5 MG/.1ML
1 INJECTION, SOLUTION SUBCUTANEOUS ONCE
Refills: 0 | Status: DISCONTINUED | OUTPATIENT
Start: 2019-06-05 | End: 2019-06-14

## 2019-06-05 RX ORDER — INSULIN LISPRO 100/ML
18 VIAL (ML) SUBCUTANEOUS
Refills: 0 | Status: DISCONTINUED | OUTPATIENT
Start: 2019-06-05 | End: 2019-06-05

## 2019-06-05 RX ORDER — PIPERACILLIN AND TAZOBACTAM 4; .5 G/20ML; G/20ML
3.38 INJECTION, POWDER, LYOPHILIZED, FOR SOLUTION INTRAVENOUS EVERY 8 HOURS
Refills: 0 | Status: DISCONTINUED | OUTPATIENT
Start: 2019-06-05 | End: 2019-06-05

## 2019-06-05 RX ORDER — HYDROMORPHONE HYDROCHLORIDE 2 MG/ML
0.5 INJECTION INTRAMUSCULAR; INTRAVENOUS; SUBCUTANEOUS
Refills: 0 | Status: DISCONTINUED | OUTPATIENT
Start: 2019-06-05 | End: 2019-06-05

## 2019-06-05 RX ORDER — PIPERACILLIN AND TAZOBACTAM 4; .5 G/20ML; G/20ML
3.38 INJECTION, POWDER, LYOPHILIZED, FOR SOLUTION INTRAVENOUS EVERY 8 HOURS
Refills: 0 | Status: DISCONTINUED | OUTPATIENT
Start: 2019-06-05 | End: 2019-06-14

## 2019-06-05 RX ORDER — OXYCODONE AND ACETAMINOPHEN 5; 325 MG/1; MG/1
2 TABLET ORAL EVERY 6 HOURS
Refills: 0 | Status: DISCONTINUED | OUTPATIENT
Start: 2019-06-05 | End: 2019-06-12

## 2019-06-05 RX ORDER — VANCOMYCIN HCL 1 G
1000 VIAL (EA) INTRAVENOUS EVERY 24 HOURS
Refills: 0 | Status: DISCONTINUED | OUTPATIENT
Start: 2019-06-05 | End: 2019-06-09

## 2019-06-05 RX ORDER — MORPHINE SULFATE 50 MG/1
2 CAPSULE, EXTENDED RELEASE ORAL EVERY 6 HOURS
Refills: 0 | Status: DISCONTINUED | OUTPATIENT
Start: 2019-06-05 | End: 2019-06-05

## 2019-06-05 RX ORDER — INSULIN GLARGINE 100 [IU]/ML
25 INJECTION, SOLUTION SUBCUTANEOUS AT BEDTIME
Refills: 0 | Status: DISCONTINUED | OUTPATIENT
Start: 2019-06-05 | End: 2019-06-05

## 2019-06-05 RX ORDER — OXYCODONE AND ACETAMINOPHEN 5; 325 MG/1; MG/1
2 TABLET ORAL EVERY 4 HOURS
Refills: 0 | Status: DISCONTINUED | OUTPATIENT
Start: 2019-06-05 | End: 2019-06-05

## 2019-06-05 RX ORDER — DEXTROSE 50 % IN WATER 50 %
15 SYRINGE (ML) INTRAVENOUS ONCE
Refills: 0 | Status: DISCONTINUED | OUTPATIENT
Start: 2019-06-05 | End: 2019-06-14

## 2019-06-05 RX ORDER — INSULIN GLARGINE 100 [IU]/ML
32 INJECTION, SOLUTION SUBCUTANEOUS AT BEDTIME
Refills: 0 | Status: DISCONTINUED | OUTPATIENT
Start: 2019-06-05 | End: 2019-06-07

## 2019-06-05 RX ORDER — ASPIRIN/CALCIUM CARB/MAGNESIUM 324 MG
81 TABLET ORAL DAILY
Refills: 0 | Status: DISCONTINUED | OUTPATIENT
Start: 2019-06-05 | End: 2019-06-14

## 2019-06-05 RX ORDER — INSULIN LISPRO 100/ML
18 VIAL (ML) SUBCUTANEOUS
Refills: 0 | Status: DISCONTINUED | OUTPATIENT
Start: 2019-06-05 | End: 2019-06-07

## 2019-06-05 RX ORDER — HEPARIN SODIUM 5000 [USP'U]/ML
5000 INJECTION INTRAVENOUS; SUBCUTANEOUS EVERY 8 HOURS
Refills: 0 | Status: DISCONTINUED | OUTPATIENT
Start: 2019-06-05 | End: 2019-06-14

## 2019-06-05 RX ORDER — POLYETHYLENE GLYCOL 3350 17 G/17G
17 POWDER, FOR SOLUTION ORAL DAILY
Refills: 0 | Status: DISCONTINUED | OUTPATIENT
Start: 2019-06-05 | End: 2019-06-08

## 2019-06-05 RX ADMIN — Medication 500 MILLIGRAM(S): at 13:02

## 2019-06-05 RX ADMIN — Medication 3: at 14:12

## 2019-06-05 RX ADMIN — Medication 3: at 22:00

## 2019-06-05 RX ADMIN — HYDROMORPHONE HYDROCHLORIDE 0.5 MILLIGRAM(S): 2 INJECTION INTRAMUSCULAR; INTRAVENOUS; SUBCUTANEOUS at 11:13

## 2019-06-05 RX ADMIN — Medication 100 MILLIGRAM(S): at 05:43

## 2019-06-05 RX ADMIN — HEPARIN SODIUM 5000 UNIT(S): 5000 INJECTION INTRAVENOUS; SUBCUTANEOUS at 13:02

## 2019-06-05 RX ADMIN — HEPARIN SODIUM 5000 UNIT(S): 5000 INJECTION INTRAVENOUS; SUBCUTANEOUS at 21:51

## 2019-06-05 RX ADMIN — POLYETHYLENE GLYCOL 3350 17 GRAM(S): 17 POWDER, FOR SOLUTION ORAL at 13:03

## 2019-06-05 RX ADMIN — Medication 3: at 11:36

## 2019-06-05 RX ADMIN — HYDROMORPHONE HYDROCHLORIDE 0.5 MILLIGRAM(S): 2 INJECTION INTRAMUSCULAR; INTRAVENOUS; SUBCUTANEOUS at 15:13

## 2019-06-05 RX ADMIN — HYDROMORPHONE HYDROCHLORIDE 0.5 MILLIGRAM(S): 2 INJECTION INTRAMUSCULAR; INTRAVENOUS; SUBCUTANEOUS at 14:43

## 2019-06-05 RX ADMIN — Medication 18 UNIT(S): at 14:11

## 2019-06-05 RX ADMIN — Medication 250 MILLIGRAM(S): at 18:36

## 2019-06-05 RX ADMIN — OXYCODONE AND ACETAMINOPHEN 1 TABLET(S): 5; 325 TABLET ORAL at 13:32

## 2019-06-05 RX ADMIN — HYDROMORPHONE HYDROCHLORIDE 0.5 MILLIGRAM(S): 2 INJECTION INTRAMUSCULAR; INTRAVENOUS; SUBCUTANEOUS at 11:30

## 2019-06-05 RX ADMIN — Medication 1 TABLET(S): at 13:02

## 2019-06-05 RX ADMIN — Medication 81 MILLIGRAM(S): at 13:02

## 2019-06-05 RX ADMIN — Medication 18 UNIT(S): at 22:00

## 2019-06-05 RX ADMIN — OXYCODONE AND ACETAMINOPHEN 1 TABLET(S): 5; 325 TABLET ORAL at 13:02

## 2019-06-05 RX ADMIN — Medication 100 MILLIGRAM(S): at 13:02

## 2019-06-05 RX ADMIN — PIPERACILLIN AND TAZOBACTAM 25 GRAM(S): 4; .5 INJECTION, POWDER, LYOPHILIZED, FOR SOLUTION INTRAVENOUS at 21:51

## 2019-06-05 RX ADMIN — HEPARIN SODIUM 5000 UNIT(S): 5000 INJECTION INTRAVENOUS; SUBCUTANEOUS at 05:43

## 2019-06-05 RX ADMIN — MORPHINE SULFATE 2 MILLIGRAM(S): 50 CAPSULE, EXTENDED RELEASE ORAL at 14:41

## 2019-06-05 RX ADMIN — Medication 250 MILLIGRAM(S): at 05:38

## 2019-06-05 RX ADMIN — MORPHINE SULFATE 2 MILLIGRAM(S): 50 CAPSULE, EXTENDED RELEASE ORAL at 14:11

## 2019-06-05 RX ADMIN — AMLODIPINE BESYLATE 10 MILLIGRAM(S): 2.5 TABLET ORAL at 05:43

## 2019-06-05 RX ADMIN — Medication 100 MILLIGRAM(S): at 21:51

## 2019-06-05 RX ADMIN — PIPERACILLIN AND TAZOBACTAM 25 GRAM(S): 4; .5 INJECTION, POWDER, LYOPHILIZED, FOR SOLUTION INTRAVENOUS at 13:03

## 2019-06-05 RX ADMIN — ERGOCALCIFEROL 50000 UNIT(S): 1.25 CAPSULE ORAL at 13:02

## 2019-06-05 RX ADMIN — SENNA PLUS 2 TABLET(S): 8.6 TABLET ORAL at 21:51

## 2019-06-05 NOTE — PROGRESS NOTE ADULT - ASSESSMENT
58M w/ DM (A1C 11.9), HTN, presents with nonhealing RLE heel ulcer s/p multiple debridements.     -continue woundcare and lyndsay as per podiatry   will follow

## 2019-06-05 NOTE — BRIEF OPERATIVE NOTE - OPERATION/FINDINGS
s/p right foot I&D and debridement of necrotic tissue, mod concern for residual infection s/p right foot I&D and debridement of non viable tissue

## 2019-06-05 NOTE — PROGRESS NOTE ADULT - SUBJECTIVE AND OBJECTIVE BOX
Subjective: Patient seen and examined. No new events except as noted.   s/p debridement     REVIEW OF SYSTEMS:    CONSTITUTIONAL: N+weakness, fevers or chills  EYES/ENT: No visual changes;  No vertigo or throat pain   NECK: No pain or stiffness  RESPIRATORY: No cough, wheezing, hemoptysis; No shortness of breath  CARDIOVASCULAR: No chest pain or palpitations  GASTROINTESTINAL: No abdominal or epigastric pain. No nausea, vomiting, or hematemesis; No diarrhea or constipation. No melena or hematochezia.  GENITOURINARY: No dysuria, frequency or hematuria  NEUROLOGICAL: No numbness or weakness  SKIN: No itching, burning, rashes, or lesions   All other review of systems is negative unless indicated above.    MEDICATIONS:  MEDICATIONS  (STANDING):  amLODIPine   Tablet 10 milliGRAM(s) Oral daily  ascorbic acid 500 milliGRAM(s) Oral daily  aspirin enteric coated 81 milliGRAM(s) Oral daily  Dakins Solution - 1/4 Strength 1 Application(s) Topical daily  dextrose 5%. 1000 milliLiter(s) (50 mL/Hr) IV Continuous <Continuous>  dextrose 5%. 1000 milliLiter(s) (50 mL/Hr) IV Continuous <Continuous>  dextrose 5%. 1000 milliLiter(s) (50 mL/Hr) IV Continuous <Continuous>  dextrose 50% Injectable 12.5 Gram(s) IV Push once  dextrose 50% Injectable 25 Gram(s) IV Push once  dextrose 50% Injectable 25 Gram(s) IV Push once  dextrose 50% Injectable 12.5 Gram(s) IV Push once  dextrose 50% Injectable 25 Gram(s) IV Push once  dextrose 50% Injectable 12.5 Gram(s) IV Push once  dextrose 50% Injectable 25 Gram(s) IV Push once  dextrose 50% Injectable 25 Gram(s) IV Push once  dextrose 50% Injectable 12.5 Gram(s) IV Push once  dextrose 50% Injectable 25 Gram(s) IV Push once  dextrose 50% Injectable 25 Gram(s) IV Push once  ergocalciferol 71572 Unit(s) Oral every week  heparin  Injectable 5000 Unit(s) SubCutaneous every 8 hours  hydrALAZINE 100 milliGRAM(s) Oral three times a day  insulin glargine Injectable (LANTUS) 32 Unit(s) SubCutaneous at bedtime  insulin glargine Injectable (LANTUS) 25 Unit(s) SubCutaneous at bedtime  insulin glargine Injectable (LANTUS) 30 Unit(s) SubCutaneous at bedtime  insulin lispro (HumaLOG) corrective regimen sliding scale   SubCutaneous three times a day before meals  insulin lispro (HumaLOG) corrective regimen sliding scale   SubCutaneous three times a day before meals  insulin lispro (HumaLOG) corrective regimen sliding scale   SubCutaneous at bedtime  insulin lispro Injectable (HumaLOG) 20 Unit(s) SubCutaneous three times a day before meals  insulin lispro Injectable (HumaLOG) 18 Unit(s) SubCutaneous three times a day before meals  insulin regular  human corrective regimen sliding scale   SubCutaneous every 6 hours  lactulose Syrup 20 Gram(s) Oral every 4 hours  meropenem  IVPB 1000 milliGRAM(s) IV Intermittent every 8 hours  multivitamin 1 Tablet(s) Oral daily  oxyCODONE    IR 10 milliGRAM(s) Oral once  piperacillin/tazobactam IVPB. 3.375 Gram(s) IV Intermittent every 8 hours  polyethylene glycol 3350 17 Gram(s) Oral daily  senna 2 Tablet(s) Oral at bedtime  sodium chloride 0.9%. 1000 milliLiter(s) (75 mL/Hr) IV Continuous <Continuous>  sodium chloride 0.9%. 1000 milliLiter(s) (45 mL/Hr) IV Continuous <Continuous>  vancomycin  IVPB 1000 milliGRAM(s) IV Intermittent every 24 hours  vancomycin  IVPB 750 milliGRAM(s) IV Intermittent every 12 hours  vancomycin  IVPB 750 milliGRAM(s) IV Intermittent every 12 hours      PHYSICAL EXAM:  T(C): 36.5 (06-05-19 @ 11:15), Max: 37.5 (06-04-19 @ 13:26)  HR: 84 (06-05-19 @ 11:15) (76 - 98)  BP: 161/74 (06-05-19 @ 11:15) (133/75 - 168/76)  RR: 17 (06-05-19 @ 11:15) (16 - 18)  SpO2: 95% (06-05-19 @ 11:15) (94% - 98%)  Wt(kg): --  I&O's Summary    04 Jun 2019 07:01  -  05 Jun 2019 07:00  --------------------------------------------------------  IN: 810 mL / OUT: 1050 mL / NET: -240 mL      Height (cm): 182.88 (06-05 @ 07:51)  Weight (kg): 111.6 (06-05 @ 07:51)  BMI (kg/m2): 33.4 (06-05 @ 07:51)  BSA (m2): 2.33 (06-05 @ 07:51)      Appearance: NAD   HEENT:   Normal oral mucosa, PERRL, EOMI	  Lymphatic: No lymphadenopathy , no edema  Cardiovascular: Normal S1 S2, No JVD, No murmurs , Peripheral pulses palpable 2+ bilaterally  Respiratory: Lungs clear to auscultation, normal effort 	  Gastrointestinal:  Soft, Non-tender, + BS	  Skin: No rashes, No ecchymoses, No cyanosis, warm to touch  Musculoskeletal: Decreased range of motion, normal strength  Psychiatry:  Mood & affect appropriate  Ext: +edema , chronic venous stasis skin discoloration   right foot wrapped , + ulcer       LABS:    CARDIAC MARKERS:                                8.5    18.2  )-----------( 500      ( 05 Jun 2019 07:11 )             28.8     06-05    135  |  100  |  25<H>  ----------------------------<  272<H>  5.0   |  21<L>  |  1.97<H>    Ca    8.8      05 Jun 2019 07:11    TPro  6.7  /  Alb  2.5<L>  /  TBili  0.5  /  DBili  x   /  AST  20  /  ALT  30  /  AlkPhos  133<H>  06-05    proBNP:   Lipid Profile:   HgA1c:   TSH:             TELEMETRY: 	SR    ECG:  	  RADIOLOGY:   DIAGNOSTIC TESTING:  [ ] Echocardiogram:  [ ]  Catheterization:  [ ] Stress Test:    OTHER:

## 2019-06-05 NOTE — PROGRESS NOTE ADULT - ASSESSMENT
diabetic foot ulcer with surrounding cellulitis   Fever resolved , leukocytosis persists  S/p debridement with podiatry 5/22 and 5/27  Culture polymicrobial--E coli, enterococcus, strep  - on zosyn  per ID  -  repeat BCXs NGTD  - Monitor for other possible source infection such as developing diarrhea  - podiatry and vascular fu   - s/p redebridement  - will likely need total 6 weeks of iv abx  - vascular spoke with pt regarding possible BKA.    uncontrolled Diabetes  - cont lantus 32 - novolog 18 units qac  - hgb a1c  11.9  - diabetic diet  - FS qid  - endo follow up appreciated    MARIKA   - cre stable at baseline  - follow creatinine  - nephrology following    HTN   -   hold hydralazine     hyponatremia  - NO salt restriction  - no HCTZ    anorexia  - add Glucerna    depression  - seen by psych

## 2019-06-05 NOTE — PROGRESS NOTE ADULT - SUBJECTIVE AND OBJECTIVE BOX
Patient is a 58y old  Male who presents with a chief complaint of right foot ulcer (05 Jun 2019 11:32)      SUBJECTIVE / OVERNIGHT EVENTS: seen and examined in recovery room.  s/p debridement of right foot.  no bm.     MEDICATIONS  (STANDING):  amLODIPine   Tablet 10 milliGRAM(s) Oral daily  ascorbic acid 500 milliGRAM(s) Oral daily  aspirin enteric coated 81 milliGRAM(s) Oral daily  Dakins Solution - 1/4 Strength 1 Application(s) Topical daily  dextrose 5%. 1000 milliLiter(s) (50 mL/Hr) IV Continuous <Continuous>  dextrose 5%. 1000 milliLiter(s) (50 mL/Hr) IV Continuous <Continuous>  dextrose 5%. 1000 milliLiter(s) (50 mL/Hr) IV Continuous <Continuous>  dextrose 50% Injectable 12.5 Gram(s) IV Push once  dextrose 50% Injectable 25 Gram(s) IV Push once  dextrose 50% Injectable 25 Gram(s) IV Push once  dextrose 50% Injectable 12.5 Gram(s) IV Push once  dextrose 50% Injectable 25 Gram(s) IV Push once  dextrose 50% Injectable 12.5 Gram(s) IV Push once  dextrose 50% Injectable 25 Gram(s) IV Push once  dextrose 50% Injectable 25 Gram(s) IV Push once  dextrose 50% Injectable 12.5 Gram(s) IV Push once  dextrose 50% Injectable 25 Gram(s) IV Push once  dextrose 50% Injectable 25 Gram(s) IV Push once  ergocalciferol 01092 Unit(s) Oral every week  heparin  Injectable 5000 Unit(s) SubCutaneous every 8 hours  hydrALAZINE 100 milliGRAM(s) Oral three times a day  insulin glargine Injectable (LANTUS) 32 Unit(s) SubCutaneous at bedtime  insulin glargine Injectable (LANTUS) 25 Unit(s) SubCutaneous at bedtime  insulin glargine Injectable (LANTUS) 30 Unit(s) SubCutaneous at bedtime  insulin lispro (HumaLOG) corrective regimen sliding scale   SubCutaneous three times a day before meals  insulin lispro (HumaLOG) corrective regimen sliding scale   SubCutaneous three times a day before meals  insulin lispro (HumaLOG) corrective regimen sliding scale   SubCutaneous at bedtime  insulin lispro Injectable (HumaLOG) 20 Unit(s) SubCutaneous three times a day before meals  insulin lispro Injectable (HumaLOG) 18 Unit(s) SubCutaneous three times a day before meals  insulin regular  human corrective regimen sliding scale   SubCutaneous every 6 hours  lactulose Syrup 20 Gram(s) Oral every 4 hours  meropenem  IVPB 1000 milliGRAM(s) IV Intermittent every 8 hours  multivitamin 1 Tablet(s) Oral daily  oxyCODONE    IR 10 milliGRAM(s) Oral once  piperacillin/tazobactam IVPB. 3.375 Gram(s) IV Intermittent every 8 hours  polyethylene glycol 3350 17 Gram(s) Oral daily  senna 2 Tablet(s) Oral at bedtime  sodium chloride 0.9%. 1000 milliLiter(s) (75 mL/Hr) IV Continuous <Continuous>  sodium chloride 0.9%. 1000 milliLiter(s) (45 mL/Hr) IV Continuous <Continuous>  vancomycin  IVPB 1000 milliGRAM(s) IV Intermittent every 24 hours  vancomycin  IVPB 750 milliGRAM(s) IV Intermittent every 12 hours  vancomycin  IVPB 750 milliGRAM(s) IV Intermittent every 12 hours    MEDICATIONS  (PRN):  acetaminophen   Tablet .. 650 milliGRAM(s) Oral every 6 hours PRN Mild Pain (1 - 3)  dextrose 40% Gel 15 Gram(s) Oral once PRN Blood Glucose LESS THAN 70 milliGRAM(s)/deciliter  dextrose 40% Gel 15 Gram(s) Oral once PRN Blood Glucose LESS THAN 70 milliGRAM(s)/deciliter  dextrose 40% Gel 15 Gram(s) Oral once PRN Blood Glucose LESS THAN 70 milliGRAM(s)/deciliter  glucagon  Injectable 1 milliGRAM(s) IntraMuscular once PRN Glucose LESS THAN 70 milligrams/deciliter  glucagon  Injectable 1 milliGRAM(s) IntraMuscular once PRN Glucose LESS THAN 70 milligrams/deciliter  HYDROmorphone  Injectable 0.5 milliGRAM(s) IV Push every 10 minutes PRN Moderate Pain (4 - 6)  metoclopramide Injectable 10 milliGRAM(s) IV Push once PRN Nausea and/or Vomiting  morphine  - Injectable 2 milliGRAM(s) IV Push every 6 hours PRN Severe Pain (7 - 10)  oxyCODONE    5 mG/acetaminophen 325 mG 1 Tablet(s) Oral every 6 hours PRN Moderate Pain (4 - 6)  oxyCODONE    5 mG/acetaminophen 325 mG 2 Tablet(s) Oral every 4 hours PRN Moderate Pain (4 - 6)      Vital Signs Last 24 Hrs  T(C): 36.5 (05 Jun 2019 11:15), Max: 37.5 (04 Jun 2019 13:26)  T(F): 97.7 (05 Jun 2019 11:15), Max: 99.5 (04 Jun 2019 13:26)  HR: 88 (05 Jun 2019 11:30) (76 - 98)  BP: 156/61 (05 Jun 2019 11:30) (133/75 - 168/76)  BP(mean): 98 (05 Jun 2019 11:30) (98 - 109)  RR: 18 (05 Jun 2019 11:30) (16 - 18)  SpO2: 96% (05 Jun 2019 11:30) (94% - 98%)  CAPILLARY BLOOD GLUCOSE      POCT Blood Glucose.: 282 mg/dL (05 Jun 2019 11:30)  POCT Blood Glucose.: 263 mg/dL (05 Jun 2019 05:52)  POCT Blood Glucose.: 216 mg/dL (04 Jun 2019 22:51)  POCT Blood Glucose.: 153 mg/dL (04 Jun 2019 14:27)    I&O's Summary    04 Jun 2019 07:01  -  05 Jun 2019 07:00  --------------------------------------------------------  IN: 810 mL / OUT: 1050 mL / NET: -240 mL        PHYSICAL EXAM:  GENERAL: NAD, well-developed  HEAD:  Atraumatic, Normocephalic  EYES: EOMI, PERRLA, conjunctiva and sclera clear  NECK: Supple, No JVD  CHEST/LUNG: Clear to auscultation bilaterally; No wheeze  HEART: Regular rate and rhythm; No murmurs, rubs, or gallops  ABDOMEN: Soft, Nontender, Nondistended; Bowel sounds present  EXTREMITIES:  2+ Peripheral Pulses, No clubbing, cyanosis, or edema  PSYCH: AAOx3  NEUROLOGY: non-focal  SKIN: No rashes or lesions    LABS:                        8.5    18.2  )-----------( 500      ( 05 Jun 2019 07:11 )             28.8     06-05    135  |  100  |  25<H>  ----------------------------<  272<H>  5.0   |  21<L>  |  1.97<H>    Ca    8.8      05 Jun 2019 07:11    TPro  6.7  /  Alb  2.5<L>  /  TBili  0.5  /  DBili  x   /  AST  20  /  ALT  30  /  AlkPhos  133<H>  06-05    PT/INR - ( 05 Jun 2019 07:11 )   PT: 16.5 sec;   INR: 1.42 ratio         PTT - ( 05 Jun 2019 07:11 )  PTT:27.5 sec          RADIOLOGY & ADDITIONAL TESTS:    Imaging Personally Reviewed:    Consultant(s) Notes Reviewed:      Care Discussed with Consultants/Other Providers:

## 2019-06-05 NOTE — BRIEF OPERATIVE NOTE - NSICDXBRIEFPREOP_GEN_ALL_CORE_FT
PRE-OP DIAGNOSIS:  Wound of right foot 22-May-2019 16:13:26  Latoya Herzog
PRE-OP DIAGNOSIS:  Wound of right foot 22-May-2019 16:13:26  Latoya Herzog

## 2019-06-05 NOTE — PROGRESS NOTE ADULT - SUBJECTIVE AND OBJECTIVE BOX
Patient is a 58y old  Male who presents with a chief complaint of right foot ulcer (05 Jun 2019 16:48)      Vascular Surgery Attending Progress Note    Interval HPI: pt w/o new c/o     Medications:  amLODIPine   Tablet 10 milliGRAM(s) Oral daily  aspirin enteric coated 81 milliGRAM(s) Oral daily  dextrose 40% Gel 15 Gram(s) Oral once PRN  dextrose 5%. 1000 milliLiter(s) IV Continuous <Continuous>  dextrose 50% Injectable 12.5 Gram(s) IV Push once  dextrose 50% Injectable 25 Gram(s) IV Push once  dextrose 50% Injectable 25 Gram(s) IV Push once  ergocalciferol 45747 Unit(s) Oral every week  glucagon  Injectable 1 milliGRAM(s) IntraMuscular once PRN  heparin  Injectable 5000 Unit(s) SubCutaneous every 8 hours  hydrALAZINE 100 milliGRAM(s) Oral three times a day  insulin glargine Injectable (LANTUS) 32 Unit(s) SubCutaneous at bedtime  insulin lispro (HumaLOG) corrective regimen sliding scale   SubCutaneous three times a day before meals  insulin lispro (HumaLOG) corrective regimen sliding scale   SubCutaneous three times a day before meals  insulin lispro (HumaLOG) corrective regimen sliding scale   SubCutaneous at bedtime  insulin lispro Injectable (HumaLOG) 18 Unit(s) SubCutaneous three times a day before meals  lactulose Syrup 20 Gram(s) Oral every 4 hours  oxyCODONE    5 mG/acetaminophen 325 mG 1 Tablet(s) Oral every 6 hours PRN  oxyCODONE    5 mG/acetaminophen 325 mG 2 Tablet(s) Oral every 6 hours PRN  piperacillin/tazobactam IVPB. 3.375 Gram(s) IV Intermittent every 8 hours  polyethylene glycol 3350 17 Gram(s) Oral daily  senna 2 Tablet(s) Oral at bedtime  sodium chloride 0.9%. 1000 milliLiter(s) IV Continuous <Continuous>  vancomycin  IVPB 1000 milliGRAM(s) IV Intermittent every 24 hours      Vital Signs Last 24 Hrs  T(C): 38 (05 Jun 2019 17:43), Max: 38 (05 Jun 2019 17:43)  T(F): 100.4 (05 Jun 2019 17:43), Max: 100.4 (05 Jun 2019 17:43)  HR: 93 (05 Jun 2019 17:43) (76 - 98)  BP: 133/56 (05 Jun 2019 17:43) (133/56 - 168/76)  BP(mean): 98 (05 Jun 2019 11:30) (98 - 109)  RR: 18 (05 Jun 2019 17:43) (16 - 18)  SpO2: 95% (05 Jun 2019 17:43) (94% - 98%)  I&O's Summary    04 Jun 2019 07:01  -  05 Jun 2019 07:00  --------------------------------------------------------  IN: 810 mL / OUT: 1050 mL / NET: -240 mL    05 Jun 2019 07:01  -  05 Jun 2019 18:06  --------------------------------------------------------  IN: 100 mL / OUT: 200 mL / NET: -100 mL        Physical Exam:  Neuro  A&Ox3 VSS  Vascular:  dressing c/d/i    LABS:                        8.5    18.2  )-----------( 500      ( 05 Jun 2019 07:11 )             28.8     06-05    135  |  100  |  25<H>  ----------------------------<  272<H>  5.0   |  21<L>  |  1.97<H>    Ca    8.8      05 Jun 2019 07:11    TPro  6.7  /  Alb  2.5<L>  /  TBili  0.5  /  DBili  x   /  AST  20  /  ALT  30  /  AlkPhos  133<H>  06-05    PT/INR - ( 05 Jun 2019 07:11 )   PT: 16.5 sec;   INR: 1.42 ratio         PTT - ( 05 Jun 2019 07:11 )  PTT:27.5 sec    CHRISTIANO KERR MD  [phone]

## 2019-06-05 NOTE — BRIEF OPERATIVE NOTE - NSICDXBRIEFPOSTOP_GEN_ALL_CORE_FT
POST-OP DIAGNOSIS:  Wound of right foot 22-May-2019 16:13:38  Latoya Herzog
POST-OP DIAGNOSIS:  Wound of right foot 22-May-2019 16:13:38  Latoya Herzog

## 2019-06-05 NOTE — PRE-ANESTHESIA EVALUATION ADULT - NSANTHPMHFT_GEN_ALL_CORE
58 M with uncontrolled DM2 on Insulin, HbA1c 12, poorly controlled HTN, admitted with diabetic foot ulcer with cellulitis/ worsening soft tissue infection- having I & D. 111 kg/ BMI 33 kg/m2
6/4 progress notes from cardiology and internal medicine in [facility]

## 2019-06-05 NOTE — PROGRESS NOTE ADULT - PROBLEM SELECTOR PLAN 3
-trend cbc  -from infection  -WBC scan negative for OM  -Podiatry strongly feels we need to r/o alternate source outside the foot  -if WBC persists or worse, check CT chest/abd/pelvis w/o contrast for other source

## 2019-06-05 NOTE — PROGRESS NOTE ADULT - PROBLEM SELECTOR PLAN 1
s/p I&D  s/p debridement   Pain control   Monitor WBC  IV abx   Wound care   ID following   keep legs elevated

## 2019-06-05 NOTE — PRE-ANESTHESIA EVALUATION ADULT - NSANTHOSAYNRD_GEN_A_CORE
No. KWABENA screening performed.  STOP BANG Legend: 0-2 = LOW Risk; 3-4 = INTERMEDIATE Risk; 5-8 = HIGH Risk

## 2019-06-05 NOTE — PROGRESS NOTE ADULT - SUBJECTIVE AND OBJECTIVE BOX
Podiatry Pager #: [phone]    Patient is a 58y old  Male who presents with a chief complaint of right foot ulcer (04 Jun 2019 20:53)      INTERVAL HPI/OVERNIGHT EVENTS:   Pt is scheduled for RF I&D with Dr. Issa at 7:30 AM . Patient is aware of procedure and is NPO since midnight.    MEDICATIONS  (STANDING):  amLODIPine   Tablet 10 milliGRAM(s) Oral daily  ascorbic acid 500 milliGRAM(s) Oral daily  aspirin enteric coated 81 milliGRAM(s) Oral daily  Dakins Solution - 1/4 Strength 1 Application(s) Topical daily  dextrose 5%. 1000 milliLiter(s) (50 mL/Hr) IV Continuous <Continuous>  dextrose 5%. 1000 milliLiter(s) (50 mL/Hr) IV Continuous <Continuous>  dextrose 50% Injectable 12.5 Gram(s) IV Push once  dextrose 50% Injectable 25 Gram(s) IV Push once  dextrose 50% Injectable 25 Gram(s) IV Push once  dextrose 50% Injectable 12.5 Gram(s) IV Push once  dextrose 50% Injectable 25 Gram(s) IV Push once  dextrose 50% Injectable 25 Gram(s) IV Push once  ergocalciferol 67989 Unit(s) Oral every week  heparin  Injectable 5000 Unit(s) SubCutaneous every 8 hours  hydrALAZINE 100 milliGRAM(s) Oral three times a day  insulin glargine Injectable (LANTUS) 32 Unit(s) SubCutaneous at bedtime  insulin lispro Injectable (HumaLOG) 18 Unit(s) SubCutaneous three times a day before meals  insulin regular  human corrective regimen sliding scale   SubCutaneous every 6 hours  lactulose Syrup 20 Gram(s) Oral every 4 hours  multivitamin 1 Tablet(s) Oral daily  oxyCODONE    IR 10 milliGRAM(s) Oral once  piperacillin/tazobactam IVPB. 3.375 Gram(s) IV Intermittent every 8 hours  polyethylene glycol 3350 17 Gram(s) Oral daily  senna 2 Tablet(s) Oral at bedtime  sodium chloride 0.9%. 1000 milliLiter(s) (30 mL/Hr) IV Continuous <Continuous>  vancomycin  IVPB 750 milliGRAM(s) IV Intermittent every 12 hours    MEDICATIONS  (PRN):  acetaminophen   Tablet .. 650 milliGRAM(s) Oral every 6 hours PRN Mild Pain (1 - 3)  dextrose 40% Gel 15 Gram(s) Oral once PRN Blood Glucose LESS THAN 70 milliGRAM(s)/deciliter  dextrose 40% Gel 15 Gram(s) Oral once PRN Blood Glucose LESS THAN 70 milliGRAM(s)/deciliter  glucagon  Injectable 1 milliGRAM(s) IntraMuscular once PRN Glucose LESS THAN 70 milligrams/deciliter  oxyCODONE    5 mG/acetaminophen 325 mG 2 Tablet(s) Oral every 4 hours PRN Moderate Pain (4 - 6)      Allergies    No Known Allergies    Intolerances        Vital Signs Last 24 Hrs  T(C): 37 (05 Jun 2019 05:22), Max: 37.5 (04 Jun 2019 13:26)  T(F): 98.6 (05 Jun 2019 05:22), Max: 99.5 (04 Jun 2019 13:26)  HR: 98 (05 Jun 2019 05:22) (88 - 98)  BP: 151/68 (05 Jun 2019 05:22) (133/75 - 166/92)  BP(mean): --  RR: 18 (05 Jun 2019 05:22) (17 - 18)  SpO2: 94% (05 Jun 2019 05:22) (94% - 95%)    LABS:                        9.3    17.0  )-----------( 480      ( 04 Jun 2019 09:18 )             30.3     06-04    139  |  104  |  24<H>  ----------------------------<  199<H>  4.9   |  22  |  1.96<H>    Ca    8.8      04 Jun 2019 09:18          CAPILLARY BLOOD GLUCOSE      POCT Blood Glucose.: 263 mg/dL (05 Jun 2019 05:52)  POCT Blood Glucose.: 216 mg/dL (04 Jun 2019 22:51)  POCT Blood Glucose.: 153 mg/dL (04 Jun 2019 14:27)  POCT Blood Glucose.: 185 mg/dL (04 Jun 2019 09:16)      RADIOLOGY & ADDITIONAL TESTS:

## 2019-06-05 NOTE — PROGRESS NOTE ADULT - SUBJECTIVE AND OBJECTIVE BOX
Chief complaint  Patient is a 58y old  Male who presents with a chief complaint of right foot ulcer (05 Jun 2019 12:01)   Review of systems  Patient in bed, looks comfortable, no fever, no hypoglycemia.    Labs and Fingersticks  CAPILLARY BLOOD GLUCOSE      POCT Blood Glucose.: 275 mg/dL (05 Jun 2019 13:51)  POCT Blood Glucose.: 282 mg/dL (05 Jun 2019 11:30)  POCT Blood Glucose.: 263 mg/dL (05 Jun 2019 05:52)  POCT Blood Glucose.: 216 mg/dL (04 Jun 2019 22:51)      Anion Gap, Serum: 14 (06-05 @ 07:11)  Anion Gap, Serum: 13 (06-04 @ 09:18)      Calcium, Total Serum: 8.8 (06-05 @ 07:11)  Calcium, Total Serum: 8.8 (06-04 @ 09:18)  Albumin, Serum: 2.5 <L> (06-05 @ 07:11)    Alanine Aminotransferase (ALT/SGPT): 30 (06-05 @ 07:11)  Alkaline Phosphatase, Serum: 133 <H> (06-05 @ 07:11)  Aspartate Aminotransferase (AST/SGOT): 20 (06-05 @ 07:11)        06-05    135  |  100  |  25<H>  ----------------------------<  272<H>  5.0   |  21<L>  |  1.97<H>    Ca    8.8      05 Jun 2019 07:11    TPro  6.7  /  Alb  2.5<L>  /  TBili  0.5  /  DBili  x   /  AST  20  /  ALT  30  /  AlkPhos  133<H>  06-05                        8.5    18.2  )-----------( 500      ( 05 Jun 2019 07:11 )             28.8     Medications  MEDICATIONS  (STANDING):  amLODIPine   Tablet 10 milliGRAM(s) Oral daily  aspirin enteric coated 81 milliGRAM(s) Oral daily  dextrose 5%. 1000 milliLiter(s) (50 mL/Hr) IV Continuous <Continuous>  dextrose 50% Injectable 12.5 Gram(s) IV Push once  dextrose 50% Injectable 25 Gram(s) IV Push once  dextrose 50% Injectable 25 Gram(s) IV Push once  ergocalciferol 05042 Unit(s) Oral every week  heparin  Injectable 5000 Unit(s) SubCutaneous every 8 hours  hydrALAZINE 100 milliGRAM(s) Oral three times a day  insulin glargine Injectable (LANTUS) 32 Unit(s) SubCutaneous at bedtime  insulin lispro (HumaLOG) corrective regimen sliding scale   SubCutaneous three times a day before meals  insulin lispro (HumaLOG) corrective regimen sliding scale   SubCutaneous three times a day before meals  insulin lispro (HumaLOG) corrective regimen sliding scale   SubCutaneous at bedtime  insulin lispro Injectable (HumaLOG) 18 Unit(s) SubCutaneous three times a day before meals  lactulose Syrup 20 Gram(s) Oral every 4 hours  piperacillin/tazobactam IVPB. 3.375 Gram(s) IV Intermittent every 8 hours  polyethylene glycol 3350 17 Gram(s) Oral daily  senna 2 Tablet(s) Oral at bedtime  sodium chloride 0.9%. 1000 milliLiter(s) (75 mL/Hr) IV Continuous <Continuous>  vancomycin  IVPB 1000 milliGRAM(s) IV Intermittent every 24 hours      Physical Exam  General: Patient comfortable in bed  Vital Signs Last 12 Hrs  T(F): 98.8 (06-05-19 @ 13:50), Max: 98.8 (06-05-19 @ 13:50)  HR: 92 (06-05-19 @ 13:50) (76 - 98)  BP: 141/70 (06-05-19 @ 13:50) (141/70 - 168/76)  BP(mean): 98 (06-05-19 @ 11:30) (98 - 109)  RR: 18 (06-05-19 @ 13:50) (16 - 18)  SpO2: 95% (06-05-19 @ 13:50) (94% - 98%)  Neck: No palpable thyroid nodules.  CVS: S1S2, No murmurs  Respiratory: No wheezing, no crepitations  GI: Abdomen soft, bowel sounds positive  Musculoskeletal:  edema lower extremities.   Skin: No skin rashes, no ecchymosis    Diagnostics    Vitamin D, 1, 25-Dihydroxy: AM Sched. Collection: 22-May-2019 06:00 (05-21 @ 18:59)  Vitamin D, 25-Hydroxy: AM Sched. Collection: 22-May-2019 06:00 (05-21 @ 18:59)

## 2019-06-05 NOTE — BRIEF OPERATIVE NOTE - NSICDXBRIEFPROCEDURE_GEN_ALL_CORE_FT
PROCEDURES:  Incision and drainage, foot, bursa, single space 22-May-2019 16:13:15  Latoya Herzog
PROCEDURES:  Incision and drainage, wound infection, foot, postoperative 05-Jun-2019 10:41:40  Jessica Caraballo

## 2019-06-05 NOTE — PROGRESS NOTE ADULT - SUBJECTIVE AND OBJECTIVE BOX
AGUSTIN BRAR 58y MRN-[id number]    Patient is a 58y old  Male who presents with a chief complaint of right foot ulcer (05 Jun 2019 16:42)      Follow Up/CC:  ID following for foot infection    Interval History/ROS: no fever, s/p debridement of foot     Allergies    No Known Allergies    Intolerances        ANTIMICROBIALS:  piperacillin/tazobactam IVPB. 3.375 every 8 hours  vancomycin  IVPB 1000 every 24 hours      MEDICATIONS  (STANDING):  amLODIPine   Tablet 10 milliGRAM(s) Oral daily  aspirin enteric coated 81 milliGRAM(s) Oral daily  dextrose 5%. 1000 milliLiter(s) (50 mL/Hr) IV Continuous <Continuous>  dextrose 50% Injectable 12.5 Gram(s) IV Push once  dextrose 50% Injectable 25 Gram(s) IV Push once  dextrose 50% Injectable 25 Gram(s) IV Push once  ergocalciferol 94744 Unit(s) Oral every week  heparin  Injectable 5000 Unit(s) SubCutaneous every 8 hours  hydrALAZINE 100 milliGRAM(s) Oral three times a day  insulin glargine Injectable (LANTUS) 32 Unit(s) SubCutaneous at bedtime  insulin lispro (HumaLOG) corrective regimen sliding scale   SubCutaneous three times a day before meals  insulin lispro (HumaLOG) corrective regimen sliding scale   SubCutaneous three times a day before meals  insulin lispro (HumaLOG) corrective regimen sliding scale   SubCutaneous at bedtime  insulin lispro Injectable (HumaLOG) 18 Unit(s) SubCutaneous three times a day before meals  lactulose Syrup 20 Gram(s) Oral every 4 hours  piperacillin/tazobactam IVPB. 3.375 Gram(s) IV Intermittent every 8 hours  polyethylene glycol 3350 17 Gram(s) Oral daily  senna 2 Tablet(s) Oral at bedtime  sodium chloride 0.9%. 1000 milliLiter(s) (75 mL/Hr) IV Continuous <Continuous>  vancomycin  IVPB 1000 milliGRAM(s) IV Intermittent every 24 hours    MEDICATIONS  (PRN):  dextrose 40% Gel 15 Gram(s) Oral once PRN Blood Glucose LESS THAN 70 milliGRAM(s)/deciliter  glucagon  Injectable 1 milliGRAM(s) IntraMuscular once PRN Glucose LESS THAN 70 milligrams/deciliter  oxyCODONE    5 mG/acetaminophen 325 mG 1 Tablet(s) Oral every 6 hours PRN Moderate Pain (4 - 6)  oxyCODONE    5 mG/acetaminophen 325 mG 2 Tablet(s) Oral every 6 hours PRN Severe Pain (7 - 10)        Vital Signs Last 24 Hrs  T(C): 37.1 (05 Jun 2019 13:50), Max: 37.1 (05 Jun 2019 13:50)  T(F): 98.8 (05 Jun 2019 13:50), Max: 98.8 (05 Jun 2019 13:50)  HR: 92 (05 Jun 2019 13:50) (76 - 98)  BP: 141/70 (05 Jun 2019 13:50) (141/70 - 168/76)  BP(mean): 98 (05 Jun 2019 11:30) (98 - 109)  RR: 18 (05 Jun 2019 13:50) (16 - 18)  SpO2: 95% (05 Jun 2019 13:50) (94% - 98%)    CBC Full  -  ( 05 Jun 2019 07:11 )  WBC Count : 18.2 K/uL  RBC Count : 3.07 M/uL  Hemoglobin : 8.5 g/dL  Hematocrit : 28.8 %  Platelet Count - Automated : 500 K/uL  Mean Cell Volume : 93.6 fl  Mean Cell Hemoglobin : 27.7 pg  Mean Cell Hemoglobin Concentration : 29.6 gm/dL  Auto Neutrophil # : x  Auto Lymphocyte # : x  Auto Monocyte # : x  Auto Eosinophil # : x  Auto Basophil # : x  Auto Neutrophil % : x  Auto Lymphocyte % : x  Auto Monocyte % : x  Auto Eosinophil % : x  Auto Basophil % : x    06-05    135  |  100  |  25<H>  ----------------------------<  272<H>  5.0   |  21<L>  |  1.97<H>    Ca    8.8      05 Jun 2019 07:11    TPro  6.7  /  Alb  2.5<L>  /  TBili  0.5  /  DBili  x   /  AST  20  /  ALT  30  /  AlkPhos  133<H>  06-05    LIVER FUNCTIONS - ( 05 Jun 2019 07:11 )  Alb: 2.5 g/dL / Pro: 6.7 g/dL / ALK PHOS: 133 U/L / ALT: 30 U/L / AST: 20 U/L / GGT: x               MICROBIOLOGY:  .Other  05-28-19   No growth  --  Enterococcus faecalis  Escherichia coli      .Blood  05-26-19   No growth at 5 days.  --  --      .Blood  05-25-19   No growth at 5 days.  --  --      .Tissue  05-23-19   Few Escherichia coli  Growth in fluid media only Enterococcus faecalis  Rare Streptococcus agalactiae (Group B) isolated  Group B streptococci are susceptible to ampicillin,  penicillin and cefazolin, but may be resistant to  erythromycin and clindamycin.  Recommendations for intrapartum prophylaxis for Group B  streptococci are penicillin or ampicillin.  --  Escherichia coli  Enterococcus faecalis      .Blood  05-20-19   No growth at 5 days.  --  --      .Abscess  05-20-19   Numerous Escherichia coli  Numerous Enterococcus faecalis  Moderate Prevotella bivia "Susceptibilities not performed"  --  Enterococcus faecalis  Escherichia coli      RADIOLOGY    < from: NM SPECT/CT Inflamm Process (06.05.19 @ 15:24) >  Abnormal Tc 99m labeled leukocyte scan.    Increased labeled WBC activity in the soft tissue of right plantar   region, may represent postsurgical change versus soft tissue infection.    No scan evidence of osteomyelitis.    < end of copied text >

## 2019-06-05 NOTE — PROGRESS NOTE ADULT - ASSESSMENT
Assessment  DMT2: 58y Male with DM T2 with hyperglycemia, on insulin, blood sugars  running high, s/p procedure, no hypoglycemic episode, non compliant with low carb diet.  Foot wound: on medications, no chest pain, stable, monitored.  HTN: Controlled,  on antihypertensive medications.  Overweight/Obesity: No strict exercise routines, not on any weight loss program, neither on low calorie diet.    Andrzej Zhu MD  Cell: [phone]  Office: [phone]

## 2019-06-05 NOTE — PROGRESS NOTE ADULT - ASSESSMENT
Plan:   To OR today RF I&D with Dr. Issa at 7:30 AM  Medical clearance since 5/21 and documented in chart.  Consent signed and in chart.  Procedure was explained to patient in detail. All alternatives, risks and complications were discussed. All questions answered.

## 2019-06-05 NOTE — PROGRESS NOTE ADULT - PROBLEM SELECTOR PLAN 1
-cont abx  -initial MRI foot noted - no OM  -s/p debridement per podiatry 5/27  -DM control  -local care per podiatry  -s/p debridement/revision 6/5 - d/w Dr Issa regarding intraop findings - no overt purulence, debrided down to healthy tissue

## 2019-06-06 LAB
ALBUMIN SERPL ELPH-MCNC: 2.4 G/DL — LOW (ref 3.3–5)
ALP SERPL-CCNC: 118 U/L — SIGNIFICANT CHANGE UP (ref 40–120)
ALT FLD-CCNC: 31 U/L — SIGNIFICANT CHANGE UP (ref 10–45)
ANION GAP SERPL CALC-SCNC: 14 MMOL/L — SIGNIFICANT CHANGE UP (ref 5–17)
AST SERPL-CCNC: 23 U/L — SIGNIFICANT CHANGE UP (ref 10–40)
BILIRUB SERPL-MCNC: 0.6 MG/DL — SIGNIFICANT CHANGE UP (ref 0.2–1.2)
BUN SERPL-MCNC: 32 MG/DL — HIGH (ref 7–23)
CALCIUM SERPL-MCNC: 8.8 MG/DL — SIGNIFICANT CHANGE UP (ref 8.4–10.5)
CHLORIDE SERPL-SCNC: 101 MMOL/L — SIGNIFICANT CHANGE UP (ref 96–108)
CO2 SERPL-SCNC: 20 MMOL/L — LOW (ref 22–31)
CREAT SERPL-MCNC: 2.72 MG/DL — HIGH (ref 0.5–1.3)
GLUCOSE BLDC GLUCOMTR-MCNC: 171 MG/DL — HIGH (ref 70–99)
GLUCOSE BLDC GLUCOMTR-MCNC: 190 MG/DL — HIGH (ref 70–99)
GLUCOSE BLDC GLUCOMTR-MCNC: 202 MG/DL — HIGH (ref 70–99)
GLUCOSE BLDC GLUCOMTR-MCNC: 237 MG/DL — HIGH (ref 70–99)
GLUCOSE SERPL-MCNC: 177 MG/DL — HIGH (ref 70–99)
HCT VFR BLD CALC: 26.2 % — LOW (ref 39–50)
HGB BLD-MCNC: 8.2 G/DL — LOW (ref 13–17)
MCHC RBC-ENTMCNC: 29.8 PG — SIGNIFICANT CHANGE UP (ref 27–34)
MCHC RBC-ENTMCNC: 31.4 GM/DL — LOW (ref 32–36)
MCV RBC AUTO: 95.1 FL — SIGNIFICANT CHANGE UP (ref 80–100)
PLATELET # BLD AUTO: 402 K/UL — HIGH (ref 150–400)
POTASSIUM SERPL-MCNC: 4.8 MMOL/L — SIGNIFICANT CHANGE UP (ref 3.5–5.3)
POTASSIUM SERPL-SCNC: 4.8 MMOL/L — SIGNIFICANT CHANGE UP (ref 3.5–5.3)
PROT SERPL-MCNC: 6.6 G/DL — SIGNIFICANT CHANGE UP (ref 6–8.3)
RBC # BLD: 2.76 M/UL — LOW (ref 4.2–5.8)
RBC # FLD: 12.5 % — SIGNIFICANT CHANGE UP (ref 10.3–14.5)
SODIUM SERPL-SCNC: 135 MMOL/L — SIGNIFICANT CHANGE UP (ref 135–145)
VANCOMYCIN TROUGH SERPL-MCNC: 11.6 UG/ML — SIGNIFICANT CHANGE UP (ref 10–20)
WBC # BLD: 18.4 K/UL — HIGH (ref 3.8–10.5)
WBC # FLD AUTO: 18.4 K/UL — HIGH (ref 3.8–10.5)

## 2019-06-06 PROCEDURE — 99233 SBSQ HOSP IP/OBS HIGH 50: CPT

## 2019-06-06 PROCEDURE — 99232 SBSQ HOSP IP/OBS MODERATE 35: CPT

## 2019-06-06 RX ORDER — DRONABINOL 2.5 MG
2.5 CAPSULE ORAL
Refills: 0 | Status: DISCONTINUED | OUTPATIENT
Start: 2019-06-06 | End: 2019-06-13

## 2019-06-06 RX ORDER — ONDANSETRON 8 MG/1
4 TABLET, FILM COATED ORAL ONCE
Refills: 0 | Status: DISCONTINUED | OUTPATIENT
Start: 2019-06-06 | End: 2019-06-14

## 2019-06-06 RX ADMIN — PIPERACILLIN AND TAZOBACTAM 25 GRAM(S): 4; .5 INJECTION, POWDER, LYOPHILIZED, FOR SOLUTION INTRAVENOUS at 14:56

## 2019-06-06 RX ADMIN — ERGOCALCIFEROL 50000 UNIT(S): 1.25 CAPSULE ORAL at 12:37

## 2019-06-06 RX ADMIN — Medication 100 MILLIGRAM(S): at 06:21

## 2019-06-06 RX ADMIN — HEPARIN SODIUM 5000 UNIT(S): 5000 INJECTION INTRAVENOUS; SUBCUTANEOUS at 22:02

## 2019-06-06 RX ADMIN — Medication 18 UNIT(S): at 12:33

## 2019-06-06 RX ADMIN — Medication 2: at 09:46

## 2019-06-06 RX ADMIN — Medication 81 MILLIGRAM(S): at 12:38

## 2019-06-06 RX ADMIN — Medication 1: at 12:33

## 2019-06-06 RX ADMIN — Medication 2.5 MILLIGRAM(S): at 16:35

## 2019-06-06 RX ADMIN — PIPERACILLIN AND TAZOBACTAM 25 GRAM(S): 4; .5 INJECTION, POWDER, LYOPHILIZED, FOR SOLUTION INTRAVENOUS at 22:02

## 2019-06-06 RX ADMIN — Medication 250 MILLIGRAM(S): at 19:05

## 2019-06-06 RX ADMIN — INSULIN GLARGINE 32 UNIT(S): 100 INJECTION, SOLUTION SUBCUTANEOUS at 22:03

## 2019-06-06 RX ADMIN — Medication 1: at 16:32

## 2019-06-06 RX ADMIN — Medication 18 UNIT(S): at 09:46

## 2019-06-06 RX ADMIN — OXYCODONE AND ACETAMINOPHEN 2 TABLET(S): 5; 325 TABLET ORAL at 22:33

## 2019-06-06 RX ADMIN — AMLODIPINE BESYLATE 10 MILLIGRAM(S): 2.5 TABLET ORAL at 06:21

## 2019-06-06 RX ADMIN — OXYCODONE AND ACETAMINOPHEN 2 TABLET(S): 5; 325 TABLET ORAL at 00:03

## 2019-06-06 RX ADMIN — INSULIN GLARGINE 32 UNIT(S): 100 INJECTION, SOLUTION SUBCUTANEOUS at 00:04

## 2019-06-06 RX ADMIN — HEPARIN SODIUM 5000 UNIT(S): 5000 INJECTION INTRAVENOUS; SUBCUTANEOUS at 14:56

## 2019-06-06 RX ADMIN — PIPERACILLIN AND TAZOBACTAM 25 GRAM(S): 4; .5 INJECTION, POWDER, LYOPHILIZED, FOR SOLUTION INTRAVENOUS at 06:21

## 2019-06-06 RX ADMIN — Medication 100 MILLIGRAM(S): at 22:02

## 2019-06-06 RX ADMIN — SENNA PLUS 2 TABLET(S): 8.6 TABLET ORAL at 22:02

## 2019-06-06 RX ADMIN — OXYCODONE AND ACETAMINOPHEN 2 TABLET(S): 5; 325 TABLET ORAL at 00:33

## 2019-06-06 RX ADMIN — HEPARIN SODIUM 5000 UNIT(S): 5000 INJECTION INTRAVENOUS; SUBCUTANEOUS at 06:21

## 2019-06-06 RX ADMIN — Medication 18 UNIT(S): at 16:32

## 2019-06-06 RX ADMIN — OXYCODONE AND ACETAMINOPHEN 2 TABLET(S): 5; 325 TABLET ORAL at 22:03

## 2019-06-06 NOTE — PROGRESS NOTE ADULT - ASSESSMENT
58M w/ DM (A1C 11.9), HTN, presents with nonhealing RLE heel ulcer s/p multiple debridements.     - JOSIANE/PVRs Resulted. Right JOSIANE 1.17 with palpable DP pulse.  - C/w wound care per podiatry  - No acute vascular surgery interventions  - Please page with questions    [facility]   1052 58M w/ DM (A1C 11.9), HTN, presents with nonhealing RLE heel ulcer s/p multiple debridements.     - JOSIANE/PVRs Resulted. Right JOSIANE 1.17 with palpable DP pulse.  - C/w wound care per podiatry  - No acute vascular surgery interventions  - Signing off, please page with questions    [facility]   2714 58M w/ DM (A1C 11.9), HTN, presents with nonhealing RLE heel ulcer s/p multiple debridements.     - JOSIANE/PVRs Resulted. Right JOSIANE 1.17 with palpable DP pulse.  - C/w wound care per podiatry  - will follow

## 2019-06-06 NOTE — PROGRESS NOTE ADULT - PROBLEM SELECTOR PLAN 3
Now much better controlled   Norvasc 10 mg daily   Hydralazine 100 mg TID   pain control  Low salt diet

## 2019-06-06 NOTE — PROGRESS NOTE ADULT - SUBJECTIVE AND OBJECTIVE BOX
Surgery Progress Note    SUBJECTIVE: Pt seen and examined at bedside. Patient comfortable and in no-apparent distress. No nausea, vomiting. Pain is controlled. Patient had debridement with podiatry yesterday.       Vital Signs Last 24 Hrs  T(C): 36.8 (06 Jun 2019 05:41), Max: 38 (05 Jun 2019 17:43)  T(F): 98.3 (06 Jun 2019 05:41), Max: 100.4 (05 Jun 2019 17:43)  HR: 84 (06 Jun 2019 05:41) (76 - 95)  BP: 128/74 (06 Jun 2019 05:41) (128/74 - 168/76)  BP(mean): 98 (05 Jun 2019 11:30) (98 - 109)  RR: 18 (06 Jun 2019 05:41) (16 - 18)  SpO2: 94% (06 Jun 2019 05:41) (94% - 98%)    Physical Exam:  General Appearance: Appears well, NAD  Respiratory: No labored breathing  CV: Pulse regularly present  Abdomen: Soft, nontense  Vascular: Right foot with dressing.  palp DP pulses b/l      LABS:                        8.5    18.2  )-----------( 500      ( 05 Jun 2019 07:11 )             28.8     06-05    135  |  100  |  25<H>  ----------------------------<  272<H>  5.0   |  21<L>  |  1.97<H>    Ca    8.8      05 Jun 2019 07:11    TPro  6.7  /  Alb  2.5<L>  /  TBili  0.5  /  DBili  x   /  AST  20  /  ALT  30  /  AlkPhos  133<H>  06-05    PT/INR - ( 05 Jun 2019 07:11 )   PT: 16.5 sec;   INR: 1.42 ratio         PTT - ( 05 Jun 2019 07:11 )  PTT:27.5 sec      INs and OUTs:    06-04-19 @ 07:01  -  06-05-19 @ 07:00  --------------------------------------------------------  IN: 810 mL / OUT: 1050 mL / NET: -240 mL    06-05-19 @ 07:01  -  06-06-19 @ 06:15  --------------------------------------------------------  IN: 1160 mL / OUT: 650 mL / NET: 510 mL

## 2019-06-06 NOTE — PROGRESS NOTE ADULT - SUBJECTIVE AND OBJECTIVE BOX
ID coverage    Patient is a 58y old  Male who presents with a chief complaint of right foot ulcer (06 Jun 2019 11:22)    Being followed by ID for foot ulcer     Interval history:feels ok  some foot pain  No other complaints   No acute events      ROS:  No cough,SOB,CP  No N/V/D./abd pain  No other complaints      Antimicrobials:    piperacillin/tazobactam IVPB. 3.375 Gram(s) IV Intermittent every 8 hours  vancomycin  IVPB 1000 milliGRAM(s) IV Intermittent every 24 hours    Other medications reviewed    Vital Signs Last 24 Hrs  T(C): 36.7 (06-06-19 @ 10:20), Max: 38 (06-05-19 @ 17:43)  T(F): 98.1 (06-06-19 @ 10:20), Max: 100.4 (06-05-19 @ 17:43)  HR: 91 (06-06-19 @ 10:20) (81 - 93)  BP: 125/71 (06-06-19 @ 10:20) (125/71 - 150/74)  BP(mean): --  RR: 17 (06-06-19 @ 10:20) (17 - 18)  SpO2: 91% (06-06-19 @ 10:20) (91% - 95%)    Physical Exam:        HEENT PERRLA EOMI    No oral exudate or erythema    Chest Good AE,CTA    CVS RRR S1 S2 WNl No murmur or rub or gallop    Abd obese  soft BS normal No tenderness no masses    IV site no erythema tenderness or discharge    R foot dressing with wound VAC     CNS AAO X 3 no focal    Lab Data:                          8.2    18.4  )-----------( 402      ( 06 Jun 2019 08:25 )             26.2   WBC Count: 18.4 (06-06-19 @ 08:25)  WBC Count: 18.2 (06-05-19 @ 07:11)  WBC Count: 17.0 (06-04-19 @ 09:18)  WBC Count: 14.4 (06-03-19 @ 07:15)  WBC Count: 11.6 (06-02-19 @ 06:22)  WBC Count: 10.5 (06-01-19 @ 06:56)  WBC Count: 10.8 (05-31-19 @ 07:12)      06-06    135  |  101  |  32<H>  ----------------------------<  177<H>  4.8   |  20<L>  |  2.72<H>    Ca    8.8      06 Jun 2019 08:25    TPro  6.6  /  Alb  2.4<L>  /  TBili  0.6  /  DBili  x   /  AST  23  /  ALT  31  /  AlkPhos  118  06-06        Culture - Tissue with Gram Stain (collected 05 Jun 2019 17:26)  Source: .Tissue  Gram Stain (05 Jun 2019 19:55):    Moderate polymorphonuclear leukocytes per low power field    Few Gram Negative Rods per oil power field

## 2019-06-06 NOTE — PROGRESS NOTE ADULT - PROBLEM SELECTOR PLAN 1
prerenal--> in setting of infection/ACE/  cr improving daily but trended back up yesterday s/p OR and started on Vanc/zosyn combo;   check vanc levels  combo of vanc and zosyn= nephrotoxic  adjust zosyn  started on IVF for hydration  monitor cr trend  encourage po hydration

## 2019-06-06 NOTE — PROGRESS NOTE ADULT - SUBJECTIVE AND OBJECTIVE BOX
Subjective: Patient seen and examined. No new events except as noted.   feels ok   pain tolerable   no cp or sob   febrile overnight        REVIEW OF SYSTEMS:    CONSTITUTIONAL: +weakness, fevers or chills  EYES/ENT: No visual changes;  No vertigo or throat pain   NECK: No pain or stiffness  RESPIRATORY: No cough, wheezing, hemoptysis; No shortness of breath  CARDIOVASCULAR: No chest pain or palpitations  GASTROINTESTINAL: No abdominal or epigastric pain. No nausea, vomiting, or hematemesis; No diarrhea or constipation. No melena or hematochezia.  GENITOURINARY: No dysuria, frequency or hematuria  NEUROLOGICAL: No numbness or weakness  SKIN: No itching, burning, rashes, or lesions   All other review of systems is negative unless indicated above.    MEDICATIONS:  MEDICATIONS  (STANDING):  amLODIPine   Tablet 10 milliGRAM(s) Oral daily  aspirin enteric coated 81 milliGRAM(s) Oral daily  dextrose 5%. 1000 milliLiter(s) (50 mL/Hr) IV Continuous <Continuous>  dextrose 50% Injectable 12.5 Gram(s) IV Push once  dextrose 50% Injectable 25 Gram(s) IV Push once  dextrose 50% Injectable 25 Gram(s) IV Push once  ergocalciferol 83627 Unit(s) Oral every week  heparin  Injectable 5000 Unit(s) SubCutaneous every 8 hours  hydrALAZINE 100 milliGRAM(s) Oral three times a day  insulin glargine Injectable (LANTUS) 32 Unit(s) SubCutaneous at bedtime  insulin lispro (HumaLOG) corrective regimen sliding scale   SubCutaneous three times a day before meals  insulin lispro (HumaLOG) corrective regimen sliding scale   SubCutaneous three times a day before meals  insulin lispro (HumaLOG) corrective regimen sliding scale   SubCutaneous at bedtime  insulin lispro Injectable (HumaLOG) 18 Unit(s) SubCutaneous three times a day before meals  lactulose Syrup 20 Gram(s) Oral every 4 hours  piperacillin/tazobactam IVPB. 3.375 Gram(s) IV Intermittent every 8 hours  polyethylene glycol 3350 17 Gram(s) Oral daily  senna 2 Tablet(s) Oral at bedtime  sodium chloride 0.9%. 1000 milliLiter(s) (75 mL/Hr) IV Continuous <Continuous>  vancomycin  IVPB 1000 milliGRAM(s) IV Intermittent every 24 hours      PHYSICAL EXAM:  T(C): 36.7 (06-06-19 @ 10:20), Max: 38 (06-05-19 @ 17:43)  HR: 91 (06-06-19 @ 10:20) (78 - 93)  BP: 125/71 (06-06-19 @ 10:20) (125/71 - 161/74)  RR: 17 (06-06-19 @ 10:20) (16 - 18)  SpO2: 91% (06-06-19 @ 10:20) (91% - 97%)  Wt(kg): --  I&O's Summary    05 Jun 2019 07:01  -  06 Jun 2019 07:00  --------------------------------------------------------  IN: 1630 mL / OUT: 900 mL / NET: 730 mL    06 Jun 2019 07:01  -  06 Jun 2019 10:31  --------------------------------------------------------  IN: 300 mL / OUT: 0 mL / NET: 300 mL          Appearance: NAD   HEENT:   Normal oral mucosa, PERRL, EOMI	  Lymphatic: No lymphadenopathy , no edema  Cardiovascular: Normal S1 S2, No JVD, No murmurs , Peripheral pulses palpable 2+ bilaterally  Respiratory: Lungs clear to auscultation, normal effort 	  Gastrointestinal:  Soft, Non-tender, + BS	  Skin: No rashes, No ecchymoses, No cyanosis, warm to touch  Musculoskeletal: Decreased range of motion, normal strength  Psychiatry:  Mood & affect appropriate  Ext: +edema , chronic venous stasis skin discoloration   right foot wrapped , + ulcer   +drain    LABS:    CARDIAC MARKERS:                                8.2    18.4  )-----------( 402      ( 06 Jun 2019 08:25 )             26.2     06-06    135  |  101  |  32<H>  ----------------------------<  177<H>  4.8   |  20<L>  |  2.72<H>    Ca    8.8      06 Jun 2019 08:25    TPro  6.6  /  Alb  2.4<L>  /  TBili  0.6  /  DBili  x   /  AST  23  /  ALT  31  /  AlkPhos  118  06-06    proBNP:   Lipid Profile:   HgA1c:   TSH:             TELEMETRY: 	    ECG:  	  RADIOLOGY:   DIAGNOSTIC TESTING:  [ ] Echocardiogram:  [ ]  Catheterization:  [ ] Stress Test:    OTHER:

## 2019-06-06 NOTE — PROGRESS NOTE ADULT - ASSESSMENT
diabetic foot ulcer with surrounding cellulitis   Fever resolved , leukocytosis persists  S/p debridement with podiatry 5/22 and 5/27  Culture polymicrobial--E coli, enterococcus, strep  - on zosyn  per ID  -  repeat BCXs NGTD  - Monitor for other possible source infection such as developing diarrhea  - podiatry and vascular fu   - s/p redebridement  - will likely need total 6 weeks of iv abx  - vascular spoke with pt regarding possible BKA.    uncontrolled Diabetes  - cont lantus 32 - novolog 18 units qac  - hgb a1c  11.9  - diabetic diet  - FS qid  - endo follow up appreciated    MARIKA   - cre stable at baseline  - follow creatinine  - nephrology following    HTN   -   hold hydralazine     hyponatremia  - NO salt restriction  - no HCTZ    anorexia  - add Glucerna  - add marinol    depression  - seen by psych    constipation  - dulcolax x 1

## 2019-06-06 NOTE — PROGRESS NOTE ADULT - SUBJECTIVE AND OBJECTIVE BOX
[facility] DIVISION OF KIDNEY DISEASES AND HYPERTENSION -- FOLLOW UP NOTE  --------------------------------------------------------------------------------  Chief Complaint:/subjective: s/p OR yesterday, increase in cr; no complaints except decreased appetite    24 hour events:as above        PAST HISTORY  --------------------------------------------------------------------------------  No significant changes to PMH, PSH, FHx, SHx, unless otherwise noted    ALLERGIES & MEDICATIONS  --------------------------------------------------------------------------------  Allergies    No Known Allergies    Intolerances      Standing Inpatient Medications  amLODIPine   Tablet 10 milliGRAM(s) Oral daily  aspirin enteric coated 81 milliGRAM(s) Oral daily  dextrose 5%. 1000 milliLiter(s) IV Continuous <Continuous>  dextrose 50% Injectable 12.5 Gram(s) IV Push once  dextrose 50% Injectable 25 Gram(s) IV Push once  dextrose 50% Injectable 25 Gram(s) IV Push once  ergocalciferol 07012 Unit(s) Oral every week  heparin  Injectable 5000 Unit(s) SubCutaneous every 8 hours  hydrALAZINE 100 milliGRAM(s) Oral three times a day  insulin glargine Injectable (LANTUS) 32 Unit(s) SubCutaneous at bedtime  insulin lispro (HumaLOG) corrective regimen sliding scale   SubCutaneous three times a day before meals  insulin lispro (HumaLOG) corrective regimen sliding scale   SubCutaneous three times a day before meals  insulin lispro (HumaLOG) corrective regimen sliding scale   SubCutaneous at bedtime  insulin lispro Injectable (HumaLOG) 18 Unit(s) SubCutaneous three times a day before meals  lactulose Syrup 20 Gram(s) Oral every 4 hours  piperacillin/tazobactam IVPB. 3.375 Gram(s) IV Intermittent every 8 hours  polyethylene glycol 3350 17 Gram(s) Oral daily  senna 2 Tablet(s) Oral at bedtime  sodium chloride 0.9%. 1000 milliLiter(s) IV Continuous <Continuous>  vancomycin  IVPB 1000 milliGRAM(s) IV Intermittent every 24 hours    PRN Inpatient Medications  dextrose 40% Gel 15 Gram(s) Oral once PRN  glucagon  Injectable 1 milliGRAM(s) IntraMuscular once PRN  oxyCODONE    5 mG/acetaminophen 325 mG 1 Tablet(s) Oral every 6 hours PRN  oxyCODONE    5 mG/acetaminophen 325 mG 2 Tablet(s) Oral every 6 hours PRN      REVIEW OF SYSTEMS  --------------------------------------------------------------------------------  Gen: No weight changes, fatigue, fevers/chills, weakness  Skin: No rashes  Head/Eyes/Ears/Mouth: No headache;   Respiratory: No dyspnea, cough  CV: No chest pain, PND, orthopnea  GI: No abdominal pain, diarrhea, constipation, nausea, vomiting  : No increased frequency, dysuria, hematuria, nocturia  MSK: No joint pain/swelling; no back pain; no edema  Neuro: No dizziness/lightheadedness, weakness  Heme: No easy bruising or bleeding  Psych: No significant nervousness, anxiety, stress, depression    All other systems were reviewed and are negative, except as noted.    VITALS/PHYSICAL EXAM  --------------------------------------------------------------------------------  T(C): 37.2 (06-06-19 @ 11:30), Max: 38 (06-05-19 @ 17:43)  HR: 85 (06-06-19 @ 11:30) (81 - 93)  BP: 125/84 (06-06-19 @ 11:30) (125/71 - 150/74)  RR: 18 (06-06-19 @ 11:30) (17 - 18)  SpO2: 90% (06-06-19 @ 11:30) (90% - 95%)  Wt(kg): --  Adult Advanced Hemodynamics Last 24 Hrs  ABP: --  ABP(mean): --  CVP(mm Hg): --  CO: --  CI: --  PA: --  PA(mean): --  PCWP: --  SVR: --  SVRI: --  Height (cm): 182.88 (06-05-19 @ 07:51)  Weight (kg): 111.6 (06-05-19 @ 07:51)  BMI (kg/m2): 33.4 (06-05-19 @ 07:51)  BSA (m2): 2.33 (06-05-19 @ 07:51)      06-05-19 @ 07:01  -  06-06-19 @ 07:00  --------------------------------------------------------  IN: 1630 mL / OUT: 900 mL / NET: 730 mL    06-06-19 @ 07:01  -  06-06-19 @ 12:35  --------------------------------------------------------  IN: 300 mL / OUT: 0 mL / NET: 300 mL      Physical Exam:  	Gen: NAD,    	HEENT:   no jvp  	Pulm: CTA B/L  	CV: RRR, S1S2; no rub  	Back:  no sacral edema  	Abd: +BS, soft, nontender/nondistended  	: No suprapubic tenderness  	Ext: pedal edema; right foot bandaged  	Neuro: awake  	Psych: alert  	Skin: Warm,    	Vascular access:    LABS/STUDIES  --------------------------------------------------------------------------------              8.2    18.4  >-----------<  402      [06-06-19 @ 08:25]              26.2     Hemoglobin: 8.2 g/dL (06-06-19 @ 08:25)  Hemoglobin: 8.5 g/dL (06-05-19 @ 07:11)    Platelet Count - Automated: 402 K/uL (06-06-19 @ 08:25)  Platelet Count - Automated: 500 K/uL (06-05-19 @ 07:11)    135  |  101  |  32  ----------------------------<  177      [06-06-19 @ 08:25]  4.8   |  20  |  2.72        Ca     8.8     [06-06-19 @ 08:25]    TPro  6.6  /  Alb  2.4  /  TBili  0.6  /  DBili  x   /  AST  23  /  ALT  31  /  AlkPhos  118  [06-06-19 @ 08:25]    PT/INR: PT 16.5 , INR 1.42       [06-05-19 @ 07:11]  PTT: 27.5       [06-05-19 @ 07:11]      Creatinine Trend:  SCr 2.72 [06-06 @ 08:25]  SCr 1.97 [06-05 @ 07:11]  SCr 1.96 [06-04 @ 09:18]  SCr 2.11 [06-03 @ 07:12]  SCr 2.19 [06-02 @ 06:22]    Urinalysis - [05-25-19 @ 14:07]      Color Yellow / Appearance Slightly Turbid / SG 1.018 / pH 6.0      Gluc Negative / Ketone Trace  / Bili Negative / Urobili 3 mg/dL       Blood Negative / Protein 100 mg/dL / Leuk Est Negative / Nitrite Negative      RBC 2 / WBC 7 / Hyaline 3 / Gran  / Sq Epi  / Non Sq Epi 2 / Bacteria Negative      Vitamin D (25OH) 4.4      [05-22-19 @ 09:57]  HbA1c 11.8      [05-21-19 @ 11:29]    HCV 0.05, Nonreact      [05-21-19 @ 11:35]

## 2019-06-06 NOTE — PROGRESS NOTE ADULT - ASSESSMENT
Assessment  DMT2: 58y Male with DM T2 with hyperglycemia, on insulin, blood sugars  trending up, s/p procedure, no hypoglycemic episode, eating full meals, compliant with low carb diet.  Foot wound: on medications, no chest pain, stable, monitored.  HTN: Controlled,  on antihypertensive medications.  Overweight/Obesity: No strict exercise routines, not on any weight loss program, neither on low calorie diet.    Andrzej Zhu MD  Cell: [phone]  Office: [phone]

## 2019-06-06 NOTE — PROGRESS NOTE ADULT - ASSESSMENT
58 M with IDDM, neuropathy presenting with diabetic foor ulcer.   Failed outpatient augmentin.  MRI ? early osteitis v OM  Nuclear scan negative  s/p I&D and debridement 6/.5  Necrotic tissue on op findings  Cx with GNR so far  Clinically stable  No other clinical s/s any other foci  Still with leucocytosis but if from foot may take a few days after debridement to decrease  Would recommend follow cultures  Continue current coverage for now -monitor vanco trough and creatinine    tailor plan for ID issues  per course,results.Will defer to primary team on management of other issues.  Dr Monroy will resume care tomorrow 58 M with IDDM, neuropathy presenting with diabetic foor ulcer.   Failed outpatient augmentin.  MRI ? early osteitis v OM  Nuclear scan negative  s/p I&D and debridement 6/.5  Necrotic tissue on op findings  Cx with GNR so far  Prior Cx with E coli(S to zosyn ) and E fecalis (S to ampicillin)   Clinically stable  No other clinical s/s any other foci  Still with leucocytosis but if from foot may take a few days after debridement to decrease-hopefully adequate source control will decrease the WBC over next few days   Would recommend follow cultures  Continue current coverage for now -monitor vanco trough and creatinine    tailor plan for ID issues  per course,results.Will defer to primary team on management of other issues.  Dr Monroy will resume care tomorrow

## 2019-06-06 NOTE — PROGRESS NOTE ADULT - SUBJECTIVE AND OBJECTIVE BOX
Chief complaint  Patient is a 58y old  Male who presents with a chief complaint of right foot ulcer (06 Jun 2019 10:31)   Review of systems  Patient in bed, looks comfortable, no fever, no hypoglycemia.    Labs and Fingersticks  CAPILLARY BLOOD GLUCOSE      POCT Blood Glucose.: 237 mg/dL (06 Jun 2019 09:35)  POCT Blood Glucose.: 205 mg/dL (05 Jun 2019 23:54)  POCT Blood Glucose.: 260 mg/dL (05 Jun 2019 21:48)  POCT Blood Glucose.: 275 mg/dL (05 Jun 2019 13:51)  POCT Blood Glucose.: 282 mg/dL (05 Jun 2019 11:30)      Anion Gap, Serum: 14 (06-06 @ 08:25)  Anion Gap, Serum: 14 (06-05 @ 07:11)      Calcium, Total Serum: 8.8 (06-06 @ 08:25)  Calcium, Total Serum: 8.8 (06-05 @ 07:11)  Albumin, Serum: 2.4 <L> (06-06 @ 08:25)  Albumin, Serum: 2.5 <L> (06-05 @ 07:11)    Alanine Aminotransferase (ALT/SGPT): 31 (06-06 @ 08:25)  Alanine Aminotransferase (ALT/SGPT): 30 (06-05 @ 07:11)  Alkaline Phosphatase, Serum: 118 (06-06 @ 08:25)  Alkaline Phosphatase, Serum: 133 <H> (06-05 @ 07:11)  Aspartate Aminotransferase (AST/SGOT): 23 (06-06 @ 08:25)  Aspartate Aminotransferase (AST/SGOT): 20 (06-05 @ 07:11)        06-06    135  |  101  |  32<H>  ----------------------------<  177<H>  4.8   |  20<L>  |  2.72<H>    Ca    8.8      06 Jun 2019 08:25    TPro  6.6  /  Alb  2.4<L>  /  TBili  0.6  /  DBili  x   /  AST  23  /  ALT  31  /  AlkPhos  118  06-06                        8.2    18.4  )-----------( 402      ( 06 Jun 2019 08:25 )             26.2     Medications  MEDICATIONS  (STANDING):  amLODIPine   Tablet 10 milliGRAM(s) Oral daily  aspirin enteric coated 81 milliGRAM(s) Oral daily  dextrose 5%. 1000 milliLiter(s) (50 mL/Hr) IV Continuous <Continuous>  dextrose 50% Injectable 12.5 Gram(s) IV Push once  dextrose 50% Injectable 25 Gram(s) IV Push once  dextrose 50% Injectable 25 Gram(s) IV Push once  ergocalciferol 20570 Unit(s) Oral every week  heparin  Injectable 5000 Unit(s) SubCutaneous every 8 hours  hydrALAZINE 100 milliGRAM(s) Oral three times a day  insulin glargine Injectable (LANTUS) 32 Unit(s) SubCutaneous at bedtime  insulin lispro (HumaLOG) corrective regimen sliding scale   SubCutaneous three times a day before meals  insulin lispro (HumaLOG) corrective regimen sliding scale   SubCutaneous three times a day before meals  insulin lispro (HumaLOG) corrective regimen sliding scale   SubCutaneous at bedtime  insulin lispro Injectable (HumaLOG) 18 Unit(s) SubCutaneous three times a day before meals  lactulose Syrup 20 Gram(s) Oral every 4 hours  piperacillin/tazobactam IVPB. 3.375 Gram(s) IV Intermittent every 8 hours  polyethylene glycol 3350 17 Gram(s) Oral daily  senna 2 Tablet(s) Oral at bedtime  sodium chloride 0.9%. 1000 milliLiter(s) (75 mL/Hr) IV Continuous <Continuous>  vancomycin  IVPB 1000 milliGRAM(s) IV Intermittent every 24 hours      Physical Exam  Culture - Tissue with Gram Stain (collected 06-05-19 @ 17:26)  Source: .Tissue  Gram Stain (06-05-19 @ 19:55):    Moderate polymorphonuclear leukocytes per low power field    Few Gram Negative Rods per oil power field      General: Patient comfortable in bed  Vital Signs Last 12 Hrs  T(F): 98.1 (06-06-19 @ 10:20), Max: 99.2 (06-06-19 @ 01:00)  HR: 91 (06-06-19 @ 10:20) (81 - 91)  BP: 125/71 (06-06-19 @ 10:20) (125/71 - 143/66)  BP(mean): --  RR: 17 (06-06-19 @ 10:20) (17 - 18)  SpO2: 91% (06-06-19 @ 10:20) (91% - 94%)  Neck: No palpable thyroid nodules.  CVS: S1S2, No murmurs  Respiratory: No wheezing, no crepitations  GI: Abdomen soft, bowel sounds positive  Musculoskeletal:  edema lower extremities.   Skin: No skin rashes, no ecchymosis    Diagnostics    Vitamin D, 1, 25-Dihydroxy: AM Sched. Collection: 22-May-2019 06:00 (05-21 @ 18:59)  Vitamin D, 25-Hydroxy: AM Sched. Collection: 22-May-2019 06:00 (05-21 @ 18:59)

## 2019-06-06 NOTE — PROGRESS NOTE ADULT - SUBJECTIVE AND OBJECTIVE BOX
Patient is a 58y old  Male who presents with a chief complaint of right foot ulcer (06 Jun 2019 12:35)      SUBJECTIVE / OVERNIGHT EVENTS:  poor apatite  no BM    MEDICATIONS  (STANDING):  amLODIPine   Tablet 10 milliGRAM(s) Oral daily  aspirin enteric coated 81 milliGRAM(s) Oral daily  bisacodyl Suppository 10 milliGRAM(s) Rectal once  dextrose 5%. 1000 milliLiter(s) (50 mL/Hr) IV Continuous <Continuous>  dextrose 50% Injectable 12.5 Gram(s) IV Push once  dextrose 50% Injectable 25 Gram(s) IV Push once  dextrose 50% Injectable 25 Gram(s) IV Push once  dronabinol 2.5 milliGRAM(s) Oral two times a day  ergocalciferol 83517 Unit(s) Oral every week  heparin  Injectable 5000 Unit(s) SubCutaneous every 8 hours  hydrALAZINE 100 milliGRAM(s) Oral three times a day  insulin glargine Injectable (LANTUS) 32 Unit(s) SubCutaneous at bedtime  insulin lispro (HumaLOG) corrective regimen sliding scale   SubCutaneous three times a day before meals  insulin lispro (HumaLOG) corrective regimen sliding scale   SubCutaneous three times a day before meals  insulin lispro (HumaLOG) corrective regimen sliding scale   SubCutaneous at bedtime  insulin lispro Injectable (HumaLOG) 18 Unit(s) SubCutaneous three times a day before meals  lactulose Syrup 20 Gram(s) Oral every 4 hours  piperacillin/tazobactam IVPB. 3.375 Gram(s) IV Intermittent every 8 hours  polyethylene glycol 3350 17 Gram(s) Oral daily  senna 2 Tablet(s) Oral at bedtime  sodium chloride 0.9%. 1000 milliLiter(s) (75 mL/Hr) IV Continuous <Continuous>  vancomycin  IVPB 1000 milliGRAM(s) IV Intermittent every 24 hours    MEDICATIONS  (PRN):  dextrose 40% Gel 15 Gram(s) Oral once PRN Blood Glucose LESS THAN 70 milliGRAM(s)/deciliter  glucagon  Injectable 1 milliGRAM(s) IntraMuscular once PRN Glucose LESS THAN 70 milligrams/deciliter  oxyCODONE    5 mG/acetaminophen 325 mG 1 Tablet(s) Oral every 6 hours PRN Moderate Pain (4 - 6)  oxyCODONE    5 mG/acetaminophen 325 mG 2 Tablet(s) Oral every 6 hours PRN Severe Pain (7 - 10)      Vital Signs Last 24 Hrs  T(C): 36.8 (06 Jun 2019 13:33), Max: 38 (05 Jun 2019 17:43)  T(F): 98.2 (06 Jun 2019 13:33), Max: 100.4 (05 Jun 2019 17:43)  HR: 83 (06 Jun 2019 13:33) (81 - 93)  BP: 122/68 (06 Jun 2019 13:33) (122/68 - 143/66)  BP(mean): --  RR: 17 (06 Jun 2019 13:33) (17 - 18)  SpO2: 90% (06 Jun 2019 13:33) (90% - 95%)  CAPILLARY BLOOD GLUCOSE      POCT Blood Glucose.: 190 mg/dL (06 Jun 2019 11:36)  POCT Blood Glucose.: 237 mg/dL (06 Jun 2019 09:35)  POCT Blood Glucose.: 205 mg/dL (05 Jun 2019 23:54)  POCT Blood Glucose.: 260 mg/dL (05 Jun 2019 21:48)    I&O's Summary    05 Jun 2019 07:01  -  06 Jun 2019 07:00  --------------------------------------------------------  IN: 1630 mL / OUT: 900 mL / NET: 730 mL    06 Jun 2019 07:01  -  06 Jun 2019 14:19  --------------------------------------------------------  IN: 550 mL / OUT: 0 mL / NET: 550 mL        PHYSICAL EXAM:  GENERAL: NAD, well-developed  HEAD:  Atraumatic, Normocephalic  EYES: EOMI, PERRLA, conjunctiva and sclera clear  NECK: Supple, No JVD  CHEST/LUNG: Clear to auscultation bilaterally; No wheeze  HEART: Regular rate and rhythm; No murmurs, rubs, or gallops  ABDOMEN: Soft, Nontender, Nondistended; Bowel sounds present  EXTREMITIES:  2+ Peripheral Pulses, No clubbing, cyanosis, or edema  PSYCH: AAOx3  NEUROLOGY: non-focal  SKIN: No rashes or lesions    LABS:                        8.2    18.4  )-----------( 402      ( 06 Jun 2019 08:25 )             26.2     06-06    135  |  101  |  32<H>  ----------------------------<  177<H>  4.8   |  20<L>  |  2.72<H>    Ca    8.8      06 Jun 2019 08:25    TPro  6.6  /  Alb  2.4<L>  /  TBili  0.6  /  DBili  x   /  AST  23  /  ALT  31  /  AlkPhos  118  06-06    PT/INR - ( 05 Jun 2019 07:11 )   PT: 16.5 sec;   INR: 1.42 ratio         PTT - ( 05 Jun 2019 07:11 )  PTT:27.5 sec          RADIOLOGY & ADDITIONAL TESTS:    Imaging Personally Reviewed:    Consultant(s) Notes Reviewed:      Care Discussed with Consultants/Other Providers:

## 2019-06-06 NOTE — PROVIDER CONTACT NOTE (CRITICAL VALUE NOTIFICATION) - SITUATION
gram stain result   moderate polymorphonuclear leukocytes per low power field   few gram negative rods per oil power field.

## 2019-06-07 DIAGNOSIS — E87.1 HYPO-OSMOLALITY AND HYPONATREMIA: ICD-10-CM

## 2019-06-07 LAB
-  AMIKACIN: SIGNIFICANT CHANGE UP
-  AMPICILLIN/SULBACTAM: SIGNIFICANT CHANGE UP
-  AMPICILLIN: SIGNIFICANT CHANGE UP
-  AZTREONAM: SIGNIFICANT CHANGE UP
-  CEFAZOLIN: SIGNIFICANT CHANGE UP
-  CEFEPIME: SIGNIFICANT CHANGE UP
-  CEFOXITIN: SIGNIFICANT CHANGE UP
-  CEFTRIAXONE: SIGNIFICANT CHANGE UP
-  CIPROFLOXACIN: SIGNIFICANT CHANGE UP
-  ERTAPENEM: SIGNIFICANT CHANGE UP
-  GENTAMICIN: SIGNIFICANT CHANGE UP
-  IMIPENEM: SIGNIFICANT CHANGE UP
-  LEVOFLOXACIN: SIGNIFICANT CHANGE UP
-  MEROPENEM: SIGNIFICANT CHANGE UP
-  PIPERACILLIN/TAZOBACTAM: SIGNIFICANT CHANGE UP
-  TOBRAMYCIN: SIGNIFICANT CHANGE UP
-  TRIMETHOPRIM/SULFAMETHOXAZOLE: SIGNIFICANT CHANGE UP
ANION GAP SERPL CALC-SCNC: 13 MMOL/L — SIGNIFICANT CHANGE UP (ref 5–17)
BUN SERPL-MCNC: 35 MG/DL — HIGH (ref 7–23)
CALCIUM SERPL-MCNC: 8.8 MG/DL — SIGNIFICANT CHANGE UP (ref 8.4–10.5)
CHLORIDE SERPL-SCNC: 101 MMOL/L — SIGNIFICANT CHANGE UP (ref 96–108)
CO2 SERPL-SCNC: 19 MMOL/L — LOW (ref 22–31)
CREAT SERPL-MCNC: 2.65 MG/DL — HIGH (ref 0.5–1.3)
GLUCOSE BLDC GLUCOMTR-MCNC: 146 MG/DL — HIGH (ref 70–99)
GLUCOSE BLDC GLUCOMTR-MCNC: 209 MG/DL — HIGH (ref 70–99)
GLUCOSE BLDC GLUCOMTR-MCNC: 246 MG/DL — HIGH (ref 70–99)
GLUCOSE BLDC GLUCOMTR-MCNC: 291 MG/DL — HIGH (ref 70–99)
GLUCOSE SERPL-MCNC: 238 MG/DL — HIGH (ref 70–99)
HCT VFR BLD CALC: 25.3 % — LOW (ref 39–50)
HGB BLD-MCNC: 8.2 G/DL — LOW (ref 13–17)
MCHC RBC-ENTMCNC: 30.4 PG — SIGNIFICANT CHANGE UP (ref 27–34)
MCHC RBC-ENTMCNC: 32.5 GM/DL — SIGNIFICANT CHANGE UP (ref 32–36)
MCV RBC AUTO: 93.7 FL — SIGNIFICANT CHANGE UP (ref 80–100)
METHOD TYPE: SIGNIFICANT CHANGE UP
OSMOLALITY UR: 434 MOS/KG — SIGNIFICANT CHANGE UP (ref 300–900)
PLATELET # BLD AUTO: 442 K/UL — HIGH (ref 150–400)
POTASSIUM SERPL-MCNC: 4.6 MMOL/L — SIGNIFICANT CHANGE UP (ref 3.5–5.3)
POTASSIUM SERPL-SCNC: 4.6 MMOL/L — SIGNIFICANT CHANGE UP (ref 3.5–5.3)
RBC # BLD: 2.7 M/UL — LOW (ref 4.2–5.8)
RBC # FLD: 12.3 % — SIGNIFICANT CHANGE UP (ref 10.3–14.5)
SODIUM SERPL-SCNC: 133 MMOL/L — LOW (ref 135–145)
WBC # BLD: 16.9 K/UL — HIGH (ref 3.8–10.5)
WBC # FLD AUTO: 16.9 K/UL — HIGH (ref 3.8–10.5)

## 2019-06-07 PROCEDURE — 99232 SBSQ HOSP IP/OBS MODERATE 35: CPT | Mod: GC

## 2019-06-07 PROCEDURE — 99232 SBSQ HOSP IP/OBS MODERATE 35: CPT

## 2019-06-07 PROCEDURE — 99233 SBSQ HOSP IP/OBS HIGH 50: CPT

## 2019-06-07 RX ORDER — INSULIN GLARGINE 100 [IU]/ML
38 INJECTION, SOLUTION SUBCUTANEOUS AT BEDTIME
Refills: 0 | Status: DISCONTINUED | OUTPATIENT
Start: 2019-06-07 | End: 2019-06-14

## 2019-06-07 RX ORDER — INSULIN LISPRO 100/ML
22 VIAL (ML) SUBCUTANEOUS
Refills: 0 | Status: DISCONTINUED | OUTPATIENT
Start: 2019-06-07 | End: 2019-06-13

## 2019-06-07 RX ADMIN — INSULIN GLARGINE 38 UNIT(S): 100 INJECTION, SOLUTION SUBCUTANEOUS at 21:59

## 2019-06-07 RX ADMIN — Medication 2.5 MILLIGRAM(S): at 17:23

## 2019-06-07 RX ADMIN — PIPERACILLIN AND TAZOBACTAM 25 GRAM(S): 4; .5 INJECTION, POWDER, LYOPHILIZED, FOR SOLUTION INTRAVENOUS at 05:41

## 2019-06-07 RX ADMIN — Medication 22 UNIT(S): at 18:22

## 2019-06-07 RX ADMIN — Medication 22 UNIT(S): at 13:45

## 2019-06-07 RX ADMIN — SENNA PLUS 2 TABLET(S): 8.6 TABLET ORAL at 21:59

## 2019-06-07 RX ADMIN — Medication 3: at 09:16

## 2019-06-07 RX ADMIN — Medication 250 MILLIGRAM(S): at 17:24

## 2019-06-07 RX ADMIN — Medication 2: at 18:22

## 2019-06-07 RX ADMIN — HEPARIN SODIUM 5000 UNIT(S): 5000 INJECTION INTRAVENOUS; SUBCUTANEOUS at 21:59

## 2019-06-07 RX ADMIN — Medication 81 MILLIGRAM(S): at 13:42

## 2019-06-07 RX ADMIN — PIPERACILLIN AND TAZOBACTAM 25 GRAM(S): 4; .5 INJECTION, POWDER, LYOPHILIZED, FOR SOLUTION INTRAVENOUS at 21:59

## 2019-06-07 RX ADMIN — Medication 100 MILLIGRAM(S): at 05:41

## 2019-06-07 RX ADMIN — HEPARIN SODIUM 5000 UNIT(S): 5000 INJECTION INTRAVENOUS; SUBCUTANEOUS at 05:41

## 2019-06-07 RX ADMIN — Medication 2.5 MILLIGRAM(S): at 05:41

## 2019-06-07 RX ADMIN — AMLODIPINE BESYLATE 10 MILLIGRAM(S): 2.5 TABLET ORAL at 05:41

## 2019-06-07 RX ADMIN — Medication 100 MILLIGRAM(S): at 21:59

## 2019-06-07 RX ADMIN — PIPERACILLIN AND TAZOBACTAM 25 GRAM(S): 4; .5 INJECTION, POWDER, LYOPHILIZED, FOR SOLUTION INTRAVENOUS at 13:43

## 2019-06-07 RX ADMIN — Medication 18 UNIT(S): at 09:15

## 2019-06-07 RX ADMIN — Medication 2: at 13:43

## 2019-06-07 RX ADMIN — HEPARIN SODIUM 5000 UNIT(S): 5000 INJECTION INTRAVENOUS; SUBCUTANEOUS at 13:43

## 2019-06-07 NOTE — PROGRESS NOTE ADULT - ASSESSMENT
59 y/o male pt s/p I&D & Debridement x3  - pt seen and evaluated   - Continue IV ABX as per ID, WBC scan negative other than foot although whole body was just performed to b/l feet? Waiting to discuss with nuclear medicine  - minimal to no purulence expressed from forefoot, none proximally, viability of tissue plantarly diffusely is guarded at best with diffuse necrosis/fibrosis within subcutaneous tissue  - irrigated with copious amounts of saline solution and repacked w/ gauze soaked w/ Dakins & applied well padded DSD  - will continue to monitor closely, WBC & fever slight downtrend after OR hoping that there is continuous downtrending as tissue & site appears viable no residual purulence will plan to continue VAC, if does not resolve would consider proximal amputation although would be last resort for source control  - d/w attending 59 y/o male pt s/p I&D & Debridement x3  - pt seen and evaluated   - Continue IV ABX as per ID, WBC scan negative for OM +soft tissue infection. Whole body scan pending to evaluate for other source of infection.  - no purulence expressed from forefoot, none proximally, viability of tissue plantar mixed fibro granular tissue irrigated with copious amounts of saline solution and repacked w/ gauze soaked w/ Dakins & applied well padded DSD  - will continue to monitor closely, WBC & fever slight downtrend after OR. Tissue appears viable no residual purulence will plan to continue VAC, & re evaluate wound.  - d/w attending

## 2019-06-07 NOTE — PROGRESS NOTE ADULT - PROBLEM SELECTOR PLAN 1
Will increase Lantus to 38 units at bed time.  Will increase Humalog to 22 units before each meal in addition to Humalog correction scale coverage.  Patient counseled for compliance with consistent low carb diet.

## 2019-06-07 NOTE — PHYSICAL THERAPY INITIAL EVALUATION ADULT - IMPAIRMENTS FOUND, PT EVAL
integumentary integrity
integumentary integrity
aerobic capacity/endurance/gait, locomotion, and balance

## 2019-06-07 NOTE — PROGRESS NOTE BEHAVIORAL HEALTH - NSBHFUPINTERVALHXFT_PSY_A_CORE
Patient seen and evaluated, staff consulted, chart, labs, meds reviewed. Reports no issues overnight. States there his appetite has mildly improved, saying he has pushing himself to eat because he was told it is his best chance to prevent the need for BKA. No changes in mood, energy, and sleep. Endorses "feeling bad" about his lifestyle choices (i.e. med nonadherence) prior to admission, as he feels they contributed to his current issues, but denies guilt saying "that's too strong a word". No SI/HI, no hopelessness, helplessness, or other major mood sx noted or reported. Actively participates in treatment and treatment planning.    Direction of care discussed with the patient. Counseling and support provided. He continues to decline psychotherapy referral or medications, but is open to holistic nurse while here and to having phone number for outpatient therapy/psychiatric resources if he ever changes his mind.

## 2019-06-07 NOTE — PROGRESS NOTE ADULT - PROBLEM SELECTOR PLAN 1
prerenal--> in setting of infection/ACE/  cr improving daily but trended back up 6/5 to 6/6 s/p OR 1.97 to 2. 72 and was started on Vanc/zosyn combo; today's cr stable/plateued, hopeful will improve with time;   cont ivf hydration as he has poor oral intake  check vanc levels daily  combo of vanc and zosyn= nephrotoxic  adjust zosyn per egfr   monitor cr trend  encourage po hydration  avoid contrast for now

## 2019-06-07 NOTE — PROGRESS NOTE BEHAVIORAL HEALTH - NSBHCONSULTFOLLOWAFTERCARE_PSY_A_CORE FT
as per primary team. If patient changes his mind and becomes interested in psychotherapy or psychiatric med management evaluation, he can follow up at [phone] for [facility] outpatient clinic

## 2019-06-07 NOTE — PROGRESS NOTE ADULT - ASSESSMENT
diabetic foot ulcer with surrounding cellulitis   Fever resolved , leukocytosis persists   -cont abx  -initial MRI foot noted - no OM  -s/p debridement per podiatry 5/27  -DM control  -local care per podiatry  -s/p debridement/revision 6/5 -   - no overt purulence, debrided down to healthy tissue.   - vascular spoke with pt regarding possible BKA.    PVD  - may need angiogram    uncontrolled Diabetes  - cont lantus 32 - novolog 18 units qac  - hgb a1c  11.9  - diabetic diet  - FS qid  - endo follow up appreciated    MARIKA   - slight rise in cre  - follow creatinine  - nephrology following  - encouraged fluid intake    HTN   -   hold hydralazine     hyponatremia  - NO salt restriction  - no HCTZ    anorexia  - add Glucerna  - cont Marinol    depression  - seen by psych    constipation  - had bm  - miralax bid diabetic foot ulcer with surrounding cellulitis   Fever resolved , leukocytosis persists   -cont abx  -initial MRI foot noted - no OM  -s/p debridement per podiatry 5/27  -DM control  -local care per podiatry  -s/p debridement/revision 6/5 -   - no overt purulence, debrided down to healthy tissue.   - vascular spoke with pt regarding possible BKA.    PVD  - may need angiogram    uncontrolled Diabetes  - cont lantus 38 - novolog 22 units qac  - hgb a1c  11.9  - diabetic diet  - FS qid  - endo follow up appreciated    MARIKA   - slight rise in cre  - follow creatinine  - nephrology following  - encouraged fluid intake    HTN   -   hold hydralazine     hyponatremia  - NO salt restriction  - no HCTZ    anorexia  - add Glucerna  - cont Marinol    depression  - seen by psych    constipation  - had bm  - miralax bid

## 2019-06-07 NOTE — PHYSICAL THERAPY INITIAL EVALUATION ADULT - DIAGNOSIS, PT EVAL
impaired integument
impaired integumentary
decreased functional mobility secondary to generalized weakness, impaired balance, decreased endurance

## 2019-06-07 NOTE — PROGRESS NOTE BEHAVIORAL HEALTH - NSBHCHARTREVIEWLAB_PSY_A_CORE FT
8.2    16.9  )-----------( 442      ( 07 Jun 2019 06:29 )             25.3     06-07    133<L>  |  101  |  35<H>  ----------------------------<  238<H>  4.6   |  19<L>  |  2.65<H>    Ca    8.8      07 Jun 2019 06:29    TPro  6.6  /  Alb  2.4<L>  /  TBili  0.6  /  DBili  x   /  AST  23  /  ALT  31  /  AlkPhos  118  06-06

## 2019-06-07 NOTE — PROGRESS NOTE BEHAVIORAL HEALTH - NSBHCHARTREVIEWIMAGING_PSY_A_CORE FT
< from: NM SPECT/CT Inflamm Process (06.05.19 @ 15:24) >    IMPRESSION: Abnormal Tc 99m labeled leukocyte scan.    Increased labeled WBC activity in the soft tissue of right plantar   region, may represent postsurgical change versus soft tissue infection.    No scan evidence of osteomyelitis.    < end of copied text >

## 2019-06-07 NOTE — PROGRESS NOTE ADULT - SUBJECTIVE AND OBJECTIVE BOX
AGUSTIN BRAR 58y [id number]    Patient is a 58y old  Male who presents with a chief complaint of right foot ulcer (07 Jun 2019 13:18)      Follow Up/CC:  ID following for fever    Interval History/ROS: no fever, no acute issues    Allergies    No Known Allergies    Intolerances        ANTIMICROBIALS:  piperacillin/tazobactam IVPB. 3.375 every 8 hours  vancomycin  IVPB 1000 every 24 hours      MEDICATIONS  (STANDING):  amLODIPine   Tablet 10 milliGRAM(s) Oral daily  aspirin enteric coated 81 milliGRAM(s) Oral daily  bisacodyl Suppository 10 milliGRAM(s) Rectal once  dextrose 5%. 1000 milliLiter(s) (50 mL/Hr) IV Continuous <Continuous>  dextrose 50% Injectable 12.5 Gram(s) IV Push once  dextrose 50% Injectable 25 Gram(s) IV Push once  dextrose 50% Injectable 25 Gram(s) IV Push once  dronabinol 2.5 milliGRAM(s) Oral two times a day  ergocalciferol 16647 Unit(s) Oral every week  heparin  Injectable 5000 Unit(s) SubCutaneous every 8 hours  hydrALAZINE 100 milliGRAM(s) Oral three times a day  insulin glargine Injectable (LANTUS) 38 Unit(s) SubCutaneous at bedtime  insulin lispro (HumaLOG) corrective regimen sliding scale   SubCutaneous three times a day before meals  insulin lispro (HumaLOG) corrective regimen sliding scale   SubCutaneous three times a day before meals  insulin lispro (HumaLOG) corrective regimen sliding scale   SubCutaneous at bedtime  insulin lispro Injectable (HumaLOG) 22 Unit(s) SubCutaneous three times a day before meals  lactulose Syrup 20 Gram(s) Oral every 4 hours  ondansetron Injectable 4 milliGRAM(s) IV Push once  piperacillin/tazobactam IVPB. 3.375 Gram(s) IV Intermittent every 8 hours  polyethylene glycol 3350 17 Gram(s) Oral daily  senna 2 Tablet(s) Oral at bedtime  sodium chloride 0.9%. 1000 milliLiter(s) (75 mL/Hr) IV Continuous <Continuous>  vancomycin  IVPB 1000 milliGRAM(s) IV Intermittent every 24 hours    MEDICATIONS  (PRN):  dextrose 40% Gel 15 Gram(s) Oral once PRN Blood Glucose LESS THAN 70 milliGRAM(s)/deciliter  glucagon  Injectable 1 milliGRAM(s) IntraMuscular once PRN Glucose LESS THAN 70 milligrams/deciliter  oxyCODONE    5 mG/acetaminophen 325 mG 2 Tablet(s) Oral every 6 hours PRN Severe Pain (7 - 10)  oxyCODONE    5 mG/acetaminophen 325 mG 1 Tablet(s) Oral every 6 hours PRN Moderate Pain (4 - 6)        Vital Signs Last 24 Hrs  T(C): 37.1 (07 Jun 2019 09:31), Max: 37.8 (06 Jun 2019 21:25)  T(F): 98.7 (07 Jun 2019 09:31), Max: 100 (06 Jun 2019 21:25)  HR: 90 (07 Jun 2019 09:31) (80 - 96)  BP: 126/72 (07 Jun 2019 09:31) (122/68 - 171/84)  BP(mean): --  RR: 18 (07 Jun 2019 09:31) (17 - 18)  SpO2: 93% (07 Jun 2019 09:31) (90% - 93%)    CBC Full  -  ( 07 Jun 2019 06:29 )  WBC Count : 16.9 K/uL  RBC Count : 2.70 M/uL  Hemoglobin : 8.2 g/dL  Hematocrit : 25.3 %  Platelet Count - Automated : 442 K/uL  Mean Cell Volume : 93.7 fl  Mean Cell Hemoglobin : 30.4 pg  Mean Cell Hemoglobin Concentration : 32.5 gm/dL  Auto Neutrophil # : x  Auto Lymphocyte # : x  Auto Monocyte # : x  Auto Eosinophil # : x  Auto Basophil # : x  Auto Neutrophil % : x  Auto Lymphocyte % : x  Auto Monocyte % : x  Auto Eosinophil % : x  Auto Basophil % : x    06-07    133<L>  |  101  |  35<H>  ----------------------------<  238<H>  4.6   |  19<L>  |  2.65<H>    Ca    8.8      07 Jun 2019 06:29    TPro  6.6  /  Alb  2.4<L>  /  TBili  0.6  /  DBili  x   /  AST  23  /  ALT  31  /  AlkPhos  118  06-06    LIVER FUNCTIONS - ( 06 Jun 2019 08:25 )  Alb: 2.4 g/dL / Pro: 6.6 g/dL / ALK PHOS: 118 U/L / ALT: 31 U/L / AST: 23 U/L / GGT: x               MICROBIOLOGY:  .Tissue  06-05-19   Numerous Gram Negative Rods  --    Moderate polymorphonuclear leukocytes per low power field  Few Gram Negative Rods per oil power field      .Other  05-28-19   No growth  --  Enterococcus faecalis  Escherichia coli      .Blood  05-26-19   No growth at 5 days.  --  --      .Blood  05-25-19   No growth at 5 days.  --  --      .Tissue  05-23-19   Few Escherichia coli  Growth in fluid media only Enterococcus faecalis  Rare Streptococcus agalactiae (Group B) isolated  Group B streptococci are susceptible to ampicillin,  penicillin and cefazolin, but may be resistant to  erythromycin and clindamycin.  Recommendations for intrapartum prophylaxis for Group B  streptococci are penicillin or ampicillin.  --  Escherichia coli  Enterococcus faecalis      .Blood  05-20-19   No growth at 5 days.  --  --      .Abscess  05-20-19   Numerous Escherichia coli  Numerous Enterococcus faecalis  Moderate Prevotella bivia "Susceptibilities not performed"  --  Enterococcus faecalis  Escherichia coli        Vancomycin Level, Trough: 11.6 ug/mL (06-06-19 @ 17:03)      RADIOLOGY    < from: NM SPECT/CT Inflamm Process (06.05.19 @ 15:24) >  Increased labeled WBC activity in the soft tissue of right plantar   region, may represent postsurgical change versus soft tissue infection.    No scan evidence of osteomyelitis.    < end of copied text >

## 2019-06-07 NOTE — PHYSICAL THERAPY INITIAL EVALUATION ADULT - GENERAL OBSERVATIONS, REHAB EVAL
Pt received supine in bed, A&Ox4,  +IV, +VAC to R foot
Received in bed in NAD, Wet to dry dressing in place.
Pt received supine in bed, A&Ox4,  +IV, +VAC to R foot

## 2019-06-07 NOTE — PROGRESS NOTE ADULT - SUBJECTIVE AND OBJECTIVE BOX
Patient is a 58y old  Male who presents with a chief complaint of right foot ulcer (06 Jun 2019 14:16)       INTERVAL HPI/OVERNIGHT EVENTS:  Patient seen and evaluated at bedside.  Pt is resting comfortable in NAD. Denies N/V/F/C.      Allergies    No Known Allergies    Intolerances        Vital Signs Last 24 Hrs  T(C): 36.8 (07 Jun 2019 05:07), Max: 37.8 (06 Jun 2019 21:25)  T(F): 98.2 (07 Jun 2019 05:07), Max: 100 (06 Jun 2019 21:25)  HR: 84 (07 Jun 2019 05:07) (80 - 96)  BP: 146/79 (07 Jun 2019 05:07) (122/68 - 171/84)  BP(mean): --  RR: 18 (07 Jun 2019 05:07) (17 - 18)  SpO2: 93% (07 Jun 2019 05:07) (90% - 93%)    LABS:                        8.2    16.9  )-----------( 442      ( 07 Jun 2019 06:29 )             25.3     06-07    133<L>  |  101  |  35<H>  ----------------------------<  238<H>  4.6   |  19<L>  |  2.65<H>    Ca    8.8      07 Jun 2019 06:29    TPro  6.6  /  Alb  2.4<L>  /  TBili  0.6  /  DBili  x   /  AST  23  /  ALT  31  /  AlkPhos  118  06-06        CAPILLARY BLOOD GLUCOSE      POCT Blood Glucose.: 202 mg/dL (06 Jun 2019 21:36)  POCT Blood Glucose.: 171 mg/dL (06 Jun 2019 16:24)  POCT Blood Glucose.: 190 mg/dL (06 Jun 2019 11:36)  POCT Blood Glucose.: 237 mg/dL (06 Jun 2019 09:35)      Lower Extremity Physical Exam:  Skin: R plantar full thickness ulcer midfoot 5 cm (d) w/ Vertical Sx incision post I & D extending distally to met heads w/ Plantar Fascia & muscle belly exposed and mainly resection as well as proximal w/ Sx incision extending to heel w/ mixed fibrogranular tissue w/ tota length of Sx wound 14 cm (L). No expressable purulence, mild maceration periwound but tissue is viable.    RADIOLOGY & ADDITIONAL TESTS:  < from: NM SPECT/CT Inflamm Process (06.05.19 @ 15:24) >    EXAM:  NM INFLAMMATORY LOC WBC WB                          EXAM:  NM MULTI DAY PROCEDURE                          EXAM:  NM INFLAMMATORY LOC SPECT CT                                PROCEDURE DATE:  06/05/2019          INTERPRETATION:  RADIOPHARMACEUTICAL: 10.8 mCi Tc 99m labeled autologous   leukocytes, I.V.    CLINICAL STATEMENT: 58-year-old male with leukocytosis and nonhealing   wound in the right foot. Patient was brought to the operating room before   imaging was performed.    TECHNIQUE: SPECT/CT of the bilateral feet was performed approximately 20   hours after the administration of the labeled leukocyte.    FINDINGS: There is increased labeled WBC activity mapping to the soft   tissue in the right plantar region. No abnormal activityis seen in the   bone to suggest presence of osteomyelitis.    IMPRESSION: Abnormal Tc 99m labeled leukocyte scan.    Increased labeled WBC activity in the soft tissue of right plantar   region, may represent postsurgical change versus soft tissue infection.    No scan evidence of osteomyelitis.                        CAITLYN LEAL M.D., NUCLEAR MEDICINE ATTENDING  This document has been electronically signed. Jun 5 2019  4:22PM                < end of copied text > Patient is a 58y old  Male who presents with a chief complaint of right foot ulcer (06 Jun 2019 14:16)       INTERVAL HPI/OVERNIGHT EVENTS:  Patient seen and evaluated at bedside.  Pt is resting comfortable in NAD. Denies N/V/F/C.      Allergies    No Known Allergies    Intolerances        Vital Signs Last 24 Hrs  T(C): 36.8 (07 Jun 2019 05:07), Max: 37.8 (06 Jun 2019 21:25)  T(F): 98.2 (07 Jun 2019 05:07), Max: 100 (06 Jun 2019 21:25)  HR: 84 (07 Jun 2019 05:07) (80 - 96)  BP: 146/79 (07 Jun 2019 05:07) (122/68 - 171/84)  BP(mean): --  RR: 18 (07 Jun 2019 05:07) (17 - 18)  SpO2: 93% (07 Jun 2019 05:07) (90% - 93%)    LABS:                        8.2    16.9  )-----------( 442      ( 07 Jun 2019 06:29 )             25.3     06-07    133<L>  |  101  |  35<H>  ----------------------------<  238<H>  4.6   |  19<L>  |  2.65<H>    Ca    8.8      07 Jun 2019 06:29    TPro  6.6  /  Alb  2.4<L>  /  TBili  0.6  /  DBili  x   /  AST  23  /  ALT  31  /  AlkPhos  118  06-06        CAPILLARY BLOOD GLUCOSE      POCT Blood Glucose.: 202 mg/dL (06 Jun 2019 21:36)  POCT Blood Glucose.: 171 mg/dL (06 Jun 2019 16:24)  POCT Blood Glucose.: 190 mg/dL (06 Jun 2019 11:36)  POCT Blood Glucose.: 237 mg/dL (06 Jun 2019 09:35)      Lower Extremity Physical Exam:  Skin: R plantar full thickness ulcer midfoot 5 cm (d) w/ Vertical Sx incision post I & D extending distally to met heads w/ Plantar Fascia & muscle belly exposed and mainly resection as well as proximal w/ Sx incision extending to heel w/ mixed fibrogranular tissue w/ total length of Sx wound 14 cm (L). No expressable purulence, mild maceration periwound but tissue is viable.    RADIOLOGY & ADDITIONAL TESTS:  < from: NM SPECT/CT Inflamm Process (06.05.19 @ 15:24) >    EXAM:  NM INFLAMMATORY LOC WBC WB                          EXAM:  NM MULTI DAY PROCEDURE                          EXAM:  NM INFLAMMATORY LOC SPECT CT                                PROCEDURE DATE:  06/05/2019          INTERPRETATION:  RADIOPHARMACEUTICAL: 10.8 mCi Tc 99m labeled autologous   leukocytes, I.V.    CLINICAL STATEMENT: 58-year-old male with leukocytosis and nonhealing   wound in the right foot. Patient was brought to the operating room before   imaging was performed.    TECHNIQUE: SPECT/CT of the bilateral feet was performed approximately 20   hours after the administration of the labeled leukocyte.    FINDINGS: There is increased labeled WBC activity mapping to the soft   tissue in the right plantar region. No abnormal activityis seen in the   bone to suggest presence of osteomyelitis.    IMPRESSION: Abnormal Tc 99m labeled leukocyte scan.    Increased labeled WBC activity in the soft tissue of right plantar   region, may represent postsurgical change versus soft tissue infection.    No scan evidence of osteomyelitis.                        CAITLYN LEAL M.D., NUCLEAR MEDICINE ATTENDING  This document has been electronically signed. Jun 5 2019  4:22PM                < end of copied text >

## 2019-06-07 NOTE — PHYSICAL THERAPY INITIAL EVALUATION ADULT - PERTINENT HX OF CURRENT PROBLEM, REHAB EVAL
NOTIFICATION RETURN TO WORK / SCHOOL 
 
4/5/2018 11:04 AM 
 
Ms. Nicole Reilly 
[address] [facility] [id number] To Whom It May Concern: 
 
Moisés Smalls is currently under the care of Alexis Villegas. She will return to work/school on: 04/05/2018 If there are questions or concerns please have the patient contact our office. Sincerely, Brandon Newman NP 
 
                                
 

57 y/o male with history of IDDM, HTN, presents to the ED sent by Podiatrist (Dr. Giovanni Hanna) for evaluation and treatment of infected R foot ulcer, failing outpatient Augmentin. Unable to control DM at home. Also reports orthopnea. S/p bedside excisional debridement fascia right foot by podiatry 5/20, R foot surgical I&D 5/22 with VAC placement.
57 y/o male with hx of IDDM, HTN, sent to ED by Podiatrist due to infected R foot ulcer, failing outpatient Augmentin. Pt reports followed by Podiatry outpatient for R plantar foot ulcer x3-4 weeks. Approx 2 weeks ago pt developed fever, chills, diaphoresis, malaise, decreased PO intake and nausea. Treated with Augmentin for the last 1 week without improvement. Unable to control DM at home. Also reports orthopnea.
57 y/o male with hx of IDDM, HTN, sent to ED by Podiatrist due to infected R foot ulcer, failing outpatient Augmentin. Pt reports followed by Podiatry outpatient for R plantar foot ulcer x3-4 weeks. Approx 2 weeks ago pt developed fever, chills, diaphoresis, malaise, decreased PO intake and nausea. Treated with Augmentin for the last 1 week without improvement. Unable to control DM at home. Also reports orthopnea.

## 2019-06-07 NOTE — PROGRESS NOTE ADULT - SUBJECTIVE AND OBJECTIVE BOX
Chief complaint  Patient is a 58y old  Male who presents with a chief complaint of right foot ulcer (07 Jun 2019 10:36)   Review of systems  Patient in bed, looks comfortable, no fever, no hypoglycemia.    Labs and Fingersticks  CAPILLARY BLOOD GLUCOSE      POCT Blood Glucose.: 291 mg/dL (07 Jun 2019 09:13)  POCT Blood Glucose.: 202 mg/dL (06 Jun 2019 21:36)  POCT Blood Glucose.: 171 mg/dL (06 Jun 2019 16:24)      Anion Gap, Serum: 13 (06-07 @ 06:29)  Anion Gap, Serum: 14 (06-06 @ 08:25)      Calcium, Total Serum: 8.8 (06-07 @ 06:29)  Calcium, Total Serum: 8.8 (06-06 @ 08:25)  Albumin, Serum: 2.4 <L> (06-06 @ 08:25)    Alanine Aminotransferase (ALT/SGPT): 31 (06-06 @ 08:25)  Alkaline Phosphatase, Serum: 118 (06-06 @ 08:25)  Aspartate Aminotransferase (AST/SGOT): 23 (06-06 @ 08:25)        06-07    133<L>  |  101  |  35<H>  ----------------------------<  238<H>  4.6   |  19<L>  |  2.65<H>    Ca    8.8      07 Jun 2019 06:29    TPro  6.6  /  Alb  2.4<L>  /  TBili  0.6  /  DBili  x   /  AST  23  /  ALT  31  /  AlkPhos  118  06-06                        8.2    16.9  )-----------( 442      ( 07 Jun 2019 06:29 )             25.3     Medications  MEDICATIONS  (STANDING):  amLODIPine   Tablet 10 milliGRAM(s) Oral daily  aspirin enteric coated 81 milliGRAM(s) Oral daily  bisacodyl Suppository 10 milliGRAM(s) Rectal once  dextrose 5%. 1000 milliLiter(s) (50 mL/Hr) IV Continuous <Continuous>  dextrose 50% Injectable 12.5 Gram(s) IV Push once  dextrose 50% Injectable 25 Gram(s) IV Push once  dextrose 50% Injectable 25 Gram(s) IV Push once  dronabinol 2.5 milliGRAM(s) Oral two times a day  ergocalciferol 91805 Unit(s) Oral every week  heparin  Injectable 5000 Unit(s) SubCutaneous every 8 hours  hydrALAZINE 100 milliGRAM(s) Oral three times a day  insulin glargine Injectable (LANTUS) 38 Unit(s) SubCutaneous at bedtime  insulin lispro (HumaLOG) corrective regimen sliding scale   SubCutaneous three times a day before meals  insulin lispro (HumaLOG) corrective regimen sliding scale   SubCutaneous three times a day before meals  insulin lispro (HumaLOG) corrective regimen sliding scale   SubCutaneous at bedtime  insulin lispro Injectable (HumaLOG) 22 Unit(s) SubCutaneous three times a day before meals  lactulose Syrup 20 Gram(s) Oral every 4 hours  ondansetron Injectable 4 milliGRAM(s) IV Push once  piperacillin/tazobactam IVPB. 3.375 Gram(s) IV Intermittent every 8 hours  polyethylene glycol 3350 17 Gram(s) Oral daily  senna 2 Tablet(s) Oral at bedtime  sodium chloride 0.9%. 1000 milliLiter(s) (75 mL/Hr) IV Continuous <Continuous>  vancomycin  IVPB 1000 milliGRAM(s) IV Intermittent every 24 hours      Physical Exam  General: Patient comfortable in bed  Vital Signs Last 12 Hrs  T(F): 98.7 (06-07-19 @ 09:31), Max: 99.8 (06-07-19 @ 01:29)  HR: 90 (06-07-19 @ 09:31) (80 - 90)  BP: 126/72 (06-07-19 @ 09:31) (126/72 - 146/79)  BP(mean): --  RR: 18 (06-07-19 @ 09:31) (18 - 18)  SpO2: 93% (06-07-19 @ 09:31) (93% - 93%)  Neck: No palpable thyroid nodules.  CVS: S1S2, No murmurs  Respiratory: No wheezing, no crepitations  GI: Abdomen soft, bowel sounds positive  Musculoskeletal:  edema lower extremities.   Skin: No skin rashes, no ecchymosis    Diagnostics    Vitamin D, 1, 25-Dihydroxy: AM Sched. Collection: 22-May-2019 06:00 (05-21 @ 18:59)  Vitamin D, 25-Hydroxy: AM Sched. Collection: 22-May-2019 06:00 (05-21 @ 18:59)

## 2019-06-07 NOTE — PROGRESS NOTE ADULT - SUBJECTIVE AND OBJECTIVE BOX
Patient is a 58y old  Male who presents with a chief complaint of right foot ulcer (07 Jun 2019 13:30)      SUBJECTIVE / OVERNIGHT EVENTS:  no new complaints.  wound vac to right foot    MEDICATIONS  (STANDING):  amLODIPine   Tablet 10 milliGRAM(s) Oral daily  aspirin enteric coated 81 milliGRAM(s) Oral daily  bisacodyl Suppository 10 milliGRAM(s) Rectal once  dextrose 5%. 1000 milliLiter(s) (50 mL/Hr) IV Continuous <Continuous>  dextrose 50% Injectable 12.5 Gram(s) IV Push once  dextrose 50% Injectable 25 Gram(s) IV Push once  dextrose 50% Injectable 25 Gram(s) IV Push once  dronabinol 2.5 milliGRAM(s) Oral two times a day  ergocalciferol 44547 Unit(s) Oral every week  heparin  Injectable 5000 Unit(s) SubCutaneous every 8 hours  hydrALAZINE 100 milliGRAM(s) Oral three times a day  insulin glargine Injectable (LANTUS) 38 Unit(s) SubCutaneous at bedtime  insulin lispro (HumaLOG) corrective regimen sliding scale   SubCutaneous three times a day before meals  insulin lispro (HumaLOG) corrective regimen sliding scale   SubCutaneous three times a day before meals  insulin lispro (HumaLOG) corrective regimen sliding scale   SubCutaneous at bedtime  insulin lispro Injectable (HumaLOG) 22 Unit(s) SubCutaneous three times a day before meals  lactulose Syrup 20 Gram(s) Oral every 4 hours  ondansetron Injectable 4 milliGRAM(s) IV Push once  piperacillin/tazobactam IVPB. 3.375 Gram(s) IV Intermittent every 8 hours  polyethylene glycol 3350 17 Gram(s) Oral daily  senna 2 Tablet(s) Oral at bedtime  sodium chloride 0.9%. 1000 milliLiter(s) (75 mL/Hr) IV Continuous <Continuous>  vancomycin  IVPB 1000 milliGRAM(s) IV Intermittent every 24 hours    MEDICATIONS  (PRN):  dextrose 40% Gel 15 Gram(s) Oral once PRN Blood Glucose LESS THAN 70 milliGRAM(s)/deciliter  glucagon  Injectable 1 milliGRAM(s) IntraMuscular once PRN Glucose LESS THAN 70 milligrams/deciliter  oxyCODONE    5 mG/acetaminophen 325 mG 2 Tablet(s) Oral every 6 hours PRN Severe Pain (7 - 10)  oxyCODONE    5 mG/acetaminophen 325 mG 1 Tablet(s) Oral every 6 hours PRN Moderate Pain (4 - 6)      Vital Signs Last 24 Hrs  T(C): 37.3 (07 Jun 2019 13:33), Max: 37.8 (06 Jun 2019 21:25)  T(F): 99.1 (07 Jun 2019 13:33), Max: 100 (06 Jun 2019 21:25)  HR: 78 (07 Jun 2019 13:33) (78 - 91)  BP: 108/65 (07 Jun 2019 13:33) (108/65 - 170/71)  BP(mean): --  RR: 18 (07 Jun 2019 13:33) (17 - 18)  SpO2: 92% (07 Jun 2019 13:33) (92% - 93%)  CAPILLARY BLOOD GLUCOSE      POCT Blood Glucose.: 246 mg/dL (07 Jun 2019 13:41)  POCT Blood Glucose.: 291 mg/dL (07 Jun 2019 09:13)  POCT Blood Glucose.: 202 mg/dL (06 Jun 2019 21:36)  POCT Blood Glucose.: 171 mg/dL (06 Jun 2019 16:24)    I&O's Summary    06 Jun 2019 07:01  -  07 Jun 2019 07:00  --------------------------------------------------------  IN: 2200 mL / OUT: 1050 mL / NET: 1150 mL    07 Jun 2019 07:01  -  07 Jun 2019 16:22  --------------------------------------------------------  IN: 360 mL / OUT: 400 mL / NET: -40 mL        PHYSICAL EXAM:  GENERAL: NAD, well-developed  HEAD:  Atraumatic, Normocephalic  EYES: EOMI, PERRLA, conjunctiva and sclera clear  NECK: Supple, No JVD  CHEST/LUNG: Clear to auscultation bilaterally; No wheeze  HEART: Regular rate and rhythm; No murmurs, rubs, or gallops  ABDOMEN: Soft, Nontender, Nondistended; Bowel sounds present  EXTREMITIES:  2+ Peripheral Pulses, No clubbing, cyanosis, or edema  PSYCH: AAOx3  NEUROLOGY: non-focal  SKIN: No rashes or lesions    LABS:                        8.2    16.9  )-----------( 442      ( 07 Jun 2019 06:29 )             25.3     06-07    133<L>  |  101  |  35<H>  ----------------------------<  238<H>  4.6   |  19<L>  |  2.65<H>    Ca    8.8      07 Jun 2019 06:29    TPro  6.6  /  Alb  2.4<L>  /  TBili  0.6  /  DBili  x   /  AST  23  /  ALT  31  /  AlkPhos  118  06-06              RADIOLOGY & ADDITIONAL TESTS:    Imaging Personally Reviewed:    Consultant(s) Notes Reviewed:      Care Discussed with Consultants/Other Providers:

## 2019-06-07 NOTE — PHYSICAL THERAPY INITIAL EVALUATION ADULT - MODALITIES TREATMENT COMMENTS
PT APRIL Eval order rec'ed for VAC management for R plantar foot surgical wound s/p multiple debridements (5/22, 5/27, 6/5) w/ VAC placement in the OR on 6/5/19. Pt rec'ed supine in bed, DSD to R foot, NAD, kenna VAC re-application well w/o adverse reaction. Wound rec'ed C/D/I, no odor, no purulence, no erythema. Noted muscle exposed in base. Wound cleansed w/ NS, cavilon to periwoud, white versafoam to base (as per podiatry consult), black granufoam to cover, tracked to R anterior shin, good seal 125mmHg, continuous. Pt left supine in bed as rec'ed, all lines & tubes intact, RN/Michelle burch, [employer] paperwork completed & left in paperchart.
R plantar foot wound measuring 9cm x 5cm x 1.5 cm with stravix graft in place, +adpatic touch sutured to base.

## 2019-06-07 NOTE — PROGRESS NOTE ADULT - ASSESSMENT
58M w/ DM (A1C 11.9), HTN, presents with nonhealing RLE heel ulcer s/p multiple debridements.       - C/w wound care per podiatry  - will follow

## 2019-06-07 NOTE — PROGRESS NOTE BEHAVIORAL HEALTH - NSBHCHARTREVIEWVS_PSY_A_CORE FT
Vital Signs Last 24 Hrs  T(C): 37.1 (07 Jun 2019 09:31), Max: 37.8 (06 Jun 2019 21:25)  T(F): 98.7 (07 Jun 2019 09:31), Max: 100 (06 Jun 2019 21:25)  HR: 90 (07 Jun 2019 09:31) (80 - 96)  BP: 126/72 (07 Jun 2019 09:31) (122/68 - 171/84)  BP(mean): --  RR: 18 (07 Jun 2019 09:31) (17 - 18)  SpO2: 93% (07 Jun 2019 09:31) (90% - 93%)

## 2019-06-07 NOTE — PROGRESS NOTE ADULT - SUBJECTIVE AND OBJECTIVE BOX
Subjective: Patient seen and examined. No new events except as noted.     REVIEW OF SYSTEMS:    CONSTITUTIONAL: No weakness, fevers or chills  EYES/ENT: No visual changes;  No vertigo or throat pain   NECK: No pain or stiffness  RESPIRATORY: No cough, wheezing, hemoptysis; No shortness of breath  CARDIOVASCULAR: No chest pain or palpitations  GASTROINTESTINAL: No abdominal or epigastric pain. No nausea, vomiting, or hematemesis; No diarrhea or constipation. No melena or hematochezia.  GENITOURINARY: No dysuria, frequency or hematuria  NEUROLOGICAL: No numbness or weakness  SKIN: No itching, burning, rashes, or lesions   All other review of systems is negative unless indicated above.    MEDICATIONS:  MEDICATIONS  (STANDING):  amLODIPine   Tablet 10 milliGRAM(s) Oral daily  aspirin enteric coated 81 milliGRAM(s) Oral daily  bisacodyl Suppository 10 milliGRAM(s) Rectal once  dextrose 5%. 1000 milliLiter(s) (50 mL/Hr) IV Continuous <Continuous>  dextrose 50% Injectable 12.5 Gram(s) IV Push once  dextrose 50% Injectable 25 Gram(s) IV Push once  dextrose 50% Injectable 25 Gram(s) IV Push once  dronabinol 2.5 milliGRAM(s) Oral two times a day  ergocalciferol 04923 Unit(s) Oral every week  heparin  Injectable 5000 Unit(s) SubCutaneous every 8 hours  hydrALAZINE 100 milliGRAM(s) Oral three times a day  insulin glargine Injectable (LANTUS) 38 Unit(s) SubCutaneous at bedtime  insulin lispro (HumaLOG) corrective regimen sliding scale   SubCutaneous three times a day before meals  insulin lispro (HumaLOG) corrective regimen sliding scale   SubCutaneous three times a day before meals  insulin lispro (HumaLOG) corrective regimen sliding scale   SubCutaneous at bedtime  insulin lispro Injectable (HumaLOG) 22 Unit(s) SubCutaneous three times a day before meals  lactulose Syrup 20 Gram(s) Oral every 4 hours  ondansetron Injectable 4 milliGRAM(s) IV Push once  piperacillin/tazobactam IVPB. 3.375 Gram(s) IV Intermittent every 8 hours  polyethylene glycol 3350 17 Gram(s) Oral daily  senna 2 Tablet(s) Oral at bedtime  sodium chloride 0.9%. 1000 milliLiter(s) (75 mL/Hr) IV Continuous <Continuous>  vancomycin  IVPB 1000 milliGRAM(s) IV Intermittent every 24 hours      PHYSICAL EXAM:  T(C): 37.1 (06-07-19 @ 09:31), Max: 37.8 (06-06-19 @ 21:25)  HR: 90 (06-07-19 @ 09:31) (80 - 96)  BP: 126/72 (06-07-19 @ 09:31) (122/68 - 171/84)  RR: 18 (06-07-19 @ 09:31) (17 - 18)  SpO2: 93% (06-07-19 @ 09:31) (90% - 93%)  Wt(kg): --  I&O's Summary    06 Jun 2019 07:01  -  07 Jun 2019 07:00  --------------------------------------------------------  IN: 2200 mL / OUT: 1050 mL / NET: 1150 mL    07 Jun 2019 07:01  -  07 Jun 2019 13:18  --------------------------------------------------------  IN: 180 mL / OUT: 0 mL / NET: 180 mL              Appearance: NAD   HEENT:   Normal oral mucosa, PERRL, EOMI	  Lymphatic: No lymphadenopathy , no edema  Cardiovascular: Normal S1 S2, No JVD, No murmurs , Peripheral pulses palpable 2+ bilaterally  Respiratory: Lungs clear to auscultation, normal effort 	  Gastrointestinal:  Soft, Non-tender, + BS	  Skin: No rashes, No ecchymoses, No cyanosis, warm to touch  Musculoskeletal: Decreased range of motion, normal strength  Psychiatry:  Mood & affect appropriate  Ext: +edema , chronic venous stasis skin discoloration   right foot wrapped , + ulcer   +drain    LABS:    CARDIAC MARKERS:                                8.2    16.9  )-----------( 442      ( 07 Jun 2019 06:29 )             25.3     06-07    133<L>  |  101  |  35<H>  ----------------------------<  238<H>  4.6   |  19<L>  |  2.65<H>    Ca    8.8      07 Jun 2019 06:29    TPro  6.6  /  Alb  2.4<L>  /  TBili  0.6  /  DBili  x   /  AST  23  /  ALT  31  /  AlkPhos  118  06-06    proBNP:   Lipid Profile:   HgA1c:   TSH:             TELEMETRY: 	    ECG:  	  RADIOLOGY:   DIAGNOSTIC TESTING:  [ ] Echocardiogram:  [ ]  Catheterization:  [ ] Stress Test:    OTHER:

## 2019-06-07 NOTE — PHYSICAL THERAPY INITIAL EVALUATION ADULT - PLANNED THERAPY INTERVENTIONS, PT EVAL
Negative Pressure Wound Therapy
Negative Pressure Wound Therapy
gait training/transfer training/balance training/bed mobility training

## 2019-06-07 NOTE — PROGRESS NOTE ADULT - ASSESSMENT
Assessment  DMT2: 58y Male with DM T2 with hyperglycemia, on insulin, blood sugars running high, no hypoglycemic episode, eating full meals, compliant with low carb diet.  Foot wound: s/p surgery, on medications, no chest pain, stable, monitored.  HTN: Controlled,  on antihypertensive medications.  Overweight/Obesity: No strict exercise routines, not on any weight loss program, neither on low calorie diet.    Andrzej Zhu MD  Cell: [phone]  Office: [phone]

## 2019-06-07 NOTE — PROGRESS NOTE BEHAVIORAL HEALTH - SUMMARY
Patient is a 58 year old man, , domiciled with wife and mother, employed as a  for [employer], no PPH, never seen a psychiatrist or therapist, no prior psychiatric hospitalizations, no prior SIB, SI or suicide attempts, no history of violence or legal issues, no history of substance abuse, PMH of DM with current R foot ulcer and HTN, admitted for treatment of worsening R foot ulcer, now s/p debridement and scheduled for OR washout tomorrow. Psychiatry consulted for patient report of worsening depression and very poor appetite.    Patient's presentation is c/w major depressive episode related to stressor of being diagnosed with foot ulcer which continues to worsen, and possibility of needed BKA per report has not been entirely ruled out. However, patient does not have SI, no acute safety concerns, and is future oriented.    He continues to decline psychotherapy referral or medications, but is open to holistic nurse while here and to having phone number for outpatient therapy/psychiatric resources if he ever changes his mind.

## 2019-06-07 NOTE — PROGRESS NOTE ADULT - SUBJECTIVE AND OBJECTIVE BOX
Patient is a 58y old  Male who presents with a chief complaint of right foot ulcer (07 Jun 2019 16:22)      Vascular Surgery Attending Progress Note    Interval HPI: pt w/o new c/o     Medications:  amLODIPine   Tablet 10 milliGRAM(s) Oral daily  aspirin enteric coated 81 milliGRAM(s) Oral daily  bisacodyl Suppository 10 milliGRAM(s) Rectal once  dextrose 40% Gel 15 Gram(s) Oral once PRN  dextrose 5%. 1000 milliLiter(s) IV Continuous <Continuous>  dextrose 50% Injectable 12.5 Gram(s) IV Push once  dextrose 50% Injectable 25 Gram(s) IV Push once  dextrose 50% Injectable 25 Gram(s) IV Push once  dronabinol 2.5 milliGRAM(s) Oral two times a day  ergocalciferol 83014 Unit(s) Oral every week  glucagon  Injectable 1 milliGRAM(s) IntraMuscular once PRN  heparin  Injectable 5000 Unit(s) SubCutaneous every 8 hours  hydrALAZINE 100 milliGRAM(s) Oral three times a day  insulin glargine Injectable (LANTUS) 38 Unit(s) SubCutaneous at bedtime  insulin lispro (HumaLOG) corrective regimen sliding scale   SubCutaneous three times a day before meals  insulin lispro (HumaLOG) corrective regimen sliding scale   SubCutaneous three times a day before meals  insulin lispro (HumaLOG) corrective regimen sliding scale   SubCutaneous at bedtime  insulin lispro Injectable (HumaLOG) 22 Unit(s) SubCutaneous three times a day before meals  lactulose Syrup 20 Gram(s) Oral every 4 hours  ondansetron Injectable 4 milliGRAM(s) IV Push once  oxyCODONE    5 mG/acetaminophen 325 mG 2 Tablet(s) Oral every 6 hours PRN  oxyCODONE    5 mG/acetaminophen 325 mG 1 Tablet(s) Oral every 6 hours PRN  piperacillin/tazobactam IVPB. 3.375 Gram(s) IV Intermittent every 8 hours  polyethylene glycol 3350 17 Gram(s) Oral daily  senna 2 Tablet(s) Oral at bedtime  sodium chloride 0.9%. 1000 milliLiter(s) IV Continuous <Continuous>  vancomycin  IVPB 1000 milliGRAM(s) IV Intermittent every 24 hours      Vital Signs Last 24 Hrs  T(C): 36.9 (07 Jun 2019 17:25), Max: 37.8 (06 Jun 2019 21:25)  T(F): 98.4 (07 Jun 2019 17:25), Max: 100 (06 Jun 2019 21:25)  HR: 75 (07 Jun 2019 17:25) (75 - 91)  BP: 136/75 (07 Jun 2019 17:25) (108/65 - 170/71)  BP(mean): --  RR: 18 (07 Jun 2019 17:25) (17 - 18)  SpO2: 94% (07 Jun 2019 17:25) (92% - 94%)  I&O's Summary    06 Jun 2019 07:01  -  07 Jun 2019 07:00  --------------------------------------------------------  IN: 2200 mL / OUT: 1050 mL / NET: 1150 mL    07 Jun 2019 07:01  -  07 Jun 2019 20:38  --------------------------------------------------------  IN: 360 mL / OUT: 400 mL / NET: -40 mL        Physical Exam:  Neuro  A&Ox3 VSS  Vascular:  stable, dressing c/d/i    LABS:                        8.2    16.9  )-----------( 442      ( 07 Jun 2019 06:29 )             25.3     06-07    133<L>  |  101  |  35<H>  ----------------------------<  238<H>  4.6   |  19<L>  |  2.65<H>    Ca    8.8      07 Jun 2019 06:29    TPro  6.6  /  Alb  2.4<L>  /  TBili  0.6  /  DBili  x   /  AST  23  /  ALT  31  /  AlkPhos  118  06-06        CHRISTIANO KERR MD  [phone]

## 2019-06-07 NOTE — PROGRESS NOTE ADULT - SUBJECTIVE AND OBJECTIVE BOX
[facility] DIVISION OF KIDNEY DISEASES AND HYPERTENSION -- FOLLOW UP NOTE  --------------------------------------------------------------------------------  Chief Complaint:/subjective: no acute events; no complaints, poor appetite, drinking glucerna     24 hour events: as above        PAST HISTORY  --------------------------------------------------------------------------------  No significant changes to PMH, PSH, FHx, SHx, unless otherwise noted    ALLERGIES & MEDICATIONS  --------------------------------------------------------------------------------  Allergies    No Known Allergies    Intolerances      Standing Inpatient Medications  amLODIPine   Tablet 10 milliGRAM(s) Oral daily  aspirin enteric coated 81 milliGRAM(s) Oral daily  bisacodyl Suppository 10 milliGRAM(s) Rectal once  dextrose 5%. 1000 milliLiter(s) IV Continuous <Continuous>  dextrose 50% Injectable 12.5 Gram(s) IV Push once  dextrose 50% Injectable 25 Gram(s) IV Push once  dextrose 50% Injectable 25 Gram(s) IV Push once  dronabinol 2.5 milliGRAM(s) Oral two times a day  ergocalciferol 22514 Unit(s) Oral every week  heparin  Injectable 5000 Unit(s) SubCutaneous every 8 hours  hydrALAZINE 100 milliGRAM(s) Oral three times a day  insulin glargine Injectable (LANTUS) 32 Unit(s) SubCutaneous at bedtime  insulin lispro (HumaLOG) corrective regimen sliding scale   SubCutaneous three times a day before meals  insulin lispro (HumaLOG) corrective regimen sliding scale   SubCutaneous three times a day before meals  insulin lispro (HumaLOG) corrective regimen sliding scale   SubCutaneous at bedtime  insulin lispro Injectable (HumaLOG) 18 Unit(s) SubCutaneous three times a day before meals  lactulose Syrup 20 Gram(s) Oral every 4 hours  ondansetron Injectable 4 milliGRAM(s) IV Push once  piperacillin/tazobactam IVPB. 3.375 Gram(s) IV Intermittent every 8 hours  polyethylene glycol 3350 17 Gram(s) Oral daily  senna 2 Tablet(s) Oral at bedtime  sodium chloride 0.9%. 1000 milliLiter(s) IV Continuous <Continuous>  vancomycin  IVPB 1000 milliGRAM(s) IV Intermittent every 24 hours    PRN Inpatient Medications  dextrose 40% Gel 15 Gram(s) Oral once PRN  glucagon  Injectable 1 milliGRAM(s) IntraMuscular once PRN  oxyCODONE    5 mG/acetaminophen 325 mG 2 Tablet(s) Oral every 6 hours PRN  oxyCODONE    5 mG/acetaminophen 325 mG 1 Tablet(s) Oral every 6 hours PRN      REVIEW OF SYSTEMS  --------------------------------------------------------------------------------  Gen: No weight changes, fatigue, fevers/chills, weakness  Skin: No rashes  Head/Eyes/Ears/Mouth: No headache;   Respiratory: No dyspnea, cough  CV: No chest pain, PND, orthopnea  GI: No abdominal pain, diarrhea, constipation, nausea, vomiting  : No increased frequency, dysuria, hematuria, nocturia  MSK: No joint pain/swelling; no back pain; no edema  Neuro: No dizziness/lightheadedness, weakness  Heme: No easy bruising or bleeding  Psych: No significant nervousness, anxiety, stress, depression    All other systems were reviewed and are negative, except as noted.    VITALS/PHYSICAL EXAM  --------------------------------------------------------------------------------  T(C): 37.1 (06-07-19 @ 09:31), Max: 37.8 (06-06-19 @ 21:25)  HR: 90 (06-07-19 @ 09:31) (80 - 96)  BP: 126/72 (06-07-19 @ 09:31) (122/68 - 171/84)  RR: 18 (06-07-19 @ 09:31) (17 - 18)  SpO2: 93% (06-07-19 @ 09:31) (90% - 93%)  Wt(kg): --  Adult Advanced Hemodynamics Last 24 Hrs  ABP: --  ABP(mean): --  CVP(mm Hg): --  CO: --  CI: --  PA: --  PA(mean): --  PCWP: --  SVR: --  SVRI: --        06-06-19 @ 07:01  -  06-07-19 @ 07:00  --------------------------------------------------------  IN: 2200 mL / OUT: 1050 mL / NET: 1150 mL      Physical Exam:  	Gen: NAD,    	HEENT:   no jvp  	Pulm: CTA B/L  	CV: RRR, S1S2; no rub  	Back:  no sacral edema  	Abd: +BS, soft,    	: No suprapubic tenderness  	Ext: pedal edema; right foot bandaged  	Neuro: alert  	Psych: awake  	Skin: Warm,   	Vascular access:    LABS/STUDIES  --------------------------------------------------------------------------------              8.2    16.9  >-----------<  442      [06-07-19 @ 06:29]              25.3     Hemoglobin: 8.2 g/dL (06-07-19 @ 06:29)  Hemoglobin: 8.2 g/dL (06-06-19 @ 08:25)    Platelet Count - Automated: 442 K/uL (06-07-19 @ 06:29)  Platelet Count - Automated: 402 K/uL (06-06-19 @ 08:25)    133  |  101  |  35  ----------------------------<  238      [06-07-19 @ 06:29]  4.6   |  19  |  2.65        Ca     8.8     [06-07-19 @ 06:29]    TPro  6.6  /  Alb  2.4  /  TBili  0.6  /  DBili  x   /  AST  23  /  ALT  31  /  AlkPhos  118  [06-06-19 @ 08:25]          Creatinine Trend:  SCr 2.65 [06-07 @ 06:29]  SCr 2.72 [06-06 @ 08:25]  SCr 1.97 [06-05 @ 07:11]  SCr 1.96 [06-04 @ 09:18]  SCr 2.11 [06-03 @ 07:12]    Urinalysis - [05-25-19 @ 14:07]      Color Yellow / Appearance Slightly Turbid / SG 1.018 / pH 6.0      Gluc Negative / Ketone Trace  / Bili Negative / Urobili 3 mg/dL       Blood Negative / Protein 100 mg/dL / Leuk Est Negative / Nitrite Negative      RBC 2 / WBC 7 / Hyaline 3 / Gran  / Sq Epi  / Non Sq Epi 2 / Bacteria Negative      Vitamin D (25OH) 4.4      [05-22-19 @ 09:57]  HbA1c 11.8      [05-21-19 @ 11:29]    HCV 0.05, Nonreact      [05-21-19 @ 11:35]

## 2019-06-07 NOTE — PHYSICAL THERAPY INITIAL EVALUATION ADULT - PRECAUTIONS/LIMITATIONS, REHAB EVAL
MR R foot: Soft tissue ulceration along the plantar aspect of the foot.No MR evidence of acute osteomyelitis.No abscess or other drainable fluid collection. Xray R foot: Soft tissue defect, likely ulcer at the plantar aspect of the midfoot. No acute cortical erosive changes are seen. No acute fracture or dislocation is seen.
Pt a/w right foot infection w/ulceration to fascia/cellulits, s/p I and D on zosyn and vanco for OR 5/22 for debridement. Low concern for OM./fall precautions
Pt a/w right foot infection w/ulceration to fascia/cellulits, s/p I and D on zosyn and vanco for OR 5/22, 5/27, 6/5 for debridement. Low concern for OM./fall precautions

## 2019-06-07 NOTE — PROGRESS NOTE BEHAVIORAL HEALTH - CASE SUMMARY
58 year old man, , domiciled with wife and mother, employed as a  for [employer], no PPH, never seen a psychiatrist or therapist, no prior psychiatric hospitalizations, no prior SIB, SI or suicide attempts, no history of violence or legal issues, no history of substance abuse, PMH of DM with current R foot ulcer and HTN, admitted for treatment of worsening R foot ulcer, now s/p debridement and scheduled for OR washout tomorrow. Psychiatry consulted for patient report of worsening depression and very poor appetite.  Pt endorsing depressive sx in the setting of recent health issues, anxiety over not being able to return to work, but no SI/HI.  C/o poor appetite, nausea, physical discomfort.  Pt ambivalent about starting meds; would prefer to think about it for now. 6/7: Pt states he thought about psych meds, but does not feel he wants to "resort to that" as yet - does not feel suicidal, no delirium, no AVH or paranoia reported.  Feels hopeful and motivated to keep pressing forward in his recovery; states he also has to be strong for his family as his wife is also currently having some struggles.  Agrees to reach out if his condition worsens.  Dx: MDD moderate.  R/o adjustment disorder.  Agree with resident's assessment and plan as above.

## 2019-06-07 NOTE — PHYSICAL THERAPY INITIAL EVALUATION ADULT - CRITERIA FOR SKILLED THERAPEUTIC INTERVENTIONS
impairments found
impairments found
therapy frequency/anticipated discharge recommendation/functional limitations in following categories/predicted duration of therapy intervention/risk reduction/prevention/rehab potential/impairments found/anticipated equipment needs at discharge

## 2019-06-08 LAB
ANION GAP SERPL CALC-SCNC: 13 MMOL/L — SIGNIFICANT CHANGE UP (ref 5–17)
BUN SERPL-MCNC: 35 MG/DL — HIGH (ref 7–23)
CALCIUM SERPL-MCNC: 8.7 MG/DL — SIGNIFICANT CHANGE UP (ref 8.4–10.5)
CHLORIDE SERPL-SCNC: 102 MMOL/L — SIGNIFICANT CHANGE UP (ref 96–108)
CO2 SERPL-SCNC: 20 MMOL/L — LOW (ref 22–31)
CREAT SERPL-MCNC: 2.52 MG/DL — HIGH (ref 0.5–1.3)
GLUCOSE BLDC GLUCOMTR-MCNC: 103 MG/DL — HIGH (ref 70–99)
GLUCOSE BLDC GLUCOMTR-MCNC: 147 MG/DL — HIGH (ref 70–99)
GLUCOSE BLDC GLUCOMTR-MCNC: 156 MG/DL — HIGH (ref 70–99)
GLUCOSE BLDC GLUCOMTR-MCNC: 184 MG/DL — HIGH (ref 70–99)
GLUCOSE SERPL-MCNC: 134 MG/DL — HIGH (ref 70–99)
HCT VFR BLD CALC: 25.3 % — LOW (ref 39–50)
HGB BLD-MCNC: 8.1 G/DL — LOW (ref 13–17)
MCHC RBC-ENTMCNC: 29.8 PG — SIGNIFICANT CHANGE UP (ref 27–34)
MCHC RBC-ENTMCNC: 31.9 GM/DL — LOW (ref 32–36)
MCV RBC AUTO: 93.3 FL — SIGNIFICANT CHANGE UP (ref 80–100)
PLATELET # BLD AUTO: 397 K/UL — SIGNIFICANT CHANGE UP (ref 150–400)
POTASSIUM SERPL-MCNC: 4.4 MMOL/L — SIGNIFICANT CHANGE UP (ref 3.5–5.3)
POTASSIUM SERPL-SCNC: 4.4 MMOL/L — SIGNIFICANT CHANGE UP (ref 3.5–5.3)
RBC # BLD: 2.71 M/UL — LOW (ref 4.2–5.8)
RBC # FLD: 12.3 % — SIGNIFICANT CHANGE UP (ref 10.3–14.5)
SODIUM SERPL-SCNC: 135 MMOL/L — SIGNIFICANT CHANGE UP (ref 135–145)
WBC # BLD: 13.8 K/UL — HIGH (ref 3.8–10.5)
WBC # FLD AUTO: 13.8 K/UL — HIGH (ref 3.8–10.5)

## 2019-06-08 RX ORDER — POLYETHYLENE GLYCOL 3350 17 G/17G
17 POWDER, FOR SOLUTION ORAL EVERY 12 HOURS
Refills: 0 | Status: DISCONTINUED | OUTPATIENT
Start: 2019-06-08 | End: 2019-06-14

## 2019-06-08 RX ADMIN — OXYCODONE AND ACETAMINOPHEN 2 TABLET(S): 5; 325 TABLET ORAL at 17:54

## 2019-06-08 RX ADMIN — HEPARIN SODIUM 5000 UNIT(S): 5000 INJECTION INTRAVENOUS; SUBCUTANEOUS at 13:12

## 2019-06-08 RX ADMIN — Medication 81 MILLIGRAM(S): at 11:26

## 2019-06-08 RX ADMIN — Medication 100 MILLIGRAM(S): at 05:37

## 2019-06-08 RX ADMIN — Medication 22 UNIT(S): at 12:46

## 2019-06-08 RX ADMIN — HEPARIN SODIUM 5000 UNIT(S): 5000 INJECTION INTRAVENOUS; SUBCUTANEOUS at 05:37

## 2019-06-08 RX ADMIN — Medication 22 UNIT(S): at 08:24

## 2019-06-08 RX ADMIN — Medication 100 MILLIGRAM(S): at 13:12

## 2019-06-08 RX ADMIN — Medication 2.5 MILLIGRAM(S): at 05:37

## 2019-06-08 RX ADMIN — AMLODIPINE BESYLATE 10 MILLIGRAM(S): 2.5 TABLET ORAL at 05:37

## 2019-06-08 RX ADMIN — Medication 1: at 17:22

## 2019-06-08 RX ADMIN — Medication 250 MILLIGRAM(S): at 17:16

## 2019-06-08 RX ADMIN — Medication 2.5 MILLIGRAM(S): at 17:15

## 2019-06-08 RX ADMIN — PIPERACILLIN AND TAZOBACTAM 25 GRAM(S): 4; .5 INJECTION, POWDER, LYOPHILIZED, FOR SOLUTION INTRAVENOUS at 05:37

## 2019-06-08 RX ADMIN — Medication 100 MILLIGRAM(S): at 21:28

## 2019-06-08 RX ADMIN — PIPERACILLIN AND TAZOBACTAM 25 GRAM(S): 4; .5 INJECTION, POWDER, LYOPHILIZED, FOR SOLUTION INTRAVENOUS at 21:29

## 2019-06-08 RX ADMIN — Medication 22 UNIT(S): at 17:23

## 2019-06-08 RX ADMIN — HEPARIN SODIUM 5000 UNIT(S): 5000 INJECTION INTRAVENOUS; SUBCUTANEOUS at 21:29

## 2019-06-08 RX ADMIN — INSULIN GLARGINE 38 UNIT(S): 100 INJECTION, SOLUTION SUBCUTANEOUS at 21:29

## 2019-06-08 RX ADMIN — PIPERACILLIN AND TAZOBACTAM 25 GRAM(S): 4; .5 INJECTION, POWDER, LYOPHILIZED, FOR SOLUTION INTRAVENOUS at 13:12

## 2019-06-08 RX ADMIN — OXYCODONE AND ACETAMINOPHEN 2 TABLET(S): 5; 325 TABLET ORAL at 18:24

## 2019-06-08 RX ADMIN — POLYETHYLENE GLYCOL 3350 17 GRAM(S): 17 POWDER, FOR SOLUTION ORAL at 17:15

## 2019-06-08 RX ADMIN — SENNA PLUS 2 TABLET(S): 8.6 TABLET ORAL at 21:29

## 2019-06-08 NOTE — PROGRESS NOTE ADULT - SUBJECTIVE AND OBJECTIVE BOX
Patient is a 58y old  Male who presents with a chief complaint of right foot ulcer (08 Jun 2019 10:05)      SUBJECTIVE / OVERNIGHT EVENTS:  pt was seen and examined.  spoke with podiatry attg at bedside at length.  pt feels better. appetite has improved.      MEDICATIONS  (STANDING):  amLODIPine   Tablet 10 milliGRAM(s) Oral daily  aspirin enteric coated 81 milliGRAM(s) Oral daily  bisacodyl Suppository 10 milliGRAM(s) Rectal once  dextrose 5%. 1000 milliLiter(s) (50 mL/Hr) IV Continuous <Continuous>  dextrose 50% Injectable 12.5 Gram(s) IV Push once  dextrose 50% Injectable 25 Gram(s) IV Push once  dextrose 50% Injectable 25 Gram(s) IV Push once  dronabinol 2.5 milliGRAM(s) Oral two times a day  ergocalciferol 35818 Unit(s) Oral every week  heparin  Injectable 5000 Unit(s) SubCutaneous every 8 hours  hydrALAZINE 100 milliGRAM(s) Oral three times a day  insulin glargine Injectable (LANTUS) 38 Unit(s) SubCutaneous at bedtime  insulin lispro (HumaLOG) corrective regimen sliding scale   SubCutaneous three times a day before meals  insulin lispro (HumaLOG) corrective regimen sliding scale   SubCutaneous three times a day before meals  insulin lispro (HumaLOG) corrective regimen sliding scale   SubCutaneous at bedtime  insulin lispro Injectable (HumaLOG) 22 Unit(s) SubCutaneous three times a day before meals  ondansetron Injectable 4 milliGRAM(s) IV Push once  piperacillin/tazobactam IVPB. 3.375 Gram(s) IV Intermittent every 8 hours  polyethylene glycol 3350 17 Gram(s) Oral every 12 hours  senna 2 Tablet(s) Oral at bedtime  sodium chloride 0.9%. 1000 milliLiter(s) (75 mL/Hr) IV Continuous <Continuous>  vancomycin  IVPB 1000 milliGRAM(s) IV Intermittent every 24 hours    MEDICATIONS  (PRN):  dextrose 40% Gel 15 Gram(s) Oral once PRN Blood Glucose LESS THAN 70 milliGRAM(s)/deciliter  glucagon  Injectable 1 milliGRAM(s) IntraMuscular once PRN Glucose LESS THAN 70 milligrams/deciliter  oxyCODONE    5 mG/acetaminophen 325 mG 2 Tablet(s) Oral every 6 hours PRN Severe Pain (7 - 10)  oxyCODONE    5 mG/acetaminophen 325 mG 1 Tablet(s) Oral every 6 hours PRN Moderate Pain (4 - 6)      Vital Signs Last 24 Hrs  T(C): 37.4 (08 Jun 2019 11:00), Max: 37.6 (08 Jun 2019 01:22)  T(F): 99.4 (08 Jun 2019 11:00), Max: 99.6 (08 Jun 2019 01:22)  HR: 79 (08 Jun 2019 11:00) (75 - 84)  BP: 128/72 (08 Jun 2019 11:00) (108/65 - 168/79)  BP(mean): --  RR: 18 (08 Jun 2019 11:00) (18 - 18)  SpO2: 94% (08 Jun 2019 11:00) (92% - 94%)  CAPILLARY BLOOD GLUCOSE      POCT Blood Glucose.: 147 mg/dL (08 Jun 2019 08:22)  POCT Blood Glucose.: 146 mg/dL (07 Jun 2019 21:58)  POCT Blood Glucose.: 209 mg/dL (07 Jun 2019 18:20)  POCT Blood Glucose.: 246 mg/dL (07 Jun 2019 13:41)    I&O's Summary    07 Jun 2019 07:01  -  08 Jun 2019 07:00  --------------------------------------------------------  IN: 1840 mL / OUT: 1100 mL / NET: 740 mL        PHYSICAL EXAM:  GENERAL: NAD, well-developed  HEAD:  Atraumatic, Normocephalic  EYES: EOMI, PERRLA, conjunctiva and sclera clear  NECK: Supple, No JVD  CHEST/LUNG: Clear to auscultation bilaterally; No wheeze  HEART: Regular rate and rhythm; No murmurs, rubs, or gallops  ABDOMEN: Soft, Nontender, Nondistended; Bowel sounds present  EXTREMITIES:  2+ Peripheral Pulses, No clubbing, cyanosis, or edema, ri foot dressing c/d/i changed by podiatry  PSYCH: AAOx3  NEUROLOGY: non-focal  SKIN: No rashes or lesions    LABS:                        8.1    13.8  )-----------( 397      ( 08 Jun 2019 06:45 )             25.3     06-08    135  |  102  |  35<H>  ----------------------------<  134<H>  4.4   |  20<L>  |  2.52<H>    Ca    8.7      08 Jun 2019 06:45                RADIOLOGY & ADDITIONAL TESTS:    Imaging Personally Reviewed:    Consultant(s) Notes Reviewed:      Care Discussed with Consultants/Other Providers:

## 2019-06-08 NOTE — PROGRESS NOTE ADULT - ASSESSMENT
diabetic foot ulcer with surrounding cellulitis   Fever resolved , leukocytosis persists but improved   -cont abx  -s/p debridement per podiatry 5/27  -DM control  -local care per podiatry  -s/p debridement/revision 6/5 -   - no overt purulence, debrided down to healthy tissue.   - vascular spoke with pt regarding possible BKA.    PVD  - may need angiogram    uncontrolled Diabetes  - cont lantus 38 - novolog 22 units qac  - hgb a1c  11.9  - diabetic diet  - FS qid  - endo follow up appreciated    MARIKA   - slight improvement in cre  - follow creatinine  - nephrology following  - encouraged fluid intake    HTN   -   hold hydralazine     hyponatremia  - NO salt restriction  - no HCTZ    anorexia  - add Glucerna  - cont Marinol    depression  - seen by psych    constipation  - had bm  - miralax bid

## 2019-06-08 NOTE — PROGRESS NOTE ADULT - SUBJECTIVE AND OBJECTIVE BOX
Chief complaint  Patient is a 58y old  Male who presents with a chief complaint of right foot ulcer (08 Jun 2019 11:29)   Review of systems  Patient in bed, looks comfortable, no fever, no hypoglycemia.    Labs and Fingersticks  CAPILLARY BLOOD GLUCOSE      POCT Blood Glucose.: 156 mg/dL (08 Jun 2019 21:05)  POCT Blood Glucose.: 184 mg/dL (08 Jun 2019 17:22)  POCT Blood Glucose.: 103 mg/dL (08 Jun 2019 12:44)  POCT Blood Glucose.: 147 mg/dL (08 Jun 2019 08:22)      Anion Gap, Serum: 13 (06-08 @ 06:45)  Anion Gap, Serum: 13 (06-07 @ 06:29)      Calcium, Total Serum: 8.7 (06-08 @ 06:45)  Calcium, Total Serum: 8.8 (06-07 @ 06:29)          06-08    135  |  102  |  35<H>  ----------------------------<  134<H>  4.4   |  20<L>  |  2.52<H>    Ca    8.7      08 Jun 2019 06:45                          8.1    13.8  )-----------( 397      ( 08 Jun 2019 06:45 )             25.3     Medications  MEDICATIONS  (STANDING):  amLODIPine   Tablet 10 milliGRAM(s) Oral daily  aspirin enteric coated 81 milliGRAM(s) Oral daily  bisacodyl Suppository 10 milliGRAM(s) Rectal once  dextrose 5%. 1000 milliLiter(s) (50 mL/Hr) IV Continuous <Continuous>  dextrose 50% Injectable 12.5 Gram(s) IV Push once  dextrose 50% Injectable 25 Gram(s) IV Push once  dextrose 50% Injectable 25 Gram(s) IV Push once  dronabinol 2.5 milliGRAM(s) Oral two times a day  ergocalciferol 70678 Unit(s) Oral every week  heparin  Injectable 5000 Unit(s) SubCutaneous every 8 hours  hydrALAZINE 100 milliGRAM(s) Oral three times a day  insulin glargine Injectable (LANTUS) 38 Unit(s) SubCutaneous at bedtime  insulin lispro (HumaLOG) corrective regimen sliding scale   SubCutaneous three times a day before meals  insulin lispro (HumaLOG) corrective regimen sliding scale   SubCutaneous three times a day before meals  insulin lispro (HumaLOG) corrective regimen sliding scale   SubCutaneous at bedtime  insulin lispro Injectable (HumaLOG) 22 Unit(s) SubCutaneous three times a day before meals  ondansetron Injectable 4 milliGRAM(s) IV Push once  piperacillin/tazobactam IVPB. 3.375 Gram(s) IV Intermittent every 8 hours  polyethylene glycol 3350 17 Gram(s) Oral every 12 hours  senna 2 Tablet(s) Oral at bedtime  sodium chloride 0.9%. 1000 milliLiter(s) (75 mL/Hr) IV Continuous <Continuous>  vancomycin  IVPB 1000 milliGRAM(s) IV Intermittent every 24 hours      Physical Exam  General: Patient comfortable in bed  Vital Signs Last 12 Hrs  T(F): 98.7 (06-08-19 @ 21:14), Max: 99.7 (06-08-19 @ 14:51)  HR: 66 (06-08-19 @ 21:14) (66 - 88)  BP: 120/70 (06-08-19 @ 21:14) (120/70 - 157/78)  BP(mean): --  RR: 17 (06-08-19 @ 21:14) (17 - 18)  SpO2: 92% (06-08-19 @ 21:14) (91% - 95%)  Neck: No palpable thyroid nodules.  CVS: S1S2, No murmurs  Respiratory: No wheezing, no crepitations  GI: Abdomen soft, bowel sounds positive  Musculoskeletal:  edema lower extremities.   Skin: No skin rashes, no ecchymosis    Diagnostics    Vitamin D, 1, 25-Dihydroxy: AM Sched. Collection: 22-May-2019 06:00 (05-21 @ 18:59)  Vitamin D, 25-Hydroxy: AM Sched. Collection: 22-May-2019 06:00 (05-21 @ 18:59)

## 2019-06-08 NOTE — PROGRESS NOTE ADULT - SUBJECTIVE AND OBJECTIVE BOX
Cardiovascular Disease Progress Note  (Covering for Dr. Christine)    Overnight events: No acute events overnight. Mr. Curry says pain at the surgical site is controlled. No chest pain or SOB.    Otherwise review of systems negative    Objective Findings:  T(C): 37.4 (06-08-19 @ 05:31), Max: 37.6 (06-08-19 @ 01:22)  HR: 84 (06-08-19 @ 05:31) (75 - 90)  BP: 168/79 (06-08-19 @ 05:31) (108/65 - 168/79)  RR: 18 (06-08-19 @ 05:31) (18 - 18)  SpO2: 93% (06-08-19 @ 05:31) (92% - 94%)  Wt(kg): --  Daily     Daily       Physical Exam:  Gen: NAD  HEENT: EOMI  CV: RRR, normal S1 + S2, no m/r/g  Lungs: CTAB  Abd: soft, non-tender  Ext: No edema    Telemetry: n/a    Laboratory Data:                        8.1    13.8  )-----------( 397      ( 08 Jun 2019 06:45 )             25.3     06-08    135  |  102  |  35<H>  ----------------------------<  134<H>  4.4   |  20<L>  |  2.52<H>    Ca    8.7      08 Jun 2019 06:45    TPro  6.6  /  Alb  2.4<L>  /  TBili  0.6  /  DBili  x   /  AST  23  /  ALT  31  /  AlkPhos  118  06-06              Inpatient Medications:  MEDICATIONS  (STANDING):  amLODIPine   Tablet 10 milliGRAM(s) Oral daily  aspirin enteric coated 81 milliGRAM(s) Oral daily  bisacodyl Suppository 10 milliGRAM(s) Rectal once  dextrose 5%. 1000 milliLiter(s) (50 mL/Hr) IV Continuous <Continuous>  dextrose 50% Injectable 12.5 Gram(s) IV Push once  dextrose 50% Injectable 25 Gram(s) IV Push once  dextrose 50% Injectable 25 Gram(s) IV Push once  dronabinol 2.5 milliGRAM(s) Oral two times a day  ergocalciferol 59483 Unit(s) Oral every week  heparin  Injectable 5000 Unit(s) SubCutaneous every 8 hours  hydrALAZINE 100 milliGRAM(s) Oral three times a day  insulin glargine Injectable (LANTUS) 38 Unit(s) SubCutaneous at bedtime  insulin lispro (HumaLOG) corrective regimen sliding scale   SubCutaneous three times a day before meals  insulin lispro (HumaLOG) corrective regimen sliding scale   SubCutaneous three times a day before meals  insulin lispro (HumaLOG) corrective regimen sliding scale   SubCutaneous at bedtime  insulin lispro Injectable (HumaLOG) 22 Unit(s) SubCutaneous three times a day before meals  lactulose Syrup 20 Gram(s) Oral every 4 hours  ondansetron Injectable 4 milliGRAM(s) IV Push once  piperacillin/tazobactam IVPB. 3.375 Gram(s) IV Intermittent every 8 hours  polyethylene glycol 3350 17 Gram(s) Oral daily  senna 2 Tablet(s) Oral at bedtime  sodium chloride 0.9%. 1000 milliLiter(s) (75 mL/Hr) IV Continuous <Continuous>  vancomycin  IVPB 1000 milliGRAM(s) IV Intermittent every 24 hours      Assessment: 59 yo male admitted with foot ulcer now s/p Incision and drainage, foot, bursa. Found to be persistently hypertensive      Problem/Plan - 1:  ·  Problem: Cellulitis of foot.  Plan: s/p I&D  s/p debridement on 6/5  Pain control   Monitor WBC  IV abx   Wound care   ID following   keep legs elevated.      Problem/Plan - 2:  ·  Problem: Venous insufficiency of both lower extremities.  Plan: Outpatient follow up.      Problem/Plan - 3:  ·  Problem: Hypertension.  Plan: Now much better controlled   Norvasc 10 mg daily   Hydralazine 100 mg TID   pain control  Low salt diet.     #Uncontrolled IDDM-  Basal bolus insulting as per primary team.     Over 25 minutes spent on total encounter; more than 50% of the visit was spent counseling and/or coordinating care by the attending physician.      Dane Gage MD [facility]  Cardiovascular Disease  [phone]

## 2019-06-08 NOTE — PROVIDER CONTACT NOTE (CRITICAL VALUE NOTIFICATION) - ACTION/TREATMENT ORDERED:
will review antibiotics
Maxi Shankar MD notified. Hold heparin dose for 1 hour. Decrease dose from 13ml/hr to 11ml/hr.
made team aware will continue to monitor

## 2019-06-08 NOTE — PROGRESS NOTE ADULT - SUBJECTIVE AND OBJECTIVE BOX
Patient is a 58y old  Male extensive revisional debridement R plantar foot infection.       INTERVAL HPI/OVERNIGHT EVENTS:  Patient seen and evaluated at bedside.  Pt is resting comfortable in NAD. Denies N/V/F/C.  Pain rated at 0/10    Allergies    No Known Allergies    Intolerances        Vital Signs Last 24 Hrs  T(C): 37.4 (08 Jun 2019 05:31), Max: 37.6 (08 Jun 2019 01:22)  T(F): 99.3 (08 Jun 2019 05:31), Max: 99.6 (08 Jun 2019 01:22)  HR: 84 (08 Jun 2019 05:31) (75 - 84)  BP: 168/79 (08 Jun 2019 05:31) (108/65 - 168/79)  BP(mean): --  RR: 18 (08 Jun 2019 05:31) (18 - 18)  SpO2: 93% (08 Jun 2019 05:31) (92% - 94%)    LABS:                        8.1    13.8  )-----------( 397      ( 08 Jun 2019 06:45 )             25.3     06-08    135  |  102  |  35<H>  ----------------------------<  134<H>  4.4   |  20<L>  |  2.52<H>    Ca    8.7      08 Jun 2019 06:45          CAPILLARY BLOOD GLUCOSE      POCT Blood Glucose.: 147 mg/dL (08 Jun 2019 08:22)  POCT Blood Glucose.: 146 mg/dL (07 Jun 2019 21:58)  POCT Blood Glucose.: 209 mg/dL (07 Jun 2019 18:20)  POCT Blood Glucose.: 246 mg/dL (07 Jun 2019 13:41)      Lower Extremity Physical Exam:  Skin: R foot  Wound #1:  Location: Vac sponge intact & compressed 125 mm hg cont therapy, minimal serosanguineous fluid in vac container  Visible plantar skin flaps are viable   RADIOLOGY & ADDITIONAL TESTS:  < from: NM Inflammatory Loc Whole body, WBC (06.05.19 @ 15:24) >    IMPRESSION: Abnormal Tc 99m labeled leukocyte scan.    Increased labeled WBC activity in the soft tissue of right plantar   region, may represent postsurgical change versus soft tissue infection.    No scan evidence of osteomyelitis.      < end of copied text >

## 2019-06-08 NOTE — PROGRESS NOTE ADULT - ASSESSMENT
Dx: 1. s/p debridement R plantar foot infection w/ extensive open wound on Vac Tx 125 mm hg continuous therapy        2. DM w/ Profound Neuropathy & Microvascular Dz    Plan. 1. Exam          2. Will re evaluate R plantar foot wound Monday          3. Continue IV Abx as per ID          4. Re bandaged R foot w/ well padded DSD          5. Continue to monitor WBC trend

## 2019-06-09 LAB
ANION GAP SERPL CALC-SCNC: 11 MMOL/L — SIGNIFICANT CHANGE UP (ref 5–17)
BUN SERPL-MCNC: 32 MG/DL — HIGH (ref 7–23)
CALCIUM SERPL-MCNC: 8.9 MG/DL — SIGNIFICANT CHANGE UP (ref 8.4–10.5)
CHLORIDE SERPL-SCNC: 105 MMOL/L — SIGNIFICANT CHANGE UP (ref 96–108)
CO2 SERPL-SCNC: 19 MMOL/L — LOW (ref 22–31)
CREAT SERPL-MCNC: 2.33 MG/DL — HIGH (ref 0.5–1.3)
D DIMER BLD IA.RAPID-MCNC: 1665 NG/ML DDU — HIGH
GLUCOSE BLDC GLUCOMTR-MCNC: 146 MG/DL — HIGH (ref 70–99)
GLUCOSE BLDC GLUCOMTR-MCNC: 172 MG/DL — HIGH (ref 70–99)
GLUCOSE BLDC GLUCOMTR-MCNC: 181 MG/DL — HIGH (ref 70–99)
GLUCOSE BLDC GLUCOMTR-MCNC: 199 MG/DL — HIGH (ref 70–99)
GLUCOSE SERPL-MCNC: 169 MG/DL — HIGH (ref 70–99)
HCT VFR BLD CALC: 24.1 % — LOW (ref 39–50)
HGB BLD-MCNC: 8.1 G/DL — LOW (ref 13–17)
MCHC RBC-ENTMCNC: 31.3 PG — SIGNIFICANT CHANGE UP (ref 27–34)
MCHC RBC-ENTMCNC: 33.4 GM/DL — SIGNIFICANT CHANGE UP (ref 32–36)
MCV RBC AUTO: 93.8 FL — SIGNIFICANT CHANGE UP (ref 80–100)
PLATELET # BLD AUTO: 362 K/UL — SIGNIFICANT CHANGE UP (ref 150–400)
POTASSIUM SERPL-MCNC: 4.7 MMOL/L — SIGNIFICANT CHANGE UP (ref 3.5–5.3)
POTASSIUM SERPL-SCNC: 4.7 MMOL/L — SIGNIFICANT CHANGE UP (ref 3.5–5.3)
RBC # BLD: 2.57 M/UL — LOW (ref 4.2–5.8)
RBC # FLD: 12.5 % — SIGNIFICANT CHANGE UP (ref 10.3–14.5)
SODIUM SERPL-SCNC: 135 MMOL/L — SIGNIFICANT CHANGE UP (ref 135–145)
TROPONIN T, HIGH SENSITIVITY RESULT: 43 NG/L — SIGNIFICANT CHANGE UP (ref 0–51)
WBC # BLD: 10.2 K/UL — SIGNIFICANT CHANGE UP (ref 3.8–10.5)
WBC # FLD AUTO: 10.2 K/UL — SIGNIFICANT CHANGE UP (ref 3.8–10.5)

## 2019-06-09 PROCEDURE — 99233 SBSQ HOSP IP/OBS HIGH 50: CPT

## 2019-06-09 PROCEDURE — 71045 X-RAY EXAM CHEST 1 VIEW: CPT | Mod: 26

## 2019-06-09 PROCEDURE — 93010 ELECTROCARDIOGRAM REPORT: CPT

## 2019-06-09 PROCEDURE — 93970 EXTREMITY STUDY: CPT | Mod: 26

## 2019-06-09 RX ORDER — FUROSEMIDE 40 MG
40 TABLET ORAL ONCE
Refills: 0 | Status: COMPLETED | OUTPATIENT
Start: 2019-06-09 | End: 2019-06-09

## 2019-06-09 RX ADMIN — POLYETHYLENE GLYCOL 3350 17 GRAM(S): 17 POWDER, FOR SOLUTION ORAL at 06:01

## 2019-06-09 RX ADMIN — POLYETHYLENE GLYCOL 3350 17 GRAM(S): 17 POWDER, FOR SOLUTION ORAL at 17:21

## 2019-06-09 RX ADMIN — Medication 1: at 09:34

## 2019-06-09 RX ADMIN — PIPERACILLIN AND TAZOBACTAM 25 GRAM(S): 4; .5 INJECTION, POWDER, LYOPHILIZED, FOR SOLUTION INTRAVENOUS at 21:31

## 2019-06-09 RX ADMIN — Medication 100 MILLIGRAM(S): at 13:57

## 2019-06-09 RX ADMIN — HEPARIN SODIUM 5000 UNIT(S): 5000 INJECTION INTRAVENOUS; SUBCUTANEOUS at 06:01

## 2019-06-09 RX ADMIN — Medication 100 MILLIGRAM(S): at 06:01

## 2019-06-09 RX ADMIN — Medication 22 UNIT(S): at 09:34

## 2019-06-09 RX ADMIN — INSULIN GLARGINE 38 UNIT(S): 100 INJECTION, SOLUTION SUBCUTANEOUS at 21:31

## 2019-06-09 RX ADMIN — PIPERACILLIN AND TAZOBACTAM 25 GRAM(S): 4; .5 INJECTION, POWDER, LYOPHILIZED, FOR SOLUTION INTRAVENOUS at 13:57

## 2019-06-09 RX ADMIN — Medication 81 MILLIGRAM(S): at 10:51

## 2019-06-09 RX ADMIN — OXYCODONE AND ACETAMINOPHEN 2 TABLET(S): 5; 325 TABLET ORAL at 15:46

## 2019-06-09 RX ADMIN — PIPERACILLIN AND TAZOBACTAM 25 GRAM(S): 4; .5 INJECTION, POWDER, LYOPHILIZED, FOR SOLUTION INTRAVENOUS at 06:01

## 2019-06-09 RX ADMIN — AMLODIPINE BESYLATE 10 MILLIGRAM(S): 2.5 TABLET ORAL at 06:01

## 2019-06-09 RX ADMIN — Medication 100 MILLIGRAM(S): at 21:31

## 2019-06-09 RX ADMIN — HEPARIN SODIUM 5000 UNIT(S): 5000 INJECTION INTRAVENOUS; SUBCUTANEOUS at 13:57

## 2019-06-09 RX ADMIN — HEPARIN SODIUM 5000 UNIT(S): 5000 INJECTION INTRAVENOUS; SUBCUTANEOUS at 21:31

## 2019-06-09 RX ADMIN — Medication 1: at 18:30

## 2019-06-09 RX ADMIN — SENNA PLUS 2 TABLET(S): 8.6 TABLET ORAL at 21:31

## 2019-06-09 RX ADMIN — Medication 2.5 MILLIGRAM(S): at 17:21

## 2019-06-09 RX ADMIN — OXYCODONE AND ACETAMINOPHEN 2 TABLET(S): 5; 325 TABLET ORAL at 15:17

## 2019-06-09 RX ADMIN — Medication 2.5 MILLIGRAM(S): at 06:01

## 2019-06-09 RX ADMIN — Medication 40 MILLIGRAM(S): at 10:50

## 2019-06-09 RX ADMIN — Medication 22 UNIT(S): at 12:44

## 2019-06-09 RX ADMIN — Medication 22 UNIT(S): at 18:30

## 2019-06-09 NOTE — PROGRESS NOTE ADULT - ASSESSMENT
58 M with IDDM, neuropathy presenting with diabetic foor ulcer.   Failed outpatient augmentin.  Has had subjective fevers, and chills, nausea, decreased PO intake and uncontrollable sugars.   Here he is afebrile, leukocytosis 14k.  xray showing edema but not OM  Seen by podiatry who did a bedside debridement. No probe to bone.   Received one dose of vanco and zosyn in ED    Overall, 58M with diabetic foot ulcer with surrounding cellulitis            Problem/Plan - 1:  ·  Problem: Diabetic foot ulcer.  Plan: -cont abx  -initial MRI foot noted - no OM  -s/p debridement per podiatry 5/27  -DM control  -local care per podiatry  -s/p debridement/revision 6/5 - d/w Dr Issa regarding intraop findings - no overt purulence, debrided down to healthy tissue.      Problem/Plan - 2:  ·  Problem: Cellulitis of foot.  Plan: -cont abx  -cont zosyn/  D/C vancomycin  check ECG and consider fungal coverage with an azole if Qtc ok ( not urgent to do now)- WBC is improving -      Problem/Plan - 3:  ·  Problem: Leukocytosis.  Plan: -trend cbc  -from infection  -WBC scan negative for OM  -Podiatry strongly feels we need to r/o alternate source outside the foot  -if WBC persists or worse, check CT chest/abd/pelvis w/o contrast for other source.   WBC improving     Problem/Plan - 4:  ·  Problem: Fever due to infection.  Plan: -better  -trend temps  -from foot infection  -now s/p debridement 5/27.      Problem/Plan - 5:  ·  Problem: Abnormal MRI.  Plan: -concern for possible early OM  -I think given extent of infection and MRI findings, would favor an aggressive approach with picc + 6 weeks iv abx.      Problem/Plan - 6:  Problem: Long term (current) use of antibiotics. Plan: -potential side effects of abx explained including GI, Cdiff, allergy issues, development of resistance, etc.  -picc  -potential side effects of PICC explained including bleeding and infection.    PROBLEM 7: SOB  called PA  O2 sat is 91%  check ECG, check CXR  check dopplers  no pleuritic component  ? V/Q scan

## 2019-06-09 NOTE — PROGRESS NOTE ADULT - SUBJECTIVE AND OBJECTIVE BOX
Patient is a 58y old  Male who presents with a chief complaint of right foot ulcer (09 Jun 2019 09:54)      SUBJECTIVE / OVERNIGHT EVENTS: has been c/o sob since last night.  no chest pain, no cough    MEDICATIONS  (STANDING):  amLODIPine   Tablet 10 milliGRAM(s) Oral daily  aspirin enteric coated 81 milliGRAM(s) Oral daily  bisacodyl Suppository 10 milliGRAM(s) Rectal once  dextrose 5%. 1000 milliLiter(s) (50 mL/Hr) IV Continuous <Continuous>  dextrose 50% Injectable 12.5 Gram(s) IV Push once  dextrose 50% Injectable 25 Gram(s) IV Push once  dextrose 50% Injectable 25 Gram(s) IV Push once  dronabinol 2.5 milliGRAM(s) Oral two times a day  ergocalciferol 57515 Unit(s) Oral every week  heparin  Injectable 5000 Unit(s) SubCutaneous every 8 hours  hydrALAZINE 100 milliGRAM(s) Oral three times a day  insulin glargine Injectable (LANTUS) 38 Unit(s) SubCutaneous at bedtime  insulin lispro (HumaLOG) corrective regimen sliding scale   SubCutaneous three times a day before meals  insulin lispro (HumaLOG) corrective regimen sliding scale   SubCutaneous three times a day before meals  insulin lispro (HumaLOG) corrective regimen sliding scale   SubCutaneous at bedtime  insulin lispro Injectable (HumaLOG) 22 Unit(s) SubCutaneous three times a day before meals  ondansetron Injectable 4 milliGRAM(s) IV Push once  piperacillin/tazobactam IVPB. 3.375 Gram(s) IV Intermittent every 8 hours  polyethylene glycol 3350 17 Gram(s) Oral every 12 hours  senna 2 Tablet(s) Oral at bedtime    MEDICATIONS  (PRN):  dextrose 40% Gel 15 Gram(s) Oral once PRN Blood Glucose LESS THAN 70 milliGRAM(s)/deciliter  glucagon  Injectable 1 milliGRAM(s) IntraMuscular once PRN Glucose LESS THAN 70 milligrams/deciliter  oxyCODONE    5 mG/acetaminophen 325 mG 2 Tablet(s) Oral every 6 hours PRN Severe Pain (7 - 10)  oxyCODONE    5 mG/acetaminophen 325 mG 1 Tablet(s) Oral every 6 hours PRN Moderate Pain (4 - 6)      Vital Signs Last 24 Hrs  T(C): 36.7 (09 Jun 2019 10:15), Max: 37.6 (08 Jun 2019 14:51)  T(F): 98 (09 Jun 2019 10:15), Max: 99.7 (08 Jun 2019 14:51)  HR: 82 (09 Jun 2019 10:15) (66 - 88)  BP: 137/77 (09 Jun 2019 10:15) (120/70 - 168/79)  BP(mean): --  RR: 17 (09 Jun 2019 10:15) (17 - 18)  SpO2: 95% (09 Jun 2019 10:15) (91% - 95%)  CAPILLARY BLOOD GLUCOSE      POCT Blood Glucose.: 181 mg/dL (09 Jun 2019 09:31)  POCT Blood Glucose.: 156 mg/dL (08 Jun 2019 21:05)  POCT Blood Glucose.: 184 mg/dL (08 Jun 2019 17:22)  POCT Blood Glucose.: 103 mg/dL (08 Jun 2019 12:44)    I&O's Summary    08 Jun 2019 07:01  -  09 Jun 2019 07:00  --------------------------------------------------------  IN: 3110 mL / OUT: 1775 mL / NET: 1335 mL    09 Jun 2019 07:01  -  09 Jun 2019 12:19  --------------------------------------------------------  IN: 240 mL / OUT: 400 mL / NET: -160 mL        PHYSICAL EXAM:  GENERAL: NAD, well-developed  HEAD:  Atraumatic, Normocephalic  EYES: EOMI, PERRLA, conjunctiva and sclera clear  NECK: Supple, No JVD  CHEST/LUNG: Clear to auscultation bilaterally; No wheeze  HEART: Regular rate and rhythm; No murmurs, rubs, or gallops  ABDOMEN: Soft, Nontender, Nondistended; Bowel sounds present  EXTREMITIES:  2+ Peripheral Pulses, No clubbing, cyanosis, 3 plus edema  PSYCH: AAOx3  NEUROLOGY: non-focal  SKIN: No rashes or lesions    LABS:                        8.1    10.2  )-----------( 362      ( 09 Jun 2019 06:19 )             24.1     06-09    135  |  105  |  32<H>  ----------------------------<  169<H>  4.7   |  19<L>  |  2.33<H>    Ca    8.9      09 Jun 2019 06:19                RADIOLOGY & ADDITIONAL TESTS:    Imaging Personally Reviewed:    Consultant(s) Notes Reviewed:      Care Discussed with Consultants/Other Providers:

## 2019-06-09 NOTE — CHART NOTE - NSCHARTNOTEFT_GEN_A_CORE
PA Medicine Event Note    Patient complaining of SOB since ON. Denies CP, palpitations, dizziness, HA, and pain, back pain.  VSS, O2 sat 91-92% RA    PHYSICAL EXAM:  GENERAL: Laying down, in no acute distress  PSYCH: AAOx3  HEAD:  Atraumatic, Normocephalic  EYES: EOMI, PERRLA, conjunctiva and sclera clear  NECK: Supple, No JVD  CHEST/LUNG: Breath sounds clear to auscultation bilaterally.  No wheezes, rales, rhonchi or crackles  HEART: +S1/S2.  Regular rate and rhythm.  ABDOMEN: Bowel sounds present in all 4 quadrants.  Soft, Nontender, Nondistended  EXTREMITIES:   NEUROLOGY: Cranial nerves 2-12 grossly intact  SKIN: No rashes or lesions +b/l edema    Assessment:  57 yo male admitted with foot ulcer now s/p Incision and drainage, foot, bursa. now with sob.    Plan- CXR, EKG ordered. Pt placed on 2 L NC, sating 95%    Dr. Davie easley, and examined patient at bedside and recommended:  -IVF discontinued  -lasix IV 40 x1  -EKG reviewed with attending  -order d dimer  -order b/l le doppler  -hold off ordering vq scan  -continue to monitor    Rosy Brewster PA-C  Dept of Medicine  #[id number]

## 2019-06-09 NOTE — PROGRESS NOTE ADULT - ASSESSMENT
dyspnea  - may be secondary to fluid overload  - chest x ray  - lasix 40 mg ivp x 1  - check lower ext doppler  - check troponin  - EKG without any acute changes  - d dimer  - will consider v/q scan    diabetic foot ulcer with surrounding cellulitis   Fever resolved , leukocytosis persists but improved   -cont abx  -s/p debridement per podiatry 5/27  -DM control  -local care per podiatry  -s/p debridement/revision 6/5 -   - no overt purulence, debrided down to healthy tissue.   - vascular spoke with pt regarding possible BKA.    PVD  - may need angiogram    uncontrolled Diabetes  - cont lantus 38 - novolog 22 units qac  - hgb a1c  11.9  - diabetic diet  - FS qid  - endo follow up appreciated    MARIKA   - slight improvement in cre  - follow creatinine  - nephrology following  - encouraged fluid intake    HTN   -   hold hydralazine     hyponatremia  - NO salt restriction  - no HCTZ    anorexia  - add Glucerna  - cont Marinol    depression  - seen by psych    constipation  - had bm  - miralax bid

## 2019-06-09 NOTE — PROGRESS NOTE ADULT - SUBJECTIVE AND OBJECTIVE BOX
Cardiovascular Disease Progress Note    Overnight events: No acute events overnight. Mr. Curry denies chest pain or SOB.     Otherwise review of systems negative    Objective Findings:  T(C): 36.7 (06-09-19 @ 05:06), Max: 37.6 (06-08-19 @ 14:51)  HR: 81 (06-09-19 @ 05:06) (66 - 88)  BP: 168/79 (06-09-19 @ 05:06) (120/70 - 168/79)  RR: 18 (06-09-19 @ 05:06) (17 - 18)  SpO2: 94% (06-09-19 @ 05:06) (91% - 95%)  Wt(kg): --  Daily     Daily       Physical Exam:  Gen: NAD  HEENT: EOMI  CV: RRR, normal S1 + S2, no m/r/g  Lungs: CTAB  Abd: soft, non-tender  Ext: No edema    Telemetry: n/a    Laboratory Data:                        8.1    10.2  )-----------( 362      ( 09 Jun 2019 06:19 )             24.1     06-09    135  |  105  |  32<H>  ----------------------------<  169<H>  4.7   |  19<L>  |  2.33<H>    Ca    8.9      09 Jun 2019 06:19                Inpatient Medications:  MEDICATIONS  (STANDING):  amLODIPine   Tablet 10 milliGRAM(s) Oral daily  aspirin enteric coated 81 milliGRAM(s) Oral daily  bisacodyl Suppository 10 milliGRAM(s) Rectal once  dextrose 5%. 1000 milliLiter(s) (50 mL/Hr) IV Continuous <Continuous>  dextrose 50% Injectable 12.5 Gram(s) IV Push once  dextrose 50% Injectable 25 Gram(s) IV Push once  dextrose 50% Injectable 25 Gram(s) IV Push once  dronabinol 2.5 milliGRAM(s) Oral two times a day  ergocalciferol 91133 Unit(s) Oral every week  heparin  Injectable 5000 Unit(s) SubCutaneous every 8 hours  hydrALAZINE 100 milliGRAM(s) Oral three times a day  insulin glargine Injectable (LANTUS) 38 Unit(s) SubCutaneous at bedtime  insulin lispro (HumaLOG) corrective regimen sliding scale   SubCutaneous three times a day before meals  insulin lispro (HumaLOG) corrective regimen sliding scale   SubCutaneous three times a day before meals  insulin lispro (HumaLOG) corrective regimen sliding scale   SubCutaneous at bedtime  insulin lispro Injectable (HumaLOG) 22 Unit(s) SubCutaneous three times a day before meals  ondansetron Injectable 4 milliGRAM(s) IV Push once  piperacillin/tazobactam IVPB. 3.375 Gram(s) IV Intermittent every 8 hours  polyethylene glycol 3350 17 Gram(s) Oral every 12 hours  senna 2 Tablet(s) Oral at bedtime  sodium chloride 0.9%. 1000 milliLiter(s) (75 mL/Hr) IV Continuous <Continuous>  vancomycin  IVPB 1000 milliGRAM(s) IV Intermittent every 24 hours      Assessment: 59 yo male admitted with foot ulcer now s/p Incision and drainage, foot, bursa. Found to be persistently hypertensive      Problem/Plan - 1:  ·  Problem: Cellulitis of foot.  Plan: s/p I&D  s/p debridement on 6/5  Pain control   Monitor WBC  IV abx   Wound care   ID following   keep legs elevated.      Problem/Plan - 2:  ·  Problem: Venous insufficiency of both lower extremities.  Plan: Outpatient follow up.      Problem/Plan - 3:  ·  Problem: Hypertension.    Plan: BP labile.  Continue to monitor on current regimen.   Low salt diet.     #Uncontrolled IDDM-  Basal bolus insulin as per primary team.     Over 25 minutes spent on total encounter; more than 50% of the visit was spent counseling and/or coordinating care by the attending physician.      Dane Gage MD [facility]  Cardiovascular Disease  [phone]

## 2019-06-09 NOTE — PROGRESS NOTE ADULT - ASSESSMENT
Assessment  DMT2: 58y Male with DM T2 with hyperglycemia, on large dose basal bolus insulin, blood sugars improving, no hypoglycemic episode, eating full meals, compliant with low carb diet.  Foot wound: s/p surgery, on medications, no chest pain, stable, monitored.  HTN: Controlled,  on antihypertensive medications.  Overweight/Obesity: No strict exercise routines, not on any weight loss program, neither on low calorie diet.    Andrzej Zhu MD  Cell: [phone]  Office: [phone]

## 2019-06-09 NOTE — PROGRESS NOTE ADULT - SUBJECTIVE AND OBJECTIVE BOX
Patient is a 58y old  Male who presents with a chief complaint of right foot ulcer (09 Jun 2019 07:57)    Being followed by ID for help in management        Interval history:  pt resting quietly  notes some SOB, poor appetite  denies chest pain or cough  No other acute events        PAST MEDICAL & SURGICAL HISTORY:  Hypertension  Insulin dependent diabetes mellitus  Venous insufficiency of both lower extremities: R &gt; L  HTN (hypertension)  BPH (benign prostatic hypertrophy)  Diabetes  History of cholecystectomy  S/P laparoscopic cholecystectomy  Status post incision and drainage: Rt groin abscess    Allergies    No Known Allergies    Intolerances      Antimicrobials:    piperacillin/tazobactam IVPB. 3.375 Gram(s) IV Intermittent every 8 hours  vancomycin  IVPB 1000 milliGRAM(s) IV Intermittent every 24 hours    MEDICATIONS  (STANDING):  amLODIPine   Tablet 10 milliGRAM(s) Oral daily  aspirin enteric coated 81 milliGRAM(s) Oral daily  bisacodyl Suppository 10 milliGRAM(s) Rectal once  dextrose 5%. 1000 milliLiter(s) (50 mL/Hr) IV Continuous <Continuous>  dextrose 50% Injectable 12.5 Gram(s) IV Push once  dextrose 50% Injectable 25 Gram(s) IV Push once  dextrose 50% Injectable 25 Gram(s) IV Push once  dronabinol 2.5 milliGRAM(s) Oral two times a day  ergocalciferol 48590 Unit(s) Oral every week  heparin  Injectable 5000 Unit(s) SubCutaneous every 8 hours  hydrALAZINE 100 milliGRAM(s) Oral three times a day  insulin glargine Injectable (LANTUS) 38 Unit(s) SubCutaneous at bedtime  insulin lispro (HumaLOG) corrective regimen sliding scale   SubCutaneous three times a day before meals  insulin lispro (HumaLOG) corrective regimen sliding scale   SubCutaneous three times a day before meals  insulin lispro (HumaLOG) corrective regimen sliding scale   SubCutaneous at bedtime  insulin lispro Injectable (HumaLOG) 22 Unit(s) SubCutaneous three times a day before meals  ondansetron Injectable 4 milliGRAM(s) IV Push once  piperacillin/tazobactam IVPB. 3.375 Gram(s) IV Intermittent every 8 hours  polyethylene glycol 3350 17 Gram(s) Oral every 12 hours  senna 2 Tablet(s) Oral at bedtime  sodium chloride 0.9%. 1000 milliLiter(s) (75 mL/Hr) IV Continuous <Continuous>  vancomycin  IVPB 1000 milliGRAM(s) IV Intermittent every 24 hours      Vital Signs Last 24 Hrs  T(C): 36.7 (06-09-19 @ 05:06), Max: 37.6 (06-08-19 @ 14:51)  T(F): 98 (06-09-19 @ 05:06), Max: 99.7 (06-08-19 @ 14:51)  HR: 81 (06-09-19 @ 05:06) (66 - 88)  BP: 168/79 (06-09-19 @ 05:06) (120/70 - 168/79)  BP(mean): --  RR: 18 (06-09-19 @ 05:06) (17 - 18)  SpO2: 94% (06-09-19 @ 05:06) (91% - 95%)    Physical Exam:    Constitutional  pt resting closes eyes during part of interview uncomfortable but denies pain    HEENT PERRLA EOMI,No pallor or icterus    No oral exudate or erythema    Neck supple no JVD or LN    Chest Good AE,CTA    CVS  S1S2    Abd soft BS normal No tenderness    Ext  right foot with VAC, left foot with  edema    IV site no erythema tenderness or discharge    Joints no swelling or LOM    CNS AAO X 3 no focal    Lab Data:                          8.1    10.2  )-----------( 362      ( 09 Jun 2019 06:19 )             24.1       06-09    135  |  105  |  32<H>  ----------------------------<  169<H>  4.7   |  19<L>  |  2.33<H>    Ca    8.9      09 Jun 2019 06:19        .Tissue  06-05-19   Numerous Escherichia coli  Few [place] parapsilosis "Susceptibilities not performed"  --  Escherichia coli        WBC Count: 10.2 (06-09-19 @ 06:19)  WBC Count: 13.8 (06-08-19 @ 06:45)  WBC Count: 16.9 (06-07-19 @ 06:29)  WBC Count: 18.4 (06-06-19 @ 08:25)  WBC Count: 18.2 (06-05-19 @ 07:11)  WBC Count: 17.0 (06-04-19 @ 09:18)  WBC Count: 14.4 (06-03-19 @ 07:15)      < from: Xray Chest 1 View- PORTABLE-Urgent (05.25.19 @ 06:30) >  IMPRESSION: The heart and lungs are normal. The bones are intact.    < end of copied text >

## 2019-06-09 NOTE — PROGRESS NOTE ADULT - SUBJECTIVE AND OBJECTIVE BOX
Chief complaint  Patient is a 58y old  Male who presents with a chief complaint of right foot ulcer (09 Jun 2019 12:19)   Review of systems  Patient in bed, looks comfortable, no fever, no hypoglycemia.    Labs and Fingersticks  CAPILLARY BLOOD GLUCOSE      POCT Blood Glucose.: 146 mg/dL (09 Jun 2019 12:43)  POCT Blood Glucose.: 181 mg/dL (09 Jun 2019 09:31)  POCT Blood Glucose.: 156 mg/dL (08 Jun 2019 21:05)  POCT Blood Glucose.: 184 mg/dL (08 Jun 2019 17:22)      Anion Gap, Serum: 11 (06-09 @ 06:19)  Anion Gap, Serum: 13 (06-08 @ 06:45)      Calcium, Total Serum: 8.9 (06-09 @ 06:19)  Calcium, Total Serum: 8.7 (06-08 @ 06:45)          06-09    135  |  105  |  32<H>  ----------------------------<  169<H>  4.7   |  19<L>  |  2.33<H>    Ca    8.9      09 Jun 2019 06:19                          8.1    10.2  )-----------( 362      ( 09 Jun 2019 06:19 )             24.1     Medications  MEDICATIONS  (STANDING):  amLODIPine   Tablet 10 milliGRAM(s) Oral daily  aspirin enteric coated 81 milliGRAM(s) Oral daily  bisacodyl Suppository 10 milliGRAM(s) Rectal once  dextrose 5%. 1000 milliLiter(s) (50 mL/Hr) IV Continuous <Continuous>  dextrose 50% Injectable 12.5 Gram(s) IV Push once  dextrose 50% Injectable 25 Gram(s) IV Push once  dextrose 50% Injectable 25 Gram(s) IV Push once  dronabinol 2.5 milliGRAM(s) Oral two times a day  ergocalciferol 24912 Unit(s) Oral every week  heparin  Injectable 5000 Unit(s) SubCutaneous every 8 hours  hydrALAZINE 100 milliGRAM(s) Oral three times a day  insulin glargine Injectable (LANTUS) 38 Unit(s) SubCutaneous at bedtime  insulin lispro (HumaLOG) corrective regimen sliding scale   SubCutaneous three times a day before meals  insulin lispro (HumaLOG) corrective regimen sliding scale   SubCutaneous three times a day before meals  insulin lispro (HumaLOG) corrective regimen sliding scale   SubCutaneous at bedtime  insulin lispro Injectable (HumaLOG) 22 Unit(s) SubCutaneous three times a day before meals  ondansetron Injectable 4 milliGRAM(s) IV Push once  piperacillin/tazobactam IVPB. 3.375 Gram(s) IV Intermittent every 8 hours  polyethylene glycol 3350 17 Gram(s) Oral every 12 hours  senna 2 Tablet(s) Oral at bedtime      Physical Exam  General: Patient comfortable in bed  Vital Signs Last 12 Hrs  T(F): 99.1 (06-09-19 @ 13:46), Max: 99.1 (06-09-19 @ 13:46)  HR: 80 (06-09-19 @ 13:46) (80 - 82)  BP: 136/75 (06-09-19 @ 13:46) (136/75 - 168/79)  BP(mean): --  RR: 18 (06-09-19 @ 13:46) (17 - 18)  SpO2: 94% (06-09-19 @ 13:46) (94% - 95%)  Neck: No palpable thyroid nodules.  CVS: S1S2, No murmurs  Respiratory: No wheezing, no crepitations  GI: Abdomen soft, bowel sounds positive  Musculoskeletal:  edema lower extremities.   Skin: No skin rashes, no ecchymosis    Diagnostics    Vitamin D, 1, 25-Dihydroxy: AM Sched. Collection: 22-May-2019 06:00 (05-21 @ 18:59)  Vitamin D, 25-Hydroxy: AM Sched. Collection: 22-May-2019 06:00 (05-21 @ 18:59)

## 2019-06-10 DIAGNOSIS — N17.9 ACUTE KIDNEY FAILURE, UNSPECIFIED: ICD-10-CM

## 2019-06-10 LAB
ANION GAP SERPL CALC-SCNC: 11 MMOL/L — SIGNIFICANT CHANGE UP (ref 5–17)
BUN SERPL-MCNC: 31 MG/DL — HIGH (ref 7–23)
CALCIUM SERPL-MCNC: 9.1 MG/DL — SIGNIFICANT CHANGE UP (ref 8.4–10.5)
CHLORIDE SERPL-SCNC: 103 MMOL/L — SIGNIFICANT CHANGE UP (ref 96–108)
CO2 SERPL-SCNC: 18 MMOL/L — LOW (ref 22–31)
CREAT SERPL-MCNC: 2.33 MG/DL — HIGH (ref 0.5–1.3)
CULTURE RESULTS: SIGNIFICANT CHANGE UP
GLUCOSE BLDC GLUCOMTR-MCNC: 144 MG/DL — HIGH (ref 70–99)
GLUCOSE BLDC GLUCOMTR-MCNC: 191 MG/DL — HIGH (ref 70–99)
GLUCOSE BLDC GLUCOMTR-MCNC: 277 MG/DL — HIGH (ref 70–99)
GLUCOSE BLDC GLUCOMTR-MCNC: 82 MG/DL — SIGNIFICANT CHANGE UP (ref 70–99)
GLUCOSE SERPL-MCNC: 297 MG/DL — HIGH (ref 70–99)
HCT VFR BLD CALC: 26 % — LOW (ref 39–50)
HGB BLD-MCNC: 8.2 G/DL — LOW (ref 13–17)
MCHC RBC-ENTMCNC: 29.4 PG — SIGNIFICANT CHANGE UP (ref 27–34)
MCHC RBC-ENTMCNC: 31.6 GM/DL — LOW (ref 32–36)
MCV RBC AUTO: 93.1 FL — SIGNIFICANT CHANGE UP (ref 80–100)
ORGANISM # SPEC MICROSCOPIC CNT: SIGNIFICANT CHANGE UP
ORGANISM # SPEC MICROSCOPIC CNT: SIGNIFICANT CHANGE UP
PLATELET # BLD AUTO: 417 K/UL — HIGH (ref 150–400)
POTASSIUM SERPL-MCNC: 4.7 MMOL/L — SIGNIFICANT CHANGE UP (ref 3.5–5.3)
POTASSIUM SERPL-SCNC: 4.7 MMOL/L — SIGNIFICANT CHANGE UP (ref 3.5–5.3)
RBC # BLD: 2.8 M/UL — LOW (ref 4.2–5.8)
RBC # FLD: 12.4 % — SIGNIFICANT CHANGE UP (ref 10.3–14.5)
SODIUM SERPL-SCNC: 132 MMOL/L — LOW (ref 135–145)
SPECIMEN SOURCE: SIGNIFICANT CHANGE UP
WBC # BLD: 8.3 K/UL — SIGNIFICANT CHANGE UP (ref 3.8–10.5)
WBC # FLD AUTO: 8.3 K/UL — SIGNIFICANT CHANGE UP (ref 3.8–10.5)

## 2019-06-10 PROCEDURE — 99232 SBSQ HOSP IP/OBS MODERATE 35: CPT

## 2019-06-10 PROCEDURE — 99232 SBSQ HOSP IP/OBS MODERATE 35: CPT | Mod: GC

## 2019-06-10 RX ORDER — COLLAGENASE CLOSTRIDIUM HIST. 250 UNIT/G
1 OINTMENT (GRAM) TOPICAL DAILY
Refills: 0 | Status: DISCONTINUED | OUTPATIENT
Start: 2019-06-10 | End: 2019-06-14

## 2019-06-10 RX ADMIN — HEPARIN SODIUM 5000 UNIT(S): 5000 INJECTION INTRAVENOUS; SUBCUTANEOUS at 05:41

## 2019-06-10 RX ADMIN — Medication 3: at 11:06

## 2019-06-10 RX ADMIN — PIPERACILLIN AND TAZOBACTAM 25 GRAM(S): 4; .5 INJECTION, POWDER, LYOPHILIZED, FOR SOLUTION INTRAVENOUS at 05:42

## 2019-06-10 RX ADMIN — PIPERACILLIN AND TAZOBACTAM 25 GRAM(S): 4; .5 INJECTION, POWDER, LYOPHILIZED, FOR SOLUTION INTRAVENOUS at 23:29

## 2019-06-10 RX ADMIN — Medication 1 APPLICATION(S): at 14:30

## 2019-06-10 RX ADMIN — POLYETHYLENE GLYCOL 3350 17 GRAM(S): 17 POWDER, FOR SOLUTION ORAL at 05:41

## 2019-06-10 RX ADMIN — OXYCODONE AND ACETAMINOPHEN 2 TABLET(S): 5; 325 TABLET ORAL at 07:52

## 2019-06-10 RX ADMIN — Medication 22 UNIT(S): at 11:06

## 2019-06-10 RX ADMIN — Medication 81 MILLIGRAM(S): at 14:28

## 2019-06-10 RX ADMIN — HEPARIN SODIUM 5000 UNIT(S): 5000 INJECTION INTRAVENOUS; SUBCUTANEOUS at 23:27

## 2019-06-10 RX ADMIN — POLYETHYLENE GLYCOL 3350 17 GRAM(S): 17 POWDER, FOR SOLUTION ORAL at 17:01

## 2019-06-10 RX ADMIN — Medication 22 UNIT(S): at 16:49

## 2019-06-10 RX ADMIN — Medication 100 MILLIGRAM(S): at 05:42

## 2019-06-10 RX ADMIN — Medication 100 MILLIGRAM(S): at 14:28

## 2019-06-10 RX ADMIN — AMLODIPINE BESYLATE 10 MILLIGRAM(S): 2.5 TABLET ORAL at 05:41

## 2019-06-10 RX ADMIN — Medication 2.5 MILLIGRAM(S): at 17:01

## 2019-06-10 RX ADMIN — Medication 100 MILLIGRAM(S): at 23:28

## 2019-06-10 RX ADMIN — Medication 2.5 MILLIGRAM(S): at 05:41

## 2019-06-10 RX ADMIN — OXYCODONE AND ACETAMINOPHEN 2 TABLET(S): 5; 325 TABLET ORAL at 08:22

## 2019-06-10 RX ADMIN — HEPARIN SODIUM 5000 UNIT(S): 5000 INJECTION INTRAVENOUS; SUBCUTANEOUS at 14:28

## 2019-06-10 RX ADMIN — PIPERACILLIN AND TAZOBACTAM 25 GRAM(S): 4; .5 INJECTION, POWDER, LYOPHILIZED, FOR SOLUTION INTRAVENOUS at 16:49

## 2019-06-10 NOTE — PROGRESS NOTE ADULT - ASSESSMENT
58 M with IDDM, neuropathy presenting with diabetic foor ulcer.   Failed outpatient augmentin.  Has had subjective fevers, and chills, nausea, decreased PO intake and uncontrollable sugars.   Here he is afebrile, leukocytosis 14k.  xray showing edema but not OM  Seen by podiatry who did a bedside debridement. No probe to bone.   Received one dose of vanco and zosyn in ED    Overall, 58M with diabetic foot ulcer with surrounding cellulitis            ·  Problem: Diabetic foot ulcer.  Plan: -cont abx  -initial MRI foot noted - no OM  -s/p debridement per podiatry 5/27  -DM control  -local care per podiatry  -s/p debridement/revision 6/5 - d  - no overt purulence, debrided down to healthy tissue.            temps down  wbc nl.  continue present therapy   issue is whether this will heal or wether he will need BKA down the line  If foot salvage  to be tried will need prolonged ab via picc

## 2019-06-10 NOTE — PROGRESS NOTE ADULT - SUBJECTIVE AND OBJECTIVE BOX
Patient is a 58y old  Male who presents with a chief complaint of right foot ulcer (10 Colt 2019 07:21)       INTERVAL HPI/OVERNIGHT EVENTS:  Patient seen and evaluated at bedside.  Pt is resting comfortable in NAD. Denies N/V/F/C.     Allergies    No Known Allergies    Intolerances        Vital Signs Last 24 Hrs  T(C): 36.9 (10 Colt 2019 10:17), Max: 37.3 (09 Jun 2019 13:46)  T(F): 98.4 (10 Colt 2019 10:17), Max: 99.1 (09 Jun 2019 13:46)  HR: 78 (10 Colt 2019 10:17) (65 - 86)  BP: 133/73 (10 Colt 2019 10:17) (133/73 - 152/80)  BP(mean): --  RR: 18 (10 Colt 2019 10:17) (18 - 18)  SpO2: 93% (10 Colt 2019 10:17) (93% - 96%)    LABS:                        8.2    8.3   )-----------( 417      ( 10 Colt 2019 06:26 )             26.0     06-10    132<L>  |  103  |  31<H>  ----------------------------<  297<H>  4.7   |  18<L>  |  2.33<H>    Ca    9.1      10 Colt 2019 06:26          CAPILLARY BLOOD GLUCOSE      POCT Blood Glucose.: 172 mg/dL (09 Jun 2019 20:56)  POCT Blood Glucose.: 199 mg/dL (09 Jun 2019 18:28)  POCT Blood Glucose.: 146 mg/dL (09 Jun 2019 12:43)      Lower Extremity Physical Exam:  Skin: R foot  Wound #1:  Location: Vac sponge intact & compressed 125 mm hg cont therapy, minimal serosanguineous fluid in vac container  Visible plantar skin flaps are viable

## 2019-06-10 NOTE — PROGRESS NOTE ADULT - ASSESSMENT
Dx: 1. s/p debridement R plantar foot infection w/ extensive open wound on Vac Tx 125 mm hg continuous therapy        2. DM w/ Profound Neuropathy & Microvascular Dz    - WBC improved over the weekend, no leukocytosis  - Plantar foot wound filling in appropriately, granular tissue and fibrotic tissue to wound bed  - Collagenase ordered for next wound VAC change  - Cont wound VAC  - Continue IV Abx as per ID  - Wet to dry dressing applied to right foot, wound VAC to be applied today  - Will f/u WBC scan  - D/w attending

## 2019-06-10 NOTE — PROGRESS NOTE ADULT - PROBLEM SELECTOR PLAN 4
Serum sodium is low this AM at 132, s/p IVFs yesterday. Pt. now s/p Lasix. Recommend monitoring serum sodium with daily labs. Serum sodium is low this AM at 132 Corrected for glucose in normal. Pt. now s/p Lasix. Recommend monitoring serum sodium with daily labs.

## 2019-06-10 NOTE — PROGRESS NOTE ADULT - SUBJECTIVE AND OBJECTIVE BOX
infectious diseases progress note:    Patient is a 58y old  Male who presents with a chief complaint of right foot ulcer (09 Jun 2019 14:38)        Type 2 diabetes mellitus with foot ulcer        ROS:  CONSTITUTIONAL:  Negative fever or chills, feels well, good appetite  EYES:  Negative  blurry vision or double vision  CARDIOVASCULAR:  Negative for chest pain or palpitations  RESPIRATORY:  Negative for cough, wheezing, or SOB   GASTROINTESTINAL:  Negative for nausea, vomiting, diarrhea, constipation, or abdominal pain  GENITOURINARY:  Negative frequency, urgency or dysuria  NEUROLOGIC:  No headache, confusion, dizziness, lightheadedness    Allergies    No Known Allergies    Intolerances        ANTIBIOTICS/RELEVANT:  antimicrobials  piperacillin/tazobactam IVPB. 3.375 Gram(s) IV Intermittent every 8 hours    immunologic:    OTHER:  amLODIPine   Tablet 10 milliGRAM(s) Oral daily  aspirin enteric coated 81 milliGRAM(s) Oral daily  bisacodyl Suppository 10 milliGRAM(s) Rectal once  dextrose 40% Gel 15 Gram(s) Oral once PRN  dextrose 5%. 1000 milliLiter(s) IV Continuous <Continuous>  dextrose 50% Injectable 12.5 Gram(s) IV Push once  dextrose 50% Injectable 25 Gram(s) IV Push once  dextrose 50% Injectable 25 Gram(s) IV Push once  dronabinol 2.5 milliGRAM(s) Oral two times a day  ergocalciferol 79391 Unit(s) Oral every week  glucagon  Injectable 1 milliGRAM(s) IntraMuscular once PRN  heparin  Injectable 5000 Unit(s) SubCutaneous every 8 hours  hydrALAZINE 100 milliGRAM(s) Oral three times a day  insulin glargine Injectable (LANTUS) 38 Unit(s) SubCutaneous at bedtime  insulin lispro (HumaLOG) corrective regimen sliding scale   SubCutaneous three times a day before meals  insulin lispro (HumaLOG) corrective regimen sliding scale   SubCutaneous three times a day before meals  insulin lispro (HumaLOG) corrective regimen sliding scale   SubCutaneous at bedtime  insulin lispro Injectable (HumaLOG) 22 Unit(s) SubCutaneous three times a day before meals  ondansetron Injectable 4 milliGRAM(s) IV Push once  oxyCODONE    5 mG/acetaminophen 325 mG 2 Tablet(s) Oral every 6 hours PRN  oxyCODONE    5 mG/acetaminophen 325 mG 1 Tablet(s) Oral every 6 hours PRN  polyethylene glycol 3350 17 Gram(s) Oral every 12 hours  senna 2 Tablet(s) Oral at bedtime      Objective:  Vital Signs Last 24 Hrs  T(C): 36.8 (10 Colt 2019 04:56), Max: 37.3 (09 Jun 2019 13:46)  T(F): 98.3 (10 Colt 2019 04:56), Max: 99.1 (09 Jun 2019 13:46)  HR: 86 (10 Colt 2019 04:56) (65 - 86)  BP: 152/80 (10 Colt 2019 04:56) (134/68 - 152/80)  BP(mean): --  RR: 18 (10 Colt 2019 04:56) (17 - 18)  SpO2: 95% (10 Colt 2019 04:56) (94% - 96%)    PHYSICAL EXAM:  Constitutional:Well-developed, well nourished--no acute distress  Eyes:LEVY, EOMI  Ear/Nose/Throat: no oral lesion, no sinus tenderness on percussion	  Neck:no JVD, no lymphadenopathy, supple     Cardiovascular: S1S2 RRR, no murmurs  Gastrointestinal:soft, (+) BS, no HSM  Extremities: large vac ovr wd         LABS:                        8.2    8.3   )-----------( 417      ( 10 Colt 2019 06:26 )             26.0     06-10    132<L>  |  103  |  31<H>  ----------------------------<  297<H>  4.7   |  18<L>  |  2.33<H>    Ca    9.1      10 Colt 2019 06:26              MICROBIOLOGY:    RECENT CULTURES:  06-05 @ 17:26 .Tissue   ELIZABETH    Moderate polymorphonuclear leukocytes per low power field  Few Gram Negative Rods per oil power field    Escherichia coli  Escherichia coli     Numerous Escherichia coli  Few [place] parapsilosis "Susceptibilities not performed"          RESPIRATORY CULTURES:              RADIOLOGY & ADDITIONAL STUDIES:        Pager [phone]  After 5 pm/weekends or if no response :[phone]

## 2019-06-10 NOTE — PROGRESS NOTE ADULT - SUBJECTIVE AND OBJECTIVE BOX
[facility] DIVISION OF KIDNEY DISEASES AND HYPERTENSION -- FOLLOW UP NOTE  --------------------------------------------------------------------------------  Chief Complaint:/subjective: MARIKA    24 hour events: Pt. seen and examined at bedside this AM. Pt. states that he feels well. He had some SOB yesterday, but received Lasix and feels improved. Denies CP, SOB, N/V/F/C.    PAST HISTORY  --------------------------------------------------------------------------------  No significant changes to PMH, PSH, FHx, SHx, unless otherwise noted    ALLERGIES & MEDICATIONS  --------------------------------------------------------------------------------  Allergies    No Known Allergies    Intolerances    Standing Inpatient Medications  amLODIPine   Tablet 10 milliGRAM(s) Oral daily  aspirin enteric coated 81 milliGRAM(s) Oral daily  bisacodyl Suppository 10 milliGRAM(s) Rectal once  collagenase Ointment 1 Application(s) Topical daily  dextrose 5%. 1000 milliLiter(s) IV Continuous <Continuous>  dextrose 50% Injectable 12.5 Gram(s) IV Push once  dextrose 50% Injectable 25 Gram(s) IV Push once  dextrose 50% Injectable 25 Gram(s) IV Push once  dronabinol 2.5 milliGRAM(s) Oral two times a day  ergocalciferol 52830 Unit(s) Oral every week  heparin  Injectable 5000 Unit(s) SubCutaneous every 8 hours  hydrALAZINE 100 milliGRAM(s) Oral three times a day  insulin glargine Injectable (LANTUS) 38 Unit(s) SubCutaneous at bedtime  insulin lispro (HumaLOG) corrective regimen sliding scale   SubCutaneous three times a day before meals  insulin lispro (HumaLOG) corrective regimen sliding scale   SubCutaneous three times a day before meals  insulin lispro (HumaLOG) corrective regimen sliding scale   SubCutaneous at bedtime  insulin lispro Injectable (HumaLOG) 22 Unit(s) SubCutaneous three times a day before meals  ondansetron Injectable 4 milliGRAM(s) IV Push once  piperacillin/tazobactam IVPB. 3.375 Gram(s) IV Intermittent every 8 hours  polyethylene glycol 3350 17 Gram(s) Oral every 12 hours  senna 2 Tablet(s) Oral at bedtime    REVIEW OF SYSTEMS  --------------------------------------------------------------------------------  Gen: No lethargy  Respiratory: No dyspnea  CV: No chest pain  GI: No abdominal pain  MSK: + LE edema  Neuro: No dizziness    All other systems were reviewed and are negative, except as noted.    VITALS/PHYSICAL EXAM  --------------------------------------------------------------------------------  T(C): 36.9 (06-10-19 @ 10:17), Max: 37.3 (06-09-19 @ 13:46)  HR: 78 (06-10-19 @ 10:17) (65 - 86)  BP: 133/73 (06-10-19 @ 10:17) (133/73 - 152/80)  RR: 18 (06-10-19 @ 10:17) (18 - 18)  SpO2: 93% (06-10-19 @ 10:17) (93% - 96%)  Wt(kg): --    06-09-19 @ 07:01  -  06-10-19 @ 07:00  --------------------------------------------------------  IN: 1675 mL / OUT: 2750 mL / NET: -1075 mL    Physical Exam:  	Gen: NAD,    	HEENT: Supple neck  	Pulm: CTA B/L  	CV: RRR, S1S2; no rub  	Abd: +BS, soft,    	: No suprapubic tenderness  	Ext: pedal edema; right foot bandaged  	Neuro: alert  	Psych: awake    LABS/STUDIES  --------------------------------------------------------------------------------              8.2    8.3   >-----------<  417      [06-10-19 @ 06:26]              26.0     132  |  103  |  31  ----------------------------<  297      [06-10-19 @ 06:26]  4.7   |  18  |  2.33        Ca     9.1     [06-10-19 @ 06:26]    Creatinine Trend:  SCr 2.33 [06-10 @ 06:26]  SCr 2.33 [06-09 @ 06:19]  SCr 2.52 [06-08 @ 06:45]  SCr 2.65 [06-07 @ 06:29]  SCr 2.72 [06-06 @ 08:25]

## 2019-06-10 NOTE — PROGRESS NOTE ADULT - PROBLEM SELECTOR PLAN 1
Patient with MARIKA likely hemodynamically mediated in the setting of infection and ACE-I use. Patient with likely CKD with Scr elevated in the past.     - Scr improving daily but trended back up 6/5 to 6/6 s/p OR 1.97 to 2.72 and was started on Vanc/Zosyn combo; today's Scr stable/plateued at 2.33, hopeful will improve with time.   - Check vanc levels daily.  - Avoid Zosyn and vancomycin combination as it may cause worsening of MARIKA.  - Monitor labs and UOP.  - Encourage PO hydration  - Recommend avoiding contrast studies for now. Patient with MARIKA likely hemodynamically mediated in the setting of infection and ACE-I use. Patient with likely CKD with Scr elevated in the past.     - Scr improving daily but trended back up 6/5 to 6/6 s/p OR 1.97 to 2.72 and was started on Vanc/Zosyn combo; today's Scr stable/plateued at 2.33, hopefully  will improve with time.   - Check vanc levels daily.  - Avoid Zosyn and vancomycin combination as it may cause worsening of MARIKA.  - Monitor labs and UOP.  - Encourage PO hydration  - Recommend avoiding contrast studies for now.

## 2019-06-10 NOTE — PROGRESS NOTE ADULT - SUBJECTIVE AND OBJECTIVE BOX
Patient is a 58y old  Male who presents with a chief complaint of right foot ulcer (10 Colt 2019 16:12)      SUBJECTIVE / OVERNIGHT EVENTS:  dyspnea has improved.  no other complaints    MEDICATIONS  (STANDING):  amLODIPine   Tablet 10 milliGRAM(s) Oral daily  aspirin enteric coated 81 milliGRAM(s) Oral daily  bisacodyl Suppository 10 milliGRAM(s) Rectal once  collagenase Ointment 1 Application(s) Topical daily  dextrose 5%. 1000 milliLiter(s) (50 mL/Hr) IV Continuous <Continuous>  dextrose 50% Injectable 12.5 Gram(s) IV Push once  dextrose 50% Injectable 25 Gram(s) IV Push once  dextrose 50% Injectable 25 Gram(s) IV Push once  dronabinol 2.5 milliGRAM(s) Oral two times a day  ergocalciferol 00118 Unit(s) Oral every week  heparin  Injectable 5000 Unit(s) SubCutaneous every 8 hours  hydrALAZINE 100 milliGRAM(s) Oral three times a day  insulin glargine Injectable (LANTUS) 38 Unit(s) SubCutaneous at bedtime  insulin lispro (HumaLOG) corrective regimen sliding scale   SubCutaneous three times a day before meals  insulin lispro (HumaLOG) corrective regimen sliding scale   SubCutaneous three times a day before meals  insulin lispro (HumaLOG) corrective regimen sliding scale   SubCutaneous at bedtime  insulin lispro Injectable (HumaLOG) 22 Unit(s) SubCutaneous three times a day before meals  ondansetron Injectable 4 milliGRAM(s) IV Push once  piperacillin/tazobactam IVPB. 3.375 Gram(s) IV Intermittent every 8 hours  polyethylene glycol 3350 17 Gram(s) Oral every 12 hours  senna 2 Tablet(s) Oral at bedtime    MEDICATIONS  (PRN):  dextrose 40% Gel 15 Gram(s) Oral once PRN Blood Glucose LESS THAN 70 milliGRAM(s)/deciliter  glucagon  Injectable 1 milliGRAM(s) IntraMuscular once PRN Glucose LESS THAN 70 milligrams/deciliter  oxyCODONE    5 mG/acetaminophen 325 mG 2 Tablet(s) Oral every 6 hours PRN Severe Pain (7 - 10)  oxyCODONE    5 mG/acetaminophen 325 mG 1 Tablet(s) Oral every 6 hours PRN Moderate Pain (4 - 6)      Vital Signs Last 24 Hrs  T(C): 36.7 (10 Colt 2019 13:20), Max: 37.3 (09 Jun 2019 22:12)  T(F): 98 (10 Colt 2019 13:20), Max: 99.1 (09 Jun 2019 22:12)  HR: 71 (10 Colt 2019 13:20) (65 - 86)  BP: 138/70 (10 Colt 2019 13:20) (133/73 - 152/80)  BP(mean): --  RR: 18 (10 Colt 2019 13:20) (18 - 18)  SpO2: 92% (10 Colt 2019 13:20) (92% - 96%)  CAPILLARY BLOOD GLUCOSE      POCT Blood Glucose.: 191 mg/dL (10 Colt 2019 13:12)  POCT Blood Glucose.: 277 mg/dL (10 Colt 2019 11:00)  POCT Blood Glucose.: 172 mg/dL (09 Jun 2019 20:56)  POCT Blood Glucose.: 199 mg/dL (09 Jun 2019 18:28)    I&O's Summary    09 Jun 2019 07:01  -  10 Colt 2019 07:00  --------------------------------------------------------  IN: 1675 mL / OUT: 2750 mL / NET: -1075 mL    10 Colt 2019 07:01  -  10 Colt 2019 16:42  --------------------------------------------------------  IN: 580 mL / OUT: 0 mL / NET: 580 mL        PHYSICAL EXAM:  GENERAL: NAD, well-developed  HEAD:  Atraumatic, Normocephalic  EYES: EOMI, PERRLA, conjunctiva and sclera clear  NECK: Supple, No JVD  CHEST/LUNG: Clear to auscultation bilaterally; No wheeze  HEART: Regular rate and rhythm; No murmurs, rubs, or gallops  ABDOMEN: Soft, Nontender, Nondistended; Bowel sounds present  EXTREMITIES:  2+ Peripheral Pulses, No clubbing, cyanosis,  2 plus edema  PSYCH: AAOx3  NEUROLOGY: non-focal  SKIN: No rashes or lesions    LABS:                        8.2    8.3   )-----------( 417      ( 10 Colt 2019 06:26 )             26.0     06-10    132<L>  |  103  |  31<H>  ----------------------------<  297<H>  4.7   |  18<L>  |  2.33<H>    Ca    9.1      10 Colt 2019 06:26                RADIOLOGY & ADDITIONAL TESTS:    Imaging Personally Reviewed:    Consultant(s) Notes Reviewed:      Care Discussed with Consultants/Other Providers:

## 2019-06-10 NOTE — PROGRESS NOTE ADULT - ASSESSMENT
Assessment  DMT2: 58y Male with DM T2 with hyperglycemia, on insulin, blood sugars trending down, no hypoglycemic episode, eating full meals, compliant with low carb diet.  Foot wound: s/p surgery, on medications, no chest pain, stable, monitored.  HTN: Controlled,  on antihypertensive medications.  Overweight/Obesity: No strict exercise routines, not on any weight loss program, neither on low calorie diet.    Andrezj Zhu MD  Cell: [phone]  Office: [phone]

## 2019-06-10 NOTE — PROGRESS NOTE ADULT - SUBJECTIVE AND OBJECTIVE BOX
Cardiovascular Disease Progress Note  (Covering for Dr. Christine)    Overnight events: No acute events overnight. Mr. Curry denies chest pain or SOB.   Otherwise review of systems negative    Objective Findings:  T(C): 36.9 (06-10-19 @ 10:17), Max: 37.3 (06-09-19 @ 13:46)  HR: 78 (06-10-19 @ 10:17) (65 - 86)  BP: 133/73 (06-10-19 @ 10:17) (133/73 - 152/80)  RR: 18 (06-10-19 @ 10:17) (18 - 18)  SpO2: 93% (06-10-19 @ 10:17) (93% - 96%)  Wt(kg): --  Daily     Daily       Physical Exam:  Gen: NAD  HEENT: EOMI  CV: RRR, normal S1 + S2, no m/r/g  Lungs: CTAB  Abd: soft, non-tender  Ext: No edema    Telemetry: n/a    Laboratory Data:                        8.2    8.3   )-----------( 417      ( 10 Colt 2019 06:26 )             26.0     06-10    132<L>  |  103  |  31<H>  ----------------------------<  297<H>  4.7   |  18<L>  |  2.33<H>    Ca    9.1      10 Colt 2019 06:26                Inpatient Medications:  MEDICATIONS  (STANDING):  amLODIPine   Tablet 10 milliGRAM(s) Oral daily  aspirin enteric coated 81 milliGRAM(s) Oral daily  bisacodyl Suppository 10 milliGRAM(s) Rectal once  collagenase Ointment 1 Application(s) Topical daily  dextrose 5%. 1000 milliLiter(s) (50 mL/Hr) IV Continuous <Continuous>  dextrose 50% Injectable 12.5 Gram(s) IV Push once  dextrose 50% Injectable 25 Gram(s) IV Push once  dextrose 50% Injectable 25 Gram(s) IV Push once  dronabinol 2.5 milliGRAM(s) Oral two times a day  ergocalciferol 26059 Unit(s) Oral every week  heparin  Injectable 5000 Unit(s) SubCutaneous every 8 hours  hydrALAZINE 100 milliGRAM(s) Oral three times a day  insulin glargine Injectable (LANTUS) 38 Unit(s) SubCutaneous at bedtime  insulin lispro (HumaLOG) corrective regimen sliding scale   SubCutaneous three times a day before meals  insulin lispro (HumaLOG) corrective regimen sliding scale   SubCutaneous three times a day before meals  insulin lispro (HumaLOG) corrective regimen sliding scale   SubCutaneous at bedtime  insulin lispro Injectable (HumaLOG) 22 Unit(s) SubCutaneous three times a day before meals  ondansetron Injectable 4 milliGRAM(s) IV Push once  piperacillin/tazobactam IVPB. 3.375 Gram(s) IV Intermittent every 8 hours  polyethylene glycol 3350 17 Gram(s) Oral every 12 hours  senna 2 Tablet(s) Oral at bedtime      Assessment: 57 yo male admitted with foot ulcer now s/p Incision and drainage, foot, bursa. Found to be persistently hypertensive      Problem/Plan - 1:  ·  Problem: Cellulitis of foot.    Plan:    s/p debridement on 6/5  Pain control   Monitor WBC  IV abx   Wound care   ID following   keep legs elevated.      Problem/Plan - 2:  ·  Problem: Venous insufficiency of both lower extremities.  Plan: Outpatient follow up.      Problem/Plan - 3:  ·  Problem: Hypertension.    Plan: BP labile.  Continue to monitor on current regimen.   Low salt diet.     #Uncontrolled IDDM-  Basal bolus insulin as per primary team.       Over 25 minutes spent on total encounter; more than 50% of the visit was spent counseling and/or coordinating care by the attending physician.      Dane Gage MD [facility]  Cardiovascular Disease  [phone]

## 2019-06-10 NOTE — PROVIDER CONTACT NOTE (OTHER) - RECOMMENDATIONS
Recommended to decrease Lantus order.
Give patient his pre meal Humalog as ordered
Notified the provider
Tylenol 650mg PO
tylenol

## 2019-06-10 NOTE — PROGRESS NOTE ADULT - SUBJECTIVE AND OBJECTIVE BOX
Patient is a 58y old  Male who presents with a chief complaint of right foot ulcer (10 Colt 2019 12:30)      Vascular Surgery Attending Progress Note    Interval HPI: pt w/o c/o     Medications:  amLODIPine   Tablet 10 milliGRAM(s) Oral daily  aspirin enteric coated 81 milliGRAM(s) Oral daily  bisacodyl Suppository 10 milliGRAM(s) Rectal once  collagenase Ointment 1 Application(s) Topical daily  dextrose 40% Gel 15 Gram(s) Oral once PRN  dextrose 5%. 1000 milliLiter(s) IV Continuous <Continuous>  dextrose 50% Injectable 12.5 Gram(s) IV Push once  dextrose 50% Injectable 25 Gram(s) IV Push once  dextrose 50% Injectable 25 Gram(s) IV Push once  dronabinol 2.5 milliGRAM(s) Oral two times a day  ergocalciferol 91650 Unit(s) Oral every week  glucagon  Injectable 1 milliGRAM(s) IntraMuscular once PRN  heparin  Injectable 5000 Unit(s) SubCutaneous every 8 hours  hydrALAZINE 100 milliGRAM(s) Oral three times a day  insulin glargine Injectable (LANTUS) 38 Unit(s) SubCutaneous at bedtime  insulin lispro (HumaLOG) corrective regimen sliding scale   SubCutaneous three times a day before meals  insulin lispro (HumaLOG) corrective regimen sliding scale   SubCutaneous three times a day before meals  insulin lispro (HumaLOG) corrective regimen sliding scale   SubCutaneous at bedtime  insulin lispro Injectable (HumaLOG) 22 Unit(s) SubCutaneous three times a day before meals  ondansetron Injectable 4 milliGRAM(s) IV Push once  oxyCODONE    5 mG/acetaminophen 325 mG 2 Tablet(s) Oral every 6 hours PRN  oxyCODONE    5 mG/acetaminophen 325 mG 1 Tablet(s) Oral every 6 hours PRN  piperacillin/tazobactam IVPB. 3.375 Gram(s) IV Intermittent every 8 hours  polyethylene glycol 3350 17 Gram(s) Oral every 12 hours  senna 2 Tablet(s) Oral at bedtime      Vital Signs Last 24 Hrs  T(C): 36.7 (10 Colt 2019 13:20), Max: 37.3 (09 Jun 2019 22:12)  T(F): 98 (10 Colt 2019 13:20), Max: 99.1 (09 Jun 2019 22:12)  HR: 71 (10 Colt 2019 13:20) (65 - 86)  BP: 138/70 (10 Colt 2019 13:20) (133/73 - 152/80)  BP(mean): --  RR: 18 (10 Colt 2019 13:20) (18 - 18)  SpO2: 92% (10 Colt 2019 13:20) (92% - 96%)  I&O's Summary    09 Jun 2019 07:01  -  10 Colt 2019 07:00  --------------------------------------------------------  IN: 1675 mL / OUT: 2750 mL / NET: -1075 mL    10 Colt 2019 07:01  -  10 Colt 2019 16:12  --------------------------------------------------------  IN: 580 mL / OUT: 0 mL / NET: 580 mL        Physical Exam:  Neuro  A&Ox3 VSS  Vascular:  stable dressing c/d/i    LABS:                        8.2    8.3   )-----------( 417      ( 10 Colt 2019 06:26 )             26.0     06-10    132<L>  |  103  |  31<H>  ----------------------------<  297<H>  4.7   |  18<L>  |  2.33<H>    Ca    9.1      10 Clot 2019 06:26          CHRISTIANO KERR MD  [phone]

## 2019-06-10 NOTE — PROGRESS NOTE ADULT - SUBJECTIVE AND OBJECTIVE BOX
Chief complaint  Patient is a 58y old  Male who presents with a chief complaint of right foot ulcer (10 Colt 2019 16:42)   Review of systems  Patient in bed, looks comfortable, no fever, no hypoglycemia.    Labs and Fingersticks  CAPILLARY BLOOD GLUCOSE      POCT Blood Glucose.: 144 mg/dL (10 Colt 2019 16:47)  POCT Blood Glucose.: 191 mg/dL (10 Colt 2019 13:12)  POCT Blood Glucose.: 277 mg/dL (10 Colt 2019 11:00)  POCT Blood Glucose.: 172 mg/dL (09 Jun 2019 20:56)  POCT Blood Glucose.: 199 mg/dL (09 Jun 2019 18:28)      Anion Gap, Serum: 11 (06-10 @ 06:26)  Anion Gap, Serum: 11 (06-09 @ 06:19)      Calcium, Total Serum: 9.1 (06-10 @ 06:26)  Calcium, Total Serum: 8.9 (06-09 @ 06:19)          06-10    132<L>  |  103  |  31<H>  ----------------------------<  297<H>  4.7   |  18<L>  |  2.33<H>    Ca    9.1      10 Colt 2019 06:26                          8.2    8.3   )-----------( 417      ( 10 Colt 2019 06:26 )             26.0     Medications  MEDICATIONS  (STANDING):  amLODIPine   Tablet 10 milliGRAM(s) Oral daily  aspirin enteric coated 81 milliGRAM(s) Oral daily  bisacodyl Suppository 10 milliGRAM(s) Rectal once  collagenase Ointment 1 Application(s) Topical daily  dextrose 5%. 1000 milliLiter(s) (50 mL/Hr) IV Continuous <Continuous>  dextrose 50% Injectable 12.5 Gram(s) IV Push once  dextrose 50% Injectable 25 Gram(s) IV Push once  dextrose 50% Injectable 25 Gram(s) IV Push once  dronabinol 2.5 milliGRAM(s) Oral two times a day  ergocalciferol 03037 Unit(s) Oral every week  heparin  Injectable 5000 Unit(s) SubCutaneous every 8 hours  hydrALAZINE 100 milliGRAM(s) Oral three times a day  insulin glargine Injectable (LANTUS) 38 Unit(s) SubCutaneous at bedtime  insulin lispro (HumaLOG) corrective regimen sliding scale   SubCutaneous three times a day before meals  insulin lispro (HumaLOG) corrective regimen sliding scale   SubCutaneous three times a day before meals  insulin lispro (HumaLOG) corrective regimen sliding scale   SubCutaneous at bedtime  insulin lispro Injectable (HumaLOG) 22 Unit(s) SubCutaneous three times a day before meals  ondansetron Injectable 4 milliGRAM(s) IV Push once  piperacillin/tazobactam IVPB. 3.375 Gram(s) IV Intermittent every 8 hours  polyethylene glycol 3350 17 Gram(s) Oral every 12 hours  senna 2 Tablet(s) Oral at bedtime      Physical Exam  General: Patient comfortable in bed  Vital Signs Last 12 Hrs  T(F): 98 (06-10-19 @ 13:20), Max: 98.4 (06-10-19 @ 10:17)  HR: 71 (06-10-19 @ 13:20) (71 - 78)  BP: 138/70 (06-10-19 @ 13:20) (133/73 - 138/70)  BP(mean): --  RR: 18 (06-10-19 @ 13:20) (18 - 18)  SpO2: 92% (06-10-19 @ 13:20) (92% - 93%)  Neck: No palpable thyroid nodules.  CVS: S1S2, No murmurs  Respiratory: No wheezing, no crepitations  GI: Abdomen soft, bowel sounds positive  Musculoskeletal:  edema lower extremities.   Skin: No skin rashes, no ecchymosis    Diagnostics    Vitamin D, 1, 25-Dihydroxy: AM Sched. Collection: 22-May-2019 06:00 (05-21 @ 18:59)  Vitamin D, 25-Hydroxy: AM Sched. Collection: 22-May-2019 06:00 (05-21 @ 18:59)

## 2019-06-10 NOTE — PROGRESS NOTE ADULT - PROBLEM SELECTOR PLAN 3
Pt. with metabolic acidosis in the setting of infection. Serum CO2 noted to be low today at 18.    - Continue to monitor serum CO2 with labs Pt. with metabolic acidosis in the setting of infection. Serum CO2 noted to be low today at 18.  Monitor. If consistently low, may need to start sodium bicarbonate    - Continue to monitor serum CO2 with labs

## 2019-06-11 LAB
ALBUMIN SERPL ELPH-MCNC: 2.8 G/DL — LOW (ref 3.3–5)
ALP SERPL-CCNC: 102 U/L — SIGNIFICANT CHANGE UP (ref 40–120)
ALT FLD-CCNC: 26 U/L — SIGNIFICANT CHANGE UP (ref 10–45)
ANION GAP SERPL CALC-SCNC: 12 MMOL/L — SIGNIFICANT CHANGE UP (ref 5–17)
AST SERPL-CCNC: 17 U/L — SIGNIFICANT CHANGE UP (ref 10–40)
BASOPHILS # BLD AUTO: 0 K/UL — SIGNIFICANT CHANGE UP (ref 0–0.2)
BASOPHILS NFR BLD AUTO: 0 % — SIGNIFICANT CHANGE UP (ref 0–2)
BILIRUB SERPL-MCNC: 0.4 MG/DL — SIGNIFICANT CHANGE UP (ref 0.2–1.2)
BLD GP AB SCN SERPL QL: NEGATIVE — SIGNIFICANT CHANGE UP
BUN SERPL-MCNC: 24 MG/DL — HIGH (ref 7–23)
CALCIUM SERPL-MCNC: 8.8 MG/DL — SIGNIFICANT CHANGE UP (ref 8.4–10.5)
CHLORIDE SERPL-SCNC: 104 MMOL/L — SIGNIFICANT CHANGE UP (ref 96–108)
CO2 SERPL-SCNC: 20 MMOL/L — LOW (ref 22–31)
CREAT SERPL-MCNC: 2.12 MG/DL — HIGH (ref 0.5–1.3)
EOSINOPHIL # BLD AUTO: 0.1 K/UL — SIGNIFICANT CHANGE UP (ref 0–0.5)
EOSINOPHIL NFR BLD AUTO: 0 % — SIGNIFICANT CHANGE UP (ref 0–6)
FERRITIN SERPL-MCNC: 1408 NG/ML — HIGH (ref 30–400)
GLUCOSE BLDC GLUCOMTR-MCNC: 101 MG/DL — HIGH (ref 70–99)
GLUCOSE BLDC GLUCOMTR-MCNC: 117 MG/DL — HIGH (ref 70–99)
GLUCOSE BLDC GLUCOMTR-MCNC: 123 MG/DL — HIGH (ref 70–99)
GLUCOSE BLDC GLUCOMTR-MCNC: 158 MG/DL — HIGH (ref 70–99)
GLUCOSE BLDC GLUCOMTR-MCNC: 204 MG/DL — HIGH (ref 70–99)
GLUCOSE BLDC GLUCOMTR-MCNC: 98 MG/DL — SIGNIFICANT CHANGE UP (ref 70–99)
GLUCOSE SERPL-MCNC: 145 MG/DL — HIGH (ref 70–99)
HCT VFR BLD CALC: 24.5 % — LOW (ref 39–50)
HGB BLD-MCNC: 7.9 G/DL — LOW (ref 13–17)
HYPOCHROMIA BLD QL: SLIGHT — SIGNIFICANT CHANGE UP
IRON SATN MFR SERPL: 19 % — SIGNIFICANT CHANGE UP (ref 16–55)
IRON SATN MFR SERPL: 31 UG/DL — LOW (ref 45–165)
LYMPHOCYTES # BLD AUTO: 0.7 K/UL — LOW (ref 1–3.3)
LYMPHOCYTES # BLD AUTO: 7 % — LOW (ref 13–44)
MCHC RBC-ENTMCNC: 29.8 PG — SIGNIFICANT CHANGE UP (ref 27–34)
MCHC RBC-ENTMCNC: 32.1 GM/DL — SIGNIFICANT CHANGE UP (ref 32–36)
MCV RBC AUTO: 92.9 FL — SIGNIFICANT CHANGE UP (ref 80–100)
MONOCYTES # BLD AUTO: 0.3 K/UL — SIGNIFICANT CHANGE UP (ref 0–0.9)
MONOCYTES NFR BLD AUTO: 7 % — SIGNIFICANT CHANGE UP (ref 2–14)
NEUTROPHILS # BLD AUTO: 7.5 K/UL — HIGH (ref 1.8–7.4)
NEUTROPHILS NFR BLD AUTO: 86 % — HIGH (ref 43–77)
PLAT MORPH BLD: NORMAL — SIGNIFICANT CHANGE UP
PLATELET # BLD AUTO: 414 K/UL — HIGH (ref 150–400)
POTASSIUM SERPL-MCNC: 4.9 MMOL/L — SIGNIFICANT CHANGE UP (ref 3.5–5.3)
POTASSIUM SERPL-SCNC: 4.9 MMOL/L — SIGNIFICANT CHANGE UP (ref 3.5–5.3)
PROT SERPL-MCNC: 6.5 G/DL — SIGNIFICANT CHANGE UP (ref 6–8.3)
RBC # BLD: 2.64 M/UL — LOW (ref 4.2–5.8)
RBC # FLD: 12.5 % — SIGNIFICANT CHANGE UP (ref 10.3–14.5)
RBC BLD AUTO: SIGNIFICANT CHANGE UP
RH IG SCN BLD-IMP: POSITIVE — SIGNIFICANT CHANGE UP
SODIUM SERPL-SCNC: 136 MMOL/L — SIGNIFICANT CHANGE UP (ref 135–145)
TIBC SERPL-MCNC: 161 UG/DL — LOW (ref 220–430)
UIBC SERPL-MCNC: 130 UG/DL — SIGNIFICANT CHANGE UP (ref 110–370)
WBC # BLD: 8.6 K/UL — SIGNIFICANT CHANGE UP (ref 3.8–10.5)
WBC # FLD AUTO: 8.6 K/UL — SIGNIFICANT CHANGE UP (ref 3.8–10.5)

## 2019-06-11 PROCEDURE — 78806: CPT | Mod: 26

## 2019-06-11 PROCEDURE — 99232 SBSQ HOSP IP/OBS MODERATE 35: CPT

## 2019-06-11 RX ORDER — INSULIN GLARGINE 100 [IU]/ML
18 INJECTION, SOLUTION SUBCUTANEOUS ONCE
Refills: 0 | Status: COMPLETED | OUTPATIENT
Start: 2019-06-11 | End: 2019-06-12

## 2019-06-11 RX ORDER — INSULIN GLARGINE 100 [IU]/ML
18 INJECTION, SOLUTION SUBCUTANEOUS ONCE
Refills: 0 | Status: DISCONTINUED | OUTPATIENT
Start: 2019-06-11 | End: 2019-06-11

## 2019-06-11 RX ADMIN — HEPARIN SODIUM 5000 UNIT(S): 5000 INJECTION INTRAVENOUS; SUBCUTANEOUS at 15:42

## 2019-06-11 RX ADMIN — HEPARIN SODIUM 5000 UNIT(S): 5000 INJECTION INTRAVENOUS; SUBCUTANEOUS at 05:39

## 2019-06-11 RX ADMIN — HEPARIN SODIUM 5000 UNIT(S): 5000 INJECTION INTRAVENOUS; SUBCUTANEOUS at 22:23

## 2019-06-11 RX ADMIN — PIPERACILLIN AND TAZOBACTAM 25 GRAM(S): 4; .5 INJECTION, POWDER, LYOPHILIZED, FOR SOLUTION INTRAVENOUS at 22:23

## 2019-06-11 RX ADMIN — OXYCODONE AND ACETAMINOPHEN 1 TABLET(S): 5; 325 TABLET ORAL at 07:48

## 2019-06-11 RX ADMIN — Medication 2.5 MILLIGRAM(S): at 17:57

## 2019-06-11 RX ADMIN — PIPERACILLIN AND TAZOBACTAM 25 GRAM(S): 4; .5 INJECTION, POWDER, LYOPHILIZED, FOR SOLUTION INTRAVENOUS at 05:40

## 2019-06-11 RX ADMIN — Medication 2.5 MILLIGRAM(S): at 05:39

## 2019-06-11 RX ADMIN — Medication 100 MILLIGRAM(S): at 05:40

## 2019-06-11 RX ADMIN — Medication 100 MILLIGRAM(S): at 15:43

## 2019-06-11 RX ADMIN — Medication 81 MILLIGRAM(S): at 15:42

## 2019-06-11 RX ADMIN — OXYCODONE AND ACETAMINOPHEN 1 TABLET(S): 5; 325 TABLET ORAL at 07:18

## 2019-06-11 RX ADMIN — Medication 1 APPLICATION(S): at 14:30

## 2019-06-11 RX ADMIN — Medication 22 UNIT(S): at 11:02

## 2019-06-11 RX ADMIN — Medication 100 MILLIGRAM(S): at 22:22

## 2019-06-11 RX ADMIN — AMLODIPINE BESYLATE 10 MILLIGRAM(S): 2.5 TABLET ORAL at 05:40

## 2019-06-11 RX ADMIN — Medication 2: at 11:03

## 2019-06-11 RX ADMIN — PIPERACILLIN AND TAZOBACTAM 25 GRAM(S): 4; .5 INJECTION, POWDER, LYOPHILIZED, FOR SOLUTION INTRAVENOUS at 15:41

## 2019-06-11 RX ADMIN — Medication 22 UNIT(S): at 13:37

## 2019-06-11 NOTE — PROGRESS NOTE ADULT - ASSESSMENT
58M w/ DM (A1C 11.9), HTN, presents with nonhealing RLE heel ulcer s/p multiple debridements.       - C/w wound care and VAC  per podiatry  WBC scan results pending  - will follow

## 2019-06-11 NOTE — PROGRESS NOTE ADULT - ASSESSMENT
58M with IDDM, HTN, and CKD 3 presenting admitted with a diabetic foot ulcer s/p debridement. creat in 2017 was 1.6, now with MARKIA

## 2019-06-11 NOTE — PROVIDER CONTACT NOTE (OTHER) - ASSESSMENT
Pt appears saddened with flat affect. Offered snack and beverage, pt agreed to take few sips of orangeade to help bring up BS levels. Will recheck in 40 minutes.
PMH HTN, DM2, hypertensive see flowsheet, , repeat 439
HR 93, 133/56, satting 95% on RA
Pt. AOX4 denies pain, chills, distress or discomfort
Pt. has no s/s of distress noted. No complaints of pain at this time.
Pt. with no s/s of distress noted.
current 
pt a&ox4, denies any chest pain or sob. Vitals stable except temp.
pt a&ox4, denies any headache, blurry vision or fatigue. Pt's dinner tray at bedside.
Pt. has no s/s of distress noted. Pt. has complaints of pain to R foot.

## 2019-06-11 NOTE — PROGRESS NOTE ADULT - SUBJECTIVE AND OBJECTIVE BOX
AGUSTIN BRAR 58y [id number]    Patient is a 58y old  Male who presents with a chief complaint of right foot ulcer (11 Jun 2019 12:05)      Follow Up/CC:  ID following for fever    Interval History/ROS: no fever, feels ok, VAC on foot    Allergies    No Known Allergies    Intolerances        ANTIMICROBIALS:  piperacillin/tazobactam IVPB. 3.375 every 8 hours      MEDICATIONS  (STANDING):  amLODIPine   Tablet 10 milliGRAM(s) Oral daily  aspirin enteric coated 81 milliGRAM(s) Oral daily  bisacodyl Suppository 10 milliGRAM(s) Rectal once  collagenase Ointment 1 Application(s) Topical daily  dextrose 5%. 1000 milliLiter(s) (50 mL/Hr) IV Continuous <Continuous>  dextrose 50% Injectable 12.5 Gram(s) IV Push once  dextrose 50% Injectable 25 Gram(s) IV Push once  dextrose 50% Injectable 25 Gram(s) IV Push once  dronabinol 2.5 milliGRAM(s) Oral two times a day  ergocalciferol 47686 Unit(s) Oral every week  heparin  Injectable 5000 Unit(s) SubCutaneous every 8 hours  hydrALAZINE 100 milliGRAM(s) Oral three times a day  insulin glargine Injectable (LANTUS) 18 Unit(s) SubCutaneous once  insulin glargine Injectable (LANTUS) 38 Unit(s) SubCutaneous at bedtime  insulin lispro (HumaLOG) corrective regimen sliding scale   SubCutaneous three times a day before meals  insulin lispro (HumaLOG) corrective regimen sliding scale   SubCutaneous three times a day before meals  insulin lispro (HumaLOG) corrective regimen sliding scale   SubCutaneous at bedtime  insulin lispro Injectable (HumaLOG) 22 Unit(s) SubCutaneous three times a day before meals  ondansetron Injectable 4 milliGRAM(s) IV Push once  piperacillin/tazobactam IVPB. 3.375 Gram(s) IV Intermittent every 8 hours  polyethylene glycol 3350 17 Gram(s) Oral every 12 hours  senna 2 Tablet(s) Oral at bedtime    MEDICATIONS  (PRN):  dextrose 40% Gel 15 Gram(s) Oral once PRN Blood Glucose LESS THAN 70 milliGRAM(s)/deciliter  glucagon  Injectable 1 milliGRAM(s) IntraMuscular once PRN Glucose LESS THAN 70 milligrams/deciliter  oxyCODONE    5 mG/acetaminophen 325 mG 2 Tablet(s) Oral every 6 hours PRN Severe Pain (7 - 10)  oxyCODONE    5 mG/acetaminophen 325 mG 1 Tablet(s) Oral every 6 hours PRN Moderate Pain (4 - 6)        Vital Signs Last 24 Hrs  T(C): 36.8 (11 Jun 2019 10:15), Max: 37.2 (10 Colt 2019 22:44)  T(F): 98.2 (11 Jun 2019 10:15), Max: 98.9 (10 Colt 2019 22:44)  HR: 88 (11 Jun 2019 10:15) (72 - 88)  BP: 137/85 (11 Jun 2019 10:15) (129/74 - 160/70)  BP(mean): --  RR: 17 (11 Jun 2019 10:15) (17 - 18)  SpO2: 95% (11 Jun 2019 10:15) (93% - 95%)    CBC Full  -  ( 11 Jun 2019 07:08 )  WBC Count : 8.6 K/uL  RBC Count : 2.64 M/uL  Hemoglobin : 7.9 g/dL  Hematocrit : 24.5 %  Platelet Count - Automated : 414 K/uL  Mean Cell Volume : 92.9 fl  Mean Cell Hemoglobin : 29.8 pg  Mean Cell Hemoglobin Concentration : 32.1 gm/dL  Auto Neutrophil # : 7.5 K/uL  Auto Lymphocyte # : 0.7 K/uL  Auto Monocyte # : 0.3 K/uL  Auto Eosinophil # : 0.1 K/uL  Auto Basophil # : 0.0 K/uL  Auto Neutrophil % : 86.0 %  Auto Lymphocyte % : 7.0 %  Auto Monocyte % : 7.0 %  Auto Eosinophil % : 0.0 %  Auto Basophil % : 0.0 %    06-11    136  |  104  |  24<H>  ----------------------------<  145<H>  4.9   |  20<L>  |  2.12<H>    Ca    8.8      11 Jun 2019 07:04    TPro  6.5  /  Alb  2.8<L>  /  TBili  0.4  /  DBili  x   /  AST  17  /  ALT  26  /  AlkPhos  102  06-11    LIVER FUNCTIONS - ( 11 Jun 2019 07:04 )  Alb: 2.8 g/dL / Pro: 6.5 g/dL / ALK PHOS: 102 U/L / ALT: 26 U/L / AST: 17 U/L / GGT: x               MICROBIOLOGY:  .Tissue  06-05-19   Numerous Escherichia coli  Few [place] parapsilosis "Susceptibilities not performed"  --  Escherichia coli      .Other  05-28-19   No growth  --  Enterococcus faecalis  Escherichia coli      .Blood  05-26-19   No growth at 5 days.  --  --      .Blood  05-25-19   No growth at 5 days.  --  --      .Tissue  05-23-19   Few Escherichia coli  Growth in fluid media only Enterococcus faecalis  Rare Streptococcus agalactiae (Group B) isolated  Group B streptococci are susceptible to ampicillin,  penicillin and cefazolin, but may be resistant to  erythromycin and clindamycin.  Recommendations for intrapartum prophylaxis for Group B  streptococci are penicillin or ampicillin.  --  Escherichia coli  Enterococcus faecalis      .Blood  05-20-19   No growth at 5 days.  --  --      .Abscess  05-20-19   Numerous Escherichia coli  Numerous Enterococcus faecalis  Moderate Prevotella bivia "Susceptibilities not performed"  --  Enterococcus faecalis  Escherichia coli          RADIOLOGY    NM Inflammatory Loc Wholebody, WBC (06.11.19 @ 09:15) >    IMPRESSION: Abnormal Indium-111 labeled leukocyte scan.    Labeled WBC activity in the urinary bladder raises the possibility of   cystitis. Please correlate clinically and with laboratory examination.

## 2019-06-11 NOTE — PROGRESS NOTE ADULT - SUBJECTIVE AND OBJECTIVE BOX
Patient is a 58y old  Male who presents with a chief complaint of right foot ulcer (11 Jun 2019 11:06)      Vascular Surgery Attending Progress Note    Interval HPI: pt w/o c/o  pt is undergoing WBC scan     Medications:  amLODIPine   Tablet 10 milliGRAM(s) Oral daily  aspirin enteric coated 81 milliGRAM(s) Oral daily  bisacodyl Suppository 10 milliGRAM(s) Rectal once  collagenase Ointment 1 Application(s) Topical daily  dextrose 40% Gel 15 Gram(s) Oral once PRN  dextrose 5%. 1000 milliLiter(s) IV Continuous <Continuous>  dextrose 50% Injectable 12.5 Gram(s) IV Push once  dextrose 50% Injectable 25 Gram(s) IV Push once  dextrose 50% Injectable 25 Gram(s) IV Push once  dronabinol 2.5 milliGRAM(s) Oral two times a day  ergocalciferol 83344 Unit(s) Oral every week  glucagon  Injectable 1 milliGRAM(s) IntraMuscular once PRN  heparin  Injectable 5000 Unit(s) SubCutaneous every 8 hours  hydrALAZINE 100 milliGRAM(s) Oral three times a day  insulin glargine Injectable (LANTUS) 18 Unit(s) SubCutaneous once  insulin glargine Injectable (LANTUS) 38 Unit(s) SubCutaneous at bedtime  insulin lispro (HumaLOG) corrective regimen sliding scale   SubCutaneous three times a day before meals  insulin lispro (HumaLOG) corrective regimen sliding scale   SubCutaneous three times a day before meals  insulin lispro (HumaLOG) corrective regimen sliding scale   SubCutaneous at bedtime  insulin lispro Injectable (HumaLOG) 22 Unit(s) SubCutaneous three times a day before meals  ondansetron Injectable 4 milliGRAM(s) IV Push once  oxyCODONE    5 mG/acetaminophen 325 mG 2 Tablet(s) Oral every 6 hours PRN  oxyCODONE    5 mG/acetaminophen 325 mG 1 Tablet(s) Oral every 6 hours PRN  piperacillin/tazobactam IVPB. 3.375 Gram(s) IV Intermittent every 8 hours  polyethylene glycol 3350 17 Gram(s) Oral every 12 hours  senna 2 Tablet(s) Oral at bedtime      Vital Signs Last 24 Hrs  T(C): 36.8 (11 Jun 2019 10:15), Max: 37.2 (10 Colt 2019 22:44)  T(F): 98.2 (11 Jun 2019 10:15), Max: 98.9 (10 Colt 2019 22:44)  HR: 88 (11 Jun 2019 10:15) (71 - 88)  BP: 137/85 (11 Jun 2019 10:15) (129/74 - 160/70)  BP(mean): --  RR: 17 (11 Jun 2019 10:15) (17 - 18)  SpO2: 95% (11 Jun 2019 10:15) (92% - 95%)  I&O's Summary    10 Colt 2019 07:01  -  11 Jun 2019 07:00  --------------------------------------------------------  IN: 1360 mL / OUT: 775 mL / NET: 585 mL        Physical Exam:  Neuro  A&Ox3 VSS  Vascular:  dressing c/d/i  VAC in place     LABS:                        7.9    8.6   )-----------( 414      ( 11 Jun 2019 07:08 )             24.5     06-11    136  |  104  |  24<H>  ----------------------------<  145<H>  4.9   |  20<L>  |  2.12<H>    Ca    8.8      11 Jun 2019 07:04    TPro  6.5  /  Alb  2.8<L>  /  TBili  0.4  /  DBili  x   /  AST  17  /  ALT  26  /  AlkPhos  102  06-11        CHRISTIANO KERR MD  [phone]

## 2019-06-11 NOTE — PROVIDER CONTACT NOTE (OTHER) - REASON
/72 and Temp of 101.8F
Pt hypertensive; hyperglycemic
Pt with temp 100.4
Pt. febrile with temp of 100.5
Pt. febrile with temp of 101F
Pt. temp 102.6 F
Temp 102.2 orally
pt did not receive lantus on 6/10
BS low with 38 units of Lantus due

## 2019-06-11 NOTE — PROGRESS NOTE ADULT - ASSESSMENT
Assessment  DMT2: 58y Male with DM T2 with hyperglycemia, on insulin, blood sugars improved, no hypoglycemic episode, eating partial meals, feeling cold and tired,  compliant with low carb diet.  Foot wound: s/p surgery, on medications, no chest pain, stable, monitored.  HTN: Controlled,  on antihypertensive medications.  Overweight/Obesity: No strict exercise routines, not on any weight loss program, neither on low calorie diet.    Andrzej Zhu MD  Cell: [phone]  Office: [phone]

## 2019-06-11 NOTE — PROGRESS NOTE ADULT - SUBJECTIVE AND OBJECTIVE BOX
Patient is a 58y old  Male who presents with a chief complaint of right foot ulcer (11 Jun 2019 09:10)      SUBJECTIVE / OVERNIGHT EVENTS:  No chest pain. No shortness of breath. No complaints. No events overnight. looks pale.    MEDICATIONS  (STANDING):  amLODIPine   Tablet 10 milliGRAM(s) Oral daily  aspirin enteric coated 81 milliGRAM(s) Oral daily  bisacodyl Suppository 10 milliGRAM(s) Rectal once  collagenase Ointment 1 Application(s) Topical daily  dextrose 5%. 1000 milliLiter(s) (50 mL/Hr) IV Continuous <Continuous>  dextrose 50% Injectable 12.5 Gram(s) IV Push once  dextrose 50% Injectable 25 Gram(s) IV Push once  dextrose 50% Injectable 25 Gram(s) IV Push once  dronabinol 2.5 milliGRAM(s) Oral two times a day  ergocalciferol 82851 Unit(s) Oral every week  heparin  Injectable 5000 Unit(s) SubCutaneous every 8 hours  hydrALAZINE 100 milliGRAM(s) Oral three times a day  insulin glargine Injectable (LANTUS) 18 Unit(s) SubCutaneous once  insulin glargine Injectable (LANTUS) 38 Unit(s) SubCutaneous at bedtime  insulin lispro (HumaLOG) corrective regimen sliding scale   SubCutaneous three times a day before meals  insulin lispro (HumaLOG) corrective regimen sliding scale   SubCutaneous three times a day before meals  insulin lispro (HumaLOG) corrective regimen sliding scale   SubCutaneous at bedtime  insulin lispro Injectable (HumaLOG) 22 Unit(s) SubCutaneous three times a day before meals  ondansetron Injectable 4 milliGRAM(s) IV Push once  piperacillin/tazobactam IVPB. 3.375 Gram(s) IV Intermittent every 8 hours  polyethylene glycol 3350 17 Gram(s) Oral every 12 hours  senna 2 Tablet(s) Oral at bedtime    MEDICATIONS  (PRN):  dextrose 40% Gel 15 Gram(s) Oral once PRN Blood Glucose LESS THAN 70 milliGRAM(s)/deciliter  glucagon  Injectable 1 milliGRAM(s) IntraMuscular once PRN Glucose LESS THAN 70 milligrams/deciliter  oxyCODONE    5 mG/acetaminophen 325 mG 2 Tablet(s) Oral every 6 hours PRN Severe Pain (7 - 10)  oxyCODONE    5 mG/acetaminophen 325 mG 1 Tablet(s) Oral every 6 hours PRN Moderate Pain (4 - 6)      Vital Signs Last 24 Hrs  T(C): 36.8 (11 Jun 2019 10:15), Max: 37.2 (10 Colt 2019 22:44)  T(F): 98.2 (11 Jun 2019 10:15), Max: 98.9 (10 Colt 2019 22:44)  HR: 88 (11 Jun 2019 10:15) (71 - 88)  BP: 137/85 (11 Jun 2019 10:15) (129/74 - 160/70)  BP(mean): --  RR: 17 (11 Jun 2019 10:15) (17 - 18)  SpO2: 95% (11 Jun 2019 10:15) (92% - 95%)  CAPILLARY BLOOD GLUCOSE      POCT Blood Glucose.: 204 mg/dL (11 Jun 2019 10:06)  POCT Blood Glucose.: 158 mg/dL (11 Jun 2019 06:02)  POCT Blood Glucose.: 101 mg/dL (11 Jun 2019 00:08)  POCT Blood Glucose.: 82 mg/dL (10 Colt 2019 22:36)  POCT Blood Glucose.: 144 mg/dL (10 Colt 2019 16:47)  POCT Blood Glucose.: 191 mg/dL (10 Colt 2019 13:12)    I&O's Summary    10 Colt 2019 07:01  -  11 Jun 2019 07:00  --------------------------------------------------------  IN: 1360 mL / OUT: 775 mL / NET: 585 mL        PHYSICAL EXAM:  GENERAL: NAD, well-developed  HEAD:  Atraumatic, Normocephalic  EYES: EOMI, PERRLA, conjunctiva and sclera clear  NECK: Supple, No JVD  CHEST/LUNG: Clear to auscultation bilaterally; No wheeze  HEART: Regular rate and rhythm; No murmurs, rubs, or gallops  ABDOMEN: Soft, Nontender, Nondistended; Bowel sounds present  EXTREMITIES:  2+ Peripheral Pulses, No clubbing, cyanosis, or edema  PSYCH: AAOx3  NEUROLOGY: non-focal  SKIN: No rashes or lesions    LABS:                        7.9    8.6   )-----------( 414      ( 11 Jun 2019 07:08 )             24.5     06-11    136  |  104  |  24<H>  ----------------------------<  145<H>  4.9   |  20<L>  |  2.12<H>    Ca    8.8      11 Jun 2019 07:04    TPro  6.5  /  Alb  2.8<L>  /  TBili  0.4  /  DBili  x   /  AST  17  /  ALT  26  /  AlkPhos  102  06-11              RADIOLOGY & ADDITIONAL TESTS:    Imaging Personally Reviewed:    Consultant(s) Notes Reviewed:      Care Discussed with Consultants/Other Providers:

## 2019-06-11 NOTE — CHART NOTE - NSCHARTNOTEFT_GEN_A_CORE
Source: Patient [x ]    Family [ ]     other [x ] Patient seen for routine length of stay follow up. Patient found resting in bed, pleasant.  Patient admits that there is so much going on, very overwhelmed.  Reports to be eating a bit better and tolerating diet but still suboptimal considering his nutritional Increased nutrient needs.  Fortunately, patient is accepting [place] Glucerna shakes.    Dxd with  DM (A1C 11.9), HTN, presents with nonhealing RLE heel ulcer s/p multiple debridements. VAC in place.  Also noted with CKD.  C/w wound care and VAC  per podiatry      Diet : Consistent CHO diet with Glucerna 8 ounces x2      Patient reports improved oral intake but still not eating optimally     PO intake:  < 50% [ ] 50-75% [x ]   % [ ]  other :     Source for PO intake [x ] Patient [ ] family [ ] chart [ x] staff           Current Weight: Weight (kg): 111.6 (06-05 @ 07:51)  % Weight Change- 266 pounds to 244 pounds.  Likely due to fluid shifts as well as a decreased po intake.  Please recheck for accuracy related to bed scale and patient unable to stand on scale.    Pertinent Medications: MEDICATIONS  (STANDING):  amLODIPine   Tablet 10 milliGRAM(s) Oral daily  aspirin enteric coated 81 milliGRAM(s) Oral daily  bisacodyl Suppository 10 milliGRAM(s) Rectal once  collagenase Ointment 1 Application(s) Topical daily  dextrose 5%. 1000 milliLiter(s) (50 mL/Hr) IV Continuous <Continuous>  dextrose 50% Injectable 12.5 Gram(s) IV Push once  dextrose 50% Injectable 25 Gram(s) IV Push once  dextrose 50% Injectable 25 Gram(s) IV Push once  dronabinol 2.5 milliGRAM(s) Oral two times a day  ergocalciferol 27297 Unit(s) Oral every week  heparin  Injectable 5000 Unit(s) SubCutaneous every 8 hours  hydrALAZINE 100 milliGRAM(s) Oral three times a day  insulin glargine Injectable (LANTUS) 18 Unit(s) SubCutaneous once  insulin glargine Injectable (LANTUS) 38 Unit(s) SubCutaneous at bedtime  insulin lispro (HumaLOG) corrective regimen sliding scale   SubCutaneous three times a day before meals  insulin lispro (HumaLOG) corrective regimen sliding scale   SubCutaneous three times a day before meals  insulin lispro (HumaLOG) corrective regimen sliding scale   SubCutaneous at bedtime  insulin lispro Injectable (HumaLOG) 22 Unit(s) SubCutaneous three times a day before meals  ondansetron Injectable 4 milliGRAM(s) IV Push once  piperacillin/tazobactam IVPB. 3.375 Gram(s) IV Intermittent every 8 hours  polyethylene glycol 3350 17 Gram(s) Oral every 12 hours  senna 2 Tablet(s) Oral at bedtime    MEDICATIONS  (PRN):  dextrose 40% Gel 15 Gram(s) Oral once PRN Blood Glucose LESS THAN 70 milliGRAM(s)/deciliter  glucagon  Injectable 1 milliGRAM(s) IntraMuscular once PRN Glucose LESS THAN 70 milligrams/deciliter  oxyCODONE    5 mG/acetaminophen 325 mG 2 Tablet(s) Oral every 6 hours PRN Severe Pain (7 - 10)  oxyCODONE    5 mG/acetaminophen 325 mG 1 Tablet(s) Oral every 6 hours PRN Moderate Pain (4 - 6)    Pertinent Labs:  06-11 Na136 mmol/L Glu 145 mg/dL<H> K+ 4.9 mmol/L Cr  2.12 mg/dL<H> BUN 24 mg/dL<H> 06-11 Alb 2.8 g/dL<L> 05-21 [id number] 11.8 %<H>      Skin: Right heel wound with VAC    Estimated Needs:   [ x] no change since previous assessment  [ ] recalculated:       Previous Nutrition Diagnosis:      [x ] Increased Nutrient Needs persists related to foot wound to heel    [x] Excessive CHO intake controlled as patient is eating suboptimally and being closely monitored in hospital but will need nutrition education regarding A1c when patient is medically appropriate. Fingerstick- 101-204                      Recommend: Continue Consistent CHO diet with Glucerna 8 ounces x2.                      Vit C and multivitamin discontinued.  Continue on Vit D2                       Consider Zac 1 package x2 daily    Monitoring and Evaluation: Monitor diet tolerance, po intake, GI tolerance, weight trends, labs, and skin integrity    RDN remains available for follow up

## 2019-06-11 NOTE — PROGRESS NOTE ADULT - SUBJECTIVE AND OBJECTIVE BOX
Chief complaint  Patient is a 58y old  Male who presents with a chief complaint of right foot ulcer (11 Jun 2019 12:11)   Review of systems  Patient in bed, looks comfortable, no fever, no hypoglycemia.    Labs and Fingersticks  CAPILLARY BLOOD GLUCOSE      POCT Blood Glucose.: 98 mg/dL (11 Jun 2019 21:00)  POCT Blood Glucose.: 123 mg/dL (11 Jun 2019 13:34)  POCT Blood Glucose.: 204 mg/dL (11 Jun 2019 10:06)  POCT Blood Glucose.: 158 mg/dL (11 Jun 2019 06:02)  POCT Blood Glucose.: 101 mg/dL (11 Jun 2019 00:08)  POCT Blood Glucose.: 82 mg/dL (10 Colt 2019 22:36)      Anion Gap, Serum: 12 (06-11 @ 07:04)  Anion Gap, Serum: 11 (06-10 @ 06:26)      Calcium, Total Serum: 8.8 (06-11 @ 07:04)  Calcium, Total Serum: 9.1 (06-10 @ 06:26)  Albumin, Serum: 2.8 <L> (06-11 @ 07:04)    Alanine Aminotransferase (ALT/SGPT): 26 (06-11 @ 07:04)  Alkaline Phosphatase, Serum: 102 (06-11 @ 07:04)  Aspartate Aminotransferase (AST/SGOT): 17 (06-11 @ 07:04)        06-11    136  |  104  |  24<H>  ----------------------------<  145<H>  4.9   |  20<L>  |  2.12<H>    Ca    8.8      11 Jun 2019 07:04    TPro  6.5  /  Alb  2.8<L>  /  TBili  0.4  /  DBili  x   /  AST  17  /  ALT  26  /  AlkPhos  102  06-11                        7.9    8.6   )-----------( 414      ( 11 Jun 2019 07:08 )             24.5     Medications  MEDICATIONS  (STANDING):  amLODIPine   Tablet 10 milliGRAM(s) Oral daily  aspirin enteric coated 81 milliGRAM(s) Oral daily  bisacodyl Suppository 10 milliGRAM(s) Rectal once  collagenase Ointment 1 Application(s) Topical daily  dextrose 5%. 1000 milliLiter(s) (50 mL/Hr) IV Continuous <Continuous>  dextrose 50% Injectable 12.5 Gram(s) IV Push once  dextrose 50% Injectable 25 Gram(s) IV Push once  dextrose 50% Injectable 25 Gram(s) IV Push once  dronabinol 2.5 milliGRAM(s) Oral two times a day  ergocalciferol 91879 Unit(s) Oral every week  heparin  Injectable 5000 Unit(s) SubCutaneous every 8 hours  hydrALAZINE 100 milliGRAM(s) Oral three times a day  insulin glargine Injectable (LANTUS) 18 Unit(s) SubCutaneous once  insulin glargine Injectable (LANTUS) 38 Unit(s) SubCutaneous at bedtime  insulin lispro (HumaLOG) corrective regimen sliding scale   SubCutaneous three times a day before meals  insulin lispro (HumaLOG) corrective regimen sliding scale   SubCutaneous three times a day before meals  insulin lispro (HumaLOG) corrective regimen sliding scale   SubCutaneous at bedtime  insulin lispro Injectable (HumaLOG) 22 Unit(s) SubCutaneous three times a day before meals  ondansetron Injectable 4 milliGRAM(s) IV Push once  piperacillin/tazobactam IVPB. 3.375 Gram(s) IV Intermittent every 8 hours  polyethylene glycol 3350 17 Gram(s) Oral every 12 hours  senna 2 Tablet(s) Oral at bedtime      Physical Exam  General: Patient comfortable in bed  Vital Signs Last 12 Hrs  T(F): 99.3 (06-11-19 @ 21:14), Max: 99.3 (06-11-19 @ 21:14)  HR: 78 (06-11-19 @ 21:14) (78 - 85)  BP: 152/78 (06-11-19 @ 21:14) (137/72 - 152/78)  BP(mean): --  RR: 18 (06-11-19 @ 21:14) (16 - 18)  SpO2: 92% (06-11-19 @ 21:14) (92% - 96%)  Neck: No palpable thyroid nodules.  CVS: S1S2, No murmurs  Respiratory: No wheezing, no crepitations  GI: Abdomen soft, bowel sounds positive  Musculoskeletal:  edema lower extremities.   Skin: No skin rashes, no ecchymosis    Diagnostics    Vitamin D, 1, 25-Dihydroxy: AM Sched. Collection: 22-May-2019 06:00 (05-21 @ 18:59)  Vitamin D, 25-Hydroxy: AM Sched. Collection: 22-May-2019 06:00 (05-21 @ 18:59)

## 2019-06-11 NOTE — PROGRESS NOTE ADULT - PROBLEM SELECTOR PLAN 4
Serum sodium is WNL this AM at 136.    - Hyponatremia currently resolved.  - Recommend monitoring serum sodium with daily labs.

## 2019-06-11 NOTE — PROGRESS NOTE ADULT - PROBLEM SELECTOR PLAN 1
s/p I&D  will need PICC line   follow up WBC study  s/p debridement   Pain control   Monitor WBC  IV abx   Wound care   ID following   keep legs elevated

## 2019-06-11 NOTE — PROGRESS NOTE ADULT - PROBLEM SELECTOR PLAN 1
-cont abx  -initial MRI foot noted - no OM  -s/p debridement per podiatry 5/27  -DM control  -local care per podiatry  -s/p debridement/revision 6/5 - d/w Dr Issa regarding intraop findings - no overt purulence, debrided down to healthy tissue  -prognosis on saving foot is guarded

## 2019-06-11 NOTE — PROGRESS NOTE ADULT - PROBLEM SELECTOR PLAN 3
Pt. with metabolic acidosis in the setting of infection. Serum CO2 noted to be improved to 20 this AM.    - Monitor. If consistently low, may need to start sodium bicarbonate  - Continue to monitor serum CO2 with labs Pt. with metabolic acidosis in the setting of CKD   Serum CO2 noted to be improved to 20 this AM.    - Monitor. If consistently low, may need to start sodium bicarbonate  - Continue to monitor serum CO2 with labs

## 2019-06-11 NOTE — PROGRESS NOTE ADULT - ASSESSMENT
dyspnea  -  secondary to fluid overload  - chest x ray done  - will d/w cards and renal weather to cont lasix 40 mg   -  lower ext doppler negative  -  troponin negative  - EKG without any acute changes  - d dimer elevated  - no need for v/q scan    diabetic foot ulcer with surrounding cellulitis   Fever resolved , leukocytosis persists but improved   -cont abx  -s/p debridement per podiatry 5/27  -DM control  -local care per podiatry  -s/p debridement/revision 6/5 -   - no overt purulence, debrided down to healthy tissue.   - vascular spoke with pt regarding possible BKA.  - picc line for long ter iv abx    PVD  - may need angiogram but will hold off due to renal function    anemia of chronic disease  - transfuse 1 unit of prbc  - consider epogen    uncontrolled Diabetes  - cont lantus 38 - novolog 22 units qac  - hgb a1c  11.9  - diabetic diet  - FS qid  - endo follow up appreciated    MARIKA   - slight improvement in cre  - follow creatinine  - nephrology following  - encouraged fluid intake    HTN   -   hold hydralazine     hyponatremia  - no HCTZ    anorexia  - add Glucerna  - cont Marinol    depression  - seen by psych    constipation  - c/w senna  - miralax bid    dispo  - d/c planning to [id number]

## 2019-06-11 NOTE — PROGRESS NOTE ADULT - SUBJECTIVE AND OBJECTIVE BOX
Subjective: Patient seen and examined. No new events except as noted.   feels worn out   very tired and weak       REVIEW OF SYSTEMS:    CONSTITUTIONAL: + weakness, fevers or chills  EYES/ENT: No visual changes;  No vertigo or throat pain   NECK: No pain or stiffness  RESPIRATORY: No cough, wheezing, hemoptysis; No shortness of breath  CARDIOVASCULAR: No chest pain or palpitations  GASTROINTESTINAL: No abdominal or epigastric pain. No nausea, vomiting, or hematemesis; No diarrhea or constipation. No melena or hematochezia.  GENITOURINARY: No dysuria, frequency or hematuria  NEUROLOGICAL: No numbness or weakness  SKIN: No itching, burning, rashes, or lesions   All other review of systems is negative unless indicated above.    MEDICATIONS:  MEDICATIONS  (STANDING):  amLODIPine   Tablet 10 milliGRAM(s) Oral daily  aspirin enteric coated 81 milliGRAM(s) Oral daily  bisacodyl Suppository 10 milliGRAM(s) Rectal once  collagenase Ointment 1 Application(s) Topical daily  dextrose 5%. 1000 milliLiter(s) (50 mL/Hr) IV Continuous <Continuous>  dextrose 50% Injectable 12.5 Gram(s) IV Push once  dextrose 50% Injectable 25 Gram(s) IV Push once  dextrose 50% Injectable 25 Gram(s) IV Push once  dronabinol 2.5 milliGRAM(s) Oral two times a day  ergocalciferol 22774 Unit(s) Oral every week  heparin  Injectable 5000 Unit(s) SubCutaneous every 8 hours  hydrALAZINE 100 milliGRAM(s) Oral three times a day  insulin glargine Injectable (LANTUS) 18 Unit(s) SubCutaneous once  insulin glargine Injectable (LANTUS) 38 Unit(s) SubCutaneous at bedtime  insulin lispro (HumaLOG) corrective regimen sliding scale   SubCutaneous three times a day before meals  insulin lispro (HumaLOG) corrective regimen sliding scale   SubCutaneous three times a day before meals  insulin lispro (HumaLOG) corrective regimen sliding scale   SubCutaneous at bedtime  insulin lispro Injectable (HumaLOG) 22 Unit(s) SubCutaneous three times a day before meals  ondansetron Injectable 4 milliGRAM(s) IV Push once  piperacillin/tazobactam IVPB. 3.375 Gram(s) IV Intermittent every 8 hours  polyethylene glycol 3350 17 Gram(s) Oral every 12 hours  senna 2 Tablet(s) Oral at bedtime      PHYSICAL EXAM:  T(C): 36.8 (06-11-19 @ 10:15), Max: 37.2 (06-10-19 @ 22:44)  HR: 88 (06-11-19 @ 10:15) (71 - 88)  BP: 137/85 (06-11-19 @ 10:15) (129/74 - 160/70)  RR: 17 (06-11-19 @ 10:15) (17 - 18)  SpO2: 95% (06-11-19 @ 10:15) (92% - 95%)  Wt(kg): --  I&O's Summary    10 Colt 2019 07:01  -  11 Jun 2019 07:00  --------------------------------------------------------  IN: 1360 mL / OUT: 775 mL / NET: 585 mL    11 Jun 2019 07:01  -  11 Jun 2019 12:05  --------------------------------------------------------  IN: 0 mL / OUT: 440 mL / NET: -440 mL              Appearance: NAD   HEENT:   Normal oral mucosa, PERRL, EOMI	  Lymphatic: No lymphadenopathy  Cardiovascular: Normal S1 S2, No JVD, No murmurs , Peripheral pulses palpable 2+ bilaterally  Respiratory: Lungs clear to auscultation, normal effort 	  Gastrointestinal:  Soft, Non-tender, + BS	  Skin: No rashes, No ecchymoses, No cyanosis, warm to touch  Musculoskeletal: Decreased range of motion, normal strength  Psychiatry:  Mood & affect appropriate  Ext: +edema , chronic venous stasis skin discoloration   right foot wrapped , + ulcer   +VAC  2+ pitting edema         LABS:    CARDIAC MARKERS:                                7.9    8.6   )-----------( 414      ( 11 Jun 2019 07:08 )             24.5     06-11    136  |  104  |  24<H>  ----------------------------<  145<H>  4.9   |  20<L>  |  2.12<H>    Ca    8.8      11 Jun 2019 07:04    TPro  6.5  /  Alb  2.8<L>  /  TBili  0.4  /  DBili  x   /  AST  17  /  ALT  26  /  AlkPhos  102  06-11    proBNP:   Lipid Profile:   HgA1c:   TSH:             TELEMETRY: 	    ECG:  	  RADIOLOGY: < from: MR Foot w/wo IV Cont, Right (05.30.19 @ 18:53) >    EXAM:  MR FOOT WAW IC RT                            PROCEDURE DATE:  05/30/2019            INTERPRETATION:  MR FOOT WITHOUT AND WITH IV CONTRAST RIGHT dated   5/30/2019 6:53 PM     INDICATION: Continued pain status post operation.    COMPARISON: Right foot MRI dated 5/21/2019. Foot radiographs dated   5/20/2019    TECHNIQUE: Multi-sequential, multiplanar MRI imaging of the midfoot and   forefoot was performed per standard protocol. Images were obtained before   and after the administration of IV contrast.  Contrast: Gadavist. Administered: 10 cc. Discarded: 0 cc.    FINDINGS:    BONE MARROW: There there is periosteal reaction along the second, third,   and fourth metatarsals with suggestion of mild endosteal   edema/enhancement along the undersurface of the second through fourth   metatarsals. There is subchondral cystic change along the inferior aspect   of the medial cuneiform without change. No fracture or osteonecrosis is   noted.  SYNOVIUM/ JOINT FLUID: There is no large joint effusion.  TENDONS: The tendons are intact. No full-thickness tear or retraction is   seen. No tenosynovitis is appreciated.  MUSCLES: There is moderate to severe atrophy of the intrinsic musculature   the foot. There is moderate edema within the musculature. This is   nonspecific but can be seen in the setting of neuropathic change.  NEUROVASCULAR STRUCTURES: Preserved  SUBCUTANEOUS SOFT TISSUES: When compared to the prior exam, the skin   defect along the plantar surface of the foot is increased in size.   Previously this skin defect measures up to 2.8 cm when remeasured.   Currently, the skin defect measures larger than 7.6 cm and is only   partially imaged on this exam. The hindfoot and extent of the skin ulcer   is not included. There is associated skin thickening. There is exposure   the underlying musculature. There is adjacent edema and enhancement.   There is a large fluid collection within the plantar aspect of the   midfoot and forefoot centrally measuring approximate 0.7 x 2.8 x 1.1 cm.   This fluid collection borders the undersurface of the second, third, and   fourth metatarsals. Several foci of gas are intermixed with the fluid.   There is a focal area of nonenhancement within the soft tissues of the   plantar foot deep to the second and third metatarsals.    IMPRESSION:    1.  Interval increase in size of a skin defect along the plantar surface   of the midfoot and forefoot which is partially imaged on this exam.   Findings likely represent mixture of ulcer with prior surgery. Adjacent   edema suggest cellulitis.  2.  Large fluid collection within the soft tissues of the plantar midfoot   and forefoot contacting the overlying metatarsals. Given the overlying   skin ulcer and several foci of intermixed gas, findings are concerning   for an evolving abscess. Differential includes postoperative seroma.  3.  Periosteal reaction involving the second, third, and fourth   metatarsals with subtle and ostial edema/enhancement of the second   through fourth metatarsals. Differential considerations include reactive   osteitis and early osteomyelitis.    Findings were discussed with DHEERAJ Gilbert on 5/31/2019 9:35 AM  with read   back.                    NICK AHMADI M.D., ATTENDING RADIOLOGIST  This document has been electronically signed. May 31 2019  9:35AM                < end of copied text >    DIAGNOSTIC TESTING:  [ ] Echocardiogram:  [ ]  Catheterization:  [ ] Stress Test:    OTHER:

## 2019-06-11 NOTE — PROGRESS NOTE ADULT - PROBLEM SELECTOR PLAN 2
Pt. with anemia. Recommend obtaining iron studies. Monitor Hgb. Pt. with anemia.  Not iron deficient  pRBC transfusion per primary team   Monitor Hgb.

## 2019-06-11 NOTE — PROVIDER CONTACT NOTE (OTHER) - SITUATION
Pt has current BS of 82 with an order of Lantus 38 units due
/72 and Temp of 101.8F
Pt dx DM foot ulcer, found to be hypertensive and hyperglycemic upon arrival to [facility]
Pt with temp 100.4
Pt. febrile with temp of 100.5
Pt. febrile with temp of 101F
Pt. temp 102.6 F.
Temp 102.2 orally
pt with bedtime FS of 82 with repeat  at 0000 on 6/11

## 2019-06-11 NOTE — PROGRESS NOTE ADULT - PROBLEM SELECTOR PLAN 1
Patient with MARIKA likely hemodynamically mediated in the setting of infection and ACE-I use. Patient with likely CKD with Scr elevated in the past.    - Scr improving daily but trended back up 6/5 to 6/6 s/p OR 1.97 to 2.72 and was started on Vanc/Zosyn combo; today's Scr improved to 2.12, hopefully will continue to improve.  - Check vanc levels daily.  - Avoid Zosyn and vancomycin combination as it may cause worsening of MARIKA.  - Monitor labs and UOP.  - Encourage PO hydration  - Recommend avoiding contrast studies for now.

## 2019-06-12 LAB
GLUCOSE BLDC GLUCOMTR-MCNC: 128 MG/DL — HIGH (ref 70–99)
GLUCOSE BLDC GLUCOMTR-MCNC: 132 MG/DL — HIGH (ref 70–99)
GLUCOSE BLDC GLUCOMTR-MCNC: 179 MG/DL — HIGH (ref 70–99)
GLUCOSE BLDC GLUCOMTR-MCNC: 269 MG/DL — HIGH (ref 70–99)
SURGICAL PATHOLOGY STUDY: SIGNIFICANT CHANGE UP

## 2019-06-12 PROCEDURE — 99232 SBSQ HOSP IP/OBS MODERATE 35: CPT

## 2019-06-12 RX ORDER — HYDROCORTISONE 1 %
1 OINTMENT (GRAM) TOPICAL
Refills: 0 | Status: DISCONTINUED | OUTPATIENT
Start: 2019-06-12 | End: 2019-06-14

## 2019-06-12 RX ADMIN — Medication 2.5 MILLIGRAM(S): at 18:47

## 2019-06-12 RX ADMIN — Medication 1: at 23:09

## 2019-06-12 RX ADMIN — PIPERACILLIN AND TAZOBACTAM 25 GRAM(S): 4; .5 INJECTION, POWDER, LYOPHILIZED, FOR SOLUTION INTRAVENOUS at 15:14

## 2019-06-12 RX ADMIN — HEPARIN SODIUM 5000 UNIT(S): 5000 INJECTION INTRAVENOUS; SUBCUTANEOUS at 15:14

## 2019-06-12 RX ADMIN — HEPARIN SODIUM 5000 UNIT(S): 5000 INJECTION INTRAVENOUS; SUBCUTANEOUS at 23:01

## 2019-06-12 RX ADMIN — OXYCODONE AND ACETAMINOPHEN 2 TABLET(S): 5; 325 TABLET ORAL at 16:05

## 2019-06-12 RX ADMIN — HEPARIN SODIUM 5000 UNIT(S): 5000 INJECTION INTRAVENOUS; SUBCUTANEOUS at 06:36

## 2019-06-12 RX ADMIN — PIPERACILLIN AND TAZOBACTAM 25 GRAM(S): 4; .5 INJECTION, POWDER, LYOPHILIZED, FOR SOLUTION INTRAVENOUS at 06:35

## 2019-06-12 RX ADMIN — Medication 100 MILLIGRAM(S): at 06:36

## 2019-06-12 RX ADMIN — AMLODIPINE BESYLATE 10 MILLIGRAM(S): 2.5 TABLET ORAL at 06:36

## 2019-06-12 RX ADMIN — INSULIN GLARGINE 18 UNIT(S): 100 INJECTION, SOLUTION SUBCUTANEOUS at 00:09

## 2019-06-12 RX ADMIN — Medication 1 APPLICATION(S): at 12:49

## 2019-06-12 RX ADMIN — Medication 1 APPLICATION(S): at 15:12

## 2019-06-12 RX ADMIN — Medication 100 MILLIGRAM(S): at 15:15

## 2019-06-12 RX ADMIN — Medication 100 MILLIGRAM(S): at 23:08

## 2019-06-12 RX ADMIN — Medication 81 MILLIGRAM(S): at 15:15

## 2019-06-12 RX ADMIN — SENNA PLUS 2 TABLET(S): 8.6 TABLET ORAL at 23:08

## 2019-06-12 RX ADMIN — INSULIN GLARGINE 38 UNIT(S): 100 INJECTION, SOLUTION SUBCUTANEOUS at 23:08

## 2019-06-12 RX ADMIN — OXYCODONE AND ACETAMINOPHEN 2 TABLET(S): 5; 325 TABLET ORAL at 15:35

## 2019-06-12 RX ADMIN — PIPERACILLIN AND TAZOBACTAM 25 GRAM(S): 4; .5 INJECTION, POWDER, LYOPHILIZED, FOR SOLUTION INTRAVENOUS at 23:02

## 2019-06-12 RX ADMIN — Medication 22 UNIT(S): at 08:52

## 2019-06-12 RX ADMIN — Medication 2.5 MILLIGRAM(S): at 06:36

## 2019-06-12 NOTE — PROGRESS NOTE ADULT - SUBJECTIVE AND OBJECTIVE BOX
Chief complaint  Patient is a 58y old  Male who presents with a chief complaint of right foot ulcer (12 Jun 2019 12:47)   Review of systems  Patient in bed, looks comfortable, no fever, no hypoglycemia.    Labs and Fingersticks  CAPILLARY BLOOD GLUCOSE      POCT Blood Glucose.: 132 mg/dL (12 Jun 2019 15:37)  POCT Blood Glucose.: 128 mg/dL (12 Jun 2019 08:35)  POCT Blood Glucose.: 117 mg/dL (11 Jun 2019 23:18)  POCT Blood Glucose.: 98 mg/dL (11 Jun 2019 21:00)      Anion Gap, Serum: 12 (06-11 @ 07:04)      Calcium, Total Serum: 8.8 (06-11 @ 07:04)  Albumin, Serum: 2.8 <L> (06-11 @ 07:04)    Alanine Aminotransferase (ALT/SGPT): 26 (06-11 @ 07:04)  Alkaline Phosphatase, Serum: 102 (06-11 @ 07:04)  Aspartate Aminotransferase (AST/SGOT): 17 (06-11 @ 07:04)        06-11    136  |  104  |  24<H>  ----------------------------<  145<H>  4.9   |  20<L>  |  2.12<H>    Ca    8.8      11 Jun 2019 07:04    TPro  6.5  /  Alb  2.8<L>  /  TBili  0.4  /  DBili  x   /  AST  17  /  ALT  26  /  AlkPhos  102  06-11                        7.9    8.6   )-----------( 414      ( 11 Jun 2019 07:08 )             24.5     Medications  MEDICATIONS  (STANDING):  amLODIPine   Tablet 10 milliGRAM(s) Oral daily  aspirin enteric coated 81 milliGRAM(s) Oral daily  bisacodyl Suppository 10 milliGRAM(s) Rectal once  collagenase Ointment 1 Application(s) Topical daily  dextrose 5%. 1000 milliLiter(s) (50 mL/Hr) IV Continuous <Continuous>  dextrose 50% Injectable 12.5 Gram(s) IV Push once  dextrose 50% Injectable 25 Gram(s) IV Push once  dextrose 50% Injectable 25 Gram(s) IV Push once  dronabinol 2.5 milliGRAM(s) Oral two times a day  ergocalciferol 96680 Unit(s) Oral every week  heparin  Injectable 5000 Unit(s) SubCutaneous every 8 hours  hydrALAZINE 100 milliGRAM(s) Oral three times a day  insulin glargine Injectable (LANTUS) 38 Unit(s) SubCutaneous at bedtime  insulin lispro (HumaLOG) corrective regimen sliding scale   SubCutaneous three times a day before meals  insulin lispro (HumaLOG) corrective regimen sliding scale   SubCutaneous three times a day before meals  insulin lispro (HumaLOG) corrective regimen sliding scale   SubCutaneous at bedtime  insulin lispro Injectable (HumaLOG) 22 Unit(s) SubCutaneous three times a day before meals  ondansetron Injectable 4 milliGRAM(s) IV Push once  piperacillin/tazobactam IVPB. 3.375 Gram(s) IV Intermittent every 8 hours  polyethylene glycol 3350 17 Gram(s) Oral every 12 hours  senna 2 Tablet(s) Oral at bedtime      Physical Exam  General: Patient comfortable in bed  Vital Signs Last 12 Hrs  T(F): 98.3 (06-12-19 @ 15:40), Max: 98.6 (06-12-19 @ 11:18)  HR: 85 (06-12-19 @ 15:40) (79 - 89)  BP: 154/74 (06-12-19 @ 15:40) (134/76 - 154/74)  BP(mean): --  RR: 18 (06-12-19 @ 15:40) (18 - 20)  SpO2: 94% (06-12-19 @ 15:40) (93% - 98%)  Neck: No palpable thyroid nodules.  CVS: S1S2, No murmurs  Respiratory: No wheezing, no crepitations  GI: Abdomen soft, bowel sounds positive  Musculoskeletal:  edema lower extremities.   Skin: No skin rashes, no ecchymosis    Diagnostics    Vitamin D, 1, 25-Dihydroxy: AM Sched. Collection: 22-May-2019 06:00 (05-21 @ 18:59)  Vitamin D, 25-Hydroxy: AM Sched. Collection: 22-May-2019 06:00 (05-21 @ 18:59)

## 2019-06-12 NOTE — PROGRESS NOTE ADULT - ASSESSMENT
Dx: 1. s/p debridement R plantar foot infection w/ extensive open wound on Vac Tx 125 mm hg continuous therapy        2. 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 S/P Extensive  debridement R plantar foot infection w/ extensive open wound on Vac Tx 125 mm hg continuous therapy w/ Sx wound stable/viable        2. 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

## 2019-06-12 NOTE — PROGRESS NOTE ADULT - SUBJECTIVE AND OBJECTIVE BOX
AGUSTIN BRAR 58y [id number]    Patient is a 58y old  Male who presents with a chief complaint of right foot ulcer (12 Jun 2019 12:21)      Follow Up/CC:  ID following for foot infection    Interval History/ROS: feels ok, waiting for picc, no fever    Allergies    No Known Allergies    Intolerances        ANTIMICROBIALS:  piperacillin/tazobactam IVPB. 3.375 every 8 hours      MEDICATIONS  (STANDING):  amLODIPine   Tablet 10 milliGRAM(s) Oral daily  aspirin enteric coated 81 milliGRAM(s) Oral daily  bisacodyl Suppository 10 milliGRAM(s) Rectal once  collagenase Ointment 1 Application(s) Topical daily  dextrose 5%. 1000 milliLiter(s) (50 mL/Hr) IV Continuous <Continuous>  dextrose 50% Injectable 12.5 Gram(s) IV Push once  dextrose 50% Injectable 25 Gram(s) IV Push once  dextrose 50% Injectable 25 Gram(s) IV Push once  dronabinol 2.5 milliGRAM(s) Oral two times a day  ergocalciferol 41223 Unit(s) Oral every week  heparin  Injectable 5000 Unit(s) SubCutaneous every 8 hours  hydrALAZINE 100 milliGRAM(s) Oral three times a day  insulin glargine Injectable (LANTUS) 38 Unit(s) SubCutaneous at bedtime  insulin lispro (HumaLOG) corrective regimen sliding scale   SubCutaneous three times a day before meals  insulin lispro (HumaLOG) corrective regimen sliding scale   SubCutaneous three times a day before meals  insulin lispro (HumaLOG) corrective regimen sliding scale   SubCutaneous at bedtime  insulin lispro Injectable (HumaLOG) 22 Unit(s) SubCutaneous three times a day before meals  ondansetron Injectable 4 milliGRAM(s) IV Push once  piperacillin/tazobactam IVPB. 3.375 Gram(s) IV Intermittent every 8 hours  polyethylene glycol 3350 17 Gram(s) Oral every 12 hours  senna 2 Tablet(s) Oral at bedtime    MEDICATIONS  (PRN):  dextrose 40% Gel 15 Gram(s) Oral once PRN Blood Glucose LESS THAN 70 milliGRAM(s)/deciliter  glucagon  Injectable 1 milliGRAM(s) IntraMuscular once PRN Glucose LESS THAN 70 milligrams/deciliter  hydrocortisone 1% Ointment 1 Application(s) Topical two times a day PRN Itching  oxyCODONE    5 mG/acetaminophen 325 mG 2 Tablet(s) Oral every 6 hours PRN Severe Pain (7 - 10)  oxyCODONE    5 mG/acetaminophen 325 mG 1 Tablet(s) Oral every 6 hours PRN Moderate Pain (4 - 6)        Vital Signs Last 24 Hrs  T(C): 36.8 (12 Jun 2019 15:40), Max: 37.4 (11 Jun 2019 21:14)  T(F): 98.3 (12 Jun 2019 15:40), Max: 99.3 (11 Jun 2019 21:14)  HR: 85 (12 Jun 2019 15:40) (75 - 89)  BP: 154/74 (12 Jun 2019 15:40) (134/76 - 154/74)  BP(mean): --  RR: 18 (12 Jun 2019 15:40) (16 - 20)  SpO2: 94% (12 Jun 2019 15:40) (92% - 98%)    CBC Full  -  ( 11 Jun 2019 07:08 )  WBC Count : 8.6 K/uL  RBC Count : 2.64 M/uL  Hemoglobin : 7.9 g/dL  Hematocrit : 24.5 %  Platelet Count - Automated : 414 K/uL  Mean Cell Volume : 92.9 fl  Mean Cell Hemoglobin : 29.8 pg  Mean Cell Hemoglobin Concentration : 32.1 gm/dL  Auto Neutrophil # : 7.5 K/uL  Auto Lymphocyte # : 0.7 K/uL  Auto Monocyte # : 0.3 K/uL  Auto Eosinophil # : 0.1 K/uL  Auto Basophil # : 0.0 K/uL  Auto Neutrophil % : 86.0 %  Auto Lymphocyte % : 7.0 %  Auto Monocyte % : 7.0 %  Auto Eosinophil % : 0.0 %  Auto Basophil % : 0.0 %    06-11    136  |  104  |  24<H>  ----------------------------<  145<H>  4.9   |  20<L>  |  2.12<H>    Ca    8.8      11 Jun 2019 07:04    TPro  6.5  /  Alb  2.8<L>  /  TBili  0.4  /  DBili  x   /  AST  17  /  ALT  26  /  AlkPhos  102  06-11    LIVER FUNCTIONS - ( 11 Jun 2019 07:04 )  Alb: 2.8 g/dL / Pro: 6.5 g/dL / ALK PHOS: 102 U/L / ALT: 26 U/L / AST: 17 U/L / GGT: x               MICROBIOLOGY:  .Tissue  06-05-19   Numerous Escherichia coli  Few [place] parapsilosis "Susceptibilities not performed"  --  Escherichia coli      .Other  05-28-19   No growth  --  Enterococcus faecalis  Escherichia coli      .Blood  05-26-19   No growth at 5 days.  --  --      .Blood  05-25-19   No growth at 5 days.  --  --      .Tissue  05-23-19   Few Escherichia coli  Growth in fluid media only Enterococcus faecalis  Rare Streptococcus agalactiae (Group B) isolated  Group B streptococci are susceptible to ampicillin,  penicillin and cefazolin, but may be resistant to  erythromycin and clindamycin.  Recommendations for intrapartum prophylaxis for Group B  streptococci are penicillin or ampicillin.  --  Escherichia coli  Enterococcus faecalis      .Blood  05-20-19   No growth at 5 days.  --  --      .Abscess  05-20-19   Numerous Escherichia coli  Numerous Enterococcus faecalis  Moderate Prevotella bivia "Susceptibilities not performed"  --  Enterococcus faecalis  Escherichia coli  RADIOLOGY    < from: NM Inflammatory Loc Wholebody, WBC (06.11.19 @ 09:15) >  Abnormal Indium-111 labeled leukocyte scan.    Labeled WBC activity in the urinary bladder raises the possibility of   cystitis    < end of copied text >

## 2019-06-12 NOTE — PROGRESS NOTE ADULT - SUBJECTIVE AND OBJECTIVE BOX
Patient is a 58y old  Male who presents with a chief complaint of right foot ulcer (11 Jun 2019 22:21)       INTERVAL HPI/OVERNIGHT EVENTS:  Patient seen and evaluated at bedside.  Pt is resting comfortable in NAD. Denies N/V/F/C.      Allergies    No Known Allergies    Intolerances        Vital Signs Last 24 Hrs  T(C): 36.8 (12 Jun 2019 05:05), Max: 37.4 (11 Jun 2019 21:14)  T(F): 98.3 (12 Jun 2019 05:05), Max: 99.3 (11 Jun 2019 21:14)  HR: 75 (12 Jun 2019 05:05) (75 - 85)  BP: 153/84 (12 Jun 2019 05:05) (137/72 - 153/84)  BP(mean): --  RR: 18 (12 Jun 2019 05:05) (16 - 18)  SpO2: 93% (12 Jun 2019 05:05) (92% - 96%)    LABS:                        7.9    8.6   )-----------( 414      ( 11 Jun 2019 07:08 )             24.5     06-11    136  |  104  |  24<H>  ----------------------------<  145<H>  4.9   |  20<L>  |  2.12<H>    Ca    8.8      11 Jun 2019 07:04    TPro  6.5  /  Alb  2.8<L>  /  TBili  0.4  /  DBili  x   /  AST  17  /  ALT  26  /  AlkPhos  102  06-11        CAPILLARY BLOOD GLUCOSE      POCT Blood Glucose.: 128 mg/dL (12 Jun 2019 08:35)  POCT Blood Glucose.: 117 mg/dL (11 Jun 2019 23:18)  POCT Blood Glucose.: 98 mg/dL (11 Jun 2019 21:00)  POCT Blood Glucose.: 123 mg/dL (11 Jun 2019 13:34)      Lower Extremity Physical Exam:  Skin: R plantar full thickness ulcer midfoot 5 cm (d) w/ Vertical Sx incision post I & D extending distally to met heads w/ Plantar Fascia & muscle belly exposed and mainly resection as well as proximal w/ Sx incision extending to heel w/ mixed fibrogranular tissue w/ total length of Sx wound 14 cm (L). No expressable purulence, mild maceration periwound but tissue is viable Patient is a 58y old  Male who presents with a chief complaint of right foot ulcer (11 Jun 2019 22:21)       INTERVAL HPI/OVERNIGHT EVENTS:  Patient seen and evaluated at bedside.  Pt is resting comfortable in NAD. Denies N/V/F/C.      Allergies    No Known Allergies    Intolerances        Vital Signs Last 24 Hrs  T(C): 36.8 (12 Jun 2019 05:05), Max: 37.4 (11 Jun 2019 21:14)  T(F): 98.3 (12 Jun 2019 05:05), Max: 99.3 (11 Jun 2019 21:14)  HR: 75 (12 Jun 2019 05:05) (75 - 85)  BP: 153/84 (12 Jun 2019 05:05) (137/72 - 153/84)  BP(mean): --  RR: 18 (12 Jun 2019 05:05) (16 - 18)  SpO2: 93% (12 Jun 2019 05:05) (92% - 96%)    LABS:                        7.9    8.6   )-----------( 414      ( 11 Jun 2019 07:08 )             24.5     06-11    136  |  104  |  24<H>  ----------------------------<  145<H>  4.9   |  20<L>  |  2.12<H>  P  Ca    8.8      11 Jun 2019 07:04    TPro  6.5  /  Alb  2.8<L>  /  TBili  0.4  /  DBili  x   /  AST  17  /  ALT  26  /  AlkPhos  102  06-11        CAPILLARY BLOOD GLUCOSE      POCT Blood Glucose.: 128 mg/dL (12 Jun 2019 08:35)  POCT Blood Glucose.: 117 mg/dL (11 Jun 2019 23:18)  POCT Blood Glucose.: 98 mg/dL (11 Jun 2019 21:00)  POCT Blood Glucose.: 123 mg/dL (11 Jun 2019 13:34)

## 2019-06-12 NOTE — PROGRESS NOTE ADULT - PROBLEM SELECTOR PLAN 1
s/p I&D  will need PICC line     s/p debridement   Pain control   Monitor WBC  IV abx   Wound care   ID following   keep legs elevated

## 2019-06-12 NOTE — PROGRESS NOTE ADULT - ASSESSMENT
dyspnea resolved  -  secondary to fluid overload    diabetic foot ulcer with surrounding cellulitis   Fever resolved , leukocytosis persists but improved   -cont abx  -s/p debridement per podiatry 5/27  -DM control  -local care per podiatry  - wound vac  -s/p debridement/revision 6/5 -   - no overt purulence, debrided down to healthy tissue.   - vascular spoke with pt regarding possible BKA.  - picc line for long ter iv abx  - day # 7/42 of abx    PVD  - may need angiogram but will hold off due to renal function    anemia of chronic disease  - s/p  transfusion 1 unit of prbc  - consider epogen    uncontrolled Diabetes  - cont lantus 38 - novolog 22 units qac  - hgb a1c  11.9  - diabetic diet  - FS qid  - endo follow up appreciated    MARIKA   - slight improvement in cre  - follow creatinine  - nephrology following  - encouraged fluid intake    HTN   -   hold hydralazine     hyponatremia  - no HCTZ    anorexia  - add Glucerna  - cont Marinol    depression  - seen by psych    constipation  - c/w senna  - miralax bid    dispo  - d/c planning to [facility] dyspnea resolved  -  secondary to fluid overload    diabetic foot ulcer with surrounding cellulitis and sepsis present on admission resolved  Fever resolved , leukocytosis persists but improved   -cont abx  -s/p debridement per podiatry 5/27  -DM control  -local care per podiatry  - wound vac  -s/p debridement/revision 6/5 -   - no overt purulence, debrided down to healthy tissue.   - vascular spoke with pt regarding possible BKA.  - picc line for long ter iv abx  - day # 7/42 of abx    PVD  - may need angiogram but will hold off due to renal function    anemia of chronic disease  - s/p  transfusion 1 unit of prbc  - consider epogen    uncontrolled Diabetes  - cont lantus 38 - novolog 22 units qac  - hgb a1c  11.9  - diabetic diet  - FS qid  - endo follow up appreciated    MARIKA   - slight improvement in cre  - follow creatinine  - nephrology following  - encouraged fluid intake    HTN   -   hold hydralazine     hyponatremia  - no HCTZ    anorexia  - add Glucerna  - cont Marinol    depression  - seen by psych    constipation  - c/w senna  - miralax bid    dispo  - d/c planning to [facility]

## 2019-06-12 NOTE — PROGRESS NOTE ADULT - ASSESSMENT
Assessment  DMT2: 58y Male with DM T2 with hyperglycemia, on insulin, blood sugars improved, no hypoglycemias, eating meals, feeling better,  compliant with low carb diet.  Foot wound: s/p surgery, on medications, no chest pain, stable, monitored.  HTN: Controlled,  on antihypertensive medications.  Overweight/Obesity: No strict exercise routines, not on any weight loss program, neither on low calorie diet.    Andrzej Zhu MD  Cell: [phone]  Office: [phone]

## 2019-06-12 NOTE — CHART NOTE - NSCHARTNOTEFT_GEN_A_CORE
Pt. with indications for PICC line insertion for long-term abx. Pt. also noted to have MARIKA during this admission, however, Scr appears to be improving as infection resolves. Recommend insertion of PICC line for continued IV abx.     Plan discussed with nephrology attending, Dr. Olivier.

## 2019-06-12 NOTE — PROGRESS NOTE ADULT - SUBJECTIVE AND OBJECTIVE BOX
Subjective: Patient seen and examined. No new events except as noted.   No cp or sob       REVIEW OF SYSTEMS:    CONSTITUTIONAL: + weakness, fevers or chills  EYES/ENT: No visual changes;  No vertigo or throat pain   NECK: No pain or stiffness  RESPIRATORY: No cough, wheezing, hemoptysis; No shortness of breath  CARDIOVASCULAR: No chest pain or palpitations  GASTROINTESTINAL: No abdominal or epigastric pain. No nausea, vomiting, or hematemesis; No diarrhea or constipation. No melena or hematochezia.  GENITOURINARY: No dysuria, frequency or hematuria  NEUROLOGICAL: No numbness or weakness  SKIN: No itching, burning, rashes, or lesions   All other review of systems is negative unless indicated above.    MEDICATIONS:  MEDICATIONS  (STANDING):  amLODIPine   Tablet 10 milliGRAM(s) Oral daily  aspirin enteric coated 81 milliGRAM(s) Oral daily  bisacodyl Suppository 10 milliGRAM(s) Rectal once  collagenase Ointment 1 Application(s) Topical daily  dextrose 5%. 1000 milliLiter(s) (50 mL/Hr) IV Continuous <Continuous>  dextrose 50% Injectable 12.5 Gram(s) IV Push once  dextrose 50% Injectable 25 Gram(s) IV Push once  dextrose 50% Injectable 25 Gram(s) IV Push once  dronabinol 2.5 milliGRAM(s) Oral two times a day  ergocalciferol 55857 Unit(s) Oral every week  heparin  Injectable 5000 Unit(s) SubCutaneous every 8 hours  hydrALAZINE 100 milliGRAM(s) Oral three times a day  insulin glargine Injectable (LANTUS) 38 Unit(s) SubCutaneous at bedtime  insulin lispro (HumaLOG) corrective regimen sliding scale   SubCutaneous three times a day before meals  insulin lispro (HumaLOG) corrective regimen sliding scale   SubCutaneous three times a day before meals  insulin lispro (HumaLOG) corrective regimen sliding scale   SubCutaneous at bedtime  insulin lispro Injectable (HumaLOG) 22 Unit(s) SubCutaneous three times a day before meals  ondansetron Injectable 4 milliGRAM(s) IV Push once  piperacillin/tazobactam IVPB. 3.375 Gram(s) IV Intermittent every 8 hours  polyethylene glycol 3350 17 Gram(s) Oral every 12 hours  senna 2 Tablet(s) Oral at bedtime      PHYSICAL EXAM:  T(C): 36.8 (06-12-19 @ 05:05), Max: 37.4 (06-11-19 @ 21:14)  HR: 75 (06-12-19 @ 05:05) (75 - 85)  BP: 153/84 (06-12-19 @ 05:05) (137/72 - 153/84)  RR: 18 (06-12-19 @ 05:05) (16 - 18)  SpO2: 93% (06-12-19 @ 05:05) (92% - 96%)  Wt(kg): --  I&O's Summary    11 Jun 2019 07:01  -  12 Jun 2019 07:00  --------------------------------------------------------  IN: 1190 mL / OUT: 1190 mL / NET: 0 mL    12 Jun 2019 07:01  -  12 Jun 2019 11:08  --------------------------------------------------------  IN: 240 mL / OUT: 0 mL / NET: 240 mL            Appearance: NAD   HEENT:   Normal oral mucosa, PERRL, EOMI	  Lymphatic: No lymphadenopathy  Cardiovascular: Normal S1 S2, No JVD, No murmurs , Peripheral pulses palpable 2+ bilaterally  Respiratory: Lungs clear to auscultation, normal effort 	  Gastrointestinal:  Soft, Non-tender, + BS	  Skin: No rashes, No ecchymoses, No cyanosis, warm to touch  Musculoskeletal: Decreased range of motion, normal strength  Psychiatry:  Mood & affect appropriate  Ext: +edema , chronic venous stasis skin discoloration   right foot wrapped , + ulcer   +VAC  2+ pitting edema     LABS:    CARDIAC MARKERS:                                7.9    8.6   )-----------( 414      ( 11 Jun 2019 07:08 )             24.5     06-11    136  |  104  |  24<H>  ----------------------------<  145<H>  4.9   |  20<L>  |  2.12<H>    Ca    8.8      11 Jun 2019 07:04    TPro  6.5  /  Alb  2.8<L>  /  TBili  0.4  /  DBili  x   /  AST  17  /  ALT  26  /  AlkPhos  102  06-11    proBNP:   Lipid Profile:   HgA1c:   TSH:             TELEMETRY: 	    ECG:  	  RADIOLOGY:   DIAGNOSTIC TESTING:  [ ] Echocardiogram:  [ ]  Catheterization:  [ ] Stress Test:    OTHER:

## 2019-06-12 NOTE — PROGRESS NOTE ADULT - SUBJECTIVE AND OBJECTIVE BOX
Patient is a 58y old  Male who presents with a chief complaint of right foot ulcer (12 Jun 2019 11:08)      SUBJECTIVE / OVERNIGHT EVENTS:  No chest pain. No shortness of breath. No complaints. No events overnight. was transfused.    MEDICATIONS  (STANDING):  amLODIPine   Tablet 10 milliGRAM(s) Oral daily  aspirin enteric coated 81 milliGRAM(s) Oral daily  bisacodyl Suppository 10 milliGRAM(s) Rectal once  collagenase Ointment 1 Application(s) Topical daily  dextrose 5%. 1000 milliLiter(s) (50 mL/Hr) IV Continuous <Continuous>  dextrose 50% Injectable 12.5 Gram(s) IV Push once  dextrose 50% Injectable 25 Gram(s) IV Push once  dextrose 50% Injectable 25 Gram(s) IV Push once  dronabinol 2.5 milliGRAM(s) Oral two times a day  ergocalciferol 91051 Unit(s) Oral every week  heparin  Injectable 5000 Unit(s) SubCutaneous every 8 hours  hydrALAZINE 100 milliGRAM(s) Oral three times a day  insulin glargine Injectable (LANTUS) 38 Unit(s) SubCutaneous at bedtime  insulin lispro (HumaLOG) corrective regimen sliding scale   SubCutaneous three times a day before meals  insulin lispro (HumaLOG) corrective regimen sliding scale   SubCutaneous three times a day before meals  insulin lispro (HumaLOG) corrective regimen sliding scale   SubCutaneous at bedtime  insulin lispro Injectable (HumaLOG) 22 Unit(s) SubCutaneous three times a day before meals  ondansetron Injectable 4 milliGRAM(s) IV Push once  piperacillin/tazobactam IVPB. 3.375 Gram(s) IV Intermittent every 8 hours  polyethylene glycol 3350 17 Gram(s) Oral every 12 hours  senna 2 Tablet(s) Oral at bedtime    MEDICATIONS  (PRN):  dextrose 40% Gel 15 Gram(s) Oral once PRN Blood Glucose LESS THAN 70 milliGRAM(s)/deciliter  glucagon  Injectable 1 milliGRAM(s) IntraMuscular once PRN Glucose LESS THAN 70 milligrams/deciliter  oxyCODONE    5 mG/acetaminophen 325 mG 2 Tablet(s) Oral every 6 hours PRN Severe Pain (7 - 10)  oxyCODONE    5 mG/acetaminophen 325 mG 1 Tablet(s) Oral every 6 hours PRN Moderate Pain (4 - 6)      Vital Signs Last 24 Hrs  T(C): 37 (12 Jun 2019 11:18), Max: 37.4 (11 Jun 2019 21:14)  T(F): 98.6 (12 Jun 2019 11:18), Max: 99.3 (11 Jun 2019 21:14)  HR: 89 (12 Jun 2019 11:18) (75 - 89)  BP: 148/77 (12 Jun 2019 11:18) (137/72 - 153/84)  BP(mean): --  RR: 20 (12 Jun 2019 11:18) (16 - 20)  SpO2: 98% (12 Jun 2019 11:18) (92% - 98%)  CAPILLARY BLOOD GLUCOSE      POCT Blood Glucose.: 128 mg/dL (12 Jun 2019 08:35)  POCT Blood Glucose.: 117 mg/dL (11 Jun 2019 23:18)  POCT Blood Glucose.: 98 mg/dL (11 Jun 2019 21:00)  POCT Blood Glucose.: 123 mg/dL (11 Jun 2019 13:34)    I&O's Summary    11 Jun 2019 07:01  -  12 Jun 2019 07:00  --------------------------------------------------------  IN: 1190 mL / OUT: 1190 mL / NET: 0 mL    12 Jun 2019 07:01  -  12 Jun 2019 12:21  --------------------------------------------------------  IN: 240 mL / OUT: 0 mL / NET: 240 mL        PHYSICAL EXAM:  GENERAL: NAD, well-developed  HEAD:  Atraumatic, Normocephalic  EYES: EOMI, PERRLA, conjunctiva and sclera clear  NECK: Supple, No JVD  CHEST/LUNG: Clear to auscultation bilaterally; No wheeze  HEART: Regular rate and rhythm; No murmurs, rubs, or gallops  ABDOMEN: Soft, Nontender, Nondistended; Bowel sounds present  EXTREMITIES:  2+ Peripheral Pulses, No clubbing, cyanosis, 2 plus edema  PSYCH: AAOx3  NEUROLOGY: non-focal  SKIN: No rashes or lesions    LABS:                        7.9    8.6   )-----------( 414      ( 11 Jun 2019 07:08 )             24.5     06-11    136  |  104  |  24<H>  ----------------------------<  145<H>  4.9   |  20<L>  |  2.12<H>    Ca    8.8      11 Jun 2019 07:04    TPro  6.5  /  Alb  2.8<L>  /  TBili  0.4  /  DBili  x   /  AST  17  /  ALT  26  /  AlkPhos  102  06-11              RADIOLOGY & ADDITIONAL TESTS:    Imaging Personally Reviewed:    Consultant(s) Notes Reviewed:      Care Discussed with Consultants/Other Providers:

## 2019-06-13 ENCOUNTER — TRANSCRIPTION ENCOUNTER (OUTPATIENT)
Age: 59
End: 2019-06-13

## 2019-06-13 LAB
ANION GAP SERPL CALC-SCNC: 10 MMOL/L — SIGNIFICANT CHANGE UP (ref 5–17)
BUN SERPL-MCNC: 18 MG/DL — SIGNIFICANT CHANGE UP (ref 7–23)
CALCIUM SERPL-MCNC: 9.1 MG/DL — SIGNIFICANT CHANGE UP (ref 8.4–10.5)
CHLORIDE SERPL-SCNC: 101 MMOL/L — SIGNIFICANT CHANGE UP (ref 96–108)
CO2 SERPL-SCNC: 22 MMOL/L — SIGNIFICANT CHANGE UP (ref 22–31)
CREAT SERPL-MCNC: 2.07 MG/DL — HIGH (ref 0.5–1.3)
GLUCOSE BLDC GLUCOMTR-MCNC: 195 MG/DL — HIGH (ref 70–99)
GLUCOSE BLDC GLUCOMTR-MCNC: 196 MG/DL — HIGH (ref 70–99)
GLUCOSE BLDC GLUCOMTR-MCNC: 237 MG/DL — HIGH (ref 70–99)
GLUCOSE BLDC GLUCOMTR-MCNC: 242 MG/DL — HIGH (ref 70–99)
GLUCOSE SERPL-MCNC: 257 MG/DL — HIGH (ref 70–99)
HCT VFR BLD CALC: 27.5 % — LOW (ref 39–50)
HGB BLD-MCNC: 8.9 G/DL — LOW (ref 13–17)
MCHC RBC-ENTMCNC: 29.8 PG — SIGNIFICANT CHANGE UP (ref 27–34)
MCHC RBC-ENTMCNC: 32.3 GM/DL — SIGNIFICANT CHANGE UP (ref 32–36)
MCV RBC AUTO: 92.3 FL — SIGNIFICANT CHANGE UP (ref 80–100)
PLATELET # BLD AUTO: 344 K/UL — SIGNIFICANT CHANGE UP (ref 150–400)
POTASSIUM SERPL-MCNC: 5 MMOL/L — SIGNIFICANT CHANGE UP (ref 3.5–5.3)
POTASSIUM SERPL-SCNC: 5 MMOL/L — SIGNIFICANT CHANGE UP (ref 3.5–5.3)
RBC # BLD: 2.99 M/UL — LOW (ref 4.2–5.8)
RBC # FLD: 12.9 % — SIGNIFICANT CHANGE UP (ref 10.3–14.5)
SODIUM SERPL-SCNC: 133 MMOL/L — LOW (ref 135–145)
WBC # BLD: 6.8 K/UL — SIGNIFICANT CHANGE UP (ref 3.8–10.5)
WBC # FLD AUTO: 6.8 K/UL — SIGNIFICANT CHANGE UP (ref 3.8–10.5)

## 2019-06-13 PROCEDURE — 93306 TTE W/DOPPLER COMPLETE: CPT | Mod: 26

## 2019-06-13 PROCEDURE — 71045 X-RAY EXAM CHEST 1 VIEW: CPT | Mod: 26

## 2019-06-13 PROCEDURE — 99232 SBSQ HOSP IP/OBS MODERATE 35: CPT

## 2019-06-13 RX ORDER — INSULIN LISPRO 100/ML
22 VIAL (ML) SUBCUTANEOUS
Refills: 0 | Status: DISCONTINUED | OUTPATIENT
Start: 2019-06-13 | End: 2019-06-14

## 2019-06-13 RX ORDER — DRONABINOL 2.5 MG
2.5 CAPSULE ORAL
Refills: 0 | Status: DISCONTINUED | OUTPATIENT
Start: 2019-06-13 | End: 2019-06-14

## 2019-06-13 RX ORDER — OXYCODONE AND ACETAMINOPHEN 5; 325 MG/1; MG/1
2 TABLET ORAL EVERY 6 HOURS
Refills: 0 | Status: DISCONTINUED | OUTPATIENT
Start: 2019-06-13 | End: 2019-06-14

## 2019-06-13 RX ORDER — OXYCODONE AND ACETAMINOPHEN 5; 325 MG/1; MG/1
1 TABLET ORAL EVERY 6 HOURS
Refills: 0 | Status: DISCONTINUED | OUTPATIENT
Start: 2019-06-13 | End: 2019-06-14

## 2019-06-13 RX ORDER — SODIUM CHLORIDE 9 MG/ML
10 INJECTION INTRAMUSCULAR; INTRAVENOUS; SUBCUTANEOUS
Refills: 0 | Status: DISCONTINUED | OUTPATIENT
Start: 2019-06-13 | End: 2019-06-14

## 2019-06-13 RX ORDER — CHLORHEXIDINE GLUCONATE 213 G/1000ML
1 SOLUTION TOPICAL
Refills: 0 | Status: DISCONTINUED | OUTPATIENT
Start: 2019-06-13 | End: 2019-06-14

## 2019-06-13 RX ADMIN — ERGOCALCIFEROL 50000 UNIT(S): 1.25 CAPSULE ORAL at 13:52

## 2019-06-13 RX ADMIN — Medication 2.5 MILLIGRAM(S): at 18:17

## 2019-06-13 RX ADMIN — POLYETHYLENE GLYCOL 3350 17 GRAM(S): 17 POWDER, FOR SOLUTION ORAL at 06:32

## 2019-06-13 RX ADMIN — HEPARIN SODIUM 5000 UNIT(S): 5000 INJECTION INTRAVENOUS; SUBCUTANEOUS at 23:13

## 2019-06-13 RX ADMIN — Medication 100 MILLIGRAM(S): at 23:13

## 2019-06-13 RX ADMIN — HEPARIN SODIUM 5000 UNIT(S): 5000 INJECTION INTRAVENOUS; SUBCUTANEOUS at 06:33

## 2019-06-13 RX ADMIN — Medication 1 APPLICATION(S): at 13:49

## 2019-06-13 RX ADMIN — Medication 100 MILLIGRAM(S): at 13:53

## 2019-06-13 RX ADMIN — Medication 1: at 18:13

## 2019-06-13 RX ADMIN — Medication 2.5 MILLIGRAM(S): at 06:32

## 2019-06-13 RX ADMIN — Medication 22 UNIT(S): at 10:41

## 2019-06-13 RX ADMIN — HEPARIN SODIUM 5000 UNIT(S): 5000 INJECTION INTRAVENOUS; SUBCUTANEOUS at 13:53

## 2019-06-13 RX ADMIN — INSULIN GLARGINE 38 UNIT(S): 100 INJECTION, SOLUTION SUBCUTANEOUS at 23:13

## 2019-06-13 RX ADMIN — Medication 81 MILLIGRAM(S): at 13:53

## 2019-06-13 RX ADMIN — PIPERACILLIN AND TAZOBACTAM 25 GRAM(S): 4; .5 INJECTION, POWDER, LYOPHILIZED, FOR SOLUTION INTRAVENOUS at 06:33

## 2019-06-13 RX ADMIN — Medication 100 MILLIGRAM(S): at 06:32

## 2019-06-13 RX ADMIN — AMLODIPINE BESYLATE 10 MILLIGRAM(S): 2.5 TABLET ORAL at 06:32

## 2019-06-13 RX ADMIN — PIPERACILLIN AND TAZOBACTAM 25 GRAM(S): 4; .5 INJECTION, POWDER, LYOPHILIZED, FOR SOLUTION INTRAVENOUS at 18:11

## 2019-06-13 RX ADMIN — Medication 2: at 10:41

## 2019-06-13 RX ADMIN — Medication 1: at 20:10

## 2019-06-13 NOTE — DISCHARGE NOTE PROVIDER - PROVIDER TOKENS
PROVIDER:[TOKEN:[[id number]],FOLLOWUP:[1 week]],PROVIDER:[TOKEN:[[id number]],FOLLOWUP:[1 week]]

## 2019-06-13 NOTE — PROGRESS NOTE ADULT - SUBJECTIVE AND OBJECTIVE BOX
AGUSTIN BRAR 58y [id number]    Patient is a 58y old  Male who presents with a chief complaint of right foot ulcer (13 Jun 2019 12:08)      Follow Up/CC:  ID following for foot infection    Interval History/ROS: feels ok, no fever    Allergies    No Known Allergies    Intolerances        ANTIMICROBIALS:  piperacillin/tazobactam IVPB. 3.375 every 8 hours      MEDICATIONS  (STANDING):  amLODIPine   Tablet 10 milliGRAM(s) Oral daily  aspirin enteric coated 81 milliGRAM(s) Oral daily  bisacodyl Suppository 10 milliGRAM(s) Rectal once  collagenase Ointment 1 Application(s) Topical daily  dextrose 5%. 1000 milliLiter(s) (50 mL/Hr) IV Continuous <Continuous>  dextrose 50% Injectable 12.5 Gram(s) IV Push once  dextrose 50% Injectable 25 Gram(s) IV Push once  dextrose 50% Injectable 25 Gram(s) IV Push once  dronabinol 2.5 milliGRAM(s) Oral two times a day  ergocalciferol 20578 Unit(s) Oral every week  heparin  Injectable 5000 Unit(s) SubCutaneous every 8 hours  hydrALAZINE 100 milliGRAM(s) Oral three times a day  insulin glargine Injectable (LANTUS) 38 Unit(s) SubCutaneous at bedtime  insulin lispro (HumaLOG) corrective regimen sliding scale   SubCutaneous three times a day before meals  insulin lispro (HumaLOG) corrective regimen sliding scale   SubCutaneous three times a day before meals  insulin lispro (HumaLOG) corrective regimen sliding scale   SubCutaneous at bedtime  insulin lispro Injectable (HumaLOG) 22 Unit(s) SubCutaneous three times a day before meals  ondansetron Injectable 4 milliGRAM(s) IV Push once  piperacillin/tazobactam IVPB. 3.375 Gram(s) IV Intermittent every 8 hours  polyethylene glycol 3350 17 Gram(s) Oral every 12 hours  senna 2 Tablet(s) Oral at bedtime    MEDICATIONS  (PRN):  dextrose 40% Gel 15 Gram(s) Oral once PRN Blood Glucose LESS THAN 70 milliGRAM(s)/deciliter  glucagon  Injectable 1 milliGRAM(s) IntraMuscular once PRN Glucose LESS THAN 70 milligrams/deciliter  hydrocortisone 1% Ointment 1 Application(s) Topical two times a day PRN Itching  oxyCODONE    5 mG/acetaminophen 325 mG 1 Tablet(s) Oral every 6 hours PRN Moderate Pain (4 - 6)  oxyCODONE    5 mG/acetaminophen 325 mG 2 Tablet(s) Oral every 6 hours PRN Severe Pain (7 - 10)        Vital Signs Last 24 Hrs  T(C): 37.4 (13 Jun 2019 15:32), Max: 37.4 (13 Jun 2019 15:32)  T(F): 99.3 (13 Jun 2019 15:32), Max: 99.3 (13 Jun 2019 15:32)  HR: 81 (13 Jun 2019 15:32) (67 - 81)  BP: 129/71 (13 Jun 2019 15:32) (129/71 - 155/80)  BP(mean): --  RR: 18 (13 Jun 2019 13:38) (18 - 18)  SpO2: 95% (13 Jun 2019 15:32) (90% - 95%)    CBC Full  -  ( 13 Jun 2019 06:34 )  WBC Count : 6.8 K/uL  RBC Count : 2.99 M/uL  Hemoglobin : 8.9 g/dL  Hematocrit : 27.5 %  Platelet Count - Automated : 344 K/uL  Mean Cell Volume : 92.3 fl  Mean Cell Hemoglobin : 29.8 pg  Mean Cell Hemoglobin Concentration : 32.3 gm/dL  Auto Neutrophil # : x  Auto Lymphocyte # : x  Auto Monocyte # : x  Auto Eosinophil # : x  Auto Basophil # : x  Auto Neutrophil % : x  Auto Lymphocyte % : x  Auto Monocyte % : x  Auto Eosinophil % : x  Auto Basophil % : x    06-13    133<L>  |  101  |  18  ----------------------------<  257<H>  5.0   |  22  |  2.07<H>    Ca    9.1      13 Jun 2019 06:34            MICROBIOLOGY:  .Tissue  06-05-19   Numerous Escherichia coli  Few [place] parapsilosis "Susceptibilities not performed"  --  Escherichia coli      .Other  05-28-19   No growth  --  Enterococcus faecalis  Escherichia coli      .Blood  05-26-19   No growth at 5 days.  --  --      .Blood  05-25-19   No growth at 5 days.  --  --      .Tissue  05-23-19   Few Escherichia coli  Growth in fluid media only Enterococcus faecalis  Rare Streptococcus agalactiae (Group B) isolated  Group B streptococci are susceptible to ampicillin,  penicillin and cefazolin, but may be resistant to  erythromycin and clindamycin.  Recommendations for intrapartum prophylaxis for Group B  streptococci are penicillin or ampicillin.  --  Escherichia coli  Enterococcus faecalis      .Blood  05-20-19   No growth at 5 days.  --  --      .Abscess  05-20-19   Numerous Escherichia coli  Numerous Enterococcus faecalis  Moderate Prevotella bivia "Susceptibilities not performed"  --  Enterococcus faecalis  Escherichia coli        RADIOLOGY    < from: [facility] Inflammatory Loc Wholebody, WBC (06.11.19 @ 09:15) >  Abnormal Indium-111 labeled leukocyte scan.    Labeled WBC activity in the urinary bladder raises the possibility of   cystitis. Please correlate clinically and with laboratory examination.    < end of copied text >

## 2019-06-13 NOTE — CHART NOTE - NSCHARTNOTEFT_GEN_A_CORE
VASCULAR & INTERVENTIONAL RADIOLOGY    Request for PICC for a total of 6 weeks of intravenous antibiotics in the setting of eGFR < 40 discussed with Dr. Hill after review of internal medicine and nephrology input. OK to place PICC.    Please contact us if the clinical situation changes or if you have any questions/comments/concerns.    Edwin Gómez MD  PGY-3  Spectra [phone] VASCULAR & INTERVENTIONAL RADIOLOGY    Request for PICC for a total of 6 weeks of intravenous antibiotics in the setting of eGFR < 40 discussed with Dr. Hill after review of internal medicine and nephrology input. MARIKA is improving. OK to place PICC.    Please contact us if the clinical situation changes or if you have any questions/comments/concerns.    Edwin Gómez MD  PGY-3  Spectra [phone]

## 2019-06-13 NOTE — DISCHARGE NOTE PROVIDER - HOSPITAL COURSE
dyspnea resolved    -  secondary to fluid overload        diabetic foot ulcer with surrounding cellulitis and sepsis present on admission resolved    Fever resolved , leukocytosis persists but improved     -cont abx    -s/p debridement per podiatry 5/27    -DM control    -local care per podiatry    - wound vac    -s/p debridement/revision 6/5 -     - no overt purulence, debrided down to healthy tissue.     - vascular spoke with pt regarding possible BKA.    - picc line for long ter iv abx    - day # 7/42 of abx        PVD    - may need angiogram but will hold off due to renal function        anemia of chronic disease    -Not iron deficient    -s/p  transfusion 1 unit of prbc    -monitor hgb    - consider epogen        uncontrolled Diabetes    - cont lantus 38 - novolog 22 units qac    - hgb a1c  11.9    - diabetic diet    - FS qid    - endo follow up appreciated        MARIKA     - slight improvement in cre    - follow creatinine    - nephrology following    - encouraged fluid intake        HTN     -   hold hydralazine         hyponatremia    - no HCTZ        anorexia    - add Glucerna    - cont Marinol        depression    - seen by psych        constipation    - c/w senna    - miralax bid        dispo    - d/c planning to [facility]                 Problem: Diabetic foot ulcer.  Plan: -cont abx    -initial MRI foot noted - no OM    -s/p debridement per podiatry 5/27    -DM control    -local care per podiatry    -s/p debridement/revision 6/5 - d/w Dr Issa regarding intra-op findings - no overt purulence, debrided down to healthy tissue    -prognosis on saving foot is guarded.          Problem/Plan - 2:    ·  Problem: Cellulitis of foot.  Plan: -cont zosyn.          Problem/Plan - 3:    ·  Problem: Leukocytosis.  Plan: -normal    -trend cbc    -from infection    -WBC scan negative for OM.          Problem/Plan - 4:    ·  Problem: Fever due to infection.  Plan: -better    -trend temps    -from foot infection    -now s/p debridement 5/27, 6/5.          Problem/Plan - 5:    ·  Problem: Abnormal MRI.  Plan: -concern for possible early OM    -I think given extent of infection and MRI findings, would favor an aggressive approach with picc + 6 weeks iv abx.          Problem/Plan - 6:    Problem: Long term (current) use of antibiotics. Plan: -potential side effects of abx explained including GI, Cdiff, allergy issues, development of resistance, etc.    -picc    -potential side effects of PICC explained including bleeding and infection    -day 8/42 of abx.         Problem/Plan - 7:    ·  Problem: Medication monitoring encounter.  Plan: -weekly cbc, cmp, esr, crp while on abx. dyspnea resolved    -  secondary to fluid overload        diabetic foot ulcer with surrounding cellulitis and sepsis present on admission resolved    Fever resolved , leukocytosis persists but improved     -cont abx    -s/p debridement per podiatry 5/27    -DM control    -local care per podiatry    - wound vac    -s/p debridement/revision 6/5 -     - no overt purulence, debrided down to healthy tissue.     - vascular spoke with pt regarding possible BKA.    - picc line for long ter iv abx    - day # 7/42 of abx        PVD    - may need angiogram but will hold off due to renal function        anemia of chronic disease    -Not iron deficient    -s/p  transfusion 1 unit of prbc    -monitor hgb    - consider epogen        uncontrolled Diabetes    - cont lantus 38 - novolog 22 units qac    - hgb a1c  11.9    - diabetic diet    - FS qid    - endo follow up appreciated        MARIKA     - slight improvement in creatinine    - follow creatinine    - nephrology following    -avoid contrast studies for now    - encouraged fluid intake        HTN     -Cardiology rec following    -hold hydralazine     -Now stable     -Awaiting TTE_____________________________    -Norvasc 10 mg daily     -Hydralazine 100 mg TID     -pain control    -Low salt diet.         hyponatremia    - no HCTZ        anorexia    - add Glucerna    - cont Marinol        depression    - seen by psych        constipation    - c/w senna    - miralax bid        dispo    - d/c planning to [facility]                     -prognosis on saving foot is guarded.          Problem/Plan - 2:    ·  Problem: Cellulitis of foot.  Plan: -cont zosyn.          Problem/Plan - 3:    ·  Problem: Leukocytosis.  Plan: -normal    -trend cbc    -from infection    -WBC scan negative for OM.          Problem/Plan - 4:    ·  Problem: Fever due to infection.  Plan: -better    -trend temps    -from foot infection    -now s/p debridement 5/27, 6/5.          Problem/Plan - 5:    ·  Problem: Abnormal MRI.  Plan: -concern for possible early OM    -I think given extent of infection and MRI findings, would favor an aggressive approach with picc + 6 weeks iv abx.          Problem/Plan - 6:    Problem: Long term (current) use of antibiotics. Plan: -potential side effects of abx explained including GI, Cdiff, allergy issues, development of resistance, etc.    -picc    -potential side effects of PICC explained including bleeding and infection    -day 8/42 of abx.         Problem/Plan - 7:    ·  Problem: Medication monitoring encounter.  Plan: -weekly cbc, cmp, esr, crp while on abx. 59 y/o male with history of IDDM, HTN, presents to the ED sent by Podiatrist (Dr. Giovanni Hanna) for evaluation and treatment of infected R foot ulcer, failing outpatient Augmentin.         dyspnea resolved    -secondary to fluid overload        diabetic foot ulcer with surrounding cellulitis and sepsis present on admission resolved    Fever resolved , leukocytosis persists but improved    -cont abx    -s/p debridement per podiatry 5/27    -DM control    -local care per podiatry    -wound vac w/collagenase     -s/p debridement/revision 6/5 -     -no overt purulence, debrided down to healthy tissue.     - vascular spoke with pt regarding possible BKA, pt refusing BKA a this time    -prognosis on saving foot is guarded.     -abnormal MRI, concern for possible early OM given extent of infection and MRI findings, would favor an aggressive approach with abx.     -picc line for 6 week course of Zosyn, day 9/42        PVD    - may need angiogram but will hold off due to renal function        anemia of chronic disease    -Not iron deficient    -s/p  transfusion 1 unit of prbc    -monitor hgb    - consider epogen        uncontrolled Diabetes    - cont lantus 38 - novolog 22 units qac    - hgb a1c  11.9    - diabetic diet    - FS qid    - endo follow up appreciated        MARIKA     -improvement in creatinine    -follow creatinine    -nephrology following    -avoid contrast studies for now    -encouraged fluid intake        HTN     Now stable     -ECHO reviewed by cardiology     -Norvasc 10 mg daily     -Hydralazine 100 mg TID     -Low salt diet.         hyponatremia    - no HCTZ        anorexia    - add Glucerna    - cont Marinol        depression    - seen by psych        constipation    - c/w senna    - miralax bid        dispo    - d/c planning to [facility]

## 2019-06-13 NOTE — PROGRESS NOTE ADULT - PROBLEM SELECTOR PLAN 1
Patient with MARIKA likely hemodynamically mediated in the setting of infection and ACE-I use. Patient with likely CKD with Scr elevated in the past.    - Scr improving daily but trended back up 6/5 to 6/6 s/p OR 1.97 to 2.72 and was started on Vanc/Zosyn combo; today's Scr improved to 2.12, hopefully will continue to improve.  - Check vanc levels daily.  - Avoid Zosyn and vancomycin combination as it may cause worsening of MARIKA.  - Monitor labs and UOP.  - Encourage PO hydration  - Recommend avoiding contrast studies for now. Patient with MARIKA likely hemodynamically mediated in the setting of infection and ACE-I use. Patient with likely CKD with Scr elevated in the past.    - Scr improving daily but trended back up 6/5 to 6/6 s/p OR 1.97 to 2.72 and was started on Vanc/Zosyn combo; today's Scr improved to 2.07, hopefully will continue to improve.  - Check vanc levels daily.  - Avoid Zosyn and vancomycin combination as it may cause worsening of MARIKA.  - Monitor labs and UOP.  - Encourage PO hydration  - Recommend avoiding contrast studies for now. Patient with MARIKA likely hemodynamically mediated in the setting of infection and ACE-I use. Patient with likely CKD in the past ( serum creatinine  [id number] ranging between 1.3-1.6) No blood work available in 2018, admitted with a serum creatinine  of 1.9 mg/dl     - Scr improving daily but trended back up 6/5 to 6/6 s/p OR 1.97 to 2.72 and was started on Vanc/Zosyn combo; today's Scr improved to 2.07, This likely will be his new baseline. Will monitor   - Maintain on antibiotics  - Monitor labs and UOP.  - Recommend avoiding contrast studies for now.  -OK from a renal standpoint to get a PICC for antibiotics

## 2019-06-13 NOTE — PROGRESS NOTE ADULT - ASSESSMENT
Assessment  DMT2: 58y Male with DM T2 with hyperglycemia, on insulin, blood sugars in acceptable range no hypoglycemias, eating meals, feeling better, compliant with low carb diet.  Foot wound: s/p surgery, on medications, no chest pain, stable, monitored.  HTN: Controlled,  on antihypertensive medications.  Overweight/Obesity: No strict exercise routines, not on any weight loss program, neither on low calorie diet.    Andrzej Zhu MD  Cell: [phone]  Office: [phone]

## 2019-06-13 NOTE — DISCHARGE NOTE PROVIDER - NSDCFUADDINST_GEN_ALL_CORE_FT
Podiatry Discharge Instructions:  Follow up: Please follow up with Dr. Issa within 1 week of discharge from the hospital, please call  [phone], [phone] appointment and discuss that you recently were seen in the hospital.  Wound Care: Collagenase and Wound Vac to be changed 3 times a week   Weight bearing: None weight bearing to RLE   Antibiotics: Please continue as instructed. Podiatry Discharge Instructions:  Follow up: Please follow up with Dr. Issa within 1 week of discharge from the hospital, please call  [phone], [phone] appointment and discuss that you recently were seen in the hospital.  Wound Care: Collagenase and Wound Vac to be changed 3 times a week   Weight bearing: None weight bearing to RLE     Follow up with Primary care physician Phill Payan upon DC from rehab  CALL TO MAKE ALL FOLLOW UP APPOINTMENTS

## 2019-06-13 NOTE — DISCHARGE NOTE PROVIDER - CARE PROVIDER_API CALL
Bj Issa (DPM)  Podiatric Medicine and Surgery  [address] [address]  Phone: [phone]  Fax: [phone]  Follow Up Time: 1 week    Giovanni Hanna (DORENE)  Podiatric Medicine and Surgery  [address] [place] [address]  Phone: [phone]  Fax: [phone]  Follow Up Time: 1 week

## 2019-06-13 NOTE — PROGRESS NOTE ADULT - ASSESSMENT
dyspnea resolved  -  secondary to fluid overload    diabetic foot ulcer with surrounding cellulitis and sepsis present on admission resolved  Fever resolved , leukocytosis persists but improved   -cont abx  -s/p debridement per podiatry 5/27  -DM control  -local care per podiatry  - wound vac  -s/p debridement/revision 6/5 -   - no overt purulence, debrided down to healthy tissue.   - vascular spoke with pt regarding possible BKA.  - picc line for long ter iv abx  - day # 7/42 of abx    PVD  - may need angiogram but will hold off due to renal function    anemia of chronic disease  - s/p  transfusion 1 unit of prbc  - consider epogen    uncontrolled Diabetes  - cont lantus 38 - novolog 22 units qac  - hgb a1c  11.9  - diabetic diet  - FS qid  - endo follow up appreciated    MARIKA   - slight improvement in cre  - follow creatinine  - nephrology following  - encouraged fluid intake    HTN   -   hold hydralazine     hyponatremia  - no HCTZ    anorexia  - add Glucerna  - cont Marinol    depression  - seen by psych    constipation  - c/w senna  - miralax bid    dispo  - d/c planning to [facility]

## 2019-06-13 NOTE — PROGRESS NOTE ADULT - PROBLEM SELECTOR PLAN 4
Serum sodium is WNL this AM at 136.    - Hyponatremia currently resolved.  - Recommend monitoring serum sodium with daily labs. Serum sodium is mildly low at 133. Asymptomatic.    - Recommend monitoring serum sodium with daily labs. Serum sodium is mildly low at 133. Asymptomatic.    -Mix antibiotics in NS  - Recommend monitoring serum sodium with daily labs.

## 2019-06-13 NOTE — PROGRESS NOTE ADULT - ASSESSMENT
58M with IDDM, HTN, and CKD 3 presenting admitted with a diabetic foot ulcer s/p debridement. creat in 2017 was 1.6, now with MARIAK

## 2019-06-13 NOTE — PROGRESS NOTE ADULT - PROBLEM SELECTOR PLAN 3
Pt. with metabolic acidosis in the setting of CKD   Serum CO2 noted to be improved to 20 this AM.    - Monitor. If consistently low, may need to start sodium bicarbonate  - Continue to monitor serum CO2 with labs Pt. with metabolic acidosis in the setting of CKD  Serum CO2 noted to be improved to 22 this AM.    - Continue to monitor serum CO2 with labs

## 2019-06-13 NOTE — PROGRESS NOTE ADULT - SUBJECTIVE AND OBJECTIVE BOX
Chief complaint  Patient is a 58y old  Male who presents with a chief complaint of right foot ulcer (13 Jun 2019 17:34)   Review of systems  Patient in bed, looks comfortable, no fever, no hypoglycemia.    Labs and Fingersticks  CAPILLARY BLOOD GLUCOSE      POCT Blood Glucose.: 196 mg/dL (13 Jun 2019 18:11)  POCT Blood Glucose.: 237 mg/dL (13 Jun 2019 10:38)  POCT Blood Glucose.: 269 mg/dL (12 Jun 2019 22:59)      Anion Gap, Serum: 10 (06-13 @ 06:34)      Calcium, Total Serum: 9.1 (06-13 @ 06:34)          06-13    133<L>  |  101  |  18  ----------------------------<  257<H>  5.0   |  22  |  2.07<H>    Ca    9.1      13 Jun 2019 06:34                          8.9    6.8   )-----------( 344      ( 13 Jun 2019 06:34 )             27.5     Medications  MEDICATIONS  (STANDING):  amLODIPine   Tablet 10 milliGRAM(s) Oral daily  aspirin enteric coated 81 milliGRAM(s) Oral daily  bisacodyl Suppository 10 milliGRAM(s) Rectal once  collagenase Ointment 1 Application(s) Topical daily  dextrose 5%. 1000 milliLiter(s) (50 mL/Hr) IV Continuous <Continuous>  dextrose 50% Injectable 12.5 Gram(s) IV Push once  dextrose 50% Injectable 25 Gram(s) IV Push once  dextrose 50% Injectable 25 Gram(s) IV Push once  dronabinol 2.5 milliGRAM(s) Oral two times a day  ergocalciferol 54491 Unit(s) Oral every week  heparin  Injectable 5000 Unit(s) SubCutaneous every 8 hours  hydrALAZINE 100 milliGRAM(s) Oral three times a day  insulin glargine Injectable (LANTUS) 38 Unit(s) SubCutaneous at bedtime  insulin lispro (HumaLOG) corrective regimen sliding scale   SubCutaneous three times a day before meals  insulin lispro (HumaLOG) corrective regimen sliding scale   SubCutaneous three times a day before meals  insulin lispro (HumaLOG) corrective regimen sliding scale   SubCutaneous at bedtime  insulin lispro Injectable (HumaLOG) 22 Unit(s) SubCutaneous three times a day before meals  ondansetron Injectable 4 milliGRAM(s) IV Push once  piperacillin/tazobactam IVPB. 3.375 Gram(s) IV Intermittent every 8 hours  polyethylene glycol 3350 17 Gram(s) Oral every 12 hours  senna 2 Tablet(s) Oral at bedtime      Physical Exam  General: Patient comfortable in bed  Vital Signs Last 12 Hrs  T(F): 99 (06-13-19 @ 18:40), Max: 99.3 (06-13-19 @ 15:32)  HR: 78 (06-13-19 @ 18:40) (69 - 81)  BP: 144/66 (06-13-19 @ 18:40) (129/71 - 154/73)  BP(mean): --  RR: 18 (06-13-19 @ 18:40) (18 - 18)  SpO2: 94% (06-13-19 @ 18:40) (92% - 95%)  Neck: No palpable thyroid nodules.  CVS: S1S2, No murmurs  Respiratory: No wheezing, no crepitations  GI: Abdomen soft, bowel sounds positive  Musculoskeletal:  edema lower extremities.   Skin: No skin rashes, no ecchymosis    Diagnostics    Vitamin D, 1, 25-Dihydroxy: AM Sched. Collection: 22-May-2019 06:00 (05-21 @ 18:59)  Vitamin D, 25-Hydroxy: AM Sched. Collection: 22-May-2019 06:00 (05-21 @ 18:59)

## 2019-06-13 NOTE — PROGRESS NOTE ADULT - SUBJECTIVE AND OBJECTIVE BOX
[facility] DIVISION OF KIDNEY DISEASES AND HYPERTENSION -- FOLLOW UP NOTE  --------------------------------------------------------------------------------  Chief Complaint:/subjective: MARIKA    24 hour events: Pt. seen and examined at bedside this AM. Pt. states that he feels well. Denies CP, N/V/F/C.    PAST HISTORY  --------------------------------------------------------------------------------  No significant changes to PMH, PSH, FHx, SHx, unless otherwise noted    ALLERGIES & MEDICATIONS  --------------------------------------------------------------------------------  Allergies    No Known Allergies    Intolerances    Standing Inpatient Medications  amLODIPine   Tablet 10 milliGRAM(s) Oral daily  aspirin enteric coated 81 milliGRAM(s) Oral daily  bisacodyl Suppository 10 milliGRAM(s) Rectal once  collagenase Ointment 1 Application(s) Topical daily  dextrose 5%. 1000 milliLiter(s) IV Continuous <Continuous>  dextrose 50% Injectable 12.5 Gram(s) IV Push once  dextrose 50% Injectable 25 Gram(s) IV Push once  dextrose 50% Injectable 25 Gram(s) IV Push once  dronabinol 2.5 milliGRAM(s) Oral two times a day  ergocalciferol 81912 Unit(s) Oral every week  heparin  Injectable 5000 Unit(s) SubCutaneous every 8 hours  hydrALAZINE 100 milliGRAM(s) Oral three times a day  insulin glargine Injectable (LANTUS) 38 Unit(s) SubCutaneous at bedtime  insulin lispro (HumaLOG) corrective regimen sliding scale   SubCutaneous three times a day before meals  insulin lispro (HumaLOG) corrective regimen sliding scale   SubCutaneous three times a day before meals  insulin lispro (HumaLOG) corrective regimen sliding scale   SubCutaneous at bedtime  insulin lispro Injectable (HumaLOG) 22 Unit(s) SubCutaneous three times a day before meals  ondansetron Injectable 4 milliGRAM(s) IV Push once  piperacillin/tazobactam IVPB. 3.375 Gram(s) IV Intermittent every 8 hours  polyethylene glycol 3350 17 Gram(s) Oral every 12 hours  senna 2 Tablet(s) Oral at bedtime    REVIEW OF SYSTEMS  --------------------------------------------------------------------------------  Gen: No lethargy  Respiratory: No dyspnea  CV: No chest pain  GI: No abdominal pain  MSK: + LE edema  Neuro: No dizziness    All other systems were reviewed and are negative, except as noted.    VITALS/PHYSICAL EXAM  --------------------------------------------------------------------------------  T(C): 36.6 (06-13-19 @ 09:18), Max: 37.3 (06-12-19 @ 20:57)  HR: 69 (06-13-19 @ 09:18) (67 - 85)  BP: 132/77 (06-13-19 @ 09:18) (131/74 - 155/80)  RR: 18 (06-13-19 @ 09:18) (18 - 20)  SpO2: 92% (06-13-19 @ 09:18) (90% - 95%)  Wt(kg): --    06-12-19 @ 07:01  -  06-13-19 @ 07:00  --------------------------------------------------------  IN: 1160 mL / OUT: 1100 mL / NET: 60 mL    06-13-19 @ 07:01  -  06-13-19 @ 12:01  --------------------------------------------------------  IN: 240 mL / OUT: 500 mL / NET: -260 mL    Physical Exam:  	Gen: NAD,    	HEENT: Supple neck  	Pulm: CTA B/L  	CV: RRR, S1S2; no rub  	Abd: +BS, soft,    	: No suprapubic tenderness  	Ext: pedal edema; right foot bandaged  	Neuro: alert  	Psych: awake    LABS/STUDIES  --------------------------------------------------------------------------------              8.9    6.8   >-----------<  344      [06-13-19 @ 06:34]              27.5     133  |  101  |  18  ----------------------------<  257      [06-13-19 @ 06:34]  5.0   |  22  |  2.07        Ca     9.1     [06-13-19 @ 06:34]    Creatinine Trend:  SCr 2.07 [06-13 @ 06:34]  SCr 2.12 [06-11 @ 07:04]  SCr 2.33 [06-10 @ 06:26]  SCr 2.33 [06-09 @ 06:19]  SCr 2.52 [06-08 @ 06:45]    Iron 31, TIBC 161, %sat 19      [06-11-19 @ 10:00]  Ferritin 1408      [06-11-19 @ 09:59]

## 2019-06-13 NOTE — PROGRESS NOTE ADULT - SUBJECTIVE AND OBJECTIVE BOX
Patient is a 58y old  Male who presents with a chief complaint of right foot ulcer (13 Jun 2019 12:01)      SUBJECTIVE / OVERNIGHT EVENTS:  No chest pain. No shortness of breath. No complaints. No events overnight.      MEDICATIONS  (STANDING):  amLODIPine   Tablet 10 milliGRAM(s) Oral daily  aspirin enteric coated 81 milliGRAM(s) Oral daily  bisacodyl Suppository 10 milliGRAM(s) Rectal once  collagenase Ointment 1 Application(s) Topical daily  dextrose 5%. 1000 milliLiter(s) (50 mL/Hr) IV Continuous <Continuous>  dextrose 50% Injectable 12.5 Gram(s) IV Push once  dextrose 50% Injectable 25 Gram(s) IV Push once  dextrose 50% Injectable 25 Gram(s) IV Push once  dronabinol 2.5 milliGRAM(s) Oral two times a day  ergocalciferol 15848 Unit(s) Oral every week  heparin  Injectable 5000 Unit(s) SubCutaneous every 8 hours  hydrALAZINE 100 milliGRAM(s) Oral three times a day  insulin glargine Injectable (LANTUS) 38 Unit(s) SubCutaneous at bedtime  insulin lispro (HumaLOG) corrective regimen sliding scale   SubCutaneous three times a day before meals  insulin lispro (HumaLOG) corrective regimen sliding scale   SubCutaneous three times a day before meals  insulin lispro (HumaLOG) corrective regimen sliding scale   SubCutaneous at bedtime  insulin lispro Injectable (HumaLOG) 22 Unit(s) SubCutaneous three times a day before meals  ondansetron Injectable 4 milliGRAM(s) IV Push once  piperacillin/tazobactam IVPB. 3.375 Gram(s) IV Intermittent every 8 hours  polyethylene glycol 3350 17 Gram(s) Oral every 12 hours  senna 2 Tablet(s) Oral at bedtime    MEDICATIONS  (PRN):  dextrose 40% Gel 15 Gram(s) Oral once PRN Blood Glucose LESS THAN 70 milliGRAM(s)/deciliter  glucagon  Injectable 1 milliGRAM(s) IntraMuscular once PRN Glucose LESS THAN 70 milligrams/deciliter  hydrocortisone 1% Ointment 1 Application(s) Topical two times a day PRN Itching  oxyCODONE    5 mG/acetaminophen 325 mG 1 Tablet(s) Oral every 6 hours PRN Moderate Pain (4 - 6)  oxyCODONE    5 mG/acetaminophen 325 mG 2 Tablet(s) Oral every 6 hours PRN Severe Pain (7 - 10)      Vital Signs Last 24 Hrs  T(C): 36.6 (13 Jun 2019 09:18), Max: 37.3 (12 Jun 2019 20:57)  T(F): 97.9 (13 Jun 2019 09:18), Max: 99.2 (12 Jun 2019 20:57)  HR: 69 (13 Jun 2019 09:18) (67 - 85)  BP: 132/77 (13 Jun 2019 09:18) (131/74 - 155/80)  BP(mean): --  RR: 18 (13 Jun 2019 09:18) (18 - 20)  SpO2: 92% (13 Jun 2019 09:18) (90% - 95%)  CAPILLARY BLOOD GLUCOSE      POCT Blood Glucose.: 237 mg/dL (13 Jun 2019 10:38)  POCT Blood Glucose.: 269 mg/dL (12 Jun 2019 22:59)  POCT Blood Glucose.: 179 mg/dL (12 Jun 2019 18:24)  POCT Blood Glucose.: 132 mg/dL (12 Jun 2019 15:37)    I&O's Summary    12 Jun 2019 07:01  -  13 Jun 2019 07:00  --------------------------------------------------------  IN: 1160 mL / OUT: 1100 mL / NET: 60 mL    13 Jun 2019 07:01  -  13 Jun 2019 12:09  --------------------------------------------------------  IN: 240 mL / OUT: 500 mL / NET: -260 mL        PHYSICAL EXAM:  GENERAL: NAD, well-developed  HEAD:  Atraumatic, Normocephalic  EYES: EOMI, PERRLA, conjunctiva and sclera clear  NECK: Supple, No JVD  CHEST/LUNG: Clear to auscultation bilaterally; No wheeze  HEART: Regular rate and rhythm; No murmurs, rubs, or gallops  ABDOMEN: Soft, Nontender, Nondistended; Bowel sounds present  EXTREMITIES:  2+ Peripheral Pulses, No clubbing, cyanosis, or edema  PSYCH: AAOx3  NEUROLOGY: non-focal  SKIN: No rashes or lesions    LABS:                        8.9    6.8   )-----------( 344      ( 13 Jun 2019 06:34 )             27.5     06-13    133<L>  |  101  |  18  ----------------------------<  257<H>  5.0   |  22  |  2.07<H>    Ca    9.1      13 Jun 2019 06:34                RADIOLOGY & ADDITIONAL TESTS:    Imaging Personally Reviewed:    Consultant(s) Notes Reviewed:      Care Discussed with Consultants/Other Providers:

## 2019-06-13 NOTE — PROGRESS NOTE ADULT - CARDIOVASCULAR DETAILS
positive S1/positive S2
positive S2/positive S1
positive S1/positive S2
positive S1/positive S2
positive S2/positive S1
positive S1/positive S2
positive S1/positive S2
positive S2/positive S1
positive S2/positive S1
positive S1/positive S2
positive S2/positive S1
positive S1/positive S2
positive S2/positive S1
positive S1/positive S2

## 2019-06-13 NOTE — DISCHARGE NOTE PROVIDER - NSDCCPCAREPLAN_GEN_ALL_CORE_FT
PRINCIPAL DISCHARGE DIAGNOSIS  Diagnosis: Diabetic foot ulcer  Assessment and Plan of Treatment:       SECONDARY DISCHARGE DIAGNOSES  Diagnosis: Abnormal MRI  Assessment and Plan of Treatment: concern for possible early OM  given extent of infection and MRI findings, would favor an aggressive approach with picc + 6 weeks iv abx.   -weekly cbc, cmp, esr, crp while on abx. PRINCIPAL DISCHARGE DIAGNOSIS  Diagnosis: Diabetic foot ulcer  Assessment and Plan of Treatment: wound on Vac Tx 125 mm hg continuous therapy w/ Sx wound stable/viable   - Plantar foot wound filling in appropriately, granular tissue and fibrotic tissue to wound bed  - Continue IV Abx as per ID  - Pt is podiatrically stable for d/c, no further intervention planned at this time        SECONDARY DISCHARGE DIAGNOSES  Diagnosis: Abnormal MRI  Assessment and Plan of Treatment: concern for possible early OM  given extent of infection and MRI findings, would favor an aggressive approach with picc + 6 weeks iv abx.   -weekly cbc, cmp, esr, crp while on abx. PRINCIPAL DISCHARGE DIAGNOSIS  Diagnosis: Diabetic foot ulcer  Assessment and Plan of Treatment: S/P Extensive  debridement R plantar foot infection w/ extensive open wound on Vac Tx 125 mm hg continuous therapy w/ Sx wound stable/viable  - sharp excisional debridement of loosely adhered fibrin using a 15 blade to level of subcutaneous tissue and not beyond.   - Plantar foot wound filling in appropriately, granular tissue and fibrotic tissue to wound bed  *****Collagenase w/ wound VAC DSG 3x/weekly******  - Continue IV Abx for total 6 weeks   -DC to rehab today follow up with Dr. Issa within one week of discharge         SECONDARY DISCHARGE DIAGNOSES  Diagnosis: Abnormal MRI  Assessment and Plan of Treatment: concern for possible early OM  given extent of infection and MRI findings, would favor an aggressive approach with picc + 6 weeks iv abx.   ***weekly cbc, cmp, esr, crp while on abx***    Diagnosis: Acute kidney injury superimposed on chronic kidney disease  Assessment and Plan of Treatment: Creatinin improving  Hold HCTZ  Avoid taking (NSAIDs) - (ex: Ibuprofen, Advil, Celebrex, Naprosyn)  Avoid taking any nephrotoxic agents (can harm kidneys) - Intravenous contrast for diagnostic testing, combination cold medications.  Have all medications adjusted for your renal function by your Health Care Provider.  Blood pressure control is important.  Take all medication as prescribed.      Diagnosis: Metabolic acidosis  Assessment and Plan of Treatment: Now improved, CO2 23 today   Monitor BMP    Diagnosis: Hyponatremia  Assessment and Plan of Treatment: Resolved, 136 today    Diagnosis: Diabetes  Assessment and Plan of Treatment: Tight glycemic control  Make sure you get your HgA1c checked every three months.  If you take oral diabetes medications, check your blood glucose two times a day.  If you take insulin, check your blood glucose before meals and at bedtime.  It's important not to skip any meals.  Keep a log of your blood glucose results and always take it with you to your doctor appointments.  Keep a list of your current medications including injectables and over the counter medications and bring this medication list with you to all your doctor appointments.  If you have not seen your ophthalmologist this year call for appointment.  Check your feet daily for redness, sores, or openings. Do not self treat. If no improvement in two days call your primary care physician for an appointment.  Low blood sugar (hypoglycemia) is a blood sugar below 70mg/dl. Check your blood sugar if you feel signs/symptoms of hypoglycemia. If your blood sugar is below 70 take 15 grams of carbohydrates (ex 4 oz of apple juice, 3-4 glucose tablets, or 4-6 oz of regular soda) wait 15 minutes and repeat blood sugar to make sure it comes up above 70.  If your blood sugar is above 70 and you are due for a meal, have a meal.  If you are not due for a meal have a snack.  This snack helps keeps your blood sugar at a safe range.

## 2019-06-13 NOTE — PROGRESS NOTE ADULT - RS GEN PE MLT RESP DETAILS PC
clear to auscultation bilaterally/respirations non-labored
good air movement/respirations non-labored/clear to auscultation bilaterally
clear to auscultation bilaterally/good air movement/respirations non-labored
respirations non-labored/clear to auscultation bilaterally
respirations non-labored/good air movement/clear to auscultation bilaterally
respirations non-labored/clear to auscultation bilaterally
good air movement/clear to auscultation bilaterally/respirations non-labored
good air movement/respirations non-labored/clear to auscultation bilaterally
clear to auscultation bilaterally/good air movement/respirations non-labored
clear to auscultation bilaterally/respirations non-labored/good air movement
respirations non-labored/clear to auscultation bilaterally
respirations non-labored/good air movement/clear to auscultation bilaterally
clear to auscultation bilaterally/respirations non-labored
clear to auscultation bilaterally/respirations non-labored
respirations non-labored/good air movement/clear to auscultation bilaterally
respirations non-labored/clear to auscultation bilaterally/good air movement
clear to auscultation bilaterally/respirations non-labored/good air movement

## 2019-06-14 ENCOUNTER — TRANSCRIPTION ENCOUNTER (OUTPATIENT)
Age: 59
End: 2019-06-14

## 2019-06-14 VITALS
TEMPERATURE: 100 F | DIASTOLIC BLOOD PRESSURE: 70 MMHG | OXYGEN SATURATION: 94 % | HEART RATE: 80 BPM | SYSTOLIC BLOOD PRESSURE: 134 MMHG | RESPIRATION RATE: 18 BRPM

## 2019-06-14 LAB
ANION GAP SERPL CALC-SCNC: 13 MMOL/L — SIGNIFICANT CHANGE UP (ref 5–17)
BUN SERPL-MCNC: 16 MG/DL — SIGNIFICANT CHANGE UP (ref 7–23)
CALCIUM SERPL-MCNC: 8.8 MG/DL — SIGNIFICANT CHANGE UP (ref 8.4–10.5)
CHLORIDE SERPL-SCNC: 100 MMOL/L — SIGNIFICANT CHANGE UP (ref 96–108)
CO2 SERPL-SCNC: 23 MMOL/L — SIGNIFICANT CHANGE UP (ref 22–31)
CREAT SERPL-MCNC: 1.96 MG/DL — HIGH (ref 0.5–1.3)
GLUCOSE BLDC GLUCOMTR-MCNC: 172 MG/DL — HIGH (ref 70–99)
GLUCOSE BLDC GLUCOMTR-MCNC: 222 MG/DL — HIGH (ref 70–99)
GLUCOSE SERPL-MCNC: 232 MG/DL — HIGH (ref 70–99)
POTASSIUM SERPL-MCNC: 4.7 MMOL/L — SIGNIFICANT CHANGE UP (ref 3.5–5.3)
POTASSIUM SERPL-SCNC: 4.7 MMOL/L — SIGNIFICANT CHANGE UP (ref 3.5–5.3)
SODIUM SERPL-SCNC: 136 MMOL/L — SIGNIFICANT CHANGE UP (ref 135–145)

## 2019-06-14 PROCEDURE — 85610 PROTHROMBIN TIME: CPT

## 2019-06-14 PROCEDURE — 85027 COMPLETE CBC AUTOMATED: CPT

## 2019-06-14 PROCEDURE — 82330 ASSAY OF CALCIUM: CPT

## 2019-06-14 PROCEDURE — 87075 CULTR BACTERIA EXCEPT BLOOD: CPT

## 2019-06-14 PROCEDURE — 80053 COMPREHEN METABOLIC PANEL: CPT

## 2019-06-14 PROCEDURE — 86803 HEPATITIS C AB TEST: CPT

## 2019-06-14 PROCEDURE — 97605 NEG PRS WND THER DME<=50SQCM: CPT

## 2019-06-14 PROCEDURE — 97162 PT EVAL MOD COMPLEX 30 MIN: CPT

## 2019-06-14 PROCEDURE — 83540 ASSAY OF IRON: CPT

## 2019-06-14 PROCEDURE — 83550 IRON BINDING TEST: CPT

## 2019-06-14 PROCEDURE — 84100 ASSAY OF PHOSPHORUS: CPT

## 2019-06-14 PROCEDURE — 82947 ASSAY GLUCOSE BLOOD QUANT: CPT

## 2019-06-14 PROCEDURE — 82306 VITAMIN D 25 HYDROXY: CPT

## 2019-06-14 PROCEDURE — 99285 EMERGENCY DEPT VISIT HI MDM: CPT | Mod: 25

## 2019-06-14 PROCEDURE — 97530 THERAPEUTIC ACTIVITIES: CPT

## 2019-06-14 PROCEDURE — 99232 SBSQ HOSP IP/OBS MODERATE 35: CPT

## 2019-06-14 PROCEDURE — 97116 GAIT TRAINING THERAPY: CPT

## 2019-06-14 PROCEDURE — 99261: CPT

## 2019-06-14 PROCEDURE — 73720 MRI LWR EXTREMITY W/O&W/DYE: CPT

## 2019-06-14 PROCEDURE — 97606 NEG PRS WND THER DME>50 SQCM: CPT

## 2019-06-14 PROCEDURE — C1889: CPT

## 2019-06-14 PROCEDURE — 96374 THER/PROPH/DIAG INJ IV PUSH: CPT

## 2019-06-14 PROCEDURE — 82962 GLUCOSE BLOOD TEST: CPT

## 2019-06-14 PROCEDURE — 73630 X-RAY EXAM OF FOOT: CPT

## 2019-06-14 PROCEDURE — 83735 ASSAY OF MAGNESIUM: CPT

## 2019-06-14 PROCEDURE — A9585: CPT

## 2019-06-14 PROCEDURE — 86901 BLOOD TYPING SEROLOGIC RH(D): CPT

## 2019-06-14 PROCEDURE — 97164 PT RE-EVAL EST PLAN CARE: CPT

## 2019-06-14 PROCEDURE — A9569: CPT

## 2019-06-14 PROCEDURE — 87070 CULTURE OTHR SPECIMN AEROBIC: CPT

## 2019-06-14 PROCEDURE — 93306 TTE W/DOPPLER COMPLETE: CPT

## 2019-06-14 PROCEDURE — 93970 EXTREMITY STUDY: CPT

## 2019-06-14 PROCEDURE — 36569 INSJ PICC 5 YR+ W/O IMAGING: CPT

## 2019-06-14 PROCEDURE — 87186 SC STD MICRODIL/AGAR DIL: CPT

## 2019-06-14 PROCEDURE — 83935 ASSAY OF URINE OSMOLALITY: CPT

## 2019-06-14 PROCEDURE — 84484 ASSAY OF TROPONIN QUANT: CPT

## 2019-06-14 PROCEDURE — 88305 TISSUE EXAM BY PATHOLOGIST: CPT

## 2019-06-14 PROCEDURE — 86850 RBC ANTIBODY SCREEN: CPT

## 2019-06-14 PROCEDURE — 82803 BLOOD GASES ANY COMBINATION: CPT

## 2019-06-14 PROCEDURE — 93923 UPR/LXTR ART STDY 3+ LVLS: CPT

## 2019-06-14 PROCEDURE — 71045 X-RAY EXAM CHEST 1 VIEW: CPT

## 2019-06-14 PROCEDURE — 87205 SMEAR GRAM STAIN: CPT

## 2019-06-14 PROCEDURE — 80202 ASSAY OF VANCOMYCIN: CPT

## 2019-06-14 PROCEDURE — 85014 HEMATOCRIT: CPT

## 2019-06-14 PROCEDURE — 84295 ASSAY OF SERUM SODIUM: CPT

## 2019-06-14 PROCEDURE — 86900 BLOOD TYPING SEROLOGIC ABO: CPT

## 2019-06-14 PROCEDURE — A9570: CPT

## 2019-06-14 PROCEDURE — 83605 ASSAY OF LACTIC ACID: CPT

## 2019-06-14 PROCEDURE — 83036 HEMOGLOBIN GLYCOSYLATED A1C: CPT

## 2019-06-14 PROCEDURE — 93005 ELECTROCARDIOGRAM TRACING: CPT

## 2019-06-14 PROCEDURE — 78802 RP LOCLZJ TUM WHBDY 1 D IMG: CPT

## 2019-06-14 PROCEDURE — 86140 C-REACTIVE PROTEIN: CPT

## 2019-06-14 PROCEDURE — 87102 FUNGUS ISOLATION CULTURE: CPT

## 2019-06-14 PROCEDURE — 82435 ASSAY OF BLOOD CHLORIDE: CPT

## 2019-06-14 PROCEDURE — 86923 COMPATIBILITY TEST ELECTRIC: CPT

## 2019-06-14 PROCEDURE — 85652 RBC SED RATE AUTOMATED: CPT

## 2019-06-14 PROCEDURE — 78999 UNLISTED MISC PX DX NUC MED: CPT

## 2019-06-14 PROCEDURE — 81001 URINALYSIS AUTO W/SCOPE: CPT

## 2019-06-14 PROCEDURE — 36430 TRANSFUSION BLD/BLD COMPNT: CPT

## 2019-06-14 PROCEDURE — P9016: CPT

## 2019-06-14 PROCEDURE — 85730 THROMBOPLASTIN TIME PARTIAL: CPT

## 2019-06-14 PROCEDURE — 85379 FIBRIN DEGRADATION QUANT: CPT

## 2019-06-14 PROCEDURE — 87040 BLOOD CULTURE FOR BACTERIA: CPT

## 2019-06-14 PROCEDURE — 82728 ASSAY OF FERRITIN: CPT

## 2019-06-14 PROCEDURE — 84132 ASSAY OF SERUM POTASSIUM: CPT

## 2019-06-14 PROCEDURE — 80048 BASIC METABOLIC PNL TOTAL CA: CPT

## 2019-06-14 PROCEDURE — 88304 TISSUE EXAM BY PATHOLOGIST: CPT

## 2019-06-14 PROCEDURE — C1751: CPT

## 2019-06-14 PROCEDURE — 82652 VIT D 1 25-DIHYDROXY: CPT

## 2019-06-14 RX ORDER — FUROSEMIDE 40 MG
2 TABLET ORAL
Qty: 0 | Refills: 0 | DISCHARGE

## 2019-06-14 RX ORDER — COLLAGENASE CLOSTRIDIUM HIST. 250 UNIT/G
1 OINTMENT (GRAM) TOPICAL DAILY
Refills: 0 | Status: DISCONTINUED | OUTPATIENT
Start: 2019-06-14 | End: 2019-06-14

## 2019-06-14 RX ORDER — SENNA PLUS 8.6 MG/1
2 TABLET ORAL
Qty: 0 | Refills: 0 | DISCHARGE
Start: 2019-06-14

## 2019-06-14 RX ORDER — INSULIN DETEMIR 100/ML (3)
20 INSULIN PEN (ML) SUBCUTANEOUS
Qty: 0 | Refills: 0 | DISCHARGE

## 2019-06-14 RX ORDER — INSULIN LISPRO 100/ML
22 VIAL (ML) SUBCUTANEOUS
Qty: 0 | Refills: 0 | DISCHARGE
Start: 2019-06-14

## 2019-06-14 RX ORDER — HYDRALAZINE HCL 50 MG
1 TABLET ORAL
Qty: 0 | Refills: 0 | DISCHARGE
Start: 2019-06-14

## 2019-06-14 RX ORDER — HYDROCORTISONE 1 %
1 OINTMENT (GRAM) TOPICAL
Qty: 0 | Refills: 0 | DISCHARGE
Start: 2019-06-14

## 2019-06-14 RX ORDER — GLIMEPIRIDE 1 MG
1 TABLET ORAL
Qty: 0 | Refills: 0 | DISCHARGE

## 2019-06-14 RX ORDER — COLLAGENASE CLOSTRIDIUM HIST. 250 UNIT/G
1 OINTMENT (GRAM) TOPICAL
Qty: 0 | Refills: 0 | DISCHARGE
Start: 2019-06-14

## 2019-06-14 RX ORDER — DRONABINOL 2.5 MG
1 CAPSULE ORAL
Qty: 0 | Refills: 0 | DISCHARGE
Start: 2019-06-14

## 2019-06-14 RX ORDER — INSULIN ASPART 100 [IU]/ML
20 INJECTION, SOLUTION SUBCUTANEOUS
Qty: 0 | Refills: 0 | DISCHARGE

## 2019-06-14 RX ORDER — AMLODIPINE BESYLATE 2.5 MG/1
1 TABLET ORAL
Qty: 0 | Refills: 0 | DISCHARGE
Start: 2019-06-14

## 2019-06-14 RX ORDER — POLYETHYLENE GLYCOL 3350 17 G/17G
17 POWDER, FOR SOLUTION ORAL
Qty: 0 | Refills: 0 | DISCHARGE
Start: 2019-06-14

## 2019-06-14 RX ORDER — PIPERACILLIN AND TAZOBACTAM 4; .5 G/20ML; G/20ML
3.38 INJECTION, POWDER, LYOPHILIZED, FOR SOLUTION INTRAVENOUS
Qty: 0 | Refills: 0 | DISCHARGE
Start: 2019-06-14

## 2019-06-14 RX ORDER — ACETAMINOPHEN 500 MG
3 TABLET ORAL
Qty: 0 | Refills: 0 | DISCHARGE

## 2019-06-14 RX ADMIN — HEPARIN SODIUM 5000 UNIT(S): 5000 INJECTION INTRAVENOUS; SUBCUTANEOUS at 05:51

## 2019-06-14 RX ADMIN — Medication 1 APPLICATION(S): at 14:10

## 2019-06-14 RX ADMIN — Medication 81 MILLIGRAM(S): at 14:12

## 2019-06-14 RX ADMIN — Medication 1: at 14:03

## 2019-06-14 RX ADMIN — CHLORHEXIDINE GLUCONATE 1 APPLICATION(S): 213 SOLUTION TOPICAL at 09:30

## 2019-06-14 RX ADMIN — Medication 22 UNIT(S): at 14:03

## 2019-06-14 RX ADMIN — Medication 2: at 09:31

## 2019-06-14 RX ADMIN — Medication 22 UNIT(S): at 09:31

## 2019-06-14 RX ADMIN — PIPERACILLIN AND TAZOBACTAM 25 GRAM(S): 4; .5 INJECTION, POWDER, LYOPHILIZED, FOR SOLUTION INTRAVENOUS at 10:56

## 2019-06-14 RX ADMIN — Medication 100 MILLIGRAM(S): at 05:51

## 2019-06-14 RX ADMIN — PIPERACILLIN AND TAZOBACTAM 25 GRAM(S): 4; .5 INJECTION, POWDER, LYOPHILIZED, FOR SOLUTION INTRAVENOUS at 01:31

## 2019-06-14 RX ADMIN — AMLODIPINE BESYLATE 10 MILLIGRAM(S): 2.5 TABLET ORAL at 05:50

## 2019-06-14 RX ADMIN — OXYCODONE AND ACETAMINOPHEN 2 TABLET(S): 5; 325 TABLET ORAL at 14:13

## 2019-06-14 RX ADMIN — Medication 100 MILLIGRAM(S): at 14:12

## 2019-06-14 RX ADMIN — OXYCODONE AND ACETAMINOPHEN 2 TABLET(S): 5; 325 TABLET ORAL at 14:45

## 2019-06-14 RX ADMIN — Medication 2.5 MILLIGRAM(S): at 05:51

## 2019-06-14 RX ADMIN — HEPARIN SODIUM 5000 UNIT(S): 5000 INJECTION INTRAVENOUS; SUBCUTANEOUS at 14:12

## 2019-06-14 NOTE — PROGRESS NOTE ADULT - NEGATIVE GENERAL GENITOURINARY SYMPTOMS
no flank pain R/no flank pain L/no hematuria/no dysuria
no hematuria/no flank pain R/no dysuria/no flank pain L
no dysuria/no flank pain L/no hematuria/no flank pain R
no dysuria/no flank pain L/no hematuria/no flank pain R
no flank pain L/no dysuria/no hematuria/no flank pain R
no hematuria/no flank pain R/no flank pain L/no dysuria
no dysuria/no flank pain R/no hematuria/no flank pain L
no hematuria/no flank pain R/no flank pain L/no dysuria
no flank pain L/no dysuria/no hematuria/no flank pain R
no flank pain R/no hematuria/no dysuria/no flank pain L
no hematuria/no flank pain L/no dysuria/no flank pain R
no hematuria/no flank pain L/no flank pain R/no dysuria
no dysuria/no hematuria/no flank pain R/no flank pain L
no hematuria/no flank pain R/no flank pain L/no dysuria
no hematuria/no dysuria/no flank pain L/no flank pain R
no hematuria/no flank pain L/no flank pain R/no dysuria
no flank pain L/no dysuria/no flank pain R/no hematuria
no flank pain L/no flank pain R/no hematuria/no dysuria

## 2019-06-14 NOTE — PROGRESS NOTE ADULT - ASSESSMENT
Dx: 1. S/P Extensive  debridement R plantar foot infection w/ extensive open wound on Vac Tx 125 mm hg continuous therapy w/ Sx wound stable/viable        2. 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   - Plantar foot wound filling in appropriately, granular tissue and fibrotic tissue to wound bed  - Collagenase w/ wound VAC to be applied later today  - Continue IV Abx as per ID  - Pt is podiatrically stable for d/c, w/ home VAC and IV abx, no further intervention planned at this time  - Pt dc to rehab today follow up with Dr. Issa within one week of discharge   - D/w attending

## 2019-06-14 NOTE — PROGRESS NOTE ADULT - ASSESSMENT
Assessment  DMT2: 58y Male with DM T2 with hyperglycemia, on insulin, dose was adjusted, blood sugars improving now, no hypoglycemias, eating meals, feeling better, compliant with low carb diet, planing DC to rehab.  Foot wound: s/p surgery, on medications, no chest pain, stable, monitored.  HTN: Controlled,  on antihypertensive medications.  Overweight/Obesity: No strict exercise routines, not on any weight loss program, neither on low calorie diet.    Andrzej Zhu MD  Cell: [phone]  Office: [phone]

## 2019-06-14 NOTE — PROGRESS NOTE ADULT - PROBLEM SELECTOR PLAN 6
-potential side effects of abx explained including GI, Cdiff, allergy issues, development of resistance, etc.  -picc  -potential side effects of PICC explained including bleeding and infection  -day 7/42 of abx
-potential side effects of abx explained including GI, Cdiff, allergy issues, development of resistance, etc.  -picc  -potential side effects of PICC explained including bleeding and infection  -day 9/42 of abx
-potential side effects of abx explained including GI, Cdiff, allergy issues, development of resistance, etc.  -picc  -potential side effects of PICC explained including bleeding and infection  -day 6/42 of abx
-cont zosyn  -potential side effects of abx explained including GI, Cdiff, allergy issues, development of resistance, etc.  -picc  -potential side effects of PICC explained including bleeding and infection
-potential side effects of abx explained including GI, Cdiff, allergy issues, development of resistance, etc.  -picc  -potential side effects of PICC explained including bleeding and infection
-potential side effects of abx explained including GI, Cdiff, allergy issues, development of resistance, etc.  -picc  -potential side effects of PICC explained including bleeding and infection  -day 8/42 of abx
-cont zosyn  -potential side effects of abx explained including GI, Cdiff, allergy issues, development of resistance, etc.  -picc  -potential side effects of PICC explained including bleeding and infection
-cont zosyn  -potential side effects of abx explained including GI, Cdiff, allergy issues, development of resistance, etc.  -picc

## 2019-06-14 NOTE — PROGRESS NOTE ADULT - NEGATIVE ALLERGIC REACTIONS
no respiratory distress

## 2019-06-14 NOTE — PROGRESS NOTE ADULT - PROBLEM SELECTOR PROBLEM 1
Cellulitis of foot
Acute kidney injury superimposed on chronic kidney disease
Arterial insufficiency with ischemic ulcer
Cellulitis of foot
Diabetes
MARIKA (acute kidney injury)
Acute kidney injury superimposed on chronic kidney disease
Arterial insufficiency with ischemic ulcer
Cellulitis of foot
Diabetes
MARIKA (acute kidney injury)
Arterial insufficiency with ischemic ulcer
Diabetes
Acute kidney injury superimposed on chronic kidney disease
Arterial insufficiency with ischemic ulcer
Arterial insufficiency with ischemic ulcer
Diabetic foot ulcer
Acute kidney injury superimposed on chronic kidney disease
Diabetic foot ulcer

## 2019-06-14 NOTE — PROGRESS NOTE ADULT - PHARYNX
no redness

## 2019-06-14 NOTE — DISCHARGE NOTE NURSING/CASE MANAGEMENT/SOCIAL WORK - NSDCPNINST_GEN_ALL_CORE
NOTIFY YOUR SURGEON/RETURN TO ED IF: You have any bleeding that does not stop, any drainage from your wounds, any fever (over 100.4 F) or chills, persistent nausea/vomiting, persistent diarrhea, or if your pain is not controlled on your discharge pain medications.

## 2019-06-14 NOTE — PROGRESS NOTE ADULT - CONSTITUTIONAL
detailed exam

## 2019-06-14 NOTE — PROGRESS NOTE ADULT - NEGATIVE ENMT SYMPTOMS
no ear pain/no nasal congestion/no throat pain
no throat pain/no ear pain/no nasal congestion
no throat pain/no nasal congestion/no ear pain
no ear pain/no nasal congestion/no throat pain
no ear pain/no nasal congestion/no throat pain
no ear pain/no throat pain/no nasal congestion
no ear pain/no throat pain/no nasal congestion
no nasal congestion/no ear pain/no throat pain
no throat pain/no ear pain/no nasal congestion
no throat pain/no nasal congestion/no ear pain
no ear pain/no throat pain/no nasal congestion
no ear pain/no nasal congestion/no throat pain
no throat pain/no nasal congestion/no ear pain
no throat pain/no ear pain/no nasal congestion

## 2019-06-14 NOTE — PROGRESS NOTE ADULT - ASSESSMENT
dyspnea resolved  -  secondary to fluid overload    diabetic foot ulcer with surrounding cellulitis and sepsis present on admission resolved  Fever resolved , leukocytosis persists but improved   -cont abx  -s/p debridement per podiatry 5/27  -DM control  -local care per podiatry  - wound vac  -s/p debridement/revision 6/5 -   - no overt purulence, debrided down to healthy tissue.   - vascular spoke with pt regarding possible BKA.  - picc line for long ter iv abx  - day # 8/42 of abx    PVD  - may need angiogram but will hold off due to renal function    anemia of chronic disease  - s/p  transfusion 1 unit of prbc  - consider epogen    uncontrolled Diabetes  - cont lantus 38 - novolog 22 units qac  - hgb a1c  11.9  - diabetic diet  - FS qid  - endo follow up appreciated    MARIKA   - cre at baseline  - follow creatinine  - nephrology following  - encouraged fluid intake    HTN   -   hold hydralazine     hyponatremia  - no HCTZ    anorexia  - add Glucerna  - cont Marinol    depression  - seen by psych    constipation  - c/w senna  - miralax bid    dispo  - d/c planning to [facility]

## 2019-06-14 NOTE — PROGRESS NOTE ADULT - NSHPATTENDINGPLANDISCUSS_GEN_ALL_CORE
PA
PA
surg ho
Dr. Broderick + Dr. Issa + wife
Vascular & Infectious Dz
surg ho
Dr. Broderick
Med NP
patient
med
patient
patient/ dr gutiérrez
pt
Dr. Broderick
Dr. Broderick
Med NP
Podiatry team
Med NP + Podiatry team
pt
Dr. Issa

## 2019-06-14 NOTE — PROGRESS NOTE ADULT - PROBLEM SELECTOR PROBLEM 5
Hyponatremia
Abnormal MRI

## 2019-06-14 NOTE — PROGRESS NOTE ADULT - ASSESSMENT
58M w/ DM (A1C 11.9), HTN, presents with nonhealing RLE heel ulcer s/p multiple debridements.       - C/w wound care and VAC  per podiatry  - will follow

## 2019-06-14 NOTE — PROGRESS NOTE ADULT - SUBJECTIVE AND OBJECTIVE BOX
AGUSTIN BRAR 58y MRN-[id number]    Patient is a 58y old  Male who presents with a chief complaint of right foot ulcer (14 Jun 2019 09:21)      Follow Up/CC:  ID following for foot infection    Interval History/ROS: no fever, feels ok, got picc    Allergies    No Known Allergies    Intolerances        ANTIMICROBIALS:  piperacillin/tazobactam IVPB. 3.375 every 8 hours      MEDICATIONS  (STANDING):  amLODIPine   Tablet 10 milliGRAM(s) Oral daily  aspirin enteric coated 81 milliGRAM(s) Oral daily  bisacodyl Suppository 10 milliGRAM(s) Rectal once  chlorhexidine 4% Liquid 1 Application(s) Topical <User Schedule>  collagenase Ointment 1 Application(s) Topical daily  dextrose 5%. 1000 milliLiter(s) (50 mL/Hr) IV Continuous <Continuous>  dextrose 50% Injectable 12.5 Gram(s) IV Push once  dextrose 50% Injectable 25 Gram(s) IV Push once  dextrose 50% Injectable 25 Gram(s) IV Push once  dronabinol 2.5 milliGRAM(s) Oral two times a day  ergocalciferol 72754 Unit(s) Oral every week  heparin  Injectable 5000 Unit(s) SubCutaneous every 8 hours  hydrALAZINE 100 milliGRAM(s) Oral three times a day  insulin glargine Injectable (LANTUS) 38 Unit(s) SubCutaneous at bedtime  insulin lispro (HumaLOG) corrective regimen sliding scale   SubCutaneous three times a day before meals  insulin lispro (HumaLOG) corrective regimen sliding scale   SubCutaneous at bedtime  insulin lispro Injectable (HumaLOG) 22 Unit(s) SubCutaneous three times a day before meals  ondansetron Injectable 4 milliGRAM(s) IV Push once  piperacillin/tazobactam IVPB. 3.375 Gram(s) IV Intermittent every 8 hours  polyethylene glycol 3350 17 Gram(s) Oral every 12 hours  senna 2 Tablet(s) Oral at bedtime    MEDICATIONS  (PRN):  dextrose 40% Gel 15 Gram(s) Oral once PRN Blood Glucose LESS THAN 70 milliGRAM(s)/deciliter  glucagon  Injectable 1 milliGRAM(s) IntraMuscular once PRN Glucose LESS THAN 70 milligrams/deciliter  hydrocortisone 1% Ointment 1 Application(s) Topical two times a day PRN Itching  oxyCODONE    5 mG/acetaminophen 325 mG 1 Tablet(s) Oral every 6 hours PRN Moderate Pain (4 - 6)  oxyCODONE    5 mG/acetaminophen 325 mG 2 Tablet(s) Oral every 6 hours PRN Severe Pain (7 - 10)  sodium chloride 0.9% lock flush 10 milliLiter(s) IV Push every 1 hour PRN Pre/post blood products, medications, blood draw, and to maintain line patency        Vital Signs Last 24 Hrs  T(C): 37.2 (14 Jun 2019 09:21), Max: 37.5 (13 Jun 2019 22:46)  T(F): 98.9 (14 Jun 2019 09:21), Max: 99.5 (13 Jun 2019 22:46)  HR: 85 (14 Jun 2019 09:21) (78 - 85)  BP: 152/81 (14 Jun 2019 09:21) (129/71 - 154/73)  BP(mean): --  RR: 18 (14 Jun 2019 09:21) (18 - 18)  SpO2: 94% (14 Jun 2019 09:21) (94% - 97%)    CBC Full  -  ( 13 Jun 2019 06:34 )  WBC Count : 6.8 K/uL  RBC Count : 2.99 M/uL  Hemoglobin : 8.9 g/dL  Hematocrit : 27.5 %  Platelet Count - Automated : 344 K/uL  Mean Cell Volume : 92.3 fl  Mean Cell Hemoglobin : 29.8 pg  Mean Cell Hemoglobin Concentration : 32.3 gm/dL  Auto Neutrophil # : x  Auto Lymphocyte # : x  Auto Monocyte # : x  Auto Eosinophil # : x  Auto Basophil # : x  Auto Neutrophil % : x  Auto Lymphocyte % : x  Auto Monocyte % : x  Auto Eosinophil % : x  Auto Basophil % : x    06-14    136  |  100  |  16  ----------------------------<  232<H>  4.7   |  23  |  1.96<H>    Ca    8.8      14 Jun 2019 06:31            MICROBIOLOGY:  .Tissue  06-05-19   Numerous Escherichia coli  Few [place] parapsilosis "Susceptibilities not performed"  --  Escherichia coli      .Other  05-28-19   No growth  --  Enterococcus faecalis  Escherichia coli      .Blood  05-26-19   No growth at 5 days.  --  --      .Blood  05-25-19   No growth at 5 days.  --  --      .Tissue  05-23-19   Few Escherichia coli  Growth in fluid media only Enterococcus faecalis  Rare Streptococcus agalactiae (Group B) isolated  Group B streptococci are susceptible to ampicillin,  penicillin and cefazolin, but may be resistant to  erythromycin and clindamycin.  Recommendations for intrapartum prophylaxis for Group B  streptococci are penicillin or ampicillin.  --  Escherichia coli  Enterococcus faecalis      .Blood  05-20-19   No growth at 5 days.  --  --      .Abscess  05-20-19   Numerous Escherichia coli  Numerous Enterococcus faecalis  Moderate Prevotella bivia "Susceptibilities not performed"  --  Enterococcus faecalis  Escherichia coli        RADIOLOGY    < from: Xray Chest 1 View- PORTABLE-Urgent (06.13.19 @ 17:34) >  RUE PICC tip in the SVC    < end of copied text >

## 2019-06-14 NOTE — PROGRESS NOTE ADULT - NEGATIVE HEMATOLOGY SYMPTOMS
no nose bleeding

## 2019-06-14 NOTE — PROGRESS NOTE ADULT - NEGATIVE NEUROLOGICAL SYMPTOMS
no confusion/no headache
no confusion/no headache
no headache/no confusion
no confusion/no headache
no headache/no confusion
no headache/no confusion
no confusion/no headache
no headache/no confusion
no confusion/no headache
no confusion/no headache
no headache/no confusion
no confusion/no headache
no headache/no confusion
no headache/no confusion
no confusion/no headache
no confusion/no headache
no headache/no confusion
no headache/no confusion

## 2019-06-14 NOTE — PROGRESS NOTE ADULT - NEGATIVE CARDIOVASCULAR SYMPTOMS
no chest pain

## 2019-06-14 NOTE — PROGRESS NOTE ADULT - ATTENDING COMMENTS
58M with IDDM, HTN, and CKD presenting admitted with a diabetic foot ulcer s/p debridement. creat in 2017 was 1.6, now with MARIKA     marika- ATN in the setting of infection with hypotension+ Vanc/zosyn combination superimposed on lisinopril use. now IMPROVING. urinating. no obstruction.
58M with IDDM, HTN, and CKD presenting admitted with a diabetic foot ulcer s/p debridement. creat in 2017 was 1.6, now with MARIKA     marika- ATN in the setting of infection with hypotension+ Vanc/zosyn combination superimposed on lisinopril use. urinating. no obstruction. no rrt needs. no need for fluids.
Patient was seen at bedside today w/ MD present.
The patient was seen and evaluated by me personally w/ Infectious Jessica today in the am
Agree w/ above findings. The patients R foot w/ new findings of fluctuance noted plantar just proximal to the met heads. In light of this new finding and along w/ the patient having fever spikes as well as increased WBC to 17 will be bringing the patient back to the OR for I & D of the plantar forefoot w/ further exploration. The patient has not eaten since last night and the medical team was alerted about proposed procedure. Discussed with the patient and his family about returning to the OR for I & D of new occurring abscess at the ball of the foot. Explained the risks, benefits, & alternatives w/ no guarantees implied. Consent signed w/o any reservation w/ patients wife present. For now patient needs procedure to control sepsis to prevent loss of life/limb. Also stated to the patient and his wife that additional surgical management will be needed for attempted R foot salvage.
Discussed case with the resident today who evaluated the R post op foot I & D. The resident relayed there was no active bleeding or pus coming from the R foot Sx wound. The tissue was viable with no odor. WBC was trending down from 17 to 14 w/ no fevers. Continue w/ local wound care 1/2 inch packing soaked in normal saline w/ well padded DSD. IV ABX Vanco/Zosyn as per Infectious Dz. Will continue to monitor.
Discussed w/ residents today agree w/ above findings. Will re-evaluate on Monday. Continue Vac Tx & IV ABX. Pt continues to improve does not feel Lethargic.
Pt scheduled for OR debridement today. Medically and cardiac cleared. Continue IV antibiotics. Please consult ID.
Spoke w/ resident planning to take the patient for revisional debridement Wednesday 6/5/2019.
The patient is NPO after midnight and Medically cleared for Revisional R foot debridement. R foot w/ Large open plantar Surgical wound 14 cm (L) & 2 cm Deep w/ exposed fibro necrotic plantar fascia, tendon & muscle. No pus expressed, no odor, edema of the foot has decreased since the initial presentation. The patient continue to have elevated WBC and no abscess expressed.  Will be going for WBC scan today to evaluate for other sources of infection.  Discussed the proposed procedure with the patient today and explained the general considerations, alternative methods & relative risks with no guarantees implied. Also, discussed the possibility that if the infection cannot be controlled and the foot is the sole source of the infection he may require a more proximal amputation to control the infection.
The patient was seen and evaluated today.
The pt was seen and evaluated @ Bedside today feeling Lethargic. He has had this Chronic R plantar midfoot ulcer for a few weeks duration which was being treated with aggressive wound care & offloading. The patients Podiatrist stated that the Wcx taken in his office had heavy growth of Strep Agalactiae group B, Enterococcus Faecalis, & E. Coli. The patient was sent into the Hospital by his private Podiatrist for IV ABX as well as  further evaluation & Tx. Vascular DP/PT 2/4 bilat, CR intact digits 1-5 bilat, TG R foot increased warmth to the touch L foot N/L TG; Neuro: Epicritic sensation not intact bilat; Ortho: pes plano valgus foot bilat Hammertoes 2-5 bilat; Derm: R plantar midfoot region 3 cm (d) x 2.5 cm (d) FT w/ mixed Fibro granular & fibro necrotic tissue w/ malodor and serous drainage. exposed Plantar fascia tissue undermining noted throughout the surrounding ulcer, not able to express any purulence. X rays did not reveal gas in the tissues or signs of OM. Recommend MRI to evaluate for deep abscess/ OM.   plan: Will need Medical CL for OR Debridement R foot infection along w/ Vac placement to get sepsis under control. Plan to take patient to the OR tomorrow 5/22/2019 @ 1pm. Also recommend ID consult. WCX taken @ Hospital pending.
I have personally  seen and examined the patient today and have noted the findings and formulated the plan of care.
Spoke w/ Podiatry Resident today and recommended Nuclear Medicine scan to evaluate for other source of infection. The patient is on the OR schedule for Revisional debridement tomorrow @ 7:30 am.  Discussed case with Dr. Osorio and at this point the R foot is guarded and if condition does not improve the patient may require a more proximal amputation.
agree w/ above findings awaiting MRI to evaluate for Abscess/OM R foot. Also Vascular consult Pending.
Agree with above findings, Patient will continue Vac and will re evaluate wound on Monday.
Patient was seen & evaluated by me today and recommend Vac Tx/Collagenase in conjunction w/ HBO for R foot salvage. Will need to continue with  total 6 weeks of IV ABX.   Lower Extremity Physical Examination:  Sx wound Full Thickness  R plantar foot now measuring 16 cm (L) x 2 cm (w) along sx incision &  x 5 cm (w) plantar midfoot where original ulcer started x 2 cm deep with increased beefy viable granular tissue covering muscle belly w/ sparse areas of fibrinous tissue. Plantar forefoot 2nd/3rd met capsular tissue noted, No odor, no purulence. Mild Macerated tissue along digits 3 & 4. Mild periwound maceration.   Dx: 1. S/P Extensive debridement R plantar Foot wound now on Vac/collagenase w/ Sx wound stable/viable        2. 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 Exam 2. IV ABxs as per ID total 6 weeks 3. Rehab that can accommodate Vac/HBO  4. Cleaned the Wound today w/ NS Flush and applied Collagenase oint w/ wet to dry dressing. Also applied Betadine sol to digits 3,4 R foot. Pt will have the Vac re applied by PT today 5. Would recommend NWB on R foot. Once D/C from the Hospital the patient can f/u in my Private office in 1 week. Office Address [address] [place] [address] [phone]
Post op day #2 following  I & D of new abscess formation plantar ball of foot done on 5/27/2019 where 30 cc of pus was expressed. No active bleeders and no remaining pus expressed from the sx wound. No odor or increased warmth to the touch, however, some areas of fibronecrotic tissue noted w/ areas of fibrogranular tissue. Wound now measuring 14 cm (L) with original plantar midfoot ulcer measuring 5x 5 1/2 cm (d) w/ exposed Flexor muscle belly & exposed Plantar Fascia . The patient feeling lethargic today and WBC holding at 14. Recommend MRI to see if any other ester of pus that may need to be explored and Vasc consult to evaluate vascular status. Will continue to monitor closely. Flushed the Sx wound today w/ copious amounts of normal saline & placed 1/2 Iodosorb packing soaked in normal saline w/ well padded DSD. Continue IV ABX as per ID Vanco/Zosyn.
Since the patient had an elevation of his WBC 11.6 today along w/ recent MRI finding consistent w/ a collection in the plantar ball of the foot I decided to further inspect the R foot. Performed aseptic bedside I & D along w/ debridement. A pocket of 3 cc  of sero purulent Fluid was expressed plantar ball of the foot deep to the Plantar fascia in the region of the 3rd Met Head. Using a Hemostat opened up medial & lateral w/ no other fluid collection. Removed fibro necrotic tissue w/ Sx scissors & Flushed w/ copious amounts of NS. Placed Iodosorb packing wet to dry R foot. Will continue to closely monitor. Plan to take the patient back to the OR on Wednesday 6/5/19 for Revisional debridement & Vac placement. Continue IV ABX as per Infectious Dz.
Erik Monroy  Attending Physician   Division of Infectious Disease  Pager #[phone]  After 5pm/weekend or no response, call #[phone]
WBC trending down 10.8. No fevers. R foot w/ fibrogranualr tissue Plantar Fascia exposed w/ no abscess. Will review MRI.
I have personally  seen and examined the patient today and have noted the findings and formulated the plan of care.
I have seen and examined the patient today and agree with  the  evaluation, assessment and plan of the surgical house officer
Erik Monroy  Attending Physician   Division of Infectious Disease  Pager #[phone]  After 5pm/weekend or no response, call #[phone]
I have personally  seen and examined the patient today and have noted the findings and formulated the plan of care.
58M with IDDM, HTN, and CKD presenting admitted with a diabetic foot ulcer s/p debridement.creat in 2017 was 1.6 > admitted with creat of 1.8 > 2.5 today.     likely sec to a poor po intake,  ? post infectious GN, ? Related to Vanc/zosyn ( however time period too short) superimposed on ACEI     - Start LR at 125cc/hr   - PLEASE check urinalysis with microscopy   - Vanc/Zosyn combination can be nephrotoxic. consider switching zosyn to cefepime/ceftazidime  - hold lisinopril ( did receive a dose yesterday)  - can uptitrate amlodipine to 10 mg daily.
I have seen and examined the patient today and agree with  the  evaluation, assessment and plan of the surgical house officer
Erik Monroy  Attending Physician   Division of Infectious Disease  Pager #[phone]  After 5pm/weekend or no response, call #[phone]
Erik Monroy  Attending Physician   Division of Infectious Disease  Pager #[phone]  After 5pm/weekend or no response, call #[phone]
I wrote above
Erik Monroy  Attending Physician   Division of Infectious Disease  Pager #[phone]  After 5pm/weekend or no response, call #[phone]    Please call the ID service [phone] with questions or concerns over the weekend.
Erik Monroy  Attending Physician   Division of Infectious Disease  Pager #[phone]  After 5pm/weekend or no response, call #[phone]
Erik Monroy  Attending Physician   Division of Infectious Disease  Pager #[phone]  After 5pm/weekend or no response, call #[phone]
Erik Monroy  Attending Physician   Division of Infectious Disease  Pager #[phone]  After 5pm/weekend or no response, call #[phone]
Erik Monroy  Attending Physician   Division of Infectious Disease  Pager #[phone]  After 5pm/weekend or no response, call #[phone]    Please call the ID service [phone] with questions or concerns over the weekend.    ID coverage available over Memorial Day weekend if needed. Call #[phone] for questions/concerns.
Erik Monroy  Attending Physician   Division of Infectious Disease  Pager #[phone]  After 5pm/weekend or no response, call #[phone]
Erik Monroy  Attending Physician   Division of Infectious Disease  Pager #[phone]  After 5pm/weekend or no response, call #[phone]
Erik Monroy  Attending Physician   Division of Infectious Disease  Pager #[phone]  After 5pm/weekend or no response, call #[phone]    Please call the ID service [phone] with questions or concerns over the weekend.
Erik Monroy  Attending Physician   Division of Infectious Disease  Pager #[phone]  After 5pm/weekend or no response, call #[phone]    I am away on 6/6 and will return 6/7. ID available #[phone].
Erik Monroy  Attending Physician   Division of Infectious Disease  Pager #[phone]  After 5pm/weekend or no response, call #[phone]    Please call the ID service [phone] with questions or concerns over the weekend.    I am away  and will return 6/11. ID available #[phone].
Erik Monroy  Attending Physician   Division of Infectious Disease  Pager #[phone]  After 5pm/weekend or no response, call #[phone]
Erik Monroy  Attending Physician   Division of Infectious Disease  Pager #[phone]  After 5pm/weekend or no response, call #[phone]
Erik Monroy  Attending Physician   Division of Infectious Disease  Pager #[phone]  After 5pm/weekend or no response, call #[phone]    Please call the ID service [phone] with questions or concerns over the weekend.
Erik Monroy  Attending Physician   Division of Infectious Disease  Pager #[phone]  After 5pm/weekend or no response, call #[phone]    Please call the ID service [phone] with questions or concerns over the weekend.

## 2019-06-14 NOTE — PROGRESS NOTE ADULT - PROBLEM SELECTOR PLAN 4
Serum sodium is WNL this AM at 136.    - Mix antibiotics in NS  - Recommend monitoring serum sodium with daily labs.

## 2019-06-14 NOTE — PROGRESS NOTE ADULT - PROBLEM SELECTOR PLAN 5
-concern for possible early OM  -I think given extent of infection and MRI findings, would favor an aggressive approach with picc + 6 weeks iv abx
sodium downtrending on ivf  check u osm  may need to change ivf if cont downtrend
-concern for possible early OM  -I think given extent of infection and MRI findings, would favor an aggressive approach with picc + 6 weeks iv abx

## 2019-06-14 NOTE — PROGRESS NOTE ADULT - MS EXT PE MLT D E PC
pedal edema/no cyanosis
no pedal edema/no cyanosis
no cyanosis
no cyanosis/pedal edema
no cyanosis
no cyanosis
pedal edema/no cyanosis
no cyanosis

## 2019-06-14 NOTE — PROGRESS NOTE ADULT - SUBJECTIVE AND OBJECTIVE BOX
Podiatry pager #: [place] Pager:  [phone], [facility] Pager: [phone]    Patient is a 58y old  Male who presents with a chief complaint of right foot ulcer (14 Jun 2019 10:01)       INTERVAL HPI/OVERNIGHT EVENTS:  Patient seen and evaluated at bedside.  Pt is resting comfortable in NAD. Denies N/V/F/C.  Pain rated at X/10    Allergies    No Known Allergies    Intolerances        Vital Signs Last 24 Hrs  T(C): 37.2 (14 Jun 2019 09:21), Max: 37.5 (13 Jun 2019 22:46)  T(F): 98.9 (14 Jun 2019 09:21), Max: 99.5 (13 Jun 2019 22:46)  HR: 85 (14 Jun 2019 09:21) (78 - 85)  BP: 152/81 (14 Jun 2019 09:21) (129/71 - 154/73)  BP(mean): --  RR: 18 (14 Jun 2019 09:21) (18 - 18)  SpO2: 94% (14 Jun 2019 09:21) (94% - 97%)    LABS:                        8.9    6.8   )-----------( 344      ( 13 Jun 2019 06:34 )             27.5     06-14    136  |  100  |  16  ----------------------------<  232<H>  4.7   |  23  |  1.96<H>    Ca    8.8      14 Jun 2019 06:31          CAPILLARY BLOOD GLUCOSE      POCT Blood Glucose.: 222 mg/dL (14 Jun 2019 09:21)  POCT Blood Glucose.: 242 mg/dL (13 Jun 2019 22:29)  POCT Blood Glucose.: 195 mg/dL (13 Jun 2019 20:01)  POCT Blood Glucose.: 196 mg/dL (13 Jun 2019 18:11)      Lower Extremity Physical Exam:  s/p RF I&D with granulation tissue filling in defect, distal aspect capsular tissue noted with some fibriotic tissue.

## 2019-06-14 NOTE — PROGRESS NOTE ADULT - PROBLEM SELECTOR PROBLEM 7
Medication monitoring encounter

## 2019-06-14 NOTE — PROGRESS NOTE ADULT - REASON FOR ADMISSION
right foot ulcer

## 2019-06-14 NOTE — PROGRESS NOTE ADULT - PROBLEM SELECTOR PROBLEM 2
Cellulitis of foot
Anemia
Foot infection
Venous insufficiency of both lower extremities
Anemia
CKD (chronic kidney disease), stage III
Diabetic foot ulcer
Foot infection
Foot infection
Venous insufficiency of both lower extremities
Anemia
CKD (chronic kidney disease), stage III
Diabetic foot ulcer
Foot infection
Venous insufficiency of both lower extremities
Vitamin D deficiency
Diabetic foot ulcer
Diabetic foot ulcer
Foot infection
Diabetic foot ulcer
Anemia
Cellulitis of foot

## 2019-06-14 NOTE — PROGRESS NOTE ADULT - NEGATIVE ALLERGY TYPES
no reactions to medicines

## 2019-06-14 NOTE — PROGRESS NOTE ADULT - PROBLEM SELECTOR PROBLEM 4
Venous insufficiency of both lower extremities
Hyponatremia
Fever due to infection
Hyponatremia
Fever due to infection
Fever due to infection
Hyponatremia
Metabolic acidosis
Venous insufficiency of both lower extremities
Hyponatremia
Metabolic acidosis
Venous insufficiency of both lower extremities
Fever due to infection

## 2019-06-14 NOTE — PROGRESS NOTE ADULT - PROBLEM SELECTOR PLAN 1
Will continue current insulin regimen for now. Will continue monitoring FS, log, will Follow up.  Suggest to DC on current basal bolus insulin regimen.  Patient counseled for compliance with consistent low carb diet.

## 2019-06-14 NOTE — PROGRESS NOTE ADULT - NECK DETAILS
supple/no JVD
no JVD/supple
supple/no JVD
no JVD/supple
supple
supple/no JVD
supple/no JVD
supple
supple
supple/no JVD
no JVD/supple
supple/no JVD
supple/no JVD
supple
supple/no JVD
no JVD/supple
supple
no JVD/supple

## 2019-06-14 NOTE — PROGRESS NOTE ADULT - SUBJECTIVE AND OBJECTIVE BOX
Subjective: Patient seen and examined. No new events except as noted.   feels ok     REVIEW OF SYSTEMS:    CONSTITUTIONAL: + weakness, fevers or chills  EYES/ENT: No visual changes;  No vertigo or throat pain   NECK: No pain or stiffness  RESPIRATORY: No cough, wheezing, hemoptysis; No shortness of breath  CARDIOVASCULAR: No chest pain or palpitations  GASTROINTESTINAL: No abdominal or epigastric pain. No nausea, vomiting, or hematemesis; No diarrhea or constipation. No melena or hematochezia.  GENITOURINARY: No dysuria, frequency or hematuria  NEUROLOGICAL: No numbness or weakness  SKIN: No itching, burning, rashes, or lesions   All other review of systems is negative unless indicated above.    MEDICATIONS:  MEDICATIONS  (STANDING):  amLODIPine   Tablet 10 milliGRAM(s) Oral daily  aspirin enteric coated 81 milliGRAM(s) Oral daily  bisacodyl Suppository 10 milliGRAM(s) Rectal once  chlorhexidine 4% Liquid 1 Application(s) Topical <User Schedule>  collagenase Ointment 1 Application(s) Topical daily  dextrose 5%. 1000 milliLiter(s) (50 mL/Hr) IV Continuous <Continuous>  dextrose 50% Injectable 12.5 Gram(s) IV Push once  dextrose 50% Injectable 25 Gram(s) IV Push once  dextrose 50% Injectable 25 Gram(s) IV Push once  dronabinol 2.5 milliGRAM(s) Oral two times a day  ergocalciferol 00268 Unit(s) Oral every week  heparin  Injectable 5000 Unit(s) SubCutaneous every 8 hours  hydrALAZINE 100 milliGRAM(s) Oral three times a day  insulin glargine Injectable (LANTUS) 38 Unit(s) SubCutaneous at bedtime  insulin lispro (HumaLOG) corrective regimen sliding scale   SubCutaneous three times a day before meals  insulin lispro (HumaLOG) corrective regimen sliding scale   SubCutaneous at bedtime  insulin lispro Injectable (HumaLOG) 22 Unit(s) SubCutaneous three times a day before meals  ondansetron Injectable 4 milliGRAM(s) IV Push once  piperacillin/tazobactam IVPB. 3.375 Gram(s) IV Intermittent every 8 hours  polyethylene glycol 3350 17 Gram(s) Oral every 12 hours  senna 2 Tablet(s) Oral at bedtime      PHYSICAL EXAM:  T(C): 37.2 (06-14-19 @ 12:53), Max: 37.5 (06-13-19 @ 22:46)  HR: 85 (06-14-19 @ 12:53) (78 - 85)  BP: 124/74 (06-14-19 @ 12:53) (124/74 - 154/73)  RR: 18 (06-14-19 @ 12:53) (18 - 18)  SpO2: 96% (06-14-19 @ 12:53) (94% - 97%)  Wt(kg): --  I&O's Summary    13 Jun 2019 07:01  -  14 Jun 2019 07:00  --------------------------------------------------------  IN: 1060 mL / OUT: 1750 mL / NET: -690 mL    14 Jun 2019 07:01  -  14 Jun 2019 12:55  --------------------------------------------------------  IN: 320 mL / OUT: 400 mL / NET: -80 mL              Appearance: NAD   HEENT:   Normal oral mucosa, PERRL, EOMI	  Lymphatic: No lymphadenopathy  Cardiovascular: Normal S1 S2, No JVD, No murmurs , Peripheral pulses palpable 2+ bilaterally  Respiratory: Lungs clear to auscultation, normal effort 	  Gastrointestinal:  Soft, Non-tender, + BS	  Skin: No rashes, No ecchymoses, No cyanosis, warm to touch  Musculoskeletal: Decreased range of motion, normal strength  Psychiatry:  Mood & affect appropriate  Ext: +edema , chronic venous stasis skin discoloration   right foot wrapped , + ulcer   +VAC  2+ pitting edema           LABS:    CARDIAC MARKERS:                                8.9    6.8   )-----------( 344      ( 13 Jun 2019 06:34 )             27.5     06-14    136  |  100  |  16  ----------------------------<  232<H>  4.7   |  23  |  1.96<H>    Ca    8.8      14 Jun 2019 06:31      proBNP:   Lipid Profile:   HgA1c:   TSH:             TELEMETRY: 	    ECG:  	  RADIOLOGY:   DIAGNOSTIC TESTING:  [ ] Echocardiogram:  < from: Transthoracic Echocardiogram (06.13.19 @ 13:39) >    Patient name: AGUSTIN BRAR  YOB: 1960   Age: 58 (M)   MR#: [id number]  Study Date: 6/13/2019  Location: [address] [place], [place] [id number]: [facility]  Study quality: Technically difficult  Referring Physician: Elly Brdoerick MD  Blood Pressure: 132/77 mmHg  Height: 182 cm  Weight: 112 kg  BSA: 2.3 m2  ------------------------------------------------------------------------  PROCEDURE: Transthoracic echocardiogram with 2-D, M-Mode  and complete spectral and color flow Doppler.  INDICATION: Dyspnea, unspecified (R06.00)  ------------------------------------------------------------------------  Dimensions:    Normal Values:  LA:     3.6    2.0 - 4.0 cm  Ao:     3.2    2.0 - 3.8 cm  SEPTUM: 1.2    0.6 - 1.2 cm  PWT:    1.2    0.6 - 1.1 cm  LVIDd:  4.4    3.0 - 5.6 cm  LVIDs:  2.8    1.8 - 4.0 cm  Derived variables:  LVMI: 90 g/m2  RWT: 0.56  Fractional short: 36 %  EF (Visual Estimate): 60 %  ------------------------------------------------------------------------  Observations:  Mitral Valve: Normal mitral valve. Minimal mitral  regurgitation.  Aortic Valve/Aorta: Sclerotic aortic valve leaflets with  normal opening.  Aortic Root: 3.2 cm.  Left Atrium: Normal left atrium.  Left Ventricle: Endocardium not well visualized; grossly  normal left ventricular systolic function. Increased  relative wall thickness with normal left ventricular mass  index, consistent with concentric left ventricular  remodeling. Normal diastolic function  Right Heart: Normal right atrium. The right ventricle is  not well visualized; grossly normal right ventricular  systolic function. Normal tricuspid valve. Minimal  tricuspid regurgitation. Normal pulmonic valve.  Pericardium/Pleura: Normal pericardium with no pericardial  effusion.  ------------------------------------------------------------------------  Conclusions:  1. Increased relative wall thickness with normal left  ventricular mass index, consistent with concentric left  ventricular remodeling.  2. Endocardium not well visualized; grossly normal left  ventricular systolic function.  3. The right ventricle is not well visualized; grossly  normal right ventricular systolic function.  *** No previous Echo exam.  ------------------------------------------------------------------------  Confirmed on  6/13/2019 - 15:27:22 by Estefani Samson M.D.  ------------------------------------------------------------------------    < end of copied text >    [ ]  Catheterization:  [ ] Stress Test:    OTHER:

## 2019-06-14 NOTE — PROGRESS NOTE ADULT - NEUROLOGICAL DETAILS
alert and oriented x 3/responds to verbal commands
responds to verbal commands/alert and oriented x 3
alert and oriented x 3/responds to verbal commands
responds to verbal commands/alert and oriented x 3
responds to verbal commands/alert and oriented x 3
alert and oriented x 3/normal strength/responds to verbal commands
responds to verbal commands/alert and oriented x 3
alert and oriented x 3/responds to verbal commands
alert and oriented x 3/normal strength/responds to verbal commands

## 2019-06-14 NOTE — PROGRESS NOTE ADULT - PROVIDER SPECIALTY LIST ADULT
Cardiology
Endocrinology
Hospitalist
Infectious Disease
Internal Medicine
Nephrology
Podiatry
Vascular Surgery
Infectious Disease
Podiatry
Podiatry
Endocrinology
Internal Medicine
Endocrinology
Infectious Disease
Nephrology
Nephrology
Vascular Surgery
Infectious Disease
Cardiology
Nephrology
Vascular Surgery
Cardiology
Endocrinology
Nephrology
Nephrology
Infectious Disease

## 2019-06-14 NOTE — PROGRESS NOTE ADULT - PROBLEM SELECTOR PLAN 3
Pt. with metabolic acidosis in the setting of CKD  Serum CO2 noted to be improved to 23 this AM.    - Continue to monitor serum CO2 with labs

## 2019-06-14 NOTE — PROGRESS NOTE ADULT - RESPIRATORY AND THORAX
details…

## 2019-06-14 NOTE — PROGRESS NOTE ADULT - PROBLEM SELECTOR PROBLEM 6
Long term (current) use of antibiotics

## 2019-06-14 NOTE — PROGRESS NOTE ADULT - PSYCHIATRIC DETAILS
normal behavior
flat affect
normal behavior
normal behavior
normal affect/normal behavior
normal behavior
normal behavior
normal behavior/normal affect
normal behavior

## 2019-06-14 NOTE — PROGRESS NOTE ADULT - SUBJECTIVE AND OBJECTIVE BOX
Patient is a 58y old  Male who presents with a chief complaint of right foot ulcer (14 Jun 2019 07:35)      Vascular Surgery Attending Progress Note    Interval HPI: pt w/o c/o     Medications:  amLODIPine   Tablet 10 milliGRAM(s) Oral daily  aspirin enteric coated 81 milliGRAM(s) Oral daily  bisacodyl Suppository 10 milliGRAM(s) Rectal once  chlorhexidine 4% Liquid 1 Application(s) Topical <User Schedule>  collagenase Ointment 1 Application(s) Topical daily  dextrose 40% Gel 15 Gram(s) Oral once PRN  dextrose 5%. 1000 milliLiter(s) IV Continuous <Continuous>  dextrose 50% Injectable 12.5 Gram(s) IV Push once  dextrose 50% Injectable 25 Gram(s) IV Push once  dextrose 50% Injectable 25 Gram(s) IV Push once  dronabinol 2.5 milliGRAM(s) Oral two times a day  ergocalciferol 32939 Unit(s) Oral every week  glucagon  Injectable 1 milliGRAM(s) IntraMuscular once PRN  heparin  Injectable 5000 Unit(s) SubCutaneous every 8 hours  hydrALAZINE 100 milliGRAM(s) Oral three times a day  hydrocortisone 1% Ointment 1 Application(s) Topical two times a day PRN  insulin glargine Injectable (LANTUS) 38 Unit(s) SubCutaneous at bedtime  insulin lispro (HumaLOG) corrective regimen sliding scale   SubCutaneous three times a day before meals  insulin lispro (HumaLOG) corrective regimen sliding scale   SubCutaneous three times a day before meals  insulin lispro (HumaLOG) corrective regimen sliding scale   SubCutaneous at bedtime  insulin lispro Injectable (HumaLOG) 22 Unit(s) SubCutaneous three times a day before meals  ondansetron Injectable 4 milliGRAM(s) IV Push once  oxyCODONE    5 mG/acetaminophen 325 mG 1 Tablet(s) Oral every 6 hours PRN  oxyCODONE    5 mG/acetaminophen 325 mG 2 Tablet(s) Oral every 6 hours PRN  piperacillin/tazobactam IVPB. 3.375 Gram(s) IV Intermittent every 8 hours  polyethylene glycol 3350 17 Gram(s) Oral every 12 hours  senna 2 Tablet(s) Oral at bedtime  sodium chloride 0.9% lock flush 10 milliLiter(s) IV Push every 1 hour PRN      Vital Signs Last 24 Hrs  T(C): 36.9 (14 Jun 2019 05:06), Max: 37.5 (13 Jun 2019 22:46)  T(F): 98.4 (14 Jun 2019 05:06), Max: 99.5 (13 Jun 2019 22:46)  HR: 82 (14 Jun 2019 05:06) (78 - 82)  BP: 150/87 (14 Jun 2019 05:06) (129/71 - 154/73)  BP(mean): --  RR: 18 (14 Jun 2019 05:06) (18 - 18)  SpO2: 97% (14 Jun 2019 05:06) (94% - 97%)  I&O's Summary    13 Jun 2019 07:01  -  14 Jun 2019 07:00  --------------------------------------------------------  IN: 1060 mL / OUT: 1750 mL / NET: -690 mL        Physical Exam:  Neuro  A&Ox3 VSS  Vascular:  stable dressing c/d/i    LABS:                        8.9    6.8   )-----------( 344      ( 13 Jun 2019 06:34 )             27.5     06-14    136  |  100  |  16  ----------------------------<  232<H>  4.7   |  23  |  1.96<H>    Ca    8.8      14 Jun 2019 06:31          CHRISTIANO KERR MD  [phone]

## 2019-06-14 NOTE — PROGRESS NOTE ADULT - NEGATIVE SKIN SYMPTOMS
no rash/no itching
no itching/no rash
no rash/no itching
no itching/no rash
no rash/no itching
no itching/no rash
no rash/no itching
no rash/no itching
no itching/no rash

## 2019-06-14 NOTE — PROGRESS NOTE ADULT - PROBLEM SELECTOR PROBLEM 3
Metabolic acidosis
Leukocytosis
Metabolic acidosis
Hypertension
Anemia
Hypertension
Metabolic acidosis
Anemia
Hypertension
Metabolic acidosis
Hypertension
Leukocytosis

## 2019-06-14 NOTE — PROGRESS NOTE ADULT - EXTREMITIES COMMENTS
right foot dressing/VAC+
right foot VAC+
right foot dressing+
right foot dressing+
right foot wound - seen with Dr Issa - clean base, no purulence
right foot dressing+
right foot VAC+
right foot vac+
right foot wrapped
right foot dressing+
right foot wrapped
right foot dressing+

## 2019-06-14 NOTE — PROGRESS NOTE ADULT - SUBJECTIVE AND OBJECTIVE BOX
Patient is a 58y old  Male who presents with a chief complaint of right foot ulcer (14 Jun 2019 12:00)      SUBJECTIVE / OVERNIGHT EVENTS:  No chest pain. No shortness of breath. No complaints. No events overnight.     MEDICATIONS  (STANDING):  amLODIPine   Tablet 10 milliGRAM(s) Oral daily  aspirin enteric coated 81 milliGRAM(s) Oral daily  bisacodyl Suppository 10 milliGRAM(s) Rectal once  chlorhexidine 4% Liquid 1 Application(s) Topical <User Schedule>  collagenase Ointment 1 Application(s) Topical daily  dextrose 5%. 1000 milliLiter(s) (50 mL/Hr) IV Continuous <Continuous>  dextrose 50% Injectable 12.5 Gram(s) IV Push once  dextrose 50% Injectable 25 Gram(s) IV Push once  dextrose 50% Injectable 25 Gram(s) IV Push once  dronabinol 2.5 milliGRAM(s) Oral two times a day  ergocalciferol 74580 Unit(s) Oral every week  heparin  Injectable 5000 Unit(s) SubCutaneous every 8 hours  hydrALAZINE 100 milliGRAM(s) Oral three times a day  insulin glargine Injectable (LANTUS) 38 Unit(s) SubCutaneous at bedtime  insulin lispro (HumaLOG) corrective regimen sliding scale   SubCutaneous three times a day before meals  insulin lispro (HumaLOG) corrective regimen sliding scale   SubCutaneous at bedtime  insulin lispro Injectable (HumaLOG) 22 Unit(s) SubCutaneous three times a day before meals  ondansetron Injectable 4 milliGRAM(s) IV Push once  piperacillin/tazobactam IVPB. 3.375 Gram(s) IV Intermittent every 8 hours  polyethylene glycol 3350 17 Gram(s) Oral every 12 hours  senna 2 Tablet(s) Oral at bedtime    MEDICATIONS  (PRN):  dextrose 40% Gel 15 Gram(s) Oral once PRN Blood Glucose LESS THAN 70 milliGRAM(s)/deciliter  glucagon  Injectable 1 milliGRAM(s) IntraMuscular once PRN Glucose LESS THAN 70 milligrams/deciliter  hydrocortisone 1% Ointment 1 Application(s) Topical two times a day PRN Itching  oxyCODONE    5 mG/acetaminophen 325 mG 1 Tablet(s) Oral every 6 hours PRN Moderate Pain (4 - 6)  oxyCODONE    5 mG/acetaminophen 325 mG 2 Tablet(s) Oral every 6 hours PRN Severe Pain (7 - 10)  sodium chloride 0.9% lock flush 10 milliLiter(s) IV Push every 1 hour PRN Pre/post blood products, medications, blood draw, and to maintain line patency      Vital Signs Last 24 Hrs  T(C): 37.2 (14 Jun 2019 09:21), Max: 37.5 (13 Jun 2019 22:46)  T(F): 98.9 (14 Jun 2019 09:21), Max: 99.5 (13 Jun 2019 22:46)  HR: 85 (14 Jun 2019 09:21) (78 - 85)  BP: 152/81 (14 Jun 2019 09:21) (129/71 - 154/73)  BP(mean): --  RR: 18 (14 Jun 2019 09:21) (18 - 18)  SpO2: 94% (14 Jun 2019 09:21) (94% - 97%)  CAPILLARY BLOOD GLUCOSE      POCT Blood Glucose.: 222 mg/dL (14 Jun 2019 09:21)  POCT Blood Glucose.: 242 mg/dL (13 Jun 2019 22:29)  POCT Blood Glucose.: 195 mg/dL (13 Jun 2019 20:01)  POCT Blood Glucose.: 196 mg/dL (13 Jun 2019 18:11)    I&O's Summary    13 Jun 2019 07:01  -  14 Jun 2019 07:00  --------------------------------------------------------  IN: 1060 mL / OUT: 1750 mL / NET: -690 mL    14 Jun 2019 07:01  -  14 Jun 2019 12:12  --------------------------------------------------------  IN: 320 mL / OUT: 400 mL / NET: -80 mL        PHYSICAL EXAM:  GENERAL: NAD, well-developed  HEAD:  Atraumatic, Normocephalic  EYES: EOMI, PERRLA, conjunctiva and sclera clear  NECK: Supple, No JVD  CHEST/LUNG: Clear to auscultation bilaterally; No wheeze  HEART: Regular rate and rhythm; No murmurs, rubs, or gallops  ABDOMEN: Soft, Nontender, Nondistended; Bowel sounds present  EXTREMITIES:  2+ Peripheral Pulses, No clubbing, cyanosis, or edema  PSYCH: AAOx3  NEUROLOGY: non-focal  SKIN: No rashes or lesions    LABS:                        8.9    6.8   )-----------( 344      ( 13 Jun 2019 06:34 )             27.5     06-14    136  |  100  |  16  ----------------------------<  232<H>  4.7   |  23  |  1.96<H>    Ca    8.8      14 Jun 2019 06:31                RADIOLOGY & ADDITIONAL TESTS:    Imaging Personally Reviewed:    Consultant(s) Notes Reviewed:      Care Discussed with Consultants/Other Providers:

## 2019-06-14 NOTE — PROGRESS NOTE ADULT - PROBLEM SELECTOR PLAN 1
Patient with MARIKA likely hemodynamically mediated in the setting of infection and ACE-I use. Patient with likely CKD in the past ( serum creatinine  [id number] ranging between 1.3-1.6) No blood work available in 2018, admitted with a serum creatinine  of 1.9 mg/dl     - Scr improving daily but trended back up 6/5 to 6/6 s/p OR 1.97 to 2.72 and was started on Vanc/Zosyn combo; today's Scr improved to 1.96, This likely will be his new baseline. Will monitor   - Maintain on antibiotics  - Monitor labs and UOP.  - Recommend avoiding contrast studies for now.

## 2019-06-14 NOTE — PROGRESS NOTE ADULT - MOUTH
moist

## 2019-06-14 NOTE — PROGRESS NOTE ADULT - NEGATIVE OPHTHALMOLOGIC SYMPTOMS
no pain R/no diplopia/no pain L
no pain R/no pain L/no diplopia
no diplopia/no pain L/no pain R
no diplopia/no pain L/no pain R
no pain L/no diplopia/no pain R
no pain L/no pain R/no diplopia
no pain L/no pain R/no diplopia
no pain R/no pain L/no diplopia
no diplopia/no pain L/no pain R
no pain R/no pain L/no diplopia
no pain L/no diplopia/no pain R
no pain R/no pain L/no diplopia
no pain R/no diplopia/no pain L
no pain L/no diplopia/no pain R
no pain L/no diplopia/no pain R
no pain R/no diplopia/no pain L
no pain R/no diplopia/no pain L
no diplopia/no pain L/no pain R

## 2019-06-14 NOTE — DISCHARGE NOTE NURSING/CASE MANAGEMENT/SOCIAL WORK - NSDCDPATPORTLINK_GEN_ALL_CORE
You can access the [facility] Portal, offered by [facility], by registering with the following website: [email or web address]

## 2019-06-14 NOTE — PROGRESS NOTE ADULT - SUBJECTIVE AND OBJECTIVE BOX
Chief complaint  Patient is a 58y old  Male who presents with a chief complaint of right foot ulcer (14 Jun 2019 12:54)   Review of systems  Patient in bed, looks comfortable, no fever, no hypoglycemia.    Labs and Fingersticks  CAPILLARY BLOOD GLUCOSE      POCT Blood Glucose.: 172 mg/dL (14 Jun 2019 13:42)  POCT Blood Glucose.: 222 mg/dL (14 Jun 2019 09:21)  POCT Blood Glucose.: 242 mg/dL (13 Jun 2019 22:29)  POCT Blood Glucose.: 195 mg/dL (13 Jun 2019 20:01)  POCT Blood Glucose.: 196 mg/dL (13 Jun 2019 18:11)      Anion Gap, Serum: 13 (06-14 @ 06:31)  Anion Gap, Serum: 10 (06-13 @ 06:34)      Calcium, Total Serum: 8.8 (06-14 @ 06:31)  Calcium, Total Serum: 9.1 (06-13 @ 06:34)          06-14    136  |  100  |  16  ----------------------------<  232<H>  4.7   |  23  |  1.96<H>    Ca    8.8      14 Jun 2019 06:31                          8.9    6.8   )-----------( 344      ( 13 Jun 2019 06:34 )             27.5     Medications  MEDICATIONS  (STANDING):  amLODIPine   Tablet 10 milliGRAM(s) Oral daily  aspirin enteric coated 81 milliGRAM(s) Oral daily  chlorhexidine 4% Liquid 1 Application(s) Topical <User Schedule>  collagenase Ointment 1 Application(s) Topical daily  dextrose 5%. 1000 milliLiter(s) (50 mL/Hr) IV Continuous <Continuous>  dextrose 50% Injectable 12.5 Gram(s) IV Push once  dextrose 50% Injectable 25 Gram(s) IV Push once  dextrose 50% Injectable 25 Gram(s) IV Push once  dronabinol 2.5 milliGRAM(s) Oral two times a day  ergocalciferol 78003 Unit(s) Oral every week  heparin  Injectable 5000 Unit(s) SubCutaneous every 8 hours  hydrALAZINE 100 milliGRAM(s) Oral three times a day  insulin glargine Injectable (LANTUS) 38 Unit(s) SubCutaneous at bedtime  insulin lispro (HumaLOG) corrective regimen sliding scale   SubCutaneous three times a day before meals  insulin lispro (HumaLOG) corrective regimen sliding scale   SubCutaneous at bedtime  insulin lispro Injectable (HumaLOG) 22 Unit(s) SubCutaneous three times a day before meals  ondansetron Injectable 4 milliGRAM(s) IV Push once  piperacillin/tazobactam IVPB. 3.375 Gram(s) IV Intermittent every 8 hours  polyethylene glycol 3350 17 Gram(s) Oral every 12 hours  senna 2 Tablet(s) Oral at bedtime      Physical Exam  General: Patient comfortable in bed  Vital Signs Last 12 Hrs  T(F): 99 (06-14-19 @ 12:53), Max: 99 (06-14-19 @ 12:53)  HR: 87 (06-14-19 @ 14:06) (82 - 87)  BP: 166/69 (06-14-19 @ 14:06) (124/74 - 166/69)  BP(mean): --  RR: 18 (06-14-19 @ 12:53) (18 - 18)  SpO2: 96% (06-14-19 @ 12:53) (94% - 97%)  Neck: No palpable thyroid nodules.  CVS: S1S2, No murmurs  Respiratory: No wheezing, no crepitations  GI: Abdomen soft, bowel sounds positive  Musculoskeletal:  edema lower extremities.   Skin: No skin rashes, no ecchymosis    Diagnostics    Vitamin D, 1, 25-Dihydroxy: AM Sched. Collection: 22-May-2019 06:00 (05-21 @ 18:59)  Vitamin D, 25-Hydroxy: AM Sched. Collection: 22-May-2019 06:00 (05-21 @ 18:59)

## 2019-06-14 NOTE — PROGRESS NOTE ADULT - SUBJECTIVE AND OBJECTIVE BOX
[facility] DIVISION OF KIDNEY DISEASES AND HYPERTENSION -- FOLLOW UP NOTE  --------------------------------------------------------------------------------  Chief Complaint:/subjective: MARIKA    24 hour events: Pt. seen and examined at bedside this AM. Pt. underwent PICC line placement yesterday (6/13/19). Pt. states that he feels well. Denies CP, N/V/F/C.  PAST HISTORY  --------------------------------------------------------------------------------  No significant changes to PMH, PSH, FHx, SHx, unless otherwise noted    ALLERGIES & MEDICATIONS  --------------------------------------------------------------------------------  Allergies    No Known Allergies    Intolerances    Standing Inpatient Medications  amLODIPine   Tablet 10 milliGRAM(s) Oral daily  aspirin enteric coated 81 milliGRAM(s) Oral daily  bisacodyl Suppository 10 milliGRAM(s) Rectal once  chlorhexidine 4% Liquid 1 Application(s) Topical <User Schedule>  collagenase Ointment 1 Application(s) Topical daily  dextrose 5%. 1000 milliLiter(s) IV Continuous <Continuous>  dextrose 50% Injectable 12.5 Gram(s) IV Push once  dextrose 50% Injectable 25 Gram(s) IV Push once  dextrose 50% Injectable 25 Gram(s) IV Push once  dronabinol 2.5 milliGRAM(s) Oral two times a day  ergocalciferol 86294 Unit(s) Oral every week  heparin  Injectable 5000 Unit(s) SubCutaneous every 8 hours  hydrALAZINE 100 milliGRAM(s) Oral three times a day  insulin glargine Injectable (LANTUS) 38 Unit(s) SubCutaneous at bedtime  insulin lispro (HumaLOG) corrective regimen sliding scale   SubCutaneous three times a day before meals  insulin lispro (HumaLOG) corrective regimen sliding scale   SubCutaneous three times a day before meals  insulin lispro (HumaLOG) corrective regimen sliding scale   SubCutaneous at bedtime  insulin lispro Injectable (HumaLOG) 22 Unit(s) SubCutaneous three times a day before meals  ondansetron Injectable 4 milliGRAM(s) IV Push once  piperacillin/tazobactam IVPB. 3.375 Gram(s) IV Intermittent every 8 hours  polyethylene glycol 3350 17 Gram(s) Oral every 12 hours  senna 2 Tablet(s) Oral at bedtime    REVIEW OF SYSTEMS  --------------------------------------------------------------------------------  Gen: No lethargy  Respiratory: No dyspnea  CV: No chest pain  GI: No abdominal pain  MSK: + LE edema  Neuro: No dizziness    All other systems were reviewed and are negative, except as noted.    VITALS/PHYSICAL EXAM  --------------------------------------------------------------------------------  T(C): 36.9 (06-14-19 @ 05:06), Max: 37.5 (06-13-19 @ 22:46)  HR: 82 (06-14-19 @ 05:06) (69 - 82)  BP: 150/87 (06-14-19 @ 05:06) (129/71 - 154/73)  RR: 18 (06-14-19 @ 05:06) (18 - 18)  SpO2: 97% (06-14-19 @ 05:06) (92% - 97%)  Wt(kg): --    06-13-19 @ 07:01  -  06-14-19 @ 07:00  --------------------------------------------------------  IN: 1060 mL / OUT: 1750 mL / NET: -690 mL    Physical Exam:  	Gen: NAD,    	HEENT: Supple neck  	Pulm: CTA B/L  	CV: RRR, S1S2; no rub  	Abd: +BS, soft,    	: No suprapubic tenderness  	Ext: pedal edema; right foot bandaged  	Neuro: alert  	Psych: awake  	Vascular access: RUE PICC line site: without bleeding    LABS/STUDIES  --------------------------------------------------------------------------------              8.9    6.8   >-----------<  344      [06-13-19 @ 06:34]              27.5     136  |  100  |  16  ----------------------------<  232      [06-14-19 @ 06:31]  4.7   |  23  |  1.96        Ca     8.8     [06-14-19 @ 06:31]    Creatinine Trend:  SCr 1.96 [06-14 @ 06:31]  SCr 2.07 [06-13 @ 06:34]  SCr 2.12 [06-11 @ 07:04]  SCr 2.33 [06-10 @ 06:26]  SCr 2.33 [06-09 @ 06:19] No complaints

## 2019-06-14 NOTE — PROGRESS NOTE ADULT - NEGATIVE GENERAL SYMPTOMS
no fever/no chills
no fever
no fever/no chills
no fever
no fever/no chills
no chills/no fever

## 2019-06-14 NOTE — PROGRESS NOTE ADULT - PROBLEM SELECTOR PLAN 7
-weekly cbc, cmp, esr, crp while on abx
-weekly cbc, cmp, esr, crp while on abx at rehab - rehab physicians to monitor labs
-weekly cbc, cmp, esr, crp while on abx

## 2019-06-14 NOTE — PROGRESS NOTE ADULT - NEGATIVE RESPIRATORY AND THORAX SYMPTOMS
no dyspnea/no cough/no hemoptysis
no dyspnea/no cough/no hemoptysis
no hemoptysis/no cough/no dyspnea
no hemoptysis/no dyspnea/no cough
no dyspnea/no hemoptysis/no cough
no dyspnea/no hemoptysis/no cough
no hemoptysis/no dyspnea/no cough
no dyspnea/no cough/no hemoptysis
no cough/no dyspnea/no hemoptysis
no hemoptysis/no dyspnea/no cough
no cough/no hemoptysis/no dyspnea
no dyspnea/no cough/no hemoptysis
no hemoptysis/no cough/no dyspnea
no dyspnea/no cough/no hemoptysis
no hemoptysis/no cough/no dyspnea
no dyspnea/no cough/no hemoptysis

## 2019-06-14 NOTE — PROGRESS NOTE ADULT - CONSTITUTIONAL DETAILS
no distress/obese
no distress
tired/no distress
no distress
well-groomed/no distress
well-groomed/no distress
no distress/obese
obese/no distress
no distress
no distress
no distress/obese
no distress/eating breakfast

## 2019-06-14 NOTE — PROGRESS NOTE ADULT - NOSE
no discharge

## 2019-06-26 LAB
CULTURE RESULTS: SIGNIFICANT CHANGE UP
SPECIMEN SOURCE: SIGNIFICANT CHANGE UP

## 2019-07-11 ENCOUNTER — INPATIENT (INPATIENT)
Facility: HOSPITAL | Age: 59
LOS: 19 days | Discharge: NURSING, REHAB OR LONG-TERM CARE | DRG: 239 | End: 2019-07-31
Attending: INTERNAL MEDICINE | Admitting: INTERNAL MEDICINE
Payer: COMMERCIAL

## 2019-07-11 VITALS
HEART RATE: 99 BPM | SYSTOLIC BLOOD PRESSURE: 158 MMHG | TEMPERATURE: 98 F | DIASTOLIC BLOOD PRESSURE: 109 MMHG | OXYGEN SATURATION: 95 % | RESPIRATION RATE: 30 BRPM

## 2019-07-11 DIAGNOSIS — N18.9 CHRONIC KIDNEY DISEASE, UNSPECIFIED: ICD-10-CM

## 2019-07-11 DIAGNOSIS — J81.1 CHRONIC PULMONARY EDEMA: ICD-10-CM

## 2019-07-11 DIAGNOSIS — I10 ESSENTIAL (PRIMARY) HYPERTENSION: ICD-10-CM

## 2019-07-11 DIAGNOSIS — E11.9 TYPE 2 DIABETES MELLITUS WITHOUT COMPLICATIONS: ICD-10-CM

## 2019-07-11 DIAGNOSIS — E11.621 TYPE 2 DIABETES MELLITUS WITH FOOT ULCER: ICD-10-CM

## 2019-07-11 DIAGNOSIS — I50.33 ACUTE ON CHRONIC DIASTOLIC (CONGESTIVE) HEART FAILURE: ICD-10-CM

## 2019-07-11 DIAGNOSIS — Z90.49 ACQUIRED ABSENCE OF OTHER SPECIFIED PARTS OF DIGESTIVE TRACT: Chronic | ICD-10-CM

## 2019-07-11 LAB
APPEARANCE UR: CLEAR — SIGNIFICANT CHANGE UP
APTT BLD: 31.9 SEC — SIGNIFICANT CHANGE UP (ref 27.5–36.3)
BACTERIA # UR AUTO: NEGATIVE — SIGNIFICANT CHANGE UP
BASE EXCESS BLDA CALC-SCNC: -1.9 MMOL/L — SIGNIFICANT CHANGE UP (ref -2–2)
BASE EXCESS BLDV CALC-SCNC: -2.1 MMOL/L — LOW (ref -2–2)
BASOPHILS # BLD AUTO: 0 K/UL — SIGNIFICANT CHANGE UP (ref 0–0.2)
BASOPHILS NFR BLD AUTO: 0.5 % — SIGNIFICANT CHANGE UP (ref 0–2)
BILIRUB UR-MCNC: NEGATIVE — SIGNIFICANT CHANGE UP
CA-I SERPL-SCNC: 1.24 MMOL/L — SIGNIFICANT CHANGE UP (ref 1.12–1.3)
CHLORIDE BLDV-SCNC: 107 MMOL/L — SIGNIFICANT CHANGE UP (ref 96–108)
CO2 BLDA-SCNC: 23 MMOL/L — SIGNIFICANT CHANGE UP (ref 22–30)
CO2 BLDV-SCNC: 24 MMOL/L — SIGNIFICANT CHANGE UP (ref 22–30)
COLOR SPEC: SIGNIFICANT CHANGE UP
DIFF PNL FLD: NEGATIVE — SIGNIFICANT CHANGE UP
EOSINOPHIL # BLD AUTO: 0.6 K/UL — HIGH (ref 0–0.5)
EOSINOPHIL NFR BLD AUTO: 7.2 % — HIGH (ref 0–6)
EPI CELLS # UR: 0 /HPF — SIGNIFICANT CHANGE UP
GAS PNL BLDA: SIGNIFICANT CHANGE UP
GAS PNL BLDV: 140 MMOL/L — SIGNIFICANT CHANGE UP (ref 135–145)
GAS PNL BLDV: SIGNIFICANT CHANGE UP
GLUCOSE BLDC GLUCOMTR-MCNC: 117 MG/DL — HIGH (ref 70–99)
GLUCOSE BLDC GLUCOMTR-MCNC: 126 MG/DL — HIGH (ref 70–99)
GLUCOSE BLDV-MCNC: 130 MG/DL — HIGH (ref 70–99)
GLUCOSE UR QL: NEGATIVE — SIGNIFICANT CHANGE UP
HCO3 BLDA-SCNC: 22 MMOL/L — SIGNIFICANT CHANGE UP (ref 21–29)
HCO3 BLDV-SCNC: 22 MMOL/L — SIGNIFICANT CHANGE UP (ref 21–29)
HCT VFR BLD CALC: 31.5 % — LOW (ref 39–50)
HCT VFR BLDA CALC: 28 % — LOW (ref 39–50)
HGB BLD CALC-MCNC: 8.9 G/DL — LOW (ref 13–17)
HGB BLD-MCNC: 9.9 G/DL — LOW (ref 13–17)
HOROWITZ INDEX BLDA+IHG-RTO: 50 — SIGNIFICANT CHANGE UP
HYALINE CASTS # UR AUTO: 2 /LPF — SIGNIFICANT CHANGE UP (ref 0–2)
INR BLD: 1.29 RATIO — HIGH (ref 0.88–1.16)
KETONES UR-MCNC: SIGNIFICANT CHANGE UP
LACTATE BLDV-MCNC: 1.1 MMOL/L — SIGNIFICANT CHANGE UP (ref 0.7–2)
LEUKOCYTE ESTERASE UR-ACNC: NEGATIVE — SIGNIFICANT CHANGE UP
LYMPHOCYTES # BLD AUTO: 1.5 K/UL — SIGNIFICANT CHANGE UP (ref 1–3.3)
LYMPHOCYTES # BLD AUTO: 17.6 % — SIGNIFICANT CHANGE UP (ref 13–44)
MCHC RBC-ENTMCNC: 28.2 PG — SIGNIFICANT CHANGE UP (ref 27–34)
MCHC RBC-ENTMCNC: 31.3 GM/DL — LOW (ref 32–36)
MCV RBC AUTO: 90.1 FL — SIGNIFICANT CHANGE UP (ref 80–100)
MONOCYTES # BLD AUTO: 0.5 K/UL — SIGNIFICANT CHANGE UP (ref 0–0.9)
MONOCYTES NFR BLD AUTO: 6 % — SIGNIFICANT CHANGE UP (ref 2–14)
NEUTROPHILS # BLD AUTO: 6 K/UL — SIGNIFICANT CHANGE UP (ref 1.8–7.4)
NEUTROPHILS NFR BLD AUTO: 68.7 % — SIGNIFICANT CHANGE UP (ref 43–77)
NITRITE UR-MCNC: NEGATIVE — SIGNIFICANT CHANGE UP
NT-PROBNP SERPL-SCNC: 767 PG/ML — HIGH (ref 0–300)
OTHER CELLS CSF MANUAL: 9 ML/DL — LOW (ref 18–22)
PCO2 BLDA: 35 MMHG — SIGNIFICANT CHANGE UP (ref 32–46)
PCO2 BLDV: 40 MMHG — SIGNIFICANT CHANGE UP (ref 35–50)
PH BLDA: 7.41 — SIGNIFICANT CHANGE UP (ref 7.35–7.45)
PH BLDV: 7.37 — SIGNIFICANT CHANGE UP (ref 7.35–7.45)
PH UR: 6 — SIGNIFICANT CHANGE UP (ref 5–8)
PLATELET # BLD AUTO: 391 K/UL — SIGNIFICANT CHANGE UP (ref 150–400)
PO2 BLDA: 83 MMHG — SIGNIFICANT CHANGE UP (ref 74–108)
PO2 BLDV: 44 MMHG — SIGNIFICANT CHANGE UP (ref 25–45)
POTASSIUM BLDV-SCNC: 3.7 MMOL/L — SIGNIFICANT CHANGE UP (ref 3.5–5.3)
PROT UR-MCNC: ABNORMAL
PROTHROM AB SERPL-ACNC: 14.8 SEC — HIGH (ref 10–12.9)
RBC # BLD: 3.49 M/UL — LOW (ref 4.2–5.8)
RBC # FLD: 13.3 % — SIGNIFICANT CHANGE UP (ref 10.3–14.5)
RBC CASTS # UR COMP ASSIST: 9 /HPF — HIGH (ref 0–4)
SAO2 % BLDA: 96 % — SIGNIFICANT CHANGE UP (ref 92–96)
SAO2 % BLDV: 75 % — SIGNIFICANT CHANGE UP (ref 67–88)
SP GR SPEC: 1.01 — SIGNIFICANT CHANGE UP (ref 1.01–1.02)
TROPONIN T, HIGH SENSITIVITY RESULT: 65 NG/L — HIGH (ref 0–51)
TROPONIN T, HIGH SENSITIVITY RESULT: 67 NG/L — HIGH (ref 0–51)
UROBILINOGEN FLD QL: NEGATIVE — SIGNIFICANT CHANGE UP
WBC # BLD: 8.7 K/UL — SIGNIFICANT CHANGE UP (ref 3.8–10.5)
WBC # FLD AUTO: 8.7 K/UL — SIGNIFICANT CHANGE UP (ref 3.8–10.5)
WBC UR QL: 1 /HPF — SIGNIFICANT CHANGE UP (ref 0–5)

## 2019-07-11 PROCEDURE — 93308 TTE F-UP OR LMTD: CPT | Mod: 26

## 2019-07-11 PROCEDURE — 71045 X-RAY EXAM CHEST 1 VIEW: CPT | Mod: 26

## 2019-07-11 PROCEDURE — 99291 CRITICAL CARE FIRST HOUR: CPT

## 2019-07-11 PROCEDURE — 93010 ELECTROCARDIOGRAM REPORT: CPT

## 2019-07-11 RX ORDER — HEPARIN SODIUM 5000 [USP'U]/ML
5000 INJECTION INTRAVENOUS; SUBCUTANEOUS EVERY 8 HOURS
Refills: 0 | Status: DISCONTINUED | OUTPATIENT
Start: 2019-07-11 | End: 2019-07-31

## 2019-07-11 RX ORDER — AMLODIPINE BESYLATE 2.5 MG/1
10 TABLET ORAL DAILY
Refills: 0 | Status: DISCONTINUED | OUTPATIENT
Start: 2019-07-11 | End: 2019-07-31

## 2019-07-11 RX ORDER — OXYCODONE HYDROCHLORIDE 5 MG/1
5 TABLET ORAL EVERY 4 HOURS
Refills: 0 | Status: DISCONTINUED | OUTPATIENT
Start: 2019-07-11 | End: 2019-07-11

## 2019-07-11 RX ORDER — SODIUM CHLORIDE 9 MG/ML
1000 INJECTION, SOLUTION INTRAVENOUS
Refills: 0 | Status: DISCONTINUED | OUTPATIENT
Start: 2019-07-11 | End: 2019-07-31

## 2019-07-11 RX ORDER — INSULIN LISPRO 100/ML
VIAL (ML) SUBCUTANEOUS AT BEDTIME
Refills: 0 | Status: DISCONTINUED | OUTPATIENT
Start: 2019-07-11 | End: 2019-07-31

## 2019-07-11 RX ORDER — FUROSEMIDE 40 MG
40 TABLET ORAL ONCE
Refills: 0 | Status: COMPLETED | OUTPATIENT
Start: 2019-07-11 | End: 2019-07-11

## 2019-07-11 RX ORDER — GLUCAGON INJECTION, SOLUTION 0.5 MG/.1ML
1 INJECTION, SOLUTION SUBCUTANEOUS ONCE
Refills: 0 | Status: DISCONTINUED | OUTPATIENT
Start: 2019-07-11 | End: 2019-07-31

## 2019-07-11 RX ORDER — INSULIN GLARGINE 100 [IU]/ML
20 INJECTION, SOLUTION SUBCUTANEOUS AT BEDTIME
Refills: 0 | Status: DISCONTINUED | OUTPATIENT
Start: 2019-07-11 | End: 2019-07-13

## 2019-07-11 RX ORDER — DEXTROSE 50 % IN WATER 50 %
25 SYRINGE (ML) INTRAVENOUS ONCE
Refills: 0 | Status: DISCONTINUED | OUTPATIENT
Start: 2019-07-11 | End: 2019-07-31

## 2019-07-11 RX ORDER — POLYETHYLENE GLYCOL 3350 17 G/17G
17 POWDER, FOR SOLUTION ORAL EVERY 12 HOURS
Refills: 0 | Status: DISCONTINUED | OUTPATIENT
Start: 2019-07-11 | End: 2019-07-31

## 2019-07-11 RX ORDER — ACETAMINOPHEN 500 MG
650 TABLET ORAL EVERY 6 HOURS
Refills: 0 | Status: DISCONTINUED | OUTPATIENT
Start: 2019-07-11 | End: 2019-07-31

## 2019-07-11 RX ORDER — PIPERACILLIN AND TAZOBACTAM 4; .5 G/20ML; G/20ML
3.38 INJECTION, POWDER, LYOPHILIZED, FOR SOLUTION INTRAVENOUS EVERY 8 HOURS
Refills: 0 | Status: DISCONTINUED | OUTPATIENT
Start: 2019-07-11 | End: 2019-07-22

## 2019-07-11 RX ORDER — HYDRALAZINE HCL 50 MG
100 TABLET ORAL THREE TIMES A DAY
Refills: 0 | Status: DISCONTINUED | OUTPATIENT
Start: 2019-07-11 | End: 2019-07-31

## 2019-07-11 RX ORDER — ERYTHROPOIETIN 10000 [IU]/ML
10000 INJECTION, SOLUTION INTRAVENOUS; SUBCUTANEOUS
Refills: 0 | Status: DISCONTINUED | OUTPATIENT
Start: 2019-07-11 | End: 2019-07-31

## 2019-07-11 RX ORDER — DEXTROSE 50 % IN WATER 50 %
15 SYRINGE (ML) INTRAVENOUS ONCE
Refills: 0 | Status: DISCONTINUED | OUTPATIENT
Start: 2019-07-11 | End: 2019-07-31

## 2019-07-11 RX ORDER — FUROSEMIDE 40 MG
60 TABLET ORAL EVERY 12 HOURS
Refills: 0 | Status: DISCONTINUED | OUTPATIENT
Start: 2019-07-11 | End: 2019-07-12

## 2019-07-11 RX ORDER — DRONABINOL 2.5 MG
2.5 CAPSULE ORAL
Refills: 0 | Status: DISCONTINUED | OUTPATIENT
Start: 2019-07-11 | End: 2019-07-18

## 2019-07-11 RX ORDER — DEXTROSE 50 % IN WATER 50 %
12.5 SYRINGE (ML) INTRAVENOUS ONCE
Refills: 0 | Status: DISCONTINUED | OUTPATIENT
Start: 2019-07-11 | End: 2019-07-31

## 2019-07-11 RX ORDER — INSULIN LISPRO 100/ML
10 VIAL (ML) SUBCUTANEOUS
Refills: 0 | Status: DISCONTINUED | OUTPATIENT
Start: 2019-07-11 | End: 2019-07-13

## 2019-07-11 RX ORDER — FERROUS SULFATE 325(65) MG
325 TABLET ORAL DAILY
Refills: 0 | Status: DISCONTINUED | OUTPATIENT
Start: 2019-07-11 | End: 2019-07-31

## 2019-07-11 RX ORDER — SENNA PLUS 8.6 MG/1
2 TABLET ORAL AT BEDTIME
Refills: 0 | Status: DISCONTINUED | OUTPATIENT
Start: 2019-07-11 | End: 2019-07-31

## 2019-07-11 RX ORDER — INSULIN LISPRO 100/ML
VIAL (ML) SUBCUTANEOUS
Refills: 0 | Status: DISCONTINUED | OUTPATIENT
Start: 2019-07-11 | End: 2019-07-31

## 2019-07-11 RX ORDER — COLLAGENASE CLOSTRIDIUM HIST. 250 UNIT/G
1 OINTMENT (GRAM) TOPICAL
Refills: 0 | Status: DISCONTINUED | OUTPATIENT
Start: 2019-07-11 | End: 2019-07-31

## 2019-07-11 RX ORDER — INSULIN DETEMIR 100/ML (3)
38 INSULIN PEN (ML) SUBCUTANEOUS
Qty: 0 | Refills: 0 | DISCHARGE

## 2019-07-11 RX ORDER — INSULIN GLARGINE 100 [IU]/ML
10 INJECTION, SOLUTION SUBCUTANEOUS ONCE
Refills: 0 | Status: COMPLETED | OUTPATIENT
Start: 2019-07-11 | End: 2019-07-11

## 2019-07-11 RX ORDER — NITROGLYCERIN 6.5 MG
0.5 CAPSULE, EXTENDED RELEASE ORAL ONCE
Refills: 0 | Status: COMPLETED | OUTPATIENT
Start: 2019-07-11 | End: 2019-07-11

## 2019-07-11 RX ADMIN — PIPERACILLIN AND TAZOBACTAM 25 GRAM(S): 4; .5 INJECTION, POWDER, LYOPHILIZED, FOR SOLUTION INTRAVENOUS at 22:22

## 2019-07-11 RX ADMIN — Medication 0.5 INCH(S): at 12:26

## 2019-07-11 RX ADMIN — HEPARIN SODIUM 5000 UNIT(S): 5000 INJECTION INTRAVENOUS; SUBCUTANEOUS at 22:21

## 2019-07-11 RX ADMIN — Medication 40 MILLIGRAM(S): at 12:25

## 2019-07-11 RX ADMIN — Medication 325 MILLIGRAM(S): at 17:38

## 2019-07-11 RX ADMIN — INSULIN GLARGINE 10 UNIT(S): 100 INJECTION, SOLUTION SUBCUTANEOUS at 23:19

## 2019-07-11 RX ADMIN — SENNA PLUS 2 TABLET(S): 8.6 TABLET ORAL at 22:22

## 2019-07-11 RX ADMIN — POLYETHYLENE GLYCOL 3350 17 GRAM(S): 17 POWDER, FOR SOLUTION ORAL at 22:21

## 2019-07-11 RX ADMIN — Medication 100 MILLIGRAM(S): at 22:28

## 2019-07-11 RX ADMIN — AMLODIPINE BESYLATE 10 MILLIGRAM(S): 2.5 TABLET ORAL at 17:38

## 2019-07-11 NOTE — ED ADULT NURSE NOTE - NSIMPLEMENTINTERV_GEN_ALL_ED
Implemented All Fall Risk Interventions:  [place] to call system. Call bell, personal items and telephone within reach. Instruct patient to call for assistance. Room bathroom lighting operational. Non-slip footwear when patient is off stretcher. Physically safe environment: no spills, clutter or unnecessary equipment. Stretcher in lowest position, wheels locked, appropriate side rails in place. Provide visual cue, wrist band, yellow gown, etc. Monitor gait and stability. Monitor for mental status changes and reorient to person, place, and time. Review medications for side effects contributing to fall risk. Reinforce activity limits and safety measures with patient and family.

## 2019-07-11 NOTE — ED PROVIDER NOTE - PMH
BPH (benign prostatic hypertrophy)    Diabetes    HTN (hypertension)    Hypertension    Insulin dependent diabetes mellitus    Venous insufficiency of both lower extremities  R > L

## 2019-07-11 NOTE — ED PROVIDER NOTE - CLINICAL SUMMARY MEDICAL DECISION MAKING FREE TEXT BOX
59M, hx DM, HTN presents from [facility] via EMS due to resp distress. POCUS with diffuse b lines and BL pleural effusions. Will check labs, stat cxr, stat BiPap  Currently stable. Will continue to follow up and re-assess. Case discussed with Attending.   Chava Yousif MD, PGY3 Emergency Medicine

## 2019-07-11 NOTE — CONSULT NOTE ADULT - SUBJECTIVE AND OBJECTIVE BOX
CHIEF COMPLAINT: SOB     HISTORY OF PRESENT ILLNESS:  58 yo M well known to me from previous recent hospitaliation,  presents from [facility] via EMS due to resp distress. Patient at [facility] due to right diabetic foot ulcer. Patient reported to be hypoxic on room air to 60s. Improved on 15L face mask top 100% O2 sat. Patient awake, alert, oriented. States he has been coughing for 2-3 weeks (dry cough) as well as weakness and shortness of breath for few days. NO fevers or chills, nausea or vomiting, headache, chest pain, abdominal pain, diarrhea or constipation. Patient reportedly received blood transfusion last night, unknown as to why. Reports BL leg swelling, no calf pain. No hx DVT or PE.   Patient denies hx of COPD/Emphysema. Recently hospitalized, 5/20-6/14, d/c to [facility].    PAST MEDICAL & SURGICAL HISTORY:  Hypertension  Insulin dependent diabetes mellitus  Venous insufficiency of both lower extremities: R &gt; L  HTN (hypertension)  BPH (benign prostatic hypertrophy)  Diabetes  History of cholecystectomy  S/P laparoscopic cholecystectomy  Status post incision and drainage: Rt groin abscess          MEDICATIONS:  furosemide   Injectable 40 milliGRAM(s) IV Push Once                  FAMILY HISTORY:  Paternal family history of emphysema (Father)  Family history of diabetes mellitus (DM) (Mother, Grandparent)      SOCIAL HISTORY:    [ ] Non-smoker  [ ] Smoker  [ ] Alcohol    Allergies    No Known Allergies    Intolerances    	    REVIEW OF SYSTEMS:  CONSTITUTIONAL: No fever, weight loss, or fatigue  EYES: No eye pain, visual disturbances, or discharge  ENMT:  No difficulty hearing, tinnitus, vertigo; No sinus or throat pain  NECK: No pain or stiffness  RESPIRATORY: No cough, wheezing, chills or hemoptysis; + Shortness of Breath  CARDIOVASCULAR: No chest pain, palpitations, passing out, dizziness, or leg swelling  GASTROINTESTINAL: No abdominal or epigastric pain. No nausea, vomiting, or hematemesis; No diarrhea or constipation. No melena or hematochezia.  GENITOURINARY: No dysuria, frequency, hematuria, or incontinence  NEUROLOGICAL: No headaches, memory loss, loss of strength, numbness, or tremors  SKIN: No itching, burning, rashes, or lesions   LYMPH Nodes: No enlarged glands  ENDOCRINE: No heat or cold intolerance; No hair loss  MUSCULOSKELETAL: + joint pain or swelling; No muscle, back, or extremity pain  PSYCHIATRIC: No depression, anxiety, mood swings, or difficulty sleeping  HEME/LYMPH: No easy bruising, or bleeding gums  ALLERY AND IMMUNOLOGIC: No hives or eczema	    [ ] All others negative	  [ ] Unable to obtain    PHYSICAL EXAM:  T(C): 36.8 (07-11-19 @ 10:22), Max: 36.8 (07-11-19 @ 10:22)  HR: 94 (07-11-19 @ 10:41) (94 - 99)  BP: 156/93 (07-11-19 @ 10:36) (156/93 - 158/109)  RR: 25 (07-11-19 @ 10:36) (25 - 30)  SpO2: 100% (07-11-19 @ 10:41) (95% - 100%)  Wt(kg): --  I&O's Summary      Appearance: On BIPAP,   HEENT:   Dry oral mucosa, PERRL, EOMI	  Lymphatic: No lymphadenopathy +JVP   Cardiovascular: Normal S1 S2, + JVD, No murmurs  Respiratory: Decreased bs	  Psychiatry: A & O x 3, Mood & affect appropriate  Gastrointestinal:  Soft, Non-tender, + BS	  Skin: No rashes, No ecchymoses, No cyanosis	  Neurologic: Non-focal  Extremities:+ edema, R foot ulcer wrapped   Vascular: Peripheral pulses palpable 2+ bilaterally    TELEMETRY:  SR 	    ECG:  	  RADIOLOGY:  < from: Xray Chest 1 View- PORTABLE-Urgent (07.11.19 @ 10:29) >    EXAM:  XR CHEST PORTABLE URGENT 1V                            PROCEDURE DATE:  07/11/2019            INTERPRETATION:  Indication: Respiratory distress.    Technique: Single portable view of the chest.    Comparison: 7/11/2019 at 9:43 AM    Findings: The cardiac silhouette is normal in size. There is severe   pulmonary edema. There is a probable small right pleural effusion. The   hilar and mediastinal structures appear unremarkable.    Impression: Severe pulmonary edema. Small right pleural effusion.                    JOSHUA LAMBERT M.D., ATTENDING RADIOLOGIST  This document has been electronically signed. Jul 11 2019 10:45AM                < end of copied text >    OTHER: 	  	  LABS:	 	    CARDIAC MARKERS:                                  9.9    8.7   )-----------( 391      ( 11 Jul 2019 11:05 )             31.5           proBNP:   Lipid Profile:   HgA1c:   TSH:

## 2019-07-11 NOTE — ED PROVIDER NOTE - CRITICAL CARE INDICATION, MLM
patient was critically ill... Patient was critically ill with a high probability of imminent or life threatening deterioration.  Respiratory distress / hypoxic respiratory failure requiring bipap.

## 2019-07-11 NOTE — ED PROVIDER NOTE - ATTENDING CONTRIBUTION TO CARE
59M, hx DM, HTN presents from [facility] via EMS due to respiratory distress; recent transfusion (yesterday) for ?symptomatic anemia; currently being treated for diabetic foot ulcer, with picc line for iv antibiotics; has had worsening SOB since yesterday; found o be hypoxic to 60% on RA today and EMS called; arrives to ED with NRB, awake/talking but tachypneic, unable to speak more than few words and with diffusely decreased BS b/l.  POCUS performed, showing diffuse interstitial edema and b/l effusions; concerning for pulmonary edema; no gross systolic dysfunction on US.  CXR c/w edema.      LABs unremarkable; patient placed immediately on bipap with improvement in respirations and oxygenation.  Evaluted by MICU, and given improvement after lasix and NTG (also hypertensive, now improving), is stable per MICU consult for admission to medicine service.

## 2019-07-11 NOTE — H&P ADULT - NSHPLABSRESULTS_GEN_ALL_CORE
LABS:                        9.9    8.7   )-----------( 391      ( 11 Jul 2019 11:05 )             31.5     07-11    141  |  103  |  16  ----------------------------<  133<H>  3.9   |  20<L>  |  1.90<H>    Ca    9.2      11 Jul 2019 11:05    TPro  7.0  /  Alb  3.4  /  TBili  0.4  /  DBili  x   /  AST  11  /  ALT  9<L>  /  AlkPhos  72  07-11    PT/INR - ( 11 Jul 2019 11:05 )   PT: 14.8 sec;   INR: 1.29 ratio         PTT - ( 11 Jul 2019 11:05 )  PTT:31.9 sec          RADIOLOGY & ADDITIONAL TESTS:

## 2019-07-11 NOTE — CONSULT NOTE ADULT - ASSESSMENT
Patient is a 59 y.o M w/ PMH IDDM c/b R. foot ulcer s/p debridement and wound vac (June 2019), HTN, and CKD who presented from [facility] w/ hypoxia to 60s and was found to have pulmonary edema s/p bipap likely 2/2 new on-set heart failure     #Neuro   -AOx3  -Protecting airway     #Pulm   -No primary pulmonary issues   -P/w hypoxia to 60%, saturation improved to 100% on NRB 15 L. Had increased work of breathing so was started on bipap 10/5 Fio2 100%   -CXR revealed pulmonary edema; s/p lasix IV 40 x1      # Card   -No reported hx of HF; TTE from 7/13/19 revealed EF of 60%; no systolic or diastolic dysfunction   -P/w SOB 2/2 pulm edema, likely 2/2 new onset HF vs. fluid overload from blood transfusion; s/p lasix 40 IV push x1  -Cardiology consulted; recommend diuresis w/ lasix 60 IV BID  -Strict I&Os; daily weights     #Renal   -Creatinine found to be 1.9; denies hx of CKD however baseline creatinine appears to be around 1.9 -2.3  -    #GI    #DVT prophylaxis     Recommendation:   -Patient's CXR revealed pulmonary edema Patient is a 59 y.o M w/ PMH IDDM c/b R. foot ulcer s/p debridement and wound vac (June 2019), HTN, and CKD who presented from [facility] w/ hypoxia to 60s and was found to have pulmonary edema s/p bipap likely 2/2 new on-set heart failure vs fluid overload    #Neuro   -AOx3  -Protecting airway     #Pulm   -No primary pulmonary issues   -P/w hypoxia to 60%, saturation improved to 100% on NRB 15 L. Had increased work of breathing so was started on bipap 10/5 Fio2 100%   -CXR revealed pulmonary edema; s/p lasix IV 40 x1      # Card   -No reported hx of HF; TTE from 7/13/19 revealed EF of 60%; no systolic or diastolic dysfunction   -P/w SOB 2/2 pulm edema, likely 2/2 new onset HF vs. fluid overload from blood transfusion; s/p lasix 40 IV push x1  -Cardiology consulted; recommend diuresis w/ lasix 60 IV BID  -Strict I&Os; daily weights   -Trop 67 --> 65; no concern for ACS     #Renal   -Creatinine found to be 1.9; denies hx of CKD however baseline creatinine appears to be around 1.9 -2.3  -Trend creatinine     #GI  -NPO while on Bipap   -No active issues   -bowel regimen     #ID  -reported chills, no fever, leukocytosis   -f/u BCX  -c/w zosyn    #DVT prophylaxis   -HSQ     Recommendation:   -Patient's CXR revealed pulmonary edema; c/w diuresis lasix 60 IV BID   -Recommend TTE to assess EF and systolic function; may need cardiac cath     Patient is not a MICU candidate at this time, please re-consult as needed

## 2019-07-11 NOTE — H&P ADULT - NSICDXFAMILYHX_GEN_ALL_CORE_FT
FAMILY HISTORY:  Paternal family history of emphysema    Grandparent  Still living? Unknown  Family history of diabetes mellitus (DM), Age at diagnosis: Age Unknown

## 2019-07-11 NOTE — ED ADULT NURSE NOTE - CHIEF COMPLAINT QUOTE
59y Male came by EMS from [facility] c/o SOB and respiratory distrss 59y Male came by EMS from [facility] c/o SOB and respiratory distress

## 2019-07-11 NOTE — H&P ADULT - ASSESSMENT
59M, hx DM, HTN presents from [facility] via EMS due to resp distress. Patient at [facility] due to right diabetic foot ulcer. Patient reported to be hypoxic on room air to 60s. Improved on 15L face mask top 100% O2 sat. Patient awake, alert, oriented. States he has been coughing for 2-3 weeks (dry cough) as well as weakness and shortness of breath for few days.    Acute on chronic diastolic chf  - iv lasix 60 bid  - strict Is and Os  - cardiology consult appreciated  - keep O2 sats above 92%    diabetic foot ulcer  - podiatry follow up  - for debridement when medically cleared    diabetes  - fs qid  - hgb a1c  - endocrine consult    htn  - cw norvasc      DVT px

## 2019-07-11 NOTE — H&P ADULT - HISTORY OF PRESENT ILLNESS
59M, hx DM, HTN presents from [facility] via EMS due to resp distress. Patient at [facility] due to right diabetic foot ulcer. Patient reported to be hypoxic on room air to 60s. Improved on 15L face mask top 100% O2 sat. Patient awake, alert, oriented. States he has been coughing for 2-3 weeks (dry cough) as well as weakness and shortness of breath for few days. NO fevers or chills, nausea or vomiting, headache, chest pain, abdominal pain, diarrhea or constipation. Patient reportedly received blood transfusion last night, unknown as to why. Reports BL leg swelling - chronic, no calf pain. No hx DVT or PE.      Patient denies hx of COPD/Emphysema. Recently hospitalized, 5/20-6/14,

## 2019-07-11 NOTE — ED PROVIDER NOTE - OBJECTIVE STATEMENT
59M, hx DM, HTN presents from [facility] via EMS due to resp distress. Patient at [facility] due to right diabetic foot ulcer. Patient reported to be hypoxic on room air to 60s. Improved on 15L face mask top 100% O2 sat. Patient awake, alert, oriented. States he has been coughing for 2-3 weeks (dry cough) as well as weakness and shortness of breath for few days. NO fevers or chills, nausea or vomiting, headache, chest pain, abdominal pain, diarrhea or constipation. Patient reportedly received blood transfusion last night, unknown as to why.   Patient denies hx of COPD/Emphysema. Recently hospitalized, 5/20-6/14, d/c to [facility]. 59M, hx DM, HTN presents from [facility] via EMS due to resp distress. Patient at [facility] due to right diabetic foot ulcer. Patient reported to be hypoxic on room air to 60s. Improved on 15L face mask top 100% O2 sat. Patient awake, alert, oriented. States he has been coughing for 2-3 weeks (dry cough) as well as weakness and shortness of breath for few days. NO fevers or chills, nausea or vomiting, headache, chest pain, abdominal pain, diarrhea or constipation. Patient reportedly received blood transfusion last night, unknown as to why. Reports BL leg swelling, no calf pain. No hx DVT or PE.   Patient denies hx of COPD/Emphysema. Recently hospitalized, 5/20-6/14, d/c to [facility]. 59M, hx DM, HTN presents from [facility] via EMS due to resp distress. Patient at [facility] due to right diabetic foot ulcer. Patient reported to be hypoxic on room air to 60s. Improved on 15L face mask top 100% O2 sat. Patient awake, alert, oriented. States he has been coughing for 2-3 weeks (dry cough) as well as weakness and shortness of breath for few days. NO fevers or chills, nausea or vomiting, headache, chest pain, abdominal pain, diarrhea or constipation. Patient reportedly received blood transfusion last night, unknown as to why. Reports BL leg swelling - chronic, no calf pain. No hx DVT or PE.      Patient denies hx of COPD/Emphysema. Recently hospitalized, 5/20-6/14, d/c to [facility].

## 2019-07-11 NOTE — ED PROVIDER NOTE - PSH
History of cholecystectomy    S/P laparoscopic cholecystectomy    Status post incision and drainage  Rt groin abscess

## 2019-07-11 NOTE — ED PROVIDER NOTE - PHYSICAL EXAMINATION
General: alert, oriented, no acute distress.   HEENT: PERRLA EOMI. No trauma/bruising noted to head or face.   CV: TACHY rate and regular rhythm, S1/S2, no murmurs/rubs/gallops noted on exam.   Lungs: Clear to ascultation bilaterally, no wheezes/crackles/rales noted on exam. Equal chest wall excursion noted.   Abdomen: Soft, non tender, non distended, no guarding or rebound.   MSK: Full ROM of upper and lower extremities bilaterally. Full ROM of neck. Right ankle and foot extensively wrapped.  Neuro: Awake, A+O x4, moving all extremities spontaneously. CN 2-12 grossly intact.   Extremities: 2+ pitting edema BL lower extremities below knee.   Skin: No rash noted on exam.    POCUS lungs w/ diffuse B lines, BL pleural effusions General: alert, oriented, no acute distress.   HEENT: PERRLA EOMI. No trauma/bruising noted to head or face.   CV: TACHY rate and regular rhythm, S1/S2, no murmurs/rubs/gallops noted on exam.   Lungs: Clear to ascultation bilaterally, no wheezes/crackles/rales noted on exam. Equal chest wall excursion noted.   Abdomen: Soft, non tender, non distended, no guarding or rebound.   MSK: Full ROM of upper and lower extremities bilaterally. Full ROM of neck. Right ankle and foot extensively wrapped w/ wound vac  Neuro: Awake, A+O x4, moving all extremities spontaneously. CN 2-12 grossly intact.   Extremities: 2+ pitting edema BL lower extremities below knee.   Skin: No rash noted on exam.    POCUS lungs w/ diffuse B lines, BL pleural effusions

## 2019-07-11 NOTE — ED PROVIDER NOTE - CARE PLAN
Principal Discharge DX:	Pulmonary edema Principal Discharge DX:	Pulmonary edema  Secondary Diagnosis:	Acute respiratory failure with hypoxia

## 2019-07-11 NOTE — ED PROVIDER NOTE - PROGRESS NOTE DETAILS
POCUS lungs w/ diffuse B lines, BL pleural effusions   BiPap initiated, lasix ordered. Will continue to re-assess   Chava Yousif MD, PGY3 Emergency Medicine MICU consulted due to new Bipap   stable on BiPap, no acute distress  BP remains elevated, will trial nitro paste  Chava Yousif MD, PGY3 Emergency Medicine

## 2019-07-11 NOTE — ED PROVIDER NOTE - NS ED ROS FT
Please see HPI section of chart for further detailed Review of Systems    cough, shortness of breath, weakness

## 2019-07-11 NOTE — ED ADULT NURSE REASSESSMENT NOTE - NS ED NURSE REASSESS COMMENT FT1
MICU consult at bedside. Pt continued on BiPAP, Pt states he feels much better with BiPAP.  SPO2 at 100%. Pt awaiting disposition with family at bedside.

## 2019-07-11 NOTE — ED ADULT NURSE NOTE - OBJECTIVE STATEMENT
59y [facility]. PMH of DM, venus insufficiency of legs, HTN.  Pt states he started to have SOB, 59y Male came by EMS from [facility] for SOB and respiratory distress. PMH of DM, venus insufficiency of legs, HTN.  Pt states he started to have SOB 3 days ago with increased pedal edema and chills at nigth.  Pt was also given a blood transfusion yesterday for decreased labs.  Pt states his SPO2 was very low this morning and was brought to ED.  Pt presents with SOB, tachypnea, bilateral crackles on lungs, bilateral +2 pedal edema. Denies chest pain, ha, n/v/d, abdominal pain, urinary symptoms, hematuria.  Upon examination, full facial symmetry, no JVD or trach deviation, S1 and S2 heart sounds present, +2 pulses in all extremities with full ROM and equal strength, wound vac on R lower foot with midline plantar wound, PICC line present on right arm for continual Abx, Pt states R arm shakes has been going on since previous hospitalization a month ago, cap refill <2 seconds, ABD soft, non distended and non tender, ABD sounds present, pelvis intact, backside clear. 59[facility]. PMH of DM, venus insufficiency of legs, HTN.  Pt states he started to have SOB 3 days ago with increased pedal edema and chills at night.  Pt was also given a blood transfusion yesterday for decreased labs.  Pt states his SPO2 was very low this morning and was brought to ED. En route EMS placed Pt on CPAP and BiPAP was continued in ED.  Pt presents with SOB, tachypnea, bilateral crackles on lungs, bilateral +2 pedal edema. Denies chest pain, ha, n/v/d, abdominal pain, urinary symptoms, hematuria.  Upon examination, full facial symmetry, no JVD or trach deviation, S1 and S2 heart sounds present, +2 pulses in all extremities with full ROM and equal strength, wound vac on R lower foot with midline plantar wound, PICC line present on right arm for continual Abx, PICC line intact and flushes without difficulty. Pt states R arm shakes has been going on since previous hospitalization a month ago, cap refill <2 seconds, ABD soft, non distended and non tender, ABD sounds present, pelvis intact, backside clear.

## 2019-07-11 NOTE — CONSULT NOTE ADULT - SUBJECTIVE AND OBJECTIVE BOX
CHIEF COMPLAINT: SOB     HPI:    Patient is a 59 y.o M w/ PMH IDDM c/b R. foot ulcer s/p debridement and wound vac (June 2019), HTN, and CKD [facility] to 60s. Patient reports that he has been feeling SOB over the past few days. He admits to a dry cough that has been going on for a few weeks but denies fever or sputum production. He also admits to orthopnea, some chills/sweating.  He is unsure if he has dyspnea on exertion as he has not been able to walk due to his foot wound. He denies CP, abdominal pain, constipation/diarrhea, skin rashes, recent sick contacts, or recent travel. He was evaluated by his PCP, Dr. Broderick, who found him to be hypoxic and w/ a low hgb of 7.6. He was transfused 1 U pRBC and sent to the emergency room. Of note, he was last hospitalized in June 2019 for a foot ulcer s/p debridement and wound vac placement. He had a TTE (7/13/19) that revealed EF 60% w/ normal systolic and diastolic dysfunction.     In the ED, Vtials: T 98.3 HR 89-99, Bp 156/93 - 174/85 RR 21-30. He was reportedly hypoxic to 60% on RA and was subsequently placed on NRB 15 L     PAST MEDICAL & SURGICAL HISTORY:  Hypertension  Insulin dependent diabetes mellitus  Venous insufficiency of both lower extremities: R &gt; L  HTN (hypertension)  BPH (benign prostatic hypertrophy)  Diabetes  History of cholecystectomy  S/P laparoscopic cholecystectomy  Status post incision and drainage: Rt groin abscess      FAMILY HISTORY:  Paternal family history of emphysema  Family history of diabetes mellitus (DM) (Grandparent)      SOCIAL HISTORY:  Smoking: __ packs x ___ years  EtOH Use:  Marital Status:  Occupation:  Recent Travel:  Country of Birth:  Advance Directives:    Allergies    No Known Allergies    Intolerances        HOME MEDICATIONS:    REVIEW OF SYSTEMS:  Constitutional:   Eyes:  ENT:  CV:  Resp:  GI:  :  MSK:  Integumentary:  Neurological:  Psychiatric:  Endocrine:  Hematologic/Lymphatic:  Allergic/Immunologic:  [ ] All other systems negative  [ ] Unable to assess ROS because ________    OBJECTIVE:  ICU Vital Signs Last 24 Hrs  T(C): 36.8 (11 Jul 2019 10:22), Max: 36.8 (11 Jul 2019 10:22)  T(F): 98.3 (11 Jul 2019 10:22), Max: 98.3 (11 Jul 2019 10:22)  HR: 84 (11 Jul 2019 13:46) (84 - 99)  BP: 152/85 (11 Jul 2019 13:46) (152/85 - 174/85)  BP(mean): --  ABP: --  ABP(mean): --  RR: 20 (11 Jul 2019 13:46) (20 - 30)  SpO2: 100% (11 Jul 2019 13:46) (95% - 100%)        CAPILLARY BLOOD GLUCOSE          PHYSICAL EXAM:  General:   HEENT:   Lymph Nodes:  Neck:   Respiratory:   Cardiovascular:   Abdomen:   Extremities:   Skin:   Neurological:  Psychiatry:    HOSPITAL MEDICATIONS:  MEDICATIONS  (STANDING):    MEDICATIONS  (PRN):      LABS:                        9.9    8.7   )-----------( 391      ( 11 Jul 2019 11:05 )             31.5     07-11    141  |  103  |  16  ----------------------------<  133<H>  3.9   |  20<L>  |  1.90<H>    Ca    9.2      11 Jul 2019 11:05    TPro  7.0  /  Alb  3.4  /  TBili  0.4  /  DBili  x   /  AST  11  /  ALT  9<L>  /  AlkPhos  72  07-11    PT/INR - ( 11 Jul 2019 11:05 )   PT: 14.8 sec;   INR: 1.29 ratio         PTT - ( 11 Jul 2019 11:05 )  PTT:31.9 sec      Venous Blood Gas:  07-11 @ 11:05  7.37/40/44/22/75  VBG Lactate: 1.1      MICROBIOLOGY:     RADIOLOGY:  [ ] Reviewed and interpreted by me    EKG: CHIEF COMPLAINT: SOB     HPI:    Patient is a 59 y.o M w/ PMH IDDM c/b R. foot ulcer s/p debridement and wound vac (June 2019), HTN, and CKD [facility] to 60s. Patient reports that he has been feeling SOB over the past few days. He admits to a dry cough that has been going on for a few weeks but denies fever or sputum production. He also admits to orthopnea, some chills/sweating.  He is unsure if he has dyspnea on exertion as he has not been able to walk due to his foot wound. He denies CP, abdominal pain, constipation/diarrhea, skin rashes, recent sick contacts, or recent travel. He was evaluated by his PCP, Dr. Broderick, who found him to be hypoxic and w/ a low hgb of 7.6. He was transfused 1 U pRBC and sent to the emergency room. Of note, he was last hospitalized in June 2019 for a foot ulcer s/p debridement and wound vac placement. He had a TTE (7/13/19) that revealed EF 60% w/ normal systolic and diastolic dysfunction.     In the ED, Vtials: T 98.3 HR 89-99, Bp 156/93 - 174/85 RR 21-30. He was reportedly hypoxic to 60% on RA and was subsequently placed on NRB 15 L and improved to 100%. However, he had increased work of breathing and was placed on bipap. S/p lasix 40 IV x1 and nitrobid x1. Notable labs: creatinine 1.9. MICu was consulted for new bipap.     PAST MEDICAL & SURGICAL HISTORY:  Hypertension  Insulin dependent diabetes mellitus  Venous insufficiency of both lower extremities: R &gt; L  HTN (hypertension)  BPH (benign prostatic hypertrophy)  Diabetes  History of cholecystectomy  S/P laparoscopic cholecystectomy  Status post incision and drainage: Rt groin abscess      FAMILY HISTORY:  Paternal family history of emphysema  Family history of diabetes mellitus (DM) (Grandparent)      SOCIAL HISTORY:  Smoking: __ packs x ___ years  EtOH Use:  Marital Status:  Occupation:  Recent Travel:  Country of Birth:  Advance Directives:    Allergies    No Known Allergies    Intolerances        HOME MEDICATIONS:    CONSTITUTIONAL:  No weight loss, fever, chills, weakness or fatigue.  HEENT:  Eyes:  No visual loss, blurred vision, double vision or yellow sclerae. Ears, Nose, Throat:  No hearing loss, sneezing, congestion, runny nose or sore throat.  SKIN:  No rash or itching.  CARDIOVASCULAR:  + orthopnea, No chest pain, chest pressure or chest discomfort. No palpitations or edema.  RESPIRATORY:  +dry cough, +SOB  GASTROINTESTINAL:  No anorexia, nausea, vomiting or diarrhea. No abdominal pain or blood.  GENITOURINARY:  Denies hematuria, dysuria.   NEUROLOGICAL:  No headache, dizziness, syncope, paralysis, ataxia, numbness or tingling in the extremities. No change in bowel or bladder control.  MUSCULOSKELETAL:  No muscle, back pain, joint pain or stiffness.  HEMATOLOGIC:  No anemia, bleeding or bruising.  LYMPHATICS:  No enlarged nodes. No history of splenectomy.  PSYCHIATRIC:  No history of depression or anxiety.  ENDOCRINOLOGIC:  No reports of sweating, cold or heat intolerance. No polyuria or polydipsia.  ALLERGIES:  No history of asthma, hives, eczema or rhinitis.    OBJECTIVE:  ICU Vital Signs Last 24 Hrs  T(C): 36.8 (11 Jul 2019 10:22), Max: 36.8 (11 Jul 2019 10:22)  T(F): 98.3 (11 Jul 2019 10:22), Max: 98.3 (11 Jul 2019 10:22)  HR: 84 (11 Jul 2019 13:46) (84 - 99)  BP: 152/85 (11 Jul 2019 13:46) (152/85 - 174/85)  BP(mean): --  ABP: --  ABP(mean): --  RR: 20 (11 Jul 2019 13:46) (20 - 30)  SpO2: 100% (11 Jul 2019 13:46) (95% - 100%)        CAPILLARY BLOOD GLUCOSE          PHYSICAL EXAM:  GENERAL: NAD, on bipap  HEAD:  Atraumatic, Normocephalic  EYES: EOMI, PERRLA, conjunctiva and sclera clear  NECK: Supple, No JVD  CHEST/LUNG: intubated; equal breath sounds b/l; rhonchi noted b/l  HEART: S1 and S2, regular rate and rhythm; No murmurs, rubs, or gallops  ABDOMEN: Soft, Nontender, Nondistended; Bowel sounds present  EXTREMITIES:  2+ Peripheral Pulses, 2+ pitting edema of lower extremities  PSYCH: AAOx0  NEUROLOGY: sedated; intubated; no obvious facial droop   SKIN: No rashes or lesions    HOSPITAL MEDICATIONS:  MEDICATIONS  (STANDING):    MEDICATIONS  (PRN):      LABS:                        9.9    8.7   )-----------( 391      ( 11 Jul 2019 11:05 )             31.5     07-11    141  |  103  |  16  ----------------------------<  133<H>  3.9   |  20<L>  |  1.90<H>    Ca    9.2      11 Jul 2019 11:05    TPro  7.0  /  Alb  3.4  /  TBili  0.4  /  DBili  x   /  AST  11  /  ALT  9<L>  /  AlkPhos  72  07-11    PT/INR - ( 11 Jul 2019 11:05 )   PT: 14.8 sec;   INR: 1.29 ratio         PTT - ( 11 Jul 2019 11:05 )  PTT:31.9 sec      Venous Blood Gas:  07-11 @ 11:05  7.37/40/44/22/75  VBG Lactate: 1.1      MICROBIOLOGY:     RADIOLOGY:  [ ] Reviewed and interpreted by me    EKG:

## 2019-07-12 LAB
-  COAGULASE NEGATIVE STAPHYLOCOCCUS: SIGNIFICANT CHANGE UP
ANION GAP SERPL CALC-SCNC: 14 MMOL/L — SIGNIFICANT CHANGE UP (ref 5–17)
ANION GAP SERPL CALC-SCNC: 14 MMOL/L — SIGNIFICANT CHANGE UP (ref 5–17)
ANION GAP SERPL CALC-SCNC: 16 MMOL/L — SIGNIFICANT CHANGE UP (ref 5–17)
BUN SERPL-MCNC: 16 MG/DL — SIGNIFICANT CHANGE UP (ref 7–23)
BUN SERPL-MCNC: 17 MG/DL — SIGNIFICANT CHANGE UP (ref 7–23)
BUN SERPL-MCNC: 21 MG/DL — SIGNIFICANT CHANGE UP (ref 7–23)
CALCIUM SERPL-MCNC: 8.6 MG/DL — SIGNIFICANT CHANGE UP (ref 8.4–10.5)
CALCIUM SERPL-MCNC: 8.7 MG/DL — SIGNIFICANT CHANGE UP (ref 8.4–10.5)
CALCIUM SERPL-MCNC: 9.1 MG/DL — SIGNIFICANT CHANGE UP (ref 8.4–10.5)
CHLORIDE SERPL-SCNC: 100 MMOL/L — SIGNIFICANT CHANGE UP (ref 96–108)
CHLORIDE SERPL-SCNC: 101 MMOL/L — SIGNIFICANT CHANGE UP (ref 96–108)
CHLORIDE SERPL-SCNC: 101 MMOL/L — SIGNIFICANT CHANGE UP (ref 96–108)
CO2 SERPL-SCNC: 23 MMOL/L — SIGNIFICANT CHANGE UP (ref 22–31)
CREAT SERPL-MCNC: 1.69 MG/DL — HIGH (ref 0.5–1.3)
CREAT SERPL-MCNC: 1.77 MG/DL — HIGH (ref 0.5–1.3)
CREAT SERPL-MCNC: 1.84 MG/DL — HIGH (ref 0.5–1.3)
CULTURE RESULTS: NO GROWTH — SIGNIFICANT CHANGE UP
FOLATE SERPL-MCNC: 7.5 NG/ML — SIGNIFICANT CHANGE UP
GLUCOSE BLDC GLUCOMTR-MCNC: 119 MG/DL — HIGH (ref 70–99)
GLUCOSE BLDC GLUCOMTR-MCNC: 153 MG/DL — HIGH (ref 70–99)
GLUCOSE BLDC GLUCOMTR-MCNC: 190 MG/DL — HIGH (ref 70–99)
GLUCOSE BLDC GLUCOMTR-MCNC: 210 MG/DL — HIGH (ref 70–99)
GLUCOSE BLDC GLUCOMTR-MCNC: 227 MG/DL — HIGH (ref 70–99)
GLUCOSE SERPL-MCNC: 146 MG/DL — HIGH (ref 70–99)
GLUCOSE SERPL-MCNC: 167 MG/DL — HIGH (ref 70–99)
GLUCOSE SERPL-MCNC: 223 MG/DL — HIGH (ref 70–99)
GRAM STN FLD: SIGNIFICANT CHANGE UP
GRAM STN FLD: SIGNIFICANT CHANGE UP
HCT VFR BLD CALC: 27.6 % — LOW (ref 39–50)
HGB BLD-MCNC: 8.8 G/DL — LOW (ref 13–17)
MAGNESIUM SERPL-MCNC: 1.5 MG/DL — LOW (ref 1.6–2.6)
MAGNESIUM SERPL-MCNC: 1.5 MG/DL — LOW (ref 1.6–2.6)
MCHC RBC-ENTMCNC: 28.8 PG — SIGNIFICANT CHANGE UP (ref 27–34)
MCHC RBC-ENTMCNC: 31.9 GM/DL — LOW (ref 32–36)
MCV RBC AUTO: 90.2 FL — SIGNIFICANT CHANGE UP (ref 80–100)
METHOD TYPE: SIGNIFICANT CHANGE UP
PHOSPHATE SERPL-MCNC: 2.9 MG/DL — SIGNIFICANT CHANGE UP (ref 2.5–4.5)
PLATELET # BLD AUTO: 353 K/UL — SIGNIFICANT CHANGE UP (ref 150–400)
POTASSIUM SERPL-MCNC: 3.3 MMOL/L — LOW (ref 3.5–5.3)
POTASSIUM SERPL-MCNC: 3.7 MMOL/L — SIGNIFICANT CHANGE UP (ref 3.5–5.3)
POTASSIUM SERPL-MCNC: 3.8 MMOL/L — SIGNIFICANT CHANGE UP (ref 3.5–5.3)
POTASSIUM SERPL-SCNC: 3.3 MMOL/L — LOW (ref 3.5–5.3)
POTASSIUM SERPL-SCNC: 3.7 MMOL/L — SIGNIFICANT CHANGE UP (ref 3.5–5.3)
POTASSIUM SERPL-SCNC: 3.8 MMOL/L — SIGNIFICANT CHANGE UP (ref 3.5–5.3)
RBC # BLD: 3.06 M/UL — LOW (ref 4.2–5.8)
RBC # FLD: 14.3 % — SIGNIFICANT CHANGE UP (ref 10.3–14.5)
SODIUM SERPL-SCNC: 137 MMOL/L — SIGNIFICANT CHANGE UP (ref 135–145)
SODIUM SERPL-SCNC: 138 MMOL/L — SIGNIFICANT CHANGE UP (ref 135–145)
SODIUM SERPL-SCNC: 140 MMOL/L — SIGNIFICANT CHANGE UP (ref 135–145)
SPECIMEN SOURCE: SIGNIFICANT CHANGE UP
TSH SERPL-MCNC: 0.87 UIU/ML — SIGNIFICANT CHANGE UP (ref 0.27–4.2)
VIT B12 SERPL-MCNC: 414 PG/ML — SIGNIFICANT CHANGE UP (ref 232–1245)
WBC # BLD: 6.42 K/UL — SIGNIFICANT CHANGE UP (ref 3.8–10.5)
WBC # FLD AUTO: 6.42 K/UL — SIGNIFICANT CHANGE UP (ref 3.8–10.5)

## 2019-07-12 RX ORDER — CHLORHEXIDINE GLUCONATE 213 G/1000ML
1 SOLUTION TOPICAL DAILY
Refills: 0 | Status: DISCONTINUED | OUTPATIENT
Start: 2019-07-12 | End: 2019-07-31

## 2019-07-12 RX ORDER — FUROSEMIDE 40 MG
60 TABLET ORAL
Refills: 0 | Status: DISCONTINUED | OUTPATIENT
Start: 2019-07-12 | End: 2019-07-16

## 2019-07-12 RX ORDER — MAGNESIUM SULFATE 500 MG/ML
1 VIAL (ML) INJECTION ONCE
Refills: 0 | Status: COMPLETED | OUTPATIENT
Start: 2019-07-12 | End: 2019-07-12

## 2019-07-12 RX ORDER — PRIMIDONE 250 MG/1
25 TABLET ORAL AT BEDTIME
Refills: 0 | Status: DISCONTINUED | OUTPATIENT
Start: 2019-07-12 | End: 2019-07-20

## 2019-07-12 RX ADMIN — Medication 0.5 INCH(S): at 01:14

## 2019-07-12 RX ADMIN — PRIMIDONE 25 MILLIGRAM(S): 250 TABLET ORAL at 22:03

## 2019-07-12 RX ADMIN — HEPARIN SODIUM 5000 UNIT(S): 5000 INJECTION INTRAVENOUS; SUBCUTANEOUS at 13:35

## 2019-07-12 RX ADMIN — Medication 325 MILLIGRAM(S): at 13:33

## 2019-07-12 RX ADMIN — Medication 60 MILLIGRAM(S): at 01:05

## 2019-07-12 RX ADMIN — Medication 60 MILLIGRAM(S): at 14:00

## 2019-07-12 RX ADMIN — Medication 10 UNIT(S): at 08:41

## 2019-07-12 RX ADMIN — Medication 100 MILLIGRAM(S): at 05:32

## 2019-07-12 RX ADMIN — Medication 100 MILLIGRAM(S): at 13:34

## 2019-07-12 RX ADMIN — PIPERACILLIN AND TAZOBACTAM 25 GRAM(S): 4; .5 INJECTION, POWDER, LYOPHILIZED, FOR SOLUTION INTRAVENOUS at 21:16

## 2019-07-12 RX ADMIN — CHLORHEXIDINE GLUCONATE 1 APPLICATION(S): 213 SOLUTION TOPICAL at 11:57

## 2019-07-12 RX ADMIN — Medication 1 APPLICATION(S): at 08:45

## 2019-07-12 RX ADMIN — Medication 1: at 08:41

## 2019-07-12 RX ADMIN — Medication 100 GRAM(S): at 10:33

## 2019-07-12 RX ADMIN — Medication 1: at 17:22

## 2019-07-12 RX ADMIN — INSULIN GLARGINE 20 UNIT(S): 100 INJECTION, SOLUTION SUBCUTANEOUS at 21:44

## 2019-07-12 RX ADMIN — SENNA PLUS 2 TABLET(S): 8.6 TABLET ORAL at 21:15

## 2019-07-12 RX ADMIN — Medication 2.5 MILLIGRAM(S): at 21:15

## 2019-07-12 RX ADMIN — HEPARIN SODIUM 5000 UNIT(S): 5000 INJECTION INTRAVENOUS; SUBCUTANEOUS at 21:15

## 2019-07-12 RX ADMIN — Medication 100 MILLIGRAM(S): at 21:16

## 2019-07-12 RX ADMIN — PIPERACILLIN AND TAZOBACTAM 25 GRAM(S): 4; .5 INJECTION, POWDER, LYOPHILIZED, FOR SOLUTION INTRAVENOUS at 05:31

## 2019-07-12 RX ADMIN — HEPARIN SODIUM 5000 UNIT(S): 5000 INJECTION INTRAVENOUS; SUBCUTANEOUS at 05:32

## 2019-07-12 RX ADMIN — AMLODIPINE BESYLATE 10 MILLIGRAM(S): 2.5 TABLET ORAL at 05:32

## 2019-07-12 RX ADMIN — PIPERACILLIN AND TAZOBACTAM 25 GRAM(S): 4; .5 INJECTION, POWDER, LYOPHILIZED, FOR SOLUTION INTRAVENOUS at 13:35

## 2019-07-12 NOTE — PROVIDER CONTACT NOTE (CRITICAL VALUE NOTIFICATION) - ASSESSMENT
Pt axox3, vs, no sob or vomiting noted. No chest pain or palpitation noted.
Pt axox3, vs, no sob or vomiting noted. No chest pain or palpitation noted.

## 2019-07-12 NOTE — PROGRESS NOTE ADULT - ASSESSMENT
59M, hx DM, HTN presents from [facility] via EMS due to resp distress. Patient at [facility] due to right diabetic foot ulcer. Patient reported to be hypoxic on room air to 60s. Improved on 15L face mask top 100% O2 sat. Patient awake, alert, oriented. States he has been coughing for 2-3 weeks (dry cough) as well as weakness and shortness of breath for few days.    Acute on chronic diastolic chf  - iv lasix 60 bid  - strict Is and Os  - cardiology consult appreciated  - keep O2 sats above 92%    diabetic foot ulcer  - podiatry follow up  - for debridement when medically stable    essential tremors  - trial of primidone    diabetes  - fs qid  - hgb a1c  - endocrine consult    htn  - cw norvasc      DVT px

## 2019-07-12 NOTE — PROVIDER CONTACT NOTE (CRITICAL VALUE NOTIFICATION) - ACTION/TREATMENT ORDERED:
Pt is already on iv antibiotic  Continue to monitor pt.
Pt is already on iv antibiotic  Continue to monitor pt.

## 2019-07-12 NOTE — PROVIDER CONTACT NOTE (CRITICAL VALUE NOTIFICATION) - TEST AND RESULT REPORTED:
Preliminary blood culture drawn on 7/10/ 19 resulted as gram positive cocci in clusters in aerobic bottle

## 2019-07-12 NOTE — PROVIDER CONTACT NOTE (CRITICAL VALUE NOTIFICATION) - TEST AND RESULT REPORTED:
Preliminary blood culture drawn on 7/11/ 19 resulted as gram positive cocci in clusters in aerobic bottle

## 2019-07-12 NOTE — PROGRESS NOTE ADULT - SUBJECTIVE AND OBJECTIVE BOX
Subjective: Patient seen and examined. No new events except as noted.   Off BIPAP   feeling much better     REVIEW OF SYSTEMS:    CONSTITUTIONAL:+ weakness, fevers or chills  EYES/ENT: No visual changes;  No vertigo or throat pain   NECK: No pain or stiffness  RESPIRATORY: No cough, wheezing, hemoptysis; No shortness of breath  CARDIOVASCULAR: No chest pain or palpitations  GASTROINTESTINAL: No abdominal or epigastric pain. No nausea, vomiting, or hematemesis; No diarrhea or constipation. No melena or hematochezia.  GENITOURINARY: No dysuria, frequency or hematuria  NEUROLOGICAL: No numbness or weakness  SKIN: No itching, burning, rashes, or lesions   All other review of systems is negative unless indicated above.    MEDICATIONS:  MEDICATIONS  (STANDING):  amLODIPine   Tablet 10 milliGRAM(s) Oral daily  collagenase Ointment 1 Application(s) Topical <User Schedule>  dextrose 5%. 1000 milliLiter(s) (50 mL/Hr) IV Continuous <Continuous>  dextrose 50% Injectable 12.5 Gram(s) IV Push once  dextrose 50% Injectable 25 Gram(s) IV Push once  dextrose 50% Injectable 25 Gram(s) IV Push once  dronabinol 2.5 milliGRAM(s) Oral two times a day  epoetin braden Injectable 85272 Unit(s) SubCutaneous <User Schedule>  ferrous    sulfate 325 milliGRAM(s) Oral daily  furosemide   Injectable 60 milliGRAM(s) IV Push every 12 hours  heparin  Injectable 5000 Unit(s) SubCutaneous every 8 hours  hydrALAZINE 100 milliGRAM(s) Oral three times a day  insulin glargine Injectable (LANTUS) 20 Unit(s) SubCutaneous at bedtime  insulin lispro (HumaLOG) corrective regimen sliding scale   SubCutaneous three times a day before meals  insulin lispro (HumaLOG) corrective regimen sliding scale   SubCutaneous at bedtime  insulin lispro Injectable (HumaLOG) 10 Unit(s) SubCutaneous before breakfast  piperacillin/tazobactam IVPB.. 3.375 Gram(s) IV Intermittent every 8 hours  polyethylene glycol 3350 17 Gram(s) Oral every 12 hours  senna 2 Tablet(s) Oral at bedtime      PHYSICAL EXAM:  T(C): 36.8 (07-12-19 @ 05:22), Max: 36.8 (07-11-19 @ 10:22)  HR: 88 (07-12-19 @ 07:10) (80 - 99)  BP: 151/82 (07-12-19 @ 05:22) (151/82 - 174/85)  RR: 18 (07-12-19 @ 05:22) (18 - 30)  SpO2: 95% (07-12-19 @ 07:10) (94% - 100%)  Wt(kg): --  I&O's Summary    11 Jul 2019 07:01  -  12 Jul 2019 07:00  --------------------------------------------------------  IN: 200 mL / OUT: 600 mL / NET: -400 mL      Height (cm): 182.88 (07-11 @ 19:02)  Weight (kg): 85 (07-11 @ 19:02)  BMI (kg/m2): 25.4 (07-11 @ 19:02)  BSA (m2): 2.07 (07-11 @ 19:02)    Appearance: NAd , off bIPAP   HEENT:   Dry oral mucosa, PERRL, EOMI	  Lymphatic: No lymphadenopathy +JVP   Cardiovascular: Normal S1 S2, + JVD, No murmurs  Respiratory: Decreased bs	  Psychiatry: A & O x 3, Mood & affect appropriate  Gastrointestinal:  Soft, Non-tender, + BS	  Skin: No rashes, No ecchymoses, No cyanosis	  Neurologic: Non-focal  Extremities:+ edema, R foot ulcer wrapped   Vascular: Peripheral pulses palpable 2+ bilaterally        LABS:    CARDIAC MARKERS:                                9.9    8.7   )-----------( 391      ( 11 Jul 2019 11:05 )             31.5     07-12    138  |  101  |  16  ----------------------------<  146<H>  3.7   |  23  |  1.69<H>    Ca    8.7      12 Jul 2019 05:47  Phos  2.9     07-12  Mg     1.5     07-12    TPro  7.0  /  Alb  3.4  /  TBili  0.4  /  DBili  x   /  AST  11  /  ALT  9<L>  /  AlkPhos  72  07-11    proBNP: Serum Pro-Brain Natriuretic Peptide: 767 pg/mL (07-11 @ 11:05)    Lipid Profile:   HgA1c:   TSH:     2          TELEMETRY: 	  SR  ECG:  	  RADIOLOGY:   DIAGNOSTIC TESTING:  [ ] Echocardiogram:  [ ]  Catheterization:  [ ] Stress Test:    OTHER:

## 2019-07-12 NOTE — PROGRESS NOTE ADULT - SUBJECTIVE AND OBJECTIVE BOX
Patient is a 59y old  Male who presents with a chief complaint of SOB (2019 12:41)      SUBJECTIVE / OVERNIGHT EVENTS:  c/o tremors of right hand.  No chest pain. No shortness of breath. No other complaints. No events overnight.     MEDICATIONS  (STANDING):  amLODIPine   Tablet 10 milliGRAM(s) Oral daily  chlorhexidine 2% Cloths 1 Application(s) Topical daily  collagenase Ointment 1 Application(s) Topical <User Schedule>  dextrose 5%. 1000 milliLiter(s) (50 mL/Hr) IV Continuous <Continuous>  dextrose 50% Injectable 12.5 Gram(s) IV Push once  dextrose 50% Injectable 25 Gram(s) IV Push once  dextrose 50% Injectable 25 Gram(s) IV Push once  dronabinol 2.5 milliGRAM(s) Oral two times a day  epoetin braden Injectable 00561 Unit(s) SubCutaneous <User Schedule>  ferrous    sulfate 325 milliGRAM(s) Oral daily  furosemide   Injectable 60 milliGRAM(s) IV Push two times a day  heparin  Injectable 5000 Unit(s) SubCutaneous every 8 hours  hydrALAZINE 100 milliGRAM(s) Oral three times a day  insulin glargine Injectable (LANTUS) 20 Unit(s) SubCutaneous at bedtime  insulin lispro (HumaLOG) corrective regimen sliding scale   SubCutaneous three times a day before meals  insulin lispro (HumaLOG) corrective regimen sliding scale   SubCutaneous at bedtime  insulin lispro Injectable (HumaLOG) 10 Unit(s) SubCutaneous before breakfast  piperacillin/tazobactam IVPB.. 3.375 Gram(s) IV Intermittent every 8 hours  polyethylene glycol 3350 17 Gram(s) Oral every 12 hours  primidone 25 milliGRAM(s) Oral at bedtime  senna 2 Tablet(s) Oral at bedtime    MEDICATIONS  (PRN):  acetaminophen   Tablet .. 650 milliGRAM(s) Oral every 6 hours PRN Mild Pain (1 - 3)  dextrose 40% Gel 15 Gram(s) Oral once PRN Blood Glucose LESS THAN 70 milliGRAM(s)/deciliter  glucagon  Injectable 1 milliGRAM(s) IntraMuscular once PRN Glucose LESS THAN 70 milligrams/deciliter  oxyCODONE    IR 5 milliGRAM(s) Oral every 4 hours PRN Moderate Pain (4 - 6)      Vital Signs Last 24 Hrs  T(C): 36.4 (2019 14:06), Max: 36.9 (2019 11:38)  T(F): 97.6 (2019 14:06), Max: 98.4 (2019 11:38)  HR: 93 (2019 14:06) (80 - 96)  BP: 132/65 (2019 14:06) (132/65 - 162/85)  BP(mean): --  RR: 16 (2019 14:06) (16 - 18)  SpO2: 92% (2019 14:06) (92% - 100%)  CAPILLARY BLOOD GLUCOSE      POCT Blood Glucose.: 119 mg/dL (2019 12:20)  POCT Blood Glucose.: 153 mg/dL (2019 08:08)  POCT Blood Glucose.: 117 mg/dL (2019 22:31)  POCT Blood Glucose.: 126 mg/dL (2019 17:21)    I&O's Summary    2019 07:  -  2019 07:00  --------------------------------------------------------  IN: 200 mL / OUT: 600 mL / NET: -400 mL    2019 07:01  -  2019 14:18  --------------------------------------------------------  IN: 360 mL / OUT: 500 mL / NET: -140 mL        PHYSICAL EXAM:  GENERAL: NAD, well-developed  HEAD:  Atraumatic, Normocephalic  EYES: EOMI, PERRLA, conjunctiva and sclera clear  NECK: Supple, No JVD  CHEST/LUNG: Clear to auscultation bilaterally; No wheeze  HEART: Regular rate and rhythm; No murmurs, rubs, or gallops  ABDOMEN: Soft, Nontender, Nondistended; Bowel sounds present  EXTREMITIES:  2+ Peripheral Pulses, No clubbing, cyanosis, or edema  PSYCH: AAOx3  NEUROLOGY: non-focal  SKIN: No rashes or lesions    LABS:                        8.8    6.42  )-----------( 353      ( 2019 09:17 )             27.6     07-12    137  |  100  |  17  ----------------------------<  167<H>  3.3<L>   |  23  |  1.77<H>    Ca    9.1      2019 10:09  Phos  2.9     -  Mg     1.5     -12    TPro  7.0  /  Alb  3.4  /  TBili  0.4  /  DBili  x   /  AST  11  /  ALT  9<L>  /  AlkPhos  72  07-11    PT/INR - ( 2019 11:05 )   PT: 14.8 sec;   INR: 1.29 ratio         PTT - ( 2019 11:05 )  PTT:31.9 sec      Urinalysis Basic - ( 2019 16:24 )    Color: Light Yellow / Appearance: Clear / S.011 / pH: x  Gluc: x / Ketone: Trace  / Bili: Negative / Urobili: Negative   Blood: x / Protein: 30 mg/dL / Nitrite: Negative   Leuk Esterase: Negative / RBC: 9 /hpf / WBC 1 /HPF   Sq Epi: x / Non Sq Epi: 0 /hpf / Bacteria: Negative        RADIOLOGY & ADDITIONAL TESTS:    Imaging Personally Reviewed:    Consultant(s) Notes Reviewed:      Care Discussed with Consultants/Other Providers:

## 2019-07-12 NOTE — CONSULT NOTE ADULT - ASSESSMENT
58 yo M presents with right foot wound after surgical debridement recently  - pt was seen and evaluated   - VSS, no leukocytosis  - right foot wound to plantar mid-foot, granular, no signs of infection  - wound was cleaned and dressed with wet to dry dressing  - Vera flow wound vac ordered, to be applied today  - d/w attending 60 yo M presents with right foot wound after surgical debridement recently  - pt was seen and evaluated   - VSS, no leukocytosis  - right foot wound to plantar mid-foot, granular, no signs of infection  - wound was cleaned and dressed with wet to dry dressing  - Vera flow wound vac ordered, to be applied today  - d/w attending     (Attending - Dr Bj Issa)  Dx: 1. Full Thickness wound plantar R foot s/p multiple Debridement & I & D Sx due to Necrotizing Fasciitis  now void of acute infection on Vac Tx 125 mmHg continuous Therapy        2. R 3rd toe FT ulcer to SQ lateral aspect (New)        3. DM w/ Profound Neuropathy  Plan: 1. Consult/Exam 2. Removed Vac Today & cleaned w/ NS & antibacterial soap. Applied  Medi honey w/ DSD plantar R foot wound & Aquacel Ag to R 3rd toe            2. Recommend re application of Vac w/ Vera flow wound vac to R plantar foot wound & continue w/ Aquacel R 3rd toe ulcer          3. Will follow

## 2019-07-12 NOTE — PROVIDER CONTACT NOTE (CRITICAL VALUE NOTIFICATION) - SITUATION
Preliminary blood culture drawn on 7/10/ 19 resulted as gram positive cocci in clusters in aerobic bottle
Preliminary blood culture drawn on 7/11/ 19 resulted as gram positive cocci in clusters in aerobic bottle

## 2019-07-12 NOTE — PHYSICAL THERAPY INITIAL EVALUATION ADULT - DID THE PATIENT HAVE SURGERY?
n/a/multiple debridements to R plantar foot on prior admissions, now admitted for respiratory distress

## 2019-07-12 NOTE — PHYSICAL THERAPY INITIAL EVALUATION ADULT - MODALITIES TREATMENT COMMENTS
R plantar foot wound (s/p multiple debridements on prior admission) measuring 19.5cm x 7.5cm x 2.5cm, 70% granular. No purulence, no erythema, no malodor.

## 2019-07-12 NOTE — PHYSICAL THERAPY INITIAL EVALUATION ADULT - ADDITIONAL COMMENTS
Pt  independent in ADLs prior to [facility] admission, lived in [facility] with elevator access with family. Pt  independent in ADLs prior to [facility] admission, lived in [facility] with elevator access with family. At [facility], pt was transferring to and from wheelchair stand pivot transfer independently. Was ambulating with modified walker that allowed for weight bearing through R knee 40 feet with assist in PT.

## 2019-07-12 NOTE — CONSULT NOTE ADULT - SUBJECTIVE AND OBJECTIVE BOX
Podiatry pager #: [facility] [phone]/ [phone]    Patient is a 59y old  Male who presents with a chief complaint of SOB (12 Jul 2019 09:13)      HPI:  59M, hx DM, HTN presents from [facility] via EMS due to resp distress. Patient at [facility] due to right diabetic foot ulcer. Patient reported to be hypoxic on room air to 60s. Improved on 15L face mask top 100% O2 sat. Patient awake, alert, oriented. States he has been coughing for 2-3 weeks (dry cough) as well as weakness and shortness of breath for few days. NO fevers or chills, nausea or vomiting, headache, chest pain, abdominal pain, diarrhea or constipation. Patient reportedly received blood transfusion last night, unknown as to why. Reports BL leg swelling - chronic, no calf pain. No hx DVT or PE.      Patient denies hx of COPD/Emphysema. Recently hospitalized, 5/20-6/14, (11 Jul 2019 15:54)      PAST MEDICAL & SURGICAL HISTORY:  Hypertension  Insulin dependent diabetes mellitus  Venous insufficiency of both lower extremities: R &gt; L  HTN (hypertension)  BPH (benign prostatic hypertrophy)  Diabetes  History of cholecystectomy  S/P laparoscopic cholecystectomy  Status post incision and drainage: Rt groin abscess      MEDICATIONS  (STANDING):  amLODIPine   Tablet 10 milliGRAM(s) Oral daily  chlorhexidine 2% Cloths 1 Application(s) Topical daily  collagenase Ointment 1 Application(s) Topical <User Schedule>  dextrose 5%. 1000 milliLiter(s) (50 mL/Hr) IV Continuous <Continuous>  dextrose 50% Injectable 12.5 Gram(s) IV Push once  dextrose 50% Injectable 25 Gram(s) IV Push once  dextrose 50% Injectable 25 Gram(s) IV Push once  dronabinol 2.5 milliGRAM(s) Oral two times a day  epoetin braden Injectable 16811 Unit(s) SubCutaneous <User Schedule>  ferrous    sulfate 325 milliGRAM(s) Oral daily  furosemide   Injectable 60 milliGRAM(s) IV Push two times a day  heparin  Injectable 5000 Unit(s) SubCutaneous every 8 hours  hydrALAZINE 100 milliGRAM(s) Oral three times a day  insulin glargine Injectable (LANTUS) 20 Unit(s) SubCutaneous at bedtime  insulin lispro (HumaLOG) corrective regimen sliding scale   SubCutaneous three times a day before meals  insulin lispro (HumaLOG) corrective regimen sliding scale   SubCutaneous at bedtime  insulin lispro Injectable (HumaLOG) 10 Unit(s) SubCutaneous before breakfast  piperacillin/tazobactam IVPB.. 3.375 Gram(s) IV Intermittent every 8 hours  polyethylene glycol 3350 17 Gram(s) Oral every 12 hours  senna 2 Tablet(s) Oral at bedtime    MEDICATIONS  (PRN):  acetaminophen   Tablet .. 650 milliGRAM(s) Oral every 6 hours PRN Mild Pain (1 - 3)  dextrose 40% Gel 15 Gram(s) Oral once PRN Blood Glucose LESS THAN 70 milliGRAM(s)/deciliter  glucagon  Injectable 1 milliGRAM(s) IntraMuscular once PRN Glucose LESS THAN 70 milligrams/deciliter  oxyCODONE    IR 5 milliGRAM(s) Oral every 4 hours PRN Moderate Pain (4 - 6)      Allergies    No Known Allergies    Intolerances        VITALS:    Vital Signs Last 24 Hrs  T(C): 36.9 (12 Jul 2019 11:38), Max: 36.9 (12 Jul 2019 11:38)  T(F): 98.4 (12 Jul 2019 11:38), Max: 98.4 (12 Jul 2019 11:38)  HR: 96 (12 Jul 2019 11:38) (80 - 96)  BP: 132/75 (12 Jul 2019 11:38) (132/75 - 162/85)  BP(mean): --  RR: 18 (12 Jul 2019 11:38) (18 - 20)  SpO2: 92% (12 Jul 2019 11:38) (92% - 100%)    LABS:                          8.8    6.42  )-----------( 353      ( 12 Jul 2019 09:17 )             27.6       07-12    137  |  100  |  17  ----------------------------<  167<H>  3.3<L>   |  23  |  1.77<H>    Ca    9.1      12 Jul 2019 10:09  Phos  2.9     07-12  Mg     1.5     07-12    TPro  7.0  /  Alb  3.4  /  TBili  0.4  /  DBili  x   /  AST  11  /  ALT  9<L>  /  AlkPhos  72  07-11      CAPILLARY BLOOD GLUCOSE      POCT Blood Glucose.: 119 mg/dL (12 Jul 2019 12:20)  POCT Blood Glucose.: 153 mg/dL (12 Jul 2019 08:08)  POCT Blood Glucose.: 117 mg/dL (11 Jul 2019 22:31)  POCT Blood Glucose.: 126 mg/dL (11 Jul 2019 17:21)      PT/INR - ( 11 Jul 2019 11:05 )   PT: 14.8 sec;   INR: 1.29 ratio         PTT - ( 11 Jul 2019 11:05 )  PTT:31.9 sec    LOWER EXTREMITY PHYSICAL EXAM:  Vascular: DP/PT 2/4, B/L, CFT <3 seconds B/L, Temperature gradient increased to RLE, WNL to LLE.   Neuro: Epicritic sensation diminished  to the level of midfoot, B/L.  Skin: +2 pitting RLE, +1 pitting LLE, erythema focal to periwound with skin slough likely postinflammatory, serous drainage noted from ulceration  Wound #1: right foot plantar mid-foot surgical wound to sbuQ with granular wound base, no cellulitis, no malodor, no pus. Sub 3rd toe maceration noted, no signs of infection.       RADIOLOGY & ADDITIONAL STUDIES:

## 2019-07-13 DIAGNOSIS — E11.9 TYPE 2 DIABETES MELLITUS WITHOUT COMPLICATIONS: ICD-10-CM

## 2019-07-13 LAB
ANION GAP SERPL CALC-SCNC: 12 MMOL/L — SIGNIFICANT CHANGE UP (ref 5–17)
BUN SERPL-MCNC: 20 MG/DL — SIGNIFICANT CHANGE UP (ref 7–23)
CALCIUM SERPL-MCNC: 9.3 MG/DL — SIGNIFICANT CHANGE UP (ref 8.4–10.5)
CHLORIDE SERPL-SCNC: 102 MMOL/L — SIGNIFICANT CHANGE UP (ref 96–108)
CO2 SERPL-SCNC: 26 MMOL/L — SIGNIFICANT CHANGE UP (ref 22–31)
CREAT SERPL-MCNC: 1.89 MG/DL — HIGH (ref 0.5–1.3)
CULTURE RESULTS: SIGNIFICANT CHANGE UP
CULTURE RESULTS: SIGNIFICANT CHANGE UP
GLUCOSE BLDC GLUCOMTR-MCNC: 170 MG/DL — HIGH (ref 70–99)
GLUCOSE BLDC GLUCOMTR-MCNC: 187 MG/DL — HIGH (ref 70–99)
GLUCOSE BLDC GLUCOMTR-MCNC: 263 MG/DL — HIGH (ref 70–99)
GLUCOSE BLDC GLUCOMTR-MCNC: 306 MG/DL — HIGH (ref 70–99)
GLUCOSE SERPL-MCNC: 185 MG/DL — HIGH (ref 70–99)
HCT VFR BLD CALC: 28.9 % — LOW (ref 39–50)
HGB BLD-MCNC: 9.2 G/DL — LOW (ref 13–17)
MAGNESIUM SERPL-MCNC: 1.6 MG/DL — SIGNIFICANT CHANGE UP (ref 1.6–2.6)
MAGNESIUM SERPL-MCNC: 2.1 MG/DL — SIGNIFICANT CHANGE UP (ref 1.6–2.6)
MCHC RBC-ENTMCNC: 28.7 PG — SIGNIFICANT CHANGE UP (ref 27–34)
MCHC RBC-ENTMCNC: 31.8 GM/DL — LOW (ref 32–36)
MCV RBC AUTO: 90 FL — SIGNIFICANT CHANGE UP (ref 80–100)
ORGANISM # SPEC MICROSCOPIC CNT: SIGNIFICANT CHANGE UP
ORGANISM # SPEC MICROSCOPIC CNT: SIGNIFICANT CHANGE UP
PHOSPHATE SERPL-MCNC: 2.7 MG/DL — SIGNIFICANT CHANGE UP (ref 2.5–4.5)
PHOSPHATE SERPL-MCNC: 3 MG/DL — SIGNIFICANT CHANGE UP (ref 2.5–4.5)
PLATELET # BLD AUTO: 349 K/UL — SIGNIFICANT CHANGE UP (ref 150–400)
POTASSIUM SERPL-MCNC: 3.7 MMOL/L — SIGNIFICANT CHANGE UP (ref 3.5–5.3)
POTASSIUM SERPL-SCNC: 3.7 MMOL/L — SIGNIFICANT CHANGE UP (ref 3.5–5.3)
RBC # BLD: 3.21 M/UL — LOW (ref 4.2–5.8)
RBC # FLD: 14.2 % — SIGNIFICANT CHANGE UP (ref 10.3–14.5)
SODIUM SERPL-SCNC: 140 MMOL/L — SIGNIFICANT CHANGE UP (ref 135–145)
SPECIMEN SOURCE: SIGNIFICANT CHANGE UP
SPECIMEN SOURCE: SIGNIFICANT CHANGE UP
WBC # BLD: 6.03 K/UL — SIGNIFICANT CHANGE UP (ref 3.8–10.5)
WBC # FLD AUTO: 6.03 K/UL — SIGNIFICANT CHANGE UP (ref 3.8–10.5)

## 2019-07-13 PROCEDURE — 73630 X-RAY EXAM OF FOOT: CPT | Mod: 26,RT

## 2019-07-13 RX ORDER — INSULIN LISPRO 100/ML
14 VIAL (ML) SUBCUTANEOUS
Refills: 0 | Status: DISCONTINUED | OUTPATIENT
Start: 2019-07-13 | End: 2019-07-14

## 2019-07-13 RX ORDER — MAGNESIUM SULFATE 500 MG/ML
2 VIAL (ML) INJECTION ONCE
Refills: 0 | Status: COMPLETED | OUTPATIENT
Start: 2019-07-13 | End: 2019-07-13

## 2019-07-13 RX ORDER — INSULIN GLARGINE 100 [IU]/ML
28 INJECTION, SOLUTION SUBCUTANEOUS AT BEDTIME
Refills: 0 | Status: DISCONTINUED | OUTPATIENT
Start: 2019-07-13 | End: 2019-07-14

## 2019-07-13 RX ORDER — POTASSIUM CHLORIDE 20 MEQ
20 PACKET (EA) ORAL ONCE
Refills: 0 | Status: COMPLETED | OUTPATIENT
Start: 2019-07-13 | End: 2019-07-13

## 2019-07-13 RX ADMIN — Medication 325 MILLIGRAM(S): at 12:22

## 2019-07-13 RX ADMIN — Medication 20 MILLIEQUIVALENT(S): at 00:53

## 2019-07-13 RX ADMIN — HEPARIN SODIUM 5000 UNIT(S): 5000 INJECTION INTRAVENOUS; SUBCUTANEOUS at 22:22

## 2019-07-13 RX ADMIN — Medication 60 MILLIGRAM(S): at 15:08

## 2019-07-13 RX ADMIN — Medication 2.5 MILLIGRAM(S): at 07:51

## 2019-07-13 RX ADMIN — PIPERACILLIN AND TAZOBACTAM 25 GRAM(S): 4; .5 INJECTION, POWDER, LYOPHILIZED, FOR SOLUTION INTRAVENOUS at 07:53

## 2019-07-13 RX ADMIN — Medication 60 MILLIGRAM(S): at 07:52

## 2019-07-13 RX ADMIN — Medication 1: at 08:30

## 2019-07-13 RX ADMIN — CHLORHEXIDINE GLUCONATE 1 APPLICATION(S): 213 SOLUTION TOPICAL at 12:20

## 2019-07-13 RX ADMIN — Medication 2: at 22:25

## 2019-07-13 RX ADMIN — POLYETHYLENE GLYCOL 3350 17 GRAM(S): 17 POWDER, FOR SOLUTION ORAL at 07:53

## 2019-07-13 RX ADMIN — PIPERACILLIN AND TAZOBACTAM 25 GRAM(S): 4; .5 INJECTION, POWDER, LYOPHILIZED, FOR SOLUTION INTRAVENOUS at 22:21

## 2019-07-13 RX ADMIN — HEPARIN SODIUM 5000 UNIT(S): 5000 INJECTION INTRAVENOUS; SUBCUTANEOUS at 07:52

## 2019-07-13 RX ADMIN — Medication 100 MILLIGRAM(S): at 22:22

## 2019-07-13 RX ADMIN — Medication 10 UNIT(S): at 08:30

## 2019-07-13 RX ADMIN — ERYTHROPOIETIN 10000 UNIT(S): 10000 INJECTION, SOLUTION INTRAVENOUS; SUBCUTANEOUS at 12:22

## 2019-07-13 RX ADMIN — Medication 3: at 17:18

## 2019-07-13 RX ADMIN — INSULIN GLARGINE 28 UNIT(S): 100 INJECTION, SOLUTION SUBCUTANEOUS at 22:33

## 2019-07-13 RX ADMIN — PRIMIDONE 25 MILLIGRAM(S): 250 TABLET ORAL at 22:23

## 2019-07-13 RX ADMIN — PIPERACILLIN AND TAZOBACTAM 25 GRAM(S): 4; .5 INJECTION, POWDER, LYOPHILIZED, FOR SOLUTION INTRAVENOUS at 15:09

## 2019-07-13 RX ADMIN — Medication 50 GRAM(S): at 00:54

## 2019-07-13 RX ADMIN — Medication 100 MILLIGRAM(S): at 13:09

## 2019-07-13 RX ADMIN — Medication 100 MILLIGRAM(S): at 07:53

## 2019-07-13 RX ADMIN — Medication 1: at 12:18

## 2019-07-13 RX ADMIN — AMLODIPINE BESYLATE 10 MILLIGRAM(S): 2.5 TABLET ORAL at 07:51

## 2019-07-13 RX ADMIN — HEPARIN SODIUM 5000 UNIT(S): 5000 INJECTION INTRAVENOUS; SUBCUTANEOUS at 14:08

## 2019-07-13 NOTE — PROGRESS NOTE ADULT - SUBJECTIVE AND OBJECTIVE BOX
Patient is a 59y old  Male who presents with a chief complaint of SOB (2019 14:18)      SUBJECTIVE / OVERNIGHT EVENTS:  No chest pain. No shortness of breath. No complaints. No events overnight.     MEDICATIONS  (STANDING):  amLODIPine   Tablet 10 milliGRAM(s) Oral daily  chlorhexidine 2% Cloths 1 Application(s) Topical daily  collagenase Ointment 1 Application(s) Topical <User Schedule>  dextrose 5%. 1000 milliLiter(s) (50 mL/Hr) IV Continuous <Continuous>  dextrose 50% Injectable 12.5 Gram(s) IV Push once  dextrose 50% Injectable 25 Gram(s) IV Push once  dextrose 50% Injectable 25 Gram(s) IV Push once  dronabinol 2.5 milliGRAM(s) Oral two times a day  epoetin braden Injectable 86797 Unit(s) SubCutaneous <User Schedule>  ferrous    sulfate 325 milliGRAM(s) Oral daily  furosemide   Injectable 60 milliGRAM(s) IV Push two times a day  heparin  Injectable 5000 Unit(s) SubCutaneous every 8 hours  hydrALAZINE 100 milliGRAM(s) Oral three times a day  insulin glargine Injectable (LANTUS) 20 Unit(s) SubCutaneous at bedtime  insulin lispro (HumaLOG) corrective regimen sliding scale   SubCutaneous three times a day before meals  insulin lispro (HumaLOG) corrective regimen sliding scale   SubCutaneous at bedtime  insulin lispro Injectable (HumaLOG) 10 Unit(s) SubCutaneous before breakfast  piperacillin/tazobactam IVPB.. 3.375 Gram(s) IV Intermittent every 8 hours  polyethylene glycol 3350 17 Gram(s) Oral every 12 hours  primidone 25 milliGRAM(s) Oral at bedtime  senna 2 Tablet(s) Oral at bedtime    MEDICATIONS  (PRN):  acetaminophen   Tablet .. 650 milliGRAM(s) Oral every 6 hours PRN Mild Pain (1 - 3)  dextrose 40% Gel 15 Gram(s) Oral once PRN Blood Glucose LESS THAN 70 milliGRAM(s)/deciliter  glucagon  Injectable 1 milliGRAM(s) IntraMuscular once PRN Glucose LESS THAN 70 milligrams/deciliter  oxyCODONE    IR 5 milliGRAM(s) Oral every 4 hours PRN Moderate Pain (4 - 6)      Vital Signs Last 24 Hrs  T(C): 36.5 (2019 11:20), Max: 36.9 (2019 11:38)  T(F): 97.7 (2019 11:20), Max: 98.5 (2019 20:33)  HR: 93 (2019 11:20) (77 - 96)  BP: 159/83 (2019 11:20) (131/78 - 159/83)  BP(mean): --  RR: 17 (2019 11:20) (16 - 20)  SpO2: 94% (2019 11:20) (92% - 96%)  CAPILLARY BLOOD GLUCOSE      POCT Blood Glucose.: 170 mg/dL (2019 11:24)  POCT Blood Glucose.: 187 mg/dL (2019 07:50)  POCT Blood Glucose.: 227 mg/dL (2019 21:54)  POCT Blood Glucose.: 210 mg/dL (2019 21:41)  POCT Blood Glucose.: 190 mg/dL (2019 16:35)  POCT Blood Glucose.: 119 mg/dL (2019 12:20)    I&O's Summary    2019 07:01  -  2019 07:00  --------------------------------------------------------  IN: 1160 mL / OUT: 1175 mL / NET: -15 mL    2019 07:01  -  2019 11:28  --------------------------------------------------------  IN: 420 mL / OUT: 0 mL / NET: 420 mL        PHYSICAL EXAM:  GENERAL: NAD, well-developed  HEAD:  Atraumatic, Normocephalic  EYES: EOMI, PERRLA, conjunctiva and sclera clear  NECK: Supple, No JVD  CHEST/LUNG: Clear to auscultation bilaterally; No wheeze  HEART: Regular rate and rhythm; No murmurs, rubs, or gallops  ABDOMEN: Soft, Nontender, Nondistended; Bowel sounds present  EXTREMITIES:  2+ Peripheral Pulses, No clubbing, cyanosis, or edema, right foot dressing c/d/i  PSYCH: AAOx3  NEUROLOGY: non-focal  SKIN: No rashes or lesions    LABS:                        8.8    6.42  )-----------( 353      ( 2019 09:17 )             27.6     07-13    140  |  102  |  20  ----------------------------<  185<H>  3.7   |  26  |  1.89<H>    Ca    9.3      2019 07:22  Phos  3.0     -  Mg     2.1     -            Urinalysis Basic - ( 2019 16:24 )    Color: Light Yellow / Appearance: Clear / S.011 / pH: x  Gluc: x / Ketone: Trace  / Bili: Negative / Urobili: Negative   Blood: x / Protein: 30 mg/dL / Nitrite: Negative   Leuk Esterase: Negative / RBC: 9 /hpf / WBC 1 /HPF   Sq Epi: x / Non Sq Epi: 0 /hpf / Bacteria: Negative        RADIOLOGY & ADDITIONAL TESTS:    Imaging Personally Reviewed:    Consultant(s) Notes Reviewed:      Care Discussed with Consultants/Other Providers:

## 2019-07-13 NOTE — PROGRESS NOTE ADULT - SUBJECTIVE AND OBJECTIVE BOX
Patient is a 59y old  Male who presents with a chief complaint of SOB (2019 11:28)       INTERVAL HPI/OVERNIGHT EVENTS:  Patient seen and evaluated at bedside.  Pt is resting comfortable in NAD. Denies N/V/F/C.  Pain rated at 0/10 Currently on Vera Flow Vac Tx which was started yesterday.    Allergies    No Known Allergies    Intolerances        Vital Signs Last 24 Hrs  T(C): 36.5 (2019 11:20), Max: 36.9 (2019 20:33)  T(F): 97.7 (2019 11:20), Max: 98.5 (2019 20:33)  HR: 93 (2019 11:20) (77 - 93)  BP: 159/83 (2019 11:20) (131/78 - 159/83)  BP(mean): --  RR: 17 (2019 11:20) (16 - 20)  SpO2: 94% (2019 11:20) (92% - 96%)    LABS:                        9.2    6.03  )-----------( 349      ( 2019 11:19 )             28.9     07-13    140  |  102  |  20  ----------------------------<  185<H>  3.7   |  26  |  1.89<H>    Ca    9.3      2019 07:22  Phos  3.0     07-13  Mg     2.1     07-13        Urinalysis Basic - ( 2019 16:24 )    Color: Light Yellow / Appearance: Clear / S.011 / pH: x  Gluc: x / Ketone: Trace  / Bili: Negative / Urobili: Negative   Blood: x / Protein: 30 mg/dL / Nitrite: Negative   Leuk Esterase: Negative / RBC: 9 /hpf / WBC 1 /HPF   Sq Epi: x / Non Sq Epi: 0 /hpf / Bacteria: Negative      CAPILLARY BLOOD GLUCOSE      POCT Blood Glucose.: 170 mg/dL (2019 11:24)  POCT Blood Glucose.: 187 mg/dL (2019 07:50)  POCT Blood Glucose.: 227 mg/dL (2019 21:54)  POCT Blood Glucose.: 210 mg/dL (2019 21:41)  POCT Blood Glucose.: 190 mg/dL (2019 16:35)      Lower Extremity Physical Exam:  Vascular: DP 2/4/ B/L PT 1/4 B/L, CFT intact toes x 10, Temp gradient N/L B/L feet  Neurology: Epicritic sensation Not intact B/L feet  Musculoskeletal/Ortho: Hammertoes 2-5 B/L  Skin: R 3rd to lateral aspect macerated fibrotic full-thickness ulcer 1/2 cm (d) probes deep to subcutaneous, no malodor, no active drainage, no pain upon palpation; right plantar full thickness wound upon removing vera flow noticed increase granulation tissue along the course of the wound with increased granulation tissue noted in the ball of the foot and the full-thickness ulcer measuring 19 cm long by 3 centimeters wide and in the ball of the foot 1.5 cm deep with no active drainage, no abscess formation,, and no hematoma

## 2019-07-13 NOTE — CONSULT NOTE ADULT - PROBLEM SELECTOR RECOMMENDATION 4
On medications,  no chest pain, stable, monitored and followed up by cardiothoracic team/cardiology team

## 2019-07-13 NOTE — CONSULT NOTE ADULT - ASSESSMENT
Assessment  DMT2: 59y Male with DM T2 with hyperglycemia, was on insulin at nursing home, blood sugars running high, no hypoglycemic episode,  eating meals,  non compliant with low carb diet.  Foot wound: On wound care, meds, stable.  CAD: on medications, no chest pain, stable, monitored.  HTN: Controlled,  on antihypertensive medications.  CKD: Monitor labs/BMP,             Andrzej Zhu MD  Cell: [phone]  Office: [phone]

## 2019-07-13 NOTE — PROGRESS NOTE ADULT - SUBJECTIVE AND OBJECTIVE BOX
Subjective: Patient seen and examined. No new events except as noted.   resting comfortably   breathing stable     REVIEW OF SYSTEMS:    CONSTITUTIONAL: + weakness, fevers or chills  EYES/ENT: No visual changes;  No vertigo or throat pain   NECK: No pain or stiffness  RESPIRATORY: No cough, wheezing, hemoptysis; No shortness of breath  CARDIOVASCULAR: No chest pain or palpitations  GASTROINTESTINAL: No abdominal or epigastric pain. No nausea, vomiting, or hematemesis; No diarrhea or constipation. No melena or hematochezia.  GENITOURINARY: No dysuria, frequency or hematuria  NEUROLOGICAL: No numbness or weakness  SKIN: No itching, burning, rashes, or lesions   All other review of systems is negative unless indicated above.    MEDICATIONS:  MEDICATIONS  (STANDING):  amLODIPine   Tablet 10 milliGRAM(s) Oral daily  chlorhexidine 2% Cloths 1 Application(s) Topical daily  collagenase Ointment 1 Application(s) Topical <User Schedule>  dextrose 5%. 1000 milliLiter(s) (50 mL/Hr) IV Continuous <Continuous>  dextrose 50% Injectable 12.5 Gram(s) IV Push once  dextrose 50% Injectable 25 Gram(s) IV Push once  dextrose 50% Injectable 25 Gram(s) IV Push once  dronabinol 2.5 milliGRAM(s) Oral two times a day  epoetin braden Injectable 60887 Unit(s) SubCutaneous <User Schedule>  ferrous    sulfate 325 milliGRAM(s) Oral daily  furosemide   Injectable 60 milliGRAM(s) IV Push two times a day  heparin  Injectable 5000 Unit(s) SubCutaneous every 8 hours  hydrALAZINE 100 milliGRAM(s) Oral three times a day  insulin glargine Injectable (LANTUS) 28 Unit(s) SubCutaneous at bedtime  insulin lispro (HumaLOG) corrective regimen sliding scale   SubCutaneous three times a day before meals  insulin lispro (HumaLOG) corrective regimen sliding scale   SubCutaneous at bedtime  insulin lispro Injectable (HumaLOG) 14 Unit(s) SubCutaneous before breakfast  piperacillin/tazobactam IVPB.. 3.375 Gram(s) IV Intermittent every 8 hours  polyethylene glycol 3350 17 Gram(s) Oral every 12 hours  primidone 25 milliGRAM(s) Oral at bedtime  senna 2 Tablet(s) Oral at bedtime      PHYSICAL EXAM:  T(C): 36.5 (07-13-19 @ 20:48), Max: 36.7 (07-13-19 @ 00:14)  HR: 87 (07-13-19 @ 20:48) (77 - 93)  BP: 156/81 (07-13-19 @ 20:48) (131/78 - 159/83)  RR: 17 (07-13-19 @ 20:48) (17 - 20)  SpO2: 94% (07-13-19 @ 20:48) (93% - 96%)  Wt(kg): --  I&O's Summary    12 Jul 2019 07:01  -  13 Jul 2019 07:00  --------------------------------------------------------  IN: 1160 mL / OUT: 1175 mL / NET: -15 mL    13 Jul 2019 07:01  -  13 Jul 2019 22:35  --------------------------------------------------------  IN: 740 mL / OUT: 2200 mL / NET: -1460 mL          Appearance: NAd  HEENT:   Dry oral mucosa, PERRL, EOMI	  Lymphatic: No lymphadenopathy +JVP   Cardiovascular: Normal S1 S2, + JVD, No murmurs  Respiratory: Decreased bs	  Psychiatry: A & O x 3, Mood & affect appropriate  Gastrointestinal:  Soft, Non-tender, + BS	  Skin: No rashes, No ecchymoses, No cyanosis	  Neurologic: Non-focal  Extremities:+ edema, R foot ulcer wrapped   Vascular: Peripheral pulses palpable 2+ bilaterally    LABS:    CARDIAC MARKERS:                                9.2    6.03  )-----------( 349      ( 13 Jul 2019 11:19 )             28.9     07-13    140  |  102  |  20  ----------------------------<  185<H>  3.7   |  26  |  1.89<H>    Ca    9.3      13 Jul 2019 07:22  Phos  3.0     07-13  Mg     2.1     07-13      proBNP:   Lipid Profile:   HgA1c:   TSH:     2          TELEMETRY: 	    ECG:  	  RADIOLOGY:   DIAGNOSTIC TESTING:  [ ] Echocardiogram:  [ ]  Catheterization:  [ ] Stress Test:    OTHER:

## 2019-07-13 NOTE — CONSULT NOTE ADULT - SUBJECTIVE AND OBJECTIVE BOX
HPI:  59M, hx DM, HTN presents from [facility] via EMS due to resp distress. Patient at [facility] due to right diabetic foot ulcer. Patient reported to be hypoxic on room air to 60s. Improved on 15L face mask top 100% O2 sat. Patient awake, alert, oriented. States he has been coughing for 2-3 weeks (dry cough) as well as weakness and shortness of breath for few days. NO fevers or chills, nausea or vomiting, headache, chest pain, abdominal pain, diarrhea or constipation. Patient reportedly received blood transfusion last night, unknown as to why. Reports BL leg swelling - chronic, no calf pain. No hx DVT or PE.      Patient denies hx of COPD/Emphysema. Recently hospitalized, 5/20-6/14, (11 Jul 2019 15:54)  Patient has history of diabetes, was on insulin at nursing home, seen by me recently, no recent hypoglycemic episodes, no polyuria polydipsia. Patient follows up with PCP for diabetes management.    PAST MEDICAL & SURGICAL HISTORY:  Hypertension  Insulin dependent diabetes mellitus  Venous insufficiency of both lower extremities: R &gt; L  HTN (hypertension)  BPH (benign prostatic hypertrophy)  Diabetes  History of cholecystectomy  S/P laparoscopic cholecystectomy  Status post incision and drainage: Rt groin abscess      FAMILY HISTORY:  Paternal family history of emphysema  Family history of diabetes mellitus (DM) (Grandparent)      Social History:    Outpatient Medications:    MEDICATIONS  (STANDING):  amLODIPine   Tablet 10 milliGRAM(s) Oral daily  chlorhexidine 2% Cloths 1 Application(s) Topical daily  collagenase Ointment 1 Application(s) Topical <User Schedule>  dextrose 5%. 1000 milliLiter(s) (50 mL/Hr) IV Continuous <Continuous>  dextrose 50% Injectable 12.5 Gram(s) IV Push once  dextrose 50% Injectable 25 Gram(s) IV Push once  dextrose 50% Injectable 25 Gram(s) IV Push once  dronabinol 2.5 milliGRAM(s) Oral two times a day  epoetin braden Injectable 28996 Unit(s) SubCutaneous <User Schedule>  ferrous    sulfate 325 milliGRAM(s) Oral daily  furosemide   Injectable 60 milliGRAM(s) IV Push two times a day  heparin  Injectable 5000 Unit(s) SubCutaneous every 8 hours  hydrALAZINE 100 milliGRAM(s) Oral three times a day  insulin glargine Injectable (LANTUS) 28 Unit(s) SubCutaneous at bedtime  insulin lispro (HumaLOG) corrective regimen sliding scale   SubCutaneous three times a day before meals  insulin lispro (HumaLOG) corrective regimen sliding scale   SubCutaneous at bedtime  insulin lispro Injectable (HumaLOG) 14 Unit(s) SubCutaneous before breakfast  piperacillin/tazobactam IVPB.. 3.375 Gram(s) IV Intermittent every 8 hours  polyethylene glycol 3350 17 Gram(s) Oral every 12 hours  primidone 25 milliGRAM(s) Oral at bedtime  senna 2 Tablet(s) Oral at bedtime    MEDICATIONS  (PRN):  acetaminophen   Tablet .. 650 milliGRAM(s) Oral every 6 hours PRN Mild Pain (1 - 3)  dextrose 40% Gel 15 Gram(s) Oral once PRN Blood Glucose LESS THAN 70 milliGRAM(s)/deciliter  glucagon  Injectable 1 milliGRAM(s) IntraMuscular once PRN Glucose LESS THAN 70 milligrams/deciliter  oxyCODONE    IR 5 milliGRAM(s) Oral every 4 hours PRN Moderate Pain (4 - 6)      Allergies    No Known Allergies    Intolerances      Review of Systems:  Constitutional: No fever, no chills  Eyes: No blurry vision  Neuro: No tremors  HEENT: No pain, no neck swelling  Cardiovascular: No chest pain, no palpitations  Respiratory: Has SOB, no cough  GI: No nausea, vomiting, abdominal pain  : No dysuria  Skin: no rash  MSK: Has leg swelling.  Psych: no depression  Endocrine: no polyuria, polydipsia    ALL OTHER SYSTEMS REVIEWED AND NEGATIVE    UNABLE TO OBTAIN    PHYSICAL EXAM:  VITALS: T(C): 36.5 (07-13-19 @ 20:48)  T(F): 97.7 (07-13-19 @ 20:48), Max: 98 (07-13-19 @ 00:14)  HR: 87 (07-13-19 @ 20:48) (77 - 93)  BP: 156/81 (07-13-19 @ 20:48) (131/78 - 159/83)  RR:  (17 - 20)  SpO2:  (93% - 96%)  Wt(kg): --  GENERAL: NAD, well-groomed, well-developed  EYES: No proptosis, no lid lag  HEENT:  Atraumatic, Normocephalic  THYROID: Normal size, no palpable nodules  RESPIRATORY: Clear to auscultation bilaterally; No rales, rhonchi, wheezing  CARDIOVASCULAR: Si S2, No murmurs;  GI: Soft, non distended, normal bowel sounds  SKIN: Dry, intact, No rashes or lesions  MUSCULOSKELETAL: Has BL lower extremity edema.  NEURO:  no tremor, sensation decreased in feet BL,    POCT Blood Glucose.: 263 mg/dL (07-13-19 @ 17:05)  POCT Blood Glucose.: 170 mg/dL (07-13-19 @ 11:24)  POCT Blood Glucose.: 187 mg/dL (07-13-19 @ 07:50)  POCT Blood Glucose.: 227 mg/dL (07-12-19 @ 21:54)  POCT Blood Glucose.: 210 mg/dL (07-12-19 @ 21:41)  POCT Blood Glucose.: 190 mg/dL (07-12-19 @ 16:35)  POCT Blood Glucose.: 119 mg/dL (07-12-19 @ 12:20)  POCT Blood Glucose.: 153 mg/dL (07-12-19 @ 08:08)  POCT Blood Glucose.: 117 mg/dL (07-11-19 @ 22:31)  POCT Blood Glucose.: 126 mg/dL (07-11-19 @ 17:21)                            9.2    6.03  )-----------( 349      ( 13 Jul 2019 11:19 )             28.9       07-13    140  |  102  |  20  ----------------------------<  185<H>  3.7   |  26  |  1.89<H>    EGFR if : 44<L>  EGFR if non : 38<L>    Ca    9.3      07-13  Mg     2.1     07-13  Phos  3.0     07-13    TPro  7.0  /  Alb  3.4  /  TBili  0.4  /  DBili  x   /  AST  11  /  ALT  9<L>  /  AlkPhos  72  07-11      Thyroid Function Tests:  07-12 @ 09:43 TSH 0.87 FreeT4 -- T3 -- Anti TPO -- Anti Thyroglobulin Ab -- TSI --      Hemoglobin A1C, Whole Blood: 11.8 % <H> [4.0 - 5.6] (05-21-19 @ 11:29)  Hemoglobin A1C, Whole Blood: 11.9 % <H> [4.0 - 5.6] (05-20-19 @ 20:01)          Radiology:

## 2019-07-13 NOTE — PROGRESS NOTE ADULT - ASSESSMENT
Dx: 1. R 3rd Toe w/ Full Thickness ulcer lateral aspect of toe (concerned for OM)        2. Full Thickness Plantar wound R foot s/p multiple Debridement & I & D improvement w/ Vera Flow Vac Tx        3. DM w/ Profound Neuropathy   Tx: 1. Exam       2. Took deep wound culture R third toe  ulcer-Results P       3. Ordered X rays R foot Evaluate OM R 3rd toe id Inconclusive recommend MRI w/ & w/o contrast to evaluate for OM       4. Removed Vera Flow Vac for now and cleaned R foot ulcers w/ NS & Antibacterial soap. Applied Medi Honey w/ Moistened sterile 4x4 gauze to the plantar wound & Medihoney w/ DSD to R 3rd toe ulcer       5. Continue IV ABX        6. Discussed w/ the patient possibility of R 3rd toe amputation        7. Will continue to follow

## 2019-07-13 NOTE — PROGRESS NOTE ADULT - ASSESSMENT
59M, hx DM, HTN presents from [facility] via EMS due to resp distress. Patient at [facility] due to right diabetic foot ulcer. Patient reported to be hypoxic on room air to 60s. Improved on 15L face mask top 100% O2 sat. Patient awake, alert, oriented. States he has been coughing for 2-3 weeks (dry cough) as well as weakness and shortness of breath for few days.    Acute on chronic diastolic chf  - iv lasix 60 bid  - strict Is and Os  - cardiology consult appreciated  - keep O2 sats above 92%    diabetic foot ulcer  - podiatry follow up  - for debridement when medically stable    essential tremors improved  - trial of primidone    diabetes  - fs qid  - hgb a1c  - endocrine consult    htn  - cw norvasc      DVT px

## 2019-07-14 LAB
ANION GAP SERPL CALC-SCNC: 12 MMOL/L — SIGNIFICANT CHANGE UP (ref 5–17)
BUN SERPL-MCNC: 21 MG/DL — SIGNIFICANT CHANGE UP (ref 7–23)
CALCIUM SERPL-MCNC: 8.7 MG/DL — SIGNIFICANT CHANGE UP (ref 8.4–10.5)
CHLORIDE SERPL-SCNC: 98 MMOL/L — SIGNIFICANT CHANGE UP (ref 96–108)
CO2 SERPL-SCNC: 27 MMOL/L — SIGNIFICANT CHANGE UP (ref 22–31)
CREAT SERPL-MCNC: 1.96 MG/DL — HIGH (ref 0.5–1.3)
GLUCOSE BLDC GLUCOMTR-MCNC: 161 MG/DL — HIGH (ref 70–99)
GLUCOSE BLDC GLUCOMTR-MCNC: 184 MG/DL — HIGH (ref 70–99)
GLUCOSE BLDC GLUCOMTR-MCNC: 185 MG/DL — HIGH (ref 70–99)
GLUCOSE BLDC GLUCOMTR-MCNC: 217 MG/DL — HIGH (ref 70–99)
GLUCOSE SERPL-MCNC: 231 MG/DL — HIGH (ref 70–99)
HCT VFR BLD CALC: 28.1 % — LOW (ref 39–50)
HGB BLD-MCNC: 9 G/DL — LOW (ref 13–17)
MAGNESIUM SERPL-MCNC: 1.6 MG/DL — SIGNIFICANT CHANGE UP (ref 1.6–2.6)
MCHC RBC-ENTMCNC: 28.5 PG — SIGNIFICANT CHANGE UP (ref 27–34)
MCHC RBC-ENTMCNC: 32 GM/DL — SIGNIFICANT CHANGE UP (ref 32–36)
MCV RBC AUTO: 88.9 FL — SIGNIFICANT CHANGE UP (ref 80–100)
PHOSPHATE SERPL-MCNC: 2.8 MG/DL — SIGNIFICANT CHANGE UP (ref 2.5–4.5)
PLATELET # BLD AUTO: 350 K/UL — SIGNIFICANT CHANGE UP (ref 150–400)
POTASSIUM SERPL-MCNC: 3.5 MMOL/L — SIGNIFICANT CHANGE UP (ref 3.5–5.3)
POTASSIUM SERPL-SCNC: 3.5 MMOL/L — SIGNIFICANT CHANGE UP (ref 3.5–5.3)
RBC # BLD: 3.16 M/UL — LOW (ref 4.2–5.8)
RBC # FLD: 13.6 % — SIGNIFICANT CHANGE UP (ref 10.3–14.5)
SODIUM SERPL-SCNC: 137 MMOL/L — SIGNIFICANT CHANGE UP (ref 135–145)
WBC # BLD: 5.54 K/UL — SIGNIFICANT CHANGE UP (ref 3.8–10.5)
WBC # FLD AUTO: 5.54 K/UL — SIGNIFICANT CHANGE UP (ref 3.8–10.5)

## 2019-07-14 RX ORDER — INSULIN GLARGINE 100 [IU]/ML
34 INJECTION, SOLUTION SUBCUTANEOUS AT BEDTIME
Refills: 0 | Status: DISCONTINUED | OUTPATIENT
Start: 2019-07-14 | End: 2019-07-16

## 2019-07-14 RX ORDER — INSULIN LISPRO 100/ML
14 VIAL (ML) SUBCUTANEOUS
Refills: 0 | Status: DISCONTINUED | OUTPATIENT
Start: 2019-07-14 | End: 2019-07-16

## 2019-07-14 RX ADMIN — Medication 325 MILLIGRAM(S): at 13:07

## 2019-07-14 RX ADMIN — Medication 2.5 MILLIGRAM(S): at 06:04

## 2019-07-14 RX ADMIN — PIPERACILLIN AND TAZOBACTAM 25 GRAM(S): 4; .5 INJECTION, POWDER, LYOPHILIZED, FOR SOLUTION INTRAVENOUS at 21:51

## 2019-07-14 RX ADMIN — Medication 1: at 13:05

## 2019-07-14 RX ADMIN — HEPARIN SODIUM 5000 UNIT(S): 5000 INJECTION INTRAVENOUS; SUBCUTANEOUS at 13:07

## 2019-07-14 RX ADMIN — CHLORHEXIDINE GLUCONATE 1 APPLICATION(S): 213 SOLUTION TOPICAL at 11:43

## 2019-07-14 RX ADMIN — HEPARIN SODIUM 5000 UNIT(S): 5000 INJECTION INTRAVENOUS; SUBCUTANEOUS at 06:04

## 2019-07-14 RX ADMIN — Medication 60 MILLIGRAM(S): at 13:06

## 2019-07-14 RX ADMIN — AMLODIPINE BESYLATE 10 MILLIGRAM(S): 2.5 TABLET ORAL at 06:04

## 2019-07-14 RX ADMIN — HEPARIN SODIUM 5000 UNIT(S): 5000 INJECTION INTRAVENOUS; SUBCUTANEOUS at 21:51

## 2019-07-14 RX ADMIN — PRIMIDONE 25 MILLIGRAM(S): 250 TABLET ORAL at 21:51

## 2019-07-14 RX ADMIN — Medication 100 MILLIGRAM(S): at 13:07

## 2019-07-14 RX ADMIN — Medication 2: at 08:13

## 2019-07-14 RX ADMIN — Medication 100 MILLIGRAM(S): at 06:04

## 2019-07-14 RX ADMIN — PIPERACILLIN AND TAZOBACTAM 25 GRAM(S): 4; .5 INJECTION, POWDER, LYOPHILIZED, FOR SOLUTION INTRAVENOUS at 06:04

## 2019-07-14 RX ADMIN — Medication 60 MILLIGRAM(S): at 06:04

## 2019-07-14 RX ADMIN — Medication 14 UNIT(S): at 08:13

## 2019-07-14 RX ADMIN — Medication 1: at 17:10

## 2019-07-14 RX ADMIN — Medication 100 MILLIGRAM(S): at 21:51

## 2019-07-14 RX ADMIN — INSULIN GLARGINE 34 UNIT(S): 100 INJECTION, SOLUTION SUBCUTANEOUS at 21:51

## 2019-07-14 RX ADMIN — PIPERACILLIN AND TAZOBACTAM 25 GRAM(S): 4; .5 INJECTION, POWDER, LYOPHILIZED, FOR SOLUTION INTRAVENOUS at 16:24

## 2019-07-14 NOTE — PROGRESS NOTE ADULT - ASSESSMENT
DX: 1. DM w/ Neuropathy w/ large Full thickness R plantar foot wound post I & D from Necrotizing Fasciitis (improving) & also w/ R 3rd toe Full Thickness ulcer concerned for OM  TX: 1. seen & Evaluated w/ MD today        2. Used Sterile Sx scrub brushes w/ Anti bacterial soap to clean R foot wound. Flushed w/ NS & applied Medi Honey along w/ Promogran matrix w/ NS wet to dry to the plantar R foot wound & Medihoney w/ Promogran w/ DSD R 3rd toe        3. Suspicion for OM R foot x ray so ordered MRI w/ w/o contrast to further evaluate        4. Continue IV ABX        5. Will Follow

## 2019-07-14 NOTE — PROGRESS NOTE ADULT - SUBJECTIVE AND OBJECTIVE BOX
Chief complaint  Patient is a 59y old  Male who presents with a chief complaint of SOB (14 Jul 2019 08:45)   Review of systems  Patient in bed, looks comfortable, no fever, no hypoglycemia.    Labs and Fingersticks  CAPILLARY BLOOD GLUCOSE      POCT Blood Glucose.: 217 mg/dL (14 Jul 2019 07:55)  POCT Blood Glucose.: 306 mg/dL (13 Jul 2019 21:39)  POCT Blood Glucose.: 263 mg/dL (13 Jul 2019 17:05)  POCT Blood Glucose.: 170 mg/dL (13 Jul 2019 11:24)      Anion Gap, Serum: 12 (07-14 @ 06:51)  Anion Gap, Serum: 12 (07-13 @ 07:22)  Anion Gap, Serum: 16 (07-12 @ 18:46)      Calcium, Total Serum: 8.7 (07-14 @ 06:51)  Calcium, Total Serum: 9.3 (07-13 @ 07:22)  Calcium, Total Serum: 8.6 (07-12 @ 18:46)          07-14    137  |  98  |  21  ----------------------------<  231<H>  3.5   |  27  |  1.96<H>    Ca    8.7      14 Jul 2019 06:51  Phos  2.8     07-14  Mg     1.6     07-14                          9.0    5.54  )-----------( 350      ( 14 Jul 2019 09:57 )             28.1     Medications  MEDICATIONS  (STANDING):  amLODIPine   Tablet 10 milliGRAM(s) Oral daily  chlorhexidine 2% Cloths 1 Application(s) Topical daily  collagenase Ointment 1 Application(s) Topical <User Schedule>  dextrose 5%. 1000 milliLiter(s) (50 mL/Hr) IV Continuous <Continuous>  dextrose 50% Injectable 12.5 Gram(s) IV Push once  dextrose 50% Injectable 25 Gram(s) IV Push once  dextrose 50% Injectable 25 Gram(s) IV Push once  dronabinol 2.5 milliGRAM(s) Oral two times a day  epoetin braden Injectable 90596 Unit(s) SubCutaneous <User Schedule>  ferrous    sulfate 325 milliGRAM(s) Oral daily  furosemide   Injectable 60 milliGRAM(s) IV Push two times a day  heparin  Injectable 5000 Unit(s) SubCutaneous every 8 hours  hydrALAZINE 100 milliGRAM(s) Oral three times a day  insulin glargine Injectable (LANTUS) 34 Unit(s) SubCutaneous at bedtime  insulin lispro (HumaLOG) corrective regimen sliding scale   SubCutaneous three times a day before meals  insulin lispro (HumaLOG) corrective regimen sliding scale   SubCutaneous at bedtime  insulin lispro Injectable (HumaLOG) 14 Unit(s) SubCutaneous before breakfast  piperacillin/tazobactam IVPB.. 3.375 Gram(s) IV Intermittent every 8 hours  polyethylene glycol 3350 17 Gram(s) Oral every 12 hours  primidone 25 milliGRAM(s) Oral at bedtime  senna 2 Tablet(s) Oral at bedtime      Physical Exam  General: Patient comfortable in bed  Vital Signs Last 12 Hrs  T(F): 98.2 (07-14-19 @ 04:19), Max: 98.2 (07-14-19 @ 04:19)  HR: 84 (07-14-19 @ 06:15) (84 - 84)  BP: 142/80 (07-14-19 @ 04:19) (142/80 - 142/80)  BP(mean): --  RR: 17 (07-14-19 @ 04:19) (17 - 17)  SpO2: 95% (07-14-19 @ 10:49) (95% - 95%)  Neck: No palpable thyroid nodules.  CVS: S1S2, No murmurs  Respiratory: No wheezing, no crepitations  GI: Abdomen soft, bowel sounds positive  Musculoskeletal:  edema lower extremities.   Skin: No skin rashes, no ecchymosis    Diagnostics

## 2019-07-14 NOTE — PROGRESS NOTE ADULT - ASSESSMENT
59M, hx DM, HTN presents from [facility] via EMS due to resp distress. Patient at [facility] due to right diabetic foot ulcer. Patient reported to be hypoxic on room air to 60s. Improved on 15L face mask top 100% O2 sat. Patient awake, alert, oriented. States he has been coughing for 2-3 weeks (dry cough) as well as weakness and shortness of breath for few days.    Acute on chronic diastolic chf  - iv lasix 60 bid  - strict Is and Os  - cardiology consult appreciated  - keep O2 sats above 92%    diabetic foot ulcer  - podiatry follow up  - MRI of right fooe to r/o osteo of third toe  - pt is cleared by cardiology for debridement    essential tremors improved  - trial of primidone    CKD 4  - monitor cre  - avoid nephrotoxins    diabetes  - fs qid  - hgb a1c  - endocrine consult    htn  - cw norvasc    DVT px

## 2019-07-14 NOTE — PROGRESS NOTE ADULT - SUBJECTIVE AND OBJECTIVE BOX
Patient is a 59y old  Male who presents with a chief complaint of SOB (14 Jul 2019 11:06)      SUBJECTIVE / OVERNIGHT EVENTS:  no complaints.  i spoke with podiatry attg at length at bedside.  tremors have improved.    MEDICATIONS  (STANDING):  amLODIPine   Tablet 10 milliGRAM(s) Oral daily  chlorhexidine 2% Cloths 1 Application(s) Topical daily  collagenase Ointment 1 Application(s) Topical <User Schedule>  dextrose 5%. 1000 milliLiter(s) (50 mL/Hr) IV Continuous <Continuous>  dextrose 50% Injectable 12.5 Gram(s) IV Push once  dextrose 50% Injectable 25 Gram(s) IV Push once  dextrose 50% Injectable 25 Gram(s) IV Push once  dronabinol 2.5 milliGRAM(s) Oral two times a day  epoetin braden Injectable 20135 Unit(s) SubCutaneous <User Schedule>  ferrous    sulfate 325 milliGRAM(s) Oral daily  furosemide   Injectable 60 milliGRAM(s) IV Push two times a day  heparin  Injectable 5000 Unit(s) SubCutaneous every 8 hours  hydrALAZINE 100 milliGRAM(s) Oral three times a day  insulin glargine Injectable (LANTUS) 34 Unit(s) SubCutaneous at bedtime  insulin lispro (HumaLOG) corrective regimen sliding scale   SubCutaneous three times a day before meals  insulin lispro (HumaLOG) corrective regimen sliding scale   SubCutaneous at bedtime  insulin lispro Injectable (HumaLOG) 14 Unit(s) SubCutaneous before breakfast  piperacillin/tazobactam IVPB.. 3.375 Gram(s) IV Intermittent every 8 hours  polyethylene glycol 3350 17 Gram(s) Oral every 12 hours  primidone 25 milliGRAM(s) Oral at bedtime  senna 2 Tablet(s) Oral at bedtime    MEDICATIONS  (PRN):  acetaminophen   Tablet .. 650 milliGRAM(s) Oral every 6 hours PRN Mild Pain (1 - 3)  dextrose 40% Gel 15 Gram(s) Oral once PRN Blood Glucose LESS THAN 70 milliGRAM(s)/deciliter  glucagon  Injectable 1 milliGRAM(s) IntraMuscular once PRN Glucose LESS THAN 70 milligrams/deciliter  oxyCODONE    IR 5 milliGRAM(s) Oral every 4 hours PRN Moderate Pain (4 - 6)      Vital Signs Last 24 Hrs  T(C): 36.8 (14 Jul 2019 04:19), Max: 36.8 (14 Jul 2019 04:19)  T(F): 98.2 (14 Jul 2019 04:19), Max: 98.2 (14 Jul 2019 04:19)  HR: 84 (14 Jul 2019 06:15) (84 - 88)  BP: 142/80 (14 Jul 2019 04:19) (142/80 - 156/81)  BP(mean): --  RR: 17 (14 Jul 2019 04:19) (17 - 17)  SpO2: 95% (14 Jul 2019 10:49) (94% - 99%)  CAPILLARY BLOOD GLUCOSE      POCT Blood Glucose.: 217 mg/dL (14 Jul 2019 07:55)  POCT Blood Glucose.: 306 mg/dL (13 Jul 2019 21:39)  POCT Blood Glucose.: 263 mg/dL (13 Jul 2019 17:05)    I&O's Summary    13 Jul 2019 07:01  -  14 Jul 2019 07:00  --------------------------------------------------------  IN: 1180 mL / OUT: 3000 mL / NET: -1820 mL        PHYSICAL EXAM:  GENERAL: NAD, well-developed  HEAD:  Atraumatic, Normocephalic  EYES: EOMI, PERRLA, conjunctiva and sclera clear  NECK: Supple, No JVD  CHEST/LUNG: Clear to auscultation bilaterally; No wheeze  HEART: Regular rate and rhythm; No murmurs, rubs, or gallops  ABDOMEN: Soft, Nontender, Nondistended; Bowel sounds present  EXTREMITIES:  2+ Peripheral Pulses, No clubbing, cyanosis, or edema  PSYCH: AAOx3  NEUROLOGY: non-focal  SKIN: No rashes or lesions    LABS:                        9.0    5.54  )-----------( 350      ( 14 Jul 2019 09:57 )             28.1     07-14    137  |  98  |  21  ----------------------------<  231<H>  3.5   |  27  |  1.96<H>    Ca    8.7      14 Jul 2019 06:51  Phos  2.8     07-14  Mg     1.6     07-14                RADIOLOGY & ADDITIONAL TESTS:    Imaging Personally Reviewed:    Consultant(s) Notes Reviewed:      Care Discussed with Consultants/Other Providers:

## 2019-07-14 NOTE — PROGRESS NOTE ADULT - SUBJECTIVE AND OBJECTIVE BOX
Patient is a 59y old  Male who presents with a chief complaint of SOB (14 Jul 2019 11:33)       INTERVAL HPI/OVERNIGHT EVENTS:  Patient seen and evaluated at bedside.  Pt is resting comfortable in NAD. Denies N/V/F/C.  Pain rated at X 0/10  F/U for R 3rd toe ulcer & R plantar foot wound post I & D. Pt seen w/ MD present.  Allergies    No Known Allergies    Intolerances        Vital Signs Last 24 Hrs  T(C): 36.8 (14 Jul 2019 04:19), Max: 36.8 (14 Jul 2019 04:19)  T(F): 98.2 (14 Jul 2019 04:19), Max: 98.2 (14 Jul 2019 04:19)  HR: 84 (14 Jul 2019 06:15) (84 - 88)  BP: 142/80 (14 Jul 2019 04:19) (142/80 - 156/81)  BP(mean): --  RR: 17 (14 Jul 2019 04:19) (17 - 17)  SpO2: 95% (14 Jul 2019 10:49) (94% - 99%)    LABS:                        9.0    5.54  )-----------( 350      ( 14 Jul 2019 09:57 )             28.1     07-14    137  |  98  |  21  ----------------------------<  231<H>  3.5   |  27  |  1.96<H>    Ca    8.7      14 Jul 2019 06:51  Phos  2.8     07-14  Mg     1.6     07-14          CAPILLARY BLOOD GLUCOSE      POCT Blood Glucose.: 217 mg/dL (14 Jul 2019 07:55)  POCT Blood Glucose.: 306 mg/dL (13 Jul 2019 21:39)  POCT Blood Glucose.: 263 mg/dL (13 Jul 2019 17:05)      Lower Extremity Physical Exam:  Vascular: DP/PT 2/4 B/L PT 1/4 B/L, CFT intact x 10, Temp gradient normal temp B/L  Neurology: Epicritic sensation not intact B/L feet  Musculoskeletal/Ortho: pes planus B/L feet Hammertoes 2-5 B/L  Skin: R 3rd toe plantar lateral aspect 2 cm x 1 cm Full Thickness ulcer yellow fibrinous tissue noted deep to SQ w/o abscess no odor no cellulitis; R Plantar foot 15 cm (L) x [5 1/2 cm (w) midfoot region] x 1.0 deep ball of foot w/ beefy granular tissue and mixed fibro granular tissue @ the ball of the foot void of abscess no malodor    RADIOLOGY & ADDITIONAL TESTS:  < from: Xray Foot AP + Lateral + Oblique, Right (07.13.19 @ 20:32) >    EXAM:  FOOT COMPLETE RIGHT (MIN 3 VIEW)                            PROCEDURE DATE:  07/13/2019            INTERPRETATION:  EXAMINATION: 3 views of the right foot    CLINICAL INFORMATION: Right foot third toe wound to the periosteum,   evaluate for osteomyelitis.    COMPARISON: MRI right foot 5/30/2019, right foot radiographs 5/20/2019.    IMPRESSION:   Overall, findings suspicious for osteomyelitis. Correlation with MRI can   be performed for confirmation and further characterization.  Lucencies in the distal aspect of the third metatarsal consistent with   cortical erosions. Lucencies in the third toe proximal phalanx are   nonspecific, question osteomyelitis. There may be nondisplaced fracture   of the third toe proximal phalanx. Nonspecific lucency in the third toe   middle phalanx, possible erosive change. Likely cortical erosions in the   second and fourth metatarsal heads. Possible lucencies in the second toe   proximal phalanx. Soft tissue defect at the plantar aspect of the mid   foot/hindfoot. Pes planus. Diffuse soft tissue swelling of the foot.                    DRE JACK M.D., ATTENDING RADIOLOGIST  This document has been electronically signed. Jul 14 2019  9:32AM        < end of copied text >

## 2019-07-14 NOTE — PROGRESS NOTE ADULT - SUBJECTIVE AND OBJECTIVE BOX
Subjective: Patient seen and examined. No new events except as noted.   feels ok   breathing much improved       REVIEW OF SYSTEMS:    CONSTITUTIONAL: + weakness, fevers or chills  EYES/ENT: No visual changes;  No vertigo or throat pain   NECK: No pain or stiffness  RESPIRATORY: No cough, wheezing, hemoptysis; No shortness of breath  CARDIOVASCULAR: No chest pain or palpitations  GASTROINTESTINAL: No abdominal or epigastric pain. No nausea, vomiting, or hematemesis; No diarrhea or constipation. No melena or hematochezia.  GENITOURINARY: No dysuria, frequency or hematuria  NEUROLOGICAL: No numbness or weakness  SKIN: No itching, burning, rashes, or lesions   All other review of systems is negative unless indicated above.    MEDICATIONS:  MEDICATIONS  (STANDING):  amLODIPine   Tablet 10 milliGRAM(s) Oral daily  chlorhexidine 2% Cloths 1 Application(s) Topical daily  collagenase Ointment 1 Application(s) Topical <User Schedule>  dextrose 5%. 1000 milliLiter(s) (50 mL/Hr) IV Continuous <Continuous>  dextrose 50% Injectable 12.5 Gram(s) IV Push once  dextrose 50% Injectable 25 Gram(s) IV Push once  dextrose 50% Injectable 25 Gram(s) IV Push once  dronabinol 2.5 milliGRAM(s) Oral two times a day  epoetin braden Injectable 01789 Unit(s) SubCutaneous <User Schedule>  ferrous    sulfate 325 milliGRAM(s) Oral daily  furosemide   Injectable 60 milliGRAM(s) IV Push two times a day  heparin  Injectable 5000 Unit(s) SubCutaneous every 8 hours  hydrALAZINE 100 milliGRAM(s) Oral three times a day  insulin glargine Injectable (LANTUS) 34 Unit(s) SubCutaneous at bedtime  insulin lispro (HumaLOG) corrective regimen sliding scale   SubCutaneous three times a day before meals  insulin lispro (HumaLOG) corrective regimen sliding scale   SubCutaneous at bedtime  insulin lispro Injectable (HumaLOG) 14 Unit(s) SubCutaneous before breakfast  piperacillin/tazobactam IVPB.. 3.375 Gram(s) IV Intermittent every 8 hours  polyethylene glycol 3350 17 Gram(s) Oral every 12 hours  primidone 25 milliGRAM(s) Oral at bedtime  senna 2 Tablet(s) Oral at bedtime      PHYSICAL EXAM:  T(C): 36.8 (07-14-19 @ 04:19), Max: 36.8 (07-14-19 @ 04:19)  HR: 84 (07-14-19 @ 06:15) (84 - 93)  BP: 142/80 (07-14-19 @ 04:19) (142/80 - 159/83)  RR: 17 (07-14-19 @ 04:19) (17 - 17)  SpO2: 95% (07-14-19 @ 06:15) (94% - 99%)  Wt(kg): --  I&O's Summary    13 Jul 2019 07:01  -  14 Jul 2019 07:00  --------------------------------------------------------  IN: 1180 mL / OUT: 3000 mL / NET: -1820 mL              Appearance: NAd  HEENT:   Dry oral mucosa, PERRL, EOMI	  Lymphatic: No lymphadenopathy +JVP   Cardiovascular: Normal S1 S2, + JVD, No murmurs  Respiratory: Decreased bs	  Psychiatry: A & O x 3, Mood & affect appropriate  Gastrointestinal:  Soft, Non-tender, + BS	  Skin: No rashes, No ecchymoses, No cyanosis	  Neurologic: Non-focal  Extremities:+ edema, R foot ulcer wrapped   Vascular: Peripheral pulses palpable 2+ bilaterally        LABS:    CARDIAC MARKERS:                                9.2    6.03  )-----------( 349      ( 13 Jul 2019 11:19 )             28.9     07-14    137  |  98  |  21  ----------------------------<  231<H>  3.5   |  27  |  1.96<H>    Ca    8.7      14 Jul 2019 06:51  Phos  2.8     07-14  Mg     1.6     07-14      proBNP:   Lipid Profile:   HgA1c:   TSH:               TELEMETRY: 	    ECG:  	  RADIOLOGY:   DIAGNOSTIC TESTING:  [ ] Echocardiogram:  [ ]  Catheterization:  [ ] Stress Test:    OTHER:

## 2019-07-14 NOTE — PROGRESS NOTE ADULT - ASSESSMENT
Assessment  DMT2: 59y Male with DM T2 with hyperglycemia, was on insulin at nursing home, blood sugars running high, now on basal bolus insulin, blood sugars running high, no hypoglycemic episode, eating meals, non compliant with low carb diet.  Foot wound: On wound care, meds, stable.  CAD: on medications, no chest pain, stable, monitored.  HTN: Controlled,  on antihypertensive medications.  CKD: Monitor labs/BMP,             Andrzej Zhu MD  Cell: [phone]  Office: [phone]

## 2019-07-15 LAB
ANION GAP SERPL CALC-SCNC: 13 MMOL/L — SIGNIFICANT CHANGE UP (ref 5–17)
BUN SERPL-MCNC: 20 MG/DL — SIGNIFICANT CHANGE UP (ref 7–23)
CALCIUM SERPL-MCNC: 8.5 MG/DL — SIGNIFICANT CHANGE UP (ref 8.4–10.5)
CHLORIDE SERPL-SCNC: 98 MMOL/L — SIGNIFICANT CHANGE UP (ref 96–108)
CO2 SERPL-SCNC: 26 MMOL/L — SIGNIFICANT CHANGE UP (ref 22–31)
CREAT SERPL-MCNC: 1.95 MG/DL — HIGH (ref 0.5–1.3)
GLUCOSE BLDC GLUCOMTR-MCNC: 128 MG/DL — HIGH (ref 70–99)
GLUCOSE BLDC GLUCOMTR-MCNC: 163 MG/DL — HIGH (ref 70–99)
GLUCOSE BLDC GLUCOMTR-MCNC: 249 MG/DL — HIGH (ref 70–99)
GLUCOSE BLDC GLUCOMTR-MCNC: 264 MG/DL — HIGH (ref 70–99)
GLUCOSE BLDC GLUCOMTR-MCNC: 273 MG/DL — HIGH (ref 70–99)
GLUCOSE SERPL-MCNC: 134 MG/DL — HIGH (ref 70–99)
HCT VFR BLD CALC: 27.4 % — LOW (ref 39–50)
HGB BLD-MCNC: 8.7 G/DL — LOW (ref 13–17)
MAGNESIUM SERPL-MCNC: 1.5 MG/DL — LOW (ref 1.6–2.6)
MCHC RBC-ENTMCNC: 28.3 PG — SIGNIFICANT CHANGE UP (ref 27–34)
MCHC RBC-ENTMCNC: 31.8 GM/DL — LOW (ref 32–36)
MCV RBC AUTO: 89.3 FL — SIGNIFICANT CHANGE UP (ref 80–100)
PHOSPHATE SERPL-MCNC: 3.2 MG/DL — SIGNIFICANT CHANGE UP (ref 2.5–4.5)
PLATELET # BLD AUTO: 339 K/UL — SIGNIFICANT CHANGE UP (ref 150–400)
POTASSIUM SERPL-MCNC: 3.3 MMOL/L — LOW (ref 3.5–5.3)
POTASSIUM SERPL-SCNC: 3.3 MMOL/L — LOW (ref 3.5–5.3)
RBC # BLD: 3.07 M/UL — LOW (ref 4.2–5.8)
RBC # FLD: 13.8 % — SIGNIFICANT CHANGE UP (ref 10.3–14.5)
SODIUM SERPL-SCNC: 137 MMOL/L — SIGNIFICANT CHANGE UP (ref 135–145)
WBC # BLD: 5.19 K/UL — SIGNIFICANT CHANGE UP (ref 3.8–10.5)
WBC # FLD AUTO: 5.19 K/UL — SIGNIFICANT CHANGE UP (ref 3.8–10.5)

## 2019-07-15 PROCEDURE — 73720 MRI LWR EXTREMITY W/O&W/DYE: CPT | Mod: 26,RT

## 2019-07-15 RX ORDER — POTASSIUM CHLORIDE 20 MEQ
40 PACKET (EA) ORAL ONCE
Refills: 0 | Status: DISCONTINUED | OUTPATIENT
Start: 2019-07-15 | End: 2019-07-15

## 2019-07-15 RX ORDER — POTASSIUM CHLORIDE 20 MEQ
20 PACKET (EA) ORAL ONCE
Refills: 0 | Status: COMPLETED | OUTPATIENT
Start: 2019-07-15 | End: 2019-07-15

## 2019-07-15 RX ADMIN — Medication 100 MILLIGRAM(S): at 14:05

## 2019-07-15 RX ADMIN — Medication 1 APPLICATION(S): at 10:00

## 2019-07-15 RX ADMIN — Medication 60 MILLIGRAM(S): at 14:04

## 2019-07-15 RX ADMIN — PRIMIDONE 25 MILLIGRAM(S): 250 TABLET ORAL at 21:00

## 2019-07-15 RX ADMIN — AMLODIPINE BESYLATE 10 MILLIGRAM(S): 2.5 TABLET ORAL at 06:05

## 2019-07-15 RX ADMIN — Medication 100 MILLIGRAM(S): at 06:06

## 2019-07-15 RX ADMIN — Medication 1: at 13:07

## 2019-07-15 RX ADMIN — Medication 60 MILLIGRAM(S): at 06:06

## 2019-07-15 RX ADMIN — HEPARIN SODIUM 5000 UNIT(S): 5000 INJECTION INTRAVENOUS; SUBCUTANEOUS at 14:04

## 2019-07-15 RX ADMIN — PIPERACILLIN AND TAZOBACTAM 25 GRAM(S): 4; .5 INJECTION, POWDER, LYOPHILIZED, FOR SOLUTION INTRAVENOUS at 06:06

## 2019-07-15 RX ADMIN — Medication 100 MILLIGRAM(S): at 21:00

## 2019-07-15 RX ADMIN — PIPERACILLIN AND TAZOBACTAM 25 GRAM(S): 4; .5 INJECTION, POWDER, LYOPHILIZED, FOR SOLUTION INTRAVENOUS at 14:05

## 2019-07-15 RX ADMIN — HEPARIN SODIUM 5000 UNIT(S): 5000 INJECTION INTRAVENOUS; SUBCUTANEOUS at 06:06

## 2019-07-15 RX ADMIN — Medication 2.5 MILLIGRAM(S): at 17:49

## 2019-07-15 RX ADMIN — Medication 3: at 18:53

## 2019-07-15 RX ADMIN — PIPERACILLIN AND TAZOBACTAM 25 GRAM(S): 4; .5 INJECTION, POWDER, LYOPHILIZED, FOR SOLUTION INTRAVENOUS at 23:43

## 2019-07-15 RX ADMIN — Medication 325 MILLIGRAM(S): at 12:14

## 2019-07-15 RX ADMIN — INSULIN GLARGINE 34 UNIT(S): 100 INJECTION, SOLUTION SUBCUTANEOUS at 23:09

## 2019-07-15 RX ADMIN — Medication 2.5 MILLIGRAM(S): at 06:05

## 2019-07-15 RX ADMIN — SENNA PLUS 2 TABLET(S): 8.6 TABLET ORAL at 21:00

## 2019-07-15 RX ADMIN — Medication 100 MILLIEQUIVALENT(S): at 14:07

## 2019-07-15 RX ADMIN — HEPARIN SODIUM 5000 UNIT(S): 5000 INJECTION INTRAVENOUS; SUBCUTANEOUS at 21:00

## 2019-07-15 RX ADMIN — CHLORHEXIDINE GLUCONATE 1 APPLICATION(S): 213 SOLUTION TOPICAL at 13:46

## 2019-07-15 NOTE — PROGRESS NOTE ADULT - ASSESSMENT
Assessment  DMT2: 59y Male with DM T2 with hyperglycemia, was on insulin at nursing home, started on basal bolus insulin, dose was adjusted,  blood sugarsimproving no hypoglycemic episode, eating meals, non compliant with low carb diet.  Foot wound: On wound care, meds, stable.  CAD: on medications, no chest pain, stable, monitored.  HTN: Controlled,  on antihypertensive medications.  CKD: Monitor labs/BMP,             Andrzej Zhu MD  Cell: [phone]  Office: [phone]

## 2019-07-15 NOTE — PROGRESS NOTE ADULT - ASSESSMENT
DX: 1. DM w/ Neuropathy w/ large Full thickness R plantar foot wound post I & D from Necrotizing Fasciitis (improving) & also w/ R 3rd toe Full Thickness ulcer concerned for OM  TX: 1. Exam        2. Used Sterile Sx scrub brushes w/ Anti bacterial soap to clean R foot wound. Flushed w/ NS & applied Medi Honey NS wet to dry to the plantar R foot wound & Medihoney w/ DSD R 3rd toe        3. MRI w/ w/o contrast to evaluate for OM R 3rd toe/Met & to R/O suspcion OM Mets 2 & 4 that was seen on X rays        4. Continue IV ABX Zosyn        5. WCx P R 3rd toe         6. Recommend Vac Tx continued R plantar foot wound switch to Black sponge

## 2019-07-15 NOTE — PROGRESS NOTE ADULT - SUBJECTIVE AND OBJECTIVE BOX
Subjective: Patient seen and examined. No new events except as noted.   resting comfortably in bed     REVIEW OF SYSTEMS:    CONSTITUTIONAL:+ weakness, fevers or chills  EYES/ENT: No visual changes;  No vertigo or throat pain   NECK: No pain or stiffness  RESPIRATORY: No cough, wheezing, hemoptysis; No shortness of breath  CARDIOVASCULAR: No chest pain or palpitations  GASTROINTESTINAL: No abdominal or epigastric pain. No nausea, vomiting, or hematemesis; No diarrhea or constipation. No melena or hematochezia.  GENITOURINARY: No dysuria, frequency or hematuria  NEUROLOGICAL: No numbness or weakness  SKIN: No itching, burning, rashes, or lesions   All other review of systems is negative unless indicated above.    MEDICATIONS:  MEDICATIONS  (STANDING):  amLODIPine   Tablet 10 milliGRAM(s) Oral daily  chlorhexidine 2% Cloths 1 Application(s) Topical daily  collagenase Ointment 1 Application(s) Topical <User Schedule>  dextrose 5%. 1000 milliLiter(s) (50 mL/Hr) IV Continuous <Continuous>  dextrose 50% Injectable 12.5 Gram(s) IV Push once  dextrose 50% Injectable 25 Gram(s) IV Push once  dextrose 50% Injectable 25 Gram(s) IV Push once  dronabinol 2.5 milliGRAM(s) Oral two times a day  epoetin braden Injectable 12948 Unit(s) SubCutaneous <User Schedule>  ferrous    sulfate 325 milliGRAM(s) Oral daily  furosemide   Injectable 60 milliGRAM(s) IV Push two times a day  heparin  Injectable 5000 Unit(s) SubCutaneous every 8 hours  hydrALAZINE 100 milliGRAM(s) Oral three times a day  insulin glargine Injectable (LANTUS) 34 Unit(s) SubCutaneous at bedtime  insulin lispro (HumaLOG) corrective regimen sliding scale   SubCutaneous three times a day before meals  insulin lispro (HumaLOG) corrective regimen sliding scale   SubCutaneous at bedtime  insulin lispro Injectable (HumaLOG) 14 Unit(s) SubCutaneous before breakfast  piperacillin/tazobactam IVPB.. 3.375 Gram(s) IV Intermittent every 8 hours  polyethylene glycol 3350 17 Gram(s) Oral every 12 hours  primidone 25 milliGRAM(s) Oral at bedtime  senna 2 Tablet(s) Oral at bedtime      PHYSICAL EXAM:  T(C): 36.4 (07-15-19 @ 05:00), Max: 36.9 (07-14-19 @ 21:23)  HR: 75 (07-15-19 @ 05:00) (75 - 86)  BP: 149/79 (07-15-19 @ 05:00) (131/75 - 154/81)  RR: 18 (07-15-19 @ 05:00) (18 - 19)  SpO2: 98% (07-15-19 @ 05:00) (95% - 98%)  Wt(kg): --  I&O's Summary    14 Jul 2019 07:01  -  15 Jul 2019 07:00  --------------------------------------------------------  IN: 200 mL / OUT: 975 mL / NET: -775 mL    15 Jul 2019 07:01  -  15 Jul 2019 07:49  --------------------------------------------------------  IN: 0 mL / OUT: 250 mL / NET: -250 mL              Appearance: NAd  HEENT:   Dry oral mucosa, PERRL, EOMI	  Lymphatic: No lymphadenopathy +JVP   Cardiovascular: Normal S1 S2, + JVD, No murmurs  Respiratory: Decreased bs	  Psychiatry: A & O x 3, Mood & affect appropriate  Gastrointestinal:  Soft, Non-tender, + BS	  Skin: No rashes, No ecchymoses, No cyanosis	  Neurologic: Non-focal  Extremities:+ edema, R foot ulcer wrapped   Vascular: Peripheral pulses palpable 2+ bilaterally    LABS:    CARDIAC MARKERS:                                9.0    5.54  )-----------( 350      ( 14 Jul 2019 09:57 )             28.1     07-15    137  |  98  |  20  ----------------------------<  134<H>  3.3<L>   |  26  |  1.95<H>    Ca    8.5      15 Jul 2019 06:04  Phos  3.2     07-15  Mg     1.5     07-15      proBNP:   Lipid Profile:   HgA1c:   TSH:             TELEMETRY: SR	    ECG:  	  RADIOLOGY:   DIAGNOSTIC TESTING:  [ ] Echocardiogram:  [ ]  Catheterization:  [ ] Stress Test:    OTHER:

## 2019-07-15 NOTE — PROGRESS NOTE ADULT - ASSESSMENT
59M, hx DM, HTN presents from [facility] via EMS due to resp distress. Patient at [facility] due to right diabetic foot ulcer. Patient reported to be hypoxic on room air to 60s. Improved on 15L face mask top 100% O2 sat. Patient awake, alert, oriented. States he has been coughing for 2-3 weeks (dry cough) as well as weakness and shortness of breath for few days.    Acute on chronic diastolic chf  - iv lasix 60 bid  - strict Is and Os  - cardiology consult appreciated  - keep O2 sats above 92%    diabetic foot ulcer  - podiatry follow up  - MRI of right foot to r/o osteo of third toe  - pt is cleared by cardiology for debridement    essential tremors improved  - trial of primidone    CKD 4  - monitor cre  - avoid nephrotoxins    diabetes  - fs qid  - hgb a1c  - endocrine consult    htn  - cw norvasc    DVT px

## 2019-07-15 NOTE — PROGRESS NOTE ADULT - SUBJECTIVE AND OBJECTIVE BOX
Chief complaint  Patient is a 59y old  Male who presents with a chief complaint of SOB (15 Jul 2019 13:02)   Review of systems  Patient in bed, looks comfortable, no fever, no hypoglycemia.    Labs and Fingersticks  CAPILLARY BLOOD GLUCOSE      POCT Blood Glucose.: 273 mg/dL (15 Jul 2019 16:47)  POCT Blood Glucose.: 163 mg/dL (15 Jul 2019 12:26)  POCT Blood Glucose.: 128 mg/dL (15 Jul 2019 08:20)  POCT Blood Glucose.: 184 mg/dL (14 Jul 2019 21:21)      Anion Gap, Serum: 13 (07-15 @ 06:04)  Anion Gap, Serum: 12 (07-14 @ 06:51)      Calcium, Total Serum: 8.5 (07-15 @ 06:04)  Calcium, Total Serum: 8.7 (07-14 @ 06:51)          07-15    137  |  98  |  20  ----------------------------<  134<H>  3.3<L>   |  26  |  1.95<H>    Ca    8.5      15 Jul 2019 06:04  Phos  3.2     07-15  Mg     1.5     07-15                          8.7    5.19  )-----------( 339      ( 15 Jul 2019 08:52 )             27.4     Medications  MEDICATIONS  (STANDING):  amLODIPine   Tablet 10 milliGRAM(s) Oral daily  chlorhexidine 2% Cloths 1 Application(s) Topical daily  collagenase Ointment 1 Application(s) Topical <User Schedule>  dextrose 5%. 1000 milliLiter(s) (50 mL/Hr) IV Continuous <Continuous>  dextrose 50% Injectable 12.5 Gram(s) IV Push once  dextrose 50% Injectable 25 Gram(s) IV Push once  dextrose 50% Injectable 25 Gram(s) IV Push once  dronabinol 2.5 milliGRAM(s) Oral two times a day  epoetin braden Injectable 93271 Unit(s) SubCutaneous <User Schedule>  ferrous    sulfate 325 milliGRAM(s) Oral daily  furosemide   Injectable 60 milliGRAM(s) IV Push two times a day  heparin  Injectable 5000 Unit(s) SubCutaneous every 8 hours  hydrALAZINE 100 milliGRAM(s) Oral three times a day  insulin glargine Injectable (LANTUS) 34 Unit(s) SubCutaneous at bedtime  insulin lispro (HumaLOG) corrective regimen sliding scale   SubCutaneous three times a day before meals  insulin lispro (HumaLOG) corrective regimen sliding scale   SubCutaneous at bedtime  insulin lispro Injectable (HumaLOG) 14 Unit(s) SubCutaneous before breakfast  piperacillin/tazobactam IVPB.. 3.375 Gram(s) IV Intermittent every 8 hours  polyethylene glycol 3350 17 Gram(s) Oral every 12 hours  primidone 25 milliGRAM(s) Oral at bedtime  senna 2 Tablet(s) Oral at bedtime      Physical Exam  General: Patient comfortable in bed  Vital Signs Last 12 Hrs  T(F): 98.2 (07-15-19 @ 11:35), Max: 98.2 (07-15-19 @ 11:35)  HR: 84 (07-15-19 @ 11:35) (75 - 85)  BP: 133/66 (07-15-19 @ 11:35) (133/66 - 149/79)  BP(mean): --  RR: 17 (07-15-19 @ 11:35) (17 - 18)  SpO2: 98% (07-15-19 @ 11:35) (98% - 98%)  Neck: No palpable thyroid nodules.  CVS: S1S2, No murmurs  Respiratory: No wheezing, no crepitations  GI: Abdomen soft, bowel sounds positive  Musculoskeletal:  edema lower extremities.   Skin: No skin rashes, no ecchymosis    Diagnostics

## 2019-07-15 NOTE — PROGRESS NOTE ADULT - SUBJECTIVE AND OBJECTIVE BOX
Patient is a 59y old  Male who presents with a chief complaint of SOB (15 Jul 2019 16:59)       INTERVAL HPI/OVERNIGHT EVENTS:  Patient seen and evaluated at bedside.  Pt is resting comfortable in NAD. Denies N/V/F/C.  Pain rated at 0/10  F/U R plantar foot wound & R 3rd toe ulcer.  Allergies    No Known Allergies    Intolerances        Vital Signs Last 24 Hrs  T(C): 36.8 (15 Jul 2019 11:35), Max: 36.9 (14 Jul 2019 21:23)  T(F): 98.2 (15 Jul 2019 11:35), Max: 98.4 (14 Jul 2019 21:23)  HR: 84 (15 Jul 2019 11:35) (75 - 86)  BP: 133/66 (15 Jul 2019 11:35) (133/66 - 154/81)  BP(mean): --  RR: 17 (15 Jul 2019 11:35) (17 - 19)  SpO2: 98% (15 Jul 2019 11:35) (95% - 98%)    LABS:                        8.7    5.19  )-----------( 339      ( 15 Jul 2019 08:52 )             27.4     07-15    137  |  98  |  20  ----------------------------<  134<H>  3.3<L>   |  26  |  1.95<H>    Ca    8.5      15 Jul 2019 06:04  Phos  3.2     07-15  Mg     1.5     07-15          CAPILLARY BLOOD GLUCOSE      POCT Blood Glucose.: 264 mg/dL (15 Jul 2019 18:49)  POCT Blood Glucose.: 273 mg/dL (15 Jul 2019 16:47)  POCT Blood Glucose.: 163 mg/dL (15 Jul 2019 12:26)  POCT Blood Glucose.: 128 mg/dL (15 Jul 2019 08:20)  POCT Blood Glucose.: 184 mg/dL (14 Jul 2019 21:21)      Lower Extremity Physical Exam:  Vascular: DP/PT 2/4 B/L PT 1/4 B/L, CFT intact x 10, Temp gradient N/L B/L  Neurology: Epicritic sensation not intact B/L  Musculoskeletal/Ortho: Hammertoes 2-5 B/L  Skin: R 3rd toe plantar lateral aspect 2 cm x 1 cm Full thickness ulcer to SQ yellow fibrinous tissue no odor no pus concern for OM  R plantar foot 15 cm (L) x [5 1/2 cm (w) midfoot region] x 1 cm deep by the ball of the foot the wound is beefy granular tissue w/ the plantar ball w/ mied fibrogranular tissue, no pus no odor no cellulitis

## 2019-07-15 NOTE — PROGRESS NOTE ADULT - SUBJECTIVE AND OBJECTIVE BOX
Patient is a 59y old  Male who presents with a chief complaint of SOB (15 Jul 2019 07:49)      SUBJECTIVE / OVERNIGHT EVENTS:  No chest pain. No shortness of breath. No complaints. No events overnight.     MEDICATIONS  (STANDING):  amLODIPine   Tablet 10 milliGRAM(s) Oral daily  chlorhexidine 2% Cloths 1 Application(s) Topical daily  collagenase Ointment 1 Application(s) Topical <User Schedule>  dextrose 5%. 1000 milliLiter(s) (50 mL/Hr) IV Continuous <Continuous>  dextrose 50% Injectable 12.5 Gram(s) IV Push once  dextrose 50% Injectable 25 Gram(s) IV Push once  dextrose 50% Injectable 25 Gram(s) IV Push once  dronabinol 2.5 milliGRAM(s) Oral two times a day  epoetin braden Injectable 75614 Unit(s) SubCutaneous <User Schedule>  ferrous    sulfate 325 milliGRAM(s) Oral daily  furosemide   Injectable 60 milliGRAM(s) IV Push two times a day  heparin  Injectable 5000 Unit(s) SubCutaneous every 8 hours  hydrALAZINE 100 milliGRAM(s) Oral three times a day  insulin glargine Injectable (LANTUS) 34 Unit(s) SubCutaneous at bedtime  insulin lispro (HumaLOG) corrective regimen sliding scale   SubCutaneous three times a day before meals  insulin lispro (HumaLOG) corrective regimen sliding scale   SubCutaneous at bedtime  insulin lispro Injectable (HumaLOG) 14 Unit(s) SubCutaneous before breakfast  piperacillin/tazobactam IVPB.. 3.375 Gram(s) IV Intermittent every 8 hours  polyethylene glycol 3350 17 Gram(s) Oral every 12 hours  primidone 25 milliGRAM(s) Oral at bedtime  senna 2 Tablet(s) Oral at bedtime    MEDICATIONS  (PRN):  acetaminophen   Tablet .. 650 milliGRAM(s) Oral every 6 hours PRN Mild Pain (1 - 3)  dextrose 40% Gel 15 Gram(s) Oral once PRN Blood Glucose LESS THAN 70 milliGRAM(s)/deciliter  glucagon  Injectable 1 milliGRAM(s) IntraMuscular once PRN Glucose LESS THAN 70 milligrams/deciliter  oxyCODONE    IR 5 milliGRAM(s) Oral every 4 hours PRN Moderate Pain (4 - 6)      Vital Signs Last 24 Hrs  T(C): 36.8 (15 Jul 2019 11:35), Max: 36.9 (14 Jul 2019 21:23)  T(F): 98.2 (15 Jul 2019 11:35), Max: 98.4 (14 Jul 2019 21:23)  HR: 84 (15 Jul 2019 11:35) (75 - 86)  BP: 133/66 (15 Jul 2019 11:35) (131/75 - 154/81)  BP(mean): --  RR: 17 (15 Jul 2019 11:35) (17 - 19)  SpO2: 98% (15 Jul 2019 11:35) (95% - 98%)  CAPILLARY BLOOD GLUCOSE      POCT Blood Glucose.: 163 mg/dL (15 Jul 2019 12:26)  POCT Blood Glucose.: 128 mg/dL (15 Jul 2019 08:20)  POCT Blood Glucose.: 184 mg/dL (14 Jul 2019 21:21)  POCT Blood Glucose.: 161 mg/dL (14 Jul 2019 16:47)    I&O's Summary    14 Jul 2019 07:01  -  15 Jul 2019 07:00  --------------------------------------------------------  IN: 200 mL / OUT: 975 mL / NET: -775 mL    15 Jul 2019 07:01  -  15 Jul 2019 13:02  --------------------------------------------------------  IN: 240 mL / OUT: 750 mL / NET: -510 mL        PHYSICAL EXAM:  GENERAL: NAD, well-developed  HEAD:  Atraumatic, Normocephalic  EYES: EOMI, PERRLA, conjunctiva and sclera clear  NECK: Supple, No JVD  CHEST/LUNG: Clear to auscultation bilaterally; No wheeze  HEART: Regular rate and rhythm; No murmurs, rubs, or gallops  ABDOMEN: Soft, Nontender, Nondistended; Bowel sounds present  EXTREMITIES:  2+ Peripheral Pulses, No clubbing, cyanosis, or edema, dressing right foot c/d/i  PSYCH: AAOx3  NEUROLOGY: non-focal  SKIN: No rashes or lesions    LABS:                        8.7    5.19  )-----------( 339      ( 15 Jul 2019 08:52 )             27.4     07-15    137  |  98  |  20  ----------------------------<  134<H>  3.3<L>   |  26  |  1.95<H>    Ca    8.5      15 Jul 2019 06:04  Phos  3.2     07-15  Mg     1.5     07-15                RADIOLOGY & ADDITIONAL TESTS:    Imaging Personally Reviewed:    Consultant(s) Notes Reviewed:      Care Discussed with Consultants/Other Providers:

## 2019-07-16 LAB
ANION GAP SERPL CALC-SCNC: 11 MMOL/L — SIGNIFICANT CHANGE UP (ref 5–17)
BASOPHILS # BLD AUTO: 0.03 K/UL — SIGNIFICANT CHANGE UP (ref 0–0.2)
BASOPHILS NFR BLD AUTO: 0.6 % — SIGNIFICANT CHANGE UP (ref 0–2)
BUN SERPL-MCNC: 22 MG/DL — SIGNIFICANT CHANGE UP (ref 7–23)
CALCIUM SERPL-MCNC: 9.1 MG/DL — SIGNIFICANT CHANGE UP (ref 8.4–10.5)
CHLORIDE SERPL-SCNC: 99 MMOL/L — SIGNIFICANT CHANGE UP (ref 96–108)
CO2 SERPL-SCNC: 28 MMOL/L — SIGNIFICANT CHANGE UP (ref 22–31)
CREAT SERPL-MCNC: 2.04 MG/DL — HIGH (ref 0.5–1.3)
EOSINOPHIL # BLD AUTO: 0.29 K/UL — SIGNIFICANT CHANGE UP (ref 0–0.5)
EOSINOPHIL NFR BLD AUTO: 6.2 % — HIGH (ref 0–6)
GLUCOSE BLDC GLUCOMTR-MCNC: 125 MG/DL — HIGH (ref 70–99)
GLUCOSE BLDC GLUCOMTR-MCNC: 139 MG/DL — HIGH (ref 70–99)
GLUCOSE BLDC GLUCOMTR-MCNC: 170 MG/DL — HIGH (ref 70–99)
GLUCOSE BLDC GLUCOMTR-MCNC: 91 MG/DL — SIGNIFICANT CHANGE UP (ref 70–99)
GLUCOSE SERPL-MCNC: 153 MG/DL — HIGH (ref 70–99)
HCT VFR BLD CALC: 27.8 % — LOW (ref 39–50)
HGB BLD-MCNC: 8.7 G/DL — LOW (ref 13–17)
IMM GRANULOCYTES NFR BLD AUTO: 0.4 % — SIGNIFICANT CHANGE UP (ref 0–1.5)
LYMPHOCYTES # BLD AUTO: 0.84 K/UL — LOW (ref 1–3.3)
LYMPHOCYTES # BLD AUTO: 17.9 % — SIGNIFICANT CHANGE UP (ref 13–44)
MAGNESIUM SERPL-MCNC: 1.6 MG/DL — SIGNIFICANT CHANGE UP (ref 1.6–2.6)
MCHC RBC-ENTMCNC: 28 PG — SIGNIFICANT CHANGE UP (ref 27–34)
MCHC RBC-ENTMCNC: 31.3 GM/DL — LOW (ref 32–36)
MCV RBC AUTO: 89.4 FL — SIGNIFICANT CHANGE UP (ref 80–100)
MONOCYTES # BLD AUTO: 0.37 K/UL — SIGNIFICANT CHANGE UP (ref 0–0.9)
MONOCYTES NFR BLD AUTO: 7.9 % — SIGNIFICANT CHANGE UP (ref 2–14)
NEUTROPHILS # BLD AUTO: 3.15 K/UL — SIGNIFICANT CHANGE UP (ref 1.8–7.4)
NEUTROPHILS NFR BLD AUTO: 67 % — SIGNIFICANT CHANGE UP (ref 43–77)
PLATELET # BLD AUTO: 330 K/UL — SIGNIFICANT CHANGE UP (ref 150–400)
POTASSIUM SERPL-MCNC: 3.3 MMOL/L — LOW (ref 3.5–5.3)
POTASSIUM SERPL-SCNC: 3.3 MMOL/L — LOW (ref 3.5–5.3)
RBC # BLD: 3.11 M/UL — LOW (ref 4.2–5.8)
RBC # FLD: 13.8 % — SIGNIFICANT CHANGE UP (ref 10.3–14.5)
SODIUM SERPL-SCNC: 138 MMOL/L — SIGNIFICANT CHANGE UP (ref 135–145)
WBC # BLD: 4.7 K/UL — SIGNIFICANT CHANGE UP (ref 3.8–10.5)
WBC # FLD AUTO: 4.7 K/UL — SIGNIFICANT CHANGE UP (ref 3.8–10.5)

## 2019-07-16 RX ORDER — POTASSIUM CHLORIDE 20 MEQ
40 PACKET (EA) ORAL ONCE
Refills: 0 | Status: COMPLETED | OUTPATIENT
Start: 2019-07-16 | End: 2019-07-16

## 2019-07-16 RX ORDER — INSULIN GLARGINE 100 [IU]/ML
36 INJECTION, SOLUTION SUBCUTANEOUS AT BEDTIME
Refills: 0 | Status: DISCONTINUED | OUTPATIENT
Start: 2019-07-16 | End: 2019-07-17

## 2019-07-16 RX ORDER — FUROSEMIDE 40 MG
80 TABLET ORAL DAILY
Refills: 0 | Status: DISCONTINUED | OUTPATIENT
Start: 2019-07-17 | End: 2019-07-18

## 2019-07-16 RX ORDER — SODIUM CHLORIDE 9 MG/ML
1000 INJECTION INTRAMUSCULAR; INTRAVENOUS; SUBCUTANEOUS
Refills: 0 | Status: DISCONTINUED | OUTPATIENT
Start: 2019-07-16 | End: 2019-07-17

## 2019-07-16 RX ORDER — INSULIN LISPRO 100/ML
16 VIAL (ML) SUBCUTANEOUS
Refills: 0 | Status: DISCONTINUED | OUTPATIENT
Start: 2019-07-16 | End: 2019-07-17

## 2019-07-16 RX ADMIN — Medication 40 MILLIEQUIVALENT(S): at 13:36

## 2019-07-16 RX ADMIN — HEPARIN SODIUM 5000 UNIT(S): 5000 INJECTION INTRAVENOUS; SUBCUTANEOUS at 13:40

## 2019-07-16 RX ADMIN — Medication 100 MILLIGRAM(S): at 06:03

## 2019-07-16 RX ADMIN — Medication 16 UNIT(S): at 10:41

## 2019-07-16 RX ADMIN — HEPARIN SODIUM 5000 UNIT(S): 5000 INJECTION INTRAVENOUS; SUBCUTANEOUS at 22:25

## 2019-07-16 RX ADMIN — PRIMIDONE 25 MILLIGRAM(S): 250 TABLET ORAL at 22:25

## 2019-07-16 RX ADMIN — Medication 325 MILLIGRAM(S): at 13:39

## 2019-07-16 RX ADMIN — Medication 60 MILLIGRAM(S): at 06:02

## 2019-07-16 RX ADMIN — AMLODIPINE BESYLATE 10 MILLIGRAM(S): 2.5 TABLET ORAL at 06:03

## 2019-07-16 RX ADMIN — CHLORHEXIDINE GLUCONATE 1 APPLICATION(S): 213 SOLUTION TOPICAL at 13:38

## 2019-07-16 RX ADMIN — PIPERACILLIN AND TAZOBACTAM 25 GRAM(S): 4; .5 INJECTION, POWDER, LYOPHILIZED, FOR SOLUTION INTRAVENOUS at 13:36

## 2019-07-16 RX ADMIN — PIPERACILLIN AND TAZOBACTAM 25 GRAM(S): 4; .5 INJECTION, POWDER, LYOPHILIZED, FOR SOLUTION INTRAVENOUS at 06:03

## 2019-07-16 RX ADMIN — HEPARIN SODIUM 5000 UNIT(S): 5000 INJECTION INTRAVENOUS; SUBCUTANEOUS at 06:03

## 2019-07-16 RX ADMIN — INSULIN GLARGINE 36 UNIT(S): 100 INJECTION, SOLUTION SUBCUTANEOUS at 22:25

## 2019-07-16 RX ADMIN — Medication 100 MILLIGRAM(S): at 13:38

## 2019-07-16 RX ADMIN — Medication 100 MILLIGRAM(S): at 22:25

## 2019-07-16 RX ADMIN — PIPERACILLIN AND TAZOBACTAM 25 GRAM(S): 4; .5 INJECTION, POWDER, LYOPHILIZED, FOR SOLUTION INTRAVENOUS at 22:25

## 2019-07-16 RX ADMIN — ERYTHROPOIETIN 10000 UNIT(S): 10000 INJECTION, SOLUTION INTRAVENOUS; SUBCUTANEOUS at 10:41

## 2019-07-16 NOTE — PROGRESS NOTE ADULT - ASSESSMENT
DX: 1. DM w/ Neuropathy w/ large Full thickness R plantar foot wound post I & D from Necrotizing Fasciitis (improving) & also w/ R 3rd toe Full Thickness ulcer concerned for OM  TX: 1. Exam        2. Fibrotic wound R 3rd toe, no purulence expressed. DSD applied R 3rd toe, wound VAC place left intact.         3. MRI w/ w/o contrast to evaluate for OM R 3rd toe/Met & to R/O suspicion OM Mets 2 & 4 that was seen on X rays. MRI preformed awaiting official read.         4. Continue IV ABX Zosyn        5. New WCx taken from R 3rd toe         6. Recommend Vac Tx continued R plantar foot wound switch to Black sponge        7. Will book for partial 3rd ray resection for tomorrow pending MRI results.        8. Please document medical optimization for local anesthesia with sedation.       d/w attending DX: 1. DM w/ Neuropathy w/ large Full thickness R plantar foot wound post I & D from Necrotizing Fasciitis & also w/ R 3rd toe Full Thickness ulcer concerned for OM  TX: 1. Exam        2. Fibrotic wound R 3rd toe, no purulence expressed. DSD applied R 3rd toe, wound VAC place left intact.         3. MRI w/ w/o contrast reviewed w/ the patient, his wife & daughter tonight         4. Continue IV ABX Zosyn        5. New WCx taken from R 3rd toe -Pending        6.  Vac Tx continued R plantar foot wound Black sponge        7. Holding off on R foot Sx 7/17/19        8. Cancel NPO orders        9. Discussed TMA R Vs BKA R    (See Attending note below)               d/w attending

## 2019-07-16 NOTE — PROGRESS NOTE ADULT - SUBJECTIVE AND OBJECTIVE BOX
Chief complaint  Patient is a 59y old  Male who presents with a chief complaint of SOB (16 Jul 2019 08:15)   Review of systems  Patient in bed, looks comfortable, no fever, no hypoglycemia.    Labs and Fingersticks  CAPILLARY BLOOD GLUCOSE      POCT Blood Glucose.: 125 mg/dL (16 Jul 2019 08:13)  POCT Blood Glucose.: 249 mg/dL (15 Jul 2019 23:08)  POCT Blood Glucose.: 264 mg/dL (15 Jul 2019 18:49)  POCT Blood Glucose.: 273 mg/dL (15 Jul 2019 16:47)  POCT Blood Glucose.: 163 mg/dL (15 Jul 2019 12:26)      Anion Gap, Serum: 11 (07-16 @ 06:56)  Anion Gap, Serum: 13 (07-15 @ 06:04)      Calcium, Total Serum: 9.1 (07-16 @ 06:56)  Calcium, Total Serum: 8.5 (07-15 @ 06:04)          07-16    138  |  99  |  22  ----------------------------<  153<H>  3.3<L>   |  28  |  2.04<H>    Ca    9.1      16 Jul 2019 06:56  Phos  3.2     07-15  Mg     1.6     07-16                          8.7    5.19  )-----------( 339      ( 15 Jul 2019 08:52 )             27.4     Medications  MEDICATIONS  (STANDING):  amLODIPine   Tablet 10 milliGRAM(s) Oral daily  chlorhexidine 2% Cloths 1 Application(s) Topical daily  collagenase Ointment 1 Application(s) Topical <User Schedule>  dextrose 5%. 1000 milliLiter(s) (50 mL/Hr) IV Continuous <Continuous>  dextrose 50% Injectable 12.5 Gram(s) IV Push once  dextrose 50% Injectable 25 Gram(s) IV Push once  dextrose 50% Injectable 25 Gram(s) IV Push once  dronabinol 2.5 milliGRAM(s) Oral two times a day  epoetin braden Injectable 47182 Unit(s) SubCutaneous <User Schedule>  ferrous    sulfate 325 milliGRAM(s) Oral daily  furosemide   Injectable 60 milliGRAM(s) IV Push two times a day  heparin  Injectable 5000 Unit(s) SubCutaneous every 8 hours  hydrALAZINE 100 milliGRAM(s) Oral three times a day  insulin glargine Injectable (LANTUS) 36 Unit(s) SubCutaneous at bedtime  insulin lispro (HumaLOG) corrective regimen sliding scale   SubCutaneous three times a day before meals  insulin lispro (HumaLOG) corrective regimen sliding scale   SubCutaneous at bedtime  insulin lispro Injectable (HumaLOG) 16 Unit(s) SubCutaneous before breakfast  piperacillin/tazobactam IVPB.. 3.375 Gram(s) IV Intermittent every 8 hours  polyethylene glycol 3350 17 Gram(s) Oral every 12 hours  primidone 25 milliGRAM(s) Oral at bedtime  senna 2 Tablet(s) Oral at bedtime      Physical Exam  General: Patient comfortable in bed  Vital Signs Last 12 Hrs  T(F): 98.1 (07-16-19 @ 04:47), Max: 98.1 (07-16-19 @ 04:47)  HR: 82 (07-16-19 @ 04:47) (77 - 83)  BP: 133/72 (07-16-19 @ 04:47) (127/80 - 164/76)  BP(mean): --  RR: 18 (07-16-19 @ 04:47) (18 - 18)  SpO2: 100% (07-16-19 @ 04:47) (98% - 100%)  Neck: No palpable thyroid nodules.  CVS: S1S2, No murmurs  Respiratory: No wheezing, no crepitations  GI: Abdomen soft, bowel sounds positive  Musculoskeletal:  edema lower extremities.   Skin: No skin rashes, no ecchymosis    Diagnostics

## 2019-07-16 NOTE — PROGRESS NOTE ADULT - SUBJECTIVE AND OBJECTIVE BOX
Podiatry pager #: [place] Pager:  [phone], [facility] Pager: [phone]    Patient is a 59y old  Male who presents with a chief complaint of SOB (15 Jul 2019 19:12)       INTERVAL HPI/OVERNIGHT EVENTS:  Patient seen and evaluated at bedside.  Pt is resting comfortable in NAD. Denies N/V/F/C.  Allergies    No Known Allergies    Intolerances        Vital Signs Last 24 Hrs  T(C): 36.7 (16 Jul 2019 04:47), Max: 36.8 (15 Jul 2019 11:35)  T(F): 98.1 (16 Jul 2019 04:47), Max: 98.2 (15 Jul 2019 11:35)  HR: 82 (16 Jul 2019 04:47) (77 - 85)  BP: 133/72 (16 Jul 2019 04:47) (127/80 - 164/76)  BP(mean): --  RR: 18 (16 Jul 2019 04:47) (17 - 18)  SpO2: 100% (16 Jul 2019 04:47) (98% - 100%)    LABS:                        8.7    5.19  )-----------( 339      ( 15 Jul 2019 08:52 )             27.4     07-16    138  |  99  |  22  ----------------------------<  153<H>  3.3<L>   |  28  |  2.04<H>    Ca    9.1      16 Jul 2019 06:56  Phos  3.2     07-15  Mg     1.6     07-16          CAPILLARY BLOOD GLUCOSE      POCT Blood Glucose.: 249 mg/dL (15 Jul 2019 23:08)  POCT Blood Glucose.: 264 mg/dL (15 Jul 2019 18:49)  POCT Blood Glucose.: 273 mg/dL (15 Jul 2019 16:47)  POCT Blood Glucose.: 163 mg/dL (15 Jul 2019 12:26)  POCT Blood Glucose.: 128 mg/dL (15 Jul 2019 08:20)      Lower Extremity Physical Exam:  Vascular: DP/PT 2/4 B/L PT 1/4 B/L, CFT intact x 10, Temp gradient N/L B/L  Neurology: Epicritic sensation not intact B/L  Musculoskeletal/Ortho: Hammertoes 2-5 B/L  Skin: R 3rd toe plantar lateral aspect 2 cm x 1 cm Full thickness ulcer to SQ yellow fibrinous tissue no odor no pus concern for OM  R plantar foot 15 cm (L) x [5 1/2 cm (w) midfoot region] x 1 cm deep by the ball of the foot the wound is beefy granular tissue w/ the plantar ball w/ mied fibrogranular tissue, no pus no odor no cellulitis     RADIOLOGY & ADDITIONAL TESTS: Podiatry pager #: [place] Pager:  [phone], [facility] Pager: [phone]    Patient is a 59y old  Male who presents with a chief complaint of SOB (15 Jul 2019 19:12)       INTERVAL HPI/OVERNIGHT EVENTS:  Patient seen and evaluated at bedside.  Pt is resting comfortable in NAD. Denies N/V/F/C. pain scale 0/10  Allergies    No Known Allergies    Intolerances        Vital Signs Last 24 Hrs  T(C): 36.7 (16 Jul 2019 04:47), Max: 36.8 (15 Jul 2019 11:35)  T(F): 98.1 (16 Jul 2019 04:47), Max: 98.2 (15 Jul 2019 11:35)  HR: 82 (16 Jul 2019 04:47) (77 - 85)  BP: 133/72 (16 Jul 2019 04:47) (127/80 - 164/76)  BP(mean): --  RR: 18 (16 Jul 2019 04:47) (17 - 18)  SpO2: 100% (16 Jul 2019 04:47) (98% - 100%)    LABS:                        8.7    5.19  )-----------( 339      ( 15 Jul 2019 08:52 )             27.4     07-16    138  |  99  |  22  ----------------------------<  153<H>  3.3<L>   |  28  |  2.04<H>    Ca    9.1      16 Jul 2019 06:56  Phos  3.2     07-15  Mg     1.6     07-16          CAPILLARY BLOOD GLUCOSE      POCT Blood Glucose.: 249 mg/dL (15 Jul 2019 23:08)  POCT Blood Glucose.: 264 mg/dL (15 Jul 2019 18:49)  POCT Blood Glucose.: 273 mg/dL (15 Jul 2019 16:47)  POCT Blood Glucose.: 163 mg/dL (15 Jul 2019 12:26)  POCT Blood Glucose.: 128 mg/dL (15 Jul 2019 08:20)      Lower Extremity Physical Exam:  Vascular: DP/PT 2/4 B/L PT 1/4 B/L, CFT intact x 10, Temp gradient N/L B/L  Neurology: Epicritic sensation not intact B/L  Musculoskeletal/Ortho: Hammertoes 2-5 B/L  Skin: R 3rd toe plantar lateral aspect 2 cm x 1 cm Full thickness ulcer to SQ yellow fibrinous tissue no odor no pus, concern for OM  R plantar foot 15 cm (L) x [5 1/2 cm (w) midfoot region] x 1 cm deep by the ball of the foot the wound is beefy granular tissue w/ the plantar ball w/ mixed  fibro granular tissue, no pus no odor no cellulitis at this time      RADIOLOGY & ADDITIONAL TESTS:

## 2019-07-16 NOTE — PROGRESS NOTE ADULT - SUBJECTIVE AND OBJECTIVE BOX
Patient is a 59y old  Male who presents with a chief complaint of SOB (16 Jul 2019 08:47)      SUBJECTIVE / OVERNIGHT EVENTS: no new complaints    MEDICATIONS  (STANDING):  amLODIPine   Tablet 10 milliGRAM(s) Oral daily  chlorhexidine 2% Cloths 1 Application(s) Topical daily  collagenase Ointment 1 Application(s) Topical <User Schedule>  dextrose 5%. 1000 milliLiter(s) (50 mL/Hr) IV Continuous <Continuous>  dextrose 50% Injectable 12.5 Gram(s) IV Push once  dextrose 50% Injectable 25 Gram(s) IV Push once  dextrose 50% Injectable 25 Gram(s) IV Push once  dronabinol 2.5 milliGRAM(s) Oral two times a day  epoetin braden Injectable 59730 Unit(s) SubCutaneous <User Schedule>  ferrous    sulfate 325 milliGRAM(s) Oral daily  furosemide   Injectable 60 milliGRAM(s) IV Push two times a day  heparin  Injectable 5000 Unit(s) SubCutaneous every 8 hours  hydrALAZINE 100 milliGRAM(s) Oral three times a day  insulin glargine Injectable (LANTUS) 36 Unit(s) SubCutaneous at bedtime  insulin lispro (HumaLOG) corrective regimen sliding scale   SubCutaneous three times a day before meals  insulin lispro (HumaLOG) corrective regimen sliding scale   SubCutaneous at bedtime  insulin lispro Injectable (HumaLOG) 16 Unit(s) SubCutaneous before breakfast  piperacillin/tazobactam IVPB.. 3.375 Gram(s) IV Intermittent every 8 hours  polyethylene glycol 3350 17 Gram(s) Oral every 12 hours  primidone 25 milliGRAM(s) Oral at bedtime  senna 2 Tablet(s) Oral at bedtime    MEDICATIONS  (PRN):  acetaminophen   Tablet .. 650 milliGRAM(s) Oral every 6 hours PRN Mild Pain (1 - 3)  dextrose 40% Gel 15 Gram(s) Oral once PRN Blood Glucose LESS THAN 70 milliGRAM(s)/deciliter  glucagon  Injectable 1 milliGRAM(s) IntraMuscular once PRN Glucose LESS THAN 70 milligrams/deciliter  oxyCODONE    IR 5 milliGRAM(s) Oral every 4 hours PRN Moderate Pain (4 - 6)      Vital Signs Last 24 Hrs  T(C): 36.7 (16 Jul 2019 04:47), Max: 36.8 (15 Jul 2019 11:35)  T(F): 98.1 (16 Jul 2019 04:47), Max: 98.2 (15 Jul 2019 11:35)  HR: 82 (16 Jul 2019 04:47) (77 - 84)  BP: 133/72 (16 Jul 2019 04:47) (127/80 - 164/76)  BP(mean): --  RR: 18 (16 Jul 2019 04:47) (17 - 18)  SpO2: 100% (16 Jul 2019 04:47) (98% - 100%)  CAPILLARY BLOOD GLUCOSE      POCT Blood Glucose.: 125 mg/dL (16 Jul 2019 08:13)  POCT Blood Glucose.: 249 mg/dL (15 Jul 2019 23:08)  POCT Blood Glucose.: 264 mg/dL (15 Jul 2019 18:49)  POCT Blood Glucose.: 273 mg/dL (15 Jul 2019 16:47)  POCT Blood Glucose.: 163 mg/dL (15 Jul 2019 12:26)    I&O's Summary    15 Jul 2019 07:01  -  16 Jul 2019 07:00  --------------------------------------------------------  IN: 600 mL / OUT: 1250 mL / NET: -650 mL        PHYSICAL EXAM:  GENERAL: NAD, well-developed  HEAD:  Atraumatic, Normocephalic  EYES: EOMI, PERRLA, conjunctiva and sclera clear  NECK: Supple, No JVD  CHEST/LUNG: Clear to auscultation bilaterally; No wheeze  HEART: Regular rate and rhythm; No murmurs, rubs, or gallops  ABDOMEN: Soft, Nontender, Nondistended; Bowel sounds present  EXTREMITIES:  2+ Peripheral Pulses, No clubbing, cyanosis, or edema  PSYCH: AAOx3  NEUROLOGY: non-focal  SKIN: No rashes or lesions    LABS:                        8.7    4.70  )-----------( 330      ( 16 Jul 2019 08:50 )             27.8     07-16    138  |  99  |  22  ----------------------------<  153<H>  3.3<L>   |  28  |  2.04<H>    Ca    9.1      16 Jul 2019 06:56  Phos  3.2     07-15  Mg     1.6     07-16                RADIOLOGY & ADDITIONAL TESTS:    Imaging Personally Reviewed:    Consultant(s) Notes Reviewed:      Care Discussed with Consultants/Other Providers:

## 2019-07-16 NOTE — PROGRESS NOTE ADULT - SUBJECTIVE AND OBJECTIVE BOX
Subjective: Patient seen and examined. No new events except as noted.   Feels ok   family at bedside       REVIEW OF SYSTEMS:    CONSTITUTIONAL: + weakness, fevers or chills  EYES/ENT: No visual changes;  No vertigo or throat pain   NECK: No pain or stiffness  RESPIRATORY: No cough, wheezing, hemoptysis; No shortness of breath  CARDIOVASCULAR: No chest pain or palpitations  GASTROINTESTINAL: No abdominal or epigastric pain. No nausea, vomiting, or hematemesis; No diarrhea or constipation. No melena or hematochezia.  GENITOURINARY: No dysuria, frequency or hematuria  NEUROLOGICAL: No numbness or weakness  SKIN: No itching, burning, rashes, or lesions   All other review of systems is negative unless indicated above.    MEDICATIONS:  MEDICATIONS  (STANDING):  amLODIPine   Tablet 10 milliGRAM(s) Oral daily  chlorhexidine 2% Cloths 1 Application(s) Topical daily  collagenase Ointment 1 Application(s) Topical <User Schedule>  dextrose 5%. 1000 milliLiter(s) (50 mL/Hr) IV Continuous <Continuous>  dextrose 50% Injectable 12.5 Gram(s) IV Push once  dextrose 50% Injectable 25 Gram(s) IV Push once  dextrose 50% Injectable 25 Gram(s) IV Push once  dronabinol 2.5 milliGRAM(s) Oral two times a day  epoetin braden Injectable 78537 Unit(s) SubCutaneous <User Schedule>  ferrous    sulfate 325 milliGRAM(s) Oral daily  heparin  Injectable 5000 Unit(s) SubCutaneous every 8 hours  hydrALAZINE 100 milliGRAM(s) Oral three times a day  insulin glargine Injectable (LANTUS) 36 Unit(s) SubCutaneous at bedtime  insulin lispro (HumaLOG) corrective regimen sliding scale   SubCutaneous three times a day before meals  insulin lispro (HumaLOG) corrective regimen sliding scale   SubCutaneous at bedtime  insulin lispro Injectable (HumaLOG) 16 Unit(s) SubCutaneous before breakfast  piperacillin/tazobactam IVPB.. 3.375 Gram(s) IV Intermittent every 8 hours  polyethylene glycol 3350 17 Gram(s) Oral every 12 hours  potassium chloride    Tablet ER 40 milliEquivalent(s) Oral once  primidone 25 milliGRAM(s) Oral at bedtime  senna 2 Tablet(s) Oral at bedtime      PHYSICAL EXAM:  T(C): 36.7 (07-16-19 @ 11:59), Max: 36.7 (07-16-19 @ 04:47)  HR: 77 (07-16-19 @ 11:59) (77 - 83)  BP: 134/76 (07-16-19 @ 11:59) (127/80 - 164/76)  RR: 18 (07-16-19 @ 11:59) (18 - 18)  SpO2: 96% (07-16-19 @ 11:59) (96% - 100%)  Wt(kg): --  I&O's Summary    15 Jul 2019 07:01  -  16 Jul 2019 07:00  --------------------------------------------------------  IN: 600 mL / OUT: 1250 mL / NET: -650 mL    16 Jul 2019 07:01  -  16 Jul 2019 12:47  --------------------------------------------------------  IN: 300 mL / OUT: 0 mL / NET: 300 mL                Appearance: NAd  HEENT:   Dry oral mucosa, PERRL, EOMI	  Lymphatic: No lymphadenopathy +JVP   Cardiovascular: Normal S1 S2, + JVD, No murmurs  Respiratory: Decreased bs	  Psychiatry: A & O x 3, Mood & affect appropriate  Gastrointestinal:  Soft, Non-tender, + BS	  Skin: No rashes, No ecchymoses, No cyanosis	  Neurologic: Non-focal  Extremities:+ edema, R foot ulcer wrapped   Vascular: Peripheral pulses palpable 2+ bilaterally    LABS:    CARDIAC MARKERS:                                8.7    4.70  )-----------( 330      ( 16 Jul 2019 08:50 )             27.8     07-16    138  |  99  |  22  ----------------------------<  153<H>  3.3<L>   |  28  |  2.04<H>    Ca    9.1      16 Jul 2019 06:56  Phos  3.2     07-15  Mg     1.6     07-16      proBNP:   Lipid Profile:   HgA1c:   TSH:             TELEMETRY: 	    ECG:  	  RADIOLOGY:   < from: MR Foot w/wo IV Cont, Right (07.15.19 @ 22:45) >    EXAM:  MR FOOT WAW IC RT                            PROCEDURE DATE:  07/15/2019            INTERPRETATION:    RIGHT FOOT MRI WITH AND WITHOUT CONTRAST    CLINICAL INFORMATION: Right foot third toe wound to the periosteum,   evaluate for osteomyelitis.  TECHNIQUE: Multiplanar, multisequence MRI was obtained of the right foot   with and without contrast. 10 cc IV gadavist administered.  COMPARISON: Right foot radiograph 7/13/2019. MRI right foot 5/30/2019    FINDINGS:      BONE MARROW: There is uncovering of the second and third metatarsal heads   with associated edema, and enhancement. There is low T1 signal within the   second metatarsal head with associated erosive change. There is loss of   the cortical margin of the third metatarsal head. There is new marrow   edema of the sesamoids at the great toe. There is mild subcortical edema   and enhancement in the heads of the fourth and fifth metatarsals which   remains similar to the prior exam. There is marrow edema and enhancement   of the second and third proximal phalanges. Subchondral cystic change   again noted of the medial cuneiform. Unchanged arthrosis of the navicular   and tarsal bones.    MUSCLES AND TENDONS: There is a small amount of fluid around the flexor   hallucis longus tendon proximally with associated enhancement. The second   and third flexor tendons are not seen at the level of the MTP joints and   are torn. There is tenosynovitis of the fourth flexor tendon. The tendons   otherwise appear intact. Edema and atrophy are noted of the intrinsic   muscles of the foot likely related to neuropathic change.     SOFT TISSUES: There is a 1.2 x 1.2 x 3.5 cm. skin ulcer/defect centered   within the second web space which is worse compared to the prior exam.   There is extensive surrounding soft tissue edema. Below the second and   third metatarsal heads, there is absence of enhancement of the soft   tissues suggestive of necrosis or evolving abscess. The previously seen   plantar soft tissue defect underlying the midfoot less prominent than   secondary to granulation tissue.    IMPRESSION:    1.  Skin ulcer of the plantar aspect of the second web space with   associated bone uncovering and osteomyelitis of the second and third   metatarsal head,  2.  Severe osteomyelitis of the second and third metatarsal heads.   Osteomyelitis of the second and third proximal phalanges.  3.  Findings suspicious for reactive osteitis versus early osteomyelitis   of the fourth and fifth metatarsal heads.  4.  New edema and enhancement within the sesamoids concerning for   osteomyelitis.  5.  Tenosynovitis of the flexor pollicis longus tendon sheath of the   infectious or inflammatory in etiology.  6.  Below the second and third metatarsal heads, there is absence of   enhancement of the soft tissues suggestive of necrosis or evolving abscess                LYNETTE MCGRAW MD,RADIOLOGY FELLOW  This document has been electronically signed.  NICK AHMADI M.D., ATTENDING RADIOLOGIST  This document has been electronically signed. Jul 16 2019 11:46AM                < end of copied text >    DIAGNOSTIC TESTING:  [ ] Echocardiogram:  [ ]  Catheterization:  [ ] Stress Test:    OTHER:

## 2019-07-16 NOTE — PROGRESS NOTE ADULT - ASSESSMENT
Assessment  DMT2: 59y Male with DM T2 with hyperglycemia, was on insulin at nursing home,  on basal bolus insulin,  FS trending down, no hypoglycemic episode, eating meals, non compliant with low carb diet.  Foot wound: On wound care, meds, stable.  CAD: on medications, no chest pain, stable, monitored.  HTN: Controlled,  on antihypertensive medications.  CKD: Monitor labs/BMP,             Andrzej Zhu MD  Cell: [phone]  Office: [phone]

## 2019-07-16 NOTE — PROGRESS NOTE ADULT - ASSESSMENT
59M, hx DM, HTN presents from [facility] via EMS due to resp distress. Patient at [facility] due to right diabetic foot ulcer. Patient reported to be hypoxic on room air to 60s. Improved on 15L face mask top 100% O2 sat. Patient awake, alert, oriented. States he has been coughing for 2-3 weeks (dry cough) as well as weakness and shortness of breath for few days.    Acute on chronic diastolic chf  - po lasix 80 qd  - strict Is and Os  - cardiology consult appreciated  - keep O2 sats above 92%    diabetic foot ulcer  - podiatry follow up  - MRI of right foot to r/o osteo of third toe  - pt is cleared by cardiology for debridement  - pt has no medical contraindications for the planned foot surgery    essential tremors improved  - trial of primidone    CKD 4  - monitor cre  - avoid nephrotoxins    diabetes  - fs qid  - hgb a1c  - endocrine consult    htn  - cw norvasc    DVT px

## 2019-07-17 LAB
ANION GAP SERPL CALC-SCNC: 14 MMOL/L — SIGNIFICANT CHANGE UP (ref 5–17)
APTT BLD: 30.7 SEC — SIGNIFICANT CHANGE UP (ref 27.5–36.3)
BLD GP AB SCN SERPL QL: NEGATIVE — SIGNIFICANT CHANGE UP
BUN SERPL-MCNC: 22 MG/DL — SIGNIFICANT CHANGE UP (ref 7–23)
CALCIUM SERPL-MCNC: 8.9 MG/DL — SIGNIFICANT CHANGE UP (ref 8.4–10.5)
CHLORIDE SERPL-SCNC: 97 MMOL/L — SIGNIFICANT CHANGE UP (ref 96–108)
CO2 SERPL-SCNC: 26 MMOL/L — SIGNIFICANT CHANGE UP (ref 22–31)
CREAT SERPL-MCNC: 2.16 MG/DL — HIGH (ref 0.5–1.3)
GLUCOSE BLDC GLUCOMTR-MCNC: 121 MG/DL — HIGH (ref 70–99)
GLUCOSE BLDC GLUCOMTR-MCNC: 145 MG/DL — HIGH (ref 70–99)
GLUCOSE BLDC GLUCOMTR-MCNC: 170 MG/DL — HIGH (ref 70–99)
GLUCOSE BLDC GLUCOMTR-MCNC: 252 MG/DL — HIGH (ref 70–99)
GLUCOSE BLDC GLUCOMTR-MCNC: 55 MG/DL — LOW (ref 70–99)
GLUCOSE BLDC GLUCOMTR-MCNC: 62 MG/DL — LOW (ref 70–99)
GLUCOSE BLDC GLUCOMTR-MCNC: 77 MG/DL — SIGNIFICANT CHANGE UP (ref 70–99)
GLUCOSE SERPL-MCNC: 139 MG/DL — HIGH (ref 70–99)
HCT VFR BLD CALC: 29 % — LOW (ref 39–50)
HGB BLD-MCNC: 9.2 G/DL — LOW (ref 13–17)
INR BLD: 1.11 RATIO — SIGNIFICANT CHANGE UP (ref 0.88–1.16)
MCHC RBC-ENTMCNC: 28.6 PG — SIGNIFICANT CHANGE UP (ref 27–34)
MCHC RBC-ENTMCNC: 31.7 GM/DL — LOW (ref 32–36)
MCV RBC AUTO: 90.1 FL — SIGNIFICANT CHANGE UP (ref 80–100)
PLATELET # BLD AUTO: 334 K/UL — SIGNIFICANT CHANGE UP (ref 150–400)
POTASSIUM SERPL-MCNC: 3.5 MMOL/L — SIGNIFICANT CHANGE UP (ref 3.5–5.3)
POTASSIUM SERPL-SCNC: 3.5 MMOL/L — SIGNIFICANT CHANGE UP (ref 3.5–5.3)
PROTHROM AB SERPL-ACNC: 12.8 SEC — SIGNIFICANT CHANGE UP (ref 10–13.1)
RBC # BLD: 3.22 M/UL — LOW (ref 4.2–5.8)
RBC # FLD: 13.9 % — SIGNIFICANT CHANGE UP (ref 10.3–14.5)
RH IG SCN BLD-IMP: POSITIVE — SIGNIFICANT CHANGE UP
SODIUM SERPL-SCNC: 137 MMOL/L — SIGNIFICANT CHANGE UP (ref 135–145)
WBC # BLD: 4.99 K/UL — SIGNIFICANT CHANGE UP (ref 3.8–10.5)
WBC # FLD AUTO: 4.99 K/UL — SIGNIFICANT CHANGE UP (ref 3.8–10.5)

## 2019-07-17 PROCEDURE — 99253 IP/OBS CNSLTJ NEW/EST LOW 45: CPT

## 2019-07-17 PROCEDURE — 93306 TTE W/DOPPLER COMPLETE: CPT | Mod: 26

## 2019-07-17 RX ORDER — DEXTROSE 50 % IN WATER 50 %
15 SYRINGE (ML) INTRAVENOUS ONCE
Refills: 0 | Status: COMPLETED | OUTPATIENT
Start: 2019-07-17 | End: 2019-07-17

## 2019-07-17 RX ORDER — INSULIN GLARGINE 100 [IU]/ML
32 INJECTION, SOLUTION SUBCUTANEOUS AT BEDTIME
Refills: 0 | Status: DISCONTINUED | OUTPATIENT
Start: 2019-07-17 | End: 2019-07-22

## 2019-07-17 RX ORDER — INSULIN LISPRO 100/ML
10 VIAL (ML) SUBCUTANEOUS
Refills: 0 | Status: DISCONTINUED | OUTPATIENT
Start: 2019-07-17 | End: 2019-07-22

## 2019-07-17 RX ADMIN — Medication 325 MILLIGRAM(S): at 12:51

## 2019-07-17 RX ADMIN — Medication 1: at 22:14

## 2019-07-17 RX ADMIN — Medication 15 GRAM(S): at 12:51

## 2019-07-17 RX ADMIN — Medication 1 APPLICATION(S): at 09:08

## 2019-07-17 RX ADMIN — PIPERACILLIN AND TAZOBACTAM 25 GRAM(S): 4; .5 INJECTION, POWDER, LYOPHILIZED, FOR SOLUTION INTRAVENOUS at 05:19

## 2019-07-17 RX ADMIN — Medication 80 MILLIGRAM(S): at 05:19

## 2019-07-17 RX ADMIN — Medication 1: at 17:45

## 2019-07-17 RX ADMIN — HEPARIN SODIUM 5000 UNIT(S): 5000 INJECTION INTRAVENOUS; SUBCUTANEOUS at 21:58

## 2019-07-17 RX ADMIN — Medication 16 UNIT(S): at 08:51

## 2019-07-17 RX ADMIN — PIPERACILLIN AND TAZOBACTAM 25 GRAM(S): 4; .5 INJECTION, POWDER, LYOPHILIZED, FOR SOLUTION INTRAVENOUS at 21:59

## 2019-07-17 RX ADMIN — INSULIN GLARGINE 32 UNIT(S): 100 INJECTION, SOLUTION SUBCUTANEOUS at 21:58

## 2019-07-17 RX ADMIN — Medication 100 MILLIGRAM(S): at 13:17

## 2019-07-17 RX ADMIN — Medication 100 MILLIGRAM(S): at 21:58

## 2019-07-17 RX ADMIN — PRIMIDONE 25 MILLIGRAM(S): 250 TABLET ORAL at 21:58

## 2019-07-17 RX ADMIN — Medication 100 MILLIGRAM(S): at 05:19

## 2019-07-17 RX ADMIN — HEPARIN SODIUM 5000 UNIT(S): 5000 INJECTION INTRAVENOUS; SUBCUTANEOUS at 13:15

## 2019-07-17 RX ADMIN — AMLODIPINE BESYLATE 10 MILLIGRAM(S): 2.5 TABLET ORAL at 05:19

## 2019-07-17 RX ADMIN — SODIUM CHLORIDE 35 MILLILITER(S): 9 INJECTION INTRAMUSCULAR; INTRAVENOUS; SUBCUTANEOUS at 00:36

## 2019-07-17 RX ADMIN — PIPERACILLIN AND TAZOBACTAM 25 GRAM(S): 4; .5 INJECTION, POWDER, LYOPHILIZED, FOR SOLUTION INTRAVENOUS at 13:15

## 2019-07-17 RX ADMIN — CHLORHEXIDINE GLUCONATE 1 APPLICATION(S): 213 SOLUTION TOPICAL at 12:47

## 2019-07-17 NOTE — PROGRESS NOTE ADULT - ASSESSMENT
DX: 1. DM w/ Neuropathy w/ large Full thickness R plantar foot wound post I & D from Necrotizing Fasciitis (improving) & also w/ R 3rd toe Full Thickness ulcer concerned for OM  TX: 1. Exam        2. Fibrotic wound R 3rd toe, no purulence expressed. DSD applied R 3rd toe, wound VAC place left intact.         3. MRI showing extensive OM involving 2nd through 5th metatarsals        4. Discussed with patient the results of the MR. Explained how if we resect the affected bones, there will still be an open wound on the bottom of the foot, which will take a long time to heal and has the possibility of getting reinfected. Recommend vascular consult for possible BKA evaluation. Discussed case w/ Dr. Osorio        5. Continue IV abx         6. Continue wound VAC on R plantar foot wound           Discussed w/ attending DX: 1. DM w/ Neuropathy w/ large Full thickness R plantar foot wound post I & D from Necrotizing Fasciitis & also w/ R 3rd toe Full Thickness ulcer + OM   2. OM R forefoot confirmed on MRI w/ w/o contrast  TX: 1. Exam        2. Fibro necrotic wound R 3rd toe: Cleaned w/ NS & Antibacterial soap & applied Aquacel w/ DSD; Cleaned R plantar foot extensive wound w/ NS & antibacterial soap & applied Medi Honey w/ NS wet toe dry dressing for now. Recommend Replacing wound VAC R foot         3. Continue IV Abx (Zosyn) Wcx P        4. Patient had Echo today for Optimization for BKA R        5. Pt seen by Vascular today        7. Podiatry will continue to follow

## 2019-07-17 NOTE — PROGRESS NOTE ADULT - SUBJECTIVE AND OBJECTIVE BOX
Patient is a 59y old  Male who presents with a chief complaint of SOB (16 Jul 2019 12:47)      SUBJECTIVE / OVERNIGHT EVENTS:  No chest pain. No shortness of breath. No complaints. No events overnight.     MEDICATIONS  (STANDING):  amLODIPine   Tablet 10 milliGRAM(s) Oral daily  chlorhexidine 2% Cloths 1 Application(s) Topical daily  collagenase Ointment 1 Application(s) Topical <User Schedule>  dextrose 5%. 1000 milliLiter(s) (50 mL/Hr) IV Continuous <Continuous>  dextrose 50% Injectable 12.5 Gram(s) IV Push once  dextrose 50% Injectable 25 Gram(s) IV Push once  dextrose 50% Injectable 25 Gram(s) IV Push once  dronabinol 2.5 milliGRAM(s) Oral two times a day  epoetin braden Injectable 83896 Unit(s) SubCutaneous <User Schedule>  ferrous    sulfate 325 milliGRAM(s) Oral daily  furosemide    Tablet 80 milliGRAM(s) Oral daily  heparin  Injectable 5000 Unit(s) SubCutaneous every 8 hours  hydrALAZINE 100 milliGRAM(s) Oral three times a day  insulin glargine Injectable (LANTUS) 36 Unit(s) SubCutaneous at bedtime  insulin lispro (HumaLOG) corrective regimen sliding scale   SubCutaneous three times a day before meals  insulin lispro (HumaLOG) corrective regimen sliding scale   SubCutaneous at bedtime  insulin lispro Injectable (HumaLOG) 16 Unit(s) SubCutaneous before breakfast  piperacillin/tazobactam IVPB.. 3.375 Gram(s) IV Intermittent every 8 hours  polyethylene glycol 3350 17 Gram(s) Oral every 12 hours  primidone 25 milliGRAM(s) Oral at bedtime  senna 2 Tablet(s) Oral at bedtime  sodium chloride 0.9%. 1000 milliLiter(s) (35 mL/Hr) IV Continuous <Continuous>    MEDICATIONS  (PRN):  acetaminophen   Tablet .. 650 milliGRAM(s) Oral every 6 hours PRN Mild Pain (1 - 3)  dextrose 40% Gel 15 Gram(s) Oral once PRN Blood Glucose LESS THAN 70 milliGRAM(s)/deciliter  glucagon  Injectable 1 milliGRAM(s) IntraMuscular once PRN Glucose LESS THAN 70 milligrams/deciliter  oxyCODONE    IR 5 milliGRAM(s) Oral every 4 hours PRN Moderate Pain (4 - 6)      Vital Signs Last 24 Hrs  T(C): 36.7 (17 Jul 2019 04:36), Max: 36.7 (16 Jul 2019 11:59)  T(F): 98.1 (17 Jul 2019 04:36), Max: 98.1 (16 Jul 2019 20:57)  HR: 81 (17 Jul 2019 04:36) (77 - 87)  BP: 154/84 (17 Jul 2019 04:36) (134/76 - 158/85)  BP(mean): --  RR: 18 (17 Jul 2019 04:36) (17 - 18)  SpO2: 96% (17 Jul 2019 04:36) (96% - 98%)  CAPILLARY BLOOD GLUCOSE      POCT Blood Glucose.: 121 mg/dL (17 Jul 2019 08:14)  POCT Blood Glucose.: 170 mg/dL (16 Jul 2019 21:40)  POCT Blood Glucose.: 139 mg/dL (16 Jul 2019 17:08)  POCT Blood Glucose.: 91 mg/dL (16 Jul 2019 13:27)    I&O's Summary    16 Jul 2019 07:01  -  17 Jul 2019 07:00  --------------------------------------------------------  IN: 1125 mL / OUT: 400 mL / NET: 725 mL    17 Jul 2019 07:01  -  17 Jul 2019 11:17  --------------------------------------------------------  IN: 0 mL / OUT: 300 mL / NET: -300 mL        PHYSICAL EXAM:  GENERAL: NAD, well-developed  HEAD:  Atraumatic, Normocephalic  EYES: EOMI, PERRLA, conjunctiva and sclera clear  NECK: Supple, No JVD  CHEST/LUNG: Clear to auscultation bilaterally; No wheeze  HEART: Regular rate and rhythm; No murmurs, rubs, or gallops  ABDOMEN: Soft, Nontender, Nondistended; Bowel sounds present  EXTREMITIES:  2+ Peripheral Pulses, No clubbing, cyanosis, or edema  PSYCH: AAOx3  NEUROLOGY: non-focal  SKIN: No rashes or lesions    LABS:                        9.2    4.99  )-----------( 334      ( 17 Jul 2019 09:23 )             29.0     07-17    137  |  97  |  22  ----------------------------<  139<H>  3.5   |  26  |  2.16<H>    Ca    8.9      17 Jul 2019 05:23  Mg     1.6     07-16      PT/INR - ( 17 Jul 2019 08:50 )   PT: 12.8 sec;   INR: 1.11 ratio         PTT - ( 17 Jul 2019 08:50 )  PTT:30.7 sec          RADIOLOGY & ADDITIONAL TESTS:    Imaging Personally Reviewed:    < from: MR Foot w/wo IV Cont, Right (07.15.19 @ 22:45) >  IMPRESSION:    1.  Skin ulcer of the plantar aspect of the second web space with   associated bone uncovering and osteomyelitis of the second and third   metatarsal head,  2.  Severe osteomyelitis of the second and third metatarsal heads.   Osteomyelitis of the second and third proximal phalanges.  3.  Findings suspicious for reactive osteitis versus early osteomyelitis   of the fourth and fifth metatarsal heads.  4.  New edema and enhancement within the sesamoids concerning for   osteomyelitis.  5.  Tenosynovitis of the flexor pollicis longus tendon sheath of the   infectious or inflammatory in etiology.  6.  Below the second and third metatarsal heads, there is absence of   enhancement of the soft tissues suggestive of necrosis or evolving abscess          < end of copied text >    Consultant(s) Notes Reviewed:      Care Discussed with Consultants/Other Providers:

## 2019-07-17 NOTE — CONSULT NOTE ADULT - PROBLEM SELECTOR RECOMMENDATION 9
Lasix 60 mg IV bid   Monitor UO and Cr   BIPAP for now
Will increase Lantus to 28 units at bed time.  Will increase Humalog to 14 units before each meal in addition to Humalog correction scale coverage.  Patient counseled for compliance with consistent low carb diet.
I performed a history and physical exam of the patient and discussed  the findings and plan with the house officer. I reviewed the resident note and agree with the findings and plan

## 2019-07-17 NOTE — PROGRESS NOTE ADULT - SUBJECTIVE AND OBJECTIVE BOX
Podiatry pager #: [phone] ([place])/ [phone] ([facility])    Patient is a 59y old  Male who presents with a chief complaint of SOB (17 Jul 2019 11:17)       INTERVAL HPI/OVERNIGHT EVENTS:  Patient seen and evaluated at bedside.  Pt is resting comfortable in NAD. Denies N/V/F/C.     Allergies    No Known Allergies    Intolerances        Vital Signs Last 24 Hrs  T(C): 36.7 (17 Jul 2019 04:36), Max: 36.7 (16 Jul 2019 11:59)  T(F): 98.1 (17 Jul 2019 04:36), Max: 98.1 (16 Jul 2019 20:57)  HR: 81 (17 Jul 2019 04:36) (77 - 87)  BP: 154/84 (17 Jul 2019 04:36) (134/76 - 158/85)  BP(mean): --  RR: 18 (17 Jul 2019 04:36) (17 - 18)  SpO2: 96% (17 Jul 2019 04:36) (96% - 98%)    LABS:                        9.2    4.99  )-----------( 334      ( 17 Jul 2019 09:23 )             29.0     07-17    137  |  97  |  22  ----------------------------<  139<H>  3.5   |  26  |  2.16<H>    Ca    8.9      17 Jul 2019 05:23  Mg     1.6     07-16      PT/INR - ( 17 Jul 2019 08:50 )   PT: 12.8 sec;   INR: 1.11 ratio         PTT - ( 17 Jul 2019 08:50 )  PTT:30.7 sec    CAPILLARY BLOOD GLUCOSE      POCT Blood Glucose.: 121 mg/dL (17 Jul 2019 08:14)  POCT Blood Glucose.: 170 mg/dL (16 Jul 2019 21:40)  POCT Blood Glucose.: 139 mg/dL (16 Jul 2019 17:08)  POCT Blood Glucose.: 91 mg/dL (16 Jul 2019 13:27)      Lower Extremity Physical Exam:  Vascular: DP/PT 2/4 B/L PT 1/4 B/L, CFT intact x 10, Temp gradient N/L B/L  Neurology: Epicritic sensation not intact B/L  Musculoskeletal/Ortho: Hammertoes 2-5 B/L  Skin: R 3rd toe plantar lateral aspect 2 cm x 1 cm Full thickness ulcer to SQ yellow fibrinous tissue no odor no pus concern for OM  R plantar foot 15 cm (L) x [5 1/2 cm (w) midfoot region] x 1 cm deep by the ball of the foot the wound is beefy granular tissue w/ the plantar ball w/ mied fibrogranular tissue, no pus no odor no cellulitis     RADIOLOGY & ADDITIONAL TESTS:  < from: MR Foot w/wo IV Cont, Right (07.15.19 @ 22:45) >    EXAM:  MR FOOT WAW IC RT                            PROCEDURE DATE:  07/15/2019            INTERPRETATION:    RIGHT FOOT MRI WITH AND WITHOUT CONTRAST    CLINICAL INFORMATION: Right foot third toe wound to the periosteum,   evaluate for osteomyelitis.  TECHNIQUE: Multiplanar, multisequence MRI was obtained of the right foot   with and without contrast. 10 cc IV gadavist administered.  COMPARISON: Right foot radiograph 7/13/2019. MRI right foot 5/30/2019    FINDINGS:      BONE MARROW: There is uncovering of the second and third metatarsal heads   with associated edema, and enhancement. There is low T1 signal within the   second metatarsal head with associated erosive change. There is loss of   the cortical margin of the third metatarsal head. There is new marrow   edema of the sesamoids at the great toe. There is mild subcortical edema   and enhancement in the heads of the fourth and fifth metatarsals which   remains similar to the prior exam. There is marrow edema and enhancement   of the second and third proximal phalanges. Subchondral cystic change   again noted of the medial cuneiform. Unchanged arthrosis of the navicular   and tarsal bones.    MUSCLES AND TENDONS: There is a small amount of fluid around the flexor   hallucis longus tendon proximally with associated enhancement. The second   and third flexor tendons are not seen at the level of the MTP joints and   are torn. There is tenosynovitis of the fourth flexor tendon. The tendons   otherwise appear intact. Edema and atrophy are noted of the intrinsic   muscles of the foot likely related to neuropathic change.     SOFT TISSUES: There is a 1.2 x 1.2 x 3.5 cm. skin ulcer/defect centered   within the second web space which is worse compared to the prior exam.   There is extensive surrounding soft tissue edema. Below the second and   third metatarsal heads, there is absence of enhancement of the soft   tissues suggestive of necrosis or evolving abscess. The previously seen   plantar soft tissue defect underlying the midfoot less prominent than   secondary to granulation tissue.    IMPRESSION:    1.  Skin ulcer of the plantar aspect of the second web space with   associated bone uncovering and osteomyelitis of the second and third   metatarsal head,  2.  Severe osteomyelitis of the second and third metatarsal heads.   Osteomyelitis of the second and third proximal phalanges.  3.  Findings suspicious for reactive osteitis versus early osteomyelitis   of the fourth and fifth metatarsal heads.  4.  New edema and enhancement within the sesamoids concerning for   osteomyelitis.  5.  Tenosynovitis of the flexor pollicis longus tendon sheath of the   infectious or inflammatory in etiology.  6.  Below the second and third metatarsal heads, there is absence of   enhancement of the soft tissues suggestive of necrosis or evolving abscess                LYNETTE MCGRAW MD,RADIOLOGY FELLOW  This document has been electronically signed.  NICK AHMADI M.D., ATTENDING RADIOLOGIST  This document has been electronically signed. Jul 16 2019 11:46AM                < end of copied text > Podiatry pager #: [phone] ([place])/ [phone] ([facility])    Patient is a 59y old  Male who presents with a chief complaint of SOB (17 Jul 2019 11:17)       INTERVAL HPI/OVERNIGHT EVENTS:  Patient seen and evaluated at bedside.  Pt is resting comfortable in NAD. Denies N/V/F/C.  pain scale 0/10.    Allergies    No Known Allergies    Intolerances        Vital Signs Last 24 Hrs  T(C): 36.7 (17 Jul 2019 04:36), Max: 36.7 (16 Jul 2019 11:59)  T(F): 98.1 (17 Jul 2019 04:36), Max: 98.1 (16 Jul 2019 20:57)  HR: 81 (17 Jul 2019 04:36) (77 - 87)  BP: 154/84 (17 Jul 2019 04:36) (134/76 - 158/85)  BP(mean): --  RR: 18 (17 Jul 2019 04:36) (17 - 18)  SpO2: 96% (17 Jul 2019 04:36) (96% - 98%)    LABS:                        9.2    4.99  )-----------( 334      ( 17 Jul 2019 09:23 )             29.0     07-17    137  |  97  |  22  ----------------------------<  139<H>  3.5   |  26  |  2.16<H>    Ca    8.9      17 Jul 2019 05:23  Mg     1.6     07-16      PT/INR - ( 17 Jul 2019 08:50 )   PT: 12.8 sec;   INR: 1.11 ratio         PTT - ( 17 Jul 2019 08:50 )  PTT:30.7 sec    CAPILLARY BLOOD GLUCOSE      POCT Blood Glucose.: 121 mg/dL (17 Jul 2019 08:14)  POCT Blood Glucose.: 170 mg/dL (16 Jul 2019 21:40)  POCT Blood Glucose.: 139 mg/dL (16 Jul 2019 17:08)  POCT Blood Glucose.: 91 mg/dL (16 Jul 2019 13:27)      Lower Extremity Physical Exam:  Vascular: DP/PT 2/4 B/L PT 1/4 B/L, CFT intact x 10, Temp gradient N/L B/L  Neurology: Epicritic sensation not intact B/L  Musculoskeletal/Ortho: Hammertoes 2-5 B/L  Skin: R 3rd toe + edema plantar lateral aspect 2 cm x 1 cm Full thickness ulcer to bone fibronecrotic tissue no odor mild serous fluid, tracts to met head  R plantar foot 15 cm (L) x [5 1/2 cm (w) midfoot region] x 1 cm deep by the ball of the foot the wound is fibro granular tissue w/ the plantar ball w/ mixed  fibro granular tissue, serous fluid, no odor no cellulitis, R foot + edema  RADIOLOGY & ADDITIONAL TESTS:      EXAM:  MR FOOT WAW IC RT                            PROCEDURE DATE:  07/15/2019            INTERPRETATION:    RIGHT FOOT MRI WITH AND WITHOUT CONTRAST    CLINICAL INFORMATION: Right foot third toe wound to the periosteum,   evaluate for osteomyelitis.  TECHNIQUE: Multiplanar, multisequence MRI was obtained of the right foot   with and without contrast. 10 cc IV gadavist administered.  COMPARISON: Right foot radiograph 7/13/2019. MRI right foot 5/30/2019    FINDINGS:      BONE MARROW: There is uncovering of the second and third metatarsal heads   with associated edema, and enhancement. There is low T1 signal within the   second metatarsal head with associated erosive change. There is loss of   the cortical margin of the third metatarsal head. There is new marrow   edema of the sesamoids at the great toe. There is mild subcortical edema   and enhancement in the heads of the fourth and fifth metatarsals which   remains similar to the prior exam. There is marrow edema and enhancement   of the second and third proximal phalanges. Subchondral cystic change   again noted of the medial cuneiform. Unchanged arthrosis of the navicular   and tarsal bones.    MUSCLES AND TENDONS: There is a small amount of fluid around the flexor   hallucis longus tendon proximally with associated enhancement. The second   and third flexor tendons are not seen at the level of the MTP joints and   are torn. There is tenosynovitis of the fourth flexor tendon. The tendons   otherwise appear intact. Edema and atrophy are noted of the intrinsic   muscles of the foot likely related to neuropathic change.     SOFT TISSUES: There is a 1.2 x 1.2 x 3.5 cm. skin ulcer/defect centered   within the second web space which is worse compared to the prior exam.   There is extensive surrounding soft tissue edema. Below the second and   third metatarsal heads, there is absence of enhancement of the soft   tissues suggestive of necrosis or evolving abscess. The previously seen   plantar soft tissue defect underlying the midfoot less prominent than   secondary to granulation tissue.    IMPRESSION:    1.  Skin ulcer of the plantar aspect of the second web space with   associated bone uncovering and osteomyelitis of the second and third   metatarsal head,  2.  Severe osteomyelitis of the second and third metatarsal heads.   Osteomyelitis of the second and third proximal phalanges.  3.  Findings suspicious for reactive osteitis versus early osteomyelitis   of the fourth and fifth metatarsal heads.  4.  New edema and enhancement within the sesamoids concerning for   osteomyelitis.  5.  Tenosynovitis of the flexor pollicis longus tendon sheath of the   infectious or inflammatory in etiology.  6.  Below the second and third metatarsal heads, there is absence of   enhancement of the soft tissues suggestive of necrosis or evolving abscess                LYNETTE MCGRAW MD,RADIOLOGY FELLOW  This document has been electronically signed.  NICK AHMADI M.D., ATTENDING RADIOLOGIST  This document has been electronically signed. Jul 16 2019 11:46AM                < end of copied text >

## 2019-07-17 NOTE — PROGRESS NOTE ADULT - ASSESSMENT
59M, hx DM, HTN presents from [facility] via EMS due to resp distress. Patient at [facility] due to right diabetic foot ulcer. Patient reported to be hypoxic on room air to 60s. Improved on 15L face mask top 100% O2 sat. Patient awake, alert, oriented. States he has been coughing for 2-3 weeks (dry cough) as well as weakness and shortness of breath for few days.    Acute on chronic diastolic chf  - po lasix 80 qd  - strict Is and Os  - cardiology consult appreciated  - keep O2 sats above 92%    diabetic foot ulcer  - podiatry follow up  - MRI of right foot  done  - pt is cleared by cardiology for debridement  - pt has no medical contraindications for the planned foot surgery    essential tremors improved  - trial of primidone    CKD 4  - monitor cre  - avoid nephrotoxins    diabetes  - fs qid  - hgb a1c  - endocrine consult following    htn  - cw norvasc    DVT px

## 2019-07-17 NOTE — PROGRESS NOTE ADULT - ASSESSMENT
Assessment  DMT2: 59y Male with DM T2 with hyperglycemia, was on insulin at nursing home,  on basal bolus insulin,  FS trending down, had hypoglycemic episode, eating partial meals, non compliant with low carb diet.  Foot wound: On wound care, meds, stable.  CAD: on medications, no chest pain, stable, monitored.  HTN: Controlled,  on antihypertensive medications.  CKD: Monitor labs/BMP,             Andrzej Zhu MD  Cell: [phone]  Office: [phone]

## 2019-07-17 NOTE — CONSULT NOTE ADULT - SUBJECTIVE AND OBJECTIVE BOX
CC: 59y old Male admitted with a chief complaint of SOB    HPI:  59M, hx DM, HTN presents from [facility] via EMS due to resp distress. Patient at [facility] due to right diabetic foot ulcer. Patient reported to be hypoxic on room air to 60s. Improved on 15L face mask top 100% O2 sat. NO fevers or chills, nausea or vomiting, headache, chest pain, abdominal pain, diarrhea or constipation. Reports BL leg swelling - chronic, no calf pain. No hx DVT or PE. Vascular consulted due to non-healing right foot wound at plantar aspect. Patient denies hx of COPD/Emphysema. Recently hospitalized, 5/20-6/14,     PMHx: Hypertension  Insulin dependent diabetes mellitus  Venous insufficiency of both lower extremities  HTN (hypertension)  BPH (benign prostatic hypertrophy)  Diabetes    PSHx: History of cholecystectomy  No significant past surgical history  S/P laparoscopic cholecystectomy  Status post incision and drainage  No significant past surgical history    Medications (inpatient): amLODIPine   Tablet 10 milliGRAM(s) Oral daily  chlorhexidine 2% Cloths 1 Application(s) Topical daily  collagenase Ointment 1 Application(s) Topical <User Schedule>  dextrose 5%. 1000 milliLiter(s) IV Continuous <Continuous>  dextrose 50% Injectable 12.5 Gram(s) IV Push once  dextrose 50% Injectable 25 Gram(s) IV Push once  dextrose 50% Injectable 25 Gram(s) IV Push once  dronabinol 2.5 milliGRAM(s) Oral two times a day  epoetin braden Injectable 49152 Unit(s) SubCutaneous <User Schedule>  ferrous    sulfate 325 milliGRAM(s) Oral daily  furosemide    Tablet 80 milliGRAM(s) Oral daily  heparin  Injectable 5000 Unit(s) SubCutaneous every 8 hours  hydrALAZINE 100 milliGRAM(s) Oral three times a day  insulin glargine Injectable (LANTUS) 32 Unit(s) SubCutaneous at bedtime  insulin lispro (HumaLOG) corrective regimen sliding scale   SubCutaneous three times a day before meals  insulin lispro (HumaLOG) corrective regimen sliding scale   SubCutaneous at bedtime  insulin lispro Injectable (HumaLOG) 10 Unit(s) SubCutaneous before breakfast  piperacillin/tazobactam IVPB.. 3.375 Gram(s) IV Intermittent every 8 hours  polyethylene glycol 3350 17 Gram(s) Oral every 12 hours  primidone 25 milliGRAM(s) Oral at bedtime  senna 2 Tablet(s) Oral at bedtime    Medications (PRN):acetaminophen   Tablet .. 650 milliGRAM(s) Oral every 6 hours PRN  dextrose 40% Gel 15 Gram(s) Oral once PRN  glucagon  Injectable 1 milliGRAM(s) IntraMuscular once PRN  oxyCODONE    IR 5 milliGRAM(s) Oral every 4 hours PRN    Allergies: No Known Allergies  (Intolerances: )  Social Hx:   Family Hx: Paternal family history of emphysema  Family history of diabetes mellitus (DM) (Grandparent)      Physical Exam  T(C): 37.1  HR: 88 (81 - 98)  BP: 137/76 (137/76 - 158/85)  RR: 18 (17 - 18)  SpO2: 93% (92% - 98%)  Tmax: T(C): , Max: 37.1 (07-17-19 @ 20:14)    07-16-19  -  07-17-19  --------------------------------------------------------  IN:    IV PiggyBack: 100 mL    Oral Fluid: 780 mL    sodium chloride 0.9%: 245 mL  Total IN: 1125 mL    OUT:    Voided: 400 mL  Total OUT: 400 mL    Total NET: 725 mL      07-17-19 - 07-17-19  --------------------------------------------------------  IN:    Oral Fluid: 360 mL    sodium chloride 0.9%: 175 mL  Total IN: 535 mL    OUT:    Voided: 600 mL  Total OUT: 600 mL    Total NET: -65 mL        General: well developed, well nourished, NAD  Neuro: alert and oriented, no focal deficits, moves all extremities spontaneously  HEENT: NCAT, EOMI, anicteric, mucosa moist  Respiratory: airway patent, respirations unlabored  CVS: regular rate and rhythm  Abdomen: soft, nontender, nondistended  Extremities: RLE plantar wound no pus draining, DP/PT signals present on right, palpable DP/PT on left  Skin: warm, dry, appropriate color    Labs:                        9.2    4.99  )-----------( 334      ( 17 Jul 2019 09:23 )             29.0     PT/INR - ( 17 Jul 2019 08:50 )   PT: 12.8 sec;   INR: 1.11 ratio         PTT - ( 17 Jul 2019 08:50 )  PTT:30.7 sec  07-17    137  |  97  |  22  ----------------------------<  139<H>  3.5   |  26  |  2.16<H>    Ca    8.9      17 Jul 2019 05:23  Mg     1.6     07-16              Imaging and other studies: CC: 59y old Male admitted with a chief complaint of SOB    HPI:  59M, hx DM, HTN presents from [facility] via EMS due to resp distress. Patient at [facility] due to right diabetic foot ulcer. Patient reported to be hypoxic on room air to 60s. Improved on 15L face mask top 100% O2 sat. NO fevers or chills, nausea or vomiting, headache, chest pain, abdominal pain, diarrhea or constipation. Reports BL leg swelling - chronic, no calf pain. No hx DVT or PE. Vascular consulted due to non-healing right foot wound at plantar aspect. Patient denies hx of COPD/Emphysema. Recently hospitalized, 5/20-6/14,     PMHx: Hypertension  Insulin dependent diabetes mellitus  Venous insufficiency of both lower extremities  HTN (hypertension)  BPH (benign prostatic hypertrophy)  Diabetes    PSHx: History of cholecystectomy  No significant past surgical history  S/P laparoscopic cholecystectomy  Status post incision and drainage  No significant past surgical history    Medications (inpatient): amLODIPine   Tablet 10 milliGRAM(s) Oral daily  chlorhexidine 2% Cloths 1 Application(s) Topical daily  collagenase Ointment 1 Application(s) Topical <User Schedule>  dextrose 5%. 1000 milliLiter(s) IV Continuous <Continuous>  dextrose 50% Injectable 12.5 Gram(s) IV Push once  dextrose 50% Injectable 25 Gram(s) IV Push once  dextrose 50% Injectable 25 Gram(s) IV Push once  dronabinol 2.5 milliGRAM(s) Oral two times a day  epoetin braden Injectable 94282 Unit(s) SubCutaneous <User Schedule>  ferrous    sulfate 325 milliGRAM(s) Oral daily  furosemide    Tablet 80 milliGRAM(s) Oral daily  heparin  Injectable 5000 Unit(s) SubCutaneous every 8 hours  hydrALAZINE 100 milliGRAM(s) Oral three times a day  insulin glargine Injectable (LANTUS) 32 Unit(s) SubCutaneous at bedtime  insulin lispro (HumaLOG) corrective regimen sliding scale   SubCutaneous three times a day before meals  insulin lispro (HumaLOG) corrective regimen sliding scale   SubCutaneous at bedtime  insulin lispro Injectable (HumaLOG) 10 Unit(s) SubCutaneous before breakfast  piperacillin/tazobactam IVPB.. 3.375 Gram(s) IV Intermittent every 8 hours  polyethylene glycol 3350 17 Gram(s) Oral every 12 hours  primidone 25 milliGRAM(s) Oral at bedtime  senna 2 Tablet(s) Oral at bedtime    Medications (PRN):acetaminophen   Tablet .. 650 milliGRAM(s) Oral every 6 hours PRN  dextrose 40% Gel 15 Gram(s) Oral once PRN  glucagon  Injectable 1 milliGRAM(s) IntraMuscular once PRN  oxyCODONE    IR 5 milliGRAM(s) Oral every 4 hours PRN    Allergies: No Known Allergies  (Intolerances: )  Social Hx:   Family Hx: Paternal family history of emphysema  Family history of diabetes mellitus (DM) (Grandparent)      Physical Exam  T(C): 37.1  HR: 88 (81 - 98)  BP: 137/76 (137/76 - 158/85)  RR: 18 (17 - 18)  SpO2: 93% (92% - 98%)  Tmax: T(C): , Max: 37.1 (07-17-19 @ 20:14)    07-16-19  -  07-17-19  --------------------------------------------------------  IN:    IV PiggyBack: 100 mL    Oral Fluid: 780 mL    sodium chloride 0.9%: 245 mL  Total IN: 1125 mL    OUT:    Voided: 400 mL  Total OUT: 400 mL    Total NET: 725 mL      07-17-19 - 07-17-19  --------------------------------------------------------  IN:    Oral Fluid: 360 mL    sodium chloride 0.9%: 175 mL  Total IN: 535 mL    OUT:    Voided: 600 mL  Total OUT: 600 mL    Total NET: -65 mL        General: well developed, well nourished, NAD  Neuro: alert and oriented, no focal deficits, moves all extremities spontaneously  HEENT: NCAT, EOMI, anicteric, mucosa moist  Respiratory: airway patent, respirations unlabored  CVS: regular rate and rhythm  Abdomen: soft, nontender, nondistended  Extremities: RLE plantar wound no pus draining, DP/PT signals present on right, palpable DP/PT on left  Skin: warm, dry, appropriate color    Labs:                        9.2    4.99  )-----------( 334      ( 17 Jul 2019 09:23 )             29.0     PT/INR - ( 17 Jul 2019 08:50 )   PT: 12.8 sec;   INR: 1.11 ratio         PTT - ( 17 Jul 2019 08:50 )  PTT:30.7 sec  07-17    137  |  97  |  22  ----------------------------<  139<H>  3.5   |  26  |  2.16<H>    Ca    8.9      17 Jul 2019 05:23  Mg     1.6     07-16        MRI right foot reviewed

## 2019-07-17 NOTE — CONSULT NOTE ADULT - ATTENDING COMMENTS
59 -year-old male with diabetes and profound neuropathy admitted to [facility] for respiratory distress. the patient is doing well on O2 oxygen and CPAP machine. The patient was found to have fluid on his lungs. Patient is status post multiple surgeries on his right foot when necrotizing fasciitis she is now stable and has a full-thickness complicated wound. The patient is on Vac therapy. The patient was going for a revisional debridement with application of [place] when he experienced respiratory distress the case was canceled the patient was transferred to [facility] or medical management of respiratory distress. Vascular: DP 2/4 bilat PT 1/4 Bilat  CR intact 1-4 bilat, TG N/L bilat; Neuro: SWN 10 g not intact bilat feet Vibratory Not intact 1st MPJ bilat feet, AJ 2/4 bilat; Ortho: pes planus foot bilat Hammertoes 2-5 bilat, Bunion bilat; Derm: Full thickness wound plantar foot 19 cm (L) x 3 cm (w) x 2 cm deep By ball of foot would w/ beefy granular tissue w/ fibrogranuar tissue at ball of foot, no active drainage, no odor, no cellulitis. R 3rd to lateral aspect fibrotic ulcer 1 cm (d) does not probe to bone no odor. no pain due to DPN
59 y.o M w/ PMH IDDM c/b R. foot ulcer s/p debridement and wound vac (June 2019), HTN, and CKD admitted w/ pulmonary edema. Seems likely due to after load from poorly controlled HTN as well as volume from prbc. Does not have infectious sx currently. Pt s/p alsix and bipap and reports significant improvement in sx. Agree with cotn bipap, lasix  use and nitrates. Does not need ICU at this time.
I performed a history and physical exam of the patient and discussed  the findings and plan with the house officer. I reviewed the resident note and agree with the findings and plan

## 2019-07-17 NOTE — CONSULT NOTE ADULT - PROBLEM SELECTOR RECOMMENDATION 2
Continue wound care, podiatry FU
RISS
I performed a history and physical exam of the patient and discussed  the findings and plan with the house officer. I reviewed the resident note and agree with the findings and plan

## 2019-07-17 NOTE — PROGRESS NOTE ADULT - SUBJECTIVE AND OBJECTIVE BOX
Chief complaint  Patient is a 59y old  Male who presents with a chief complaint of SOB (17 Jul 2019 11:29)   Review of systems  Patient in bed, looks comfortable, no fever, had hypoglycemia.    Labs and Fingersticks  CAPILLARY BLOOD GLUCOSE      POCT Blood Glucose.: 77 mg/dL (17 Jul 2019 13:14)  POCT Blood Glucose.: 62 mg/dL (17 Jul 2019 12:44)  POCT Blood Glucose.: 55 mg/dL (17 Jul 2019 12:43)  POCT Blood Glucose.: 121 mg/dL (17 Jul 2019 08:14)  POCT Blood Glucose.: 170 mg/dL (16 Jul 2019 21:40)  POCT Blood Glucose.: 139 mg/dL (16 Jul 2019 17:08)      Anion Gap, Serum: 14 (07-17 @ 05:23)  Anion Gap, Serum: 11 (07-16 @ 06:56)      Calcium, Total Serum: 8.9 (07-17 @ 05:23)  Calcium, Total Serum: 9.1 (07-16 @ 06:56)          07-17    137  |  97  |  22  ----------------------------<  139<H>  3.5   |  26  |  2.16<H>    Ca    8.9      17 Jul 2019 05:23  Mg     1.6     07-16                          9.2    4.99  )-----------( 334      ( 17 Jul 2019 09:23 )             29.0     Medications  MEDICATIONS  (STANDING):  amLODIPine   Tablet 10 milliGRAM(s) Oral daily  chlorhexidine 2% Cloths 1 Application(s) Topical daily  collagenase Ointment 1 Application(s) Topical <User Schedule>  dextrose 5%. 1000 milliLiter(s) (50 mL/Hr) IV Continuous <Continuous>  dextrose 50% Injectable 12.5 Gram(s) IV Push once  dextrose 50% Injectable 25 Gram(s) IV Push once  dextrose 50% Injectable 25 Gram(s) IV Push once  dronabinol 2.5 milliGRAM(s) Oral two times a day  epoetin braden Injectable 04608 Unit(s) SubCutaneous <User Schedule>  ferrous    sulfate 325 milliGRAM(s) Oral daily  furosemide    Tablet 80 milliGRAM(s) Oral daily  heparin  Injectable 5000 Unit(s) SubCutaneous every 8 hours  hydrALAZINE 100 milliGRAM(s) Oral three times a day  insulin glargine Injectable (LANTUS) 32 Unit(s) SubCutaneous at bedtime  insulin lispro (HumaLOG) corrective regimen sliding scale   SubCutaneous three times a day before meals  insulin lispro (HumaLOG) corrective regimen sliding scale   SubCutaneous at bedtime  insulin lispro Injectable (HumaLOG) 10 Unit(s) SubCutaneous before breakfast  piperacillin/tazobactam IVPB.. 3.375 Gram(s) IV Intermittent every 8 hours  polyethylene glycol 3350 17 Gram(s) Oral every 12 hours  primidone 25 milliGRAM(s) Oral at bedtime  senna 2 Tablet(s) Oral at bedtime  sodium chloride 0.9%. 1000 milliLiter(s) (35 mL/Hr) IV Continuous <Continuous>      Physical Exam  General: Patient comfortable in bed  Vital Signs Last 12 Hrs  T(F): 98.1 (07-17-19 @ 12:54), Max: 98.1 (07-17-19 @ 04:36)  HR: 98 (07-17-19 @ 12:54) (81 - 98)  BP: 138/69 (07-17-19 @ 12:54) (138/69 - 154/84)  BP(mean): --  RR: 18 (07-17-19 @ 12:54) (18 - 18)  SpO2: 92% (07-17-19 @ 12:54) (92% - 96%)  Neck: No palpable thyroid nodules.  CVS: S1S2, No murmurs  Respiratory: No wheezing, no crepitations  GI: Abdomen soft, bowel sounds positive  Musculoskeletal:  edema lower extremities.   Skin: No skin rashes, no ecchymosis    Diagnostics

## 2019-07-17 NOTE — CONSULT NOTE ADULT - ASSESSMENT
59M, hx DM, HTN presents from [facility] via EMS due to resp distress. Patient at [facility] due to right diabetic foot ulcer. Vascular consulted for non-healing right foot wound.    - c/w abx  - apprec. pods recs  - obtain JOSIANE/PVR   - care per primary team     Discussed with attending     Vascular Surgery [phone]

## 2019-07-17 NOTE — PROGRESS NOTE ADULT - SUBJECTIVE AND OBJECTIVE BOX
Subjective: Patient seen and examined. No new events except as noted.   resting comfortably     REVIEW OF SYSTEMS:    CONSTITUTIONAL: + weakness, fevers or chills  EYES/ENT: No visual changes;  No vertigo or throat pain   NECK: No pain or stiffness  RESPIRATORY: No cough, wheezing, hemoptysis; No shortness of breath  CARDIOVASCULAR: No chest pain or palpitations  GASTROINTESTINAL: No abdominal or epigastric pain. No nausea, vomiting, or hematemesis; No diarrhea or constipation. No melena or hematochezia.  GENITOURINARY: No dysuria, frequency or hematuria  NEUROLOGICAL: No numbness or weakness  SKIN: No itching, burning, rashes, or lesions   All other review of systems is negative unless indicated above.    MEDICATIONS:  MEDICATIONS  (STANDING):  amLODIPine   Tablet 10 milliGRAM(s) Oral daily  chlorhexidine 2% Cloths 1 Application(s) Topical daily  collagenase Ointment 1 Application(s) Topical <User Schedule>  dextrose 5%. 1000 milliLiter(s) (50 mL/Hr) IV Continuous <Continuous>  dextrose 50% Injectable 12.5 Gram(s) IV Push once  dextrose 50% Injectable 25 Gram(s) IV Push once  dextrose 50% Injectable 25 Gram(s) IV Push once  dronabinol 2.5 milliGRAM(s) Oral two times a day  epoetin braden Injectable 34339 Unit(s) SubCutaneous <User Schedule>  ferrous    sulfate 325 milliGRAM(s) Oral daily  furosemide    Tablet 80 milliGRAM(s) Oral daily  heparin  Injectable 5000 Unit(s) SubCutaneous every 8 hours  hydrALAZINE 100 milliGRAM(s) Oral three times a day  insulin glargine Injectable (LANTUS) 36 Unit(s) SubCutaneous at bedtime  insulin lispro (HumaLOG) corrective regimen sliding scale   SubCutaneous three times a day before meals  insulin lispro (HumaLOG) corrective regimen sliding scale   SubCutaneous at bedtime  insulin lispro Injectable (HumaLOG) 16 Unit(s) SubCutaneous before breakfast  piperacillin/tazobactam IVPB.. 3.375 Gram(s) IV Intermittent every 8 hours  polyethylene glycol 3350 17 Gram(s) Oral every 12 hours  primidone 25 milliGRAM(s) Oral at bedtime  senna 2 Tablet(s) Oral at bedtime  sodium chloride 0.9%. 1000 milliLiter(s) (35 mL/Hr) IV Continuous <Continuous>      PHYSICAL EXAM:  T(C): 36.7 (07-17-19 @ 04:36), Max: 36.7 (07-16-19 @ 11:59)  HR: 81 (07-17-19 @ 04:36) (77 - 87)  BP: 154/84 (07-17-19 @ 04:36) (134/76 - 158/85)  RR: 18 (07-17-19 @ 04:36) (17 - 18)  SpO2: 96% (07-17-19 @ 04:36) (96% - 98%)  Wt(kg): --  I&O's Summary    16 Jul 2019 07:01  -  17 Jul 2019 07:00  --------------------------------------------------------  IN: 1125 mL / OUT: 400 mL / NET: 725 mL    17 Jul 2019 07:01  -  17 Jul 2019 11:29  --------------------------------------------------------  IN: 0 mL / OUT: 300 mL / NET: -300 mL              Appearance: NAd  HEENT:   Dry oral mucosa, PERRL, EOMI	  Lymphatic: No lymphadenopathy +JVP   Cardiovascular: Normal S1 S2, + JVD, No murmurs  Respiratory: Decreased bs	  Psychiatry: A & O x 3, Mood & affect appropriate  Gastrointestinal:  Soft, Non-tender, + BS	  Skin: No rashes, No ecchymoses, No cyanosis	  Neurologic: Non-focal  Extremities:+ edema, R foot ulcer wrapped   Vascular: Peripheral pulses palpable 2+ bilaterally        LABS:    CARDIAC MARKERS:                                9.2    4.99  )-----------( 334      ( 17 Jul 2019 09:23 )             29.0     07-17    137  |  97  |  22  ----------------------------<  139<H>  3.5   |  26  |  2.16<H>    Ca    8.9      17 Jul 2019 05:23  Mg     1.6     07-16      proBNP:   Lipid Profile:   HgA1c:   TSH:             TELEMETRY: 	    ECG:  	  RADIOLOGY:   DIAGNOSTIC TESTING:  [ ] Echocardiogram:  [ ]  Catheterization:  [ ] Stress Test:    OTHER:

## 2019-07-18 DIAGNOSIS — I77.1 STRICTURE OF ARTERY: ICD-10-CM

## 2019-07-18 DIAGNOSIS — M86.171 OTHER ACUTE OSTEOMYELITIS, RIGHT ANKLE AND FOOT: ICD-10-CM

## 2019-07-18 LAB
-  AMIKACIN: SIGNIFICANT CHANGE UP
-  AMIKACIN: SIGNIFICANT CHANGE UP
-  AMPICILLIN/SULBACTAM: SIGNIFICANT CHANGE UP
-  AMPICILLIN/SULBACTAM: SIGNIFICANT CHANGE UP
-  AMPICILLIN: SIGNIFICANT CHANGE UP
-  AMPICILLIN: SIGNIFICANT CHANGE UP
-  AZTREONAM: SIGNIFICANT CHANGE UP
-  AZTREONAM: SIGNIFICANT CHANGE UP
-  CEFAZOLIN: SIGNIFICANT CHANGE UP
-  CEFEPIME: SIGNIFICANT CHANGE UP
-  CEFEPIME: SIGNIFICANT CHANGE UP
-  CEFOXITIN: SIGNIFICANT CHANGE UP
-  CEFOXITIN: SIGNIFICANT CHANGE UP
-  CEFTRIAXONE: SIGNIFICANT CHANGE UP
-  CEFTRIAXONE: SIGNIFICANT CHANGE UP
-  CIPROFLOXACIN: SIGNIFICANT CHANGE UP
-  CIPROFLOXACIN: SIGNIFICANT CHANGE UP
-  ERTAPENEM: SIGNIFICANT CHANGE UP
-  ERTAPENEM: SIGNIFICANT CHANGE UP
-  GENTAMICIN: SIGNIFICANT CHANGE UP
-  GENTAMICIN: SIGNIFICANT CHANGE UP
-  IMIPENEM: SIGNIFICANT CHANGE UP
-  IMIPENEM: SIGNIFICANT CHANGE UP
-  LEVOFLOXACIN: SIGNIFICANT CHANGE UP
-  LEVOFLOXACIN: SIGNIFICANT CHANGE UP
-  MEROPENEM: SIGNIFICANT CHANGE UP
-  MEROPENEM: SIGNIFICANT CHANGE UP
-  PIPERACILLIN/TAZOBACTAM: SIGNIFICANT CHANGE UP
-  PIPERACILLIN/TAZOBACTAM: SIGNIFICANT CHANGE UP
-  TOBRAMYCIN: SIGNIFICANT CHANGE UP
-  TOBRAMYCIN: SIGNIFICANT CHANGE UP
-  TRIMETHOPRIM/SULFAMETHOXAZOLE: SIGNIFICANT CHANGE UP
-  TRIMETHOPRIM/SULFAMETHOXAZOLE: SIGNIFICANT CHANGE UP
ANION GAP SERPL CALC-SCNC: 13 MMOL/L — SIGNIFICANT CHANGE UP (ref 5–17)
BUN SERPL-MCNC: 22 MG/DL — SIGNIFICANT CHANGE UP (ref 7–23)
CALCIUM SERPL-MCNC: 8.6 MG/DL — SIGNIFICANT CHANGE UP (ref 8.4–10.5)
CHLORIDE SERPL-SCNC: 100 MMOL/L — SIGNIFICANT CHANGE UP (ref 96–108)
CO2 SERPL-SCNC: 27 MMOL/L — SIGNIFICANT CHANGE UP (ref 22–31)
CREAT SERPL-MCNC: 2.18 MG/DL — HIGH (ref 0.5–1.3)
GLUCOSE BLDC GLUCOMTR-MCNC: 106 MG/DL — HIGH (ref 70–99)
GLUCOSE BLDC GLUCOMTR-MCNC: 136 MG/DL — HIGH (ref 70–99)
GLUCOSE BLDC GLUCOMTR-MCNC: 149 MG/DL — HIGH (ref 70–99)
GLUCOSE BLDC GLUCOMTR-MCNC: 225 MG/DL — HIGH (ref 70–99)
GLUCOSE SERPL-MCNC: 153 MG/DL — HIGH (ref 70–99)
HBA1C BLD-MCNC: 7.1 % — HIGH (ref 4–5.6)
MAGNESIUM SERPL-MCNC: 1.5 MG/DL — LOW (ref 1.6–2.6)
METHOD TYPE: SIGNIFICANT CHANGE UP
METHOD TYPE: SIGNIFICANT CHANGE UP
PHOSPHATE SERPL-MCNC: 3.2 MG/DL — SIGNIFICANT CHANGE UP (ref 2.5–4.5)
POTASSIUM SERPL-MCNC: 3.3 MMOL/L — LOW (ref 3.5–5.3)
POTASSIUM SERPL-SCNC: 3.3 MMOL/L — LOW (ref 3.5–5.3)
SODIUM SERPL-SCNC: 140 MMOL/L — SIGNIFICANT CHANGE UP (ref 135–145)

## 2019-07-18 PROCEDURE — 93923 UPR/LXTR ART STDY 3+ LVLS: CPT | Mod: 26

## 2019-07-18 PROCEDURE — 93010 ELECTROCARDIOGRAM REPORT: CPT

## 2019-07-18 PROCEDURE — 99232 SBSQ HOSP IP/OBS MODERATE 35: CPT

## 2019-07-18 RX ORDER — DRONABINOL 2.5 MG
2.5 CAPSULE ORAL
Refills: 0 | Status: DISCONTINUED | OUTPATIENT
Start: 2019-07-18 | End: 2019-07-25

## 2019-07-18 RX ORDER — POTASSIUM CHLORIDE 20 MEQ
40 PACKET (EA) ORAL EVERY 4 HOURS
Refills: 0 | Status: COMPLETED | OUTPATIENT
Start: 2019-07-18 | End: 2019-07-18

## 2019-07-18 RX ORDER — MAGNESIUM SULFATE 500 MG/ML
2 VIAL (ML) INJECTION ONCE
Refills: 0 | Status: COMPLETED | OUTPATIENT
Start: 2019-07-18 | End: 2019-07-18

## 2019-07-18 RX ADMIN — Medication 40 MILLIEQUIVALENT(S): at 13:41

## 2019-07-18 RX ADMIN — Medication 100 MILLIGRAM(S): at 13:12

## 2019-07-18 RX ADMIN — Medication 100 MILLIGRAM(S): at 21:34

## 2019-07-18 RX ADMIN — AMLODIPINE BESYLATE 10 MILLIGRAM(S): 2.5 TABLET ORAL at 05:21

## 2019-07-18 RX ADMIN — Medication 100 MILLIGRAM(S): at 05:21

## 2019-07-18 RX ADMIN — CHLORHEXIDINE GLUCONATE 1 APPLICATION(S): 213 SOLUTION TOPICAL at 12:35

## 2019-07-18 RX ADMIN — PIPERACILLIN AND TAZOBACTAM 25 GRAM(S): 4; .5 INJECTION, POWDER, LYOPHILIZED, FOR SOLUTION INTRAVENOUS at 21:34

## 2019-07-18 RX ADMIN — Medication 40 MILLIEQUIVALENT(S): at 17:05

## 2019-07-18 RX ADMIN — PRIMIDONE 25 MILLIGRAM(S): 250 TABLET ORAL at 21:34

## 2019-07-18 RX ADMIN — PIPERACILLIN AND TAZOBACTAM 25 GRAM(S): 4; .5 INJECTION, POWDER, LYOPHILIZED, FOR SOLUTION INTRAVENOUS at 13:10

## 2019-07-18 RX ADMIN — HEPARIN SODIUM 5000 UNIT(S): 5000 INJECTION INTRAVENOUS; SUBCUTANEOUS at 05:20

## 2019-07-18 RX ADMIN — PIPERACILLIN AND TAZOBACTAM 25 GRAM(S): 4; .5 INJECTION, POWDER, LYOPHILIZED, FOR SOLUTION INTRAVENOUS at 05:20

## 2019-07-18 RX ADMIN — Medication 325 MILLIGRAM(S): at 13:12

## 2019-07-18 RX ADMIN — SENNA PLUS 2 TABLET(S): 8.6 TABLET ORAL at 21:34

## 2019-07-18 RX ADMIN — Medication 10 UNIT(S): at 08:17

## 2019-07-18 RX ADMIN — HEPARIN SODIUM 5000 UNIT(S): 5000 INJECTION INTRAVENOUS; SUBCUTANEOUS at 13:12

## 2019-07-18 RX ADMIN — Medication 80 MILLIGRAM(S): at 05:21

## 2019-07-18 RX ADMIN — Medication 50 GRAM(S): at 13:41

## 2019-07-18 RX ADMIN — ERYTHROPOIETIN 10000 UNIT(S): 10000 INJECTION, SOLUTION INTRAVENOUS; SUBCUTANEOUS at 08:17

## 2019-07-18 RX ADMIN — HEPARIN SODIUM 5000 UNIT(S): 5000 INJECTION INTRAVENOUS; SUBCUTANEOUS at 21:34

## 2019-07-18 RX ADMIN — INSULIN GLARGINE 32 UNIT(S): 100 INJECTION, SOLUTION SUBCUTANEOUS at 22:07

## 2019-07-18 NOTE — PROGRESS NOTE ADULT - ASSESSMENT
59M, hx DM, HTN presents from [facility] via EMS due to resp distress. Patient at [facility] due to right diabetic foot ulcer. Patient reported to be hypoxic on room air to 60s. Improved on 15L face mask top 100% O2 sat. Patient awake, alert, oriented. States he has been coughing for 2-3 weeks (dry cough) as well as weakness and shortness of breath for few days.    Acute on chronic diastolic chf  - po lasix 80 qd  - strict Is and Os  - cardiology consult appreciated  - keep O2 sats above 92%    diabetic foot ulcer  - podiatry follow up  - MRI of right foot  done  - podiatry reccomending BKA  - was seen by vascular  - pt ha no medical contraindications for BKA    essential tremors improved  - trial of primidone    CKD 4  - monitor cre  - avoid nephrotoxins    diabetes  - fs qid  - hgb a1c  - endocrine consult following    htn  - cw norvasc    DVT px

## 2019-07-18 NOTE — DIETITIAN INITIAL EVALUATION ADULT. - ENERGY NEEDS
Ht: 72 inches Wt: 188.7 lbs (7/18) BMI: 25.5 kg/m2, IBW: 178 lbs +/- 10%, %IBW: %  Edema: +2 L ankle, +3 R Foot. Skin: Right foot diabetic ulcer

## 2019-07-18 NOTE — PROGRESS NOTE ADULT - ASSESSMENT
Assessment  DMT2: 59y Male with DM T2 with hyperglycemia, on basal bolus insulin,  FS improved, no hypoglycemic episode, eating partial meals, non compliant with low carb diet.  Foot wound: On wound care, meds, stable.  CAD: on medications, no chest pain, stable, monitored.  HTN: Controlled,  on antihypertensive medications.  CKD: Monitor labs/BMP,             Andrzej Zhu MD  Cell: [phone]  Office: [phone]

## 2019-07-18 NOTE — PROVIDER CONTACT NOTE (OTHER) - SITUATION
Patient experiencing episodes of runs of Junctional Tach (longest 18 sec HR up to 120). First time this occurring on tele.

## 2019-07-18 NOTE — PROGRESS NOTE ADULT - SUBJECTIVE AND OBJECTIVE BOX
Subjective: Patient seen and examined. No new events except as noted.   feels ok   no cp or sob     REVIEW OF SYSTEMS:    CONSTITUTIONAL: + weakness, fevers or chills  EYES/ENT: No visual changes;  No vertigo or throat pain   NECK: No pain or stiffness  RESPIRATORY: No cough, wheezing, hemoptysis; No shortness of breath  CARDIOVASCULAR: No chest pain or palpitations  GASTROINTESTINAL: No abdominal or epigastric pain. No nausea, vomiting, or hematemesis; No diarrhea or constipation. No melena or hematochezia.  GENITOURINARY: No dysuria, frequency or hematuria  NEUROLOGICAL: No numbness or weakness  SKIN: No itching, burning, rashes, or lesions   All other review of systems is negative unless indicated above.    MEDICATIONS:  MEDICATIONS  (STANDING):  amLODIPine   Tablet 10 milliGRAM(s) Oral daily  chlorhexidine 2% Cloths 1 Application(s) Topical daily  collagenase Ointment 1 Application(s) Topical <User Schedule>  dextrose 5%. 1000 milliLiter(s) (50 mL/Hr) IV Continuous <Continuous>  dextrose 50% Injectable 12.5 Gram(s) IV Push once  dextrose 50% Injectable 25 Gram(s) IV Push once  dextrose 50% Injectable 25 Gram(s) IV Push once  dronabinol 2.5 milliGRAM(s) Oral two times a day  epoetin braden Injectable 79490 Unit(s) SubCutaneous <User Schedule>  ferrous    sulfate 325 milliGRAM(s) Oral daily  furosemide    Tablet 80 milliGRAM(s) Oral daily  heparin  Injectable 5000 Unit(s) SubCutaneous every 8 hours  hydrALAZINE 100 milliGRAM(s) Oral three times a day  insulin glargine Injectable (LANTUS) 32 Unit(s) SubCutaneous at bedtime  insulin lispro (HumaLOG) corrective regimen sliding scale   SubCutaneous three times a day before meals  insulin lispro (HumaLOG) corrective regimen sliding scale   SubCutaneous at bedtime  insulin lispro Injectable (HumaLOG) 10 Unit(s) SubCutaneous before breakfast  piperacillin/tazobactam IVPB.. 3.375 Gram(s) IV Intermittent every 8 hours  polyethylene glycol 3350 17 Gram(s) Oral every 12 hours  primidone 25 milliGRAM(s) Oral at bedtime  senna 2 Tablet(s) Oral at bedtime      PHYSICAL EXAM:  T(C): 36.8 (07-18-19 @ 05:12), Max: 37.1 (07-17-19 @ 20:14)  HR: 84 (07-18-19 @ 05:12) (84 - 98)  BP: 151/84 (07-18-19 @ 05:12) (131/77 - 151/84)  RR: 18 (07-18-19 @ 05:12) (18 - 18)  SpO2: 97% (07-18-19 @ 05:12) (92% - 97%)  Wt(kg): --  I&O's Summary    17 Jul 2019 07:01  -  18 Jul 2019 07:00  --------------------------------------------------------  IN: 875 mL / OUT: 1525 mL / NET: -650 mL    18 Jul 2019 07:01  -  18 Jul 2019 11:32  --------------------------------------------------------  IN: 240 mL / OUT: 0 mL / NET: 240 mL          Appearance: Normal	  HEENT:   Normal oral mucosa, PERRL, EOMI	  Lymphatic: No lymphadenopathy , no edema  Cardiovascular: Normal S1 S2, No JVD, No murmurs , Peripheral pulses palpable 2+ bilaterally  Respiratory: Lungs clear to auscultation, normal effort 	  Gastrointestinal:  Soft, Non-tender, + BS	  Skin: No rashes, No ecchymoses, No cyanosis, warm to touch  Musculoskeletal: Normal range of motion, normal strength  Psychiatry:  Mood & affect appropriate  Ext: + edema  L foot wrapped   + VAC     LABS:    CARDIAC MARKERS:                                9.2    4.99  )-----------( 334      ( 17 Jul 2019 09:23 )             29.0     07-18    140  |  100  |  22  ----------------------------<  153<H>  3.3<L>   |  27  |  2.18<H>    Ca    8.6      18 Jul 2019 06:32  Phos  3.2     07-18  Mg     1.5     07-18      proBNP:   Lipid Profile:   HgA1c: Hemoglobin A1C, Whole Blood: 7.1 % (07-18 @ 08:32)    TSH:             TELEMETRY: 	    ECG:  	  RADIOLOGY:   DIAGNOSTIC TESTING:  [ ] Echocardiogram:  [ ]  Catheterization:  [ ] Stress Test:    OTHER:

## 2019-07-18 NOTE — PROGRESS NOTE ADULT - SUBJECTIVE AND OBJECTIVE BOX
Patient is a 59y old  Male who presents with a chief complaint of SOB (18 Jul 2019 11:32)      SUBJECTIVE / OVERNIGHT EVENTS:  No chest pain. No shortness of breath. No complaints. No events overnight.     MEDICATIONS  (STANDING):  amLODIPine   Tablet 10 milliGRAM(s) Oral daily  chlorhexidine 2% Cloths 1 Application(s) Topical daily  collagenase Ointment 1 Application(s) Topical <User Schedule>  dextrose 5%. 1000 milliLiter(s) (50 mL/Hr) IV Continuous <Continuous>  dextrose 50% Injectable 12.5 Gram(s) IV Push once  dextrose 50% Injectable 25 Gram(s) IV Push once  dextrose 50% Injectable 25 Gram(s) IV Push once  dronabinol 2.5 milliGRAM(s) Oral two times a day  epoetin braden Injectable 87389 Unit(s) SubCutaneous <User Schedule>  ferrous    sulfate 325 milliGRAM(s) Oral daily  heparin  Injectable 5000 Unit(s) SubCutaneous every 8 hours  hydrALAZINE 100 milliGRAM(s) Oral three times a day  insulin glargine Injectable (LANTUS) 32 Unit(s) SubCutaneous at bedtime  insulin lispro (HumaLOG) corrective regimen sliding scale   SubCutaneous three times a day before meals  insulin lispro (HumaLOG) corrective regimen sliding scale   SubCutaneous at bedtime  insulin lispro Injectable (HumaLOG) 10 Unit(s) SubCutaneous before breakfast  magnesium sulfate  IVPB 2 Gram(s) IV Intermittent once  piperacillin/tazobactam IVPB.. 3.375 Gram(s) IV Intermittent every 8 hours  polyethylene glycol 3350 17 Gram(s) Oral every 12 hours  potassium chloride    Tablet ER 40 milliEquivalent(s) Oral every 4 hours  primidone 25 milliGRAM(s) Oral at bedtime  senna 2 Tablet(s) Oral at bedtime    MEDICATIONS  (PRN):  acetaminophen   Tablet .. 650 milliGRAM(s) Oral every 6 hours PRN Mild Pain (1 - 3)  dextrose 40% Gel 15 Gram(s) Oral once PRN Blood Glucose LESS THAN 70 milliGRAM(s)/deciliter  glucagon  Injectable 1 milliGRAM(s) IntraMuscular once PRN Glucose LESS THAN 70 milligrams/deciliter  oxyCODONE    IR 5 milliGRAM(s) Oral every 4 hours PRN Moderate Pain (4 - 6)      Vital Signs Last 24 Hrs  T(C): 36.7 (18 Jul 2019 11:42), Max: 37.1 (17 Jul 2019 20:14)  T(F): 98 (18 Jul 2019 11:42), Max: 98.8 (17 Jul 2019 20:14)  HR: 80 (18 Jul 2019 12:58) (80 - 88)  BP: 150/76 (18 Jul 2019 12:58) (131/77 - 155/83)  BP(mean): --  RR: 17 (18 Jul 2019 11:42) (17 - 18)  SpO2: 98% (18 Jul 2019 11:42) (93% - 98%)  CAPILLARY BLOOD GLUCOSE      POCT Blood Glucose.: 106 mg/dL (18 Jul 2019 12:10)  POCT Blood Glucose.: 136 mg/dL (18 Jul 2019 07:59)  POCT Blood Glucose.: 252 mg/dL (17 Jul 2019 21:28)  POCT Blood Glucose.: 170 mg/dL (17 Jul 2019 16:41)  POCT Blood Glucose.: 145 mg/dL (17 Jul 2019 14:30)  POCT Blood Glucose.: 77 mg/dL (17 Jul 2019 13:14)    I&O's Summary    17 Jul 2019 07:01  -  18 Jul 2019 07:00  --------------------------------------------------------  IN: 875 mL / OUT: 1525 mL / NET: -650 mL    18 Jul 2019 07:01  -  18 Jul 2019 13:13  --------------------------------------------------------  IN: 240 mL / OUT: 450 mL / NET: -210 mL        PHYSICAL EXAM:  GENERAL: NAD, well-developed  HEAD:  Atraumatic, Normocephalic  EYES: EOMI, PERRLA, conjunctiva and sclera clear  NECK: Supple, No JVD  CHEST/LUNG: Clear to auscultation bilaterally; No wheeze  HEART: Regular rate and rhythm; No murmurs, rubs, or gallops  ABDOMEN: Soft, Nontender, Nondistended; Bowel sounds present  EXTREMITIES:  2+ Peripheral Pulses, No clubbing, cyanosis, or edema  PSYCH: AAOx3  NEUROLOGY: non-focal  SKIN: No rashes or lesions    LABS:                        9.2    4.99  )-----------( 334      ( 17 Jul 2019 09:23 )             29.0     07-18    140  |  100  |  22  ----------------------------<  153<H>  3.3<L>   |  27  |  2.18<H>    Ca    8.6      18 Jul 2019 06:32  Phos  3.2     07-18  Mg     1.5     07-18      PT/INR - ( 17 Jul 2019 08:50 )   PT: 12.8 sec;   INR: 1.11 ratio         PTT - ( 17 Jul 2019 08:50 )  PTT:30.7 sec          RADIOLOGY & ADDITIONAL TESTS:    Imaging Personally Reviewed:    < from: TTE with Doppler (w/Cont) (07.17.19 @ 10:06) >  Conclusions:  1. Increased relative wall thickness with normal left  ventricular mass index, consistent with concentric left  ventricular remodeling.  2. Endocardial visualization enhanced with intravenous  injection of Ultrasonic Enhancing Agent (Definity). Normal  left ventricular systolic function.  3. The right ventricle is not well visualized; grossly  preserved  right ventricular systolic function.    < end of copied text >    Consultant(s) Notes Reviewed:      Care Discussed with Consultants/Other Providers:

## 2019-07-18 NOTE — DIETITIAN INITIAL EVALUATION ADULT. - OTHER INFO
Per chart, 60 y/o Male from rehab with right diabetic foot ulcer with hx of T2DM, HTN, CHF, CKD4    Diet Hx: Pt states he has good appetite and is eating all of meals during his hospital stay. Pt denies allergies, chewing/swallowing difficulty, nausea/vomiting and GI distress. At home, pt admits to poor compliance to a diabetic diet, by eating out often and eating whatever he pleases. However, pt is aware that diet is poor, and is interested in making changes. Pt also had misconception regarding carbohydrates and affect of insulin on blood sugar. Pt stated he often takes insulin post-meal when sugars are high, in addition to skipping long-acting insulin at night when sugars are mid-100s because he experiences morning lows. Pt reports he checks sugars 2-3 times daily, numbers from mid-100s to low 200s. HgbA1c (5/21/19) 11.8% and (7/18) 7.1% (?).  Pt receptive to diet education, written education provided.    Weight Hx:   (5/27/19) 246 pounds  (6/12/19) 166 pounds  (7/11/19) 235 pounds- per rehab documentation  (7/18/19) 188 pounds.  Pt states UBW is 245 pounds, and endorses some wt loss since prior hospital admission, however denies large change in wt. Pt states he was weighed at 235 pounds at rehab facility one week PTA. Question accuracy weight discrepancies. Will continue to monitor. Pt currently endorses good appetite.

## 2019-07-18 NOTE — DIETITIAN INITIAL EVALUATION ADULT. - ADD RECOMMEND
1) Provided pt with extensive DM diet education and written materials. 2) Discussed importance of adequate PO intake for wound healing. 3) Recommend to follow-up with education. 4) Recommend to continue current consistent carb and low-sodium diet 5) Monitor weights and blood sugars

## 2019-07-18 NOTE — PROGRESS NOTE ADULT - SUBJECTIVE AND OBJECTIVE BOX
Chief complaint  Patient is a 59y old  Male who presents with a chief complaint of SOB (18 Jul 2019 13:13)   Review of systems  Patient in bed, looks comfortable, no fever, no hypoglycemia.    Labs and Fingersticks  CAPILLARY BLOOD GLUCOSE      POCT Blood Glucose.: 149 mg/dL (18 Jul 2019 16:42)  POCT Blood Glucose.: 106 mg/dL (18 Jul 2019 12:10)  POCT Blood Glucose.: 136 mg/dL (18 Jul 2019 07:59)  POCT Blood Glucose.: 252 mg/dL (17 Jul 2019 21:28)      Anion Gap, Serum: 13 (07-18 @ 06:32)  Anion Gap, Serum: 14 (07-17 @ 05:23)    Hemoglobin A1C, Whole Blood: 7.1 <H> (07-18 @ 08:32)    Calcium, Total Serum: 8.6 (07-18 @ 06:32)  Calcium, Total Serum: 8.9 (07-17 @ 05:23)          07-18    140  |  100  |  22  ----------------------------<  153<H>  3.3<L>   |  27  |  2.18<H>    Ca    8.6      18 Jul 2019 06:32  Phos  3.2     07-18  Mg     1.5     07-18                          9.2    4.99  )-----------( 334      ( 17 Jul 2019 09:23 )             29.0     Medications  MEDICATIONS  (STANDING):  amLODIPine   Tablet 10 milliGRAM(s) Oral daily  chlorhexidine 2% Cloths 1 Application(s) Topical daily  collagenase Ointment 1 Application(s) Topical <User Schedule>  dextrose 5%. 1000 milliLiter(s) (50 mL/Hr) IV Continuous <Continuous>  dextrose 50% Injectable 12.5 Gram(s) IV Push once  dextrose 50% Injectable 25 Gram(s) IV Push once  dextrose 50% Injectable 25 Gram(s) IV Push once  dronabinol 2.5 milliGRAM(s) Oral two times a day  epoetin braden Injectable 99584 Unit(s) SubCutaneous <User Schedule>  ferrous    sulfate 325 milliGRAM(s) Oral daily  heparin  Injectable 5000 Unit(s) SubCutaneous every 8 hours  hydrALAZINE 100 milliGRAM(s) Oral three times a day  insulin glargine Injectable (LANTUS) 32 Unit(s) SubCutaneous at bedtime  insulin lispro (HumaLOG) corrective regimen sliding scale   SubCutaneous three times a day before meals  insulin lispro (HumaLOG) corrective regimen sliding scale   SubCutaneous at bedtime  insulin lispro Injectable (HumaLOG) 10 Unit(s) SubCutaneous before breakfast  piperacillin/tazobactam IVPB.. 3.375 Gram(s) IV Intermittent every 8 hours  polyethylene glycol 3350 17 Gram(s) Oral every 12 hours  primidone 25 milliGRAM(s) Oral at bedtime  senna 2 Tablet(s) Oral at bedtime      Physical Exam  General: Patient comfortable in bed  Vital Signs Last 12 Hrs  T(F): 98 (07-18-19 @ 11:42), Max: 98 (07-18-19 @ 11:42)  HR: 80 (07-18-19 @ 12:58) (80 - 88)  BP: 150/76 (07-18-19 @ 12:58) (150/76 - 155/83)  BP(mean): --  RR: 17 (07-18-19 @ 11:42) (17 - 17)  SpO2: 98% (07-18-19 @ 11:42) (98% - 98%)  Neck: No palpable thyroid nodules.  CVS: S1S2, No murmurs  Respiratory: No wheezing, no crepitations  GI: Abdomen soft, bowel sounds positive  Musculoskeletal:  edema lower extremities.   Skin: No skin rashes, no ecchymosis    Diagnostics

## 2019-07-18 NOTE — PROVIDER CONTACT NOTE (OTHER) - ASSESSMENT
Patient experiencing episodes of runs of Junctional Tach. VSS. Asymptomatic. No c/o palpitations / chest pain / discomfort.

## 2019-07-18 NOTE — DIETITIAN INITIAL EVALUATION ADULT. - PERTINENT LABORATORY DATA
Potassium 3.3 L, Glucose serum 153 H  Finger Sticks: (7/17-7/18) 106-252  HgbA1c (7/18/19): 7.1%  HgbA1c (5/21/19) 11.8%

## 2019-07-19 LAB
ANION GAP SERPL CALC-SCNC: 11 MMOL/L — SIGNIFICANT CHANGE UP (ref 5–17)
BUN SERPL-MCNC: 18 MG/DL — SIGNIFICANT CHANGE UP (ref 7–23)
CALCIUM SERPL-MCNC: 8.7 MG/DL — SIGNIFICANT CHANGE UP (ref 8.4–10.5)
CHLORIDE SERPL-SCNC: 99 MMOL/L — SIGNIFICANT CHANGE UP (ref 96–108)
CO2 SERPL-SCNC: 28 MMOL/L — SIGNIFICANT CHANGE UP (ref 22–31)
CREAT SERPL-MCNC: 2 MG/DL — HIGH (ref 0.5–1.3)
GLUCOSE BLDC GLUCOMTR-MCNC: 176 MG/DL — HIGH (ref 70–99)
GLUCOSE BLDC GLUCOMTR-MCNC: 177 MG/DL — HIGH (ref 70–99)
GLUCOSE BLDC GLUCOMTR-MCNC: 184 MG/DL — HIGH (ref 70–99)
GLUCOSE BLDC GLUCOMTR-MCNC: 214 MG/DL — HIGH (ref 70–99)
GLUCOSE SERPL-MCNC: 204 MG/DL — HIGH (ref 70–99)
HCT VFR BLD CALC: 29.7 % — LOW (ref 39–50)
HGB BLD-MCNC: 9.3 G/DL — LOW (ref 13–17)
MAGNESIUM SERPL-MCNC: 1.9 MG/DL — SIGNIFICANT CHANGE UP (ref 1.6–2.6)
MCHC RBC-ENTMCNC: 28.4 PG — SIGNIFICANT CHANGE UP (ref 27–34)
MCHC RBC-ENTMCNC: 31.3 GM/DL — LOW (ref 32–36)
MCV RBC AUTO: 90.8 FL — SIGNIFICANT CHANGE UP (ref 80–100)
PLATELET # BLD AUTO: 338 K/UL — SIGNIFICANT CHANGE UP (ref 150–400)
POTASSIUM SERPL-MCNC: 3.9 MMOL/L — SIGNIFICANT CHANGE UP (ref 3.5–5.3)
POTASSIUM SERPL-SCNC: 3.9 MMOL/L — SIGNIFICANT CHANGE UP (ref 3.5–5.3)
RBC # BLD: 3.27 M/UL — LOW (ref 4.2–5.8)
RBC # FLD: 14 % — SIGNIFICANT CHANGE UP (ref 10.3–14.5)
SODIUM SERPL-SCNC: 138 MMOL/L — SIGNIFICANT CHANGE UP (ref 135–145)
WBC # BLD: 5.13 K/UL — SIGNIFICANT CHANGE UP (ref 3.8–10.5)
WBC # FLD AUTO: 5.13 K/UL — SIGNIFICANT CHANGE UP (ref 3.8–10.5)

## 2019-07-19 PROCEDURE — 99232 SBSQ HOSP IP/OBS MODERATE 35: CPT

## 2019-07-19 RX ADMIN — Medication 1: at 17:14

## 2019-07-19 RX ADMIN — POLYETHYLENE GLYCOL 3350 17 GRAM(S): 17 POWDER, FOR SOLUTION ORAL at 06:07

## 2019-07-19 RX ADMIN — Medication 1 APPLICATION(S): at 13:58

## 2019-07-19 RX ADMIN — PIPERACILLIN AND TAZOBACTAM 25 GRAM(S): 4; .5 INJECTION, POWDER, LYOPHILIZED, FOR SOLUTION INTRAVENOUS at 22:12

## 2019-07-19 RX ADMIN — Medication 325 MILLIGRAM(S): at 12:16

## 2019-07-19 RX ADMIN — HEPARIN SODIUM 5000 UNIT(S): 5000 INJECTION INTRAVENOUS; SUBCUTANEOUS at 15:19

## 2019-07-19 RX ADMIN — HEPARIN SODIUM 5000 UNIT(S): 5000 INJECTION INTRAVENOUS; SUBCUTANEOUS at 06:06

## 2019-07-19 RX ADMIN — Medication 10 UNIT(S): at 08:14

## 2019-07-19 RX ADMIN — Medication 100 MILLIGRAM(S): at 22:12

## 2019-07-19 RX ADMIN — PIPERACILLIN AND TAZOBACTAM 25 GRAM(S): 4; .5 INJECTION, POWDER, LYOPHILIZED, FOR SOLUTION INTRAVENOUS at 17:11

## 2019-07-19 RX ADMIN — PRIMIDONE 25 MILLIGRAM(S): 250 TABLET ORAL at 22:12

## 2019-07-19 RX ADMIN — SENNA PLUS 2 TABLET(S): 8.6 TABLET ORAL at 22:19

## 2019-07-19 RX ADMIN — INSULIN GLARGINE 32 UNIT(S): 100 INJECTION, SOLUTION SUBCUTANEOUS at 22:18

## 2019-07-19 RX ADMIN — POLYETHYLENE GLYCOL 3350 17 GRAM(S): 17 POWDER, FOR SOLUTION ORAL at 17:14

## 2019-07-19 RX ADMIN — Medication 100 MILLIGRAM(S): at 13:58

## 2019-07-19 RX ADMIN — CHLORHEXIDINE GLUCONATE 1 APPLICATION(S): 213 SOLUTION TOPICAL at 12:14

## 2019-07-19 RX ADMIN — Medication 2.5 MILLIGRAM(S): at 06:06

## 2019-07-19 RX ADMIN — PIPERACILLIN AND TAZOBACTAM 25 GRAM(S): 4; .5 INJECTION, POWDER, LYOPHILIZED, FOR SOLUTION INTRAVENOUS at 06:06

## 2019-07-19 RX ADMIN — Medication 1: at 08:14

## 2019-07-19 RX ADMIN — Medication 100 MILLIGRAM(S): at 06:06

## 2019-07-19 RX ADMIN — AMLODIPINE BESYLATE 10 MILLIGRAM(S): 2.5 TABLET ORAL at 06:06

## 2019-07-19 RX ADMIN — Medication 1: at 12:15

## 2019-07-19 RX ADMIN — HEPARIN SODIUM 5000 UNIT(S): 5000 INJECTION INTRAVENOUS; SUBCUTANEOUS at 22:19

## 2019-07-19 NOTE — PROGRESS NOTE ADULT - ASSESSMENT
59M, hx DM, HTN presents from [facility] via EMS due to resp distress. Patient at [facility] due to right diabetic foot ulcer. Patient reported to be hypoxic on room air to 60s. Improved on 15L face mask top 100% O2 sat. Patient awake, alert, oriented. States he has been coughing for 2-3 weeks (dry cough) as well as weakness and shortness of breath for few days.    Acute on chronic diastolic chf  - po lasix 80 qd  - strict Is and Os  - cardiology consult appreciated  - keep O2 sats above 92%    diabetic foot ulcer  - podiatry follow up  - MRI of right foot  done  - podiatry recommending BKA  - was seen by vascular  - BKA if right lower ext scheduled for tuesday  - pt ha no medical contraindications for BKA    essential tremors   - trial of primidone  - neuro consult called    CKD 4  - monitor cre  - avoid nephrotoxins    diabetes  - fs qid  - hgb a1c  - endocrine consult following    htn  - cw norvasc    DVT px

## 2019-07-19 NOTE — PROGRESS NOTE ADULT - SUBJECTIVE AND OBJECTIVE BOX
Subjective: Patient seen and examined. No new events except as noted.   depressed about new to have an amputation   no cp or sob     REVIEW OF SYSTEMS:    CONSTITUTIONAL: No weakness, fevers or chills  EYES/ENT: No visual changes;  No vertigo or throat pain   NECK: No pain or stiffness  RESPIRATORY: No cough, wheezing, hemoptysis; No shortness of breath  CARDIOVASCULAR: No chest pain or palpitations  GASTROINTESTINAL: No abdominal or epigastric pain. No nausea, vomiting, or hematemesis; No diarrhea or constipation. No melena or hematochezia.  GENITOURINARY: No dysuria, frequency or hematuria  NEUROLOGICAL: No numbness or weakness  SKIN: No itching, burning, rashes, or lesions   All other review of systems is negative unless indicated above.    MEDICATIONS:  MEDICATIONS  (STANDING):  amLODIPine   Tablet 10 milliGRAM(s) Oral daily  chlorhexidine 2% Cloths 1 Application(s) Topical daily  collagenase Ointment 1 Application(s) Topical <User Schedule>  dextrose 5%. 1000 milliLiter(s) (50 mL/Hr) IV Continuous <Continuous>  dextrose 50% Injectable 12.5 Gram(s) IV Push once  dextrose 50% Injectable 25 Gram(s) IV Push once  dextrose 50% Injectable 25 Gram(s) IV Push once  dronabinol 2.5 milliGRAM(s) Oral two times a day  epoetin braden Injectable 06747 Unit(s) SubCutaneous <User Schedule>  ferrous    sulfate 325 milliGRAM(s) Oral daily  heparin  Injectable 5000 Unit(s) SubCutaneous every 8 hours  hydrALAZINE 100 milliGRAM(s) Oral three times a day  insulin glargine Injectable (LANTUS) 32 Unit(s) SubCutaneous at bedtime  insulin lispro (HumaLOG) corrective regimen sliding scale   SubCutaneous three times a day before meals  insulin lispro (HumaLOG) corrective regimen sliding scale   SubCutaneous at bedtime  insulin lispro Injectable (HumaLOG) 10 Unit(s) SubCutaneous before breakfast  piperacillin/tazobactam IVPB.. 3.375 Gram(s) IV Intermittent every 8 hours  polyethylene glycol 3350 17 Gram(s) Oral every 12 hours  primidone 25 milliGRAM(s) Oral at bedtime  senna 2 Tablet(s) Oral at bedtime      PHYSICAL EXAM:  T(C): 36.9 (07-19-19 @ 04:03), Max: 36.9 (07-18-19 @ 21:05)  HR: 85 (07-19-19 @ 10:30) (74 - 88)  BP: 150/77 (07-19-19 @ 10:30) (120/70 - 155/83)  RR: 18 (07-19-19 @ 04:03) (17 - 18)  SpO2: 96% (07-19-19 @ 10:30) (96% - 98%)  Wt(kg): --  I&O's Summary    18 Jul 2019 07:01  -  19 Jul 2019 07:00  --------------------------------------------------------  IN: 870 mL / OUT: 1700 mL / NET: -830 mL    19 Jul 2019 07:01  -  19 Jul 2019 10:51  --------------------------------------------------------  IN: 230 mL / OUT: 325 mL / NET: -95 mL          Appearance: NAD  HEENT:   Normal oral mucosa, PERRL, EOMI	  Lymphatic: No lymphadenopathy , no edema  Cardiovascular: Normal S1 S2, No JVD, No murmurs , Peripheral pulses palpable 2+ bilaterally  Respiratory: Lungs clear to auscultation, normal effort 	  Gastrointestinal:  Soft, Non-tender, + BS	  Skin: No rashes, No ecchymoses, No cyanosis, warm to touch  Musculoskeletal: Normal range of motion, normal strength  Psychiatry:  Mood & affect appropriate  Ext: L LEg wrapped, + VAC       LABS:    CARDIAC MARKERS:                                9.3    5.13  )-----------( 338      ( 19 Jul 2019 08:25 )             29.7     07-19    138  |  99  |  18  ----------------------------<  204<H>  3.9   |  28  |  2.00<H>    Ca    8.7      19 Jul 2019 06:45  Phos  3.2     07-18  Mg     1.9     07-19      proBNP:   Lipid Profile:   HgA1c:   TSH:             TELEMETRY: 	    ECG:  	  RADIOLOGY: < from: [facility] Physiol Extremity Lower 3+ Level, BI (07.18.19 @ 20:17) >    EXAM:  PHYSIOL EXTREM LOW 3+ LEV BI                            PROCEDURE DATE:  07/18/2019            INTERPRETATION:  Clinical information: 59-year-old male with history of   diabetes, hypertension, and smoking presents with a nonhealing right   lower extremity wound.     A prior exam from 5/31/2019 demonstrated no significant arterial   occlusive disease.    Findings:    The right ankle-brachial index is 1.36. Left ankle-brachial index is   1.33. The right toe brachial index is 0.69. The lefttoe brachial index   was not obtained due to noncompressible vessels.     Segmental pressure measurements reveal no significant pressure reduction.   Patient demonstrates elevated pressures bilaterally.    Pulse volume recordings demonstrate no significant decrease in amplitude..    Impression:     Study limited by elevated pressures. No evidence of significant arterial   occlusive disease of either lower extremity at rest.                CASSIUS UP M.D., RADIOLOGY RESIDENT  This document has been electronically signed.  CHAITANYA CARNES M.D., ATTENDING RADIOLOGIST  This document has been electronically signed. Jul 19 2019 10:34AM                < end of copied text >    DIAGNOSTIC TESTING:  [ ] Echocardiogram:  [ ]  Catheterization:  [ ] Stress Test:    OTHER:

## 2019-07-19 NOTE — PROGRESS NOTE ADULT - SUBJECTIVE AND OBJECTIVE BOX
Patient is a 59y old  Male who presents with a chief complaint of SOB (19 Jul 2019 10:51)       INTERVAL HPI/OVERNIGHT EVENTS:  Patient seen and evaluated at bedside.  Pt is resting comfortable in NAD. Denies N/V/F/C.   Allergies    No Known Allergies    Intolerances        Vital Signs Last 24 Hrs  T(C): 36.9 (19 Jul 2019 04:03), Max: 36.9 (18 Jul 2019 21:05)  T(F): 98.4 (19 Jul 2019 04:03), Max: 98.5 (18 Jul 2019 21:05)  HR: 85 (19 Jul 2019 10:30) (74 - 88)  BP: 150/77 (19 Jul 2019 10:30) (120/70 - 155/83)  BP(mean): --  RR: 18 (19 Jul 2019 04:03) (17 - 18)  SpO2: 96% (19 Jul 2019 10:30) (96% - 98%)    LABS:                        9.3    5.13  )-----------( 338      ( 19 Jul 2019 08:25 )             29.7     07-19    138  |  99  |  18  ----------------------------<  204<H>  3.9   |  28  |  2.00<H>    Ca    8.7      19 Jul 2019 06:45  Phos  3.2     07-18  Mg     1.9     07-19          CAPILLARY BLOOD GLUCOSE      POCT Blood Glucose.: 177 mg/dL (19 Jul 2019 07:56)  POCT Blood Glucose.: 225 mg/dL (18 Jul 2019 21:50)  POCT Blood Glucose.: 149 mg/dL (18 Jul 2019 16:42)  POCT Blood Glucose.: 106 mg/dL (18 Jul 2019 12:10)      Lower Extremity Physical Exam:  Vascular: DP/PT 2/4 B/L PT 1/4 B/L, CFT intact x 10, Temp gradient N/L B/L  Neurology: Epicritic sensation not intact B/L  Musculoskeletal/Ortho: Hammertoes 2-5 B/L  Skin: R 3rd toe + edema plantar lateral aspect 2 cm x 1 cm Full thickness ulcer to bone fibronecrotic tissue no odor mild serous fluid, tracts to met head  R plantar foot 15 cm (L) x [5 1/2 cm (w) midfoot region] x 1 cm deep by the ball of the foot the wound is fibro granular tissue w/ the plantar ball w/ mixed  fibro granular tissue, serous fluid, no odor no cellulitis, R foot + edema    RADIOLOGY & ADDITIONAL TESTS:    < from: MR Foot w/wo IV Cont, Right (07.15.19 @ 22:45) >  INTERPRETATION:    RIGHT FOOT MRI WITH AND WITHOUT CONTRAST    CLINICAL INFORMATION: Right foot third toe wound to the periosteum,   evaluate for osteomyelitis.  TECHNIQUE: Multiplanar, multisequence MRI was obtained of the right foot   with and without contrast. 10 cc IV gadavist administered.  COMPARISON: Right foot radiograph 7/13/2019. MRI right foot 5/30/2019    FINDINGS:      BONE MARROW: There is uncovering of the second and third metatarsal heads   with associated edema, and enhancement. There is low T1 signal within the   second metatarsal head with associated erosive change. There is loss of   the cortical margin of the third metatarsal head. There is new marrow   edema of the sesamoids at the great toe. There is mild subcortical edema   and enhancement in the heads of the fourth and fifth metatarsals which   remains similar to the prior exam. There is marrow edema and enhancement   of the second and third proximal phalanges. Subchondral cystic change   again noted of the medial cuneiform. Unchanged arthrosis of the navicular   and tarsal bones.    MUSCLES AND TENDONS: There is a small amount of fluid around the flexor   hallucis longus tendon proximally with associated enhancement. The second   and third flexor tendons are not seen at the level of the MTP joints and   are torn. There is tenosynovitis of the fourth flexor tendon. The tendons   otherwise appear intact. Edema and atrophy are noted of the intrinsic   muscles of the foot likely related to neuropathic change.     SOFT TISSUES: There is a 1.2 x 1.2 x 3.5 cm. skin ulcer/defect centered   within the second web space which is worse compared to the prior exam.   There is extensive surrounding soft tissue edema. Below the second and   third metatarsal heads, there is absence of enhancement of the soft   tissues suggestive of necrosis or evolving abscess. The previously seen   plantar soft tissue defect underlying the midfoot less prominent than   secondary to granulation tissue.    IMPRESSION:    1.  Skin ulcer of the plantar aspect of the second web space with   associated bone uncovering and osteomyelitis of the second and third   metatarsal head,  2.  Severe osteomyelitis of the second and third metatarsal heads.   Osteomyelitis of the second and third proximal phalanges.  3.  Findings suspicious for reactive osteitis versus early osteomyelitis   of the fourth and fifth metatarsal heads.  4.  New edema and enhancement within the sesamoids concerning for   osteomyelitis.  5.  Tenosynovitis of the flexor pollicis longus tendon sheath of the   infectious or inflammatory in etiology.  6.  Below the second and third metatarsal heads, there is absence of   enhancement of the soft tissues suggestive of necrosis or evolving abscess                LYNETTE MCGRAW MD,RADIOLOGY FELLOW  This document has been electronically signed.  NICK AHMADI M.D., ATTENDING RADIOLOGIST  This document has been electronically signed. Jul 16 2019 11:46AM        < end of copied text >

## 2019-07-19 NOTE — PROGRESS NOTE ADULT - SUBJECTIVE AND OBJECTIVE BOX
Chief complaint  Patient is a 59y old  Male who presents with a chief complaint of SOB (19 Jul 2019 15:12)   Review of systems  Patient in bed, looks comfortable, no fever, no hypoglycemia.    Labs and Fingersticks  CAPILLARY BLOOD GLUCOSE      POCT Blood Glucose.: 184 mg/dL (19 Jul 2019 16:32)  POCT Blood Glucose.: 176 mg/dL (19 Jul 2019 11:47)  POCT Blood Glucose.: 177 mg/dL (19 Jul 2019 07:56)  POCT Blood Glucose.: 225 mg/dL (18 Jul 2019 21:50)      Anion Gap, Serum: 11 (07-19 @ 06:45)  Anion Gap, Serum: 13 (07-18 @ 06:32)    Hemoglobin A1C, Whole Blood: 7.1 <H> (07-18 @ 08:32)    Calcium, Total Serum: 8.7 (07-19 @ 06:45)  Calcium, Total Serum: 8.6 (07-18 @ 06:32)          07-19    138  |  99  |  18  ----------------------------<  204<H>  3.9   |  28  |  2.00<H>    Ca    8.7      19 Jul 2019 06:45  Phos  3.2     07-18  Mg     1.9     07-19                          9.3    5.13  )-----------( 338      ( 19 Jul 2019 08:25 )             29.7     Medications  MEDICATIONS  (STANDING):  amLODIPine   Tablet 10 milliGRAM(s) Oral daily  chlorhexidine 2% Cloths 1 Application(s) Topical daily  collagenase Ointment 1 Application(s) Topical <User Schedule>  dextrose 5%. 1000 milliLiter(s) (50 mL/Hr) IV Continuous <Continuous>  dextrose 50% Injectable 12.5 Gram(s) IV Push once  dextrose 50% Injectable 25 Gram(s) IV Push once  dextrose 50% Injectable 25 Gram(s) IV Push once  dronabinol 2.5 milliGRAM(s) Oral two times a day  epoetin braden Injectable 36948 Unit(s) SubCutaneous <User Schedule>  ferrous    sulfate 325 milliGRAM(s) Oral daily  heparin  Injectable 5000 Unit(s) SubCutaneous every 8 hours  hydrALAZINE 100 milliGRAM(s) Oral three times a day  insulin glargine Injectable (LANTUS) 32 Unit(s) SubCutaneous at bedtime  insulin lispro (HumaLOG) corrective regimen sliding scale   SubCutaneous three times a day before meals  insulin lispro (HumaLOG) corrective regimen sliding scale   SubCutaneous at bedtime  insulin lispro Injectable (HumaLOG) 10 Unit(s) SubCutaneous before breakfast  piperacillin/tazobactam IVPB.. 3.375 Gram(s) IV Intermittent every 8 hours  polyethylene glycol 3350 17 Gram(s) Oral every 12 hours  primidone 25 milliGRAM(s) Oral at bedtime  senna 2 Tablet(s) Oral at bedtime      Physical Exam  General: Patient comfortable in bed  Vital Signs Last 12 Hrs  T(F): 98.2 (07-19-19 @ 11:45), Max: 98.2 (07-19-19 @ 11:45)  HR: 85 (07-19-19 @ 11:45) (85 - 85)  BP: 133/73 (07-19-19 @ 11:45) (133/73 - 150/77)  BP(mean): --  RR: 18 (07-19-19 @ 11:45) (18 - 18)  SpO2: 98% (07-19-19 @ 11:45) (96% - 98%)  Neck: No palpable thyroid nodules.  CVS: S1S2, No murmurs  Respiratory: No wheezing, no crepitations  GI: Abdomen soft, bowel sounds positive  Musculoskeletal:  edema lower extremities.   Skin: No skin rashes, no ecchymosis    Diagnostics

## 2019-07-19 NOTE — PROGRESS NOTE ADULT - ASSESSMENT
Assessment  DMT2: 59y Male with DM T2 with hyperglycemia, on basal bolus insulin, blood sugars in acceptable range, no hypoglycemic episode, eating partial meals, non compliant with low carb diet.  Foot wound: On wound care, meds, stable.  CAD: on medications, no chest pain, stable, monitored.  HTN: Controlled,  on antihypertensive medications.  CKD: Monitor labs/BMP,             Andrzej Zhu MD  Cell: [phone]  Office: [phone]

## 2019-07-19 NOTE — PROGRESS NOTE ADULT - ASSESSMENT
Patient w/ large Full thickness R plantar foot wound post I & D from Necrotizing Fasciitis & also w/ R 3rd toe Full Thickness ulcer + OM   2. OM R forefoot confirmed on MRI w/ w/o contrast  - Patient seen and evaluated  - Fibro necrotic wound R 3rd toe applied Betadine & R plantar foot extensive wound to bone and tracking dorsally packed with NS soaked packing and dressed with DSD.   - WCx: E. Coli & Klebsiella pneumoniae  - Continue w/ wound VAC R foot   - Continue IV Abx (Zosyn)  - Vascular recommends R BKA and scheduled for Tuesday 7/23  - Podiatry will continue to follow  - Seen w/ attending

## 2019-07-19 NOTE — PROGRESS NOTE ADULT - SUBJECTIVE AND OBJECTIVE BOX
VACULAR SURGERY DAILY PROGRESS NOTE:       Subjective:  Patient seen and examined on AM rounds. No acute events overnight. AF/VSS. Wound vac placed yesterday. Patient verbalized that he spoke with Dr. Osorio and understands we are recommending R BKA for improved quality of life.       Objective:    PE:  Gen: NAD  Resp: respirations unlabored, no increased WoB  Ext:  RLE: DP palpable, wound vac in place      Vital Signs Last 24 Hrs  T(C): 36.9 (2019 04:03), Max: 36.9 (2019 21:05)  T(F): 98.4 (2019 04:03), Max: 98.5 (2019 21:05)  HR: 78 (2019 04:03) (74 - 88)  BP: 120/70 (2019 04:03) (120/70 - 155/83)  BP(mean): --  RR: 18 (2019 04:03) (17 - 18)  SpO2: 97% (2019 04:03) (97% - 98%)    I&O's Detail    2019 07:01  -  2019 07:00  --------------------------------------------------------  IN:    IV PiggyBack: 150 mL    Oral Fluid: 720 mL  Total IN: 870 mL    OUT:    Voided: 1700 mL  Total OUT: 1700 mL    Total NET: -830 mL          Daily     Daily Weight in k.2 (2019 16:39)    MEDICATIONS  (STANDING):  amLODIPine   Tablet 10 milliGRAM(s) Oral daily  chlorhexidine 2% Cloths 1 Application(s) Topical daily  collagenase Ointment 1 Application(s) Topical <User Schedule>  dextrose 5%. 1000 milliLiter(s) (50 mL/Hr) IV Continuous <Continuous>  dextrose 50% Injectable 12.5 Gram(s) IV Push once  dextrose 50% Injectable 25 Gram(s) IV Push once  dextrose 50% Injectable 25 Gram(s) IV Push once  dronabinol 2.5 milliGRAM(s) Oral two times a day  epoetin braden Injectable 53144 Unit(s) SubCutaneous <User Schedule>  ferrous    sulfate 325 milliGRAM(s) Oral daily  heparin  Injectable 5000 Unit(s) SubCutaneous every 8 hours  hydrALAZINE 100 milliGRAM(s) Oral three times a day  insulin glargine Injectable (LANTUS) 32 Unit(s) SubCutaneous at bedtime  insulin lispro (HumaLOG) corrective regimen sliding scale   SubCutaneous three times a day before meals  insulin lispro (HumaLOG) corrective regimen sliding scale   SubCutaneous at bedtime  insulin lispro Injectable (HumaLOG) 10 Unit(s) SubCutaneous before breakfast  piperacillin/tazobactam IVPB.. 3.375 Gram(s) IV Intermittent every 8 hours  polyethylene glycol 3350 17 Gram(s) Oral every 12 hours  primidone 25 milliGRAM(s) Oral at bedtime  senna 2 Tablet(s) Oral at bedtime    MEDICATIONS  (PRN):  acetaminophen   Tablet .. 650 milliGRAM(s) Oral every 6 hours PRN Mild Pain (1 - 3)  dextrose 40% Gel 15 Gram(s) Oral once PRN Blood Glucose LESS THAN 70 milliGRAM(s)/deciliter  glucagon  Injectable 1 milliGRAM(s) IntraMuscular once PRN Glucose LESS THAN 70 milligrams/deciliter      LABS:                        9.2    4.99  )-----------( 334      ( 2019 09:23 )             29.0     07-19    138  |  99  |  18  ----------------------------<  204<H>  3.9   |  28  |  2.00<H>    Ca    8.7      2019 06:45  Phos  3.2     07-18  Mg     1.9     07-19      PT/INR - ( 2019 08:50 )   PT: 12.8 sec;   INR: 1.11 ratio         PTT - ( 2019 08:50 )  PTT:30.7 sec      RADIOLOGY & ADDITIONAL STUDIES: VACULAR SURGERY DAILY PROGRESS NOTE:       Subjective:  Patient seen and examined on AM rounds. No acute events overnight. AF/VSS. Wound vac placed yesterday. Patient verbalized that he spoke with Dr. Osorio and understands we are recommending R BKA for improved quality of life.       Objective:    PE:  Gen: NAD  Resp: respirations unlabored, no increased WoB  Ext:  RLE: DP palpable, wound vac in place      Vital Signs Last 24 Hrs  T(C): 36.9 (2019 04:03), Max: 36.9 (2019 21:05)  T(F): 98.4 (2019 04:03), Max: 98.5 (2019 21:05)  HR: 78 (2019 04:03) (74 - 88)  BP: 120/70 (2019 04:03) (120/70 - 155/83)  BP(mean): --  RR: 18 (2019 04:03) (17 - 18)  SpO2: 97% (2019 04:03) (97% - 98%)    I&O's Detail    2019 07:01  -  2019 07:00  --------------------------------------------------------  IN:    IV PiggyBack: 150 mL    Oral Fluid: 720 mL  Total IN: 870 mL    OUT:    Voided: 1700 mL  Total OUT: 1700 mL    Total NET: -830 mL      Daily Weight in k.2 (2019 16:39)    MEDICATIONS  (STANDING):  amLODIPine   Tablet 10 milliGRAM(s) Oral daily  chlorhexidine 2% Cloths 1 Application(s) Topical daily  collagenase Ointment 1 Application(s) Topical <User Schedule>  dextrose 5%. 1000 milliLiter(s) (50 mL/Hr) IV Continuous <Continuous>  dextrose 50% Injectable 12.5 Gram(s) IV Push once  dextrose 50% Injectable 25 Gram(s) IV Push once  dextrose 50% Injectable 25 Gram(s) IV Push once  dronabinol 2.5 milliGRAM(s) Oral two times a day  epoetin braden Injectable 74771 Unit(s) SubCutaneous <User Schedule>  ferrous    sulfate 325 milliGRAM(s) Oral daily  heparin  Injectable 5000 Unit(s) SubCutaneous every 8 hours  hydrALAZINE 100 milliGRAM(s) Oral three times a day  insulin glargine Injectable (LANTUS) 32 Unit(s) SubCutaneous at bedtime  insulin lispro (HumaLOG) corrective regimen sliding scale   SubCutaneous three times a day before meals  insulin lispro (HumaLOG) corrective regimen sliding scale   SubCutaneous at bedtime  insulin lispro Injectable (HumaLOG) 10 Unit(s) SubCutaneous before breakfast  piperacillin/tazobactam IVPB.. 3.375 Gram(s) IV Intermittent every 8 hours  polyethylene glycol 3350 17 Gram(s) Oral every 12 hours  primidone 25 milliGRAM(s) Oral at bedtime  senna 2 Tablet(s) Oral at bedtime    MEDICATIONS  (PRN):  acetaminophen   Tablet .. 650 milliGRAM(s) Oral every 6 hours PRN Mild Pain (1 - 3)  dextrose 40% Gel 15 Gram(s) Oral once PRN Blood Glucose LESS THAN 70 milliGRAM(s)/deciliter  glucagon  Injectable 1 milliGRAM(s) IntraMuscular once PRN Glucose LESS THAN 70 milligrams/deciliter      LABS:                        9.2    4.99  )-----------( 334      ( 2019 09:23 )             29.0     07-19    138  |  99  |  18  ----------------------------<  204<H>  3.9   |  28  |  2.00<H>    Ca    8.7      2019 06:45  Phos  3.2     07-18  Mg     1.9     07-19      PT/INR - ( 2019 08:50 )   PT: 12.8 sec;   INR: 1.11 ratio         PTT - ( 2019 08:50 )  PTT:30.7 sec

## 2019-07-19 NOTE — PROGRESS NOTE ADULT - SUBJECTIVE AND OBJECTIVE BOX
Patient is a 59y old  Male who presents with a chief complaint of SOB (19 Jul 2019 11:23)      SUBJECTIVE / OVERNIGHT EVENTS:  No chest pain. No shortness of breath.  No events overnight. c/o right hand tremors    MEDICATIONS  (STANDING):  amLODIPine   Tablet 10 milliGRAM(s) Oral daily  chlorhexidine 2% Cloths 1 Application(s) Topical daily  collagenase Ointment 1 Application(s) Topical <User Schedule>  dextrose 5%. 1000 milliLiter(s) (50 mL/Hr) IV Continuous <Continuous>  dextrose 50% Injectable 12.5 Gram(s) IV Push once  dextrose 50% Injectable 25 Gram(s) IV Push once  dextrose 50% Injectable 25 Gram(s) IV Push once  dronabinol 2.5 milliGRAM(s) Oral two times a day  epoetin braden Injectable 58990 Unit(s) SubCutaneous <User Schedule>  ferrous    sulfate 325 milliGRAM(s) Oral daily  heparin  Injectable 5000 Unit(s) SubCutaneous every 8 hours  hydrALAZINE 100 milliGRAM(s) Oral three times a day  insulin glargine Injectable (LANTUS) 32 Unit(s) SubCutaneous at bedtime  insulin lispro (HumaLOG) corrective regimen sliding scale   SubCutaneous three times a day before meals  insulin lispro (HumaLOG) corrective regimen sliding scale   SubCutaneous at bedtime  insulin lispro Injectable (HumaLOG) 10 Unit(s) SubCutaneous before breakfast  piperacillin/tazobactam IVPB.. 3.375 Gram(s) IV Intermittent every 8 hours  polyethylene glycol 3350 17 Gram(s) Oral every 12 hours  primidone 25 milliGRAM(s) Oral at bedtime  senna 2 Tablet(s) Oral at bedtime    MEDICATIONS  (PRN):  acetaminophen   Tablet .. 650 milliGRAM(s) Oral every 6 hours PRN Mild Pain (1 - 3)  dextrose 40% Gel 15 Gram(s) Oral once PRN Blood Glucose LESS THAN 70 milliGRAM(s)/deciliter  glucagon  Injectable 1 milliGRAM(s) IntraMuscular once PRN Glucose LESS THAN 70 milligrams/deciliter      Vital Signs Last 24 Hrs  T(C): 36.8 (19 Jul 2019 11:45), Max: 36.9 (18 Jul 2019 21:05)  T(F): 98.2 (19 Jul 2019 11:45), Max: 98.5 (18 Jul 2019 21:05)  HR: 85 (19 Jul 2019 11:45) (74 - 85)  BP: 133/73 (19 Jul 2019 11:45) (120/70 - 150/77)  BP(mean): --  RR: 18 (19 Jul 2019 11:45) (17 - 18)  SpO2: 98% (19 Jul 2019 11:45) (96% - 98%)  CAPILLARY BLOOD GLUCOSE      POCT Blood Glucose.: 176 mg/dL (19 Jul 2019 11:47)  POCT Blood Glucose.: 177 mg/dL (19 Jul 2019 07:56)  POCT Blood Glucose.: 225 mg/dL (18 Jul 2019 21:50)  POCT Blood Glucose.: 149 mg/dL (18 Jul 2019 16:42)    I&O's Summary    18 Jul 2019 07:01  -  19 Jul 2019 07:00  --------------------------------------------------------  IN: 870 mL / OUT: 1700 mL / NET: -830 mL    19 Jul 2019 07:01  -  19 Jul 2019 15:13  --------------------------------------------------------  IN: 580 mL / OUT: 625 mL / NET: -45 mL        PHYSICAL EXAM:  GENERAL: NAD, well-developed  HEAD:  Atraumatic, Normocephalic  EYES: EOMI, PERRLA, conjunctiva and sclera clear  NECK: Supple, No JVD  CHEST/LUNG: Clear to auscultation bilaterally; No wheeze  HEART: Regular rate and rhythm; No murmurs, rubs, or gallops  ABDOMEN: Soft, Nontender, Nondistended; Bowel sounds present  EXTREMITIES:  2+ Peripheral Pulses, No clubbing, cyanosis, or edema  PSYCH: AAOx3  NEUROLOGY: non-focal  SKIN: No rashes or lesions    LABS:                        9.3    5.13  )-----------( 338      ( 19 Jul 2019 08:25 )             29.7     07-19    138  |  99  |  18  ----------------------------<  204<H>  3.9   |  28  |  2.00<H>    Ca    8.7      19 Jul 2019 06:45  Phos  3.2     07-18  Mg     1.9     07-19                RADIOLOGY & ADDITIONAL TESTS:    Imaging Personally Reviewed:    Consultant(s) Notes Reviewed:      Care Discussed with Consultants/Other Providers:

## 2019-07-19 NOTE — PROGRESS NOTE ADULT - ASSESSMENT
59M, hx DM, HTN presents from [facility] via EMS due to resp distress. Patient at [facility] due to right diabetic foot ulcer. Vascular consulted for non-healing right foot wound evaluation for BKA    - c/w abx  - JOSIANE/PVRs reviewed  - Patient has documented medical and cardiac clearance for BKA pending patient agreement  - care per primary team       Vascular Surgery [phone] 59M, hx DM, HTN presents from [facility] via EMS due to resp distress. Patient at [facility] due to right diabetic foot ulcer. Vascular consulted for non-healing right foot wound evaluation for BKA    - c/w abx  - JOSIANE/PVRs reviewed  - Patient has documented medical and cardiac clearance for BKA, plan for OR Tuesday 7/23  - care per primary team       Vascular Surgery [phone] 59M, hx DM, HTN presents from [facility] via EMS due to resp distress. Patient at [facility] due to right diabetic foot ulcer. Vascular consulted for non-healing right foot wound evaluation for BKA    - c/w abx  - - Patient has documented medical and cardiac clearance for BKA, plan for OR Tuesday 7/23  Indications risks and benefits of right leg below above the knee amputation were explained to patient who understands and consents   - care per primary team       Vascular Surgery [phone]

## 2019-07-20 LAB
ANION GAP SERPL CALC-SCNC: 11 MMOL/L — SIGNIFICANT CHANGE UP (ref 5–17)
BUN SERPL-MCNC: 20 MG/DL — SIGNIFICANT CHANGE UP (ref 7–23)
CALCIUM SERPL-MCNC: 9.2 MG/DL — SIGNIFICANT CHANGE UP (ref 8.4–10.5)
CHLORIDE SERPL-SCNC: 100 MMOL/L — SIGNIFICANT CHANGE UP (ref 96–108)
CO2 SERPL-SCNC: 26 MMOL/L — SIGNIFICANT CHANGE UP (ref 22–31)
CREAT SERPL-MCNC: 1.94 MG/DL — HIGH (ref 0.5–1.3)
GLUCOSE BLDC GLUCOMTR-MCNC: 123 MG/DL — HIGH (ref 70–99)
GLUCOSE BLDC GLUCOMTR-MCNC: 158 MG/DL — HIGH (ref 70–99)
GLUCOSE BLDC GLUCOMTR-MCNC: 164 MG/DL — HIGH (ref 70–99)
GLUCOSE BLDC GLUCOMTR-MCNC: 204 MG/DL — HIGH (ref 70–99)
GLUCOSE SERPL-MCNC: 198 MG/DL — HIGH (ref 70–99)
HCT VFR BLD CALC: 29.8 % — LOW (ref 39–50)
HGB BLD-MCNC: 9.3 G/DL — LOW (ref 13–17)
MCHC RBC-ENTMCNC: 28 PG — SIGNIFICANT CHANGE UP (ref 27–34)
MCHC RBC-ENTMCNC: 31.2 GM/DL — LOW (ref 32–36)
MCV RBC AUTO: 89.8 FL — SIGNIFICANT CHANGE UP (ref 80–100)
PLATELET # BLD AUTO: 307 K/UL — SIGNIFICANT CHANGE UP (ref 150–400)
POTASSIUM SERPL-MCNC: 4.2 MMOL/L — SIGNIFICANT CHANGE UP (ref 3.5–5.3)
POTASSIUM SERPL-SCNC: 4.2 MMOL/L — SIGNIFICANT CHANGE UP (ref 3.5–5.3)
RBC # BLD: 3.32 M/UL — LOW (ref 4.2–5.8)
RBC # FLD: 14.3 % — SIGNIFICANT CHANGE UP (ref 10.3–14.5)
SODIUM SERPL-SCNC: 137 MMOL/L — SIGNIFICANT CHANGE UP (ref 135–145)
WBC # BLD: 5.02 K/UL — SIGNIFICANT CHANGE UP (ref 3.8–10.5)
WBC # FLD AUTO: 5.02 K/UL — SIGNIFICANT CHANGE UP (ref 3.8–10.5)

## 2019-07-20 RX ORDER — PRIMIDONE 250 MG/1
50 TABLET ORAL
Refills: 0 | Status: DISCONTINUED | OUTPATIENT
Start: 2019-07-20 | End: 2019-07-29

## 2019-07-20 RX ADMIN — PRIMIDONE 50 MILLIGRAM(S): 250 TABLET ORAL at 17:28

## 2019-07-20 RX ADMIN — Medication 325 MILLIGRAM(S): at 13:28

## 2019-07-20 RX ADMIN — CHLORHEXIDINE GLUCONATE 1 APPLICATION(S): 213 SOLUTION TOPICAL at 12:38

## 2019-07-20 RX ADMIN — INSULIN GLARGINE 32 UNIT(S): 100 INJECTION, SOLUTION SUBCUTANEOUS at 22:31

## 2019-07-20 RX ADMIN — Medication 100 MILLIGRAM(S): at 21:19

## 2019-07-20 RX ADMIN — Medication 1: at 17:28

## 2019-07-20 RX ADMIN — Medication 10 UNIT(S): at 08:29

## 2019-07-20 RX ADMIN — PIPERACILLIN AND TAZOBACTAM 25 GRAM(S): 4; .5 INJECTION, POWDER, LYOPHILIZED, FOR SOLUTION INTRAVENOUS at 05:29

## 2019-07-20 RX ADMIN — HEPARIN SODIUM 5000 UNIT(S): 5000 INJECTION INTRAVENOUS; SUBCUTANEOUS at 21:19

## 2019-07-20 RX ADMIN — HEPARIN SODIUM 5000 UNIT(S): 5000 INJECTION INTRAVENOUS; SUBCUTANEOUS at 13:28

## 2019-07-20 RX ADMIN — PIPERACILLIN AND TAZOBACTAM 25 GRAM(S): 4; .5 INJECTION, POWDER, LYOPHILIZED, FOR SOLUTION INTRAVENOUS at 13:29

## 2019-07-20 RX ADMIN — PIPERACILLIN AND TAZOBACTAM 25 GRAM(S): 4; .5 INJECTION, POWDER, LYOPHILIZED, FOR SOLUTION INTRAVENOUS at 22:32

## 2019-07-20 RX ADMIN — AMLODIPINE BESYLATE 10 MILLIGRAM(S): 2.5 TABLET ORAL at 05:29

## 2019-07-20 RX ADMIN — Medication 100 MILLIGRAM(S): at 05:29

## 2019-07-20 RX ADMIN — Medication 100 MILLIGRAM(S): at 13:28

## 2019-07-20 RX ADMIN — ERYTHROPOIETIN 10000 UNIT(S): 10000 INJECTION, SOLUTION INTRAVENOUS; SUBCUTANEOUS at 15:40

## 2019-07-20 RX ADMIN — Medication 1: at 08:29

## 2019-07-20 RX ADMIN — HEPARIN SODIUM 5000 UNIT(S): 5000 INJECTION INTRAVENOUS; SUBCUTANEOUS at 05:29

## 2019-07-20 NOTE — PROGRESS NOTE ADULT - ASSESSMENT
59M, hx DM, HTN presents from [facility] via EMS due to resp distress. Patient at [facility] due to right diabetic foot ulcer. Patient reported to be hypoxic on room air to 60s. Improved on 15L face mask top 100% O2 sat. Patient awake, alert, oriented. States he has been coughing for 2-3 weeks (dry cough) as well as weakness and shortness of breath for few days.    Acute on chronic diastolic chf  - po lasix 80 qd  - strict Is and Os  - cardiology consult appreciated  - keep O2 sats above 92%    diabetic foot ulcer  - podiatry follow up  - MRI of right foot  done  - podiatry recommending BKA  - was seen by vascular  - BKA if right lower ext scheduled for tuesday  - pt ha no medical contraindications for BKA    essential tremors   - trial of primidone  - seen by neuro consult  - increase primidone to 50 bid    CKD 4  - monitor cre  - avoid nephrotoxins    diabetes  - fs qid  - hgb a1c  - endocrine consult following    htn  - cw norvasc    DVT px

## 2019-07-20 NOTE — PROGRESS NOTE ADULT - SUBJECTIVE AND OBJECTIVE BOX
Patient is a 59y old  Male who presents with a chief complaint of SOB (20 Jul 2019 09:42)      SUBJECTIVE / OVERNIGHT EVENTS:  No chest pain. No shortness of breath. No complaints. No events overnight.     MEDICATIONS  (STANDING):  amLODIPine   Tablet 10 milliGRAM(s) Oral daily  chlorhexidine 2% Cloths 1 Application(s) Topical daily  collagenase Ointment 1 Application(s) Topical <User Schedule>  dextrose 5%. 1000 milliLiter(s) (50 mL/Hr) IV Continuous <Continuous>  dextrose 50% Injectable 12.5 Gram(s) IV Push once  dextrose 50% Injectable 25 Gram(s) IV Push once  dextrose 50% Injectable 25 Gram(s) IV Push once  dronabinol 2.5 milliGRAM(s) Oral two times a day  epoetin braden Injectable 39741 Unit(s) SubCutaneous <User Schedule>  ferrous    sulfate 325 milliGRAM(s) Oral daily  heparin  Injectable 5000 Unit(s) SubCutaneous every 8 hours  hydrALAZINE 100 milliGRAM(s) Oral three times a day  insulin glargine Injectable (LANTUS) 32 Unit(s) SubCutaneous at bedtime  insulin lispro (HumaLOG) corrective regimen sliding scale   SubCutaneous three times a day before meals  insulin lispro (HumaLOG) corrective regimen sliding scale   SubCutaneous at bedtime  insulin lispro Injectable (HumaLOG) 10 Unit(s) SubCutaneous before breakfast  piperacillin/tazobactam IVPB.. 3.375 Gram(s) IV Intermittent every 8 hours  polyethylene glycol 3350 17 Gram(s) Oral every 12 hours  primidone 50 milliGRAM(s) Oral two times a day  senna 2 Tablet(s) Oral at bedtime    MEDICATIONS  (PRN):  acetaminophen   Tablet .. 650 milliGRAM(s) Oral every 6 hours PRN Mild Pain (1 - 3)  dextrose 40% Gel 15 Gram(s) Oral once PRN Blood Glucose LESS THAN 70 milliGRAM(s)/deciliter  glucagon  Injectable 1 milliGRAM(s) IntraMuscular once PRN Glucose LESS THAN 70 milligrams/deciliter      Vital Signs Last 24 Hrs  T(C): 37 (20 Jul 2019 04:02), Max: 37 (20 Jul 2019 04:02)  T(F): 98.6 (20 Jul 2019 04:02), Max: 98.6 (20 Jul 2019 04:02)  HR: 84 (20 Jul 2019 04:02) (84 - 85)  BP: 150/76 (20 Jul 2019 04:15) (142/82 - 150/76)  BP(mean): --  RR: 18 (20 Jul 2019 04:02) (18 - 18)  SpO2: 97% (20 Jul 2019 04:02) (95% - 97%)  CAPILLARY BLOOD GLUCOSE      POCT Blood Glucose.: 164 mg/dL (20 Jul 2019 08:21)  POCT Blood Glucose.: 214 mg/dL (19 Jul 2019 22:03)  POCT Blood Glucose.: 184 mg/dL (19 Jul 2019 16:32)    I&O's Summary    19 Jul 2019 07:01  -  20 Jul 2019 07:00  --------------------------------------------------------  IN: 580 mL / OUT: 1175 mL / NET: -595 mL        PHYSICAL EXAM:  GENERAL: NAD, well-developed  HEAD:  Atraumatic, Normocephalic  EYES: EOMI, PERRLA, conjunctiva and sclera clear  NECK: Supple, No JVD  CHEST/LUNG: Clear to auscultation bilaterally; No wheeze  HEART: Regular rate and rhythm; No murmurs, rubs, or gallops  ABDOMEN: Soft, Nontender, Nondistended; Bowel sounds present  EXTREMITIES:  2+ Peripheral Pulses, No clubbing, cyanosis, or edema  PSYCH: AAOx3  NEUROLOGY: non-focal  SKIN: No rashes or lesions    LABS:                        9.3    5.02  )-----------( 307      ( 20 Jul 2019 10:58 )             29.8     07-20    137  |  100  |  20  ----------------------------<  198<H>  4.2   |  26  |  1.94<H>    Ca    9.2      20 Jul 2019 06:06  Mg     1.9     07-19                RADIOLOGY & ADDITIONAL TESTS:    Imaging Personally Reviewed:    Consultant(s) Notes Reviewed:      Care Discussed with Consultants/Other Providers:

## 2019-07-20 NOTE — CONSULT NOTE ADULT - SUBJECTIVE AND OBJECTIVE BOX
Admitting Diagnosis:  Chronic pulmonary edema (J81.1): CHRONIC PULMONARY EDEMA      HPI:  This is a 59y year old Male with the below past medical history of DM, HTN presented from [facility] via EMS due to resp distress. Patient at [facility] due to right diabetic foot ulcer. Patient reported to be hypoxic on room air to 60s. Improved on 15L face mask top 100% O2 sat. Patient awake, alert, oriented. NO fevers or chills, nausea or vomiting, headache, chest pain, abdominal pain, diarrhea or constipation. Patient reportedly received blood transfusion last night, unknown as to why. Reports BL leg swelling - chronic, no calf pain. No hx DVT or PE.       Neurology is asked to comment on the patients bilateral upper extremities R>L longstanding action tremor. It seems to be more prominent during the current hospitalization.     Past Medical History:  Hypertension (I10)  Insulin dependent diabetes mellitus (E11.9)  Venous insufficiency of both lower extremities (459.81): R &gt; L  HTN (hypertension) (401.9)  BPH (benign prostatic hypertrophy) (600.00)  Diabetes (250.00)      Past Surgical History:  History of cholecystectomy (Z90.49)  No significant past surgical history (045913337)  S/P laparoscopic cholecystectomy (V45.89)  Status post incision and drainage (V45.89): Rt groin abscess  No significant past surgical history (161632707)      Social History:  No toxic habits    Family History:  FAMILY HISTORY:  Paternal family history of emphysema  Family history of diabetes mellitus (DM) (Grandparent)      Allergies:  No Known Allergies      ROS:  Constitutional: Patient offers no complaints of fevers or significant weight loss  Ears, Nose, Mouth and Throat: The patient presents with no abnormalities of the head, ears, eyes, nose or throat  Skin: Patient offers no concerns of new rashes or lesions  Respiratory: The patient presents with no abnormalities of the respiratory tract  Cardiovascular: The patient presents with no cardiac abnormalities  Gastrointestinal: The patient presents with no abnormalities of the GI system  Genitourinary: The patient presents with no dysuria, hematuria or frequent urination  Neurological: See HPI  Endocrine: Patient offers no complaints of excessive thirst, urination, or heat/cold intolerance    Advanced care planning reviewed and noted in the chart.    Medications:  acetaminophen   Tablet .. 650 milliGRAM(s) Oral every 6 hours PRN  amLODIPine   Tablet 10 milliGRAM(s) Oral daily  chlorhexidine 2% Cloths 1 Application(s) Topical daily  collagenase Ointment 1 Application(s) Topical <User Schedule>  dextrose 40% Gel 15 Gram(s) Oral once PRN  dextrose 5%. 1000 milliLiter(s) IV Continuous <Continuous>  dextrose 50% Injectable 12.5 Gram(s) IV Push once  dextrose 50% Injectable 25 Gram(s) IV Push once  dextrose 50% Injectable 25 Gram(s) IV Push once  dronabinol 2.5 milliGRAM(s) Oral two times a day  epoetin braden Injectable 40976 Unit(s) SubCutaneous <User Schedule>  ferrous    sulfate 325 milliGRAM(s) Oral daily  glucagon  Injectable 1 milliGRAM(s) IntraMuscular once PRN  heparin  Injectable 5000 Unit(s) SubCutaneous every 8 hours  hydrALAZINE 100 milliGRAM(s) Oral three times a day  insulin glargine Injectable (LANTUS) 32 Unit(s) SubCutaneous at bedtime  insulin lispro (HumaLOG) corrective regimen sliding scale   SubCutaneous three times a day before meals  insulin lispro (HumaLOG) corrective regimen sliding scale   SubCutaneous at bedtime  insulin lispro Injectable (HumaLOG) 10 Unit(s) SubCutaneous before breakfast  piperacillin/tazobactam IVPB.. 3.375 Gram(s) IV Intermittent every 8 hours  polyethylene glycol 3350 17 Gram(s) Oral every 12 hours  primidone 25 milliGRAM(s) Oral at bedtime  senna 2 Tablet(s) Oral at bedtime      Labs:  CBC Full  -  ( 19 Jul 2019 08:25 )  WBC Count : 5.13 K/uL  RBC Count : 3.27 M/uL  Hemoglobin : 9.3 g/dL  Hematocrit : 29.7 %  Platelet Count - Automated : 338 K/uL  Mean Cell Volume : 90.8 fl  Mean Cell Hemoglobin : 28.4 pg  Mean Cell Hemoglobin Concentration : 31.3 gm/dL  Auto Neutrophil # : x  Auto Lymphocyte # : x  Auto Monocyte # : x  Auto Eosinophil # : x  Auto Basophil # : x  Auto Neutrophil % : x  Auto Lymphocyte % : x  Auto Monocyte % : x  Auto Eosinophil % : x  Auto Basophil % : x    07-20    137  |  100  |  20  ----------------------------<  198<H>  4.2   |  26  |  1.94<H>    Ca    9.2      20 Jul 2019 06:06  Mg     1.9     07-19      CAPILLARY BLOOD GLUCOSE      POCT Blood Glucose.: 164 mg/dL (20 Jul 2019 08:21)  POCT Blood Glucose.: 214 mg/dL (19 Jul 2019 22:03)  POCT Blood Glucose.: 184 mg/dL (19 Jul 2019 16:32)  POCT Blood Glucose.: 176 mg/dL (19 Jul 2019 11:47)          Male    Vitals:  Vital Signs Last 24 Hrs  T(C): 37 (20 Jul 2019 04:02), Max: 37 (20 Jul 2019 04:02)  T(F): 98.6 (20 Jul 2019 04:02), Max: 98.6 (20 Jul 2019 04:02)  HR: 84 (20 Jul 2019 04:02) (84 - 85)  BP: 150/76 (20 Jul 2019 04:15) (133/73 - 150/77)  BP(mean): --  RR: 18 (20 Jul 2019 04:02) (18 - 18)  SpO2: 97% (20 Jul 2019 04:02) (95% - 98%)    NEUROLOGICAL EXAM:    Mental status: Awake, alert, and in no apparent distress. Oriented to person, place and time. Language function is normal. Recent memory, digit span and concentration were normal.     Cranial Nerves: Pupils were equal, round, reactive to light. Extraocular movements were intact. Visual field were full. Fundoscopic exam was deferred. Facial sensation was intact to light touch. There was no facial asymmetry. The palate was upgoing symmetrically and tongue was midline. Hearing acuity was intact to finger rub AU. Shoulder shrug was full bilaterally    Motor exam: Bulk and tone were normal. Strength was 5/5 in all four extremities. Fine finger movements were symmetric and normal. There was no pronator drift    Reflexes: 1+ in the bilateral upper extremities. 0 in the bilateral lower extremities. Toes were downgoing bilaterally.     Sensation: diminishe for all modalities in lower extremites below the knees.    Coordination: Finger-nose-finger and heel-to-shin was without dysmetria.     Gait: deferred.    There is a bilateral mild low amplitude/ low frequency action tremor.

## 2019-07-20 NOTE — PROGRESS NOTE ADULT - ASSESSMENT
Assessment  DMT2: 59y Male with DM T2 with hyperglycemia, on basal bolus insulin, FS improving, no hypoglycemic episode, eating partial meals, non compliant with low carb diet.  Foot wound: On wound care, meds, stable.  CAD: on medications, no chest pain, stable, monitored.  HTN: Controlled,  on antihypertensive medications.  CKD: Monitor labs/BMP,             Andrzej Zhu MD  Cell: [phone]  Office: [phone]

## 2019-07-20 NOTE — CONSULT NOTE ADULT - ASSESSMENT
58 yo male with longstanding bilateral mild action tremor. It is most likely essential tremor, possibly aggravated by his current medical problems. The nebulizers are notorious for aggravating tremors. As well probably he is suffering from the superimposed generalized polyneuropathy secondary to poorly controlled DM.    - agree with primidone, but would increase to 50 mg BID  - discussed with the patient  - will follow on 7/22

## 2019-07-20 NOTE — PROGRESS NOTE ADULT - SUBJECTIVE AND OBJECTIVE BOX
VACULAR SURGERY DAILY PROGRESS NOTE:       Subjective:  Patient seen and examined on AM rounds. No acute events overnight. AF/VSS. Pain controlled. Denies N/V and tolerating diet. Patient aware of plan for BKA next Tuesday      Objective:    PE:  Gen: NAD  Resp: respirations unlabored, no increased WoB  Ext:  RLE: DP palpable      Vital Signs Last 24 Hrs  T(C): 37 (2019 04:02), Max: 37 (2019 04:02)  T(F): 98.6 (2019 04:02), Max: 98.6 (2019 04:02)  HR: 84 (2019 04:02) (84 - 85)  BP: 150/76 (2019 04:15) (133/73 - 150/77)  BP(mean): --  RR: 18 (2019 04:02) (18 - 18)  SpO2: 97% (2019 04:02) (95% - 98%)    I&O's Detail    2019 07:01  -  2019 07:00  --------------------------------------------------------  IN:    Oral Fluid: 580 mL  Total IN: 580 mL    OUT:    Voided: 1175 mL  Total OUT: 1175 mL    Total NET: -595 mL          Daily     Daily Weight in k.5 (2019 10:31)    MEDICATIONS  (STANDING):  amLODIPine   Tablet 10 milliGRAM(s) Oral daily  chlorhexidine 2% Cloths 1 Application(s) Topical daily  collagenase Ointment 1 Application(s) Topical <User Schedule>  dextrose 5%. 1000 milliLiter(s) (50 mL/Hr) IV Continuous <Continuous>  dextrose 50% Injectable 12.5 Gram(s) IV Push once  dextrose 50% Injectable 25 Gram(s) IV Push once  dextrose 50% Injectable 25 Gram(s) IV Push once  dronabinol 2.5 milliGRAM(s) Oral two times a day  epoetin braden Injectable 66368 Unit(s) SubCutaneous <User Schedule>  ferrous    sulfate 325 milliGRAM(s) Oral daily  heparin  Injectable 5000 Unit(s) SubCutaneous every 8 hours  hydrALAZINE 100 milliGRAM(s) Oral three times a day  insulin glargine Injectable (LANTUS) 32 Unit(s) SubCutaneous at bedtime  insulin lispro (HumaLOG) corrective regimen sliding scale   SubCutaneous three times a day before meals  insulin lispro (HumaLOG) corrective regimen sliding scale   SubCutaneous at bedtime  insulin lispro Injectable (HumaLOG) 10 Unit(s) SubCutaneous before breakfast  piperacillin/tazobactam IVPB.. 3.375 Gram(s) IV Intermittent every 8 hours  polyethylene glycol 3350 17 Gram(s) Oral every 12 hours  primidone 25 milliGRAM(s) Oral at bedtime  senna 2 Tablet(s) Oral at bedtime    MEDICATIONS  (PRN):  acetaminophen   Tablet .. 650 milliGRAM(s) Oral every 6 hours PRN Mild Pain (1 - 3)  dextrose 40% Gel 15 Gram(s) Oral once PRN Blood Glucose LESS THAN 70 milliGRAM(s)/deciliter  glucagon  Injectable 1 milliGRAM(s) IntraMuscular once PRN Glucose LESS THAN 70 milligrams/deciliter      LABS:                        9.3    5.13  )-----------( 338      ( 2019 08:25 )             29.7     07-20    137  |  100  |  20  ----------------------------<  198<H>  4.2   |  26  |  1.94<H>    Ca    9.2      2019 06:06  Mg     1.9     07-19            RADIOLOGY & ADDITIONAL STUDIES:

## 2019-07-20 NOTE — PROGRESS NOTE ADULT - ASSESSMENT
59M, hx DM, HTN presents from [facility] via EMS due to resp distress. Patient at [facility] due to right diabetic foot ulcer. Vascular consulted for non-healing right foot wound evaluation for BKA    - c/w abx  - - Patient has documented medical and cardiac clearance for BKA, plan for OR Tuesday 7/23  Indications risks and benefits of right leg below above the knee amputation were explained to patient who understands and consents   - care per primary team       Vascular Surgery [phone]

## 2019-07-20 NOTE — PROGRESS NOTE ADULT - SUBJECTIVE AND OBJECTIVE BOX
Chief complaint  Patient is a 59y old  Male who presents with a chief complaint of SOB (20 Jul 2019 12:20)   Review of systems  Patient in bed, looks comfortable, no fever, no hypoglycemia.    Labs and Fingersticks  CAPILLARY BLOOD GLUCOSE      POCT Blood Glucose.: 123 mg/dL (20 Jul 2019 12:29)  POCT Blood Glucose.: 164 mg/dL (20 Jul 2019 08:21)  POCT Blood Glucose.: 214 mg/dL (19 Jul 2019 22:03)  POCT Blood Glucose.: 184 mg/dL (19 Jul 2019 16:32)      Anion Gap, Serum: 11 (07-20 @ 06:06)  Anion Gap, Serum: 11 (07-19 @ 06:45)      Calcium, Total Serum: 9.2 (07-20 @ 06:06)  Calcium, Total Serum: 8.7 (07-19 @ 06:45)          07-20    137  |  100  |  20  ----------------------------<  198<H>  4.2   |  26  |  1.94<H>    Ca    9.2      20 Jul 2019 06:06  Mg     1.9     07-19                          9.3    5.02  )-----------( 307      ( 20 Jul 2019 10:58 )             29.8     Medications  MEDICATIONS  (STANDING):  amLODIPine   Tablet 10 milliGRAM(s) Oral daily  chlorhexidine 2% Cloths 1 Application(s) Topical daily  collagenase Ointment 1 Application(s) Topical <User Schedule>  dextrose 5%. 1000 milliLiter(s) (50 mL/Hr) IV Continuous <Continuous>  dextrose 50% Injectable 12.5 Gram(s) IV Push once  dextrose 50% Injectable 25 Gram(s) IV Push once  dextrose 50% Injectable 25 Gram(s) IV Push once  dronabinol 2.5 milliGRAM(s) Oral two times a day  epoetin braden Injectable 84060 Unit(s) SubCutaneous <User Schedule>  ferrous    sulfate 325 milliGRAM(s) Oral daily  heparin  Injectable 5000 Unit(s) SubCutaneous every 8 hours  hydrALAZINE 100 milliGRAM(s) Oral three times a day  insulin glargine Injectable (LANTUS) 32 Unit(s) SubCutaneous at bedtime  insulin lispro (HumaLOG) corrective regimen sliding scale   SubCutaneous three times a day before meals  insulin lispro (HumaLOG) corrective regimen sliding scale   SubCutaneous at bedtime  insulin lispro Injectable (HumaLOG) 10 Unit(s) SubCutaneous before breakfast  piperacillin/tazobactam IVPB.. 3.375 Gram(s) IV Intermittent every 8 hours  polyethylene glycol 3350 17 Gram(s) Oral every 12 hours  primidone 50 milliGRAM(s) Oral two times a day  senna 2 Tablet(s) Oral at bedtime      Physical Exam  General: Patient comfortable in bed  Vital Signs Last 12 Hrs  T(F): 98.1 (07-20-19 @ 13:35), Max: 98.6 (07-20-19 @ 04:02)  HR: 80 (07-20-19 @ 13:35) (80 - 84)  BP: 154/70 (07-20-19 @ 13:35) (150/76 - 154/70)  BP(mean): --  RR: 16 (07-20-19 @ 13:35) (16 - 18)  SpO2: 97% (07-20-19 @ 13:35) (97% - 97%)  Neck: No palpable thyroid nodules.  CVS: S1S2, No murmurs  Respiratory: No wheezing, no crepitations  GI: Abdomen soft, bowel sounds positive  Musculoskeletal:  edema lower extremities.   Skin: No skin rashes, no ecchymosis    Diagnostics

## 2019-07-20 NOTE — PROGRESS NOTE ADULT - SUBJECTIVE AND OBJECTIVE BOX
Subjective: Patient seen and examined. No new events except as noted.   resting comfortably     REVIEW OF SYSTEMS:    CONSTITUTIONAL: + weakness, fevers or chills  EYES/ENT: No visual changes;  No vertigo or throat pain   NECK: No pain or stiffness  RESPIRATORY: No cough, wheezing, hemoptysis; No shortness of breath  CARDIOVASCULAR: No chest pain or palpitations  GASTROINTESTINAL: No abdominal or epigastric pain. No nausea, vomiting, or hematemesis; No diarrhea or constipation. No melena or hematochezia.  GENITOURINARY: No dysuria, frequency or hematuria  NEUROLOGICAL: No numbness or weakness  SKIN: No itching, burning, rashes, or lesions   All other review of systems is negative unless indicated above.    MEDICATIONS:  MEDICATIONS  (STANDING):  amLODIPine   Tablet 10 milliGRAM(s) Oral daily  chlorhexidine 2% Cloths 1 Application(s) Topical daily  collagenase Ointment 1 Application(s) Topical <User Schedule>  dextrose 5%. 1000 milliLiter(s) (50 mL/Hr) IV Continuous <Continuous>  dextrose 50% Injectable 12.5 Gram(s) IV Push once  dextrose 50% Injectable 25 Gram(s) IV Push once  dextrose 50% Injectable 25 Gram(s) IV Push once  dronabinol 2.5 milliGRAM(s) Oral two times a day  epoetin braden Injectable 84384 Unit(s) SubCutaneous <User Schedule>  ferrous    sulfate 325 milliGRAM(s) Oral daily  heparin  Injectable 5000 Unit(s) SubCutaneous every 8 hours  hydrALAZINE 100 milliGRAM(s) Oral three times a day  insulin glargine Injectable (LANTUS) 32 Unit(s) SubCutaneous at bedtime  insulin lispro (HumaLOG) corrective regimen sliding scale   SubCutaneous three times a day before meals  insulin lispro (HumaLOG) corrective regimen sliding scale   SubCutaneous at bedtime  insulin lispro Injectable (HumaLOG) 10 Unit(s) SubCutaneous before breakfast  piperacillin/tazobactam IVPB.. 3.375 Gram(s) IV Intermittent every 8 hours  polyethylene glycol 3350 17 Gram(s) Oral every 12 hours  primidone 50 milliGRAM(s) Oral two times a day  senna 2 Tablet(s) Oral at bedtime      PHYSICAL EXAM:  T(C): 36.7 (07-20-19 @ 21:16), Max: 37 (07-20-19 @ 04:02)  HR: 85 (07-20-19 @ 21:16) (80 - 85)  BP: 141/77 (07-20-19 @ 21:16) (141/77 - 154/70)  RR: 16 (07-20-19 @ 21:16) (16 - 18)  SpO2: 96% (07-20-19 @ 21:16) (96% - 97%)  Wt(kg): --  I&O's Summary    19 Jul 2019 07:01  -  20 Jul 2019 07:00  --------------------------------------------------------  IN: 580 mL / OUT: 1175 mL / NET: -595 mL    20 Jul 2019 07:01  -  20 Jul 2019 22:36  --------------------------------------------------------  IN: 580 mL / OUT: 200 mL / NET: 380 mL          Appearance: Normal	  HEENT:   Normal oral mucosa, PERRL, EOMI	  Lymphatic: No lymphadenopathy , no edema  Cardiovascular: Normal S1 S2, No JVD, No murmurs , Peripheral pulses palpable 2+ bilaterally  Respiratory: Lungs clear to auscultation, normal effort 	  Gastrointestinal:  Soft, Non-tender, + BS	  Skin: No rashes, No ecchymoses, No cyanosis, warm to touch  Musculoskeletal: Normal range of motion, normal strength  Psychiatry:  Mood & affect appropriate  Ext: R foot wraaped, +VAC     LABS:    CARDIAC MARKERS:                                9.3    5.02  )-----------( 307      ( 20 Jul 2019 10:58 )             29.8     07-20    137  |  100  |  20  ----------------------------<  198<H>  4.2   |  26  |  1.94<H>    Ca    9.2      20 Jul 2019 06:06  Mg     1.9     07-19      proBNP:   Lipid Profile:   HgA1c:   TSH:             TELEMETRY: 	SR    ECG:  	  RADIOLOGY:   DIAGNOSTIC TESTING:  [ ] Echocardiogram:  [ ]  Catheterization:  [ ] Stress Test:    OTHER:

## 2019-07-21 LAB
ANION GAP SERPL CALC-SCNC: 14 MMOL/L — SIGNIFICANT CHANGE UP (ref 5–17)
BUN SERPL-MCNC: 21 MG/DL — SIGNIFICANT CHANGE UP (ref 7–23)
CALCIUM SERPL-MCNC: 8.9 MG/DL — SIGNIFICANT CHANGE UP (ref 8.4–10.5)
CHLORIDE SERPL-SCNC: 99 MMOL/L — SIGNIFICANT CHANGE UP (ref 96–108)
CO2 SERPL-SCNC: 25 MMOL/L — SIGNIFICANT CHANGE UP (ref 22–31)
CREAT SERPL-MCNC: 2.04 MG/DL — HIGH (ref 0.5–1.3)
GLUCOSE BLDC GLUCOMTR-MCNC: 140 MG/DL — HIGH (ref 70–99)
GLUCOSE BLDC GLUCOMTR-MCNC: 156 MG/DL — HIGH (ref 70–99)
GLUCOSE BLDC GLUCOMTR-MCNC: 214 MG/DL — HIGH (ref 70–99)
GLUCOSE BLDC GLUCOMTR-MCNC: 92 MG/DL — SIGNIFICANT CHANGE UP (ref 70–99)
GLUCOSE SERPL-MCNC: 175 MG/DL — HIGH (ref 70–99)
HCT VFR BLD CALC: 29.5 % — LOW (ref 39–50)
HGB BLD-MCNC: 9.2 G/DL — LOW (ref 13–17)
MCHC RBC-ENTMCNC: 28.1 PG — SIGNIFICANT CHANGE UP (ref 27–34)
MCHC RBC-ENTMCNC: 31.2 GM/DL — LOW (ref 32–36)
MCV RBC AUTO: 90.2 FL — SIGNIFICANT CHANGE UP (ref 80–100)
PLATELET # BLD AUTO: 312 K/UL — SIGNIFICANT CHANGE UP (ref 150–400)
POTASSIUM SERPL-MCNC: 3.9 MMOL/L — SIGNIFICANT CHANGE UP (ref 3.5–5.3)
POTASSIUM SERPL-SCNC: 3.9 MMOL/L — SIGNIFICANT CHANGE UP (ref 3.5–5.3)
RBC # BLD: 3.27 M/UL — LOW (ref 4.2–5.8)
RBC # FLD: 14.5 % — SIGNIFICANT CHANGE UP (ref 10.3–14.5)
SODIUM SERPL-SCNC: 138 MMOL/L — SIGNIFICANT CHANGE UP (ref 135–145)
WBC # BLD: 5.28 K/UL — SIGNIFICANT CHANGE UP (ref 3.8–10.5)
WBC # FLD AUTO: 5.28 K/UL — SIGNIFICANT CHANGE UP (ref 3.8–10.5)

## 2019-07-21 RX ADMIN — PRIMIDONE 50 MILLIGRAM(S): 250 TABLET ORAL at 18:40

## 2019-07-21 RX ADMIN — PIPERACILLIN AND TAZOBACTAM 25 GRAM(S): 4; .5 INJECTION, POWDER, LYOPHILIZED, FOR SOLUTION INTRAVENOUS at 13:18

## 2019-07-21 RX ADMIN — PRIMIDONE 50 MILLIGRAM(S): 250 TABLET ORAL at 05:03

## 2019-07-21 RX ADMIN — INSULIN GLARGINE 32 UNIT(S): 100 INJECTION, SOLUTION SUBCUTANEOUS at 22:38

## 2019-07-21 RX ADMIN — HEPARIN SODIUM 5000 UNIT(S): 5000 INJECTION INTRAVENOUS; SUBCUTANEOUS at 13:18

## 2019-07-21 RX ADMIN — PIPERACILLIN AND TAZOBACTAM 25 GRAM(S): 4; .5 INJECTION, POWDER, LYOPHILIZED, FOR SOLUTION INTRAVENOUS at 21:08

## 2019-07-21 RX ADMIN — Medication 10 UNIT(S): at 08:34

## 2019-07-21 RX ADMIN — Medication 100 MILLIGRAM(S): at 05:04

## 2019-07-21 RX ADMIN — Medication 100 MILLIGRAM(S): at 13:17

## 2019-07-21 RX ADMIN — Medication 325 MILLIGRAM(S): at 13:17

## 2019-07-21 RX ADMIN — POLYETHYLENE GLYCOL 3350 17 GRAM(S): 17 POWDER, FOR SOLUTION ORAL at 18:40

## 2019-07-21 RX ADMIN — CHLORHEXIDINE GLUCONATE 1 APPLICATION(S): 213 SOLUTION TOPICAL at 12:10

## 2019-07-21 RX ADMIN — Medication 100 MILLIGRAM(S): at 21:08

## 2019-07-21 RX ADMIN — AMLODIPINE BESYLATE 10 MILLIGRAM(S): 2.5 TABLET ORAL at 05:03

## 2019-07-21 RX ADMIN — PIPERACILLIN AND TAZOBACTAM 25 GRAM(S): 4; .5 INJECTION, POWDER, LYOPHILIZED, FOR SOLUTION INTRAVENOUS at 05:03

## 2019-07-21 RX ADMIN — HEPARIN SODIUM 5000 UNIT(S): 5000 INJECTION INTRAVENOUS; SUBCUTANEOUS at 21:08

## 2019-07-21 RX ADMIN — HEPARIN SODIUM 5000 UNIT(S): 5000 INJECTION INTRAVENOUS; SUBCUTANEOUS at 05:04

## 2019-07-21 RX ADMIN — Medication 1: at 08:34

## 2019-07-21 NOTE — PROGRESS NOTE ADULT - SUBJECTIVE AND OBJECTIVE BOX
Patient is a 59y old  Male who presents with a chief complaint of SOB (21 Jul 2019 08:51)      SUBJECTIVE / OVERNIGHT EVENTS:  No chest pain. No shortness of breath. No complaints. No events overnight.     MEDICATIONS  (STANDING):  amLODIPine   Tablet 10 milliGRAM(s) Oral daily  chlorhexidine 2% Cloths 1 Application(s) Topical daily  collagenase Ointment 1 Application(s) Topical <User Schedule>  dextrose 5%. 1000 milliLiter(s) (50 mL/Hr) IV Continuous <Continuous>  dextrose 50% Injectable 12.5 Gram(s) IV Push once  dextrose 50% Injectable 25 Gram(s) IV Push once  dextrose 50% Injectable 25 Gram(s) IV Push once  dronabinol 2.5 milliGRAM(s) Oral two times a day  epoetin braden Injectable 67515 Unit(s) SubCutaneous <User Schedule>  ferrous    sulfate 325 milliGRAM(s) Oral daily  heparin  Injectable 5000 Unit(s) SubCutaneous every 8 hours  hydrALAZINE 100 milliGRAM(s) Oral three times a day  insulin glargine Injectable (LANTUS) 32 Unit(s) SubCutaneous at bedtime  insulin lispro (HumaLOG) corrective regimen sliding scale   SubCutaneous three times a day before meals  insulin lispro (HumaLOG) corrective regimen sliding scale   SubCutaneous at bedtime  insulin lispro Injectable (HumaLOG) 10 Unit(s) SubCutaneous before breakfast  piperacillin/tazobactam IVPB.. 3.375 Gram(s) IV Intermittent every 8 hours  polyethylene glycol 3350 17 Gram(s) Oral every 12 hours  primidone 50 milliGRAM(s) Oral two times a day  senna 2 Tablet(s) Oral at bedtime    MEDICATIONS  (PRN):  acetaminophen   Tablet .. 650 milliGRAM(s) Oral every 6 hours PRN Mild Pain (1 - 3)  dextrose 40% Gel 15 Gram(s) Oral once PRN Blood Glucose LESS THAN 70 milliGRAM(s)/deciliter  glucagon  Injectable 1 milliGRAM(s) IntraMuscular once PRN Glucose LESS THAN 70 milligrams/deciliter      Vital Signs Last 24 Hrs  T(C): 36.6 (21 Jul 2019 11:37), Max: 37 (21 Jul 2019 04:16)  T(F): 97.9 (21 Jul 2019 11:37), Max: 98.6 (21 Jul 2019 04:16)  HR: 86 (21 Jul 2019 11:37) (78 - 86)  BP: 143/77 (21 Jul 2019 11:37) (136/79 - 154/70)  BP(mean): --  RR: 18 (21 Jul 2019 11:37) (16 - 18)  SpO2: 94% (21 Jul 2019 11:37) (93% - 97%)  CAPILLARY BLOOD GLUCOSE      POCT Blood Glucose.: 92 mg/dL (21 Jul 2019 12:08)  POCT Blood Glucose.: 156 mg/dL (21 Jul 2019 07:58)  POCT Blood Glucose.: 204 mg/dL (20 Jul 2019 22:09)  POCT Blood Glucose.: 158 mg/dL (20 Jul 2019 17:25)    I&O's Summary    20 Jul 2019 07:01  -  21 Jul 2019 07:00  --------------------------------------------------------  IN: 580 mL / OUT: 750 mL / NET: -170 mL        PHYSICAL EXAM:  GENERAL: NAD, well-developed  HEAD:  Atraumatic, Normocephalic  EYES: EOMI, PERRLA, conjunctiva and sclera clear  NECK: Supple, No JVD  CHEST/LUNG: Clear to auscultation bilaterally; No wheeze  HEART: Regular rate and rhythm; No murmurs, rubs, or gallops  ABDOMEN: Soft, Nontender, Nondistended; Bowel sounds present  EXTREMITIES:  2+ Peripheral Pulses, No clubbing, cyanosis, or edema  PSYCH: AAOx3  NEUROLOGY: non-focal  SKIN: No rashes or lesions    LABS:                        9.2    5.28  )-----------( 312      ( 21 Jul 2019 10:26 )             29.5     07-21    138  |  99  |  21  ----------------------------<  175<H>  3.9   |  25  |  2.04<H>    Ca    8.9      21 Jul 2019 07:49                RADIOLOGY & ADDITIONAL TESTS:    Imaging Personally Reviewed:    Consultant(s) Notes Reviewed:      Care Discussed with Consultants/Other Providers:

## 2019-07-21 NOTE — PROGRESS NOTE ADULT - ASSESSMENT
59M, hx DM, HTN presents from [facility] via EMS due to resp distress. Patient at [facility] due to right diabetic foot ulcer. Patient reported to be hypoxic on room air to 60s. Improved on 15L face mask top 100% O2 sat. Patient awake, alert, oriented. States he has been coughing for 2-3 weeks (dry cough) as well as weakness and shortness of breath for few days.    Acute on chronic diastolic chf  - po lasix 80 qd  - strict Is and Os  - cardiology consult appreciated  - keep O2 sats above 92%    diabetic foot ulcer  - podiatry follow up  - MRI of right foot  done  - podiatry recommending BKA  - was seen by vascular  - BKA if right lower ext scheduled for tuesday  - pt ha no medical contraindications for BKA    essential tremors   - trial of primidone  - seen by neuro consult  - increase primidone to 50 bid    CKD 4  - monitor cre  - avoid nephrotoxins    diabetes  - fs qid  - hgb a1c   7.1  - endocrine consult following    htn  - cw norvasc    DVT px

## 2019-07-21 NOTE — PROGRESS NOTE ADULT - SUBJECTIVE AND OBJECTIVE BOX
Subjective: Patient seen and examined. No new events except as noted.   no cp or sob   feels tired and weak       REVIEW OF SYSTEMS:    CONSTITUTIONAL: + weakness, fevers or chills  EYES/ENT: No visual changes;  No vertigo or throat pain   NECK: No pain or stiffness  RESPIRATORY: No cough, wheezing, hemoptysis; No shortness of breath  CARDIOVASCULAR: No chest pain or palpitations  GASTROINTESTINAL: No abdominal or epigastric pain. No nausea, vomiting, or hematemesis; No diarrhea or constipation. No melena or hematochezia.  GENITOURINARY: No dysuria, frequency or hematuria  NEUROLOGICAL: No numbness or weakness  SKIN: No itching, burning, rashes, or lesions   All other review of systems is negative unless indicated above.    MEDICATIONS:  MEDICATIONS  (STANDING):  amLODIPine   Tablet 10 milliGRAM(s) Oral daily  chlorhexidine 2% Cloths 1 Application(s) Topical daily  collagenase Ointment 1 Application(s) Topical <User Schedule>  dextrose 5%. 1000 milliLiter(s) (50 mL/Hr) IV Continuous <Continuous>  dextrose 50% Injectable 12.5 Gram(s) IV Push once  dextrose 50% Injectable 25 Gram(s) IV Push once  dextrose 50% Injectable 25 Gram(s) IV Push once  dronabinol 2.5 milliGRAM(s) Oral two times a day  epoetin braden Injectable 64609 Unit(s) SubCutaneous <User Schedule>  ferrous    sulfate 325 milliGRAM(s) Oral daily  heparin  Injectable 5000 Unit(s) SubCutaneous every 8 hours  hydrALAZINE 100 milliGRAM(s) Oral three times a day  insulin glargine Injectable (LANTUS) 32 Unit(s) SubCutaneous at bedtime  insulin lispro (HumaLOG) corrective regimen sliding scale   SubCutaneous three times a day before meals  insulin lispro (HumaLOG) corrective regimen sliding scale   SubCutaneous at bedtime  insulin lispro Injectable (HumaLOG) 10 Unit(s) SubCutaneous before breakfast  piperacillin/tazobactam IVPB.. 3.375 Gram(s) IV Intermittent every 8 hours  polyethylene glycol 3350 17 Gram(s) Oral every 12 hours  primidone 50 milliGRAM(s) Oral two times a day  senna 2 Tablet(s) Oral at bedtime      PHYSICAL EXAM:  T(C): 37 (07-21-19 @ 04:16), Max: 37 (07-21-19 @ 04:16)  HR: 78 (07-21-19 @ 04:16) (78 - 85)  BP: 136/79 (07-21-19 @ 04:16) (136/79 - 154/70)  RR: 17 (07-21-19 @ 04:16) (16 - 17)  SpO2: 93% (07-21-19 @ 04:16) (93% - 97%)  Wt(kg): --  I&O's Summary    20 Jul 2019 07:01  -  21 Jul 2019 07:00  --------------------------------------------------------  IN: 580 mL / OUT: 750 mL / NET: -170 mL          Appearance: Normal	  HEENT:   Normal oral mucosa, PERRL, EOMI	  Lymphatic: No lymphadenopathy , no edema  Cardiovascular: Normal S1 S2, No JVD, No murmurs , Peripheral pulses palpable 2+ bilaterally  Respiratory: Lungs clear to auscultation, normal effort 	  Gastrointestinal:  Soft, Non-tender, + BS	  Skin: No rashes, No ecchymoses, No cyanosis, warm to touch  Musculoskeletal: Normal range of motion, normal strength  Psychiatry:  Mood & affect appropriate  Ext: R foot wraaped, +VAC     LABS:    CARDIAC MARKERS:                                9.3    5.02  )-----------( 307      ( 20 Jul 2019 10:58 )             29.8     07-21    138  |  99  |  21  ----------------------------<  175<H>  3.9   |  25  |  2.04<H>    Ca    8.9      21 Jul 2019 07:49      proBNP:   Lipid Profile:   HgA1c:   TSH:             TELEMETRY: 	SR    ECG:  	  RADIOLOGY:   DIAGNOSTIC TESTING:  [ ] Echocardiogram:  [ ]  Catheterization:  [ ] Stress Test:    OTHER:

## 2019-07-21 NOTE — PROGRESS NOTE ADULT - SUBJECTIVE AND OBJECTIVE BOX
Chief complaint  Patient is a 59y old  Male who presents with a chief complaint of SOB (21 Jul 2019 13:03)   Review of systems  Patient in bed, looks comfortable, no fever, no hypoglycemia.    Labs and Fingersticks  CAPILLARY BLOOD GLUCOSE      POCT Blood Glucose.: 92 mg/dL (21 Jul 2019 12:08)  POCT Blood Glucose.: 156 mg/dL (21 Jul 2019 07:58)  POCT Blood Glucose.: 204 mg/dL (20 Jul 2019 22:09)  POCT Blood Glucose.: 158 mg/dL (20 Jul 2019 17:25)      Anion Gap, Serum: 14 (07-21 @ 07:49)  Anion Gap, Serum: 11 (07-20 @ 06:06)      Calcium, Total Serum: 8.9 (07-21 @ 07:49)  Calcium, Total Serum: 9.2 (07-20 @ 06:06)          07-21    138  |  99  |  21  ----------------------------<  175<H>  3.9   |  25  |  2.04<H>    Ca    8.9      21 Jul 2019 07:49                          9.2    5.28  )-----------( 312      ( 21 Jul 2019 10:26 )             29.5     Medications  MEDICATIONS  (STANDING):  amLODIPine   Tablet 10 milliGRAM(s) Oral daily  chlorhexidine 2% Cloths 1 Application(s) Topical daily  collagenase Ointment 1 Application(s) Topical <User Schedule>  dextrose 5%. 1000 milliLiter(s) (50 mL/Hr) IV Continuous <Continuous>  dextrose 50% Injectable 12.5 Gram(s) IV Push once  dextrose 50% Injectable 25 Gram(s) IV Push once  dextrose 50% Injectable 25 Gram(s) IV Push once  dronabinol 2.5 milliGRAM(s) Oral two times a day  epoetin braden Injectable 30826 Unit(s) SubCutaneous <User Schedule>  ferrous    sulfate 325 milliGRAM(s) Oral daily  heparin  Injectable 5000 Unit(s) SubCutaneous every 8 hours  hydrALAZINE 100 milliGRAM(s) Oral three times a day  insulin glargine Injectable (LANTUS) 32 Unit(s) SubCutaneous at bedtime  insulin lispro (HumaLOG) corrective regimen sliding scale   SubCutaneous three times a day before meals  insulin lispro (HumaLOG) corrective regimen sliding scale   SubCutaneous at bedtime  insulin lispro Injectable (HumaLOG) 10 Unit(s) SubCutaneous before breakfast  piperacillin/tazobactam IVPB.. 3.375 Gram(s) IV Intermittent every 8 hours  polyethylene glycol 3350 17 Gram(s) Oral every 12 hours  primidone 50 milliGRAM(s) Oral two times a day  senna 2 Tablet(s) Oral at bedtime      Physical Exam  General: Patient comfortable in bed  Vital Signs Last 12 Hrs  T(F): 97.9 (07-21-19 @ 11:37), Max: 98.6 (07-21-19 @ 04:16)  HR: 86 (07-21-19 @ 11:37) (78 - 86)  BP: 143/77 (07-21-19 @ 11:37) (136/79 - 143/77)  BP(mean): --  RR: 18 (07-21-19 @ 11:37) (17 - 18)  SpO2: 94% (07-21-19 @ 11:37) (93% - 94%)  Neck: No palpable thyroid nodules.  CVS: S1S2, No murmurs  Respiratory: No wheezing, no crepitations  GI: Abdomen soft, bowel sounds positive  Musculoskeletal:  edema lower extremities.   Skin: No skin rashes, no ecchymosis    Diagnostics

## 2019-07-21 NOTE — PROGRESS NOTE ADULT - ASSESSMENT
Assessment  DMT2: 59y Male with DM T2 with hyperglycemia, on basal bolus insulin, FS fluctuating, no hypoglycemic episode, has increased PO intake today, non compliant with low carb diet.  Foot wound: On wound care, meds, stable.  CAD: on medications, no chest pain, stable, monitored.  HTN: Controlled,  on antihypertensive medications.  CKD: Monitor labs/BMP,             Andrzej Zhu MD  Cell: [phone]  Office: [phone]

## 2019-07-22 ENCOUNTER — TRANSCRIPTION ENCOUNTER (OUTPATIENT)
Age: 59
End: 2019-07-22

## 2019-07-22 LAB
ANION GAP SERPL CALC-SCNC: 12 MMOL/L — SIGNIFICANT CHANGE UP (ref 5–17)
BLD GP AB SCN SERPL QL: NEGATIVE — SIGNIFICANT CHANGE UP
BUN SERPL-MCNC: 22 MG/DL — SIGNIFICANT CHANGE UP (ref 7–23)
CALCIUM SERPL-MCNC: 8.7 MG/DL — SIGNIFICANT CHANGE UP (ref 8.4–10.5)
CHLORIDE SERPL-SCNC: 100 MMOL/L — SIGNIFICANT CHANGE UP (ref 96–108)
CO2 SERPL-SCNC: 25 MMOL/L — SIGNIFICANT CHANGE UP (ref 22–31)
CREAT SERPL-MCNC: 1.89 MG/DL — HIGH (ref 0.5–1.3)
GLUCOSE BLDC GLUCOMTR-MCNC: 105 MG/DL — HIGH (ref 70–99)
GLUCOSE BLDC GLUCOMTR-MCNC: 157 MG/DL — HIGH (ref 70–99)
GLUCOSE BLDC GLUCOMTR-MCNC: 178 MG/DL — HIGH (ref 70–99)
GLUCOSE BLDC GLUCOMTR-MCNC: 224 MG/DL — HIGH (ref 70–99)
GLUCOSE BLDC GLUCOMTR-MCNC: 56 MG/DL — LOW (ref 70–99)
GLUCOSE SERPL-MCNC: 122 MG/DL — HIGH (ref 70–99)
HCT VFR BLD CALC: 30.4 % — LOW (ref 39–50)
HGB BLD-MCNC: 9.3 G/DL — LOW (ref 13–17)
MCHC RBC-ENTMCNC: 27.8 PG — SIGNIFICANT CHANGE UP (ref 27–34)
MCHC RBC-ENTMCNC: 30.6 GM/DL — LOW (ref 32–36)
MCV RBC AUTO: 91 FL — SIGNIFICANT CHANGE UP (ref 80–100)
PLATELET # BLD AUTO: 307 K/UL — SIGNIFICANT CHANGE UP (ref 150–400)
POTASSIUM SERPL-MCNC: 3.9 MMOL/L — SIGNIFICANT CHANGE UP (ref 3.5–5.3)
POTASSIUM SERPL-SCNC: 3.9 MMOL/L — SIGNIFICANT CHANGE UP (ref 3.5–5.3)
RBC # BLD: 3.34 M/UL — LOW (ref 4.2–5.8)
RBC # FLD: 14.6 % — HIGH (ref 10.3–14.5)
RH IG SCN BLD-IMP: POSITIVE — SIGNIFICANT CHANGE UP
SODIUM SERPL-SCNC: 137 MMOL/L — SIGNIFICANT CHANGE UP (ref 135–145)
WBC # BLD: 4.6 K/UL — SIGNIFICANT CHANGE UP (ref 3.8–10.5)
WBC # FLD AUTO: 4.6 K/UL — SIGNIFICANT CHANGE UP (ref 3.8–10.5)

## 2019-07-22 PROCEDURE — 99232 SBSQ HOSP IP/OBS MODERATE 35: CPT

## 2019-07-22 RX ORDER — INSULIN LISPRO 100/ML
4 VIAL (ML) SUBCUTANEOUS
Refills: 0 | Status: DISCONTINUED | OUTPATIENT
Start: 2019-07-22 | End: 2019-07-28

## 2019-07-22 RX ORDER — INSULIN GLARGINE 100 [IU]/ML
22 INJECTION, SOLUTION SUBCUTANEOUS AT BEDTIME
Refills: 0 | Status: DISCONTINUED | OUTPATIENT
Start: 2019-07-22 | End: 2019-07-28

## 2019-07-22 RX ORDER — PIPERACILLIN AND TAZOBACTAM 4; .5 G/20ML; G/20ML
3.38 INJECTION, POWDER, LYOPHILIZED, FOR SOLUTION INTRAVENOUS EVERY 8 HOURS
Refills: 0 | Status: DISCONTINUED | OUTPATIENT
Start: 2019-07-22 | End: 2019-07-23

## 2019-07-22 RX ADMIN — AMLODIPINE BESYLATE 10 MILLIGRAM(S): 2.5 TABLET ORAL at 05:18

## 2019-07-22 RX ADMIN — Medication 325 MILLIGRAM(S): at 13:04

## 2019-07-22 RX ADMIN — Medication 1 APPLICATION(S): at 08:24

## 2019-07-22 RX ADMIN — CHLORHEXIDINE GLUCONATE 1 APPLICATION(S): 213 SOLUTION TOPICAL at 12:25

## 2019-07-22 RX ADMIN — HEPARIN SODIUM 5000 UNIT(S): 5000 INJECTION INTRAVENOUS; SUBCUTANEOUS at 05:18

## 2019-07-22 RX ADMIN — PRIMIDONE 50 MILLIGRAM(S): 250 TABLET ORAL at 17:29

## 2019-07-22 RX ADMIN — SENNA PLUS 2 TABLET(S): 8.6 TABLET ORAL at 21:36

## 2019-07-22 RX ADMIN — HEPARIN SODIUM 5000 UNIT(S): 5000 INJECTION INTRAVENOUS; SUBCUTANEOUS at 13:05

## 2019-07-22 RX ADMIN — POLYETHYLENE GLYCOL 3350 17 GRAM(S): 17 POWDER, FOR SOLUTION ORAL at 17:30

## 2019-07-22 RX ADMIN — Medication 2: at 17:30

## 2019-07-22 RX ADMIN — Medication 1: at 13:04

## 2019-07-22 RX ADMIN — Medication 100 MILLIGRAM(S): at 21:36

## 2019-07-22 RX ADMIN — Medication 100 MILLIGRAM(S): at 05:18

## 2019-07-22 RX ADMIN — HEPARIN SODIUM 5000 UNIT(S): 5000 INJECTION INTRAVENOUS; SUBCUTANEOUS at 21:36

## 2019-07-22 RX ADMIN — PIPERACILLIN AND TAZOBACTAM 25 GRAM(S): 4; .5 INJECTION, POWDER, LYOPHILIZED, FOR SOLUTION INTRAVENOUS at 21:36

## 2019-07-22 RX ADMIN — PRIMIDONE 50 MILLIGRAM(S): 250 TABLET ORAL at 05:18

## 2019-07-22 RX ADMIN — INSULIN GLARGINE 22 UNIT(S): 100 INJECTION, SOLUTION SUBCUTANEOUS at 21:36

## 2019-07-22 RX ADMIN — PIPERACILLIN AND TAZOBACTAM 25 GRAM(S): 4; .5 INJECTION, POWDER, LYOPHILIZED, FOR SOLUTION INTRAVENOUS at 05:18

## 2019-07-22 RX ADMIN — Medication 100 MILLIGRAM(S): at 13:08

## 2019-07-22 RX ADMIN — PIPERACILLIN AND TAZOBACTAM 25 GRAM(S): 4; .5 INJECTION, POWDER, LYOPHILIZED, FOR SOLUTION INTRAVENOUS at 13:08

## 2019-07-22 NOTE — PROGRESS NOTE ADULT - SUBJECTIVE AND OBJECTIVE BOX
24 hour events/subjective: No c/os, No overnight events.      PAST HISTORY  --------------------------------------------------------------------------------  No significant changes to PMH, PSH, FHx, SHx, unless otherwise noted    ALLERGIES & MEDICATIONS  --------------------------------------------------------------------------------  Allergies    No Known Allergies    Intolerances      Standing Inpatient Medications  amLODIPine   Tablet 10 milliGRAM(s) Oral daily  chlorhexidine 2% Cloths 1 Application(s) Topical daily  collagenase Ointment 1 Application(s) Topical <User Schedule>  dextrose 5%. 1000 milliLiter(s) IV Continuous <Continuous>  dextrose 50% Injectable 12.5 Gram(s) IV Push once  dextrose 50% Injectable 25 Gram(s) IV Push once  dextrose 50% Injectable 25 Gram(s) IV Push once  dronabinol 2.5 milliGRAM(s) Oral two times a day  epoetin braden Injectable 67534 Unit(s) SubCutaneous <User Schedule>  ferrous    sulfate 325 milliGRAM(s) Oral daily  heparin  Injectable 5000 Unit(s) SubCutaneous every 8 hours  hydrALAZINE 100 milliGRAM(s) Oral three times a day  insulin glargine Injectable (LANTUS) 32 Unit(s) SubCutaneous at bedtime  insulin lispro (HumaLOG) corrective regimen sliding scale   SubCutaneous three times a day before meals  insulin lispro (HumaLOG) corrective regimen sliding scale   SubCutaneous at bedtime  insulin lispro Injectable (HumaLOG) 10 Unit(s) SubCutaneous before breakfast  piperacillin/tazobactam IVPB.. 3.375 Gram(s) IV Intermittent every 8 hours  polyethylene glycol 3350 17 Gram(s) Oral every 12 hours  primidone 50 milliGRAM(s) Oral two times a day  senna 2 Tablet(s) Oral at bedtime    PRN Inpatient Medications  acetaminophen   Tablet .. 650 milliGRAM(s) Oral every 6 hours PRN  dextrose 40% Gel 15 Gram(s) Oral once PRN  glucagon  Injectable 1 milliGRAM(s) IntraMuscular once PRN      REVIEW OF SYSTEMS  --------------------------------------------------------------------------------  Gen: as per HPI  Skin: No rashes  Head/Eyes/Ears/Mouth: No headache;No sore throat  Respiratory: No dyspnea, cough,   CV: No chest pain, PND, orthopnea  GI: as per HPI  : No increased frequency, dysuria, hematuria, nocturia  MSK: No joint pain/swelling; no back pain; no edema  Neuro: No dizziness/lightheadedness, weakness, seizures, numbness, tingling  Psych: No significant nervousness, anxiety, stress, depression    All other systems were reviewed and are negative, except as noted.      LABS/STUDIES  --------------------------------------------------------------------------------              9.2    5.28  >-----------<  312      [07-21-19 @ 10:26]              29.5     137  |  100  |  22  ----------------------------<  122      [07-22-19 @ 05:35]  3.9   |  25  |  1.89        Ca     8.7     [07-22-19 @ 05:35]              Glucose, Serum: 122 mg/dL (07-22-19 @ 05:35)  Glucose, Serum: 175 mg/dL (07-21-19 @ 07:49)        07-20-19 @ 07:01  -  07-21-19 @ 07:00  --------------------------------------------------------  IN: 580 mL / OUT: 750 mL / NET: -170 mL    07-21-19 @ 07:01  -  07-22-19 @ 06:53  --------------------------------------------------------  IN: 720 mL / OUT: 0 mL / NET: 720 mL        VITALS/PHYSICAL EXAM  --------------------------------------------------------------------------------  T(C): 36.9 (07-22-19 @ 04:29), Max: 36.9 (07-22-19 @ 04:29)  HR: 74 (07-22-19 @ 04:29) (74 - 86)  BP: 129/75 (07-22-19 @ 04:29) (129/75 - 144/80)  RR: 17 (07-22-19 @ 04:29) (17 - 18)  SpO2: 95% (07-22-19 @ 04:29) (94% - 96%)  Wt(kg): --    Physical Exam:    Gen: NAD Aox3  Resp: respirations unlabored, no increased WoB  Ext: RLE: DP palpable 24 hour events/subjective: No c/os, No overnight events.      PAST HISTORY  --------------------------------------------------------------------------------  No significant changes to PMH, PSH, FHx, SHx, unless otherwise noted    ALLERGIES & MEDICATIONS  --------------------------------------------------------------------------------  Allergies    No Known Allergies    Intolerances      Standing Inpatient Medications  amLODIPine   Tablet 10 milliGRAM(s) Oral daily  chlorhexidine 2% Cloths 1 Application(s) Topical daily  collagenase Ointment 1 Application(s) Topical <User Schedule>  dextrose 5%. 1000 milliLiter(s) IV Continuous <Continuous>  dextrose 50% Injectable 12.5 Gram(s) IV Push once  dextrose 50% Injectable 25 Gram(s) IV Push once  dextrose 50% Injectable 25 Gram(s) IV Push once  dronabinol 2.5 milliGRAM(s) Oral two times a day  epoetin braden Injectable 68949 Unit(s) SubCutaneous <User Schedule>  ferrous    sulfate 325 milliGRAM(s) Oral daily  heparin  Injectable 5000 Unit(s) SubCutaneous every 8 hours  hydrALAZINE 100 milliGRAM(s) Oral three times a day  insulin glargine Injectable (LANTUS) 32 Unit(s) SubCutaneous at bedtime  insulin lispro (HumaLOG) corrective regimen sliding scale   SubCutaneous three times a day before meals  insulin lispro (HumaLOG) corrective regimen sliding scale   SubCutaneous at bedtime  insulin lispro Injectable (HumaLOG) 10 Unit(s) SubCutaneous before breakfast  piperacillin/tazobactam IVPB.. 3.375 Gram(s) IV Intermittent every 8 hours  polyethylene glycol 3350 17 Gram(s) Oral every 12 hours  primidone 50 milliGRAM(s) Oral two times a day  senna 2 Tablet(s) Oral at bedtime    PRN Inpatient Medications  acetaminophen   Tablet .. 650 milliGRAM(s) Oral every 6 hours PRN  dextrose 40% Gel 15 Gram(s) Oral once PRN  glucagon  Injectable 1 milliGRAM(s) IntraMuscular once PRN      REVIEW OF SYSTEMS  --------------------------------------------------------------------------------  Gen: as per HPI  Skin: No rashes  Head/Eyes/Ears/Mouth: No headache;No sore throat  Respiratory: No dyspnea, cough,   CV: No chest pain, PND, orthopnea  GI: as per HPI  : No increased frequency, dysuria, hematuria, nocturia  MSK: No joint pain/swelling; no back pain; no edema  Neuro: No dizziness/lightheadedness, weakness, seizures, numbness, tingling  Psych: No significant nervousness, anxiety, stress, depression    All other systems were reviewed and are negative, except as noted.      LABS/STUDIES  --------------------------------------------------------------------------------              9.2    5.28  >-----------<  312      [07-21-19 @ 10:26]              29.5     137  |  100  |  22  ----------------------------<  122      [07-22-19 @ 05:35]  3.9   |  25  |  1.89        Ca     8.7     [07-22-19 @ 05:35]        Glucose, Serum: 122 mg/dL (07-22-19 @ 05:35)  Glucose, Serum: 175 mg/dL (07-21-19 @ 07:49)        07-20-19 @ 07:01  -  07-21-19 @ 07:00  --------------------------------------------------------  IN: 580 mL / OUT: 750 mL / NET: -170 mL    07-21-19 @ 07:01  -  07-22-19 @ 06:53  --------------------------------------------------------  IN: 720 mL / OUT: 0 mL / NET: 720 mL        VITALS/PHYSICAL EXAM  --------------------------------------------------------------------------------  T(C): 36.9 (07-22-19 @ 04:29), Max: 36.9 (07-22-19 @ 04:29)  HR: 74 (07-22-19 @ 04:29) (74 - 86)  BP: 129/75 (07-22-19 @ 04:29) (129/75 - 144/80)  RR: 17 (07-22-19 @ 04:29) (17 - 18)  SpO2: 95% (07-22-19 @ 04:29) (94% - 96%)  Wt(kg): --    Physical Exam:    Gen: NAD Aox3  Resp: respirations unlabored, no increased WoB  Ext: RLE: DP palpable

## 2019-07-22 NOTE — CHART NOTE - NSCHARTNOTEFT_GEN_A_CORE
PRE OPERATIVE NOTE    Pre-op Diagnosis: PVD  Procedure: TYLER  Surgeon: Jo                          9.3    4.60  )-----------( 307      ( 2019 08:50 )             30.4     07-    137  |  100  |  22  ----------------------------<  122<H>  3.9   |  25  |  1.89<H>    Ca    8.7      2019 05:35        CAPILLARY BLOOD GLUCOSE      POCT Blood Glucose.: 105 mg/dL (2019 07:52)    UA: 19 neg    Urine pregnancy: N/A    Type & Screen: pending    CXR: 19 Impression: Severe pulmonary edema. Small right pleural effusion.    EK19 NORMAL SINUS RHYTHM NONSPECIFIC T WAVE ABNORMALITY ABNORMAL ECG    Echocardiogram: 19 Conclusions:  1. Increased relative wall thickness with normal left  ventricular mass index, consistent with concentric left  ventricular remodeling.  2. Endocardial visualization enhanced with intravenous  injection of Ultrasonic Enhancing Agent (Definity). Normal  left ventricular systolic function.  3. The right ventricle is not well visualized; grossly  preserved  right ventricular systolic function.    A/P: 59y Male planned for above procedure  NPO past midnight, except medications  IVF @ [facility] spoke with GASTON Rees [phone] as per primary attending  Pain/nausea control  AM labs, coags, T&S, CBC, BMP  Consent in chart    [phone]

## 2019-07-22 NOTE — PROGRESS NOTE ADULT - ASSESSMENT
59M, hx DM, HTN presents from [facility] via EMS due to resp distress. Patient at [facility] due to right diabetic foot ulcer. Vascular consulted for non-healing right foot wound evaluation for BKA    -  c/w abx  -  Patient has documented medical and cardiac clearance for BKA, plan for OR Tuesday 7/23  Indications risks and benefits of right leg below above the knee amputation were explained to patient who understands and consents   - Preop and Consent today  - care per primary team       Vascular Surgery [phone]

## 2019-07-22 NOTE — CHART NOTE - NSCHARTNOTEFT_GEN_A_CORE
PRE OPERATIVE NOTE    Pre-op Diagnosis:   Procedure: R BKA  Surgeon: Jo                          9.3    4.60  )-----------( 307      ( 2019 08:50 )             30.4     -    137  |  100  |  22  ----------------------------<  122<H>  3.9   |  25  |  1.89<H>    Ca    8.7      2019 05:35        POCT Blood Glucose.: 105 mg/dL (2019 07:52)    UA:  neg    Urine pregnancy: N/A    Type & Screen: pending    CXR: 19 Impression: Severe pulmonary edema. Small right pleural effusion.    EK19 Diagnosis Line NORMAL SINUS RHYTHM NONSPECIFIC T WAVE ABNORMALITY, ABNORMAL ECG    Echocardiogram: 19  Conclusions:  1. Increased relative wall thickness with normal left  ventricular mass index, consistent with concentric left  ventricular remodeling.  2. Endocardial visualization enhanced with intravenous  injection of Ultrasonic Enhancing Agent (Definity). Normal  left ventricular systolic function.  3. The right ventricle is not well visualized; grossly  preserved  right ventricular systolic function.      A/P: 59y Male planned for above procedure    NPO past midnight, except medications  [facility]F @ [facility] as per primary team spoke with GASTON Rees for IVF O/N as per attending.  Pain/nausea control  AM labs, type and screen, coags,CBC. BMP  Consent in chart      [id number]

## 2019-07-22 NOTE — PROGRESS NOTE ADULT - SUBJECTIVE AND OBJECTIVE BOX
Admitting Diagnosis:  Chronic pulmonary edema (J81.1): CHRONIC PULMONARY EDEMA      HPI:  This is a 59y year old Male with the below past medical history of DM, HTN presented from [facility] via EMS due to resp distress. Patient at [facility] due to right diabetic foot ulcer. Patient reported to be hypoxic on room air to 60s. Improved on 15L face mask top 100% O2 sat. Patient awake, alert, oriented. NO fevers or chills, nausea or vomiting, headache, chest pain, abdominal pain, diarrhea or constipation. Patient reportedly received blood transfusion last night, unknown as to why. Reports BL leg swelling - chronic, no calf pain. No hx DVT or PE.       Neurology is asked to comment on the patients bilateral upper extremities R>L longstanding action tremor. It seems to be more prominent during the current hospitalization.    pt says tremor not bad now     Past Medical History:  Hypertension (I10)  Insulin dependent diabetes mellitus (E11.9)  Venous insufficiency of both lower extremities (459.81): R &gt; L  HTN (hypertension) (401.9)  BPH (benign prostatic hypertrophy) (600.00)  Diabetes (250.00)      Past Surgical History:  History of cholecystectomy (Z90.49)  No significant past surgical history (708061557)  S/P laparoscopic cholecystectomy (V45.89)  Status post incision and drainage (V45.89): Rt groin abscess  No significant past surgical history (841146369)      Social History:  No toxic habits    Family History:  FAMILY HISTORY:  Paternal family history of emphysema  Family history of diabetes mellitus (DM) (Grandparent)      Allergies:  No Known Allergies      ROS:  Constitutional: Patient offers no complaints of fevers or significant weight loss  Ears, Nose, Mouth and Throat: The patient presents with no abnormalities of the head, ears, eyes, nose or throat  Skin: Patient offers no concerns of new rashes or lesions  Respiratory: The patient presents with no abnormalities of the respiratory tract  Cardiovascular: The patient presents with no cardiac abnormalities  Gastrointestinal: The patient presents with no abnormalities of the GI system  Genitourinary: The patient presents with no dysuria, hematuria or frequent urination  Neurological: See HPI  Endocrine: Patient offers no complaints of excessive thirst, urination, or heat/cold intolerance    Advanced care planning reviewed and noted in the chart.      Medications:  acetaminophen   Tablet .. 650 milliGRAM(s) Oral every 6 hours PRN  amLODIPine   Tablet 10 milliGRAM(s) Oral daily  chlorhexidine 2% Cloths 1 Application(s) Topical daily  collagenase Ointment 1 Application(s) Topical <User Schedule>  dextrose 40% Gel 15 Gram(s) Oral once PRN  dextrose 5%. 1000 milliLiter(s) IV Continuous <Continuous>  dextrose 50% Injectable 12.5 Gram(s) IV Push once  dextrose 50% Injectable 25 Gram(s) IV Push once  dextrose 50% Injectable 25 Gram(s) IV Push once  dronabinol 2.5 milliGRAM(s) Oral two times a day  epoetin braden Injectable 03921 Unit(s) SubCutaneous <User Schedule>  ferrous    sulfate 325 milliGRAM(s) Oral daily  glucagon  Injectable 1 milliGRAM(s) IntraMuscular once PRN  heparin  Injectable 5000 Unit(s) SubCutaneous every 8 hours  hydrALAZINE 100 milliGRAM(s) Oral three times a day  insulin glargine Injectable (LANTUS) 32 Unit(s) SubCutaneous at bedtime  insulin lispro (HumaLOG) corrective regimen sliding scale   SubCutaneous three times a day before meals  insulin lispro (HumaLOG) corrective regimen sliding scale   SubCutaneous at bedtime  insulin lispro Injectable (HumaLOG) 10 Unit(s) SubCutaneous before breakfast  piperacillin/tazobactam IVPB.. 3.375 Gram(s) IV Intermittent every 8 hours  polyethylene glycol 3350 17 Gram(s) Oral every 12 hours  primidone 50 milliGRAM(s) Oral two times a day  senna 2 Tablet(s) Oral at bedtime      Labs:  CBC Full  -  ( 22 Jul 2019 08:50 )  WBC Count : 4.60 K/uL  RBC Count : 3.34 M/uL  Hemoglobin : 9.3 g/dL  Hematocrit : 30.4 %  Platelet Count - Automated : 307 K/uL  Mean Cell Volume : 91.0 fl  Mean Cell Hemoglobin : 27.8 pg  Mean Cell Hemoglobin Concentration : 30.6 gm/dL  Auto Neutrophil # : x  Auto Lymphocyte # : x  Auto Monocyte # : x  Auto Eosinophil # : x  Auto Basophil # : x  Auto Neutrophil % : x  Auto Lymphocyte % : x  Auto Monocyte % : x  Auto Eosinophil % : x  Auto Basophil % : x    07-22    137  |  100  |  22  ----------------------------<  122<H>  3.9   |  25  |  1.89<H>    Ca    8.7      22 Jul 2019 05:35      CAPILLARY BLOOD GLUCOSE      POCT Blood Glucose.: 105 mg/dL (22 Jul 2019 07:52)  POCT Blood Glucose.: 214 mg/dL (21 Jul 2019 21:42)  POCT Blood Glucose.: 140 mg/dL (21 Jul 2019 16:40)  POCT Blood Glucose.: 92 mg/dL (21 Jul 2019 12:08)              Vitals:  Vital Signs Last 24 Hrs  T(C): 36.9 (22 Jul 2019 04:29), Max: 36.9 (22 Jul 2019 04:29)  T(F): 98.5 (22 Jul 2019 04:29), Max: 98.5 (22 Jul 2019 04:29)  HR: 74 (22 Jul 2019 04:29) (74 - 86)  BP: 129/75 (22 Jul 2019 04:29) (129/75 - 144/80)  BP(mean): --  RR: 17 (22 Jul 2019 04:29) (17 - 18)  SpO2: 95% (22 Jul 2019 04:29) (94% - 96%)    NEUROLOGICAL EXAM:    Mental status: Awake, alert, and in no apparent distress. Oriented to person, place and time. Language function is normal. Recent memory, digit span and concentration were normal.     Cranial Nerves: Pupils were equal, round, reactive to light. Extraocular movements were intact. Visual field were full. Fundoscopic exam was deferred. Facial sensation was intact to light touch. There was no facial asymmetry. The palate was upgoing symmetrically and tongue was midline. Hearing acuity was intact to finger rub AU. Shoulder shrug was full bilaterally    Motor exam: Bulk and tone were normal. Strength was 5/5 in all four extremities. Fine finger movements were symmetric and normal. There was no pronator drift    Reflexes: 1+ in the bilateral upper extremities. 0 in the bilateral lower extremities. Toes were downgoing bilaterally.     Sensation: diminishe for all modalities in lower extremites below the knees.    Coordination: Finger-nose-finger and heel-to-shin was without dysmetria.     Gait: deferred.    There is a bilateral mild low amplitude/ low frequency action tremor.

## 2019-07-22 NOTE — PROGRESS NOTE ADULT - SUBJECTIVE AND OBJECTIVE BOX
Patient is a 59y old  Male who presents with a chief complaint of SOB (22 Jul 2019 09:41)      SUBJECTIVE / OVERNIGHT EVENTS: No chest pain. No shortness of breath. No complaints. No events overnight.       MEDICATIONS  (STANDING):  amLODIPine   Tablet 10 milliGRAM(s) Oral daily  chlorhexidine 2% Cloths 1 Application(s) Topical daily  collagenase Ointment 1 Application(s) Topical <User Schedule>  dextrose 5%. 1000 milliLiter(s) (50 mL/Hr) IV Continuous <Continuous>  dextrose 50% Injectable 12.5 Gram(s) IV Push once  dextrose 50% Injectable 25 Gram(s) IV Push once  dextrose 50% Injectable 25 Gram(s) IV Push once  dronabinol 2.5 milliGRAM(s) Oral two times a day  epoetin braden Injectable 58850 Unit(s) SubCutaneous <User Schedule>  ferrous    sulfate 325 milliGRAM(s) Oral daily  heparin  Injectable 5000 Unit(s) SubCutaneous every 8 hours  hydrALAZINE 100 milliGRAM(s) Oral three times a day  insulin glargine Injectable (LANTUS) 32 Unit(s) SubCutaneous at bedtime  insulin lispro (HumaLOG) corrective regimen sliding scale   SubCutaneous three times a day before meals  insulin lispro (HumaLOG) corrective regimen sliding scale   SubCutaneous at bedtime  insulin lispro Injectable (HumaLOG) 10 Unit(s) SubCutaneous before breakfast  piperacillin/tazobactam IVPB.. 3.375 Gram(s) IV Intermittent every 8 hours  polyethylene glycol 3350 17 Gram(s) Oral every 12 hours  primidone 50 milliGRAM(s) Oral two times a day  senna 2 Tablet(s) Oral at bedtime    MEDICATIONS  (PRN):  acetaminophen   Tablet .. 650 milliGRAM(s) Oral every 6 hours PRN Mild Pain (1 - 3)  dextrose 40% Gel 15 Gram(s) Oral once PRN Blood Glucose LESS THAN 70 milliGRAM(s)/deciliter  glucagon  Injectable 1 milliGRAM(s) IntraMuscular once PRN Glucose LESS THAN 70 milligrams/deciliter      Vital Signs Last 24 Hrs  T(C): 37 (22 Jul 2019 11:29), Max: 37 (22 Jul 2019 11:29)  T(F): 98.6 (22 Jul 2019 11:29), Max: 98.6 (22 Jul 2019 11:29)  HR: 90 (22 Jul 2019 13:12) (74 - 90)  BP: 155/84 (22 Jul 2019 13:12) (129/75 - 155/84)  BP(mean): --  RR: 18 (22 Jul 2019 11:29) (17 - 18)  SpO2: 95% (22 Jul 2019 11:29) (95% - 96%)  CAPILLARY BLOOD GLUCOSE      POCT Blood Glucose.: 157 mg/dL (22 Jul 2019 12:23)  POCT Blood Glucose.: 56 mg/dL (22 Jul 2019 12:20)  POCT Blood Glucose.: 105 mg/dL (22 Jul 2019 07:52)  POCT Blood Glucose.: 214 mg/dL (21 Jul 2019 21:42)  POCT Blood Glucose.: 140 mg/dL (21 Jul 2019 16:40)    I&O's Summary    21 Jul 2019 07:01  -  22 Jul 2019 07:00  --------------------------------------------------------  IN: 720 mL / OUT: 0 mL / NET: 720 mL    22 Jul 2019 07:01  -  22 Jul 2019 15:40  --------------------------------------------------------  IN: 480 mL / OUT: 500 mL / NET: -20 mL        PHYSICAL EXAM:  GENERAL: NAD, well-developed  HEAD:  Atraumatic, Normocephalic  EYES: EOMI, PERRLA, conjunctiva and sclera clear  NECK: Supple, No JVD  CHEST/LUNG: Clear to auscultation bilaterally; No wheeze  HEART: Regular rate and rhythm; No murmurs, rubs, or gallops  ABDOMEN: Soft, Nontender, Nondistended; Bowel sounds present  EXTREMITIES:  2+ Peripheral Pulses, No clubbing, cyanosis, or edema  PSYCH: AAOx3  NEUROLOGY: non-focal  SKIN: No rashes or lesions    LABS:                        9.3    4.60  )-----------( 307      ( 22 Jul 2019 08:50 )             30.4     07-22    137  |  100  |  22  ----------------------------<  122<H>  3.9   |  25  |  1.89<H>    Ca    8.7      22 Jul 2019 05:35                RADIOLOGY & ADDITIONAL TESTS:    Imaging Personally Reviewed:    Consultant(s) Notes Reviewed:      Care Discussed with Consultants/Other Providers:

## 2019-07-22 NOTE — PROGRESS NOTE ADULT - ASSESSMENT
58 yo male with longstanding bilateral mild action tremor. It is most likely essential tremor, possibly aggravated by his current medical problems. The nebulizers are notorious for aggravating tremors. As well probably he is suffering from the superimposed generalized polyneuropathy secondary to poorly controlled DM.    - agree with primidone, now at 50 mg BID seems to be helping  -neuro stable

## 2019-07-22 NOTE — PROGRESS NOTE ADULT - ASSESSMENT
Assessment  DMT2: 59y Male with DM T2 with hyperglycemia, on basal bolus insulin, blood sugars trending down, had hypoglycemic episode, not eating full meals, compliant with low carb diet.  Foot wound: On wound care, meds, stable.  CAD: on medications, no chest pain, stable, monitored.  HTN: Controlled,  on antihypertensive medications.  CKD: Monitor labs/BMP,             Andrzej Zhu MD  Cell: [phone]  Office: [phone]

## 2019-07-22 NOTE — PROGRESS NOTE ADULT - SUBJECTIVE AND OBJECTIVE BOX
Chief complaint  Patient is a 59y old  Male who presents with a chief complaint of SOB (22 Jul 2019 15:40)   Review of systems  Patient in bed, looks comfortable, no fever, had hypoglycemia.    Labs and Fingersticks  CAPILLARY BLOOD GLUCOSE      POCT Blood Glucose.: 157 mg/dL (22 Jul 2019 12:23)  POCT Blood Glucose.: 56 mg/dL (22 Jul 2019 12:20)  POCT Blood Glucose.: 105 mg/dL (22 Jul 2019 07:52)  POCT Blood Glucose.: 214 mg/dL (21 Jul 2019 21:42)  POCT Blood Glucose.: 140 mg/dL (21 Jul 2019 16:40)      Anion Gap, Serum: 12 (07-22 @ 05:35)  Anion Gap, Serum: 14 (07-21 @ 07:49)      Calcium, Total Serum: 8.7 (07-22 @ 05:35)  Calcium, Total Serum: 8.9 (07-21 @ 07:49)          07-22    137  |  100  |  22  ----------------------------<  122<H>  3.9   |  25  |  1.89<H>    Ca    8.7      22 Jul 2019 05:35                          9.3    4.60  )-----------( 307      ( 22 Jul 2019 08:50 )             30.4     Medications  MEDICATIONS  (STANDING):  amLODIPine   Tablet 10 milliGRAM(s) Oral daily  chlorhexidine 2% Cloths 1 Application(s) Topical daily  collagenase Ointment 1 Application(s) Topical <User Schedule>  dextrose 5%. 1000 milliLiter(s) (50 mL/Hr) IV Continuous <Continuous>  dextrose 50% Injectable 12.5 Gram(s) IV Push once  dextrose 50% Injectable 25 Gram(s) IV Push once  dextrose 50% Injectable 25 Gram(s) IV Push once  dronabinol 2.5 milliGRAM(s) Oral two times a day  epoetin braden Injectable 82823 Unit(s) SubCutaneous <User Schedule>  ferrous    sulfate 325 milliGRAM(s) Oral daily  heparin  Injectable 5000 Unit(s) SubCutaneous every 8 hours  hydrALAZINE 100 milliGRAM(s) Oral three times a day  insulin glargine Injectable (LANTUS) 32 Unit(s) SubCutaneous at bedtime  insulin lispro (HumaLOG) corrective regimen sliding scale   SubCutaneous three times a day before meals  insulin lispro (HumaLOG) corrective regimen sliding scale   SubCutaneous at bedtime  insulin lispro Injectable (HumaLOG) 10 Unit(s) SubCutaneous before breakfast  piperacillin/tazobactam IVPB.. 3.375 Gram(s) IV Intermittent every 8 hours  polyethylene glycol 3350 17 Gram(s) Oral every 12 hours  primidone 50 milliGRAM(s) Oral two times a day  senna 2 Tablet(s) Oral at bedtime      Physical Exam  General: Patient comfortable in bed  Vital Signs Last 12 Hrs  T(F): 98.6 (07-22-19 @ 11:29), Max: 98.6 (07-22-19 @ 11:29)  HR: 90 (07-22-19 @ 13:12) (74 - 90)  BP: 155/84 (07-22-19 @ 13:12) (129/75 - 155/84)  BP(mean): --  RR: 18 (07-22-19 @ 11:29) (17 - 18)  SpO2: 95% (07-22-19 @ 11:29) (95% - 96%)  Neck: No palpable thyroid nodules.  CVS: S1S2, No murmurs  Respiratory: No wheezing, no crepitations  GI: Abdomen soft, bowel sounds positive  Musculoskeletal:  edema lower extremities.   Skin: No skin rashes, no ecchymosis    Diagnostics

## 2019-07-22 NOTE — PROGRESS NOTE ADULT - ASSESSMENT
59M, hx DM, HTN presents from [facility] via EMS due to resp distress. Patient at [facility] due to right diabetic foot ulcer. Patient reported to be hypoxic on room air to 60s. Improved on 15L face mask top 100% O2 sat. Patient awake, alert, oriented. States he has been coughing for 2-3 weeks (dry cough) as well as weakness and shortness of breath for few days.    Acute on chronic diastolic chf  - po lasix 80 qd  - strict Is and Os  - cardiology consult appreciated  - keep O2 sats above 92%    diabetic foot ulcer  - podiatry follow up  - MRI of right foot  done  - podiatry recommending BKA  - was seen by vascular  - BKA if right lower ext scheduled for tuesday  - pt ha no medical contraindications for BKA    essential tremors   - trial of primidone  - seen by neuro consult  - increase primidone to 50 bid    CKD 4  - monitor cre  - avoid nephrotoxins    diabetes  - fs qid  - hgb a1c   7.1  - endocrine consult following    htn  - cw norvasc    DVT px

## 2019-07-22 NOTE — PROGRESS NOTE ADULT - SUBJECTIVE AND OBJECTIVE BOX
Subjective: Patient seen and examined. No new events except as noted.   resting comfortably   sleepy     REVIEW OF SYSTEMS:    CONSTITUTIONAL: +weakness, fevers or chills  EYES/ENT: No visual changes;  No vertigo or throat pain   NECK: No pain or stiffness  RESPIRATORY: No cough, wheezing, hemoptysis; No shortness of breath  CARDIOVASCULAR: No chest pain or palpitations  GASTROINTESTINAL: No abdominal or epigastric pain. No nausea, vomiting, or hematemesis; No diarrhea or constipation. No melena or hematochezia.  GENITOURINARY: No dysuria, frequency or hematuria  NEUROLOGICAL: No numbness or weakness  SKIN: No itching, burning, rashes, or lesions   All other review of systems is negative unless indicated above.    MEDICATIONS:  MEDICATIONS  (STANDING):  amLODIPine   Tablet 10 milliGRAM(s) Oral daily  chlorhexidine 2% Cloths 1 Application(s) Topical daily  collagenase Ointment 1 Application(s) Topical <User Schedule>  dextrose 5%. 1000 milliLiter(s) (50 mL/Hr) IV Continuous <Continuous>  dextrose 50% Injectable 12.5 Gram(s) IV Push once  dextrose 50% Injectable 25 Gram(s) IV Push once  dextrose 50% Injectable 25 Gram(s) IV Push once  dronabinol 2.5 milliGRAM(s) Oral two times a day  epoetin braden Injectable 92437 Unit(s) SubCutaneous <User Schedule>  ferrous    sulfate 325 milliGRAM(s) Oral daily  heparin  Injectable 5000 Unit(s) SubCutaneous every 8 hours  hydrALAZINE 100 milliGRAM(s) Oral three times a day  insulin glargine Injectable (LANTUS) 32 Unit(s) SubCutaneous at bedtime  insulin lispro (HumaLOG) corrective regimen sliding scale   SubCutaneous three times a day before meals  insulin lispro (HumaLOG) corrective regimen sliding scale   SubCutaneous at bedtime  insulin lispro Injectable (HumaLOG) 10 Unit(s) SubCutaneous before breakfast  piperacillin/tazobactam IVPB.. 3.375 Gram(s) IV Intermittent every 8 hours  polyethylene glycol 3350 17 Gram(s) Oral every 12 hours  primidone 50 milliGRAM(s) Oral two times a day  senna 2 Tablet(s) Oral at bedtime      PHYSICAL EXAM:  T(C): 36.9 (07-22-19 @ 04:29), Max: 36.9 (07-22-19 @ 04:29)  HR: 74 (07-22-19 @ 04:29) (74 - 86)  BP: 129/75 (07-22-19 @ 04:29) (129/75 - 144/80)  RR: 17 (07-22-19 @ 04:29) (17 - 18)  SpO2: 95% (07-22-19 @ 04:29) (94% - 96%)  Wt(kg): --  I&O's Summary    21 Jul 2019 07:01  -  22 Jul 2019 07:00  --------------------------------------------------------  IN: 720 mL / OUT: 0 mL / NET: 720 mL          Appearance: NAD obese 	  HEENT:   Dry  oral mucosa, PERRL, EOMI	  Lymphatic: No lymphadenopathy , no edema  Cardiovascular: Normal S1 S2, No JVD, No murmurs , Peripheral pulses palpable 2+ bilaterally  Respiratory: Lungs clear to auscultation, normal effort 	  Gastrointestinal:  Soft, Non-tender, + BS	  Skin: No rashes, No ecchymoses, No cyanosis, warm to touch  Musculoskeletal: Decreased range of motion and strength  Psychiatry:  Mood & affect appropriate  Ext: Right foot wrapped, +VAC       LABS:    CARDIAC MARKERS:                                9.3    4.60  )-----------( 307      ( 22 Jul 2019 08:50 )             30.4     07-22    137  |  100  |  22  ----------------------------<  122<H>  3.9   |  25  |  1.89<H>    Ca    8.7      22 Jul 2019 05:35      proBNP:   Lipid Profile:   HgA1c:   TSH:             TELEMETRY: 	  SR  ECG:  	  RADIOLOGY:   DIAGNOSTIC TESTING:  [ ] Echocardiogram:  [ ]  Catheterization:  [ ] Stress Test:    OTHER:

## 2019-07-23 ENCOUNTER — RESULT REVIEW (OUTPATIENT)
Age: 59
End: 2019-07-23

## 2019-07-23 LAB
ANION GAP SERPL CALC-SCNC: 11 MMOL/L — SIGNIFICANT CHANGE UP (ref 5–17)
APTT BLD: 30.7 SEC — SIGNIFICANT CHANGE UP (ref 27.5–36.3)
BUN SERPL-MCNC: 19 MG/DL — SIGNIFICANT CHANGE UP (ref 7–23)
CALCIUM SERPL-MCNC: 9.1 MG/DL — SIGNIFICANT CHANGE UP (ref 8.4–10.5)
CHLORIDE SERPL-SCNC: 99 MMOL/L — SIGNIFICANT CHANGE UP (ref 96–108)
CO2 SERPL-SCNC: 27 MMOL/L — SIGNIFICANT CHANGE UP (ref 22–31)
CREAT SERPL-MCNC: 1.77 MG/DL — HIGH (ref 0.5–1.3)
GLUCOSE BLDC GLUCOMTR-MCNC: 107 MG/DL — HIGH (ref 70–99)
GLUCOSE BLDC GLUCOMTR-MCNC: 161 MG/DL — HIGH (ref 70–99)
GLUCOSE BLDC GLUCOMTR-MCNC: 209 MG/DL — HIGH (ref 70–99)
GLUCOSE BLDC GLUCOMTR-MCNC: 91 MG/DL — SIGNIFICANT CHANGE UP (ref 70–99)
GLUCOSE SERPL-MCNC: 240 MG/DL — HIGH (ref 70–99)
HCT VFR BLD CALC: 29.7 % — LOW (ref 39–50)
HGB BLD-MCNC: 9.4 G/DL — LOW (ref 13–17)
INR BLD: 1.09 RATIO — SIGNIFICANT CHANGE UP (ref 0.88–1.16)
MAGNESIUM SERPL-MCNC: 1.9 MG/DL — SIGNIFICANT CHANGE UP (ref 1.6–2.6)
MCHC RBC-ENTMCNC: 28.7 PG — SIGNIFICANT CHANGE UP (ref 27–34)
MCHC RBC-ENTMCNC: 31.6 GM/DL — LOW (ref 32–36)
MCV RBC AUTO: 90.5 FL — SIGNIFICANT CHANGE UP (ref 80–100)
PHOSPHATE SERPL-MCNC: 2.8 MG/DL — SIGNIFICANT CHANGE UP (ref 2.5–4.5)
PLATELET # BLD AUTO: 290 K/UL — SIGNIFICANT CHANGE UP (ref 150–400)
POTASSIUM SERPL-MCNC: 4.1 MMOL/L — SIGNIFICANT CHANGE UP (ref 3.5–5.3)
POTASSIUM SERPL-SCNC: 4.1 MMOL/L — SIGNIFICANT CHANGE UP (ref 3.5–5.3)
PROTHROM AB SERPL-ACNC: 12.4 SEC — SIGNIFICANT CHANGE UP (ref 10–13.1)
RBC # BLD: 3.28 M/UL — LOW (ref 4.2–5.8)
RBC # FLD: 14.8 % — HIGH (ref 10.3–14.5)
SODIUM SERPL-SCNC: 137 MMOL/L — SIGNIFICANT CHANGE UP (ref 135–145)
WBC # BLD: 5.02 K/UL — SIGNIFICANT CHANGE UP (ref 3.8–10.5)
WBC # FLD AUTO: 5.02 K/UL — SIGNIFICANT CHANGE UP (ref 3.8–10.5)

## 2019-07-23 PROCEDURE — 88307 TISSUE EXAM BY PATHOLOGIST: CPT | Mod: 26

## 2019-07-23 PROCEDURE — 27880 AMPUTATION OF LOWER LEG: CPT | Mod: RT

## 2019-07-23 RX ORDER — OXYCODONE HYDROCHLORIDE 5 MG/1
5 TABLET ORAL EVERY 4 HOURS
Refills: 0 | Status: DISCONTINUED | OUTPATIENT
Start: 2019-07-23 | End: 2019-07-27

## 2019-07-23 RX ORDER — OXYCODONE HYDROCHLORIDE 5 MG/1
10 TABLET ORAL EVERY 4 HOURS
Refills: 0 | Status: DISCONTINUED | OUTPATIENT
Start: 2019-07-23 | End: 2019-07-27

## 2019-07-23 RX ORDER — HYDROMORPHONE HYDROCHLORIDE 2 MG/ML
0.25 INJECTION INTRAMUSCULAR; INTRAVENOUS; SUBCUTANEOUS
Refills: 0 | Status: DISCONTINUED | OUTPATIENT
Start: 2019-07-23 | End: 2019-07-24

## 2019-07-23 RX ORDER — CEFEPIME 1 G/1
INJECTION, POWDER, FOR SOLUTION INTRAMUSCULAR; INTRAVENOUS
Refills: 0 | Status: DISCONTINUED | OUTPATIENT
Start: 2019-07-23 | End: 2019-07-25

## 2019-07-23 RX ORDER — CEFEPIME 1 G/1
2000 INJECTION, POWDER, FOR SOLUTION INTRAMUSCULAR; INTRAVENOUS EVERY 24 HOURS
Refills: 0 | Status: DISCONTINUED | OUTPATIENT
Start: 2019-07-24 | End: 2019-07-25

## 2019-07-23 RX ORDER — CEFEPIME 1 G/1
2000 INJECTION, POWDER, FOR SOLUTION INTRAMUSCULAR; INTRAVENOUS ONCE
Refills: 0 | Status: COMPLETED | OUTPATIENT
Start: 2019-07-23 | End: 2019-07-23

## 2019-07-23 RX ORDER — SODIUM CHLORIDE 9 MG/ML
1000 INJECTION INTRAMUSCULAR; INTRAVENOUS; SUBCUTANEOUS
Refills: 0 | Status: DISCONTINUED | OUTPATIENT
Start: 2019-07-23 | End: 2019-07-29

## 2019-07-23 RX ADMIN — PIPERACILLIN AND TAZOBACTAM 25 GRAM(S): 4; .5 INJECTION, POWDER, LYOPHILIZED, FOR SOLUTION INTRAVENOUS at 06:30

## 2019-07-23 RX ADMIN — Medication 325 MILLIGRAM(S): at 10:56

## 2019-07-23 RX ADMIN — Medication 2: at 08:26

## 2019-07-23 RX ADMIN — Medication 1: at 12:53

## 2019-07-23 RX ADMIN — HYDROMORPHONE HYDROCHLORIDE 0.25 MILLIGRAM(S): 2 INJECTION INTRAMUSCULAR; INTRAVENOUS; SUBCUTANEOUS at 22:25

## 2019-07-23 RX ADMIN — CEFEPIME 100 MILLIGRAM(S): 1 INJECTION, POWDER, FOR SOLUTION INTRAMUSCULAR; INTRAVENOUS at 10:53

## 2019-07-23 RX ADMIN — AMLODIPINE BESYLATE 10 MILLIGRAM(S): 2.5 TABLET ORAL at 06:30

## 2019-07-23 RX ADMIN — HYDROMORPHONE HYDROCHLORIDE 0.25 MILLIGRAM(S): 2 INJECTION INTRAMUSCULAR; INTRAVENOUS; SUBCUTANEOUS at 22:11

## 2019-07-23 RX ADMIN — ERYTHROPOIETIN 10000 UNIT(S): 10000 INJECTION, SOLUTION INTRAVENOUS; SUBCUTANEOUS at 08:25

## 2019-07-23 RX ADMIN — Medication 100 MILLIGRAM(S): at 13:01

## 2019-07-23 RX ADMIN — Medication 100 MILLIGRAM(S): at 06:30

## 2019-07-23 RX ADMIN — CHLORHEXIDINE GLUCONATE 1 APPLICATION(S): 213 SOLUTION TOPICAL at 10:56

## 2019-07-23 RX ADMIN — PRIMIDONE 50 MILLIGRAM(S): 250 TABLET ORAL at 06:30

## 2019-07-23 NOTE — PROGRESS NOTE ADULT - ASSESSMENT
Assessment  DMT2: 59y Male with DM T2 with hyperglycemia, on basal bolus insulin, blood sugars  fluctuating no new hypoglycemic episode, NPO  Foot wound: On wound care, meds, stable.  CAD: on medications, no chest pain, stable, monitored.  HTN: Controlled,  on antihypertensive medications.  CKD: Monitor labs/BMP,             Andrzej Zhu MD  Cell: [phone]  Office: [phone]

## 2019-07-23 NOTE — PROGRESS NOTE ADULT - ASSESSMENT
59M, hx DM, HTN presents from [facility] via EMS due to resp distress. Patient at [facility] due to right diabetic foot ulcer. Vascular consulted for non-healing right foot wound evaluation for BKA    - Plan for OR today for left BKA  - Consented. NPO. Blood on hold.       Vascular Surgery [phone]

## 2019-07-23 NOTE — CHART NOTE - NSCHARTNOTEFT_GEN_A_CORE
Sensitivities from abscess culture posted.  Resistant to zosyn / sensitive to Cefepime  IV Zosyn dc'd - Cefepime 2grams IVSS q 24 ordered

## 2019-07-23 NOTE — PHARMACOTHERAPY INTERVENTION NOTE - COMMENTS
Abscess culture sensitivities show E. coli and K. Pneumoniae resistant to piperacillin/tazobactam. Recommended changing antibiotics to cefepime 2 g iv q 24h. (sensitive) Can re-evaluate need for antibiotics post BKA.

## 2019-07-23 NOTE — PROGRESS NOTE ADULT - ASSESSMENT
59M, hx DM, HTN presents from [facility] via EMS due to resp distress. Patient at [facility] due to right diabetic foot ulcer. Patient reported to be hypoxic on room air to 60s. Improved on 15L face mask top 100% O2 sat. Patient awake, alert, oriented. States he has been coughing for 2-3 weeks (dry cough) as well as weakness and shortness of breath for few days.    Acute on chronic diastolic chf  - hold  lasix 80 qd  - strict Is and Os  - cardiology consult appreciated  - keep O2 sats above 92%    diabetic foot ulcer with osteo  - on cefepime  - podiatry follow up  - MRI of right foot  done  - podiatry recommending BKA  - was seen by vascular  - BKA if right lower ext scheduled for today  - pt ha no medical contraindications for BKA    essential tremors and diaphoresis  - may be secondary to infection  - trial of primidone  - seen by neuro consult  - increase primidone to 50 bid    CKD 4  - monitor cre  - avoid nephrotoxins    anemia of chronic disease  - c/w iron and epogen    diabetes  - fs qid  - hgb a1c   7.1  - endocrine consult following    htn  - cw norvasc  - c/w hydralazine    DVT px

## 2019-07-23 NOTE — PROGRESS NOTE ADULT - SUBJECTIVE AND OBJECTIVE BOX
Admitting Diagnosis:  Chronic pulmonary edema (J81.1): CHRONIC PULMONARY EDEMA    HPI:  This is a 59y year old Male with the below past medical history of DM, HTN presented from [facility] via EMS due to resp distress. Patient at [facility] due to right diabetic foot ulcer. Patient reported to be hypoxic on room air to 60s. Improved on 15L face mask top 100% O2 sat. Patient awake, alert, oriented. NO fevers or chills, nausea or vomiting, headache, chest pain, abdominal pain, diarrhea or constipation. Patient reportedly received blood transfusion last night, unknown as to why. Reports BL leg swelling - chronic, no calf pain. No hx DVT or PE.       Neurology is asked to comment on the patients bilateral upper extremities R>L longstanding action tremor. It seems to be more prominent during the current hospitalization.    pt says tremor improved with closing AC , states the cold air triggers the tremor    Past Medical History:  Hypertension (I10)  Insulin dependent diabetes mellitus (E11.9)  Venous insufficiency of both lower extremities (459.81): R &gt; L  HTN (hypertension) (401.9)  BPH (benign prostatic hypertrophy) (600.00)  Diabetes (250.00)      Past Surgical History:  History of cholecystectomy (Z90.49)  No significant past surgical history ([id number])  S/P laparoscopic cholecystectomy (V45.89)  Status post incision and drainage (V45.89): Rt groin abscess  No significant past surgical history ([id number])      Social History:  No toxic habits    Family History:  FAMILY HISTORY:  Paternal family history of emphysema  Family history of diabetes mellitus (DM) (Grandparent)      Allergies:  No Known Allergies      ROS:  Constitutional: Patient offers no complaints of fevers or significant weight loss  Ears, Nose, Mouth and Throat: The patient presents with no abnormalities of the head, ears, eyes, nose or throat  Skin: Patient offers no concerns of new rashes or lesions  Respiratory: The patient presents with no abnormalities of the respiratory tract  Cardiovascular: The patient presents with no cardiac abnormalities  Gastrointestinal: The patient presents with no abnormalities of the GI system  Genitourinary: The patient presents with no dysuria, hematuria or frequent urination  Neurological: See HPI  Endocrine: Patient offers no complaints of excessive thirst, urination, or heat/cold intolerance    Advanced care planning reviewed and noted in the chart.    Medications:  acetaminophen   Tablet .. 650 milliGRAM(s) Oral every 6 hours PRN  amLODIPine   Tablet 10 milliGRAM(s) Oral daily  cefepime   IVPB      chlorhexidine 2% Cloths 1 Application(s) Topical daily  collagenase Ointment 1 Application(s) Topical <User Schedule>  dextrose 40% Gel 15 Gram(s) Oral once PRN  dextrose 5%. 1000 milliLiter(s) IV Continuous <Continuous>  dextrose 50% Injectable 12.5 Gram(s) IV Push once  dextrose 50% Injectable 25 Gram(s) IV Push once  dextrose 50% Injectable 25 Gram(s) IV Push once  dronabinol 2.5 milliGRAM(s) Oral two times a day  epoetin braden Injectable 59319 Unit(s) SubCutaneous <User Schedule>  ferrous    sulfate 325 milliGRAM(s) Oral daily  glucagon  Injectable 1 milliGRAM(s) IntraMuscular once PRN  heparin  Injectable 5000 Unit(s) SubCutaneous every 8 hours  hydrALAZINE 100 milliGRAM(s) Oral three times a day  insulin glargine Injectable (LANTUS) 22 Unit(s) SubCutaneous at bedtime  insulin lispro (HumaLOG) corrective regimen sliding scale   SubCutaneous three times a day before meals  insulin lispro (HumaLOG) corrective regimen sliding scale   SubCutaneous at bedtime  insulin lispro Injectable (HumaLOG) 4 Unit(s) SubCutaneous before breakfast  polyethylene glycol 3350 17 Gram(s) Oral every 12 hours  primidone 50 milliGRAM(s) Oral two times a day  senna 2 Tablet(s) Oral at bedtime      Labs:  CBC Full  -  ( 23 Jul 2019 08:42 )  WBC Count : 5.02 K/uL  RBC Count : 3.28 M/uL  Hemoglobin : 9.4 g/dL  Hematocrit : 29.7 %  Platelet Count - Automated : 290 K/uL  Mean Cell Volume : 90.5 fl  Mean Cell Hemoglobin : 28.7 pg  Mean Cell Hemoglobin Concentration : 31.6 gm/dL  Auto Neutrophil # : x  Auto Lymphocyte # : x  Auto Monocyte # : x  Auto Eosinophil # : x  Auto Basophil # : x  Auto Neutrophil % : x  Auto Lymphocyte % : x  Auto Monocyte % : x  Auto Eosinophil % : x  Auto Basophil % : x    07-23    137  |  99  |  19  ----------------------------<  240<H>  4.1   |  27  |  1.77<H>    Ca    9.1      23 Jul 2019 06:01  Phos  2.8     07-23  Mg     1.9     07-23      CAPILLARY BLOOD GLUCOSE      POCT Blood Glucose.: 161 mg/dL (23 Jul 2019 12:06)  POCT Blood Glucose.: 209 mg/dL (23 Jul 2019 08:07)  POCT Blood Glucose.: 178 mg/dL (22 Jul 2019 21:31)  POCT Blood Glucose.: 224 mg/dL (22 Jul 2019 16:50)      PT/INR - ( 23 Jul 2019 08:15 )   PT: 12.4 sec;   INR: 1.09 ratio         PTT - ( 23 Jul 2019 08:15 )  PTT:30.7 sec        Vitals:  Vital Signs Last 24 Hrs  T(C): 36.7 (23 Jul 2019 11:38), Max: 37.3 (22 Jul 2019 20:58)  T(F): 98.1 (23 Jul 2019 11:38), Max: 99.1 (22 Jul 2019 20:58)  HR: 84 (23 Jul 2019 11:38) (72 - 90)  BP: 157/88 (23 Jul 2019 11:38) (127/75 - 157/88)  BP(mean): --  RR: 18 (23 Jul 2019 11:38) (18 - 18)  SpO2: 94% (23 Jul 2019 11:38) (94% - 96%)    NEUROLOGICAL EXAM:    Mental status: Awake, alert, and in no apparent distress. Oriented to person, place and time. Language function is normal. Recent memory, digit span and concentration were normal.     Cranial Nerves: Pupils were equal, round, reactive to light. Extraocular movements were intact. Visual field were full.  Facial sensation was intact to light touch. There was no facial asymmetry. The palate was upgoing symmetrically and tongue was midline. Hearing acuity was intact to finger rub AU. Shoulder shrug was full bilaterally    Motor exam: Bulk and tone were normal. Strength was 5/5 in all four extremities. Fine finger movements were symmetric and normal. There was no pronator drift. + intention tremor b/l hands mild with low amplitude/frequency    Reflexes: 1+ in the bilateral upper extremities. 0 in the bilateral lower extremities. Toes were downgoing bilaterally.     Sensation: diminishe for all modalities in lower extremites below the knees.    Coordination: Finger-nose-finger and heel-to-shin was without dysmetria.     Gait: deferred.

## 2019-07-23 NOTE — PROGRESS NOTE ADULT - SUBJECTIVE AND OBJECTIVE BOX
Chief complaint  Patient is a 59y old  Male who presents with a chief complaint of SOB (22 Jul 2019 16:14)   Review of systems  Patient in bed, looks comfortable, no fever, no hypoglycemia.    Labs and Fingersticks  CAPILLARY BLOOD GLUCOSE      POCT Blood Glucose.: 209 mg/dL (23 Jul 2019 08:07)  POCT Blood Glucose.: 178 mg/dL (22 Jul 2019 21:31)  POCT Blood Glucose.: 224 mg/dL (22 Jul 2019 16:50)  POCT Blood Glucose.: 157 mg/dL (22 Jul 2019 12:23)  POCT Blood Glucose.: 56 mg/dL (22 Jul 2019 12:20)      Anion Gap, Serum: 11 (07-23 @ 06:01)  Anion Gap, Serum: 12 (07-22 @ 05:35)      Calcium, Total Serum: 9.1 (07-23 @ 06:01)  Calcium, Total Serum: 8.7 (07-22 @ 05:35)          07-23    137  |  99  |  19  ----------------------------<  240<H>  4.1   |  27  |  1.77<H>    Ca    9.1      23 Jul 2019 06:01  Phos  2.8     07-23  Mg     1.9     07-23                          9.3    4.60  )-----------( 307      ( 22 Jul 2019 08:50 )             30.4     Medications  MEDICATIONS  (STANDING):  amLODIPine   Tablet 10 milliGRAM(s) Oral daily  chlorhexidine 2% Cloths 1 Application(s) Topical daily  collagenase Ointment 1 Application(s) Topical <User Schedule>  dextrose 5%. 1000 milliLiter(s) (50 mL/Hr) IV Continuous <Continuous>  dextrose 50% Injectable 12.5 Gram(s) IV Push once  dextrose 50% Injectable 25 Gram(s) IV Push once  dextrose 50% Injectable 25 Gram(s) IV Push once  dronabinol 2.5 milliGRAM(s) Oral two times a day  epoetin braden Injectable 25479 Unit(s) SubCutaneous <User Schedule>  ferrous    sulfate 325 milliGRAM(s) Oral daily  heparin  Injectable 5000 Unit(s) SubCutaneous every 8 hours  hydrALAZINE 100 milliGRAM(s) Oral three times a day  insulin glargine Injectable (LANTUS) 22 Unit(s) SubCutaneous at bedtime  insulin lispro (HumaLOG) corrective regimen sliding scale   SubCutaneous three times a day before meals  insulin lispro (HumaLOG) corrective regimen sliding scale   SubCutaneous at bedtime  insulin lispro Injectable (HumaLOG) 4 Unit(s) SubCutaneous before breakfast  piperacillin/tazobactam IVPB.. 3.375 Gram(s) IV Intermittent every 8 hours  polyethylene glycol 3350 17 Gram(s) Oral every 12 hours  primidone 50 milliGRAM(s) Oral two times a day  senna 2 Tablet(s) Oral at bedtime      Physical Exam  General: Patient comfortable in bed  Vital Signs Last 12 Hrs  T(F): 97.5 (07-23-19 @ 04:46), Max: 99.1 (07-22-19 @ 20:58)  HR: 72 (07-23-19 @ 04:46) (72 - 83)  BP: 142/73 (07-23-19 @ 04:46) (127/75 - 142/73)  BP(mean): --  RR: 18 (07-23-19 @ 04:46) (18 - 18)  SpO2: 96% (07-23-19 @ 04:46) (95% - 96%)  Neck: No palpable thyroid nodules.  CVS: S1S2, No murmurs  Respiratory: No wheezing, no crepitations  GI: Abdomen soft, bowel sounds positive  Musculoskeletal:  edema lower extremities.   Skin: No skin rashes, no ecchymosis    Diagnostics

## 2019-07-23 NOTE — PROGRESS NOTE ADULT - SUBJECTIVE AND OBJECTIVE BOX
VACULAR SURGERY DAILY PROGRESS NOTE:       Subjective:  Patient seen and examined on AM rounds. No acute events overnight. AF/VSS. Pain controlled. NPO at midnight plan for OR today      Objective:    PE:  Gen: NAD  Resp: respirations unlabored, no increased WoB  Ext:  RLE: DP palpable. wound vac in place      Vital Signs Last 24 Hrs  T(C): 36.4 (23 Jul 2019 04:46), Max: 37.3 (22 Jul 2019 20:58)  T(F): 97.5 (23 Jul 2019 04:46), Max: 99.1 (22 Jul 2019 20:58)  HR: 72 (23 Jul 2019 04:46) (72 - 90)  BP: 142/73 (23 Jul 2019 04:46) (127/75 - 155/84)  BP(mean): --  RR: 18 (23 Jul 2019 04:46) (18 - 18)  SpO2: 96% (23 Jul 2019 04:46) (95% - 96%)    I&O's Detail    22 Jul 2019 07:01  -  23 Jul 2019 07:00  --------------------------------------------------------  IN:    IV PiggyBack: 100 mL    Oral Fluid: 600 mL  Total IN: 700 mL    OUT:    Voided: 925 mL  Total OUT: 925 mL    Total NET: -225 mL      23 Jul 2019 07:01  -  23 Jul 2019 10:15  --------------------------------------------------------  IN:  Total IN: 0 mL    OUT:    Voided: 425 mL  Total OUT: 425 mL    Total NET: -425 mL          Daily     Daily     MEDICATIONS  (STANDING):  amLODIPine   Tablet 10 milliGRAM(s) Oral daily  cefepime   IVPB 2000 milliGRAM(s) IV Intermittent once  cefepime   IVPB      chlorhexidine 2% Cloths 1 Application(s) Topical daily  collagenase Ointment 1 Application(s) Topical <User Schedule>  dextrose 5%. 1000 milliLiter(s) (50 mL/Hr) IV Continuous <Continuous>  dextrose 50% Injectable 12.5 Gram(s) IV Push once  dextrose 50% Injectable 25 Gram(s) IV Push once  dextrose 50% Injectable 25 Gram(s) IV Push once  dronabinol 2.5 milliGRAM(s) Oral two times a day  epoetin braden Injectable 57159 Unit(s) SubCutaneous <User Schedule>  ferrous    sulfate 325 milliGRAM(s) Oral daily  heparin  Injectable 5000 Unit(s) SubCutaneous every 8 hours  hydrALAZINE 100 milliGRAM(s) Oral three times a day  insulin glargine Injectable (LANTUS) 22 Unit(s) SubCutaneous at bedtime  insulin lispro (HumaLOG) corrective regimen sliding scale   SubCutaneous three times a day before meals  insulin lispro (HumaLOG) corrective regimen sliding scale   SubCutaneous at bedtime  insulin lispro Injectable (HumaLOG) 4 Unit(s) SubCutaneous before breakfast  polyethylene glycol 3350 17 Gram(s) Oral every 12 hours  primidone 50 milliGRAM(s) Oral two times a day  senna 2 Tablet(s) Oral at bedtime    MEDICATIONS  (PRN):  acetaminophen   Tablet .. 650 milliGRAM(s) Oral every 6 hours PRN Mild Pain (1 - 3)  dextrose 40% Gel 15 Gram(s) Oral once PRN Blood Glucose LESS THAN 70 milliGRAM(s)/deciliter  glucagon  Injectable 1 milliGRAM(s) IntraMuscular once PRN Glucose LESS THAN 70 milligrams/deciliter      LABS:                        9.4    5.02  )-----------( 290      ( 23 Jul 2019 08:42 )             29.7     07-23    137  |  99  |  19  ----------------------------<  240<H>  4.1   |  27  |  1.77<H>    Ca    9.1      23 Jul 2019 06:01  Phos  2.8     07-23  Mg     1.9     07-23      PT/INR - ( 23 Jul 2019 08:15 )   PT: 12.4 sec;   INR: 1.09 ratio         PTT - ( 23 Jul 2019 08:15 )  PTT:30.7 sec      RADIOLOGY & ADDITIONAL STUDIES:

## 2019-07-23 NOTE — PROGRESS NOTE ADULT - ASSESSMENT
58 yo male with longstanding bilateral mild action tremor. It is most likely essential tremor, possibly aggravated by his current medical problems. The nebulizers are notorious for aggravating tremors. As well probably he is suffering from the superimposed generalized polyneuropathy secondary to poorly controlled DM.    neuro stable    primidone 50 mg BID seems to be helping benign action tremor    no neuro objection to d/c once medically cleared

## 2019-07-23 NOTE — PROGRESS NOTE ADULT - SUBJECTIVE AND OBJECTIVE BOX
Patient is a 59y old  Male who presents with a chief complaint of SOB (23 Jul 2019 12:52)      SUBJECTIVE / OVERNIGHT EVENTS:  c/o right sided tremors and diaphoresis, scheduled BKA at 5 pm    MEDICATIONS  (STANDING):  amLODIPine   Tablet 10 milliGRAM(s) Oral daily  cefepime   IVPB      chlorhexidine 2% Cloths 1 Application(s) Topical daily  collagenase Ointment 1 Application(s) Topical <User Schedule>  dextrose 5%. 1000 milliLiter(s) (50 mL/Hr) IV Continuous <Continuous>  dextrose 50% Injectable 12.5 Gram(s) IV Push once  dextrose 50% Injectable 25 Gram(s) IV Push once  dextrose 50% Injectable 25 Gram(s) IV Push once  dronabinol 2.5 milliGRAM(s) Oral two times a day  epoetin braden Injectable 67483 Unit(s) SubCutaneous <User Schedule>  ferrous    sulfate 325 milliGRAM(s) Oral daily  heparin  Injectable 5000 Unit(s) SubCutaneous every 8 hours  hydrALAZINE 100 milliGRAM(s) Oral three times a day  insulin glargine Injectable (LANTUS) 22 Unit(s) SubCutaneous at bedtime  insulin lispro (HumaLOG) corrective regimen sliding scale   SubCutaneous three times a day before meals  insulin lispro (HumaLOG) corrective regimen sliding scale   SubCutaneous at bedtime  insulin lispro Injectable (HumaLOG) 4 Unit(s) SubCutaneous before breakfast  polyethylene glycol 3350 17 Gram(s) Oral every 12 hours  primidone 50 milliGRAM(s) Oral two times a day  senna 2 Tablet(s) Oral at bedtime    MEDICATIONS  (PRN):  acetaminophen   Tablet .. 650 milliGRAM(s) Oral every 6 hours PRN Mild Pain (1 - 3)  dextrose 40% Gel 15 Gram(s) Oral once PRN Blood Glucose LESS THAN 70 milliGRAM(s)/deciliter  glucagon  Injectable 1 milliGRAM(s) IntraMuscular once PRN Glucose LESS THAN 70 milligrams/deciliter      Vital Signs Last 24 Hrs  T(C): 36.7 (23 Jul 2019 12:58), Max: 37.3 (22 Jul 2019 20:58)  T(F): 98 (23 Jul 2019 12:58), Max: 99.1 (22 Jul 2019 20:58)  HR: 80 (23 Jul 2019 12:58) (72 - 84)  BP: 160/80 (23 Jul 2019 12:58) (127/75 - 160/80)  BP(mean): --  RR: 18 (23 Jul 2019 12:58) (18 - 18)  SpO2: 97% (23 Jul 2019 12:58) (94% - 97%)  CAPILLARY BLOOD GLUCOSE      POCT Blood Glucose.: 161 mg/dL (23 Jul 2019 12:06)  POCT Blood Glucose.: 209 mg/dL (23 Jul 2019 08:07)  POCT Blood Glucose.: 178 mg/dL (22 Jul 2019 21:31)  POCT Blood Glucose.: 224 mg/dL (22 Jul 2019 16:50)    I&O's Summary    22 Jul 2019 07:01  -  23 Jul 2019 07:00  --------------------------------------------------------  IN: 700 mL / OUT: 925 mL / NET: -225 mL    23 Jul 2019 07:01  -  23 Jul 2019 13:22  --------------------------------------------------------  IN: 0 mL / OUT: 425 mL / NET: -425 mL        PHYSICAL EXAM:  GENERAL: NAD, well-developed  HEAD:  Atraumatic, Normocephalic  EYES: EOMI, PERRLA, conjunctiva and sclera clear  NECK: Supple, No JVD  CHEST/LUNG: Clear to auscultation bilaterally; No wheeze  HEART: Regular rate and rhythm; No murmurs, rubs, or gallops  ABDOMEN: Soft, Nontender, Nondistended; Bowel sounds present  EXTREMITIES:  2+ Peripheral Pulses, No clubbing, cyanosis, or edema, right arm tremors  PSYCH: AAOx3  NEUROLOGY: non-focal  SKIN: No rashes or lesions    LABS:                        9.4    5.02  )-----------( 290      ( 23 Jul 2019 08:42 )             29.7     07-23    137  |  99  |  19  ----------------------------<  240<H>  4.1   |  27  |  1.77<H>    Ca    9.1      23 Jul 2019 06:01  Phos  2.8     07-23  Mg     1.9     07-23      PT/INR - ( 23 Jul 2019 08:15 )   PT: 12.4 sec;   INR: 1.09 ratio         PTT - ( 23 Jul 2019 08:15 )  PTT:30.7 sec          RADIOLOGY & ADDITIONAL TESTS:    Imaging Personally Reviewed:    Consultant(s) Notes Reviewed:      Care Discussed with Consultants/Other Providers:

## 2019-07-23 NOTE — PROGRESS NOTE ADULT - SUBJECTIVE AND OBJECTIVE BOX
Subjective: Patient seen and examined. No new events except as noted.   resting comfortably   anxiously awaiting his surgery   no cp or sob       REVIEW OF SYSTEMS:    CONSTITUTIONAL: + weakness, fevers or chills  EYES/ENT: No visual changes;  No vertigo or throat pain   NECK: No pain or stiffness  RESPIRATORY: No cough, wheezing, hemoptysis; No shortness of breath  CARDIOVASCULAR: No chest pain or palpitations  GASTROINTESTINAL: No abdominal or epigastric pain. No nausea, vomiting, or hematemesis; No diarrhea or constipation. No melena or hematochezia.  GENITOURINARY: No dysuria, frequency or hematuria  NEUROLOGICAL: No numbness or weakness  SKIN: No itching, burning, rashes, or lesions   All other review of systems is negative unless indicated above.    MEDICATIONS:  MEDICATIONS  (STANDING):  amLODIPine   Tablet 10 milliGRAM(s) Oral daily  cefepime   IVPB      chlorhexidine 2% Cloths 1 Application(s) Topical daily  collagenase Ointment 1 Application(s) Topical <User Schedule>  dextrose 5%. 1000 milliLiter(s) (50 mL/Hr) IV Continuous <Continuous>  dextrose 50% Injectable 12.5 Gram(s) IV Push once  dextrose 50% Injectable 25 Gram(s) IV Push once  dextrose 50% Injectable 25 Gram(s) IV Push once  dronabinol 2.5 milliGRAM(s) Oral two times a day  epoetin braden Injectable 12350 Unit(s) SubCutaneous <User Schedule>  ferrous    sulfate 325 milliGRAM(s) Oral daily  heparin  Injectable 5000 Unit(s) SubCutaneous every 8 hours  hydrALAZINE 100 milliGRAM(s) Oral three times a day  insulin glargine Injectable (LANTUS) 22 Unit(s) SubCutaneous at bedtime  insulin lispro (HumaLOG) corrective regimen sliding scale   SubCutaneous three times a day before meals  insulin lispro (HumaLOG) corrective regimen sliding scale   SubCutaneous at bedtime  insulin lispro Injectable (HumaLOG) 4 Unit(s) SubCutaneous before breakfast  polyethylene glycol 3350 17 Gram(s) Oral every 12 hours  primidone 50 milliGRAM(s) Oral two times a day  senna 2 Tablet(s) Oral at bedtime      PHYSICAL EXAM:  T(C): 36.7 (07-23-19 @ 11:38), Max: 37.3 (07-22-19 @ 20:58)  HR: 84 (07-23-19 @ 11:38) (72 - 90)  BP: 157/88 (07-23-19 @ 11:38) (127/75 - 157/88)  RR: 18 (07-23-19 @ 11:38) (18 - 18)  SpO2: 94% (07-23-19 @ 11:38) (94% - 96%)  Wt(kg): --  I&O's Summary    22 Jul 2019 07:01  -  23 Jul 2019 07:00  --------------------------------------------------------  IN: 700 mL / OUT: 925 mL / NET: -225 mL    23 Jul 2019 07:01  -  23 Jul 2019 11:40  --------------------------------------------------------  IN: 0 mL / OUT: 425 mL / NET: -425 mL          Appearance: Normal	  HEENT:   Normal oral mucosa, PERRL, EOMI	  Lymphatic: No lymphadenopathy , no edema  Cardiovascular: Normal S1 S2, No JVD, No murmurs , Peripheral pulses palpable 2+ bilaterally  Respiratory: Lungs clear to auscultation, normal effort 	  Gastrointestinal:  Soft, Non-tender, + BS	  Skin: No rashes, No ecchymoses, No cyanosis, warm to touch  Musculoskeletal: Normal range of motion, normal strength  Psychiatry:  Mood & affect appropriate  Ext: right foot wrapped, VAC       LABS:    CARDIAC MARKERS:                                9.4    5.02  )-----------( 290      ( 23 Jul 2019 08:42 )             29.7     07-23    137  |  99  |  19  ----------------------------<  240<H>  4.1   |  27  |  1.77<H>    Ca    9.1      23 Jul 2019 06:01  Phos  2.8     07-23  Mg     1.9     07-23      proBNP:   Lipid Profile:   HgA1c:   TSH:             TELEMETRY: 	  SR  ECG:  	  RADIOLOGY:   DIAGNOSTIC TESTING:  [ ] Echocardiogram:  [ ]  Catheterization:  [ ] Stress Test:    OTHER:

## 2019-07-24 LAB
ANION GAP SERPL CALC-SCNC: 16 MMOL/L — SIGNIFICANT CHANGE UP (ref 5–17)
BUN SERPL-MCNC: 15 MG/DL — SIGNIFICANT CHANGE UP (ref 7–23)
CALCIUM SERPL-MCNC: 9 MG/DL — SIGNIFICANT CHANGE UP (ref 8.4–10.5)
CHLORIDE SERPL-SCNC: 98 MMOL/L — SIGNIFICANT CHANGE UP (ref 96–108)
CO2 SERPL-SCNC: 23 MMOL/L — SIGNIFICANT CHANGE UP (ref 22–31)
CREAT SERPL-MCNC: 1.62 MG/DL — HIGH (ref 0.5–1.3)
CULTURE RESULTS: SIGNIFICANT CHANGE UP
GLUCOSE BLDC GLUCOMTR-MCNC: 124 MG/DL — HIGH (ref 70–99)
GLUCOSE BLDC GLUCOMTR-MCNC: 135 MG/DL — HIGH (ref 70–99)
GLUCOSE BLDC GLUCOMTR-MCNC: 141 MG/DL — HIGH (ref 70–99)
GLUCOSE BLDC GLUCOMTR-MCNC: 154 MG/DL — HIGH (ref 70–99)
GLUCOSE BLDC GLUCOMTR-MCNC: 175 MG/DL — HIGH (ref 70–99)
GLUCOSE SERPL-MCNC: 144 MG/DL — HIGH (ref 70–99)
HCT VFR BLD CALC: 31.3 % — LOW (ref 39–50)
HGB BLD-MCNC: 9.7 G/DL — LOW (ref 13–17)
MAGNESIUM SERPL-MCNC: 1.8 MG/DL — SIGNIFICANT CHANGE UP (ref 1.6–2.6)
MCHC RBC-ENTMCNC: 28.9 PG — SIGNIFICANT CHANGE UP (ref 27–34)
MCHC RBC-ENTMCNC: 31 GM/DL — LOW (ref 32–36)
MCV RBC AUTO: 93.2 FL — SIGNIFICANT CHANGE UP (ref 80–100)
ORGANISM # SPEC MICROSCOPIC CNT: SIGNIFICANT CHANGE UP
PLATELET # BLD AUTO: 283 K/UL — SIGNIFICANT CHANGE UP (ref 150–400)
POTASSIUM SERPL-MCNC: 4.4 MMOL/L — SIGNIFICANT CHANGE UP (ref 3.5–5.3)
POTASSIUM SERPL-SCNC: 4.4 MMOL/L — SIGNIFICANT CHANGE UP (ref 3.5–5.3)
RBC # BLD: 3.36 M/UL — LOW (ref 4.2–5.8)
RBC # FLD: 15 % — HIGH (ref 10.3–14.5)
SODIUM SERPL-SCNC: 137 MMOL/L — SIGNIFICANT CHANGE UP (ref 135–145)
SPECIMEN SOURCE: SIGNIFICANT CHANGE UP
WBC # BLD: 8.96 K/UL — SIGNIFICANT CHANGE UP (ref 3.8–10.5)
WBC # FLD AUTO: 8.96 K/UL — SIGNIFICANT CHANGE UP (ref 3.8–10.5)

## 2019-07-24 RX ORDER — ACETAMINOPHEN 500 MG
1000 TABLET ORAL ONCE
Refills: 0 | Status: COMPLETED | OUTPATIENT
Start: 2019-07-24 | End: 2019-07-24

## 2019-07-24 RX ORDER — GABAPENTIN 400 MG/1
100 CAPSULE ORAL EVERY 8 HOURS
Refills: 0 | Status: DISCONTINUED | OUTPATIENT
Start: 2019-07-24 | End: 2019-07-29

## 2019-07-24 RX ADMIN — Medication 1 APPLICATION(S): at 08:16

## 2019-07-24 RX ADMIN — OXYCODONE HYDROCHLORIDE 5 MILLIGRAM(S): 5 TABLET ORAL at 08:08

## 2019-07-24 RX ADMIN — CHLORHEXIDINE GLUCONATE 1 APPLICATION(S): 213 SOLUTION TOPICAL at 11:11

## 2019-07-24 RX ADMIN — OXYCODONE HYDROCHLORIDE 5 MILLIGRAM(S): 5 TABLET ORAL at 19:15

## 2019-07-24 RX ADMIN — OXYCODONE HYDROCHLORIDE 10 MILLIGRAM(S): 5 TABLET ORAL at 06:12

## 2019-07-24 RX ADMIN — OXYCODONE HYDROCHLORIDE 5 MILLIGRAM(S): 5 TABLET ORAL at 08:45

## 2019-07-24 RX ADMIN — Medication 325 MILLIGRAM(S): at 11:11

## 2019-07-24 RX ADMIN — Medication 100 MILLIGRAM(S): at 13:56

## 2019-07-24 RX ADMIN — OXYCODONE HYDROCHLORIDE 10 MILLIGRAM(S): 5 TABLET ORAL at 01:11

## 2019-07-24 RX ADMIN — GABAPENTIN 100 MILLIGRAM(S): 400 CAPSULE ORAL at 13:56

## 2019-07-24 RX ADMIN — OXYCODONE HYDROCHLORIDE 10 MILLIGRAM(S): 5 TABLET ORAL at 11:30

## 2019-07-24 RX ADMIN — GABAPENTIN 100 MILLIGRAM(S): 400 CAPSULE ORAL at 22:41

## 2019-07-24 RX ADMIN — OXYCODONE HYDROCHLORIDE 5 MILLIGRAM(S): 5 TABLET ORAL at 13:30

## 2019-07-24 RX ADMIN — PRIMIDONE 50 MILLIGRAM(S): 250 TABLET ORAL at 06:12

## 2019-07-24 RX ADMIN — Medication 100 MILLIGRAM(S): at 22:41

## 2019-07-24 RX ADMIN — OXYCODONE HYDROCHLORIDE 10 MILLIGRAM(S): 5 TABLET ORAL at 16:15

## 2019-07-24 RX ADMIN — PRIMIDONE 50 MILLIGRAM(S): 250 TABLET ORAL at 18:39

## 2019-07-24 RX ADMIN — Medication 400 MILLIGRAM(S): at 04:05

## 2019-07-24 RX ADMIN — HEPARIN SODIUM 5000 UNIT(S): 5000 INJECTION INTRAVENOUS; SUBCUTANEOUS at 06:12

## 2019-07-24 RX ADMIN — Medication 4 UNIT(S): at 08:09

## 2019-07-24 RX ADMIN — OXYCODONE HYDROCHLORIDE 5 MILLIGRAM(S): 5 TABLET ORAL at 04:08

## 2019-07-24 RX ADMIN — AMLODIPINE BESYLATE 10 MILLIGRAM(S): 2.5 TABLET ORAL at 08:08

## 2019-07-24 RX ADMIN — HEPARIN SODIUM 5000 UNIT(S): 5000 INJECTION INTRAVENOUS; SUBCUTANEOUS at 22:40

## 2019-07-24 RX ADMIN — OXYCODONE HYDROCHLORIDE 10 MILLIGRAM(S): 5 TABLET ORAL at 10:52

## 2019-07-24 RX ADMIN — OXYCODONE HYDROCHLORIDE 5 MILLIGRAM(S): 5 TABLET ORAL at 12:42

## 2019-07-24 RX ADMIN — INSULIN GLARGINE 22 UNIT(S): 100 INJECTION, SOLUTION SUBCUTANEOUS at 22:41

## 2019-07-24 RX ADMIN — Medication 100 MILLIGRAM(S): at 06:12

## 2019-07-24 RX ADMIN — OXYCODONE HYDROCHLORIDE 5 MILLIGRAM(S): 5 TABLET ORAL at 04:38

## 2019-07-24 RX ADMIN — OXYCODONE HYDROCHLORIDE 10 MILLIGRAM(S): 5 TABLET ORAL at 00:41

## 2019-07-24 RX ADMIN — HEPARIN SODIUM 5000 UNIT(S): 5000 INJECTION INTRAVENOUS; SUBCUTANEOUS at 13:56

## 2019-07-24 RX ADMIN — Medication 1000 MILLIGRAM(S): at 04:35

## 2019-07-24 RX ADMIN — OXYCODONE HYDROCHLORIDE 10 MILLIGRAM(S): 5 TABLET ORAL at 15:38

## 2019-07-24 RX ADMIN — Medication 1: at 12:42

## 2019-07-24 RX ADMIN — OXYCODONE HYDROCHLORIDE 5 MILLIGRAM(S): 5 TABLET ORAL at 18:38

## 2019-07-24 RX ADMIN — CEFEPIME 100 MILLIGRAM(S): 1 INJECTION, POWDER, FOR SOLUTION INTRAMUSCULAR; INTRAVENOUS at 08:16

## 2019-07-24 NOTE — PROGRESS NOTE ADULT - ASSESSMENT
A/P: 59y Male with hx of nonhealing right foot ulcer, s/p right BKA POD1    - Activity: OOB with assistance   - Pain medication: Tylenol, oxycodone prn   - DVT ppxL SQH  - Dressing to remain in place until POD3    Vascular Surgery  [id number] A/P: 59y Male with hx of nonhealing right foot ulcer, s/p right BKA POD1    - Activity: OOB with assistance   - D/C [facility]  - Pain medication: Tylenol, oxycodone prn   - DVT ppxL SQH  - Dressing to remain in place until POD3    Vascular Surgery  [id number] A/P: 59y Male with hx of nonhealing right foot ulcer, s/p right BKA POD1    - Activity: OOB with assistance   - recommend d/c [facility]  - Pain medication: Tylenol, oxycodone prn   - DVT ppxL SQH  - Dressing to remain in place until POD3    Vascular Surgery  [id number]

## 2019-07-24 NOTE — PROGRESS NOTE ADULT - ASSESSMENT
58 yo male with longstanding bilateral mild action tremor. It is most likely essential tremor, possibly aggravated by his current medical problems. The nebulizers are notorious for aggravating tremors. As well probably he is suffering from the superimposed generalized polyneuropathy secondary to poorly controlled DM.    neuro stable    primidone 50 mg BID seems to be helping benign action tremor    no further inpt neuro workup needed    reconsult prn

## 2019-07-24 NOTE — PROGRESS NOTE ADULT - SUBJECTIVE AND OBJECTIVE BOX
VACULAR SURGERY DAILY PROGRESS NOTE:       Subjective:  Patient seen and examined on AM rounds. AF/VSS. Right BKA yesterday. Patient having pain, improves with medication.       Objective:    PE:  Gen: NAD  Resp: respirations unlabored, no increased WoB  Ext:  RLE: BKA ace c/d/i, immobilizer in place      Vital Signs Last 24 Hrs  T(C): 36.4 (2019 04:12), Max: 36.7 (2019 11:38)  T(F): 97.6 (2019 04:12), Max: 98.1 (2019 11:38)  HR: 91 (2019 04:12) (73 - 91)  BP: 142/81 (2019 04:12) (142/81 - 195/88)  BP(mean): 122 (2019 23:30) (113 - 126)  RR: 18 (2019 04:12) (14 - 18)  SpO2: 98% (2019 04:12) (94% - 100%)    I&O's Detail    2019 07:01  -  2019 07:00  --------------------------------------------------------  IN:    IV PiggyBack: 150 mL  Total IN: 150 mL    OUT:    Indwelling Catheter - Urethral: 600 mL    Voided: 825 mL  Total OUT: 1425 mL    Total NET: -1275 mL          Daily Height in cm: 183 (2019 18:39)    Daily Weight in k.4 (2019 10:41)    MEDICATIONS  (STANDING):  amLODIPine   Tablet 10 milliGRAM(s) Oral daily  cefepime   IVPB 2000 milliGRAM(s) IV Intermittent every 24 hours  cefepime   IVPB      chlorhexidine 2% Cloths 1 Application(s) Topical daily  collagenase Ointment 1 Application(s) Topical <User Schedule>  dextrose 5%. 1000 milliLiter(s) (50 mL/Hr) IV Continuous <Continuous>  dextrose 50% Injectable 12.5 Gram(s) IV Push once  dextrose 50% Injectable 25 Gram(s) IV Push once  dextrose 50% Injectable 25 Gram(s) IV Push once  dronabinol 2.5 milliGRAM(s) Oral two times a day  epoetin braden Injectable 42815 Unit(s) SubCutaneous <User Schedule>  ferrous    sulfate 325 milliGRAM(s) Oral daily  heparin  Injectable 5000 Unit(s) SubCutaneous every 8 hours  hydrALAZINE 100 milliGRAM(s) Oral three times a day  insulin glargine Injectable (LANTUS) 22 Unit(s) SubCutaneous at bedtime  insulin lispro (HumaLOG) corrective regimen sliding scale   SubCutaneous three times a day before meals  insulin lispro (HumaLOG) corrective regimen sliding scale   SubCutaneous at bedtime  insulin lispro Injectable (HumaLOG) 4 Unit(s) SubCutaneous before breakfast  polyethylene glycol 3350 17 Gram(s) Oral every 12 hours  primidone 50 milliGRAM(s) Oral two times a day  senna 2 Tablet(s) Oral at bedtime  sodium chloride 0.9%. 1000 milliLiter(s) (30 mL/Hr) IV Continuous <Continuous>    MEDICATIONS  (PRN):  acetaminophen   Tablet .. 650 milliGRAM(s) Oral every 6 hours PRN Mild Pain (1 - 3)  dextrose 40% Gel 15 Gram(s) Oral once PRN Blood Glucose LESS THAN 70 milliGRAM(s)/deciliter  glucagon  Injectable 1 milliGRAM(s) IntraMuscular once PRN Glucose LESS THAN 70 milligrams/deciliter  oxyCODONE    IR 5 milliGRAM(s) Oral every 4 hours PRN Moderate Pain (4 - 6)  oxyCODONE    IR 10 milliGRAM(s) Oral every 4 hours PRN Severe Pain (7 - 10)      LABS:                        9.4    5.02  )-----------( 290      ( 2019 08:42 )             29.7     07    137  |  99  |  19  ----------------------------<  240<H>  4.1   |  27  |  1.77<H>    Ca    9.1      2019 06:01  Phos  2.8     07  Mg     1.9           PT/INR - ( 2019 08:15 )   PT: 12.4 sec;   INR: 1.09 ratio         PTT - ( 2019 08:15 )  PTT:30.7 sec      RADIOLOGY & ADDITIONAL STUDIES:

## 2019-07-24 NOTE — PROGRESS NOTE ADULT - ASSESSMENT
59M, hx DM, HTN presents from [facility] via EMS due to resp distress. Patient at [facility] due to right diabetic foot ulcer. Patient reported to be hypoxic on room air to 60s. Improved on 15L face mask top 100% O2 sat. Patient awake, alert, oriented. States he has been coughing for 2-3 weeks (dry cough) as well as weakness and shortness of breath for few days.    Acute on chronic diastolic chf  - hold  lasix 80 qd  - strict Is and Os  - cardiology consult appreciated  - keep O2 sats above 92%    diabetic foot ulcer with osteo  - s/p BKA  - on cefepime  - follow up by vascular    pain  - oxycodone prn  - add Neurontin For phantom pain    essential tremors and diaphoresis  - may be secondary to infection  - trial of primidone  - seen by neuro consult  - increase primidone to 50 bid    CKD 4  - monitor cre  - avoid nephrotoxins    anemia of chronic disease  - c/w iron and epogen    diabetes  - fs qid  - hgb a1c   7.1  - endocrine consult following    htn  - cw norvasc  - c/w hydralazine    DVT px

## 2019-07-24 NOTE — PROGRESS NOTE ADULT - ASSESSMENT
Assessment  DMT2: 59y Male with DM T2 with hyperglycemia, on basal bolus insulin, feeling tired, c/o foot pain, blood sugars improving, no new hypoglycemic episode, eating meals.  Foot wound: On wound care, meds, stable.  CAD: on medications, no chest pain, stable, monitored.  HTN: Controlled,  on antihypertensive medications.  CKD: Monitor labs/BMP,             Andrzej Zhu MD  Cell: [phone]  Office: [phone]

## 2019-07-24 NOTE — PROGRESS NOTE ADULT - SUBJECTIVE AND OBJECTIVE BOX
Vascular Surgery post-op Note   STATUS POST:  BKA 	  POST OPERATIVE DAY #: 0    patient seen and examined at bedside. c/o pain at BKA stump. denies dizziness, SOB, CP or palpitations.     Vital Signs Last 24 Hrs  T(C): 36.3 (24 Jul 2019 00:24), Max: 36.7 (23 Jul 2019 11:38)  T(F): 97.3 (24 Jul 2019 00:24), Max: 98.1 (23 Jul 2019 11:38)  HR: 90 (24 Jul 2019 00:24) (72 - 90)  BP: 163/84 (24 Jul 2019 00:24) (142/73 - 195/88)  BP(mean): 122 (23 Jul 2019 23:30) (113 - 126)  RR: 18 (24 Jul 2019 00:24) (14 - 18)  SpO2: 99% (24 Jul 2019 00:24) (94% - 100%)  I&O's Summary    22 Jul 2019 07:01  -  23 Jul 2019 07:00  --------------------------------------------------------  IN: 700 mL / OUT: 925 mL / NET: -225 mL    23 Jul 2019 07:01  -  24 Jul 2019 01:36  --------------------------------------------------------  IN: 150 mL / OUT: 975 mL / NET: -825 mL      I&O's Detail    22 Jul 2019 07:01  -  23 Jul 2019 07:00  --------------------------------------------------------  IN:    IV PiggyBack: 100 mL    Oral Fluid: 600 mL  Total IN: 700 mL    OUT:    Voided: 925 mL  Total OUT: 925 mL    Total NET: -225 mL      23 Jul 2019 07:01  -  24 Jul 2019 01:36  --------------------------------------------------------  IN:    IV PiggyBack: 150 mL  Total IN: 150 mL    OUT:    Indwelling Catheter - Urethral: 150 mL    Voided: 825 mL  Total OUT: 975 mL    Total NET: -825 mL      MEDICATIONS  (STANDING):  amLODIPine   Tablet 10 milliGRAM(s) Oral daily  cefepime   IVPB 2000 milliGRAM(s) IV Intermittent every 24 hours  cefepime   IVPB      chlorhexidine 2% Cloths 1 Application(s) Topical daily  collagenase Ointment 1 Application(s) Topical <User Schedule>  dextrose 5%. 1000 milliLiter(s) (50 mL/Hr) IV Continuous <Continuous>  dextrose 50% Injectable 12.5 Gram(s) IV Push once  dextrose 50% Injectable 25 Gram(s) IV Push once  dextrose 50% Injectable 25 Gram(s) IV Push once  dronabinol 2.5 milliGRAM(s) Oral two times a day  epoetin braden Injectable 50481 Unit(s) SubCutaneous <User Schedule>  ferrous    sulfate 325 milliGRAM(s) Oral daily  heparin  Injectable 5000 Unit(s) SubCutaneous every 8 hours  hydrALAZINE 100 milliGRAM(s) Oral three times a day  insulin glargine Injectable (LANTUS) 22 Unit(s) SubCutaneous at bedtime  insulin lispro (HumaLOG) corrective regimen sliding scale   SubCutaneous three times a day before meals  insulin lispro (HumaLOG) corrective regimen sliding scale   SubCutaneous at bedtime  insulin lispro Injectable (HumaLOG) 4 Unit(s) SubCutaneous before breakfast  polyethylene glycol 3350 17 Gram(s) Oral every 12 hours  primidone 50 milliGRAM(s) Oral two times a day  senna 2 Tablet(s) Oral at bedtime  sodium chloride 0.9%. 1000 milliLiter(s) (30 mL/Hr) IV Continuous <Continuous>    MEDICATIONS  (PRN):  acetaminophen   Tablet .. 650 milliGRAM(s) Oral every 6 hours PRN Mild Pain (1 - 3)  dextrose 40% Gel 15 Gram(s) Oral once PRN Blood Glucose LESS THAN 70 milliGRAM(s)/deciliter  glucagon  Injectable 1 milliGRAM(s) IntraMuscular once PRN Glucose LESS THAN 70 milligrams/deciliter  oxyCODONE    IR 5 milliGRAM(s) Oral every 4 hours PRN Moderate Pain (4 - 6)  oxyCODONE    IR 10 milliGRAM(s) Oral every 4 hours PRN Severe Pain (7 - 10)      LABS:                        9.4    5.02  )-----------( 290      ( 23 Jul 2019 08:42 )             29.7     07-23    137  |  99  |  19  ----------------------------<  240<H>  4.1   |  27  |  1.77<H>    Ca    9.1      23 Jul 2019 06:01  Phos  2.8     07-23  Mg     1.9     07-23      PT/INR - ( 23 Jul 2019 08:15 )   PT: 12.4 sec;   INR: 1.09 ratio         PTT - ( 23 Jul 2019 08:15 )  PTT:30.7 sec      RADIOLOGY & ADDITIONAL STUDIES:    PHYSICAL EXAM:      Constitutional: NAD, A&O x 3  Respiratory: unlabor breathin  Extremities: right BKA stump with ACE wrap in place, clean and dry, no strike through blood.     A/P: 59y Male with hx of nonhealing right foot ulcer, s/p right BKA POD #0 stable     - Diet: diabetic diet  - Activity: OOB with assistance   - Labs: f/u AM labs   - Pain medication: Tylenol, oxycodone prn   - DVT ppxL SQH    Bernice Dumont PA-C

## 2019-07-24 NOTE — PROGRESS NOTE ADULT - SUBJECTIVE AND OBJECTIVE BOX
Patient is a 59y old  Male who presents with a chief complaint of SOB (24 Jul 2019 12:31)      SUBJECTIVE / OVERNIGHT EVENTS:  s/p bka. c/o phantom pain.    MEDICATIONS  (STANDING):  amLODIPine   Tablet 10 milliGRAM(s) Oral daily  cefepime   IVPB 2000 milliGRAM(s) IV Intermittent every 24 hours  cefepime   IVPB      chlorhexidine 2% Cloths 1 Application(s) Topical daily  collagenase Ointment 1 Application(s) Topical <User Schedule>  dextrose 5%. 1000 milliLiter(s) (50 mL/Hr) IV Continuous <Continuous>  dextrose 50% Injectable 12.5 Gram(s) IV Push once  dextrose 50% Injectable 25 Gram(s) IV Push once  dextrose 50% Injectable 25 Gram(s) IV Push once  dronabinol 2.5 milliGRAM(s) Oral two times a day  epoetin braden Injectable 66269 Unit(s) SubCutaneous <User Schedule>  ferrous    sulfate 325 milliGRAM(s) Oral daily  gabapentin 100 milliGRAM(s) Oral every 8 hours  heparin  Injectable 5000 Unit(s) SubCutaneous every 8 hours  hydrALAZINE 100 milliGRAM(s) Oral three times a day  insulin glargine Injectable (LANTUS) 22 Unit(s) SubCutaneous at bedtime  insulin lispro (HumaLOG) corrective regimen sliding scale   SubCutaneous three times a day before meals  insulin lispro (HumaLOG) corrective regimen sliding scale   SubCutaneous at bedtime  insulin lispro Injectable (HumaLOG) 4 Unit(s) SubCutaneous before breakfast  polyethylene glycol 3350 17 Gram(s) Oral every 12 hours  primidone 50 milliGRAM(s) Oral two times a day  senna 2 Tablet(s) Oral at bedtime  sodium chloride 0.9%. 1000 milliLiter(s) (30 mL/Hr) IV Continuous <Continuous>    MEDICATIONS  (PRN):  acetaminophen   Tablet .. 650 milliGRAM(s) Oral every 6 hours PRN Mild Pain (1 - 3)  dextrose 40% Gel 15 Gram(s) Oral once PRN Blood Glucose LESS THAN 70 milliGRAM(s)/deciliter  glucagon  Injectable 1 milliGRAM(s) IntraMuscular once PRN Glucose LESS THAN 70 milligrams/deciliter  oxyCODONE    IR 5 milliGRAM(s) Oral every 4 hours PRN Moderate Pain (4 - 6)  oxyCODONE    IR 10 milliGRAM(s) Oral every 4 hours PRN Severe Pain (7 - 10)      Vital Signs Last 24 Hrs  T(C): 36.7 (24 Jul 2019 12:12), Max: 36.7 (24 Jul 2019 12:12)  T(F): 98.1 (24 Jul 2019 12:12), Max: 98.1 (24 Jul 2019 12:12)  HR: 80 (24 Jul 2019 13:55) (73 - 91)  BP: 150/80 (24 Jul 2019 13:55) (135/80 - 195/88)  BP(mean): 122 (23 Jul 2019 23:30) (113 - 126)  RR: 18 (24 Jul 2019 12:12) (14 - 18)  SpO2: 99% (24 Jul 2019 12:12) (96% - 100%)  CAPILLARY BLOOD GLUCOSE      POCT Blood Glucose.: 154 mg/dL (24 Jul 2019 12:30)  POCT Blood Glucose.: 135 mg/dL (24 Jul 2019 07:55)  POCT Blood Glucose.: 124 mg/dL (24 Jul 2019 00:49)  POCT Blood Glucose.: 91 mg/dL (23 Jul 2019 23:29)  POCT Blood Glucose.: 107 mg/dL (23 Jul 2019 16:55)    I&O's Summary    23 Jul 2019 07:01  -  24 Jul 2019 07:00  --------------------------------------------------------  IN: 150 mL / OUT: 1425 mL / NET: -1275 mL    24 Jul 2019 07:01  -  24 Jul 2019 14:31  --------------------------------------------------------  IN: 240 mL / OUT: 0 mL / NET: 240 mL        PHYSICAL EXAM:  GENERAL: NAD, well-developed  HEAD:  Atraumatic, Normocephalic  EYES: EOMI, PERRLA, conjunctiva and sclera clear  NECK: Supple, No JVD  CHEST/LUNG: Clear to auscultation bilaterally; No wheeze  HEART: Regular rate and rhythm; No murmurs, rubs, or gallops  ABDOMEN: Soft, Nontender, Nondistended; Bowel sounds present  EXTREMITIES:  2+ Peripheral Pulses, No clubbing, cyanosis, or edema  PSYCH: AAOx3  NEUROLOGY: non-focal  SKIN: No rashes or lesions    LABS:                        9.7    8.96  )-----------( 283      ( 24 Jul 2019 10:09 )             31.3     07-24    137  |  98  |  15  ----------------------------<  144<H>  4.4   |  23  |  1.62<H>    Ca    9.0      24 Jul 2019 07:09  Phos  2.8     07-23  Mg     1.8     07-24      PT/INR - ( 23 Jul 2019 08:15 )   PT: 12.4 sec;   INR: 1.09 ratio         PTT - ( 23 Jul 2019 08:15 )  PTT:30.7 sec          RADIOLOGY & ADDITIONAL TESTS:    Imaging Personally Reviewed:    Consultant(s) Notes Reviewed:      Care Discussed with Consultants/Other Providers:

## 2019-07-24 NOTE — PROGRESS NOTE ADULT - SUBJECTIVE AND OBJECTIVE BOX
Admitting Diagnosis:  Chronic pulmonary edema (J81.1): CHRONIC PULMONARY EDEMA    HPI:  This is a 59y year old Male with the below past medical history of DM, HTN presented from [facility] via EMS due to resp distress. Patient at [facility] due to right diabetic foot ulcer. Patient reported to be hypoxic on room air to 60s. Improved on 15L face mask top 100% O2 sat. Patient awake, alert, oriented. NO fevers or chills, nausea or vomiting, headache, chest pain, abdominal pain, diarrhea or constipation. Patient reportedly received blood transfusion last night, unknown as to why. Reports BL leg swelling - chronic, no calf pain. No hx DVT or PE.       Neurology is asked to comment on the patients bilateral upper extremities R>L longstanding action tremor. It seems to be more prominent during the current hospitalization.    pt says feels tremor not much of issue for him now    Past Medical History:  Hypertension (I10)  Insulin dependent diabetes mellitus (E11.9)  Venous insufficiency of both lower extremities (459.81): R &gt; L  HTN (hypertension) (401.9)  BPH (benign prostatic hypertrophy) (600.00)  Diabetes (250.00)      Past Surgical History:  History of cholecystectomy (Z90.49)  No significant past surgical history ([id number])  S/P laparoscopic cholecystectomy (V45.89)  Status post incision and drainage (V45.89): Rt groin abscess  No significant past surgical history (103010213)      Social History:  No toxic habits    Family History:  FAMILY HISTORY:  Paternal family history of emphysema  Family history of diabetes mellitus (DM) (Grandparent)      Allergies:  No Known Allergies      ROS:  Constitutional: Patient offers no complaints of fevers or significant weight loss  Ears, Nose, Mouth and Throat: The patient presents with no abnormalities of the head, ears, eyes, nose or throat  Skin: Patient offers no concerns of new rashes or lesions  Respiratory: The patient presents with no abnormalities of the respiratory tract  Cardiovascular: The patient presents with no cardiac abnormalities  Gastrointestinal: The patient presents with no abnormalities of the GI system  Genitourinary: The patient presents with no dysuria, hematuria or frequent urination  Neurological: See HPI  Endocrine: Patient offers no complaints of excessive thirst, urination, or heat/cold intolerance    Advanced care planning reviewed and noted in the chart.    Medications:  acetaminophen   Tablet .. 650 milliGRAM(s) Oral every 6 hours PRN  amLODIPine   Tablet 10 milliGRAM(s) Oral daily  cefepime   IVPB 2000 milliGRAM(s) IV Intermittent every 24 hours  cefepime   IVPB      chlorhexidine 2% Cloths 1 Application(s) Topical daily  collagenase Ointment 1 Application(s) Topical <User Schedule>  dextrose 40% Gel 15 Gram(s) Oral once PRN  dextrose 5%. 1000 milliLiter(s) IV Continuous <Continuous>  dextrose 50% Injectable 12.5 Gram(s) IV Push once  dextrose 50% Injectable 25 Gram(s) IV Push once  dextrose 50% Injectable 25 Gram(s) IV Push once  dronabinol 2.5 milliGRAM(s) Oral two times a day  epoetin braden Injectable 09829 Unit(s) SubCutaneous <User Schedule>  ferrous    sulfate 325 milliGRAM(s) Oral daily  glucagon  Injectable 1 milliGRAM(s) IntraMuscular once PRN  heparin  Injectable 5000 Unit(s) SubCutaneous every 8 hours  hydrALAZINE 100 milliGRAM(s) Oral three times a day  insulin glargine Injectable (LANTUS) 22 Unit(s) SubCutaneous at bedtime  insulin lispro (HumaLOG) corrective regimen sliding scale   SubCutaneous three times a day before meals  insulin lispro (HumaLOG) corrective regimen sliding scale   SubCutaneous at bedtime  insulin lispro Injectable (HumaLOG) 4 Unit(s) SubCutaneous before breakfast  oxyCODONE    IR 5 milliGRAM(s) Oral every 4 hours PRN  oxyCODONE    IR 10 milliGRAM(s) Oral every 4 hours PRN  polyethylene glycol 3350 17 Gram(s) Oral every 12 hours  primidone 50 milliGRAM(s) Oral two times a day  senna 2 Tablet(s) Oral at bedtime  sodium chloride 0.9%. 1000 milliLiter(s) IV Continuous <Continuous>      Labs:  CBC Full  -  ( 23 Jul 2019 08:42 )  WBC Count : 5.02 K/uL  RBC Count : 3.28 M/uL  Hemoglobin : 9.4 g/dL  Hematocrit : 29.7 %  Platelet Count - Automated : 290 K/uL  Mean Cell Volume : 90.5 fl  Mean Cell Hemoglobin : 28.7 pg  Mean Cell Hemoglobin Concentration : 31.6 gm/dL  Auto Neutrophil # : x  Auto Lymphocyte # : x  Auto Monocyte # : x  Auto Eosinophil # : x  Auto Basophil # : x  Auto Neutrophil % : x  Auto Lymphocyte % : x  Auto Monocyte % : x  Auto Eosinophil % : x  Auto Basophil % : x    07-24    137  |  98  |  15  ----------------------------<  144<H>  4.4   |  23  |  1.62<H>    Ca    9.0      24 Jul 2019 07:09  Phos  2.8     07-23  Mg     1.8     07-24      CAPILLARY BLOOD GLUCOSE      POCT Blood Glucose.: 135 mg/dL (24 Jul 2019 07:55)  POCT Blood Glucose.: 124 mg/dL (24 Jul 2019 00:49)  POCT Blood Glucose.: 91 mg/dL (23 Jul 2019 23:29)  POCT Blood Glucose.: 107 mg/dL (23 Jul 2019 16:55)  POCT Blood Glucose.: 161 mg/dL (23 Jul 2019 12:06)      PT/INR - ( 23 Jul 2019 08:15 )   PT: 12.4 sec;   INR: 1.09 ratio         PTT - ( 23 Jul 2019 08:15 )  PTT:30.7 sec        Vitals:  Vital Signs Last 24 Hrs  T(C): 36.4 (24 Jul 2019 04:12), Max: 36.7 (23 Jul 2019 11:38)  T(F): 97.6 (24 Jul 2019 04:12), Max: 98.1 (23 Jul 2019 11:38)  HR: 90 (24 Jul 2019 08:00) (73 - 91)  BP: 135/80 (24 Jul 2019 08:00) (135/80 - 195/88)  BP(mean): 122 (23 Jul 2019 23:30) (113 - 126)  RR: 18 (24 Jul 2019 04:12) (14 - 18)  SpO2: 98% (24 Jul 2019 04:12) (94% - 100%)  NEUROLOGICAL EXAM:    Mental status: Awake, alert, and in no apparent distress. Oriented to person, place and time. Language function is normal. Recent memory, digit span and concentration were normal.     Cranial Nerves: Pupils were equal, round, reactive to light. Extraocular movements were intact. Visual field were full.  Facial sensation was intact to light touch. There was no facial asymmetry. The palate was upgoing symmetrically and tongue was midline. Hearing acuity was intact to finger rub AU. Shoulder shrug was full bilaterally    Motor exam: Bulk and tone were normal. Strength grossly strong but limited by post op pain. There was no pronator drift. + intention tremor b/l hands mild with low amplitude/frequency    Reflexes: 1+ in the bilateral upper extremities. 0 in the bilateral lower extremities. Toes were downgoing bilaterally.     Sensation: diminishe for all modalities     Coordination: no gross dysmetria.     Gait: deferred.

## 2019-07-24 NOTE — PROGRESS NOTE ADULT - SUBJECTIVE AND OBJECTIVE BOX
Chief complaint  Patient is a 59y old  Male who presents with a chief complaint of SOB (24 Jul 2019 14:31)   Review of systems  Patient in bed, looks comfortable, no fever, no hypoglycemia.    Labs and Fingersticks  CAPILLARY BLOOD GLUCOSE      POCT Blood Glucose.: 154 mg/dL (24 Jul 2019 12:30)  POCT Blood Glucose.: 135 mg/dL (24 Jul 2019 07:55)  POCT Blood Glucose.: 124 mg/dL (24 Jul 2019 00:49)  POCT Blood Glucose.: 91 mg/dL (23 Jul 2019 23:29)  POCT Blood Glucose.: 107 mg/dL (23 Jul 2019 16:55)      Anion Gap, Serum: 16 (07-24 @ 07:09)  Anion Gap, Serum: 11 (07-23 @ 06:01)      Calcium, Total Serum: 9.0 (07-24 @ 07:09)  Calcium, Total Serum: 9.1 (07-23 @ 06:01)          07-24    137  |  98  |  15  ----------------------------<  144<H>  4.4   |  23  |  1.62<H>    Ca    9.0      24 Jul 2019 07:09  Phos  2.8     07-23  Mg     1.8     07-24                          9.7    8.96  )-----------( 283      ( 24 Jul 2019 10:09 )             31.3     Medications  MEDICATIONS  (STANDING):  amLODIPine   Tablet 10 milliGRAM(s) Oral daily  cefepime   IVPB 2000 milliGRAM(s) IV Intermittent every 24 hours  cefepime   IVPB      chlorhexidine 2% Cloths 1 Application(s) Topical daily  collagenase Ointment 1 Application(s) Topical <User Schedule>  dextrose 5%. 1000 milliLiter(s) (50 mL/Hr) IV Continuous <Continuous>  dextrose 50% Injectable 12.5 Gram(s) IV Push once  dextrose 50% Injectable 25 Gram(s) IV Push once  dextrose 50% Injectable 25 Gram(s) IV Push once  dronabinol 2.5 milliGRAM(s) Oral two times a day  epoetin braden Injectable 13935 Unit(s) SubCutaneous <User Schedule>  ferrous    sulfate 325 milliGRAM(s) Oral daily  gabapentin 100 milliGRAM(s) Oral every 8 hours  heparin  Injectable 5000 Unit(s) SubCutaneous every 8 hours  hydrALAZINE 100 milliGRAM(s) Oral three times a day  insulin glargine Injectable (LANTUS) 22 Unit(s) SubCutaneous at bedtime  insulin lispro (HumaLOG) corrective regimen sliding scale   SubCutaneous three times a day before meals  insulin lispro (HumaLOG) corrective regimen sliding scale   SubCutaneous at bedtime  insulin lispro Injectable (HumaLOG) 4 Unit(s) SubCutaneous before breakfast  polyethylene glycol 3350 17 Gram(s) Oral every 12 hours  primidone 50 milliGRAM(s) Oral two times a day  senna 2 Tablet(s) Oral at bedtime  sodium chloride 0.9%. 1000 milliLiter(s) (30 mL/Hr) IV Continuous <Continuous>      Physical Exam  General: Patient comfortable in bed  Vital Signs Last 12 Hrs  T(F): 98.1 (07-24-19 @ 12:12), Max: 98.1 (07-24-19 @ 12:12)  HR: 80 (07-24-19 @ 13:55) (80 - 90)  BP: 150/80 (07-24-19 @ 13:55) (135/80 - 153/80)  BP(mean): --  RR: 18 (07-24-19 @ 12:12) (18 - 18)  SpO2: 99% (07-24-19 @ 12:12) (99% - 99%)  Neck: No palpable thyroid nodules.  CVS: S1S2, No murmurs  Respiratory: No wheezing, no crepitations  GI: Abdomen soft, bowel sounds positive  Musculoskeletal:  edema lower extremities.   Skin: No skin rashes, no ecchymosis    Diagnostics

## 2019-07-24 NOTE — PROGRESS NOTE ADULT - SUBJECTIVE AND OBJECTIVE BOX
Subjective: Patient seen and examined. No new events except as noted.   resting comfortably   POD #1   REVIEW OF SYSTEMS:    CONSTITUTIONAL: + weakness, fevers or chills  EYES/ENT: No visual changes;  No vertigo or throat pain   NECK: No pain or stiffness  RESPIRATORY: No cough, wheezing, hemoptysis; No shortness of breath  CARDIOVASCULAR: No chest pain or palpitations  GASTROINTESTINAL: No abdominal or epigastric pain. No nausea, vomiting, or hematemesis; No diarrhea or constipation. No melena or hematochezia.  GENITOURINARY: No dysuria, frequency or hematuria  NEUROLOGICAL: No numbness or weakness  SKIN: No itching, burning, rashes, or lesions   All other review of systems is negative unless indicated above.    MEDICATIONS:  MEDICATIONS  (STANDING):  amLODIPine   Tablet 10 milliGRAM(s) Oral daily  cefepime   IVPB 2000 milliGRAM(s) IV Intermittent every 24 hours  cefepime   IVPB      chlorhexidine 2% Cloths 1 Application(s) Topical daily  collagenase Ointment 1 Application(s) Topical <User Schedule>  dextrose 5%. 1000 milliLiter(s) (50 mL/Hr) IV Continuous <Continuous>  dextrose 50% Injectable 12.5 Gram(s) IV Push once  dextrose 50% Injectable 25 Gram(s) IV Push once  dextrose 50% Injectable 25 Gram(s) IV Push once  dronabinol 2.5 milliGRAM(s) Oral two times a day  epoetin braden Injectable 20030 Unit(s) SubCutaneous <User Schedule>  ferrous    sulfate 325 milliGRAM(s) Oral daily  gabapentin 100 milliGRAM(s) Oral every 8 hours  heparin  Injectable 5000 Unit(s) SubCutaneous every 8 hours  hydrALAZINE 100 milliGRAM(s) Oral three times a day  insulin glargine Injectable (LANTUS) 22 Unit(s) SubCutaneous at bedtime  insulin lispro (HumaLOG) corrective regimen sliding scale   SubCutaneous three times a day before meals  insulin lispro (HumaLOG) corrective regimen sliding scale   SubCutaneous at bedtime  insulin lispro Injectable (HumaLOG) 4 Unit(s) SubCutaneous before breakfast  polyethylene glycol 3350 17 Gram(s) Oral every 12 hours  primidone 50 milliGRAM(s) Oral two times a day  senna 2 Tablet(s) Oral at bedtime  sodium chloride 0.9%. 1000 milliLiter(s) (30 mL/Hr) IV Continuous <Continuous>      PHYSICAL EXAM:  T(C): 36.7 (07-24-19 @ 12:12), Max: 36.7 (07-23-19 @ 12:58)  HR: 86 (07-24-19 @ 12:12) (73 - 91)  BP: 153/80 (07-24-19 @ 12:12) (135/80 - 195/88)  RR: 18 (07-24-19 @ 12:12) (14 - 18)  SpO2: 99% (07-24-19 @ 12:12) (96% - 100%)  Wt(kg): --  I&O's Summary    23 Jul 2019 07:01  -  24 Jul 2019 07:00  --------------------------------------------------------  IN: 150 mL / OUT: 1425 mL / NET: -1275 mL    24 Jul 2019 07:01  -  24 Jul 2019 12:31  --------------------------------------------------------  IN: 240 mL / OUT: 0 mL / NET: 240 mL      Height (cm): 183 (07-23 @ 18:39)  Weight (kg): 85 (07-23 @ 18:39)  BMI (kg/m2): 25.4 (07-23 @ 18:39)  BSA (m2): 2.07 (07-23 @ 18:39)    Appearance: NAD sleepy 	  HEENT: Dry oral mucosa, PERRL, EOMI	  Lymphatic: No lymphadenopathy , no edema  Cardiovascular: Normal S1 S2, No JVD, No murmurs , Peripheral pulses palpable 2+ bilaterally  Respiratory: Lungs clear to auscultation, normal effort 	  Gastrointestinal:  Soft, Non-tender, + BS	  Skin: No rashes, No ecchymoses, No cyanosis, warm to touch  Musculoskeletal: Normal range of motion, normal strength  Psychiatry:  Mood & affect appropriate  Ext: R BKA       LABS:    CARDIAC MARKERS:                                9.7    8.96  )-----------( 283      ( 24 Jul 2019 10:09 )             31.3     07-24    137  |  98  |  15  ----------------------------<  144<H>  4.4   |  23  |  1.62<H>    Ca    9.0      24 Jul 2019 07:09  Phos  2.8     07-23  Mg     1.8     07-24      proBNP:   Lipid Profile:   HgA1c:   TSH:             TELEMETRY: 	SR    ECG:  	  RADIOLOGY:   DIAGNOSTIC TESTING:  [ ] Echocardiogram:  [ ]  Catheterization:  [ ] Stress Test:    OTHER:

## 2019-07-25 LAB
ANION GAP SERPL CALC-SCNC: 12 MMOL/L — SIGNIFICANT CHANGE UP (ref 5–17)
BUN SERPL-MCNC: 16 MG/DL — SIGNIFICANT CHANGE UP (ref 7–23)
CALCIUM SERPL-MCNC: 9 MG/DL — SIGNIFICANT CHANGE UP (ref 8.4–10.5)
CHLORIDE SERPL-SCNC: 99 MMOL/L — SIGNIFICANT CHANGE UP (ref 96–108)
CO2 SERPL-SCNC: 25 MMOL/L — SIGNIFICANT CHANGE UP (ref 22–31)
CREAT SERPL-MCNC: 1.84 MG/DL — HIGH (ref 0.5–1.3)
GLUCOSE BLDC GLUCOMTR-MCNC: 112 MG/DL — HIGH (ref 70–99)
GLUCOSE BLDC GLUCOMTR-MCNC: 115 MG/DL — HIGH (ref 70–99)
GLUCOSE BLDC GLUCOMTR-MCNC: 124 MG/DL — HIGH (ref 70–99)
GLUCOSE BLDC GLUCOMTR-MCNC: 143 MG/DL — HIGH (ref 70–99)
GLUCOSE BLDC GLUCOMTR-MCNC: 182 MG/DL — HIGH (ref 70–99)
GLUCOSE SERPL-MCNC: 185 MG/DL — HIGH (ref 70–99)
HCT VFR BLD CALC: 28.8 % — LOW (ref 39–50)
HGB BLD-MCNC: 9.1 G/DL — LOW (ref 13–17)
MCHC RBC-ENTMCNC: 29.3 PG — SIGNIFICANT CHANGE UP (ref 27–34)
MCHC RBC-ENTMCNC: 31.6 GM/DL — LOW (ref 32–36)
MCV RBC AUTO: 92.6 FL — SIGNIFICANT CHANGE UP (ref 80–100)
PLATELET # BLD AUTO: 267 K/UL — SIGNIFICANT CHANGE UP (ref 150–400)
POTASSIUM SERPL-MCNC: 4.3 MMOL/L — SIGNIFICANT CHANGE UP (ref 3.5–5.3)
POTASSIUM SERPL-SCNC: 4.3 MMOL/L — SIGNIFICANT CHANGE UP (ref 3.5–5.3)
RBC # BLD: 3.11 M/UL — LOW (ref 4.2–5.8)
RBC # FLD: 15.3 % — HIGH (ref 10.3–14.5)
SODIUM SERPL-SCNC: 136 MMOL/L — SIGNIFICANT CHANGE UP (ref 135–145)
WBC # BLD: 6.34 K/UL — SIGNIFICANT CHANGE UP (ref 3.8–10.5)
WBC # FLD AUTO: 6.34 K/UL — SIGNIFICANT CHANGE UP (ref 3.8–10.5)

## 2019-07-25 PROCEDURE — 51703 INSERT BLADDER CATH COMPLEX: CPT

## 2019-07-25 PROCEDURE — 99255 IP/OBS CONSLTJ NEW/EST HI 80: CPT

## 2019-07-25 RX ORDER — TAMSULOSIN HYDROCHLORIDE 0.4 MG/1
0.4 CAPSULE ORAL AT BEDTIME
Refills: 0 | Status: DISCONTINUED | OUTPATIENT
Start: 2019-07-25 | End: 2019-07-31

## 2019-07-25 RX ORDER — FUROSEMIDE 40 MG
40 TABLET ORAL DAILY
Refills: 0 | Status: DISCONTINUED | OUTPATIENT
Start: 2019-07-25 | End: 2019-07-29

## 2019-07-25 RX ADMIN — Medication 100 MILLIGRAM(S): at 05:36

## 2019-07-25 RX ADMIN — HEPARIN SODIUM 5000 UNIT(S): 5000 INJECTION INTRAVENOUS; SUBCUTANEOUS at 22:40

## 2019-07-25 RX ADMIN — GABAPENTIN 100 MILLIGRAM(S): 400 CAPSULE ORAL at 22:40

## 2019-07-25 RX ADMIN — ERYTHROPOIETIN 10000 UNIT(S): 10000 INJECTION, SOLUTION INTRAVENOUS; SUBCUTANEOUS at 08:36

## 2019-07-25 RX ADMIN — HEPARIN SODIUM 5000 UNIT(S): 5000 INJECTION INTRAVENOUS; SUBCUTANEOUS at 05:36

## 2019-07-25 RX ADMIN — Medication 100 MILLIGRAM(S): at 22:40

## 2019-07-25 RX ADMIN — Medication 1: at 08:35

## 2019-07-25 RX ADMIN — AMLODIPINE BESYLATE 10 MILLIGRAM(S): 2.5 TABLET ORAL at 05:36

## 2019-07-25 RX ADMIN — PRIMIDONE 50 MILLIGRAM(S): 250 TABLET ORAL at 05:36

## 2019-07-25 RX ADMIN — Medication 4 UNIT(S): at 08:36

## 2019-07-25 RX ADMIN — INSULIN GLARGINE 22 UNIT(S): 100 INJECTION, SOLUTION SUBCUTANEOUS at 22:41

## 2019-07-25 RX ADMIN — SODIUM CHLORIDE 30 MILLILITER(S): 9 INJECTION INTRAMUSCULAR; INTRAVENOUS; SUBCUTANEOUS at 05:37

## 2019-07-25 RX ADMIN — PRIMIDONE 50 MILLIGRAM(S): 250 TABLET ORAL at 22:40

## 2019-07-25 RX ADMIN — Medication 40 MILLIGRAM(S): at 22:40

## 2019-07-25 RX ADMIN — CEFEPIME 100 MILLIGRAM(S): 1 INJECTION, POWDER, FOR SOLUTION INTRAMUSCULAR; INTRAVENOUS at 08:36

## 2019-07-25 RX ADMIN — CHLORHEXIDINE GLUCONATE 1 APPLICATION(S): 213 SOLUTION TOPICAL at 11:41

## 2019-07-25 RX ADMIN — GABAPENTIN 100 MILLIGRAM(S): 400 CAPSULE ORAL at 05:36

## 2019-07-25 RX ADMIN — HEPARIN SODIUM 5000 UNIT(S): 5000 INJECTION INTRAVENOUS; SUBCUTANEOUS at 13:36

## 2019-07-25 RX ADMIN — TAMSULOSIN HYDROCHLORIDE 0.4 MILLIGRAM(S): 0.4 CAPSULE ORAL at 22:40

## 2019-07-25 RX ADMIN — SODIUM CHLORIDE 30 MILLILITER(S): 9 INJECTION INTRAMUSCULAR; INTRAVENOUS; SUBCUTANEOUS at 22:41

## 2019-07-25 RX ADMIN — Medication 100 MILLIGRAM(S): at 13:36

## 2019-07-25 RX ADMIN — GABAPENTIN 100 MILLIGRAM(S): 400 CAPSULE ORAL at 13:36

## 2019-07-25 RX ADMIN — OXYCODONE HYDROCHLORIDE 10 MILLIGRAM(S): 5 TABLET ORAL at 12:30

## 2019-07-25 RX ADMIN — Medication 325 MILLIGRAM(S): at 11:42

## 2019-07-25 RX ADMIN — OXYCODONE HYDROCHLORIDE 10 MILLIGRAM(S): 5 TABLET ORAL at 11:43

## 2019-07-25 NOTE — CONSULT NOTE ADULT - CONSULT REQUESTED DATE/TIME
11-Jul-2019 11:31
11-Jul-2019 16:04
13-Jul-2019 21:35
17-Jul-2019 20:28
20-Jul-2019 09:43
25-Jul-2019 16:58
12-Jul-2019 12:42

## 2019-07-25 NOTE — PHYSICAL THERAPY INITIAL EVALUATION ADULT - DIAGNOSIS, PT EVAL
Decr. functional mobility 2/2 decr. strength, postural control/awareness, standing balance
impaired integument

## 2019-07-25 NOTE — CONSULT NOTE ADULT - CONSULT REASON
?Bacteremia, LE Infection
High Blood Sugars/DMT2
New Bipap
SOB
Tremor
right foot wound
right foot wound

## 2019-07-25 NOTE — PROGRESS NOTE ADULT - ASSESSMENT
Assessment  DMT2: 59y Male with DM T2 with hyperglycemia, on basal bolus insulin, feeling tired, c/o foot pain, blood sugars improving, no new hypoglycemic episode, eating meals.  Foot wound: On wound care, meds, stable.  CAD: on medications, no chest pain, stable, monitored.  HTN: Controlled,  on antihypertensive medications.  CKD: Monitor labs/BMP,             Andrzej Zhu MD  Cell: [phone]  Office: [phone]

## 2019-07-25 NOTE — PHYSICAL THERAPY INITIAL EVALUATION ADULT - BALANCE TRAINING, PT EVAL
GOAL: Pt will demonstrate improved static/dynamic balance to good, in order to improve stability, decrease fall risk and increase independence with ADLs within 4 weeks.
GOAL: Pt will improve static/dynamic sitting and standing balance by 1/2 grade to improve level of independence with mobility skills and ADLs in 2 weeks.

## 2019-07-25 NOTE — PROGRESS NOTE ADULT - SUBJECTIVE AND OBJECTIVE BOX
Patient is a 59y old  Male who presents with a chief complaint of SOB (25 Jul 2019 10:59)      SUBJECTIVE / OVERNIGHT EVENTS:  No chest pain. No shortness of breath. No complaints. No events overnight.     MEDICATIONS  (STANDING):  amLODIPine   Tablet 10 milliGRAM(s) Oral daily  cefepime   IVPB 2000 milliGRAM(s) IV Intermittent every 24 hours  cefepime   IVPB      chlorhexidine 2% Cloths 1 Application(s) Topical daily  collagenase Ointment 1 Application(s) Topical <User Schedule>  dextrose 5%. 1000 milliLiter(s) (50 mL/Hr) IV Continuous <Continuous>  dextrose 50% Injectable 12.5 Gram(s) IV Push once  dextrose 50% Injectable 25 Gram(s) IV Push once  dextrose 50% Injectable 25 Gram(s) IV Push once  dronabinol 2.5 milliGRAM(s) Oral two times a day  epoetin braden Injectable 02635 Unit(s) SubCutaneous <User Schedule>  ferrous    sulfate 325 milliGRAM(s) Oral daily  furosemide    Tablet 40 milliGRAM(s) Oral daily  gabapentin 100 milliGRAM(s) Oral every 8 hours  heparin  Injectable 5000 Unit(s) SubCutaneous every 8 hours  hydrALAZINE 100 milliGRAM(s) Oral three times a day  insulin glargine Injectable (LANTUS) 22 Unit(s) SubCutaneous at bedtime  insulin lispro (HumaLOG) corrective regimen sliding scale   SubCutaneous three times a day before meals  insulin lispro (HumaLOG) corrective regimen sliding scale   SubCutaneous at bedtime  insulin lispro Injectable (HumaLOG) 4 Unit(s) SubCutaneous before breakfast  polyethylene glycol 3350 17 Gram(s) Oral every 12 hours  primidone 50 milliGRAM(s) Oral two times a day  senna 2 Tablet(s) Oral at bedtime  sodium chloride 0.9%. 1000 milliLiter(s) (30 mL/Hr) IV Continuous <Continuous>    MEDICATIONS  (PRN):  acetaminophen   Tablet .. 650 milliGRAM(s) Oral every 6 hours PRN Mild Pain (1 - 3)  dextrose 40% Gel 15 Gram(s) Oral once PRN Blood Glucose LESS THAN 70 milliGRAM(s)/deciliter  glucagon  Injectable 1 milliGRAM(s) IntraMuscular once PRN Glucose LESS THAN 70 milligrams/deciliter  oxyCODONE    IR 5 milliGRAM(s) Oral every 4 hours PRN Moderate Pain (4 - 6)  oxyCODONE    IR 10 milliGRAM(s) Oral every 4 hours PRN Severe Pain (7 - 10)      Vital Signs Last 24 Hrs  T(C): 36.9 (25 Jul 2019 04:21), Max: 37.6 (24 Jul 2019 21:12)  T(F): 98.4 (25 Jul 2019 04:21), Max: 99.6 (24 Jul 2019 21:12)  HR: 79 (25 Jul 2019 09:01) (79 - 86)  BP: 141/73 (25 Jul 2019 09:01) (117/67 - 153/80)  BP(mean): --  RR: 18 (25 Jul 2019 04:21) (18 - 18)  SpO2: 96% (25 Jul 2019 09:01) (96% - 99%)  CAPILLARY BLOOD GLUCOSE      POCT Blood Glucose.: 182 mg/dL (25 Jul 2019 08:23)  POCT Blood Glucose.: 175 mg/dL (24 Jul 2019 22:31)  POCT Blood Glucose.: 141 mg/dL (24 Jul 2019 16:38)  POCT Blood Glucose.: 154 mg/dL (24 Jul 2019 12:30)    I&O's Summary    24 Jul 2019 07:01  -  25 Jul 2019 07:00  --------------------------------------------------------  IN: 1080 mL / OUT: 530 mL / NET: 550 mL    25 Jul 2019 07:01  -  25 Jul 2019 11:36  --------------------------------------------------------  IN: 50 mL / OUT: 0 mL / NET: 50 mL        PHYSICAL EXAM:  GENERAL: NAD, well-developed  HEAD:  Atraumatic, Normocephalic  EYES: EOMI, PERRLA, conjunctiva and sclera clear  NECK: Supple, No JVD  CHEST/LUNG: Clear to auscultation bilaterally; No wheeze  HEART: Regular rate and rhythm; No murmurs, rubs, or gallops  ABDOMEN: Soft, Nontender, Nondistended; Bowel sounds present  EXTREMITIES:  2+ Peripheral Pulses, No clubbing, cyanosis, or edema  PSYCH: AAOx3  NEUROLOGY: non-focal  SKIN: No rashes or lesions    LABS:                        9.1    6.34  )-----------( 267      ( 25 Jul 2019 07:59 )             28.8     07-25    136  |  99  |  16  ----------------------------<  185<H>  4.3   |  25  |  1.84<H>    Ca    9.0      25 Jul 2019 05:57  Mg     1.8     07-24                RADIOLOGY & ADDITIONAL TESTS:    Imaging Personally Reviewed:    Consultant(s) Notes Reviewed:      Care Discussed with Consultants/Other Providers:

## 2019-07-25 NOTE — PROCEDURE NOTE - ADDITIONAL PROCEDURE DETAILS
Urology called to place difficult [facility] after unsuccessful attempt. 18f coude placed. Would recommend restarting flomax, pt self d/c'd when BPH symptoms improved. Trial of void prior to discharge once pt is more ambulatory and at least 2 doses of flomax. Pt has his own urologist who he will follow up with.

## 2019-07-25 NOTE — CONSULT NOTE ADULT - SUBJECTIVE AND OBJECTIVE BOX
Patient is a 59y old  Male who presents with a chief complaint of SOB (25 Jul 2019 11:35)      HPI:    59M, hx DM, HTN presents from [facility] via EMS on 7/11/19 for respiratory distress. Patient reported to be hypoxic on room air to 60s. Improved on 15L face mask top 100% O2 sat. Patient awake, alert, oriented. States he has been coughing for 2-3 weeks (dry cough) as well as weakness and shortness of breath for few days. NO fevers or chills, nausea or vomiting, headache, chest pain, abdominal pain, diarrhea or constipation.  Suspected to have heart failure. Started on Zosyn 7/11 - 7/23. 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 Underwent R BKA for foot OM on 7/23. Switched to Cefepime 7/23 - 7/25.     prior hospital charts reviewed [  ]  primary team notes reviewed [  ]  other consultant notes reviewed [  ]    PAST MEDICAL & SURGICAL HISTORY:  Hypertension  Insulin dependent diabetes mellitus  Venous insufficiency of both lower extremities: R &gt; L  HTN (hypertension)  BPH (benign prostatic hypertrophy)  Diabetes  History of cholecystectomy  S/P laparoscopic cholecystectomy  Status post incision and drainage: Rt groin abscess      Allergies  No Known Allergies    ANTIMICROBIALS (past 90 days)  MEDICATIONS  (STANDING):  cefepime   IVPB   100 mL/Hr IV Intermittent (07-23-19 @ 10:53)    cefepime   IVPB   100 mL/Hr IV Intermittent (07-25-19 @ 08:36)   100 mL/Hr IV Intermittent (07-24-19 @ 08:16)    piperacillin/tazobactam IVPB..   25 mL/Hr IV Intermittent (07-23-19 @ 06:30)   25 mL/Hr IV Intermittent (07-22-19 @ 21:36)    piperacillin/tazobactam IVPB..   25 mL/Hr IV Intermittent (07-22-19 @ 13:08)   25 mL/Hr IV Intermittent (07-22-19 @ 05:18)   25 mL/Hr IV Intermittent (07-21-19 @ 21:08)   25 mL/Hr IV Intermittent (07-21-19 @ 13:18)   25 mL/Hr IV Intermittent (07-21-19 @ 05:03)   25 mL/Hr IV Intermittent (07-20-19 @ 22:32)   25 mL/Hr IV Intermittent (07-20-19 @ 13:29)   25 mL/Hr IV Intermittent (07-20-19 @ 05:29)   25 mL/Hr IV Intermittent (07-19-19 @ 22:12)   25 mL/Hr IV Intermittent (07-19-19 @ 17:11)   25 mL/Hr IV Intermittent (07-19-19 @ 06:06)   25 mL/Hr IV Intermittent (07-18-19 @ 21:34)   25 mL/Hr IV Intermittent (07-18-19 @ 13:10)   25 mL/Hr IV Intermittent (07-18-19 @ 05:20)   25 mL/Hr IV Intermittent (07-17-19 @ 21:59)   25 mL/Hr IV Intermittent (07-17-19 @ 13:15)   25 mL/Hr IV Intermittent (07-17-19 @ 05:19)   25 mL/Hr IV Intermittent (07-16-19 @ 22:25)   25 mL/Hr IV Intermittent (07-16-19 @ 13:36)   25 mL/Hr IV Intermittent (07-16-19 @ 06:03)   25 mL/Hr IV Intermittent (07-15-19 @ 23:43)   25 mL/Hr IV Intermittent (07-15-19 @ 14:05)   25 mL/Hr IV Intermittent (07-15-19 @ 06:06)   25 mL/Hr IV Intermittent (07-14-19 @ 21:51)   25 mL/Hr IV Intermittent (07-14-19 @ 16:24)   25 mL/Hr IV Intermittent (07-14-19 @ 06:04)   25 mL/Hr IV Intermittent (07-13-19 @ 22:21)   25 mL/Hr IV Intermittent (07-13-19 @ 15:09)   25 mL/Hr IV Intermittent (07-13-19 @ 07:53)   25 mL/Hr IV Intermittent (07-12-19 @ 21:16)   25 mL/Hr IV Intermittent (07-12-19 @ 13:35)   25 mL/Hr IV Intermittent (07-12-19 @ 05:31)   25 mL/Hr IV Intermittent (07-11-19 @ 22:22)      ANTIMICROBIALS:    cefepime   IVPB 2000 every 24 hours  cefepime   IVPB      OTHER MEDS: MEDICATIONS  (STANDING):  acetaminophen   Tablet .. 650 every 6 hours PRN  amLODIPine   Tablet 10 daily  dextrose 40% Gel 15 once PRN  dextrose 50% Injectable 12.5 once  dextrose 50% Injectable 25 once  dextrose 50% Injectable 25 once  dronabinol 2.5 two times a day  epoetin braden Injectable 01375 <User Schedule>  furosemide    Tablet 40 daily  gabapentin 100 every 8 hours  glucagon  Injectable 1 once PRN  heparin  Injectable 5000 every 8 hours  hydrALAZINE 100 three times a day  insulin glargine Injectable (LANTUS) 22 at bedtime  insulin lispro (HumaLOG) corrective regimen sliding scale  three times a day before meals  insulin lispro (HumaLOG) corrective regimen sliding scale  at bedtime  insulin lispro Injectable (HumaLOG) 4 before breakfast  oxyCODONE    IR 5 every 4 hours PRN  oxyCODONE    IR 10 every 4 hours PRN  polyethylene glycol 3350 17 every 12 hours  primidone 50 two times a day  senna 2 at bedtime  tamsulosin 0.4 at bedtime    SOCIAL HISTORY:   hx smoking  non-smoker    FAMILY HISTORY:  Paternal family history of emphysema  Family history of diabetes mellitus (DM) (Grandparent)    REVIEW OF SYSTEMS  [  ] ROS unobtainable because:    [  ] All other systems negative except as noted below:	    Constitutional:  [ ] fever [ ] chills  [ ] weight loss  [ ] weakness  Skin:  [ ] rash [ ] phlebitis	  Eyes: [ ] icterus [ ] pain  [ ] discharge	  ENMT: [ ] sore throat  [ ] thrush [ ] ulcers [ ] exudates  Respiratory: [ ] dyspnea [ ] hemoptysis [ ] cough [ ] sputum	  Cardiovascular:  [ ] chest pain [ ] palpitations [ ] edema	  Gastrointestinal:  [ ] nausea [ ] vomiting [ ] diarrhea [ ] constipation [ ] pain	  Genitourinary:  [ ] dysuria [ ] frequency [ ] hematuria [ ] discharge [ ] flank pain  [ ] incontinence  Musculoskeletal:  [ ] myalgias [ ] arthralgias [ ] arthritis  [ ] back pain  Neurological:  [ ] headache [ ] seizures  [ ] confusion/altered mental status  Psychiatric:  [ ] anxiety [ ] depression	  Hematology/Lymphatics:  [ ] lymphadenopathy  Endocrine:  [ ] adrenal [ ] thyroid  Allergic/Immunologic:	 [ ] transplant [ ] seasonal    Vital Signs Last 24 Hrs  T(F): 97.7 (07-25-19 @ 14:12), Max: 99.6 (07-24-19 @ 21:12)  Vital Signs Last 24 Hrs  HR: 80 (07-25-19 @ 14:12) (79 - 84)  BP: 146/76 (07-25-19 @ 14:12) (117/67 - 146/76)  RR: 17 (07-25-19 @ 14:12)  SpO2: 97% (07-25-19 @ 14:12) (96% - 97%)  Wt(kg): --    PHYSICAL EXAMINATION:  General: Alert and Awake, NAD  HEENT: PERRL, EOMI, No subconjunctival hemorrhages, Oropharynx Clear, MMM  Neck: Supple, No GLENIS  Cardiac: RRR, No M/R/G  Resp: CTAB, No Wh/Rh/Ra  Abdomen: NBS, NT/ND, No HSM, No rigidity or guarding  MSK: No LE edema. No stigmata of IE. No evidence of phlebitis. No evidence of synovitis.  : No henderson  Skin: No rashes or lesions. Skin is warm and dry to the touch.   Neuro: Alert and Awake. CN 2-12 Grossly intact. Moves all four extremities spontaneously.  Psych: Calm, Pleasant, Cooperative                          9.1    6.34  )-----------( 267      ( 25 Jul 2019 07:59 )             28.8     07-25    136  |  99  |  16  ----------------------------<  185<H>  4.3   |  25  |  1.84<H>    Ca    9.0      25 Jul 2019 05:57  Mg     1.8     07-24      MICROBIOLOGY:    Culture - Abscess with Gram Stain (07.16.19 @ 14:43)    -  Trimethoprim/Sulfamethoxazole: S <=2/38    -  Trimethoprim/Sulfamethoxazole: R >2/38    -  Gentamicin: S <=4    -  Gentamicin: S <=4    -  Meropenem: S <=1    -  Meropenem: S <=1    -  Piperacillin/Tazobactam: R >64    -  Tobramycin: S <=4    -  Tobramycin: S <=4    -  Piperacillin/Tazobactam: R >64    -  Cefazolin: I 16 Enterobacter, Citrobacter, and Serratia may develop resistance during prolonged therapy (3-4 days)    -  Cefepime: S <=4    -  Cefepime: S <=4    -  Aztreonam: S <=4    -  Aztreonam: S <=4    -  Amikacin: S <=16    -  Amikacin: S <=16    -  Ampicillin: R >16 These ampicillin results predict results for amoxicillin    -  Ampicillin: R >16 These ampicillin results predict results for amoxicillin    -  Ampicillin/Sulbactam: R >16/8 Enterobacter, Citrobacter, and Serratia may develop resistance during prolonged therapy (3-4 days)    -  Ampicillin/Sulbactam: R >16/8 Enterobacter, Citrobacter, and Serratia may develop resistance during prolonged therapy (3-4 days)    -  Ciprofloxacin: S <=1    -  Ciprofloxacin: S <=1    -  Ceftriaxone: S <=1 Enterobacter, Citrobacter, and Serratia may develop resistance during prolonged therapy    -  Ceftriaxone: S <=1 Enterobacter, Citrobacter, and Serratia may develop resistance during prolonged therapy    -  Cefoxitin: S <=8    -  Cefoxitin: S <=8    -  Ertapenem: S <=1    -  Ertapenem: S <=1    -  Imipenem: S <=1    -  Imipenem: S <=1    -  Levofloxacin: S <=2    -  Levofloxacin: S <=2    Specimen Source: .Abscess None right 3rd toe culterette    Culture Results:   Few Escherichia coli  Rare Klebsiella pneumoniae  Few Candida parapsilosis    Organism Identification: Escherichia coli  Klebsiella pneumoniae    Organism: Escherichia coli    Organism: Klebsiella pneumoniae    Method Type: ELIZABETH    Method Type: ELIZABETH    RADIOLOGY:    imaging below personally reviewed    EXAM:  MR FOOT WAW IC RT                        PROCEDURE DATE:  07/15/2019    1.  Skin ulcer of the plantar aspect of the second web space with   associated bone uncovering and osteomyelitis of the second and third   metatarsal head,  2.  Severe osteomyelitis of the second and third metatarsal heads.   Osteomyelitis of the second and third proximal phalanges.  3.  Findings suspicious for reactive osteitis versus early osteomyelitis   of the fourth and fifth metatarsal heads.  4.  New edema and enhancement within the sesamoids concerning for   osteomyelitis.  5.  Tenosynovitis of the flexor pollicis longus tendon sheath of the   infectious or inflammatory in etiology.  6.  Below the second and third metatarsal heads, there is absence of   enhancement of the soft tissues suggestive of necrosis or evolving abscess    EXAM:  PHYSIOL EXTREM LOW 3+ LEV BI                        PROCEDURE DATE:  07/18/2019    Study limited by elevated pressures. No evidence of significant arterial occlusive disease of either lower extremity at rest. Patient is a 59y old  Male who presents with a chief complaint of SOB (25 Jul 2019 11:35)      HPI:    59 year old male PMH DM, HTN who presented from [facility] via EMS on 7/11/19 for respiratory distress. Patient reported to be hypoxic on room air to 60s. Improved on 15L face mask top 100% O2 sat. Patient awake, alert, oriented. States he has been coughing for 2-3 weeks (dry cough) as well as weakness and shortness of breath for few days. NO fevers or chills, nausea or vomiting, headache, chest pain, abdominal pain, diarrhea or constipation.  Suspected to have heart failure. Started on Zosyn 7/11 - 7/23. 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 Underwent R BKA for foot OM on 7/23. Switched to Cefepime 7/23 - 7/25 based on cultures (Zosyn resistant E. coli and Klebsiella). Patient at this point denies chills or fevers. Denies N/V/D. Still has some shortness of breath. Feels weak.     prior hospital charts reviewed [x  ]  primary team notes reviewed [ x ]  other consultant notes reviewed [x  ]    PAST MEDICAL & SURGICAL HISTORY:  Hypertension  Insulin dependent diabetes mellitus  Venous insufficiency of both lower extremities: R &gt; L  HTN (hypertension)  BPH (benign prostatic hypertrophy)  Diabetes  History of cholecystectomy  S/P laparoscopic cholecystectomy  Status post incision and drainage: Rt groin abscess      Allergies  No Known Allergies    ANTIMICROBIALS (past 90 days)  MEDICATIONS  (STANDING):  cefepime   IVPB   100 mL/Hr IV Intermittent (07-23-19 @ 10:53)    cefepime   IVPB   100 mL/Hr IV Intermittent (07-25-19 @ 08:36)   100 mL/Hr IV Intermittent (07-24-19 @ 08:16)    piperacillin/tazobactam IVPB..   25 mL/Hr IV Intermittent (07-23-19 @ 06:30)   25 mL/Hr IV Intermittent (07-22-19 @ 21:36)    piperacillin/tazobactam IVPB..   25 mL/Hr IV Intermittent (07-22-19 @ 13:08)   25 mL/Hr IV Intermittent (07-22-19 @ 05:18)   25 mL/Hr IV Intermittent (07-21-19 @ 21:08)   25 mL/Hr IV Intermittent (07-21-19 @ 13:18)   25 mL/Hr IV Intermittent (07-21-19 @ 05:03)   25 mL/Hr IV Intermittent (07-20-19 @ 22:32)   25 mL/Hr IV Intermittent (07-20-19 @ 13:29)   25 mL/Hr IV Intermittent (07-20-19 @ 05:29)   25 mL/Hr IV Intermittent (07-19-19 @ 22:12)   25 mL/Hr IV Intermittent (07-19-19 @ 17:11)   25 mL/Hr IV Intermittent (07-19-19 @ 06:06)   25 mL/Hr IV Intermittent (07-18-19 @ 21:34)   25 mL/Hr IV Intermittent (07-18-19 @ 13:10)   25 mL/Hr IV Intermittent (07-18-19 @ 05:20)   25 mL/Hr IV Intermittent (07-17-19 @ 21:59)   25 mL/Hr IV Intermittent (07-17-19 @ 13:15)   25 mL/Hr IV Intermittent (07-17-19 @ 05:19)   25 mL/Hr IV Intermittent (07-16-19 @ 22:25)   25 mL/Hr IV Intermittent (07-16-19 @ 13:36)   25 mL/Hr IV Intermittent (07-16-19 @ 06:03)   25 mL/Hr IV Intermittent (07-15-19 @ 23:43)   25 mL/Hr IV Intermittent (07-15-19 @ 14:05)   25 mL/Hr IV Intermittent (07-15-19 @ 06:06)   25 mL/Hr IV Intermittent (07-14-19 @ 21:51)   25 mL/Hr IV Intermittent (07-14-19 @ 16:24)   25 mL/Hr IV Intermittent (07-14-19 @ 06:04)   25 mL/Hr IV Intermittent (07-13-19 @ 22:21)   25 mL/Hr IV Intermittent (07-13-19 @ 15:09)   25 mL/Hr IV Intermittent (07-13-19 @ 07:53)   25 mL/Hr IV Intermittent (07-12-19 @ 21:16)   25 mL/Hr IV Intermittent (07-12-19 @ 13:35)   25 mL/Hr IV Intermittent (07-12-19 @ 05:31)   25 mL/Hr IV Intermittent (07-11-19 @ 22:22)      ANTIMICROBIALS:    cefepime   IVPB 2000 every 24 hours  cefepime   IVPB      OTHER MEDS: MEDICATIONS  (STANDING):  acetaminophen   Tablet .. 650 every 6 hours PRN  amLODIPine   Tablet 10 daily  dextrose 40% Gel 15 once PRN  dextrose 50% Injectable 12.5 once  dextrose 50% Injectable 25 once  dextrose 50% Injectable 25 once  dronabinol 2.5 two times a day  epoetin braden Injectable 04403 <User Schedule>  furosemide    Tablet 40 daily  gabapentin 100 every 8 hours  glucagon  Injectable 1 once PRN  heparin  Injectable 5000 every 8 hours  hydrALAZINE 100 three times a day  insulin glargine Injectable (LANTUS) 22 at bedtime  insulin lispro (HumaLOG) corrective regimen sliding scale  three times a day before meals  insulin lispro (HumaLOG) corrective regimen sliding scale  at bedtime  insulin lispro Injectable (HumaLOG) 4 before breakfast  oxyCODONE    IR 5 every 4 hours PRN  oxyCODONE    IR 10 every 4 hours PRN  polyethylene glycol 3350 17 every 12 hours  primidone 50 two times a day  senna 2 at bedtime  tamsulosin 0.4 at bedtime    SOCIAL HISTORY:   hx smoking  non-smoker    FAMILY HISTORY:  Paternal family history of emphysema  Family history of diabetes mellitus (DM) (Grandparent)    REVIEW OF SYSTEMS  [  ] ROS unobtainable because:    [ x ] All other systems negative except as noted below:	    Constitutional:  [ ] fever [ ] chills  [ ] weight loss  [ ] weakness  Skin:  [ ] rash [ ] phlebitis	  Eyes: [ ] icterus [ ] pain  [ ] discharge	  ENMT: [ ] sore throat  [ ] thrush [ ] ulcers [ ] exudates  Respiratory: [ ] dyspnea [ ] hemoptysis [ ] cough [ ] sputum	  Cardiovascular:  [ ] chest pain [ ] palpitations [ ] edema	  Gastrointestinal:  [ ] nausea [ ] vomiting [ ] diarrhea [ ] constipation [ ] pain	  Genitourinary:  [ ] dysuria [ ] frequency [ ] hematuria [ ] discharge [ ] flank pain  [ ] incontinence  Musculoskeletal:  [ ] myalgias [ ] arthralgias [ ] arthritis  [ ] back pain  Neurological:  [ ] headache [ ] seizures  [ ] confusion/altered mental status  Psychiatric:  [ ] anxiety [ ] depression	  Hematology/Lymphatics:  [ ] lymphadenopathy  Endocrine:  [ ] adrenal [ ] thyroid  Allergic/Immunologic:	 [ ] transplant [ ] seasonal    Vital Signs Last 24 Hrs  T(F): 97.7 (07-25-19 @ 14:12), Max: 99.6 (07-24-19 @ 21:12)  Vital Signs Last 24 Hrs  HR: 80 (07-25-19 @ 14:12) (79 - 84)  BP: 146/76 (07-25-19 @ 14:12) (117/67 - 146/76)  RR: 17 (07-25-19 @ 14:12)  SpO2: 97% (07-25-19 @ 14:12) (96% - 97%)  Wt(kg): --    PHYSICAL EXAMINATION:  General: Alert and Awake, NAD  HEENT: PERRL, EOMI, No subconjunctival hemorrhages, Oropharynx Clear, MMM  Neck: Supple, No GLENIS  Cardiac: RRR, No M/R/G  Resp: CTAB, No Wh/Rh/Ra  Abdomen: NBS, NT/ND, No HSM, No rigidity or guarding  MSK: RLE s/p BKA with post-surgical dressing, LLE without wounds, Trace to 1+ LLE edema. No stigmata of IE. No evidence of phlebitis. No evidence of synovitis.  : No henderson  Skin: No rashes or lesions. Skin is warm and dry to the touch.   Neuro: Alert and Awake. CN 2-12 Grossly intact. Moves all four extremities spontaneously.  Psych: Calm, Pleasant, Cooperative  Vascular: +RUE PICC line (No surrounding erythema, drainage or tenderness to palpation)                        9.1    6.34  )-----------( 267      ( 25 Jul 2019 07:59 )             28.8     07-25    136  |  99  |  16  ----------------------------<  185<H>  4.3   |  25  |  1.84<H>    Ca    9.0      25 Jul 2019 05:57  Mg     1.8     07-24      MICROBIOLOGY:    Culture - Abscess with Gram Stain (07.16.19 @ 14:43)    -  Trimethoprim/Sulfamethoxazole: S <=2/38    -  Trimethoprim/Sulfamethoxazole: R >2/38    -  Gentamicin: S <=4    -  Gentamicin: S <=4    -  Meropenem: S <=1    -  Meropenem: S <=1    -  Piperacillin/Tazobactam: R >64    -  Tobramycin: S <=4    -  Tobramycin: S <=4    -  Piperacillin/Tazobactam: R >64    -  Cefazolin: I 16 Enterobacter, Citrobacter, and Serratia may develop resistance during prolonged therapy (3-4 days)    -  Cefepime: S <=4    -  Cefepime: S <=4    -  Aztreonam: S <=4    -  Aztreonam: S <=4    -  Amikacin: S <=16    -  Amikacin: S <=16    -  Ampicillin: R >16 These ampicillin results predict results for amoxicillin    -  Ampicillin: R >16 These ampicillin results predict results for amoxicillin    -  Ampicillin/Sulbactam: R >16/8 Enterobacter, Citrobacter, and Serratia may develop resistance during prolonged therapy (3-4 days)    -  Ampicillin/Sulbactam: R >16/8 Enterobacter, Citrobacter, and Serratia may develop resistance during prolonged therapy (3-4 days)    -  Ciprofloxacin: S <=1    -  Ciprofloxacin: S <=1    -  Ceftriaxone: S <=1 Enterobacter, Citrobacter, and Serratia may develop resistance during prolonged therapy    -  Ceftriaxone: S <=1 Enterobacter, Citrobacter, and Serratia may develop resistance during prolonged therapy    -  Cefoxitin: S <=8    -  Cefoxitin: S <=8    -  Ertapenem: S <=1    -  Ertapenem: S <=1    -  Imipenem: S <=1    -  Imipenem: S <=1    -  Levofloxacin: S <=2    -  Levofloxacin: S <=2    Specimen Source: .Abscess None right 3rd toe culterette    Culture Results:   Few Escherichia coli  Rare Klebsiella pneumoniae  Few Candida parapsilosis    Organism Identification: Escherichia coli  Klebsiella pneumoniae    Organism: Escherichia coli    Organism: Klebsiella pneumoniae    Method Type: ELIZABETH    Method Type: ELIZABETH    RADIOLOGY:    imaging below personally reviewed    EXAM:  MR FOOT WAW IC RT                        PROCEDURE DATE:  07/15/2019    1.  Skin ulcer of the plantar aspect of the second web space with   associated bone uncovering and osteomyelitis of the second and third   metatarsal head,  2.  Severe osteomyelitis of the second and third metatarsal heads.   Osteomyelitis of the second and third proximal phalanges.  3.  Findings suspicious for reactive osteitis versus early osteomyelitis   of the fourth and fifth metatarsal heads.  4.  New edema and enhancement within the sesamoids concerning for   osteomyelitis.  5.  Tenosynovitis of the flexor pollicis longus tendon sheath of the   infectious or inflammatory in etiology.  6.  Below the second and third metatarsal heads, there is absence of   enhancement of the soft tissues suggestive of necrosis or evolving abscess    EXAM:  PHYSIOL EXTREM LOW 3+ LEV BI                        PROCEDURE DATE:  07/18/2019    Study limited by elevated pressures. No evidence of significant arterial occlusive disease of either lower extremity at rest.

## 2019-07-25 NOTE — PHYSICAL THERAPY INITIAL EVALUATION ADULT - TRANSFER TRAINING, PT EVAL
GOAL: Pt will perform ALL transfers with min assist, w/use of appropriate assistive device as needed, in 4 weeks.
GOAL: Pt will complete sit to stand transfer with CGA in 2 weeks.

## 2019-07-25 NOTE — PHYSICAL THERAPY INITIAL EVALUATION ADULT - PRECAUTIONS/LIMITATIONS, REHAB EVAL
fall precautions/Pt reportedly received blood transfusion last night, unknown as to why. Reports BL leg swelling - chronic, no calf pain. Recently hospitalized, 5/20-6/14 s/p R plantar foot debridements on prior admission on 5/22, 5/27, 6/5/19.
Recently hospitalized, 5/20-6/14 s/p R plantar foot debridements on prior admission on 5/22, 5/27, 6/5/19./fall precautions/surgical precautions

## 2019-07-25 NOTE — PHYSICAL THERAPY INITIAL EVALUATION ADULT - PERTINENT HX OF CURRENT PROBLEM, REHAB EVAL
58 yo M, hx DM, HTN presents from [facility] via EMS due to resp distress. Pt at [facility] due to right diabetic foot ulcer. Pt reported to be hypoxic on room air to 60s. Improved on 15L face mask top 100% O2 sat. Pt has been coughing for 2-3 weeks (dry cough) as well as weakness and shortness of breath for few days. NO fevers or chills, nausea or vomiting, headache, chest pain, abdominal pain, diarrhea or constipation.
60 yo M, hx DM, HTN presents from [facility] via EMS due to resp distress. Pt at [facility] due to right diabetic foot ulcer. Pt reported to be hypoxic on room air to 60s. Improved on 15L face mask top 100% O2 sat. Pt has been coughing for 2-3 weeks (dry cough) as well as weakness and shortness of breath for few days. NO fevers or chills, nausea or vomiting, headache, chest pain, abdominal pain, diarrhea or constipation. Cont. below:

## 2019-07-25 NOTE — PROGRESS NOTE ADULT - ASSESSMENT
A/P: 59y Male with hx of nonhealing right foot ulcer, s/p right BKA POD2    - Activity: OOB with assistance   - Pain medication: Tylenol, oxycodone prn   - DVT ppxL SQH  - Dressing to remain in place until POD3    Vascular Surgery  [phone]

## 2019-07-25 NOTE — PROGRESS NOTE ADULT - SUBJECTIVE AND OBJECTIVE BOX
Chief complaint  Patient is a 59y old  Male who presents with a chief complaint of SOB (25 Jul 2019 09:59)   Review of systems  Patient in bed, looks comfortable, no fever, no hypoglycemia.    Labs and Fingersticks  CAPILLARY BLOOD GLUCOSE      POCT Blood Glucose.: 182 mg/dL (25 Jul 2019 08:23)  POCT Blood Glucose.: 175 mg/dL (24 Jul 2019 22:31)  POCT Blood Glucose.: 141 mg/dL (24 Jul 2019 16:38)  POCT Blood Glucose.: 154 mg/dL (24 Jul 2019 12:30)      Anion Gap, Serum: 12 (07-25 @ 05:57)  Anion Gap, Serum: 16 (07-24 @ 07:09)      Calcium, Total Serum: 9.0 (07-25 @ 05:57)  Calcium, Total Serum: 9.0 (07-24 @ 07:09)          07-25    136  |  99  |  16  ----------------------------<  185<H>  4.3   |  25  |  1.84<H>    Ca    9.0      25 Jul 2019 05:57  Mg     1.8     07-24                          9.1    6.34  )-----------( 267      ( 25 Jul 2019 07:59 )             28.8     Medications  MEDICATIONS  (STANDING):  amLODIPine   Tablet 10 milliGRAM(s) Oral daily  cefepime   IVPB 2000 milliGRAM(s) IV Intermittent every 24 hours  cefepime   IVPB      chlorhexidine 2% Cloths 1 Application(s) Topical daily  collagenase Ointment 1 Application(s) Topical <User Schedule>  dextrose 5%. 1000 milliLiter(s) (50 mL/Hr) IV Continuous <Continuous>  dextrose 50% Injectable 12.5 Gram(s) IV Push once  dextrose 50% Injectable 25 Gram(s) IV Push once  dextrose 50% Injectable 25 Gram(s) IV Push once  dronabinol 2.5 milliGRAM(s) Oral two times a day  epoetin braden Injectable 01058 Unit(s) SubCutaneous <User Schedule>  ferrous    sulfate 325 milliGRAM(s) Oral daily  furosemide    Tablet 40 milliGRAM(s) Oral daily  gabapentin 100 milliGRAM(s) Oral every 8 hours  heparin  Injectable 5000 Unit(s) SubCutaneous every 8 hours  hydrALAZINE 100 milliGRAM(s) Oral three times a day  insulin glargine Injectable (LANTUS) 22 Unit(s) SubCutaneous at bedtime  insulin lispro (HumaLOG) corrective regimen sliding scale   SubCutaneous three times a day before meals  insulin lispro (HumaLOG) corrective regimen sliding scale   SubCutaneous at bedtime  insulin lispro Injectable (HumaLOG) 4 Unit(s) SubCutaneous before breakfast  polyethylene glycol 3350 17 Gram(s) Oral every 12 hours  primidone 50 milliGRAM(s) Oral two times a day  senna 2 Tablet(s) Oral at bedtime  sodium chloride 0.9%. 1000 milliLiter(s) (30 mL/Hr) IV Continuous <Continuous>      Physical Exam  General: Patient comfortable in bed  Vital Signs Last 12 Hrs  T(F): 98.4 (07-25-19 @ 04:21), Max: 98.4 (07-25-19 @ 04:21)  HR: 79 (07-25-19 @ 04:21) (79 - 79)  BP: 122/72 (07-25-19 @ 04:21) (122/72 - 122/72)  BP(mean): --  RR: 18 (07-25-19 @ 04:21) (18 - 18)  SpO2: 97% (07-25-19 @ 04:21) (97% - 97%)  Neck: No palpable thyroid nodules.  CVS: S1S2, No murmurs  Respiratory: No wheezing, no crepitations  GI: Abdomen soft, bowel sounds positive  Musculoskeletal: AKA,  edema lower extremities.   Skin: No skin rashes, no ecchymosis    Diagnostics

## 2019-07-25 NOTE — PHYSICAL THERAPY INITIAL EVALUATION ADULT - BED MOBILITY TRAINING, PT EVAL
GOAL: Pt will perform bed mobility independently, in 4 weeks.
GOAL: Pt will complete bed mobility with independence in 2 weeks.

## 2019-07-25 NOTE — PROGRESS NOTE ADULT - ASSESSMENT
59M, hx DM, HTN presents from [facility] via EMS due to resp distress. Patient at [facility] due to right diabetic foot ulcer. Patient reported to be hypoxic on room air to 60s. Improved on 15L face mask top 100% O2 sat. Patient awake, alert, oriented. States he has been coughing for 2-3 weeks (dry cough) as well as weakness and shortness of breath for few days.    Acute on chronic diastolic chf  - lasix 40 qd  - strict Is and Os  - cardiology consult appreciated  - keep O2 sats above 92%    diabetic foot ulcer with osteo  - s/p BKA  - on cefepime  - follow up by vascular    pain  - oxycodone prn  - add Neurontin For phantom pain    essential tremors and diaphoresis  - may be secondary to infection  - trial of primidone  - seen by neuro consult  - increase primidone to 50 bid    CKD 4  - monitor cre  - avoid nephrotoxins    anemia of chronic disease  - c/w iron and epogen    diabetes  - fs qid  - hgb a1c   7.1  - endocrine consult following    htn  - cw norvasc  - c/w hydralazine    DVT px

## 2019-07-25 NOTE — PROGRESS NOTE ADULT - SUBJECTIVE AND OBJECTIVE BOX
VACULAR SURGERY DAILY PROGRESS NOTE:       Subjective:  Patient seen and examined on AM rounds. No acute events overnight. AF/VSS. When discussing pain control, patient reports he is having phantom limb pain, reporting pain in the toes and heel. Currently not experiencing any pain.       Objective:    PE:  Gen: NAD  Resp: respirations unlabored, no increased WoB  Ext:  RLE: BKA ace c/d/i, immobilizer in place      Vital Signs Last 24 Hrs  T(C): 36.9 (25 Jul 2019 04:21), Max: 37.6 (24 Jul 2019 21:12)  T(F): 98.4 (25 Jul 2019 04:21), Max: 99.6 (24 Jul 2019 21:12)  HR: 79 (25 Jul 2019 04:21) (79 - 86)  BP: 122/72 (25 Jul 2019 04:21) (117/67 - 153/80)  BP(mean): --  RR: 18 (25 Jul 2019 04:21) (18 - 18)  SpO2: 97% (25 Jul 2019 04:21) (96% - 99%)    I&O's Detail    24 Jul 2019 07:01  -  25 Jul 2019 07:00  --------------------------------------------------------  IN:    Oral Fluid: 720 mL    sodium chloride 0.9%.: 360 mL  Total IN: 1080 mL    OUT:    Intermittent Catheterization - Urethral: 530 mL  Total OUT: 530 mL    Total NET: 550 mL      25 Jul 2019 07:01  -  25 Jul 2019 09:55  --------------------------------------------------------  IN:    IV PiggyBack: 50 mL  Total IN: 50 mL    OUT:  Total OUT: 0 mL    Total NET: 50 mL          Daily     Daily     MEDICATIONS  (STANDING):  amLODIPine   Tablet 10 milliGRAM(s) Oral daily  cefepime   IVPB 2000 milliGRAM(s) IV Intermittent every 24 hours  cefepime   IVPB      chlorhexidine 2% Cloths 1 Application(s) Topical daily  collagenase Ointment 1 Application(s) Topical <User Schedule>  dextrose 5%. 1000 milliLiter(s) (50 mL/Hr) IV Continuous <Continuous>  dextrose 50% Injectable 12.5 Gram(s) IV Push once  dextrose 50% Injectable 25 Gram(s) IV Push once  dextrose 50% Injectable 25 Gram(s) IV Push once  dronabinol 2.5 milliGRAM(s) Oral two times a day  epoetin braden Injectable 27436 Unit(s) SubCutaneous <User Schedule>  ferrous    sulfate 325 milliGRAM(s) Oral daily  gabapentin 100 milliGRAM(s) Oral every 8 hours  heparin  Injectable 5000 Unit(s) SubCutaneous every 8 hours  hydrALAZINE 100 milliGRAM(s) Oral three times a day  insulin glargine Injectable (LANTUS) 22 Unit(s) SubCutaneous at bedtime  insulin lispro (HumaLOG) corrective regimen sliding scale   SubCutaneous three times a day before meals  insulin lispro (HumaLOG) corrective regimen sliding scale   SubCutaneous at bedtime  insulin lispro Injectable (HumaLOG) 4 Unit(s) SubCutaneous before breakfast  polyethylene glycol 3350 17 Gram(s) Oral every 12 hours  primidone 50 milliGRAM(s) Oral two times a day  senna 2 Tablet(s) Oral at bedtime  sodium chloride 0.9%. 1000 milliLiter(s) (30 mL/Hr) IV Continuous <Continuous>    MEDICATIONS  (PRN):  acetaminophen   Tablet .. 650 milliGRAM(s) Oral every 6 hours PRN Mild Pain (1 - 3)  dextrose 40% Gel 15 Gram(s) Oral once PRN Blood Glucose LESS THAN 70 milliGRAM(s)/deciliter  glucagon  Injectable 1 milliGRAM(s) IntraMuscular once PRN Glucose LESS THAN 70 milligrams/deciliter  oxyCODONE    IR 5 milliGRAM(s) Oral every 4 hours PRN Moderate Pain (4 - 6)  oxyCODONE    IR 10 milliGRAM(s) Oral every 4 hours PRN Severe Pain (7 - 10)      LABS:                        9.1    6.34  )-----------( 267      ( 25 Jul 2019 07:59 )             28.8     07-25    136  |  99  |  16  ----------------------------<  185<H>  4.3   |  25  |  1.84<H>    Ca    9.0      25 Jul 2019 05:57  Mg     1.8     07-24            RADIOLOGY & ADDITIONAL STUDIES:

## 2019-07-25 NOTE — PHYSICAL THERAPY INITIAL EVALUATION ADULT - IMPAIRMENTS FOUND, PT EVAL
gait, locomotion, and balance/integumentary integrity
aerobic capacity/endurance/muscle strength/gait, locomotion, and balance/posture

## 2019-07-25 NOTE — PROGRESS NOTE ADULT - SUBJECTIVE AND OBJECTIVE BOX
Subjective: Patient seen and examined. No new events except as noted.   feels ok   no cp or sob   pain controlled   REVIEW OF SYSTEMS:    CONSTITUTIONAL: + weakness, fevers or chills  EYES/ENT: No visual changes;  No vertigo or throat pain   NECK: No pain or stiffness  RESPIRATORY: No cough, wheezing, hemoptysis; No shortness of breath  CARDIOVASCULAR: No chest pain or palpitations  GASTROINTESTINAL: No abdominal or epigastric pain. No nausea, vomiting, or hematemesis; No diarrhea or constipation. No melena or hematochezia.  GENITOURINARY: No dysuria, frequency or hematuria  NEUROLOGICAL: No numbness or weakness  SKIN: No itching, burning, rashes, or lesions   All other review of systems is negative unless indicated above.    MEDICATIONS:  MEDICATIONS  (STANDING):  amLODIPine   Tablet 10 milliGRAM(s) Oral daily  cefepime   IVPB 2000 milliGRAM(s) IV Intermittent every 24 hours  cefepime   IVPB      chlorhexidine 2% Cloths 1 Application(s) Topical daily  collagenase Ointment 1 Application(s) Topical <User Schedule>  dextrose 5%. 1000 milliLiter(s) (50 mL/Hr) IV Continuous <Continuous>  dextrose 50% Injectable 12.5 Gram(s) IV Push once  dextrose 50% Injectable 25 Gram(s) IV Push once  dextrose 50% Injectable 25 Gram(s) IV Push once  dronabinol 2.5 milliGRAM(s) Oral two times a day  epoetin braden Injectable 24803 Unit(s) SubCutaneous <User Schedule>  ferrous    sulfate 325 milliGRAM(s) Oral daily  gabapentin 100 milliGRAM(s) Oral every 8 hours  heparin  Injectable 5000 Unit(s) SubCutaneous every 8 hours  hydrALAZINE 100 milliGRAM(s) Oral three times a day  insulin glargine Injectable (LANTUS) 22 Unit(s) SubCutaneous at bedtime  insulin lispro (HumaLOG) corrective regimen sliding scale   SubCutaneous three times a day before meals  insulin lispro (HumaLOG) corrective regimen sliding scale   SubCutaneous at bedtime  insulin lispro Injectable (HumaLOG) 4 Unit(s) SubCutaneous before breakfast  polyethylene glycol 3350 17 Gram(s) Oral every 12 hours  primidone 50 milliGRAM(s) Oral two times a day  senna 2 Tablet(s) Oral at bedtime  sodium chloride 0.9%. 1000 milliLiter(s) (30 mL/Hr) IV Continuous <Continuous>      PHYSICAL EXAM:  T(C): 36.9 (07-25-19 @ 04:21), Max: 37.6 (07-24-19 @ 21:12)  HR: 79 (07-25-19 @ 04:21) (79 - 86)  BP: 122/72 (07-25-19 @ 04:21) (117/67 - 153/80)  RR: 18 (07-25-19 @ 04:21) (18 - 18)  SpO2: 97% (07-25-19 @ 04:21) (96% - 99%)  Wt(kg): --  I&O's Summary    24 Jul 2019 07:01  -  25 Jul 2019 07:00  --------------------------------------------------------  IN: 1080 mL / OUT: 530 mL / NET: 550 mL    25 Jul 2019 07:01  -  25 Jul 2019 09:59  --------------------------------------------------------  IN: 50 mL / OUT: 0 mL / NET: 50 mL          Appearance: NAD  HEENT:   Normal oral mucosa, PERRL, EOMI	  Lymphatic: No lymphadenopathy , no edema  Cardiovascular: Normal S1 S2, No JVD, No murmurs , Peripheral pulses palpable 2+ bilaterally  Respiratory: Lungs clear to auscultation, normal effort 	  Gastrointestinal:  Soft, Non-tender, + BS	  Skin: No rashes, No ecchymoses, No cyanosis, warm to touch  Musculoskeletal: Normal range of motion, normal strength  Psychiatry:  Mood & affect appropriate  Ext: R BKA       LABS:    CARDIAC MARKERS:                                9.1    6.34  )-----------( 267      ( 25 Jul 2019 07:59 )             28.8     07-25    136  |  99  |  16  ----------------------------<  185<H>  4.3   |  25  |  1.84<H>    Ca    9.0      25 Jul 2019 05:57  Mg     1.8     07-24      proBNP:   Lipid Profile:   HgA1c:   TSH:             TELEMETRY: 	    ECG:  	  RADIOLOGY:   DIAGNOSTIC TESTING:  [ ] Echocardiogram:  [ ]  Catheterization:  [ ] Stress Test:    OTHER:

## 2019-07-25 NOTE — PHYSICAL THERAPY INITIAL EVALUATION ADULT - GENERAL OBSERVATIONS, REHAB EVAL
Pt received semi-supine in bed, (+) R KI, 2L NCO2, IV, NAD. Pt alert, agreeable to PT re-eval.
Received in bed in NAD, +O2 via NC, family at bedside, wet to dry dressing in place on RLE.

## 2019-07-25 NOTE — PHYSICAL THERAPY INITIAL EVALUATION ADULT - ADDITIONAL COMMENTS
PTA, pt at [facility] rehab. 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 At [facility], pt was transferring to and from wheelchair via stand pivot transfer independently. Was ambulating with modified walker that allowed for weight bearing through R knee 40 feet with assist in PT.

## 2019-07-25 NOTE — PHYSICAL THERAPY INITIAL EVALUATION ADULT - PLANNED THERAPY INTERVENTIONS, PT EVAL
transfer training/balance training/bed mobility training/gait training
transfer training/gait training/balance training/bed mobility training

## 2019-07-25 NOTE — PHYSICAL THERAPY INITIAL EVALUATION ADULT - GAIT TRAINING, PT EVAL
GOAL: Pt will ambulate 25 feet w/ min assist, w/use of appropriate assistive device in 4 weeks.
GOAL: Pt will ambulate 25 feet with rolling walker and min A in 2 weeks.

## 2019-07-25 NOTE — CHART NOTE - NSCHARTNOTEFT_GEN_A_CORE
S/P Falied TOV on 7/24 - Straight cath at 1AM for urinary retention and drained 530cc. Today bladder scan repeated for urinary retention for > 6 hours and scan showed >320cc. Pt asymptomatic. Failed attempts to straight cath x 2. Urology called and placed pt on Flomax per Dr. Broderick    [id number]

## 2019-07-25 NOTE — CONSULT NOTE ADULT - ASSESSMENT
59 year old male PMH DM, HTN who presented from [facility] via EMS on 7/11/19 for respiratory distress. Suspected to have heart failure. Started on Zosyn 7/11 - 7/23. 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 Underwent R BKA for foot OM on 7/23. Switched to Cefepime 7/23 - 7/25 based on cultures (Zosyn resistant E. coli and Klebsiella).     Overall, prior severe, extensive OM of the digits of the R foot but now s/p BKA. Patient received ~48 hours of antibiotics targeting the primary pathogens in the bone and now source of infection removed given BKA. Would discontinue Cefepime after today. Suspect CoNS on BCx from admission was procurement contaminant however, given PICC line would obtain BCx to make sure patient does not have line related infection. Would monitor off of antibiotics after today.     #R Foot OM s/p BKA  --Stop Cefepime after today  --Monitor off of antibiotics  --Will try to examine stump with surgical service - no noted concerns for infection from notes    #CoNS on BCx - suspect contaminant  --Check 2x Blood Cultures with next lab draw    I will continue to follow. Please feel free to contact me with any further questions.    Judah Espinosa M.D.  [facility] Division of Infectious Disease  8AM-5PM: Pager Number [phone]  After Hours (or if no response): Please contact the Infectious Diseases Office at [phone]

## 2019-07-25 NOTE — PROCEDURE NOTE - NSURITECHNIQUE_GU_A_CORE
Proper hand hygiene was performed/A sterile drape was used to cover all adjacent areas/The catheter was secured with a securement device (e.g. StatLock)/The collection bag is below the level of the patient and urinary bladder/All applicable medical record documentation is completed/Sterile gloves were worn for the duration of the procedure/The site was cleaned with soap/water and sterile solution (betadine)/The catheter was appropriately lubricated/The urinary drainage system is closed at the end of the procedure

## 2019-07-26 LAB
ANION GAP SERPL CALC-SCNC: 16 MMOL/L — SIGNIFICANT CHANGE UP (ref 5–17)
BUN SERPL-MCNC: 17 MG/DL — SIGNIFICANT CHANGE UP (ref 7–23)
CALCIUM SERPL-MCNC: 9 MG/DL — SIGNIFICANT CHANGE UP (ref 8.4–10.5)
CHLORIDE SERPL-SCNC: 101 MMOL/L — SIGNIFICANT CHANGE UP (ref 96–108)
CO2 SERPL-SCNC: 22 MMOL/L — SIGNIFICANT CHANGE UP (ref 22–31)
CREAT SERPL-MCNC: 1.66 MG/DL — HIGH (ref 0.5–1.3)
GLUCOSE BLDC GLUCOMTR-MCNC: 100 MG/DL — HIGH (ref 70–99)
GLUCOSE BLDC GLUCOMTR-MCNC: 107 MG/DL — HIGH (ref 70–99)
GLUCOSE BLDC GLUCOMTR-MCNC: 182 MG/DL — HIGH (ref 70–99)
GLUCOSE BLDC GLUCOMTR-MCNC: 184 MG/DL — HIGH (ref 70–99)
GLUCOSE BLDC GLUCOMTR-MCNC: 202 MG/DL — HIGH (ref 70–99)
GLUCOSE SERPL-MCNC: 119 MG/DL — HIGH (ref 70–99)
HCT VFR BLD CALC: 28.3 % — LOW (ref 39–50)
HGB BLD-MCNC: 8.8 G/DL — LOW (ref 13–17)
MCHC RBC-ENTMCNC: 28.9 PG — SIGNIFICANT CHANGE UP (ref 27–34)
MCHC RBC-ENTMCNC: 31.1 GM/DL — LOW (ref 32–36)
MCV RBC AUTO: 93.1 FL — SIGNIFICANT CHANGE UP (ref 80–100)
PLATELET # BLD AUTO: 251 K/UL — SIGNIFICANT CHANGE UP (ref 150–400)
POTASSIUM SERPL-MCNC: 4.4 MMOL/L — SIGNIFICANT CHANGE UP (ref 3.5–5.3)
POTASSIUM SERPL-SCNC: 4.4 MMOL/L — SIGNIFICANT CHANGE UP (ref 3.5–5.3)
RBC # BLD: 3.04 M/UL — LOW (ref 4.2–5.8)
RBC # FLD: 15.1 % — HIGH (ref 10.3–14.5)
SODIUM SERPL-SCNC: 139 MMOL/L — SIGNIFICANT CHANGE UP (ref 135–145)
WBC # BLD: 5.34 K/UL — SIGNIFICANT CHANGE UP (ref 3.8–10.5)
WBC # FLD AUTO: 5.34 K/UL — SIGNIFICANT CHANGE UP (ref 3.8–10.5)

## 2019-07-26 RX ORDER — LACTULOSE 10 G/15ML
20 SOLUTION ORAL EVERY 4 HOURS
Refills: 0 | Status: COMPLETED | OUTPATIENT
Start: 2019-07-26 | End: 2019-07-26

## 2019-07-26 RX ORDER — DRONABINOL 2.5 MG
2.5 CAPSULE ORAL
Refills: 0 | Status: DISCONTINUED | OUTPATIENT
Start: 2019-07-26 | End: 2019-07-31

## 2019-07-26 RX ORDER — INSULIN LISPRO 100/ML
2 VIAL (ML) SUBCUTANEOUS ONCE
Refills: 0 | Status: COMPLETED | OUTPATIENT
Start: 2019-07-26 | End: 2019-07-26

## 2019-07-26 RX ADMIN — Medication 100 MILLIGRAM(S): at 13:01

## 2019-07-26 RX ADMIN — Medication 40 MILLIGRAM(S): at 05:46

## 2019-07-26 RX ADMIN — CHLORHEXIDINE GLUCONATE 1 APPLICATION(S): 213 SOLUTION TOPICAL at 13:11

## 2019-07-26 RX ADMIN — SODIUM CHLORIDE 30 MILLILITER(S): 9 INJECTION INTRAMUSCULAR; INTRAVENOUS; SUBCUTANEOUS at 21:57

## 2019-07-26 RX ADMIN — HEPARIN SODIUM 5000 UNIT(S): 5000 INJECTION INTRAVENOUS; SUBCUTANEOUS at 13:01

## 2019-07-26 RX ADMIN — Medication 100 MILLIGRAM(S): at 21:56

## 2019-07-26 RX ADMIN — PRIMIDONE 50 MILLIGRAM(S): 250 TABLET ORAL at 05:46

## 2019-07-26 RX ADMIN — LACTULOSE 20 GRAM(S): 10 SOLUTION ORAL at 18:37

## 2019-07-26 RX ADMIN — TAMSULOSIN HYDROCHLORIDE 0.4 MILLIGRAM(S): 0.4 CAPSULE ORAL at 21:56

## 2019-07-26 RX ADMIN — INSULIN GLARGINE 22 UNIT(S): 100 INJECTION, SOLUTION SUBCUTANEOUS at 21:57

## 2019-07-26 RX ADMIN — AMLODIPINE BESYLATE 10 MILLIGRAM(S): 2.5 TABLET ORAL at 05:46

## 2019-07-26 RX ADMIN — HEPARIN SODIUM 5000 UNIT(S): 5000 INJECTION INTRAVENOUS; SUBCUTANEOUS at 05:46

## 2019-07-26 RX ADMIN — PRIMIDONE 50 MILLIGRAM(S): 250 TABLET ORAL at 18:37

## 2019-07-26 RX ADMIN — Medication 1: at 18:38

## 2019-07-26 RX ADMIN — Medication 1 APPLICATION(S): at 13:02

## 2019-07-26 RX ADMIN — Medication 2.5 MILLIGRAM(S): at 18:37

## 2019-07-26 RX ADMIN — Medication 100 MILLIGRAM(S): at 05:46

## 2019-07-26 RX ADMIN — GABAPENTIN 100 MILLIGRAM(S): 400 CAPSULE ORAL at 13:01

## 2019-07-26 RX ADMIN — Medication 2 UNIT(S): at 13:01

## 2019-07-26 RX ADMIN — HEPARIN SODIUM 5000 UNIT(S): 5000 INJECTION INTRAVENOUS; SUBCUTANEOUS at 21:56

## 2019-07-26 RX ADMIN — SENNA PLUS 2 TABLET(S): 8.6 TABLET ORAL at 21:56

## 2019-07-26 RX ADMIN — GABAPENTIN 100 MILLIGRAM(S): 400 CAPSULE ORAL at 21:56

## 2019-07-26 RX ADMIN — Medication 0: at 08:59

## 2019-07-26 RX ADMIN — SODIUM CHLORIDE 30 MILLILITER(S): 9 INJECTION INTRAMUSCULAR; INTRAVENOUS; SUBCUTANEOUS at 14:56

## 2019-07-26 RX ADMIN — Medication 325 MILLIGRAM(S): at 13:01

## 2019-07-26 RX ADMIN — LACTULOSE 20 GRAM(S): 10 SOLUTION ORAL at 14:54

## 2019-07-26 RX ADMIN — POLYETHYLENE GLYCOL 3350 17 GRAM(S): 17 POWDER, FOR SOLUTION ORAL at 18:44

## 2019-07-26 RX ADMIN — GABAPENTIN 100 MILLIGRAM(S): 400 CAPSULE ORAL at 05:46

## 2019-07-26 NOTE — PROGRESS NOTE ADULT - SUBJECTIVE AND OBJECTIVE BOX
Patient is a 59y old  Male who presents with a chief complaint of SOB (26 Jul 2019 13:22)      SUBJECTIVE / OVERNIGHT EVENTS:  lethargic from narcotics, failed tov.  no bm for few days.    MEDICATIONS  (STANDING):  amLODIPine   Tablet 10 milliGRAM(s) Oral daily  chlorhexidine 2% Cloths 1 Application(s) Topical daily  collagenase Ointment 1 Application(s) Topical <User Schedule>  dextrose 5%. 1000 milliLiter(s) (50 mL/Hr) IV Continuous <Continuous>  dextrose 50% Injectable 12.5 Gram(s) IV Push once  dextrose 50% Injectable 25 Gram(s) IV Push once  dextrose 50% Injectable 25 Gram(s) IV Push once  dronabinol 2.5 milliGRAM(s) Oral two times a day  epoetin braden Injectable 58376 Unit(s) SubCutaneous <User Schedule>  ferrous    sulfate 325 milliGRAM(s) Oral daily  furosemide    Tablet 40 milliGRAM(s) Oral daily  gabapentin 100 milliGRAM(s) Oral every 8 hours  heparin  Injectable 5000 Unit(s) SubCutaneous every 8 hours  hydrALAZINE 100 milliGRAM(s) Oral three times a day  insulin glargine Injectable (LANTUS) 22 Unit(s) SubCutaneous at bedtime  insulin lispro (HumaLOG) corrective regimen sliding scale   SubCutaneous three times a day before meals  insulin lispro (HumaLOG) corrective regimen sliding scale   SubCutaneous at bedtime  insulin lispro Injectable (HumaLOG) 4 Unit(s) SubCutaneous before breakfast  lactulose Syrup 20 Gram(s) Oral every 4 hours  polyethylene glycol 3350 17 Gram(s) Oral every 12 hours  primidone 50 milliGRAM(s) Oral two times a day  senna 2 Tablet(s) Oral at bedtime  sodium chloride 0.9%. 1000 milliLiter(s) (30 mL/Hr) IV Continuous <Continuous>  tamsulosin 0.4 milliGRAM(s) Oral at bedtime    MEDICATIONS  (PRN):  acetaminophen   Tablet .. 650 milliGRAM(s) Oral every 6 hours PRN Mild Pain (1 - 3)  dextrose 40% Gel 15 Gram(s) Oral once PRN Blood Glucose LESS THAN 70 milliGRAM(s)/deciliter  glucagon  Injectable 1 milliGRAM(s) IntraMuscular once PRN Glucose LESS THAN 70 milligrams/deciliter  oxyCODONE    IR 5 milliGRAM(s) Oral every 4 hours PRN Moderate Pain (4 - 6)  oxyCODONE    IR 10 milliGRAM(s) Oral every 4 hours PRN Severe Pain (7 - 10)      Vital Signs Last 24 Hrs  T(C): 36.7 (26 Jul 2019 11:20), Max: 37 (25 Jul 2019 21:03)  T(F): 98.1 (26 Jul 2019 11:20), Max: 98.6 (25 Jul 2019 21:03)  HR: 87 (26 Jul 2019 12:59) (74 - 87)  BP: 123/64 (26 Jul 2019 12:59) (123/64 - 152/77)  BP(mean): --  RR: 17 (26 Jul 2019 11:20) (17 - 18)  SpO2: 98% (26 Jul 2019 11:20) (97% - 99%)  CAPILLARY BLOOD GLUCOSE      POCT Blood Glucose.: 100 mg/dL (26 Jul 2019 12:06)  POCT Blood Glucose.: 107 mg/dL (26 Jul 2019 08:03)  POCT Blood Glucose.: 112 mg/dL (25 Jul 2019 22:32)  POCT Blood Glucose.: 115 mg/dL (25 Jul 2019 21:02)  POCT Blood Glucose.: 124 mg/dL (25 Jul 2019 17:17)    I&O's Summary    25 Jul 2019 07:01  -  26 Jul 2019 07:00  --------------------------------------------------------  IN: 1010 mL / OUT: 1100 mL / NET: -90 mL        PHYSICAL EXAM:  GENERAL: NAD, well-developed  HEAD:  Atraumatic, Normocephalic  EYES: EOMI, PERRLA, conjunctiva and sclera clear  NECK: Supple, No JVD  CHEST/LUNG: Clear to auscultation bilaterally; No wheeze  HEART: Regular rate and rhythm; No murmurs, rubs, or gallops  ABDOMEN: Soft, Nontender, Nondistended; Bowel sounds present  EXTREMITIES:  2+ Peripheral Pulses, No clubbing, cyanosis, or edema, right bka, dressing c/d/i  PSYCH: AAOx3  NEUROLOGY: non-focal  SKIN: No rashes or lesions    LABS:                        8.8    5.34  )-----------( 251      ( 26 Jul 2019 08:29 )             28.3     07-26    139  |  101  |  17  ----------------------------<  119<H>  4.4   |  22  |  1.66<H>    Ca    9.0      26 Jul 2019 05:15                RADIOLOGY & ADDITIONAL TESTS:    Imaging Personally Reviewed:    Consultant(s) Notes Reviewed:      Care Discussed with Consultants/Other Providers:

## 2019-07-26 NOTE — CHART NOTE - NSCHARTNOTEFT_GEN_A_CORE
Pt's blood sugar 107 preBF. Will give 2 units humalog this am and will continue to monitor BS    [id number]

## 2019-07-26 NOTE — PROGRESS NOTE ADULT - SUBJECTIVE AND OBJECTIVE BOX
Subjective: Patient seen and examined. No new events except as noted.   resting comfortable     REVIEW OF SYSTEMS:    CONSTITUTIONAL: No weakness, fevers or chills  EYES/ENT: No visual changes;  No vertigo or throat pain   NECK: No pain or stiffness  RESPIRATORY: No cough, wheezing, hemoptysis; No shortness of breath  CARDIOVASCULAR: No chest pain or palpitations  GASTROINTESTINAL: No abdominal or epigastric pain. No nausea, vomiting, or hematemesis; No diarrhea or constipation. No melena or hematochezia.  GENITOURINARY: No dysuria, frequency or hematuria  NEUROLOGICAL: No numbness or weakness  SKIN: No itching, burning, rashes, or lesions   All other review of systems is negative unless indicated above.    MEDICATIONS:  MEDICATIONS  (STANDING):  amLODIPine   Tablet 10 milliGRAM(s) Oral daily  chlorhexidine 2% Cloths 1 Application(s) Topical daily  collagenase Ointment 1 Application(s) Topical <User Schedule>  dextrose 5%. 1000 milliLiter(s) (50 mL/Hr) IV Continuous <Continuous>  dextrose 50% Injectable 12.5 Gram(s) IV Push once  dextrose 50% Injectable 25 Gram(s) IV Push once  dextrose 50% Injectable 25 Gram(s) IV Push once  dronabinol 2.5 milliGRAM(s) Oral two times a day  epoetin braden Injectable 81170 Unit(s) SubCutaneous <User Schedule>  ferrous    sulfate 325 milliGRAM(s) Oral daily  furosemide    Tablet 40 milliGRAM(s) Oral daily  gabapentin 100 milliGRAM(s) Oral every 8 hours  heparin  Injectable 5000 Unit(s) SubCutaneous every 8 hours  hydrALAZINE 100 milliGRAM(s) Oral three times a day  insulin glargine Injectable (LANTUS) 22 Unit(s) SubCutaneous at bedtime  insulin lispro (HumaLOG) corrective regimen sliding scale   SubCutaneous three times a day before meals  insulin lispro (HumaLOG) corrective regimen sliding scale   SubCutaneous at bedtime  insulin lispro Injectable (HumaLOG) 4 Unit(s) SubCutaneous before breakfast  lactulose Syrup 20 Gram(s) Oral every 4 hours  polyethylene glycol 3350 17 Gram(s) Oral every 12 hours  primidone 50 milliGRAM(s) Oral two times a day  senna 2 Tablet(s) Oral at bedtime  sodium chloride 0.9%. 1000 milliLiter(s) (30 mL/Hr) IV Continuous <Continuous>  tamsulosin 0.4 milliGRAM(s) Oral at bedtime      PHYSICAL EXAM:  T(C): 36.7 (07-26-19 @ 11:20), Max: 37 (07-25-19 @ 21:03)  HR: 87 (07-26-19 @ 12:59) (74 - 87)  BP: 123/64 (07-26-19 @ 12:59) (123/64 - 152/77)  RR: 17 (07-26-19 @ 11:20) (17 - 18)  SpO2: 98% (07-26-19 @ 11:20) (97% - 99%)  Wt(kg): --  I&O's Summary    25 Jul 2019 07:01  -  26 Jul 2019 07:00  --------------------------------------------------------  IN: 1010 mL / OUT: 1100 mL / NET: -90 mL          Appearance: NAD  HEENT:   Normal oral mucosa, PERRL, EOMI	  Lymphatic: No lymphadenopathy , no edema  Cardiovascular: Normal S1 S2, No JVD, No murmurs , Peripheral pulses palpable 2+ bilaterally  Respiratory: Lungs clear to auscultation, normal effort 	  Gastrointestinal:  Soft, Non-tender, + BS	  Skin: No rashes, No ecchymoses, No cyanosis, warm to touch  Musculoskeletal: Decreased range of motion and  strength  Psychiatry:  Mood & affect appropriate  Ext: R BKA       LABS:    CARDIAC MARKERS:                                8.8    5.34  )-----------( 251      ( 26 Jul 2019 08:29 )             28.3     07-26    139  |  101  |  17  ----------------------------<  119<H>  4.4   |  22  |  1.66<H>    Ca    9.0      26 Jul 2019 05:15      proBNP:   Lipid Profile:   HgA1c:   TSH:             TELEMETRY: 	    ECG:  	  RADIOLOGY:   DIAGNOSTIC TESTING:  [ ] Echocardiogram:  [ ]  Catheterization:  [ ] Stress Test:    OTHER:

## 2019-07-26 NOTE — PROGRESS NOTE ADULT - ASSESSMENT
A/P: 59y Male with hx of nonhealing right foot ulcer, s/p right BKA POD2    - Activity: OOB with assistance   - Pain medication: Tylenol, oxycodone prn   - DVT ppxL SQH  - No further vascular surgery intervention, will sign off, page [phone] for further questions  - f/u in 2 weeks with Dr. Osorio for removal of brian A/P: 59y Male with hx of nonhealing right foot ulcer, s/p right BKA POD3    - Activity: OOB with assistance   - Pain medication: Tylenol, oxycodone prn   - DVT ppxL SQH  - No further vascular surgery intervention, will sign off, page [phone] for further questions  - f/u in 2 weeks with Dr. Osroio for removal of brian

## 2019-07-26 NOTE — PROGRESS NOTE ADULT - ASSESSMENT
59M, hx DM, HTN presents from [facility] via EMS due to resp distress. Patient at [facility] due to right diabetic foot ulcer. Patient reported to be hypoxic on room air to 60s. Improved on 15L face mask top 100% O2 sat. Patient awake, alert, oriented. States he has been coughing for 2-3 weeks (dry cough) as well as weakness and shortness of breath for few days.    Acute on chronic diastolic chf  - lasix 40 qd  - strict Is and Os  - cardiology consult appreciated  - keep O2 sats above 92%    diabetic foot ulcer with osteo  - s/p BKA  - on cefepime  - follow up by vascular    pain  - oxycodone prn  - Neurontin For phantom pain    essential tremors and diaphoresis  - may be secondary to infection  - trial of primidone  - seen by neuro consult  - increase primidone to 50 bid    CKD 4  - monitor cre  - avoid nephrotoxins    anemia of chronic disease  - c/w iron and epogen    diabetes  - fs qid  - hgb a1c   7.1  - endocrine consult following    htn  - cw norvasc  - c/w hydralazine    constipation  - lactulose x 2  - miralax and senna    urinary retention  - c/w flomax  - TOV after bm    DVT px

## 2019-07-26 NOTE — PROGRESS NOTE ADULT - SUBJECTIVE AND OBJECTIVE BOX
SURGERY DAILY PROGRESS NOTE:       SUBJECTIVE/ROS: Patient examined at bedside, feels well  Denies nausea, vomiting, chest pain, shortness of breath         MEDICATIONS  (STANDING):  amLODIPine   Tablet 10 milliGRAM(s) Oral daily  chlorhexidine 2% Cloths 1 Application(s) Topical daily  collagenase Ointment 1 Application(s) Topical <User Schedule>  dextrose 5%. 1000 milliLiter(s) (50 mL/Hr) IV Continuous <Continuous>  dextrose 50% Injectable 12.5 Gram(s) IV Push once  dextrose 50% Injectable 25 Gram(s) IV Push once  dextrose 50% Injectable 25 Gram(s) IV Push once  epoetin braden Injectable 72277 Unit(s) SubCutaneous <User Schedule>  ferrous    sulfate 325 milliGRAM(s) Oral daily  furosemide    Tablet 40 milliGRAM(s) Oral daily  gabapentin 100 milliGRAM(s) Oral every 8 hours  heparin  Injectable 5000 Unit(s) SubCutaneous every 8 hours  hydrALAZINE 100 milliGRAM(s) Oral three times a day  insulin glargine Injectable (LANTUS) 22 Unit(s) SubCutaneous at bedtime  insulin lispro (HumaLOG) corrective regimen sliding scale   SubCutaneous three times a day before meals  insulin lispro (HumaLOG) corrective regimen sliding scale   SubCutaneous at bedtime  insulin lispro Injectable (HumaLOG) 4 Unit(s) SubCutaneous before breakfast  insulin lispro Injectable (HumaLOG) 2 Unit(s) SubCutaneous once  polyethylene glycol 3350 17 Gram(s) Oral every 12 hours  primidone 50 milliGRAM(s) Oral two times a day  senna 2 Tablet(s) Oral at bedtime  sodium chloride 0.9%. 1000 milliLiter(s) (30 mL/Hr) IV Continuous <Continuous>  tamsulosin 0.4 milliGRAM(s) Oral at bedtime    MEDICATIONS  (PRN):  acetaminophen   Tablet .. 650 milliGRAM(s) Oral every 6 hours PRN Mild Pain (1 - 3)  dextrose 40% Gel 15 Gram(s) Oral once PRN Blood Glucose LESS THAN 70 milliGRAM(s)/deciliter  glucagon  Injectable 1 milliGRAM(s) IntraMuscular once PRN Glucose LESS THAN 70 milligrams/deciliter  oxyCODONE    IR 5 milliGRAM(s) Oral every 4 hours PRN Moderate Pain (4 - 6)  oxyCODONE    IR 10 milliGRAM(s) Oral every 4 hours PRN Severe Pain (7 - 10)      OBJECTIVE:    Vital Signs Last 24 Hrs  T(C): 36.7 (2019 04:23), Max: 37 (2019 21:03)  T(F): 98 (2019 04:23), Max: 98.6 (2019 21:03)  HR: 74 (2019 04:23) (74 - 82)  BP: 128/76 (2019 04:23) (128/76 - 152/77)  BP(mean): --  RR: 18 (2019 04:23) (17 - 18)  SpO2: 97% (2019 04:23) (97% - 99%)        I&O's Detail    2019 07:01  -  2019 07:00  --------------------------------------------------------  IN:    IV PiggyBack: 50 mL    Oral Fluid: 240 mL    sodium chloride 0.9%.: 720 mL  Total IN: 1010 mL    OUT:    Indwelling Catheter - Urethral: 1100 mL  Total OUT: 1100 mL    Total NET: -90 mL          Daily     Daily Weight in k.7 (2019 07:37)    LABS:                        8.8    5.34  )-----------( 251      ( 2019 08:29 )             28.3         139  |  101  |  17  ----------------------------<  119<H>  4.4   |  22  |  1.66<H>    Ca    9.0      2019 05:15                    PHYSICAL EXAM:  Constitutional: well developed, well nourished, NAD  Respiratory: Breathing comfortably  Neurological: intact, non-focal  Skin: L AKA dressing c/d/i  Psychiatric: oriented x 3; appropriate SURGERY DAILY PROGRESS NOTE:       SUBJECTIVE/ROS: Patient examined at bedside, feels well  Denies nausea, vomiting, chest pain, shortness of breath         MEDICATIONS  (STANDING):  amLODIPine   Tablet 10 milliGRAM(s) Oral daily  chlorhexidine 2% Cloths 1 Application(s) Topical daily  collagenase Ointment 1 Application(s) Topical <User Schedule>  dextrose 5%. 1000 milliLiter(s) (50 mL/Hr) IV Continuous <Continuous>  dextrose 50% Injectable 12.5 Gram(s) IV Push once  dextrose 50% Injectable 25 Gram(s) IV Push once  dextrose 50% Injectable 25 Gram(s) IV Push once  epoetin braden Injectable 36463 Unit(s) SubCutaneous <User Schedule>  ferrous    sulfate 325 milliGRAM(s) Oral daily  furosemide    Tablet 40 milliGRAM(s) Oral daily  gabapentin 100 milliGRAM(s) Oral every 8 hours  heparin  Injectable 5000 Unit(s) SubCutaneous every 8 hours  hydrALAZINE 100 milliGRAM(s) Oral three times a day  insulin glargine Injectable (LANTUS) 22 Unit(s) SubCutaneous at bedtime  insulin lispro (HumaLOG) corrective regimen sliding scale   SubCutaneous three times a day before meals  insulin lispro (HumaLOG) corrective regimen sliding scale   SubCutaneous at bedtime  insulin lispro Injectable (HumaLOG) 4 Unit(s) SubCutaneous before breakfast  insulin lispro Injectable (HumaLOG) 2 Unit(s) SubCutaneous once  polyethylene glycol 3350 17 Gram(s) Oral every 12 hours  primidone 50 milliGRAM(s) Oral two times a day  senna 2 Tablet(s) Oral at bedtime  sodium chloride 0.9%. 1000 milliLiter(s) (30 mL/Hr) IV Continuous <Continuous>  tamsulosin 0.4 milliGRAM(s) Oral at bedtime    MEDICATIONS  (PRN):  acetaminophen   Tablet .. 650 milliGRAM(s) Oral every 6 hours PRN Mild Pain (1 - 3)  dextrose 40% Gel 15 Gram(s) Oral once PRN Blood Glucose LESS THAN 70 milliGRAM(s)/deciliter  glucagon  Injectable 1 milliGRAM(s) IntraMuscular once PRN Glucose LESS THAN 70 milligrams/deciliter  oxyCODONE    IR 5 milliGRAM(s) Oral every 4 hours PRN Moderate Pain (4 - 6)  oxyCODONE    IR 10 milliGRAM(s) Oral every 4 hours PRN Severe Pain (7 - 10)      OBJECTIVE:    Vital Signs Last 24 Hrs  T(C): 36.7 (2019 04:23), Max: 37 (2019 21:03)  T(F): 98 (2019 04:23), Max: 98.6 (2019 21:03)  HR: 74 (2019 04:23) (74 - 82)  BP: 128/76 (2019 04:23) (128/76 - 152/77)  BP(mean): --  RR: 18 (2019 04:23) (17 - 18)  SpO2: 97% (2019 04:23) (97% - 99%)        I&O's Detail    2019 07:01  -  2019 07:00  --------------------------------------------------------  IN:    IV PiggyBack: 50 mL    Oral Fluid: 240 mL    sodium chloride 0.9%.: 720 mL  Total IN: 1010 mL    OUT:    Indwelling Catheter - Urethral: 1100 mL  Total OUT: 1100 mL    Total NET: -90 mL          Daily     Daily Weight in k.7 (2019 07:37)    LABS:                        8.8    5.34  )-----------( 251      ( 2019 08:29 )             28.3         139  |  101  |  17  ----------------------------<  119<H>  4.4   |  22  |  1.66<H>    Ca    9.0      2019 05:15                    PHYSICAL EXAM:  Constitutional: well developed, well nourished, NAD  Respiratory: Breathing comfortably  Neurological: intact, non-focal  Skin: L AKA dressing c/d/i, staple line clean, no discharge active bleeding, signs of hematoma, or erythema  Psychiatric: oriented x 3; appropriate

## 2019-07-27 LAB
ANION GAP SERPL CALC-SCNC: 15 MMOL/L — SIGNIFICANT CHANGE UP (ref 5–17)
BUN SERPL-MCNC: 18 MG/DL — SIGNIFICANT CHANGE UP (ref 7–23)
CALCIUM SERPL-MCNC: 9.2 MG/DL — SIGNIFICANT CHANGE UP (ref 8.4–10.5)
CHLORIDE SERPL-SCNC: 98 MMOL/L — SIGNIFICANT CHANGE UP (ref 96–108)
CO2 SERPL-SCNC: 24 MMOL/L — SIGNIFICANT CHANGE UP (ref 22–31)
CREAT SERPL-MCNC: 1.55 MG/DL — HIGH (ref 0.5–1.3)
GLUCOSE BLDC GLUCOMTR-MCNC: 133 MG/DL — HIGH (ref 70–99)
GLUCOSE BLDC GLUCOMTR-MCNC: 180 MG/DL — HIGH (ref 70–99)
GLUCOSE BLDC GLUCOMTR-MCNC: 212 MG/DL — HIGH (ref 70–99)
GLUCOSE BLDC GLUCOMTR-MCNC: 236 MG/DL — HIGH (ref 70–99)
GLUCOSE BLDC GLUCOMTR-MCNC: 247 MG/DL — HIGH (ref 70–99)
GLUCOSE SERPL-MCNC: 194 MG/DL — HIGH (ref 70–99)
HCT VFR BLD CALC: 30.3 % — LOW (ref 39–50)
HGB BLD-MCNC: 9.4 G/DL — LOW (ref 13–17)
MCHC RBC-ENTMCNC: 28.6 PG — SIGNIFICANT CHANGE UP (ref 27–34)
MCHC RBC-ENTMCNC: 31 GM/DL — LOW (ref 32–36)
MCV RBC AUTO: 92.1 FL — SIGNIFICANT CHANGE UP (ref 80–100)
PLATELET # BLD AUTO: 288 K/UL — SIGNIFICANT CHANGE UP (ref 150–400)
POTASSIUM SERPL-MCNC: 4.1 MMOL/L — SIGNIFICANT CHANGE UP (ref 3.5–5.3)
POTASSIUM SERPL-SCNC: 4.1 MMOL/L — SIGNIFICANT CHANGE UP (ref 3.5–5.3)
RBC # BLD: 3.29 M/UL — LOW (ref 4.2–5.8)
RBC # FLD: 14.8 % — HIGH (ref 10.3–14.5)
SODIUM SERPL-SCNC: 137 MMOL/L — SIGNIFICANT CHANGE UP (ref 135–145)
WBC # BLD: 5.17 K/UL — SIGNIFICANT CHANGE UP (ref 3.8–10.5)
WBC # FLD AUTO: 5.17 K/UL — SIGNIFICANT CHANGE UP (ref 3.8–10.5)

## 2019-07-27 RX ORDER — OXYCODONE AND ACETAMINOPHEN 5; 325 MG/1; MG/1
1 TABLET ORAL EVERY 4 HOURS
Refills: 0 | Status: DISCONTINUED | OUTPATIENT
Start: 2019-07-27 | End: 2019-07-31

## 2019-07-27 RX ORDER — LACTULOSE 10 G/15ML
20 SOLUTION ORAL EVERY 4 HOURS
Refills: 0 | Status: COMPLETED | OUTPATIENT
Start: 2019-07-27 | End: 2019-07-27

## 2019-07-27 RX ORDER — ALTEPLASE 100 MG
2 KIT INTRAVENOUS ONCE
Refills: 0 | Status: DISCONTINUED | OUTPATIENT
Start: 2019-07-27 | End: 2019-07-27

## 2019-07-27 RX ADMIN — HEPARIN SODIUM 5000 UNIT(S): 5000 INJECTION INTRAVENOUS; SUBCUTANEOUS at 13:07

## 2019-07-27 RX ADMIN — LACTULOSE 20 GRAM(S): 10 SOLUTION ORAL at 17:01

## 2019-07-27 RX ADMIN — HEPARIN SODIUM 5000 UNIT(S): 5000 INJECTION INTRAVENOUS; SUBCUTANEOUS at 22:52

## 2019-07-27 RX ADMIN — Medication 650 MILLIGRAM(S): at 05:00

## 2019-07-27 RX ADMIN — OXYCODONE HYDROCHLORIDE 5 MILLIGRAM(S): 5 TABLET ORAL at 05:00

## 2019-07-27 RX ADMIN — Medication 650 MILLIGRAM(S): at 04:26

## 2019-07-27 RX ADMIN — ERYTHROPOIETIN 10000 UNIT(S): 10000 INJECTION, SOLUTION INTRAVENOUS; SUBCUTANEOUS at 08:15

## 2019-07-27 RX ADMIN — HEPARIN SODIUM 5000 UNIT(S): 5000 INJECTION INTRAVENOUS; SUBCUTANEOUS at 06:20

## 2019-07-27 RX ADMIN — OXYCODONE HYDROCHLORIDE 5 MILLIGRAM(S): 5 TABLET ORAL at 04:30

## 2019-07-27 RX ADMIN — INSULIN GLARGINE 22 UNIT(S): 100 INJECTION, SOLUTION SUBCUTANEOUS at 22:51

## 2019-07-27 RX ADMIN — PRIMIDONE 50 MILLIGRAM(S): 250 TABLET ORAL at 06:20

## 2019-07-27 RX ADMIN — Medication 100 MILLIGRAM(S): at 22:52

## 2019-07-27 RX ADMIN — Medication 1: at 08:15

## 2019-07-27 RX ADMIN — Medication 100 MILLIGRAM(S): at 06:20

## 2019-07-27 RX ADMIN — Medication 2.5 MILLIGRAM(S): at 06:20

## 2019-07-27 RX ADMIN — GABAPENTIN 100 MILLIGRAM(S): 400 CAPSULE ORAL at 13:07

## 2019-07-27 RX ADMIN — Medication 100 MILLIGRAM(S): at 13:07

## 2019-07-27 RX ADMIN — OXYCODONE HYDROCHLORIDE 10 MILLIGRAM(S): 5 TABLET ORAL at 06:51

## 2019-07-27 RX ADMIN — TAMSULOSIN HYDROCHLORIDE 0.4 MILLIGRAM(S): 0.4 CAPSULE ORAL at 22:52

## 2019-07-27 RX ADMIN — PRIMIDONE 50 MILLIGRAM(S): 250 TABLET ORAL at 17:01

## 2019-07-27 RX ADMIN — Medication 4 UNIT(S): at 08:15

## 2019-07-27 RX ADMIN — LACTULOSE 20 GRAM(S): 10 SOLUTION ORAL at 13:07

## 2019-07-27 RX ADMIN — Medication 325 MILLIGRAM(S): at 11:22

## 2019-07-27 RX ADMIN — Medication 40 MILLIGRAM(S): at 06:20

## 2019-07-27 RX ADMIN — Medication 2: at 16:58

## 2019-07-27 RX ADMIN — CHLORHEXIDINE GLUCONATE 1 APPLICATION(S): 213 SOLUTION TOPICAL at 11:22

## 2019-07-27 RX ADMIN — OXYCODONE HYDROCHLORIDE 10 MILLIGRAM(S): 5 TABLET ORAL at 06:21

## 2019-07-27 RX ADMIN — GABAPENTIN 100 MILLIGRAM(S): 400 CAPSULE ORAL at 22:52

## 2019-07-27 RX ADMIN — SENNA PLUS 2 TABLET(S): 8.6 TABLET ORAL at 22:51

## 2019-07-27 RX ADMIN — POLYETHYLENE GLYCOL 3350 17 GRAM(S): 17 POWDER, FOR SOLUTION ORAL at 17:01

## 2019-07-27 RX ADMIN — GABAPENTIN 100 MILLIGRAM(S): 400 CAPSULE ORAL at 06:20

## 2019-07-27 RX ADMIN — POLYETHYLENE GLYCOL 3350 17 GRAM(S): 17 POWDER, FOR SOLUTION ORAL at 06:21

## 2019-07-27 RX ADMIN — AMLODIPINE BESYLATE 10 MILLIGRAM(S): 2.5 TABLET ORAL at 06:20

## 2019-07-27 NOTE — PROGRESS NOTE ADULT - ASSESSMENT
A/P: 59y Male with hx of nonhealing right foot ulcer, s/p right BKA     - Activity: OOB with assistance   - Pain medication: Tylenol, oxycodone prn   - DVT ppxL SQH  - No further vascular surgery intervention, will sign off, page [phone] for further questions  - f/u in 2 weeks with Dr. Osorio for removal of brian

## 2019-07-27 NOTE — PROGRESS NOTE ADULT - SUBJECTIVE AND OBJECTIVE BOX
Endocrinology Attending Covering for Dr. Zhu      Chief complaint  Patient is a 59y old  Male who presents with a chief complaint of SOB (27 Jul 2019 08:14)   Review of systems  Patient in bed, looks comfortable, no fever,  had no hypoglycemia.    Labs and Fingersticks  CAPILLARY BLOOD GLUCOSE      POCT Blood Glucose.: 180 mg/dL (27 Jul 2019 07:58)  POCT Blood Glucose.: 202 mg/dL (26 Jul 2019 21:34)  POCT Blood Glucose.: 184 mg/dL (26 Jul 2019 18:36)  POCT Blood Glucose.: 182 mg/dL (26 Jul 2019 16:40)  POCT Blood Glucose.: 100 mg/dL (26 Jul 2019 12:06)      Anion Gap, Serum: 15 (07-27 @ 07:14)  Anion Gap, Serum: 16 (07-26 @ 05:15)      Calcium, Total Serum: 9.2 (07-27 @ 07:14)  Calcium, Total Serum: 9.0 (07-26 @ 05:15)          07-27    137  |  98  |  18  ----------------------------<  194<H>  4.1   |  24  |  1.55<H>    Ca    9.2      27 Jul 2019 07:14                          9.4    5.17  )-----------( 288      ( 27 Jul 2019 09:00 )             30.3     Medications  MEDICATIONS  (STANDING):  alteplase for catheter clearance 2 milliGRAM(s) Catheter once  amLODIPine   Tablet 10 milliGRAM(s) Oral daily  chlorhexidine 2% Cloths 1 Application(s) Topical daily  collagenase Ointment 1 Application(s) Topical <User Schedule>  dextrose 5%. 1000 milliLiter(s) (50 mL/Hr) IV Continuous <Continuous>  dextrose 50% Injectable 12.5 Gram(s) IV Push once  dextrose 50% Injectable 25 Gram(s) IV Push once  dextrose 50% Injectable 25 Gram(s) IV Push once  dronabinol 2.5 milliGRAM(s) Oral two times a day  epoetin braden Injectable 28680 Unit(s) SubCutaneous <User Schedule>  ferrous    sulfate 325 milliGRAM(s) Oral daily  furosemide    Tablet 40 milliGRAM(s) Oral daily  gabapentin 100 milliGRAM(s) Oral every 8 hours  heparin  Injectable 5000 Unit(s) SubCutaneous every 8 hours  hydrALAZINE 100 milliGRAM(s) Oral three times a day  insulin glargine Injectable (LANTUS) 22 Unit(s) SubCutaneous at bedtime  insulin lispro (HumaLOG) corrective regimen sliding scale   SubCutaneous three times a day before meals  insulin lispro (HumaLOG) corrective regimen sliding scale   SubCutaneous at bedtime  insulin lispro Injectable (HumaLOG) 4 Unit(s) SubCutaneous before breakfast  polyethylene glycol 3350 17 Gram(s) Oral every 12 hours  primidone 50 milliGRAM(s) Oral two times a day  senna 2 Tablet(s) Oral at bedtime  sodium chloride 0.9%. 1000 milliLiter(s) (30 mL/Hr) IV Continuous <Continuous>  tamsulosin 0.4 milliGRAM(s) Oral at bedtime      Physical Exam  General: Patient comfortable in bed  Vital Signs Last 12 Hrs  T(F): 98 (07-27-19 @ 04:20), Max: 98 (07-27-19 @ 04:20)  HR: 80 (07-27-19 @ 04:20) (80 - 80)  BP: 129/76 (07-27-19 @ 04:20) (129/76 - 129/76)  BP(mean): --  RR: 18 (07-27-19 @ 04:20) (18 - 18)  SpO2: 96% (07-27-19 @ 04:20) (96% - 96%)  Neck: No palpable thyroid nodules.  CVS: S1S2, No murmurs  Respiratory: No wheezing, no crepitations  GI: Abdomen soft, bowel sounds positive  Musculoskeletal:  edema lower extremities.   Skin: No skin rashes, no ecchymosis    Diagnostics

## 2019-07-27 NOTE — PROGRESS NOTE ADULT - ASSESSMENT
Assessment  DMT2: 59y Male with DM T2 with hyperglycemia, on basal bolus insulin, , c/o foot pain, blood sugars in good range   107-202 yesterday, on lantus 22 units and lisipro 4 units pre-brakfast  , no new hypoglycemic episode, eating meals.  Foot wound: On wound care, meds, stable.  CAD: on medications, no chest pain, stable, monitored.  HTN: Controlled,  on antihypertensive medications.  CKD: Monitor labs/BMP,     conner Burden  Cell: [phone]

## 2019-07-27 NOTE — PROGRESS NOTE ADULT - SUBJECTIVE AND OBJECTIVE BOX
VACULAR SURGERY DAILY PROGRESS NOTE:       Subjective:  Patient seen and examined on AM rounds. AF/VSS. Pain in leg only with movement, has been up to chair, otherwise controlled. Patient reports new left shoulder pain/tingling. Denies CP/SOB. At the moment no pain 2/2 recent pain medication.       Objective:    PHYSICAL EXAM:  Constitutional: well developed, well nourished, NAD  Respiratory: Breathing comfortably  Neurological: intact, non-focal  Skin: L AKA dressing c/d/i, staple line clean, no discharge active bleeding, signs of hematoma, or erythema  Psychiatric: oriented x 3; appropriate    Vital Signs Last 24 Hrs  T(C): 36.7 (2019 04:20), Max: 36.9 (2019 21:26)  T(F): 98 (2019 04:20), Max: 98.5 (2019 21:26)  HR: 80 (2019 04:20) (74 - 89)  BP: 129/76 (2019 04:20) (123/64 - 145/67)  BP(mean): --  RR: 18 (2019 04:20) (17 - 18)  SpO2: 96% (2019 04:20) (94% - 99%)    I&O's Detail    2019 07:01  -  2019 07:00  --------------------------------------------------------  IN:    Oral Fluid: 570 mL    sodium chloride 0.9%.: 660 mL  Total IN: 1230 mL    OUT:    Indwelling Catheter - Urethral: 2650 mL  Total OUT: 2650 mL    Total NET: -1420 mL          Daily     Daily Weight in k.4 (2019 07:19)    MEDICATIONS  (STANDING):  alteplase for catheter clearance 2 milliGRAM(s) Catheter once  amLODIPine   Tablet 10 milliGRAM(s) Oral daily  chlorhexidine 2% Cloths 1 Application(s) Topical daily  collagenase Ointment 1 Application(s) Topical <User Schedule>  dextrose 5%. 1000 milliLiter(s) (50 mL/Hr) IV Continuous <Continuous>  dextrose 50% Injectable 12.5 Gram(s) IV Push once  dextrose 50% Injectable 25 Gram(s) IV Push once  dextrose 50% Injectable 25 Gram(s) IV Push once  dronabinol 2.5 milliGRAM(s) Oral two times a day  epoetin braden Injectable 66637 Unit(s) SubCutaneous <User Schedule>  ferrous    sulfate 325 milliGRAM(s) Oral daily  furosemide    Tablet 40 milliGRAM(s) Oral daily  gabapentin 100 milliGRAM(s) Oral every 8 hours  heparin  Injectable 5000 Unit(s) SubCutaneous every 8 hours  hydrALAZINE 100 milliGRAM(s) Oral three times a day  insulin glargine Injectable (LANTUS) 22 Unit(s) SubCutaneous at bedtime  insulin lispro (HumaLOG) corrective regimen sliding scale   SubCutaneous three times a day before meals  insulin lispro (HumaLOG) corrective regimen sliding scale   SubCutaneous at bedtime  insulin lispro Injectable (HumaLOG) 4 Unit(s) SubCutaneous before breakfast  polyethylene glycol 3350 17 Gram(s) Oral every 12 hours  primidone 50 milliGRAM(s) Oral two times a day  senna 2 Tablet(s) Oral at bedtime  sodium chloride 0.9%. 1000 milliLiter(s) (30 mL/Hr) IV Continuous <Continuous>  tamsulosin 0.4 milliGRAM(s) Oral at bedtime    MEDICATIONS  (PRN):  acetaminophen   Tablet .. 650 milliGRAM(s) Oral every 6 hours PRN Mild Pain (1 - 3)  dextrose 40% Gel 15 Gram(s) Oral once PRN Blood Glucose LESS THAN 70 milliGRAM(s)/deciliter  glucagon  Injectable 1 milliGRAM(s) IntraMuscular once PRN Glucose LESS THAN 70 milligrams/deciliter  oxyCODONE    IR 5 milliGRAM(s) Oral every 4 hours PRN Moderate Pain (4 - 6)  oxyCODONE    IR 10 milliGRAM(s) Oral every 4 hours PRN Severe Pain (7 - 10)      LABS:                        8.8    5.34  )-----------( 251      ( 2019 08:29 )             28.3     07-27    137  |  98  |  18  ----------------------------<  194<H>  4.1   |  24  |  1.55<H>    Ca    9.2      2019 07:14            RADIOLOGY & ADDITIONAL STUDIES:

## 2019-07-27 NOTE — CHART NOTE - NSCHARTNOTEFT_GEN_A_CORE
CC: Left shoulder pain    Event Summary: Asked by nurse to evaluate patient for left shoulder pain. Patient seen and examined at the bedside. Patient states left shoulder pain is new, and started about 3-4 hours ago. Patient describes the pain as soreness, 8/10, and radiates to the back and fingers. Patient endorses numbness and tingling. Patient last received Tylenol 650 mg PO and oxycodone 5 mg IR at 04:30 with little relief. Denies fevers/chills, headaches, dizziness, syncope, weakness, diaphoresis, chest pain, palpitations, SOB, abdominal pain, N/V/D.     Vital Signs Last 24 Hrs  T(C): 36.7 (27 Jul 2019 04:20), Max: 36.9 (26 Jul 2019 21:26)  T(F): 98 (27 Jul 2019 04:20), Max: 98.5 (26 Jul 2019 21:26)  HR: 80 (27 Jul 2019 04:20) (74 - 89)  BP: 129/76 (27 Jul 2019 04:20) (123/64 - 145/67)  RR: 18 (27 Jul 2019 04:20) (17 - 18)  SpO2: 96% (27 Jul 2019 04:20) (94% - 99%)    PHYSICAL EXAM:  General: NAD, A&Ox3  Respiratory: Lungs clear to auscultation bilaterally. No wheezes, rales, rhonchi. Normal respiratory effort.   Cardiovascular: S1, S2 present. Regular rate and rhythm. No murmurs, wheezes, or gallops  Gastrointestinal: BS x4 normoactive. Soft, non-tender, non-distended.   Extremities: 2+ peripheral pulses. No edema, cyanosis.  Neurological: CN II-XII grossly intact. Strength 5+/5+ bilateral     A/P  59 y.o M w/ PMH IDDM c/b R. foot ulcer s/p debridement and wound vac (June 2019), HTN, and CKD who presented from [facility] rehab w/ hypoxia to 60s. Patient reports that he has been feeling SOB over the past few days. He admits to a dry cough that has been going on for a few weeks but denies fever or sputum production. He also admits to orthopnea, some chills/sweating. Patient admitted for acute on chronic diastolic CHF - s/p Lasix IV now PO. Patient's hospital course also significant for nonhealing right foot ulcer, s/p right BKA on 7/23   Now, patient c/o new 8/10 left shoulder pain described as soreness with radiation to the back and fingers. Patient endorses numbness and tingling. Patient last received Tylenol 650 mg PO and oxycodone 5 mg IR at 04:30 with little relief.     #Left shoulder pain  -Given 06:00 dose of gabapentin 100 mg   -Given oxycodone 10 mg IR   -Will endorse to day team      Mona Mchugh PA-C  #[id number]

## 2019-07-27 NOTE — PROGRESS NOTE ADULT - ASSESSMENT
59M, hx DM, HTN presents from [facility] via EMS due to resp distress. Patient at [facility] due to right diabetic foot ulcer. Patient reported to be hypoxic on room air to 60s. Improved on 15L face mask top 100% O2 sat. Patient awake, alert, oriented. States he has been coughing for 2-3 weeks (dry cough) as well as weakness and shortness of breath for few days.    Acute on chronic diastolic chf  - lasix 40 qd  - strict Is and Os  - cardiology consult appreciated  - keep O2 sats above 92%    diabetic foot ulcer with osteo  - s/p BKA  - off cefepime  - follow up by vascular    pain  - percocet prn  - Neurontin For phantom pain    essential tremors and diaphoresis  - may be secondary to infection  - trial of primidone  - seen by neuro consult  - increase primidone to 50 bid    CKD 4  - monitor cre  - avoid nephrotoxins    anemia of chronic disease  - c/w iron and epogen    diabetes  - fs qid  - hgb a1c   7.1  - endocrine consult following    htn  - cw norvasc  - c/w hydralazine    constipation  - lactulose x 2  - miralax and senna  -  dulcolax x 1    urinary retention  - c/w flomax  - TOV after bm    DVT px

## 2019-07-27 NOTE — PROGRESS NOTE ADULT - SUBJECTIVE AND OBJECTIVE BOX
Patient is a 59y old  Male who presents with a chief complaint of SOB (27 Jul 2019 10:39)      SUBJECTIVE / OVERNIGHT EVENTS:  does not like how oxycodone makes him feel.  prefers percocet.    MEDICATIONS  (STANDING):  alteplase for catheter clearance 2 milliGRAM(s) Catheter once  amLODIPine   Tablet 10 milliGRAM(s) Oral daily  chlorhexidine 2% Cloths 1 Application(s) Topical daily  collagenase Ointment 1 Application(s) Topical <User Schedule>  dextrose 5%. 1000 milliLiter(s) (50 mL/Hr) IV Continuous <Continuous>  dextrose 50% Injectable 12.5 Gram(s) IV Push once  dextrose 50% Injectable 25 Gram(s) IV Push once  dextrose 50% Injectable 25 Gram(s) IV Push once  dronabinol 2.5 milliGRAM(s) Oral two times a day  epoetin braden Injectable 69142 Unit(s) SubCutaneous <User Schedule>  ferrous    sulfate 325 milliGRAM(s) Oral daily  furosemide    Tablet 40 milliGRAM(s) Oral daily  gabapentin 100 milliGRAM(s) Oral every 8 hours  heparin  Injectable 5000 Unit(s) SubCutaneous every 8 hours  hydrALAZINE 100 milliGRAM(s) Oral three times a day  insulin glargine Injectable (LANTUS) 22 Unit(s) SubCutaneous at bedtime  insulin lispro (HumaLOG) corrective regimen sliding scale   SubCutaneous three times a day before meals  insulin lispro (HumaLOG) corrective regimen sliding scale   SubCutaneous at bedtime  insulin lispro Injectable (HumaLOG) 4 Unit(s) SubCutaneous before breakfast  polyethylene glycol 3350 17 Gram(s) Oral every 12 hours  primidone 50 milliGRAM(s) Oral two times a day  senna 2 Tablet(s) Oral at bedtime  sodium chloride 0.9%. 1000 milliLiter(s) (30 mL/Hr) IV Continuous <Continuous>  tamsulosin 0.4 milliGRAM(s) Oral at bedtime    MEDICATIONS  (PRN):  acetaminophen   Tablet .. 650 milliGRAM(s) Oral every 6 hours PRN Mild Pain (1 - 3)  dextrose 40% Gel 15 Gram(s) Oral once PRN Blood Glucose LESS THAN 70 milliGRAM(s)/deciliter  glucagon  Injectable 1 milliGRAM(s) IntraMuscular once PRN Glucose LESS THAN 70 milligrams/deciliter  oxyCODONE    5 mG/acetaminophen 325 mG 1 Tablet(s) Oral every 4 hours PRN Moderate Pain (4 - 6)      Vital Signs Last 24 Hrs  T(C): 36.6 (27 Jul 2019 11:32), Max: 36.9 (26 Jul 2019 21:26)  T(F): 97.9 (27 Jul 2019 11:32), Max: 98.5 (26 Jul 2019 21:26)  HR: 69 (27 Jul 2019 11:32) (69 - 89)  BP: 124/64 (27 Jul 2019 11:32) (123/64 - 137/75)  BP(mean): --  RR: 17 (27 Jul 2019 11:32) (17 - 18)  SpO2: 93% (27 Jul 2019 11:32) (93% - 99%)  CAPILLARY BLOOD GLUCOSE      POCT Blood Glucose.: 133 mg/dL (27 Jul 2019 11:49)  POCT Blood Glucose.: 180 mg/dL (27 Jul 2019 07:58)  POCT Blood Glucose.: 202 mg/dL (26 Jul 2019 21:34)  POCT Blood Glucose.: 184 mg/dL (26 Jul 2019 18:36)  POCT Blood Glucose.: 182 mg/dL (26 Jul 2019 16:40)    I&O's Summary    26 Jul 2019 07:01  -  27 Jul 2019 07:00  --------------------------------------------------------  IN: 1230 mL / OUT: 2650 mL / NET: -1420 mL        PHYSICAL EXAM:  GENERAL: NAD, well-developed  HEAD:  Atraumatic, Normocephalic  EYES: EOMI, PERRLA, conjunctiva and sclera clear  NECK: Supple, No JVD  CHEST/LUNG: Clear to auscultation bilaterally; No wheeze  HEART: Regular rate and rhythm; No murmurs, rubs, or gallops  ABDOMEN: Soft, Nontender, Nondistended; Bowel sounds present  EXTREMITIES:  2+ Peripheral Pulses, No clubbing, cyanosis, or edema  PSYCH: AAOx3  NEUROLOGY: non-focal  SKIN: No rashes or lesions    LABS:                        9.4    5.17  )-----------( 288      ( 27 Jul 2019 09:00 )             30.3     07-27    137  |  98  |  18  ----------------------------<  194<H>  4.1   |  24  |  1.55<H>    Ca    9.2      27 Jul 2019 07:14                RADIOLOGY & ADDITIONAL TESTS:    Imaging Personally Reviewed:    Consultant(s) Notes Reviewed:      Care Discussed with Consultants/Other Providers:

## 2019-07-27 NOTE — PROGRESS NOTE ADULT - SUBJECTIVE AND OBJECTIVE BOX
Subjective: Patient seen and examined. No new events except as noted.   resting comfortably     REVIEW OF SYSTEMS:    CONSTITUTIONAL: + weakness, fevers or chills  EYES/ENT: No visual changes;  No vertigo or throat pain   NECK: No pain or stiffness  RESPIRATORY: No cough, wheezing, hemoptysis; No shortness of breath  CARDIOVASCULAR: No chest pain or palpitations  GASTROINTESTINAL: No abdominal or epigastric pain. No nausea, vomiting, or hematemesis; No diarrhea or constipation. No melena or hematochezia.  GENITOURINARY: No dysuria, frequency or hematuria  NEUROLOGICAL: No numbness or weakness  SKIN: No itching, burning, rashes, or lesions   All other review of systems is negative unless indicated above.    MEDICATIONS:  MEDICATIONS  (STANDING):  amLODIPine   Tablet 10 milliGRAM(s) Oral daily  bisacodyl Suppository 10 milliGRAM(s) Rectal once  chlorhexidine 2% Cloths 1 Application(s) Topical daily  collagenase Ointment 1 Application(s) Topical <User Schedule>  dextrose 5%. 1000 milliLiter(s) (50 mL/Hr) IV Continuous <Continuous>  dextrose 50% Injectable 12.5 Gram(s) IV Push once  dextrose 50% Injectable 25 Gram(s) IV Push once  dextrose 50% Injectable 25 Gram(s) IV Push once  dronabinol 2.5 milliGRAM(s) Oral two times a day  epoetin braden Injectable 84891 Unit(s) SubCutaneous <User Schedule>  ferrous    sulfate 325 milliGRAM(s) Oral daily  furosemide    Tablet 40 milliGRAM(s) Oral daily  gabapentin 100 milliGRAM(s) Oral every 8 hours  heparin  Injectable 5000 Unit(s) SubCutaneous every 8 hours  hydrALAZINE 100 milliGRAM(s) Oral three times a day  insulin glargine Injectable (LANTUS) 22 Unit(s) SubCutaneous at bedtime  insulin lispro (HumaLOG) corrective regimen sliding scale   SubCutaneous three times a day before meals  insulin lispro (HumaLOG) corrective regimen sliding scale   SubCutaneous at bedtime  insulin lispro Injectable (HumaLOG) 4 Unit(s) SubCutaneous before breakfast  polyethylene glycol 3350 17 Gram(s) Oral every 12 hours  primidone 50 milliGRAM(s) Oral two times a day  senna 2 Tablet(s) Oral at bedtime  sodium chloride 0.9%. 1000 milliLiter(s) (30 mL/Hr) IV Continuous <Continuous>  tamsulosin 0.4 milliGRAM(s) Oral at bedtime      PHYSICAL EXAM:  T(C): 36.8 (07-27-19 @ 21:36), Max: 36.8 (07-27-19 @ 21:36)  HR: 78 (07-27-19 @ 21:36) (69 - 80)  BP: 134/71 (07-27-19 @ 21:36) (116/72 - 134/71)  RR: 18 (07-27-19 @ 21:36) (17 - 18)  SpO2: 96% (07-27-19 @ 21:36) (93% - 96%)  Wt(kg): --  I&O's Summary    26 Jul 2019 07:01  -  27 Jul 2019 07:00  --------------------------------------------------------  IN: 1230 mL / OUT: 2650 mL / NET: -1420 mL    27 Jul 2019 07:01  -  27 Jul 2019 22:42  --------------------------------------------------------  IN: 4020 mL / OUT: 800 mL / NET: 3220 mL          Appearance: Normal	  HEENT:   Normal oral mucosa, PERRL, EOMI	  Lymphatic: No lymphadenopathy , no edema  Cardiovascular: Normal S1 S2, No JVD, No murmurs , Peripheral pulses palpable 2+ bilaterally  Respiratory: Lungs clear to auscultation, normal effort 	  Gastrointestinal:  Soft, Non-tender, + BS	  Skin: No rashes, No ecchymoses, No cyanosis, warm to touch  Musculoskeletal: Normal range of motion, normal strength  Psychiatry:  Mood & affect appropriate  Ext: R BKA       LABS:    CARDIAC MARKERS:                                9.4    5.17  )-----------( 288      ( 27 Jul 2019 09:00 )             30.3     07-27    137  |  98  |  18  ----------------------------<  194<H>  4.1   |  24  |  1.55<H>    Ca    9.2      27 Jul 2019 07:14      proBNP:   Lipid Profile:   HgA1c:   TSH:             TELEMETRY: 	    ECG:  	  RADIOLOGY:   DIAGNOSTIC TESTING:  [ ] Echocardiogram:  [ ]  Catheterization:  [ ] Stress Test:    OTHER:

## 2019-07-28 LAB
ANION GAP SERPL CALC-SCNC: 12 MMOL/L — SIGNIFICANT CHANGE UP (ref 5–17)
BUN SERPL-MCNC: 16 MG/DL — SIGNIFICANT CHANGE UP (ref 7–23)
CALCIUM SERPL-MCNC: 9.2 MG/DL — SIGNIFICANT CHANGE UP (ref 8.4–10.5)
CHLORIDE SERPL-SCNC: 98 MMOL/L — SIGNIFICANT CHANGE UP (ref 96–108)
CO2 SERPL-SCNC: 25 MMOL/L — SIGNIFICANT CHANGE UP (ref 22–31)
CREAT SERPL-MCNC: 1.48 MG/DL — HIGH (ref 0.5–1.3)
GLUCOSE BLDC GLUCOMTR-MCNC: 185 MG/DL — HIGH (ref 70–99)
GLUCOSE BLDC GLUCOMTR-MCNC: 210 MG/DL — HIGH (ref 70–99)
GLUCOSE BLDC GLUCOMTR-MCNC: 211 MG/DL — HIGH (ref 70–99)
GLUCOSE BLDC GLUCOMTR-MCNC: 307 MG/DL — HIGH (ref 70–99)
GLUCOSE SERPL-MCNC: 212 MG/DL — HIGH (ref 70–99)
POTASSIUM SERPL-MCNC: 3.8 MMOL/L — SIGNIFICANT CHANGE UP (ref 3.5–5.3)
POTASSIUM SERPL-SCNC: 3.8 MMOL/L — SIGNIFICANT CHANGE UP (ref 3.5–5.3)
SODIUM SERPL-SCNC: 135 MMOL/L — SIGNIFICANT CHANGE UP (ref 135–145)

## 2019-07-28 RX ORDER — INSULIN LISPRO 100/ML
4 VIAL (ML) SUBCUTANEOUS
Refills: 0 | Status: DISCONTINUED | OUTPATIENT
Start: 2019-07-28 | End: 2019-07-29

## 2019-07-28 RX ORDER — INSULIN GLARGINE 100 [IU]/ML
24 INJECTION, SOLUTION SUBCUTANEOUS AT BEDTIME
Refills: 0 | Status: DISCONTINUED | OUTPATIENT
Start: 2019-07-28 | End: 2019-07-29

## 2019-07-28 RX ADMIN — INSULIN GLARGINE 24 UNIT(S): 100 INJECTION, SOLUTION SUBCUTANEOUS at 22:04

## 2019-07-28 RX ADMIN — HEPARIN SODIUM 5000 UNIT(S): 5000 INJECTION INTRAVENOUS; SUBCUTANEOUS at 21:10

## 2019-07-28 RX ADMIN — Medication 100 MILLIGRAM(S): at 21:10

## 2019-07-28 RX ADMIN — HEPARIN SODIUM 5000 UNIT(S): 5000 INJECTION INTRAVENOUS; SUBCUTANEOUS at 14:26

## 2019-07-28 RX ADMIN — GABAPENTIN 100 MILLIGRAM(S): 400 CAPSULE ORAL at 14:26

## 2019-07-28 RX ADMIN — HEPARIN SODIUM 5000 UNIT(S): 5000 INJECTION INTRAVENOUS; SUBCUTANEOUS at 06:23

## 2019-07-28 RX ADMIN — TAMSULOSIN HYDROCHLORIDE 0.4 MILLIGRAM(S): 0.4 CAPSULE ORAL at 21:10

## 2019-07-28 RX ADMIN — PRIMIDONE 50 MILLIGRAM(S): 250 TABLET ORAL at 06:22

## 2019-07-28 RX ADMIN — POLYETHYLENE GLYCOL 3350 17 GRAM(S): 17 POWDER, FOR SOLUTION ORAL at 18:25

## 2019-07-28 RX ADMIN — Medication 2: at 16:55

## 2019-07-28 RX ADMIN — AMLODIPINE BESYLATE 10 MILLIGRAM(S): 2.5 TABLET ORAL at 06:22

## 2019-07-28 RX ADMIN — Medication 2: at 22:05

## 2019-07-28 RX ADMIN — Medication 40 MILLIGRAM(S): at 06:22

## 2019-07-28 RX ADMIN — Medication 4 UNIT(S): at 08:36

## 2019-07-28 RX ADMIN — POLYETHYLENE GLYCOL 3350 17 GRAM(S): 17 POWDER, FOR SOLUTION ORAL at 06:22

## 2019-07-28 RX ADMIN — Medication 100 MILLIGRAM(S): at 06:22

## 2019-07-28 RX ADMIN — CHLORHEXIDINE GLUCONATE 1 APPLICATION(S): 213 SOLUTION TOPICAL at 14:27

## 2019-07-28 RX ADMIN — Medication 4 UNIT(S): at 16:56

## 2019-07-28 RX ADMIN — Medication 1: at 12:15

## 2019-07-28 RX ADMIN — SENNA PLUS 2 TABLET(S): 8.6 TABLET ORAL at 21:10

## 2019-07-28 RX ADMIN — GABAPENTIN 100 MILLIGRAM(S): 400 CAPSULE ORAL at 06:22

## 2019-07-28 RX ADMIN — Medication 325 MILLIGRAM(S): at 12:16

## 2019-07-28 RX ADMIN — Medication 100 MILLIGRAM(S): at 14:26

## 2019-07-28 RX ADMIN — Medication 10 MILLIGRAM(S): at 02:05

## 2019-07-28 RX ADMIN — Medication 2: at 08:36

## 2019-07-28 RX ADMIN — GABAPENTIN 100 MILLIGRAM(S): 400 CAPSULE ORAL at 21:10

## 2019-07-28 RX ADMIN — PRIMIDONE 50 MILLIGRAM(S): 250 TABLET ORAL at 18:25

## 2019-07-28 RX ADMIN — Medication 4 UNIT(S): at 12:16

## 2019-07-28 NOTE — PROGRESS NOTE ADULT - ASSESSMENT
Assessment  DMT2: 59y Male with DM T2 with hyperglycemia, on basal bolus insulin, , c/o foot pain, blood sugars trending up   107-202 yesterday, on lantus 22 units and lisipro 4 units pre-brakfast  , no new hypoglycemic episode, eating meals.  Foot wound: On wound care, meds, stable.  CAD: on medications, no chest pain, stable, monitored.  HTN: Controlled,  on antihypertensive medications.  CKD: Monitor labs/BMP,     conner Burden  Cell: [phone]

## 2019-07-28 NOTE — PROGRESS NOTE ADULT - SUBJECTIVE AND OBJECTIVE BOX
Endocrinology Attending Covering for Dr. Zhu      Chief complaint  Patient is a 59y old  Male who presents with a chief complaint of SOB (28 Jul 2019 09:31)   Review of systems  Patient in bed, looks comfortable, no fever,  had no hypoglycemia.    Labs and Fingersticks  CAPILLARY BLOOD GLUCOSE      POCT Blood Glucose.: 211 mg/dL (28 Jul 2019 07:37)  POCT Blood Glucose.: 247 mg/dL (27 Jul 2019 22:49)  POCT Blood Glucose.: 212 mg/dL (27 Jul 2019 21:35)  POCT Blood Glucose.: 236 mg/dL (27 Jul 2019 16:55)  POCT Blood Glucose.: 133 mg/dL (27 Jul 2019 11:49)      Anion Gap, Serum: 12 (07-28 @ 07:09)  Anion Gap, Serum: 15 (07-27 @ 07:14)      Calcium, Total Serum: 9.2 (07-28 @ 07:09)  Calcium, Total Serum: 9.2 (07-27 @ 07:14)          07-28    135  |  98  |  16  ----------------------------<  212<H>  3.8   |  25  |  1.48<H>    Ca    9.2      28 Jul 2019 07:09                          9.4    5.17  )-----------( 288      ( 27 Jul 2019 09:00 )             30.3     Medications  MEDICATIONS  (STANDING):  amLODIPine   Tablet 10 milliGRAM(s) Oral daily  chlorhexidine 2% Cloths 1 Application(s) Topical daily  collagenase Ointment 1 Application(s) Topical <User Schedule>  dextrose 5%. 1000 milliLiter(s) (50 mL/Hr) IV Continuous <Continuous>  dextrose 50% Injectable 12.5 Gram(s) IV Push once  dextrose 50% Injectable 25 Gram(s) IV Push once  dextrose 50% Injectable 25 Gram(s) IV Push once  dronabinol 2.5 milliGRAM(s) Oral two times a day  epoetin braden Injectable 42050 Unit(s) SubCutaneous <User Schedule>  ferrous    sulfate 325 milliGRAM(s) Oral daily  furosemide    Tablet 40 milliGRAM(s) Oral daily  gabapentin 100 milliGRAM(s) Oral every 8 hours  heparin  Injectable 5000 Unit(s) SubCutaneous every 8 hours  hydrALAZINE 100 milliGRAM(s) Oral three times a day  insulin glargine Injectable (LANTUS) 22 Unit(s) SubCutaneous at bedtime  insulin lispro (HumaLOG) corrective regimen sliding scale   SubCutaneous three times a day before meals  insulin lispro (HumaLOG) corrective regimen sliding scale   SubCutaneous at bedtime  insulin lispro Injectable (HumaLOG) 4 Unit(s) SubCutaneous before breakfast  polyethylene glycol 3350 17 Gram(s) Oral every 12 hours  primidone 50 milliGRAM(s) Oral two times a day  senna 2 Tablet(s) Oral at bedtime  sodium chloride 0.9%. 1000 milliLiter(s) (30 mL/Hr) IV Continuous <Continuous>  tamsulosin 0.4 milliGRAM(s) Oral at bedtime      Physical Exam  General: Patient comfortable in bed  Vital Signs Last 12 Hrs  T(F): 98.2 (07-28-19 @ 04:23), Max: 98.4 (07-28-19 @ 01:44)  HR: 74 (07-28-19 @ 04:23) (74 - 78)  BP: 132/76 (07-28-19 @ 04:23) (132/76 - 142/77)  BP(mean): --  RR: 18 (07-28-19 @ 04:23) (18 - 18)  SpO2: 98% (07-28-19 @ 04:23) (97% - 98%)  Neck: No palpable thyroid nodules.  CVS: S1S2, No murmurs  Respiratory: No wheezing, no crepitations  GI: Abdomen soft, bowel sounds positive  Musculoskeletal:  edema lower extremities.   Skin: No skin rashes, no ecchymosis    Diagnostics

## 2019-07-28 NOTE — PROGRESS NOTE ADULT - SUBJECTIVE AND OBJECTIVE BOX
Subjective: Patient seen and examined. No new events except as noted.   sleepy and tired   on 3 liters nasal cannula       REVIEW OF SYSTEMS:    CONSTITUTIONAL: + weakness, fevers or chills  EYES/ENT: No visual changes;  No vertigo or throat pain   NECK: No pain or stiffness  RESPIRATORY: No cough, wheezing, hemoptysis; No shortness of breath  CARDIOVASCULAR: No chest pain or palpitations  GASTROINTESTINAL: No abdominal or epigastric pain. No nausea, vomiting, or hematemesis; No diarrhea or constipation. No melena or hematochezia.  GENITOURINARY: No dysuria, frequency or hematuria  NEUROLOGICAL: No numbness or weakness  SKIN: No itching, burning, rashes, or lesions   All other review of systems is negative unless indicated above.    MEDICATIONS:  MEDICATIONS  (STANDING):  amLODIPine   Tablet 10 milliGRAM(s) Oral daily  chlorhexidine 2% Cloths 1 Application(s) Topical daily  collagenase Ointment 1 Application(s) Topical <User Schedule>  dextrose 5%. 1000 milliLiter(s) (50 mL/Hr) IV Continuous <Continuous>  dextrose 50% Injectable 12.5 Gram(s) IV Push once  dextrose 50% Injectable 25 Gram(s) IV Push once  dextrose 50% Injectable 25 Gram(s) IV Push once  dronabinol 2.5 milliGRAM(s) Oral two times a day  epoetin braden Injectable 60873 Unit(s) SubCutaneous <User Schedule>  ferrous    sulfate 325 milliGRAM(s) Oral daily  furosemide    Tablet 40 milliGRAM(s) Oral daily  gabapentin 100 milliGRAM(s) Oral every 8 hours  heparin  Injectable 5000 Unit(s) SubCutaneous every 8 hours  hydrALAZINE 100 milliGRAM(s) Oral three times a day  insulin glargine Injectable (LANTUS) 22 Unit(s) SubCutaneous at bedtime  insulin lispro (HumaLOG) corrective regimen sliding scale   SubCutaneous three times a day before meals  insulin lispro (HumaLOG) corrective regimen sliding scale   SubCutaneous at bedtime  insulin lispro Injectable (HumaLOG) 4 Unit(s) SubCutaneous before breakfast  polyethylene glycol 3350 17 Gram(s) Oral every 12 hours  primidone 50 milliGRAM(s) Oral two times a day  senna 2 Tablet(s) Oral at bedtime  sodium chloride 0.9%. 1000 milliLiter(s) (30 mL/Hr) IV Continuous <Continuous>  tamsulosin 0.4 milliGRAM(s) Oral at bedtime      PHYSICAL EXAM:  T(C): 36.8 (07-28-19 @ 04:23), Max: 36.9 (07-28-19 @ 01:44)  HR: 74 (07-28-19 @ 04:23) (69 - 78)  BP: 132/76 (07-28-19 @ 04:23) (116/72 - 142/77)  RR: 18 (07-28-19 @ 04:23) (17 - 18)  SpO2: 98% (07-28-19 @ 04:23) (93% - 98%)  Wt(kg): --  I&O's Summary    27 Jul 2019 07:01  -  28 Jul 2019 07:00  --------------------------------------------------------  IN: 4740 mL / OUT: 1250 mL / NET: 3490 mL          Appearance: NAD  HEENT:   Normal oral mucosa, PERRL, EOMI	  Lymphatic: No lymphadenopathy , no edema  Cardiovascular: Normal S1 S2, No JVD, No murmurs , Peripheral pulses palpable 2+ bilaterally  Respiratory: decreased bs 	  Gastrointestinal:  Soft, Non-tender, + BS	  Skin: No rashes, No ecchymoses, No cyanosis, warm to touch  Musculoskeletal: Normal range of motion, normal strength  Psychiatry:  Mood & affect appropriate  Ext: R BKA       LABS:    CARDIAC MARKERS:                                9.4    5.17  )-----------( 288      ( 27 Jul 2019 09:00 )             30.3     07-28    135  |  98  |  16  ----------------------------<  212<H>  3.8   |  25  |  1.48<H>    Ca    9.2      28 Jul 2019 07:09      proBNP:   Lipid Profile:   HgA1c:   TSH:             TELEMETRY: 	  SR  ECG:  	  RADIOLOGY:   DIAGNOSTIC TESTING:  [ ] Echocardiogram:  [ ]  Catheterization:  [ ] Stress Test:    OTHER:

## 2019-07-28 NOTE — PROGRESS NOTE ADULT - ASSESSMENT
59M, hx DM, HTN presents from [facility] via EMS due to resp distress. Patient at [facility] due to right diabetic foot ulcer. Patient reported to be hypoxic on room air to 60s. Improved on 15L face mask top 100% O2 sat. Patient awake, alert, oriented. States he has been coughing for 2-3 weeks (dry cough) as well as weakness and shortness of breath for few days.    Acute on chronic diastolic chf  - lasix 40 qd  - strict Is and Os  - cardiology consult appreciated  - keep O2 sats above 92%    diabetic foot ulcer with osteo  - s/p BKA  - off cefepime  - follow up by vascular    pain  - percocet prn  - Neurontin For phantom pain    essential tremors and diaphoresis  - may be secondary to infection  - trial of primidone  - seen by neuro consult  -  primidone  50 bid    CKD 4  - monitor cre  - avoid nephrotoxins    anemia of chronic disease  - c/w iron and epogen    diabetes  - fs qid  - hgb a1c   7.1  - endocrine consult following    htn  - cw norvasc  - c/w hydralazine    constipation  - lactulose x 2  - miralax and senna  -  dulcolax x 1    urinary retention  - c/w flomax  - TOV tomorrow    DVT px    d/c planning

## 2019-07-28 NOTE — CHART NOTE - NSCHARTNOTEFT_GEN_A_CORE
POCT Blood Glucose.: 211 mg/dL (28 Jul 2019 07:37)  POCT Blood Glucose.: 247 mg/dL (27 Jul 2019 22:49)  POCT Blood Glucose.: 212 mg/dL (27 Jul 2019 21:35)  POCT Blood Glucose.: 236 mg/dL (27 Jul 2019 16:55)  POCT Blood Glucose.: 133 mg/dL (27 Jul 2019 11:49)    Lantus increased to 24 units at bedtime and Humalog increasd to 4 units with each premeal per endocrine    [id number]

## 2019-07-28 NOTE — PROGRESS NOTE ADULT - SUBJECTIVE AND OBJECTIVE BOX
Patient is a 59y old  Male who presents with a chief complaint of SOB (28 Jul 2019 09:43)      SUBJECTIVE / OVERNIGHT EVENTS:  had bm, tremors have resolved.    MEDICATIONS  (STANDING):  amLODIPine   Tablet 10 milliGRAM(s) Oral daily  chlorhexidine 2% Cloths 1 Application(s) Topical daily  collagenase Ointment 1 Application(s) Topical <User Schedule>  dextrose 5%. 1000 milliLiter(s) (50 mL/Hr) IV Continuous <Continuous>  dextrose 50% Injectable 12.5 Gram(s) IV Push once  dextrose 50% Injectable 25 Gram(s) IV Push once  dextrose 50% Injectable 25 Gram(s) IV Push once  dronabinol 2.5 milliGRAM(s) Oral two times a day  epoetin braden Injectable 52056 Unit(s) SubCutaneous <User Schedule>  ferrous    sulfate 325 milliGRAM(s) Oral daily  furosemide    Tablet 40 milliGRAM(s) Oral daily  gabapentin 100 milliGRAM(s) Oral every 8 hours  heparin  Injectable 5000 Unit(s) SubCutaneous every 8 hours  hydrALAZINE 100 milliGRAM(s) Oral three times a day  insulin glargine Injectable (LANTUS) 22 Unit(s) SubCutaneous at bedtime  insulin lispro (HumaLOG) corrective regimen sliding scale   SubCutaneous three times a day before meals  insulin lispro (HumaLOG) corrective regimen sliding scale   SubCutaneous at bedtime  insulin lispro Injectable (HumaLOG) 4 Unit(s) SubCutaneous before breakfast  polyethylene glycol 3350 17 Gram(s) Oral every 12 hours  primidone 50 milliGRAM(s) Oral two times a day  senna 2 Tablet(s) Oral at bedtime  sodium chloride 0.9%. 1000 milliLiter(s) (30 mL/Hr) IV Continuous <Continuous>  tamsulosin 0.4 milliGRAM(s) Oral at bedtime    MEDICATIONS  (PRN):  acetaminophen   Tablet .. 650 milliGRAM(s) Oral every 6 hours PRN Mild Pain (1 - 3)  dextrose 40% Gel 15 Gram(s) Oral once PRN Blood Glucose LESS THAN 70 milliGRAM(s)/deciliter  glucagon  Injectable 1 milliGRAM(s) IntraMuscular once PRN Glucose LESS THAN 70 milligrams/deciliter  oxyCODONE    5 mG/acetaminophen 325 mG 1 Tablet(s) Oral every 4 hours PRN Moderate Pain (4 - 6)      Vital Signs Last 24 Hrs  T(C): 36.8 (28 Jul 2019 04:23), Max: 36.9 (28 Jul 2019 01:44)  T(F): 98.2 (28 Jul 2019 04:23), Max: 98.4 (28 Jul 2019 01:44)  HR: 74 (28 Jul 2019 04:23) (69 - 78)  BP: 132/76 (28 Jul 2019 04:23) (116/72 - 142/77)  BP(mean): --  RR: 18 (28 Jul 2019 04:23) (17 - 18)  SpO2: 98% (28 Jul 2019 04:23) (93% - 98%)  CAPILLARY BLOOD GLUCOSE      POCT Blood Glucose.: 211 mg/dL (28 Jul 2019 07:37)  POCT Blood Glucose.: 247 mg/dL (27 Jul 2019 22:49)  POCT Blood Glucose.: 212 mg/dL (27 Jul 2019 21:35)  POCT Blood Glucose.: 236 mg/dL (27 Jul 2019 16:55)  POCT Blood Glucose.: 133 mg/dL (27 Jul 2019 11:49)    I&O's Summary    27 Jul 2019 07:01  -  28 Jul 2019 07:00  --------------------------------------------------------  IN: 4740 mL / OUT: 1250 mL / NET: 3490 mL        PHYSICAL EXAM:  GENERAL: NAD, well-developed  HEAD:  Atraumatic, Normocephalic  EYES: EOMI, PERRLA, conjunctiva and sclera clear  NECK: Supple, No JVD  CHEST/LUNG: Clear to auscultation bilaterally; No wheeze  HEART: Regular rate and rhythm; No murmurs, rubs, or gallops  ABDOMEN: Soft, Nontender, Nondistended; Bowel sounds present  EXTREMITIES:  2+ Peripheral Pulses, No clubbing, cyanosis, or edema, BKA  PSYCH: AAOx3  NEUROLOGY: non-focal  SKIN: No rashes or lesions    LABS:                        9.4    5.17  )-----------( 288      ( 27 Jul 2019 09:00 )             30.3     07-28    135  |  98  |  16  ----------------------------<  212<H>  3.8   |  25  |  1.48<H>    Ca    9.2      28 Jul 2019 07:09                RADIOLOGY & ADDITIONAL TESTS:    Imaging Personally Reviewed:    Consultant(s) Notes Reviewed:      Care Discussed with Consultants/Other Providers:

## 2019-07-29 LAB
ANION GAP SERPL CALC-SCNC: 12 MMOL/L — SIGNIFICANT CHANGE UP (ref 5–17)
BUN SERPL-MCNC: 16 MG/DL — SIGNIFICANT CHANGE UP (ref 7–23)
CALCIUM SERPL-MCNC: 8.9 MG/DL — SIGNIFICANT CHANGE UP (ref 8.4–10.5)
CHLORIDE SERPL-SCNC: 98 MMOL/L — SIGNIFICANT CHANGE UP (ref 96–108)
CO2 SERPL-SCNC: 26 MMOL/L — SIGNIFICANT CHANGE UP (ref 22–31)
CREAT SERPL-MCNC: 1.47 MG/DL — HIGH (ref 0.5–1.3)
GLUCOSE BLDC GLUCOMTR-MCNC: 116 MG/DL — HIGH (ref 70–99)
GLUCOSE BLDC GLUCOMTR-MCNC: 120 MG/DL — HIGH (ref 70–99)
GLUCOSE BLDC GLUCOMTR-MCNC: 152 MG/DL — HIGH (ref 70–99)
GLUCOSE BLDC GLUCOMTR-MCNC: 181 MG/DL — HIGH (ref 70–99)
GLUCOSE SERPL-MCNC: 185 MG/DL — HIGH (ref 70–99)
POTASSIUM SERPL-MCNC: 3.8 MMOL/L — SIGNIFICANT CHANGE UP (ref 3.5–5.3)
POTASSIUM SERPL-SCNC: 3.8 MMOL/L — SIGNIFICANT CHANGE UP (ref 3.5–5.3)
SODIUM SERPL-SCNC: 136 MMOL/L — SIGNIFICANT CHANGE UP (ref 135–145)

## 2019-07-29 RX ORDER — INSULIN GLARGINE 100 [IU]/ML
25 INJECTION, SOLUTION SUBCUTANEOUS AT BEDTIME
Refills: 0 | Status: DISCONTINUED | OUTPATIENT
Start: 2019-07-29 | End: 2019-07-31

## 2019-07-29 RX ORDER — INSULIN LISPRO 100/ML
6 VIAL (ML) SUBCUTANEOUS
Refills: 0 | Status: DISCONTINUED | OUTPATIENT
Start: 2019-07-29 | End: 2019-07-31

## 2019-07-29 RX ORDER — INSULIN GLARGINE 100 [IU]/ML
10 INJECTION, SOLUTION SUBCUTANEOUS ONCE
Refills: 0 | Status: COMPLETED | OUTPATIENT
Start: 2019-07-29 | End: 2019-07-29

## 2019-07-29 RX ORDER — FUROSEMIDE 40 MG
80 TABLET ORAL DAILY
Refills: 0 | Status: DISCONTINUED | OUTPATIENT
Start: 2019-07-29 | End: 2019-07-31

## 2019-07-29 RX ADMIN — Medication 100 MILLIGRAM(S): at 13:29

## 2019-07-29 RX ADMIN — HEPARIN SODIUM 5000 UNIT(S): 5000 INJECTION INTRAVENOUS; SUBCUTANEOUS at 13:29

## 2019-07-29 RX ADMIN — Medication 100 MILLIGRAM(S): at 05:38

## 2019-07-29 RX ADMIN — Medication 100 MILLIGRAM(S): at 22:24

## 2019-07-29 RX ADMIN — Medication 6 UNIT(S): at 17:42

## 2019-07-29 RX ADMIN — Medication 325 MILLIGRAM(S): at 13:29

## 2019-07-29 RX ADMIN — Medication 4 UNIT(S): at 08:25

## 2019-07-29 RX ADMIN — AMLODIPINE BESYLATE 10 MILLIGRAM(S): 2.5 TABLET ORAL at 05:38

## 2019-07-29 RX ADMIN — Medication 40 MILLIGRAM(S): at 05:38

## 2019-07-29 RX ADMIN — POLYETHYLENE GLYCOL 3350 17 GRAM(S): 17 POWDER, FOR SOLUTION ORAL at 05:38

## 2019-07-29 RX ADMIN — Medication 2.5 MILLIGRAM(S): at 17:42

## 2019-07-29 RX ADMIN — INSULIN GLARGINE 10 UNIT(S): 100 INJECTION, SOLUTION SUBCUTANEOUS at 22:27

## 2019-07-29 RX ADMIN — HEPARIN SODIUM 5000 UNIT(S): 5000 INJECTION INTRAVENOUS; SUBCUTANEOUS at 22:24

## 2019-07-29 RX ADMIN — GABAPENTIN 100 MILLIGRAM(S): 400 CAPSULE ORAL at 05:38

## 2019-07-29 RX ADMIN — SENNA PLUS 2 TABLET(S): 8.6 TABLET ORAL at 22:24

## 2019-07-29 RX ADMIN — PRIMIDONE 50 MILLIGRAM(S): 250 TABLET ORAL at 05:38

## 2019-07-29 RX ADMIN — Medication 1: at 17:42

## 2019-07-29 RX ADMIN — Medication 4 UNIT(S): at 12:28

## 2019-07-29 RX ADMIN — TAMSULOSIN HYDROCHLORIDE 0.4 MILLIGRAM(S): 0.4 CAPSULE ORAL at 22:24

## 2019-07-29 RX ADMIN — CHLORHEXIDINE GLUCONATE 1 APPLICATION(S): 213 SOLUTION TOPICAL at 13:10

## 2019-07-29 RX ADMIN — HEPARIN SODIUM 5000 UNIT(S): 5000 INJECTION INTRAVENOUS; SUBCUTANEOUS at 05:38

## 2019-07-29 RX ADMIN — Medication 1: at 08:25

## 2019-07-29 NOTE — PROVIDER CONTACT NOTE (OTHER) - RECOMMENDATIONS
pt educated about risks vs benefit of straight cath. pt continued to refuse. will continue to monitor pt

## 2019-07-29 NOTE — PROGRESS NOTE ADULT - SUBJECTIVE AND OBJECTIVE BOX
Patient is a 59y old  Male who presents with a chief complaint of SOB (29 Jul 2019 11:01)      SUBJECTIVE / OVERNIGHT EVENTS:  pain has resolved, tremors have resolved.    MEDICATIONS  (STANDING):  amLODIPine   Tablet 10 milliGRAM(s) Oral daily  chlorhexidine 2% Cloths 1 Application(s) Topical daily  collagenase Ointment 1 Application(s) Topical <User Schedule>  dextrose 5%. 1000 milliLiter(s) (50 mL/Hr) IV Continuous <Continuous>  dextrose 50% Injectable 12.5 Gram(s) IV Push once  dextrose 50% Injectable 25 Gram(s) IV Push once  dextrose 50% Injectable 25 Gram(s) IV Push once  dronabinol 2.5 milliGRAM(s) Oral two times a day  epoetin braden Injectable 66696 Unit(s) SubCutaneous <User Schedule>  ferrous    sulfate 325 milliGRAM(s) Oral daily  furosemide    Tablet 80 milliGRAM(s) Oral daily  heparin  Injectable 5000 Unit(s) SubCutaneous every 8 hours  hydrALAZINE 100 milliGRAM(s) Oral three times a day  insulin glargine Injectable (LANTUS) 25 Unit(s) SubCutaneous at bedtime  insulin lispro (HumaLOG) corrective regimen sliding scale   SubCutaneous three times a day before meals  insulin lispro (HumaLOG) corrective regimen sliding scale   SubCutaneous at bedtime  insulin lispro Injectable (HumaLOG) 6 Unit(s) SubCutaneous three times a day before meals  polyethylene glycol 3350 17 Gram(s) Oral every 12 hours  senna 2 Tablet(s) Oral at bedtime  tamsulosin 0.4 milliGRAM(s) Oral at bedtime    MEDICATIONS  (PRN):  acetaminophen   Tablet .. 650 milliGRAM(s) Oral every 6 hours PRN Mild Pain (1 - 3)  dextrose 40% Gel 15 Gram(s) Oral once PRN Blood Glucose LESS THAN 70 milliGRAM(s)/deciliter  glucagon  Injectable 1 milliGRAM(s) IntraMuscular once PRN Glucose LESS THAN 70 milligrams/deciliter  oxyCODONE    5 mG/acetaminophen 325 mG 1 Tablet(s) Oral every 4 hours PRN Moderate Pain (4 - 6)      Vital Signs Last 24 Hrs  T(C): 36.6 (29 Jul 2019 11:04), Max: 36.8 (28 Jul 2019 21:06)  T(F): 97.9 (29 Jul 2019 11:04), Max: 98.2 (28 Jul 2019 21:06)  HR: 74 (29 Jul 2019 11:04) (67 - 87)  BP: 113/69 (29 Jul 2019 11:04) (113/69 - 151/85)  BP(mean): --  RR: 18 (29 Jul 2019 11:04) (18 - 18)  SpO2: 94% (29 Jul 2019 11:04) (94% - 98%)  CAPILLARY BLOOD GLUCOSE      POCT Blood Glucose.: 120 mg/dL (29 Jul 2019 11:40)  POCT Blood Glucose.: 152 mg/dL (29 Jul 2019 07:53)  POCT Blood Glucose.: 307 mg/dL (28 Jul 2019 21:40)  POCT Blood Glucose.: 210 mg/dL (28 Jul 2019 16:31)    I&O's Summary    28 Jul 2019 07:01  -  29 Jul 2019 07:00  --------------------------------------------------------  IN: 960 mL / OUT: 800 mL / NET: 160 mL        PHYSICAL EXAM:  GENERAL: NAD, well-developed  HEAD:  Atraumatic, Normocephalic  EYES: EOMI, PERRLA, conjunctiva and sclera clear  NECK: Supple, No JVD  CHEST/LUNG: Clear to auscultation bilaterally; No wheeze  HEART: Regular rate and rhythm; No murmurs, rubs, or gallops  ABDOMEN: Soft, Nontender, Nondistended; Bowel sounds present  EXTREMITIES:  2+ Peripheral Pulses, No clubbing, cyanosis, or edema  PSYCH: AAOx3  NEUROLOGY: non-focal  SKIN: No rashes or lesions    LABS:    07-29    136  |  98  |  16  ----------------------------<  185<H>  3.8   |  26  |  1.47<H>    Ca    8.9      29 Jul 2019 07:07                RADIOLOGY & ADDITIONAL TESTS:    Imaging Personally Reviewed:    Consultant(s) Notes Reviewed:      Care Discussed with Consultants/Other Providers:

## 2019-07-29 NOTE — PROGRESS NOTE ADULT - ASSESSMENT
59M, hx DM, HTN presents from [facility] via EMS due to resp distress. Patient at [facility] due to right diabetic foot ulcer. Vascular consulted for non-healing right foot wound evaluation for BKA    -  c/w abx  -  d/c to rehab       Vascular Surgery [phone]

## 2019-07-29 NOTE — PROVIDER CONTACT NOTE (OTHER) - BACKGROUND
pt admitted with acute on chronic diastolic chf, s/p R BKA. TOV 7/29. post-void residual bladder scan > 500ml

## 2019-07-29 NOTE — PROGRESS NOTE ADULT - ASSESSMENT
Assessment  DMT2: 59y Male with DM T2 with hyperglycemia, on basal bolus insulin, , c/o foot pain, blood sugars trending up   at [facility] yesterday, on Lantus 24 units and lispro 4 units prebreakfast  , no new hypoglycemic episode, eating meals.  Foot wound: On wound care, meds, stable.  CAD: on medications, no chest pain, stable, monitored.  HTN: Controlled,  on antihypertensive medications.  CKD: Monitor labs/BMP,     conner Burden  Cell: [phone]

## 2019-07-29 NOTE — PROGRESS NOTE ADULT - SUBJECTIVE AND OBJECTIVE BOX
Subjective: Patient seen and examined. No new events except as noted.   resting comfortably   sleepy and weak     REVIEW OF SYSTEMS:    CONSTITUTIONAL: + weakness, fevers or chills  EYES/ENT: No visual changes;  No vertigo or throat pain   NECK: No pain or stiffness  RESPIRATORY: No cough, wheezing, hemoptysis; No shortness of breath  CARDIOVASCULAR: No chest pain or palpitations  GASTROINTESTINAL: No abdominal or epigastric pain. No nausea, vomiting, or hematemesis; No diarrhea or constipation. No melena or hematochezia.  GENITOURINARY: No dysuria, frequency or hematuria  NEUROLOGICAL: No numbness or weakness  SKIN: No itching, burning, rashes, or lesions   All other review of systems is negative unless indicated above.    MEDICATIONS:  MEDICATIONS  (STANDING):  amLODIPine   Tablet 10 milliGRAM(s) Oral daily  chlorhexidine 2% Cloths 1 Application(s) Topical daily  collagenase Ointment 1 Application(s) Topical <User Schedule>  dextrose 5%. 1000 milliLiter(s) (50 mL/Hr) IV Continuous <Continuous>  dextrose 50% Injectable 12.5 Gram(s) IV Push once  dextrose 50% Injectable 25 Gram(s) IV Push once  dextrose 50% Injectable 25 Gram(s) IV Push once  dronabinol 2.5 milliGRAM(s) Oral two times a day  epoetin braden Injectable 31429 Unit(s) SubCutaneous <User Schedule>  ferrous    sulfate 325 milliGRAM(s) Oral daily  furosemide    Tablet 40 milliGRAM(s) Oral daily  gabapentin 100 milliGRAM(s) Oral every 8 hours  heparin  Injectable 5000 Unit(s) SubCutaneous every 8 hours  hydrALAZINE 100 milliGRAM(s) Oral three times a day  insulin glargine Injectable (LANTUS) 24 Unit(s) SubCutaneous at bedtime  insulin lispro (HumaLOG) corrective regimen sliding scale   SubCutaneous three times a day before meals  insulin lispro (HumaLOG) corrective regimen sliding scale   SubCutaneous at bedtime  insulin lispro Injectable (HumaLOG) 4 Unit(s) SubCutaneous three times a day before meals  polyethylene glycol 3350 17 Gram(s) Oral every 12 hours  primidone 50 milliGRAM(s) Oral two times a day  senna 2 Tablet(s) Oral at bedtime  sodium chloride 0.9%. 1000 milliLiter(s) (30 mL/Hr) IV Continuous <Continuous>  tamsulosin 0.4 milliGRAM(s) Oral at bedtime      PHYSICAL EXAM:  T(C): 36.6 (07-29-19 @ 04:24), Max: 36.8 (07-28-19 @ 21:06)  HR: 78 (07-29-19 @ 05:53) (67 - 87)  BP: 151/85 (07-29-19 @ 05:53) (125/72 - 151/85)  RR: 18 (07-29-19 @ 04:24) (16 - 18)  SpO2: 96% (07-29-19 @ 04:24) (96% - 98%)  Wt(kg): --  I&O's Summary    28 Jul 2019 07:01  -  29 Jul 2019 07:00  --------------------------------------------------------  IN: 960 mL / OUT: 800 mL / NET: 160 mL            Appearance: NAD  HEENT:   Normal oral mucosa, PERRL, EOMI	  Lymphatic: No lymphadenopathy , no edema  Cardiovascular: Normal S1 S2, No JVD, No murmurs , Peripheral pulses palpable 2+ bilaterally  Respiratory: decreased bs 	  Gastrointestinal:  Soft, Non-tender, + BS	  Skin: No rashes, No ecchymoses, No cyanosis, warm to touch  Musculoskeletal: Normal range of motion, normal strength  Psychiatry:  Mood & affect appropriate  Ext: R BKA         LABS:    CARDIAC MARKERS:            07-29    136  |  98  |  16  ----------------------------<  185<H>  3.8   |  26  |  1.47<H>    Ca    8.9      29 Jul 2019 07:07      proBNP:   Lipid Profile:   HgA1c:   TSH:             TELEMETRY: 	    ECG:  	  RADIOLOGY:   DIAGNOSTIC TESTING:  [ ] Echocardiogram:  [ ]  Catheterization:  [ ] Stress Test:    OTHER:

## 2019-07-29 NOTE — PROGRESS NOTE ADULT - SUBJECTIVE AND OBJECTIVE BOX
Patient is a 59y old  Male who presents with a chief complaint of SOB (28 Jul 2019 10:15)      Vascular Surgery Attending Progress Note    Interval HPI: pt w/o c/o     Medications:  acetaminophen   Tablet .. 650 milliGRAM(s) Oral every 6 hours PRN  amLODIPine   Tablet 10 milliGRAM(s) Oral daily  chlorhexidine 2% Cloths 1 Application(s) Topical daily  collagenase Ointment 1 Application(s) Topical <User Schedule>  dextrose 40% Gel 15 Gram(s) Oral once PRN  dextrose 5%. 1000 milliLiter(s) IV Continuous <Continuous>  dextrose 50% Injectable 12.5 Gram(s) IV Push once  dextrose 50% Injectable 25 Gram(s) IV Push once  dextrose 50% Injectable 25 Gram(s) IV Push once  dronabinol 2.5 milliGRAM(s) Oral two times a day  epoetin braden Injectable 37111 Unit(s) SubCutaneous <User Schedule>  ferrous    sulfate 325 milliGRAM(s) Oral daily  furosemide    Tablet 40 milliGRAM(s) Oral daily  gabapentin 100 milliGRAM(s) Oral every 8 hours  glucagon  Injectable 1 milliGRAM(s) IntraMuscular once PRN  heparin  Injectable 5000 Unit(s) SubCutaneous every 8 hours  hydrALAZINE 100 milliGRAM(s) Oral three times a day  insulin glargine Injectable (LANTUS) 24 Unit(s) SubCutaneous at bedtime  insulin lispro (HumaLOG) corrective regimen sliding scale   SubCutaneous three times a day before meals  insulin lispro (HumaLOG) corrective regimen sliding scale   SubCutaneous at bedtime  insulin lispro Injectable (HumaLOG) 4 Unit(s) SubCutaneous three times a day before meals  oxyCODONE    5 mG/acetaminophen 325 mG 1 Tablet(s) Oral every 4 hours PRN  polyethylene glycol 3350 17 Gram(s) Oral every 12 hours  primidone 50 milliGRAM(s) Oral two times a day  senna 2 Tablet(s) Oral at bedtime  sodium chloride 0.9%. 1000 milliLiter(s) IV Continuous <Continuous>  tamsulosin 0.4 milliGRAM(s) Oral at bedtime      Vital Signs Last 24 Hrs  T(C): 36.6 (29 Jul 2019 04:24), Max: 36.8 (28 Jul 2019 21:06)  T(F): 97.9 (29 Jul 2019 04:24), Max: 98.2 (28 Jul 2019 21:06)  HR: 78 (29 Jul 2019 05:53) (67 - 87)  BP: 151/85 (29 Jul 2019 05:53) (125/72 - 151/85)  BP(mean): --  RR: 18 (29 Jul 2019 04:24) (16 - 18)  SpO2: 96% (29 Jul 2019 04:24) (96% - 98%)  I&O's Summary    28 Jul 2019 07:01  -  29 Jul 2019 07:00  --------------------------------------------------------  IN: 960 mL / OUT: 800 mL / NET: 160 mL        Physical Exam:  Neuro  A&Ox3 VSS  Vascular:  rt bka stable    LABS:                        9.4    5.17  )-----------( 288      ( 27 Jul 2019 09:00 )             30.3     07-29    136  |  98  |  16  ----------------------------<  185<H>  3.8   |  26  |  1.47<H>    Ca    8.9      29 Jul 2019 07:07          CHRISTIANO KERR MD  [phone]

## 2019-07-29 NOTE — PROGRESS NOTE ADULT - SUBJECTIVE AND OBJECTIVE BOX
Endocrinology Attending Covering for Dr. Zhu      Chief complaint  Patient is a 59y old  Male who presents with a chief complaint of SOB (29 Jul 2019 10:24)   Review of systems  Patient in bed, looks comfortable, no fever,  had no hypoglycemia.    Labs and Fingersticks  CAPILLARY BLOOD GLUCOSE      POCT Blood Glucose.: 152 mg/dL (29 Jul 2019 07:53)  POCT Blood Glucose.: 307 mg/dL (28 Jul 2019 21:40)  POCT Blood Glucose.: 210 mg/dL (28 Jul 2019 16:31)  POCT Blood Glucose.: 185 mg/dL (28 Jul 2019 11:33)      Anion Gap, Serum: 12 (07-29 @ 07:07)  Anion Gap, Serum: 12 (07-28 @ 07:09)      Calcium, Total Serum: 8.9 (07-29 @ 07:07)  Calcium, Total Serum: 9.2 (07-28 @ 07:09)          07-29    136  |  98  |  16  ----------------------------<  185<H>  3.8   |  26  |  1.47<H>    Ca    8.9      29 Jul 2019 07:07      Medications  MEDICATIONS  (STANDING):  amLODIPine   Tablet 10 milliGRAM(s) Oral daily  chlorhexidine 2% Cloths 1 Application(s) Topical daily  collagenase Ointment 1 Application(s) Topical <User Schedule>  dextrose 5%. 1000 milliLiter(s) (50 mL/Hr) IV Continuous <Continuous>  dextrose 50% Injectable 12.5 Gram(s) IV Push once  dextrose 50% Injectable 25 Gram(s) IV Push once  dextrose 50% Injectable 25 Gram(s) IV Push once  dronabinol 2.5 milliGRAM(s) Oral two times a day  epoetin braden Injectable 69702 Unit(s) SubCutaneous <User Schedule>  ferrous    sulfate 325 milliGRAM(s) Oral daily  furosemide    Tablet 80 milliGRAM(s) Oral daily  gabapentin 100 milliGRAM(s) Oral every 8 hours  heparin  Injectable 5000 Unit(s) SubCutaneous every 8 hours  hydrALAZINE 100 milliGRAM(s) Oral three times a day  insulin glargine Injectable (LANTUS) 24 Unit(s) SubCutaneous at bedtime  insulin lispro (HumaLOG) corrective regimen sliding scale   SubCutaneous three times a day before meals  insulin lispro (HumaLOG) corrective regimen sliding scale   SubCutaneous at bedtime  insulin lispro Injectable (HumaLOG) 4 Unit(s) SubCutaneous three times a day before meals  polyethylene glycol 3350 17 Gram(s) Oral every 12 hours  primidone 50 milliGRAM(s) Oral two times a day  senna 2 Tablet(s) Oral at bedtime  sodium chloride 0.9%. 1000 milliLiter(s) (30 mL/Hr) IV Continuous <Continuous>  tamsulosin 0.4 milliGRAM(s) Oral at bedtime      Physical Exam  General: Patient comfortable in bed  Vital Signs Last 12 Hrs  T(F): 97.9 (07-29-19 @ 04:24), Max: 97.9 (07-29-19 @ 04:24)  HR: 78 (07-29-19 @ 05:53) (67 - 78)  BP: 151/85 (07-29-19 @ 05:53) (125/72 - 151/85)  BP(mean): --  RR: 18 (07-29-19 @ 04:24) (18 - 18)  SpO2: 96% (07-29-19 @ 04:24) (96% - 96%)  Neck: No palpable thyroid nodules.  CVS: S1S2, No murmurs  Respiratory: No wheezing, no crepitations  GI: Abdomen soft, bowel sounds positive  Musculoskeletal:  edema lower extremities.   Skin: No skin rashes, no ecchymosis    Diagnostics

## 2019-07-29 NOTE — PROGRESS NOTE ADULT - ASSESSMENT
59M, hx DM, HTN presents from [facility] via EMS due to resp distress. Patient at [facility] due to right diabetic foot ulcer. Patient reported to be hypoxic on room air to 60s. Improved on 15L face mask top 100% O2 sat. Patient awake, alert, oriented. States he has been coughing for 2-3 weeks (dry cough) as well as weakness and shortness of breath for few days.    Acute on chronic diastolic chf  - lasix 40 qd  - strict Is and Os  - cardiology consult appreciated  - keep O2 sats above 92%    diabetic foot ulcer with osteo  - s/p BKA  - off cefepime  - follow up by vascular    pain  - percocet prn  -  d/c Neurontin For phantom pain    essential tremors resolved  - may have  been secondary to infection  - d/c primidone  - seen by neuro consult      CKD 4  - monitor cre  - avoid nephrotoxins    anemia of chronic disease  - c/w iron and epogen    diabetes  - fs qid  - hgb a1c   7.1  - endocrine consult following    htn  - cw norvasc  - c/w hydralazine    constipation  - lactulose x 2  - miralax and senna  -  dulcolax x 1    urinary retention  - c/w flomax  - TOV today    DVT px    d/c planning

## 2019-07-29 NOTE — CHART NOTE - NSCHARTNOTEFT_GEN_A_CORE
9 beats of WCT    Notified by RN that patient had 9 beats of WCT. Patient denies shortness of breath, headache, palpitations, lightheadedness    Vital Signs Last 24 Hrs  T(C): 36.6 (29 Jul 2019 04:24), Max: 36.8 (28 Jul 2019 21:06)  T(F): 97.9 (29 Jul 2019 04:24), Max: 98.2 (28 Jul 2019 21:06)  HR: 78 (29 Jul 2019 05:53) (67 - 87)  BP: 151/85 (29 Jul 2019 05:53) (125/72 - 151/85)  BP(mean): --  RR: 18 (29 Jul 2019 04:24) (16 - 18)  SpO2: 96% (29 Jul 2019 04:24) (96% - 98%)    NAD awake alert and oriented    07-28    135  |  98  |  16  ----------------------------<  212<H>  3.8   |  25  |  1.48<H>    Ca    9.2      28 Jul 2019 07:09      MEDICATIONS  (STANDING):  amLODIPine   Tablet 10 milliGRAM(s) Oral daily  chlorhexidine 2% Cloths 1 Application(s) Topical daily  collagenase Ointment 1 Application(s) Topical <User Schedule>  dextrose 5%. 1000 milliLiter(s) (50 mL/Hr) IV Continuous <Continuous>  dextrose 50% Injectable 12.5 Gram(s) IV Push once  dextrose 50% Injectable 25 Gram(s) IV Push once  dextrose 50% Injectable 25 Gram(s) IV Push once  dronabinol 2.5 milliGRAM(s) Oral two times a day  epoetin braden Injectable 05120 Unit(s) SubCutaneous <User Schedule>  ferrous    sulfate 325 milliGRAM(s) Oral daily  furosemide    Tablet 40 milliGRAM(s) Oral daily  gabapentin 100 milliGRAM(s) Oral every 8 hours  heparin  Injectable 5000 Unit(s) SubCutaneous every 8 hours  hydrALAZINE 100 milliGRAM(s) Oral three times a day  insulin glargine Injectable (LANTUS) 24 Unit(s) SubCutaneous at bedtime  insulin lispro (HumaLOG) corrective regimen sliding scale   SubCutaneous three times a day before meals  insulin lispro (HumaLOG) corrective regimen sliding scale   SubCutaneous at bedtime  insulin lispro Injectable (HumaLOG) 4 Unit(s) SubCutaneous three times a day before meals  polyethylene glycol 3350 17 Gram(s) Oral every 12 hours  primidone 50 milliGRAM(s) Oral two times a day  senna 2 Tablet(s) Oral at bedtime  sodium chloride 0.9%. 1000 milliLiter(s) (30 mL/Hr) IV Continuous <Continuous>  tamsulosin 0.4 milliGRAM(s) Oral at bedtime    MEDICATIONS  (PRN):  acetaminophen   Tablet .. 650 milliGRAM(s) Oral every 6 hours PRN Mild Pain (1 - 3)  dextrose 40% Gel 15 Gram(s) Oral once PRN Blood Glucose LESS THAN 70 milliGRAM(s)/deciliter  glucagon  Injectable 1 milliGRAM(s) IntraMuscular once PRN Glucose LESS THAN 70 milligrams/deciliter  oxyCODONE    5 mG/acetaminophen 325 mG 1 Tablet(s) Oral every 4 hours PRN Moderate Pain (4 - 6)    PAST MEDICAL & SURGICAL HISTORY:  Hypertension  Insulin dependent diabetes mellitus  Venous insufficiency of both lower extremities: R &gt; L  HTN (hypertension)  BPH (benign prostatic hypertrophy)  Diabetes  History of cholecystectomy  S/P laparoscopic cholecystectomy  Status post incision and drainage: Rt groin abscess      HPI:  59M, hx DM, HTN presents from [facility] via EMS due to resp distress. Patient at [facility] due to right diabetic foot ulcer. Patient reported to be hypoxic on room air to 60s. Improved on 15L face mask top 100% O2 sat. (11 Jul 2019 15:54)      Plan  add phos and mag to am labs  bmp pending    Will endorse to primary day team, attending to follow  Linette Arias NP  spectralink [phone]

## 2019-07-29 NOTE — PROVIDER CONTACT NOTE (OTHER) - ACTION/TREATMENT ORDERED:
add mg and phos to AM labs. continue to monitor pt
EKG performed and reviewed with PA. Labs drawn and sent. Patient stable. VSS.
NP aware, apple juice given, glucose gel given, will do FS Q15 until FS over 100, continue to monitor
NP notified, continue to monitor
NP notified, ok to continue on tele due to continuous pulse ox
continue to monitor pt.

## 2019-07-29 NOTE — CHART NOTE - NSCHARTNOTEFT_GEN_A_CORE
Nutrition Follow Up Note  Patient seen for: nutrition follow-up     Chart reviewed, events noted. This is a 59M, with  PMHx DM, HTN presents from [facility] via EMS due to respiratory distress. Patient at [facility] due to right diabetic foot ulcer. Now s/p R BKA on . Endocrine following for glycemic control. Discharge planning.     Source: patient, medical record     Diet : carbohydrate consistent + Low Sodium Diet     Patient reports fair to good PO intake, states PO intake is has decreased over the last few days which pt attributes to pain medications. Reports he is still consuming >75% of meals for breakfast and lunch but tends to have a light dinner. Denies any acute GI distress. Last BM . Reviewed with patient menu ordering procedures including carbohydrate counting with menu. Discussed importance inadequate protein intake and glycemic control for wound healing.  Pt with no additional diet related questions. Pt declines Diet Mighty Shakes, is amendable to drinking Glucerna 1x per day (strawberry flavor only).      PO intake : ~75% of meals      Source for PO intake: pt reports      Enteral /Parenteral Nutrition: N/A    Weight fluctuations noted, pt now s/p BKA on .   Daily Weight in k.4 (-), Weight in k.7 (-), Weight in k.7 (-), Weight in k.4 ()  UBW 235lbs per pt report and nursing home transfer record     Pertinent Medications: MEDICATIONS  (STANDING):  amLODIPine   Tablet 10 milliGRAM(s) Oral daily  chlorhexidine 2% Cloths 1 Application(s) Topical daily  collagenase Ointment 1 Application(s) Topical <User Schedule>  dextrose 5%. 1000 milliLiter(s) (50 mL/Hr) IV Continuous <Continuous>  dextrose 50% Injectable 12.5 Gram(s) IV Push once  dextrose 50% Injectable 25 Gram(s) IV Push once  dextrose 50% Injectable 25 Gram(s) IV Push once  dronabinol 2.5 milliGRAM(s) Oral two times a day  epoetin braden Injectable 17708 Unit(s) SubCutaneous <User Schedule>  ferrous    sulfate 325 milliGRAM(s) Oral daily  furosemide    Tablet 80 milliGRAM(s) Oral daily  heparin  Injectable 5000 Unit(s) SubCutaneous every 8 hours  hydrALAZINE 100 milliGRAM(s) Oral three times a day  insulin glargine Injectable (LANTUS) 25 Unit(s) SubCutaneous at bedtime  insulin lispro (HumaLOG) corrective regimen sliding scale   SubCutaneous three times a day before meals  insulin lispro (HumaLOG) corrective regimen sliding scale   SubCutaneous at bedtime  insulin lispro Injectable (HumaLOG) 6 Unit(s) SubCutaneous three times a day before meals  polyethylene glycol 3350 17 Gram(s) Oral every 12 hours  senna 2 Tablet(s) Oral at bedtime  tamsulosin 0.4 milliGRAM(s) Oral at bedtime    MEDICATIONS  (PRN):  acetaminophen   Tablet .. 650 milliGRAM(s) Oral every 6 hours PRN Mild Pain (1 - 3)  dextrose 40% Gel 15 Gram(s) Oral once PRN Blood Glucose LESS THAN 70 milliGRAM(s)/deciliter  glucagon  Injectable 1 milliGRAM(s) IntraMuscular once PRN Glucose LESS THAN 70 milligrams/deciliter  oxyCODONE    5 mG/acetaminophen 325 mG 1 Tablet(s) Oral every 4 hours PRN Moderate Pain (4 - 6)    Pertinent Labs:  @ 07:07: Na 136, BUN 16, Cr 1.47<H>, <H>, K+ 3.8, Phos --, Mg --, Alk Phos --, ALT/SGPT --, AST/SGOT --, HbA1c --    Finger Sticks:  POCT Blood Glucose.: 120 mg/dL ( @ 11:40)  POCT Blood Glucose.: 152 mg/dL ( @ 07:53)  POCT Blood Glucose.: 307 mg/dL ( @ 21:40)  POCT Blood Glucose.: 210 mg/dL ( @ 16:31)      Skin per nursing documentation:   Edema:    Estimated Needs:   [ x] no change since previous assessment, protein needs previously elevated for wound healing   [ ] recalculated:       Previous Nutrition Diagnosis: Increased Nutrient Needs+ Food & Nutrition Related Knowledge Deficit.   Nutrition Diagnosis is: on going, being addressed with encouragement of protein-rich foods, nutrition supplements, diet education     New Nutrition Diagnosis: N/A       Interventions:     Recommend  1) Continue current diet as tolerated.   2) Recommend Glucerna 1x per day to supplement PO intake in setting of increased needs   3) Provide diet education reinforcement as needed.   4) Consider ascorbic acid for wound healing.     Monitoring and Evaluation:     Continue to monitor Nutritional intake, Tolerance to diet prescription, weights, labs, skin integrity    RD remains available upon request and will follow up per protocol  Aure Whitten RD, CDN, Pager # [phone]

## 2019-07-30 LAB
ANION GAP SERPL CALC-SCNC: 13 MMOL/L — SIGNIFICANT CHANGE UP (ref 5–17)
BUN SERPL-MCNC: 15 MG/DL — SIGNIFICANT CHANGE UP (ref 7–23)
CALCIUM SERPL-MCNC: 9.2 MG/DL — SIGNIFICANT CHANGE UP (ref 8.4–10.5)
CHLORIDE SERPL-SCNC: 99 MMOL/L — SIGNIFICANT CHANGE UP (ref 96–108)
CO2 SERPL-SCNC: 27 MMOL/L — SIGNIFICANT CHANGE UP (ref 22–31)
CREAT SERPL-MCNC: 1.39 MG/DL — HIGH (ref 0.5–1.3)
GLUCOSE BLDC GLUCOMTR-MCNC: 138 MG/DL — HIGH (ref 70–99)
GLUCOSE BLDC GLUCOMTR-MCNC: 149 MG/DL — HIGH (ref 70–99)
GLUCOSE BLDC GLUCOMTR-MCNC: 195 MG/DL — HIGH (ref 70–99)
GLUCOSE BLDC GLUCOMTR-MCNC: 224 MG/DL — HIGH (ref 70–99)
GLUCOSE SERPL-MCNC: 150 MG/DL — HIGH (ref 70–99)
POTASSIUM SERPL-MCNC: 4.1 MMOL/L — SIGNIFICANT CHANGE UP (ref 3.5–5.3)
POTASSIUM SERPL-SCNC: 4.1 MMOL/L — SIGNIFICANT CHANGE UP (ref 3.5–5.3)
SODIUM SERPL-SCNC: 139 MMOL/L — SIGNIFICANT CHANGE UP (ref 135–145)

## 2019-07-30 RX ADMIN — Medication 100 MILLIGRAM(S): at 14:46

## 2019-07-30 RX ADMIN — OXYCODONE AND ACETAMINOPHEN 1 TABLET(S): 5; 325 TABLET ORAL at 11:30

## 2019-07-30 RX ADMIN — HEPARIN SODIUM 5000 UNIT(S): 5000 INJECTION INTRAVENOUS; SUBCUTANEOUS at 07:53

## 2019-07-30 RX ADMIN — CHLORHEXIDINE GLUCONATE 1 APPLICATION(S): 213 SOLUTION TOPICAL at 14:47

## 2019-07-30 RX ADMIN — Medication 100 MILLIGRAM(S): at 22:22

## 2019-07-30 RX ADMIN — HEPARIN SODIUM 5000 UNIT(S): 5000 INJECTION INTRAVENOUS; SUBCUTANEOUS at 14:46

## 2019-07-30 RX ADMIN — Medication 2.5 MILLIGRAM(S): at 07:53

## 2019-07-30 RX ADMIN — HEPARIN SODIUM 5000 UNIT(S): 5000 INJECTION INTRAVENOUS; SUBCUTANEOUS at 22:22

## 2019-07-30 RX ADMIN — OXYCODONE AND ACETAMINOPHEN 1 TABLET(S): 5; 325 TABLET ORAL at 10:19

## 2019-07-30 RX ADMIN — Medication 100 MILLIGRAM(S): at 07:54

## 2019-07-30 RX ADMIN — POLYETHYLENE GLYCOL 3350 17 GRAM(S): 17 POWDER, FOR SOLUTION ORAL at 07:54

## 2019-07-30 RX ADMIN — Medication 6 UNIT(S): at 17:28

## 2019-07-30 RX ADMIN — Medication 325 MILLIGRAM(S): at 14:46

## 2019-07-30 RX ADMIN — Medication 6 UNIT(S): at 08:56

## 2019-07-30 RX ADMIN — Medication 2: at 17:27

## 2019-07-30 RX ADMIN — INSULIN GLARGINE 25 UNIT(S): 100 INJECTION, SOLUTION SUBCUTANEOUS at 22:21

## 2019-07-30 RX ADMIN — OXYCODONE AND ACETAMINOPHEN 1 TABLET(S): 5; 325 TABLET ORAL at 22:35

## 2019-07-30 RX ADMIN — OXYCODONE AND ACETAMINOPHEN 1 TABLET(S): 5; 325 TABLET ORAL at 21:09

## 2019-07-30 RX ADMIN — Medication 6 UNIT(S): at 12:48

## 2019-07-30 RX ADMIN — AMLODIPINE BESYLATE 10 MILLIGRAM(S): 2.5 TABLET ORAL at 07:53

## 2019-07-30 RX ADMIN — Medication 80 MILLIGRAM(S): at 07:53

## 2019-07-30 RX ADMIN — TAMSULOSIN HYDROCHLORIDE 0.4 MILLIGRAM(S): 0.4 CAPSULE ORAL at 22:22

## 2019-07-30 RX ADMIN — ERYTHROPOIETIN 10000 UNIT(S): 10000 INJECTION, SOLUTION INTRAVENOUS; SUBCUTANEOUS at 08:56

## 2019-07-30 NOTE — PROGRESS NOTE ADULT - SUBJECTIVE AND OBJECTIVE BOX
Patient is a 59y old  Male who presents with a chief complaint of SOB (30 Jul 2019 10:43)      SUBJECTIVE / OVERNIGHT EVENTS:  No chest pain. No shortness of breath. No complaints. No events overnight.     MEDICATIONS  (STANDING):  amLODIPine   Tablet 10 milliGRAM(s) Oral daily  chlorhexidine 2% Cloths 1 Application(s) Topical daily  collagenase Ointment 1 Application(s) Topical <User Schedule>  dextrose 5%. 1000 milliLiter(s) (50 mL/Hr) IV Continuous <Continuous>  dextrose 50% Injectable 12.5 Gram(s) IV Push once  dextrose 50% Injectable 25 Gram(s) IV Push once  dextrose 50% Injectable 25 Gram(s) IV Push once  dronabinol 2.5 milliGRAM(s) Oral two times a day  epoetin braden Injectable 31364 Unit(s) SubCutaneous <User Schedule>  ferrous    sulfate 325 milliGRAM(s) Oral daily  furosemide    Tablet 80 milliGRAM(s) Oral daily  heparin  Injectable 5000 Unit(s) SubCutaneous every 8 hours  hydrALAZINE 100 milliGRAM(s) Oral three times a day  insulin glargine Injectable (LANTUS) 25 Unit(s) SubCutaneous at bedtime  insulin lispro (HumaLOG) corrective regimen sliding scale   SubCutaneous three times a day before meals  insulin lispro (HumaLOG) corrective regimen sliding scale   SubCutaneous at bedtime  insulin lispro Injectable (HumaLOG) 6 Unit(s) SubCutaneous three times a day before meals  polyethylene glycol 3350 17 Gram(s) Oral every 12 hours  senna 2 Tablet(s) Oral at bedtime  tamsulosin 0.4 milliGRAM(s) Oral at bedtime    MEDICATIONS  (PRN):  acetaminophen   Tablet .. 650 milliGRAM(s) Oral every 6 hours PRN Mild Pain (1 - 3)  dextrose 40% Gel 15 Gram(s) Oral once PRN Blood Glucose LESS THAN 70 milliGRAM(s)/deciliter  glucagon  Injectable 1 milliGRAM(s) IntraMuscular once PRN Glucose LESS THAN 70 milligrams/deciliter  oxyCODONE    5 mG/acetaminophen 325 mG 1 Tablet(s) Oral every 4 hours PRN Moderate Pain (4 - 6)      Vital Signs Last 24 Hrs  T(C): 36.6 (30 Jul 2019 11:34), Max: 36.7 (30 Jul 2019 04:20)  T(F): 97.9 (30 Jul 2019 11:34), Max: 98.1 (30 Jul 2019 04:20)  HR: 75 (30 Jul 2019 11:34) (75 - 86)  BP: 133/77 (30 Jul 2019 11:34) (133/74 - 146/81)  BP(mean): --  RR: 18 (30 Jul 2019 11:34) (18 - 18)  SpO2: 97% (30 Jul 2019 11:34) (95% - 97%)  CAPILLARY BLOOD GLUCOSE      POCT Blood Glucose.: 138 mg/dL (30 Jul 2019 12:01)  POCT Blood Glucose.: 149 mg/dL (30 Jul 2019 08:06)  POCT Blood Glucose.: 116 mg/dL (29 Jul 2019 22:02)  POCT Blood Glucose.: 181 mg/dL (29 Jul 2019 17:10)    I&O's Summary    29 Jul 2019 07:01  -  30 Jul 2019 07:00  --------------------------------------------------------  IN: 0 mL / OUT: 1550 mL / NET: -1550 mL    30 Jul 2019 07:01  -  30 Jul 2019 12:41  --------------------------------------------------------  IN: 360 mL / OUT: 350 mL / NET: 10 mL        PHYSICAL EXAM:  GENERAL: NAD, well-developed  HEAD:  Atraumatic, Normocephalic  EYES: EOMI, PERRLA, conjunctiva and sclera clear  NECK: Supple, No JVD  CHEST/LUNG: Clear to auscultation bilaterally; No wheeze  HEART: Regular rate and rhythm; No murmurs, rubs, or gallops  ABDOMEN: Soft, Nontender, Nondistended; Bowel sounds present  EXTREMITIES:  2+ Peripheral Pulses, No clubbing, cyanosis, or edema, right BKA  PSYCH: AAOx3  NEUROLOGY: non-focal  SKIN: No rashes or lesions    LABS:    07-30    139  |  99  |  15  ----------------------------<  150<H>  4.1   |  27  |  1.39<H>    Ca    9.2      30 Jul 2019 06:16                RADIOLOGY & ADDITIONAL TESTS:    Imaging Personally Reviewed:    Consultant(s) Notes Reviewed:      Care Discussed with Consultants/Other Providers:

## 2019-07-30 NOTE — PROGRESS NOTE ADULT - ASSESSMENT
Assessment  DMT2: 59y Male with DM T2 with hyperglycemia, on basal bolus insulin, , c/o foot pain, blood sugars trending up   at [facility] yesterday, on Lantus 25 units and lispro 6 units prebreakfast  , no new hypoglycemic episode, eating meals.  Foot wound: On wound care, meds, stable.  CAD: on medications, no chest pain, stable, monitored.  HTN: Controlled,  on antihypertensive medications.  CKD: Monitor labs/BMP,     conner Burden  Cell: [phone]

## 2019-07-30 NOTE — PROGRESS NOTE ADULT - ASSESSMENT
59M, hx DM, HTN presents from [facility] via EMS due to resp distress. Patient at [facility] due to right diabetic foot ulcer. Patient reported to be hypoxic on room air to 60s. Improved on 15L face mask top 100% O2 sat. Patient awake, alert, oriented. States he has been coughing for 2-3 weeks (dry cough) as well as weakness and shortness of breath for few days.    Acute on chronic diastolic chf  - lasix 80 qd  - strict Is and Os  - cardiology consult appreciated  - keep O2 sats above 92%    diabetic foot ulcer with osteo  - s/p BKA  - off cefepime  - follow up by vascular    pain  - percocet prn  -  d/c Neurontin For phantom pain    essential tremors resolved  - may have  been secondary to infection  - d/c primidone  - seen by neuro consult    CKD 4  - monitor cre  - avoid nephrotoxins    anemia of chronic disease  - c/w iron and epogen    diabetes  - fs qid  - hgb a1c   7.1  - endocrine consult following    htn  - cw norvasc  - c/w hydralazine    constipation  -  had BM  - miralax and senna    urinary retention  - c/w flomax  - voiding freely    DVT px    d/c planning

## 2019-07-30 NOTE — PROGRESS NOTE ADULT - SUBJECTIVE AND OBJECTIVE BOX
Subjective: Patient seen and examined. No new events except as noted.   resting comfortably     REVIEW OF SYSTEMS:    CONSTITUTIONAL: +weakness, fevers or chills  EYES/ENT: No visual changes;  No vertigo or throat pain   NECK: No pain or stiffness  RESPIRATORY: No cough, wheezing, hemoptysis; No shortness of breath  CARDIOVASCULAR: No chest pain or palpitations  GASTROINTESTINAL: No abdominal or epigastric pain. No nausea, vomiting, or hematemesis; No diarrhea or constipation. No melena or hematochezia.  GENITOURINARY: No dysuria, frequency or hematuria  NEUROLOGICAL: No numbness or weakness  SKIN: No itching, burning, rashes, or lesions   All other review of systems is negative unless indicated above.    MEDICATIONS:  MEDICATIONS  (STANDING):  amLODIPine   Tablet 10 milliGRAM(s) Oral daily  chlorhexidine 2% Cloths 1 Application(s) Topical daily  collagenase Ointment 1 Application(s) Topical <User Schedule>  dextrose 5%. 1000 milliLiter(s) (50 mL/Hr) IV Continuous <Continuous>  dextrose 50% Injectable 12.5 Gram(s) IV Push once  dextrose 50% Injectable 25 Gram(s) IV Push once  dextrose 50% Injectable 25 Gram(s) IV Push once  dronabinol 2.5 milliGRAM(s) Oral two times a day  epoetin braden Injectable 15862 Unit(s) SubCutaneous <User Schedule>  ferrous    sulfate 325 milliGRAM(s) Oral daily  furosemide    Tablet 80 milliGRAM(s) Oral daily  heparin  Injectable 5000 Unit(s) SubCutaneous every 8 hours  hydrALAZINE 100 milliGRAM(s) Oral three times a day  insulin glargine Injectable (LANTUS) 25 Unit(s) SubCutaneous at bedtime  insulin lispro (HumaLOG) corrective regimen sliding scale   SubCutaneous three times a day before meals  insulin lispro (HumaLOG) corrective regimen sliding scale   SubCutaneous at bedtime  insulin lispro Injectable (HumaLOG) 6 Unit(s) SubCutaneous three times a day before meals  polyethylene glycol 3350 17 Gram(s) Oral every 12 hours  senna 2 Tablet(s) Oral at bedtime  tamsulosin 0.4 milliGRAM(s) Oral at bedtime      PHYSICAL EXAM:  T(C): 36.7 (07-30-19 @ 04:20), Max: 36.7 (07-30-19 @ 04:20)  HR: 78 (07-30-19 @ 04:20) (74 - 86)  BP: 133/74 (07-30-19 @ 04:20) (113/69 - 146/81)  RR: 18 (07-30-19 @ 04:20) (18 - 18)  SpO2: 97% (07-30-19 @ 04:20) (94% - 97%)  Wt(kg): --  I&O's Summary    29 Jul 2019 07:01  -  30 Jul 2019 07:00  --------------------------------------------------------  IN: 0 mL / OUT: 1550 mL / NET: -1550 mL    30 Jul 2019 07:01  -  30 Jul 2019 10:43  --------------------------------------------------------  IN: 0 mL / OUT: 350 mL / NET: -350 mL          Appearance: Normal	  HEENT:   Normal oral mucosa, PERRL, EOMI	  Lymphatic: No lymphadenopathy , no edema  Cardiovascular: Normal S1 S2, No JVD, No murmurs , Peripheral pulses palpable 2+ bilaterally  Respiratory: Lungs clear to auscultation, normal effort 	  Gastrointestinal:  Soft, Non-tender, + BS	  Skin: No rashes, No ecchymoses, No cyanosis, warm to touch  Musculoskeletal: Normal range of motion, normal strength  Psychiatry: sleepy   Ext: right BKA       LABS:    CARDIAC MARKERS:            07-30    139  |  99  |  15  ----------------------------<  150<H>  4.1   |  27  |  1.39<H>    Ca    9.2      30 Jul 2019 06:16      proBNP:   Lipid Profile:   HgA1c:   TSH:             TELEMETRY: 	    ECG:  	  RADIOLOGY:   DIAGNOSTIC TESTING:  [ ] Echocardiogram:  [ ]  Catheterization:  [ ] Stress Test:    OTHER:

## 2019-07-30 NOTE — PROGRESS NOTE ADULT - SUBJECTIVE AND OBJECTIVE BOX
Endocrinology Attending Covering for Dr. Zhu      Chief complaint  Patient is a 59y old  Male who presents with a chief complaint of SOB (29 Jul 2019 14:43)   Review of systems  Patient in bed, looks comfortable, no fever,  had no hypoglycemia.    Labs and Fingersticks  CAPILLARY BLOOD GLUCOSE      POCT Blood Glucose.: 149 mg/dL (30 Jul 2019 08:06)  POCT Blood Glucose.: 116 mg/dL (29 Jul 2019 22:02)  POCT Blood Glucose.: 181 mg/dL (29 Jul 2019 17:10)  POCT Blood Glucose.: 120 mg/dL (29 Jul 2019 11:40)      Anion Gap, Serum: 13 (07-30 @ 06:16)  Anion Gap, Serum: 12 (07-29 @ 07:07)      Calcium, Total Serum: 9.2 (07-30 @ 06:16)  Calcium, Total Serum: 8.9 (07-29 @ 07:07)          07-30    139  |  99  |  15  ----------------------------<  150<H>  4.1   |  27  |  1.39<H>    Ca    9.2      30 Jul 2019 06:16      Medications  MEDICATIONS  (STANDING):  amLODIPine   Tablet 10 milliGRAM(s) Oral daily  chlorhexidine 2% Cloths 1 Application(s) Topical daily  collagenase Ointment 1 Application(s) Topical <User Schedule>  dextrose 5%. 1000 milliLiter(s) (50 mL/Hr) IV Continuous <Continuous>  dextrose 50% Injectable 12.5 Gram(s) IV Push once  dextrose 50% Injectable 25 Gram(s) IV Push once  dextrose 50% Injectable 25 Gram(s) IV Push once  dronabinol 2.5 milliGRAM(s) Oral two times a day  epoetin braden Injectable 57996 Unit(s) SubCutaneous <User Schedule>  ferrous    sulfate 325 milliGRAM(s) Oral daily  furosemide    Tablet 80 milliGRAM(s) Oral daily  heparin  Injectable 5000 Unit(s) SubCutaneous every 8 hours  hydrALAZINE 100 milliGRAM(s) Oral three times a day  insulin glargine Injectable (LANTUS) 25 Unit(s) SubCutaneous at bedtime  insulin lispro (HumaLOG) corrective regimen sliding scale   SubCutaneous three times a day before meals  insulin lispro (HumaLOG) corrective regimen sliding scale   SubCutaneous at bedtime  insulin lispro Injectable (HumaLOG) 6 Unit(s) SubCutaneous three times a day before meals  polyethylene glycol 3350 17 Gram(s) Oral every 12 hours  senna 2 Tablet(s) Oral at bedtime  tamsulosin 0.4 milliGRAM(s) Oral at bedtime      Physical Exam  General: Patient comfortable in bed  Vital Signs Last 12 Hrs  T(F): 98.1 (07-30-19 @ 04:20), Max: 98.1 (07-30-19 @ 04:20)  HR: 78 (07-30-19 @ 04:20) (78 - 86)  BP: 133/74 (07-30-19 @ 04:20) (133/74 - 146/81)  BP(mean): --  RR: 18 (07-30-19 @ 04:20) (18 - 18)  SpO2: 97% (07-30-19 @ 04:20) (95% - 97%)  Neck: No palpable thyroid nodules.  CVS: S1S2, No murmurs  Respiratory: No wheezing, no crepitations  GI: Abdomen soft, bowel sounds positive  Musculoskeletal:  edema lower extremities.   Skin: No skin rashes, no ecchymosis    Diagnostics

## 2019-07-31 ENCOUNTER — TRANSCRIPTION ENCOUNTER (OUTPATIENT)
Age: 59
End: 2019-07-31

## 2019-07-31 VITALS
OXYGEN SATURATION: 94 % | TEMPERATURE: 98 F | SYSTOLIC BLOOD PRESSURE: 146 MMHG | HEART RATE: 146 BPM | DIASTOLIC BLOOD PRESSURE: 82 MMHG | RESPIRATION RATE: 18 BRPM

## 2019-07-31 LAB
ANION GAP SERPL CALC-SCNC: 13 MMOL/L — SIGNIFICANT CHANGE UP (ref 5–17)
BUN SERPL-MCNC: 16 MG/DL — SIGNIFICANT CHANGE UP (ref 7–23)
CALCIUM SERPL-MCNC: 9.1 MG/DL — SIGNIFICANT CHANGE UP (ref 8.4–10.5)
CHLORIDE SERPL-SCNC: 97 MMOL/L — SIGNIFICANT CHANGE UP (ref 96–108)
CO2 SERPL-SCNC: 28 MMOL/L — SIGNIFICANT CHANGE UP (ref 22–31)
CREAT SERPL-MCNC: 1.49 MG/DL — HIGH (ref 0.5–1.3)
CULTURE RESULTS: SIGNIFICANT CHANGE UP
CULTURE RESULTS: SIGNIFICANT CHANGE UP
GLUCOSE BLDC GLUCOMTR-MCNC: 180 MG/DL — HIGH (ref 70–99)
GLUCOSE BLDC GLUCOMTR-MCNC: 98 MG/DL — SIGNIFICANT CHANGE UP (ref 70–99)
GLUCOSE SERPL-MCNC: 100 MG/DL — HIGH (ref 70–99)
POTASSIUM SERPL-MCNC: 3.9 MMOL/L — SIGNIFICANT CHANGE UP (ref 3.5–5.3)
POTASSIUM SERPL-SCNC: 3.9 MMOL/L — SIGNIFICANT CHANGE UP (ref 3.5–5.3)
SODIUM SERPL-SCNC: 138 MMOL/L — SIGNIFICANT CHANGE UP (ref 135–145)
SPECIMEN SOURCE: SIGNIFICANT CHANGE UP
SPECIMEN SOURCE: SIGNIFICANT CHANGE UP
SURGICAL PATHOLOGY STUDY: SIGNIFICANT CHANGE UP

## 2019-07-31 PROCEDURE — 86850 RBC ANTIBODY SCREEN: CPT

## 2019-07-31 PROCEDURE — A9585: CPT

## 2019-07-31 PROCEDURE — 81001 URINALYSIS AUTO W/SCOPE: CPT

## 2019-07-31 PROCEDURE — 84443 ASSAY THYROID STIM HORMONE: CPT

## 2019-07-31 PROCEDURE — 88307 TISSUE EXAM BY PATHOLOGIST: CPT

## 2019-07-31 PROCEDURE — 84295 ASSAY OF SERUM SODIUM: CPT

## 2019-07-31 PROCEDURE — 84100 ASSAY OF PHOSPHORUS: CPT

## 2019-07-31 PROCEDURE — 80053 COMPREHEN METABOLIC PANEL: CPT

## 2019-07-31 PROCEDURE — 84484 ASSAY OF TROPONIN QUANT: CPT

## 2019-07-31 PROCEDURE — C8929: CPT

## 2019-07-31 PROCEDURE — 93923 UPR/LXTR ART STDY 3+ LVLS: CPT

## 2019-07-31 PROCEDURE — 82607 VITAMIN B-12: CPT

## 2019-07-31 PROCEDURE — 84132 ASSAY OF SERUM POTASSIUM: CPT

## 2019-07-31 PROCEDURE — 85730 THROMBOPLASTIN TIME PARTIAL: CPT

## 2019-07-31 PROCEDURE — 85014 HEMATOCRIT: CPT

## 2019-07-31 PROCEDURE — 97605 NEG PRS WND THER DME<=50SQCM: CPT

## 2019-07-31 PROCEDURE — 87205 SMEAR GRAM STAIN: CPT

## 2019-07-31 PROCEDURE — 83735 ASSAY OF MAGNESIUM: CPT

## 2019-07-31 PROCEDURE — 85027 COMPLETE CBC AUTOMATED: CPT

## 2019-07-31 PROCEDURE — 87040 BLOOD CULTURE FOR BACTERIA: CPT

## 2019-07-31 PROCEDURE — 82947 ASSAY GLUCOSE BLOOD QUANT: CPT

## 2019-07-31 PROCEDURE — 93308 TTE F-UP OR LMTD: CPT

## 2019-07-31 PROCEDURE — 82746 ASSAY OF FOLIC ACID SERUM: CPT

## 2019-07-31 PROCEDURE — 82803 BLOOD GASES ANY COMBINATION: CPT

## 2019-07-31 PROCEDURE — 97164 PT RE-EVAL EST PLAN CARE: CPT

## 2019-07-31 PROCEDURE — 86901 BLOOD TYPING SEROLOGIC RH(D): CPT

## 2019-07-31 PROCEDURE — 87070 CULTURE OTHR SPECIMN AEROBIC: CPT

## 2019-07-31 PROCEDURE — 36600 WITHDRAWAL OF ARTERIAL BLOOD: CPT

## 2019-07-31 PROCEDURE — 83605 ASSAY OF LACTIC ACID: CPT

## 2019-07-31 PROCEDURE — 82962 GLUCOSE BLOOD TEST: CPT

## 2019-07-31 PROCEDURE — 97530 THERAPEUTIC ACTIVITIES: CPT

## 2019-07-31 PROCEDURE — 71045 X-RAY EXAM CHEST 1 VIEW: CPT

## 2019-07-31 PROCEDURE — 80048 BASIC METABOLIC PNL TOTAL CA: CPT

## 2019-07-31 PROCEDURE — 97162 PT EVAL MOD COMPLEX 30 MIN: CPT

## 2019-07-31 PROCEDURE — 99291 CRITICAL CARE FIRST HOUR: CPT | Mod: 25

## 2019-07-31 PROCEDURE — 87186 SC STD MICRODIL/AGAR DIL: CPT

## 2019-07-31 PROCEDURE — 96374 THER/PROPH/DIAG INJ IV PUSH: CPT

## 2019-07-31 PROCEDURE — 82330 ASSAY OF CALCIUM: CPT

## 2019-07-31 PROCEDURE — 82435 ASSAY OF BLOOD CHLORIDE: CPT

## 2019-07-31 PROCEDURE — 36430 TRANSFUSION BLD/BLD COMPNT: CPT

## 2019-07-31 PROCEDURE — 83036 HEMOGLOBIN GLYCOSYLATED A1C: CPT

## 2019-07-31 PROCEDURE — 85610 PROTHROMBIN TIME: CPT

## 2019-07-31 PROCEDURE — 73630 X-RAY EXAM OF FOOT: CPT

## 2019-07-31 PROCEDURE — 83880 ASSAY OF NATRIURETIC PEPTIDE: CPT

## 2019-07-31 PROCEDURE — 87086 URINE CULTURE/COLONY COUNT: CPT

## 2019-07-31 PROCEDURE — 87150 DNA/RNA AMPLIFIED PROBE: CPT

## 2019-07-31 PROCEDURE — 94660 CPAP INITIATION&MGMT: CPT

## 2019-07-31 PROCEDURE — 97116 GAIT TRAINING THERAPY: CPT

## 2019-07-31 PROCEDURE — 93005 ELECTROCARDIOGRAM TRACING: CPT

## 2019-07-31 PROCEDURE — 97110 THERAPEUTIC EXERCISES: CPT

## 2019-07-31 PROCEDURE — 73720 MRI LWR EXTREMITY W/O&W/DYE: CPT

## 2019-07-31 PROCEDURE — 87075 CULTR BACTERIA EXCEPT BLOOD: CPT

## 2019-07-31 PROCEDURE — 86900 BLOOD TYPING SEROLOGIC ABO: CPT

## 2019-07-31 RX ORDER — INSULIN GLARGINE 100 [IU]/ML
20 INJECTION, SOLUTION SUBCUTANEOUS
Qty: 0 | Refills: 0 | DISCHARGE

## 2019-07-31 RX ORDER — BACITRACIN ZINC 500 UNIT/G
1 OINTMENT IN PACKET (EA) TOPICAL
Qty: 0 | Refills: 0 | DISCHARGE

## 2019-07-31 RX ORDER — OXYCODONE HYDROCHLORIDE 5 MG/1
1 TABLET ORAL
Qty: 0 | Refills: 0 | DISCHARGE

## 2019-07-31 RX ORDER — OXYCODONE HYDROCHLORIDE 5 MG/1
2 TABLET ORAL
Qty: 0 | Refills: 0 | DISCHARGE

## 2019-07-31 RX ORDER — PIPERACILLIN AND TAZOBACTAM 4; .5 G/20ML; G/20ML
3.38 INJECTION, POWDER, LYOPHILIZED, FOR SOLUTION INTRAVENOUS
Qty: 0 | Refills: 0 | DISCHARGE

## 2019-07-31 RX ORDER — TAMSULOSIN HYDROCHLORIDE 0.4 MG/1
1 CAPSULE ORAL
Qty: 0 | Refills: 0 | DISCHARGE
Start: 2019-07-31

## 2019-07-31 RX ORDER — INSULIN LISPRO 100/ML
10 VIAL (ML) SUBCUTANEOUS
Qty: 0 | Refills: 0 | DISCHARGE

## 2019-07-31 RX ORDER — FUROSEMIDE 40 MG
1 TABLET ORAL
Qty: 0 | Refills: 0 | DISCHARGE
Start: 2019-07-31

## 2019-07-31 RX ORDER — ERYTHROPOIETIN 10000 [IU]/ML
1 INJECTION, SOLUTION INTRAVENOUS; SUBCUTANEOUS
Qty: 0 | Refills: 0 | DISCHARGE

## 2019-07-31 RX ORDER — LANOLIN/MINERAL OIL
1 LOTION (ML) TOPICAL
Qty: 0 | Refills: 0 | DISCHARGE

## 2019-07-31 RX ADMIN — Medication 6 UNIT(S): at 12:20

## 2019-07-31 RX ADMIN — HEPARIN SODIUM 5000 UNIT(S): 5000 INJECTION INTRAVENOUS; SUBCUTANEOUS at 07:01

## 2019-07-31 RX ADMIN — Medication 1: at 12:20

## 2019-07-31 RX ADMIN — AMLODIPINE BESYLATE 10 MILLIGRAM(S): 2.5 TABLET ORAL at 07:02

## 2019-07-31 RX ADMIN — Medication 100 MILLIGRAM(S): at 07:01

## 2019-07-31 RX ADMIN — CHLORHEXIDINE GLUCONATE 1 APPLICATION(S): 213 SOLUTION TOPICAL at 12:22

## 2019-07-31 RX ADMIN — Medication 325 MILLIGRAM(S): at 12:22

## 2019-07-31 RX ADMIN — Medication 80 MILLIGRAM(S): at 07:02

## 2019-07-31 NOTE — PROGRESS NOTE ADULT - PROBLEM SELECTOR PLAN 4
On medications,  no chest pain, stable, monitored and followed up by primary  team/cardiology team
Cont with meds  Low salt diet
On medications,  no chest pain, stable, monitored and followed up by primary  team/cardiology team
Cont with meds  Low salt diet
Cont with meds  Low salt diet
On medications,  no chest pain, stable, monitored and followed up by primary  team/cardiology team
Cont with meds  Low salt diet
On medications,  no chest pain, stable, monitored and followed up by primary  team/cardiology team
Cont with meds  Low salt diet

## 2019-07-31 NOTE — PROGRESS NOTE ADULT - REASON FOR ADMISSION
SOB
respiratory distress
SOB

## 2019-07-31 NOTE — PROGRESS NOTE ADULT - PROBLEM SELECTOR PROBLEM 5
Insulin dependent diabetes mellitus

## 2019-07-31 NOTE — PROGRESS NOTE ADULT - PROBLEM SELECTOR PLAN 3
Podiatry and vascular follow up   Wound care  reviewed  MRI. He will need amputation, possibly of entire foot. Vascular workup pending.   May proceed with acceptable cardiac risk.
Monitor labs, Renal FU
Podiatry and vascular follow up   Wound care  reviewed  MRI. He will need amputation of the entire foot.   May proceed with acceptable cardiac risk.
Monitor labs, Renal FU
Podiatry and vascular follow up   Wound care  s/p R BKA   Pain control   Wound care
Monitor labs, Renal FU
Podiatry and vascular follow up   Wound care  reviewed  MRI. He will need amputation. May proceed with acceptable cardiac risk.
Monitor labs, Renal FU
Podiatry and vascular follow up   Wound care
Podiatry and vascular follow up   Wound care
Podiatry and vascular follow up   Wound care  Awaiting MRI to evaluate for OM. He may need amputation. If so, may proceed with acceptable cardiac risk.
Podiatry and vascular follow up   Wound care  reviewed  MRI. He will need amputation of the entire foot.   May proceed with acceptable cardiac risk.
Podiatry and vascular follow up   Wound care  reviewed  MRI. He will need amputation of the entire foot.   May proceed with acceptable cardiac risk.
Podiatry and vascular follow up   Wound care  reviewed  MRI. He will need amputation. May proceed with acceptable cardiac risk.
Podiatry and vascular follow up   Wound care  reviewed  MRI. Scheduled for amputation of the entire foot.   May proceed with acceptable cardiac risk.
Podiatry and vascular follow up   Wound care  s/p R BKA   Pain control   Wound care
Podiatry and vascular follow up   Wound care
Podiatry and vascular follow up   Wound care  reviewed  MRI. Scheduled for amputation of the entire foot.   May proceed with acceptable cardiac risk.
Podiatry and vascular follow up   Wound care  s/p R BKA   Pain control   Wound care
Monitor labs, Renal FU

## 2019-07-31 NOTE — PROGRESS NOTE ADULT - ASSESSMENT
59M, hx DM, HTN presents from [facility] via EMS due to resp distress. Patient at [facility] due to right diabetic foot ulcer. s/p BKA        -  c/w abx  -  d/c to rehab       Vascular Surgery [id number] 59M, hx DM, HTN presents from [facility] via EMS due to resp distress. Patient at [facility] due to right diabetic foot ulcer. s/p BKA        -  c/w abx  -  d/c to rehab - please DC on Augmentin 500 BID X 10 days       Vascular Surgery [phone]

## 2019-07-31 NOTE — PROGRESS NOTE ADULT - PROBLEM SELECTOR PLAN 1
I have personally  seen and examined the patient today and have noted the findings and formulated the plan of care.
Will increase Lantus to 34 units at bed time.  Will increase Humalog to 14 units before each meal in addition to Humalog correction scale coverage.  Patient counseled for compliance with consistent low carb diet.
Compensated   Cont to hold lasix for now
Compensated   Cont holding lasix for now
Will continue current insulin regimen for now. Will continue monitoring FS, log, will Follow up.  Patient counseled for compliance with consistent low carb diet.
Will continue current insulin regimen for now. Will continue monitoring FS, log, will Follow up.  Patient counseled for compliance with consistent low carb diet. nce with consistent low carb diet.
Will decrease Lantus to 32 units at bed time.  Will decrease Humalog to 10 units before each meal in addition to Humalog correction scale coverage.  Patient counseled for compliance with consistent low carb diet.
Lasic 80 mg PO daily   Monitor UP and Cr
Will continue current insulin regimen for now. Will continue monitoring FS, log, will Follow up.  Patient counseled for compliance with consistent low carb diet.
Appears more volume overloaded  Lasix 60 mg IV x 1 now  Incentive spirometry
Change lasix 80 mg PO daily   Monitor UP and Cr
Compensated    lasix 40 mg PO daily
Compensated   Cont holding lasix for now
Compensated   Cont to hold lasix for now
Compensated   hold lasix for now
Compensated   restart lasix 40 mg PO daily
Cont lasix 80 mg PO daily   Incentive spirometry
Cont lasix 80 mg PO daily   Incentive spirometry  Can DC Tele
Cont lasix 80 mg PO daily   Monitor UP and Cr
Cont with IV lasix as ordered   Monitor UP and Cr
I have personally  seen and examined the patient today and have noted the findings and formulated the plan of care.
I have seen and examined the patient today and agree with  the  evaluation, assessment and plan of the surgical house officer
I have seen and examined the patient today and agree with  the  evaluation, assessment and plan of the surgical house officer
Increase lasix 80 mg PO daily   Incentive spirometry
Lasix 80 mg PO daily   Monitor UP and Cr
Will Continue  Lantus to25  and put on 6 units lispro pre-each meal Will continue monitoring FS, log, will Follow up.  Patient counseled for compliance with consistent low carb diet.
Will Continue  Lantus to25  and put on Lispro  6 units pre-breakfast   and lispro  7 unit pre- Lunch and Dinner Will continue monitoring FS, log, will Follow up.  Patient counseled for compliance with consistent low carb diet.
Will continue current insulin regimen for now. Will continue monitoring FS, log, will Follow up.  Patient counseled for compliance with consistent low carb diet.
Will continue current insulin regimen for now. Will continue monitoring FS, log, will Follow up.  Patient counseled for compliance with consistent low carb diet. nce with consistent low carb diet.
Will decrease Lantus to 22 units at bed time.  Will decrease Humalog to 4 units before each meal in addition to Humalog correction scale coverage.  Patient counseled for compliance with consistent low carb diet.
Will increase Lantus to 30 units at bed time.  Will increase Humalog to 7 units before each meal in addition to Humalog correction scale coverage.  Patient counseled for compliance with consistent low carb diet.
Will increase Lantus to24 and put on 4 units lispro pre-each meal Will continue monitoring FS, log, will Follow up.  Patient counseled for compliance with consistent low carb diet.
Will increase Lantus to25  and put on 6 units lispro pre-each meal Will continue monitoring FS, log, will Follow up.  Patient counseled for compliance with consistent low carb diet.
I have seen and examined the patient today and agree with  the  evaluation, assessment and plan of the surgical house officer
Compensated    lasix 40 mg PO daily
Compensated   Cont holding lasix for now
Cont with IV lasix as ordered   Monitor UP and Cr
Will continue current insulin regimen for now. Will continue monitoring FS, log, will Follow up.  Patient counseled for compliance with consistent low carb diet. nce with consistent low carb diet.
I have seen and examined the patient today and agree with  the  evaluation, assessment and plan of the surgical house officer
Hold Lasix 80 mg PO daily for now given rising Cr

## 2019-07-31 NOTE — DISCHARGE NOTE NURSING/CASE MANAGEMENT/SOCIAL WORK - NSDCDPATPORTLINK_GEN_ALL_CORE
You can access the [facility] Patient Portal, offered by [facility], by registering with the following website: [email or web address]

## 2019-07-31 NOTE — PROGRESS NOTE ADULT - PROBLEM SELECTOR PLAN 2
Continue wound care, podiatry FU
I have personally  seen and examined the patient today and have noted the findings and formulated the plan of care.
Cr improving
Continue wound care, podiatry FU
Cr improved
Continue wound care, podiatry FU
Monitor closely
Continue wound care, podiatry FU
Cr improved
Cr improving
Cr stable
Cr stable
I have personally  seen and examined the patient today and have noted the findings and formulated the plan of care.
I have seen and examined the patient today and agree with  the  evaluation, assessment and plan of the surgical house officer
I have seen and examined the patient today and agree with  the  evaluation, assessment and plan of the surgical house officer
Monitor closely
Stable   Monitor closely   Cont with IV diuretics
I have seen and examined the patient today and agree with  the  evaluation, assessment and plan of the surgical house officer
Cr improved
Cr improving
Stable   Monitor closely   Cont with IV diuretics
Continue wound care, podiatry FU
I have seen and examined the patient today and agree with  the  evaluation, assessment and plan of the surgical house officer
Monitor closely

## 2019-07-31 NOTE — DISCHARGE NOTE PROVIDER - CARE PROVIDER_API CALL
Garrick Osorio)  Vascular Surgery  [address], [address]  [place] [address]  Phone: [phone]  Fax: [phone]  Follow Up Time: 1 week Garrick Osorio)  Vascular Surgery  [address], [address]  [place] [address]  Phone: [phone]  Fax: [phone]  Follow Up Time: 1 week    Bj Christine)  Cardiology; Internal Medicine  [address], [address]  [place] [address]  Phone: [phone]  Fax: [phone]  Follow Up Time: 1 week    Andrzej Zhu)  EndocrinologyMetabDiabetes; Internal Medicine  [address] [place] [address]  Phone: [phone]  Fax: [phone]  Follow Up Time: 1 week

## 2019-07-31 NOTE — PROGRESS NOTE ADULT - PROBLEM SELECTOR PROBLEM 2
Arterial insufficiency with ischemic ulcer
Diabetic foot ulcer
CKD (chronic kidney disease)
Arterial insufficiency with ischemic ulcer
CKD (chronic kidney disease)
Diabetic foot ulcer
CKD (chronic kidney disease)
Diabetic foot ulcer
Arterial insufficiency with ischemic ulcer
CKD (chronic kidney disease)
Diabetic foot ulcer
Arterial insufficiency with ischemic ulcer
CKD (chronic kidney disease)
Diabetic foot ulcer
CKD (chronic kidney disease)

## 2019-07-31 NOTE — PROGRESS NOTE ADULT - SUBJECTIVE AND OBJECTIVE BOX
Endocrinology Attending Covering for Dr. Zhu      Chief complaint  Patient is a 59y old  Male who presents with a chief complaint of SOB (31 Jul 2019 09:43)   Review of systems  Patient in bed, looks comfortable, no fever,  had no hypoglycemia.    Labs and Fingersticks  CAPILLARY BLOOD GLUCOSE      POCT Blood Glucose.: 98 mg/dL (31 Jul 2019 08:00)  POCT Blood Glucose.: 195 mg/dL (30 Jul 2019 21:48)  POCT Blood Glucose.: 224 mg/dL (30 Jul 2019 17:02)  POCT Blood Glucose.: 138 mg/dL (30 Jul 2019 12:01)      Anion Gap, Serum: 13 (07-31 @ 07:45)  Anion Gap, Serum: 13 (07-30 @ 06:16)      Calcium, Total Serum: 9.1 (07-31 @ 07:45)  Calcium, Total Serum: 9.2 (07-30 @ 06:16)          07-31    138  |  97  |  16  ----------------------------<  100<H>  3.9   |  28  |  1.49<H>    Ca    9.1      31 Jul 2019 07:45      Medications  MEDICATIONS  (STANDING):  amLODIPine   Tablet 10 milliGRAM(s) Oral daily  chlorhexidine 2% Cloths 1 Application(s) Topical daily  dextrose 5%. 1000 milliLiter(s) (50 mL/Hr) IV Continuous <Continuous>  dextrose 50% Injectable 12.5 Gram(s) IV Push once  dextrose 50% Injectable 25 Gram(s) IV Push once  dextrose 50% Injectable 25 Gram(s) IV Push once  dronabinol 2.5 milliGRAM(s) Oral two times a day  epoetin braden Injectable 75190 Unit(s) SubCutaneous <User Schedule>  ferrous    sulfate 325 milliGRAM(s) Oral daily  furosemide    Tablet 80 milliGRAM(s) Oral daily  heparin  Injectable 5000 Unit(s) SubCutaneous every 8 hours  hydrALAZINE 100 milliGRAM(s) Oral three times a day  insulin glargine Injectable (LANTUS) 25 Unit(s) SubCutaneous at bedtime  insulin lispro (HumaLOG) corrective regimen sliding scale   SubCutaneous three times a day before meals  insulin lispro (HumaLOG) corrective regimen sliding scale   SubCutaneous at bedtime  insulin lispro Injectable (HumaLOG) 6 Unit(s) SubCutaneous three times a day before meals  polyethylene glycol 3350 17 Gram(s) Oral every 12 hours  senna 2 Tablet(s) Oral at bedtime  tamsulosin 0.4 milliGRAM(s) Oral at bedtime      Physical Exam  General: Patient comfortable in bed  Vital Signs Last 12 Hrs  T(F): 97.7 (07-31-19 @ 04:34), Max: 97.7 (07-31-19 @ 04:34)  HR: 70 (07-31-19 @ 04:34) (70 - 70)  BP: 120/75 (07-31-19 @ 04:34) (120/75 - 120/75)  BP(mean): --  RR: 17 (07-31-19 @ 04:34) (17 - 17)  SpO2: 96% (07-31-19 @ 04:34) (96% - 96%)  Neck: No palpable thyroid nodules.  CVS: S1S2, No murmurs  Respiratory: No wheezing, no crepitations  GI: Abdomen soft, bowel sounds positive  Musculoskeletal:  edema lower extremities.   Skin: No skin rashes, no ecchymosis    Diagnostics

## 2019-07-31 NOTE — PROGRESS NOTE ADULT - ATTENDING COMMENTS
MRI P R foot  & Wcx P R 3rd toe
The patient was seen and Evaluated at Bedside. MRI w/ & w/o contrast to further evaluate for suspicion of OM R foot.
The patient was seen and evaluated with the podiatry resident present today.
I have personally  seen and examined the patient today and have noted the findings and formulated the plan of care.
The patient was seen & evaluated tonight at 8:15p w/ patients daughter & wife present. The patient is being optimized for BK R & had Echo today. Will continue w/ palliative care for now R foot.
after reviewing the patient's MRI with and without contrast of the right foot it was noted there was extensive severe osteomyelitis of the sesamoid bones of the first met, the second and third metatarsal heads and phalanges along with osteo-of the fourth and fifth met heads. Also fluid noted in the ball of foot. Met with the patient  and his wife and daughter this evening 7/16/2019 to discuss  surgical options. Spoke about a transmetatarsal amputation versus a below-knee amputation. Explained to the patient due to the  extensive osteomyelitis of the right foot as well as the large wound on the plantar aspect that after doing a transmetatarsal amputation he would still be left with a significant plantar wound that would be open which could put him at risk for reinfection. Also, the healing of a transmetatarsal amputation with the plantar wound could take a long period of time to potentially heal. Explained to the patient & his family members that due to the circumstance I would recommend a below-knee amputation which would be a better option for quality of life where the patient would be able to get a prosthesis and eventually ambulate. Recommended Dr. Osorio to Evaluate for BKA R. D/C NPO Orders & cancel Sx for 7/17/19 for now. Will continue to follow.
I have personally  seen and examined the patient today and have noted the findings and formulated the plan of care.
I have seen and examined the patient today and agree with  the  evaluation, assessment and plan of the surgical house officer
I have seen and examined the patient today and agree with  the  evaluation, assessment and plan of the surgical house officer

## 2019-07-31 NOTE — PROGRESS NOTE ADULT - PROBLEM SELECTOR PROBLEM 3
Diabetic foot ulcer
CKD (chronic kidney disease)
Diabetic foot ulcer
CKD (chronic kidney disease)
Diabetic foot ulcer
CKD (chronic kidney disease)
Diabetic foot ulcer
CKD (chronic kidney disease)
Diabetic foot ulcer
CKD (chronic kidney disease)

## 2019-07-31 NOTE — DISCHARGE NOTE PROVIDER - PROVIDER TOKENS
PROVIDER:[TOKEN:[[id number]],FOLLOWUP:[1 week]] PROVIDER:[TOKEN:[[id number]],FOLLOWUP:[1 week]],PROVIDER:[TOKEN:[[id number]],FOLLOWUP:[1 week]],PROVIDER:[TOKEN:[[id number]],FOLLOWUP:[1 week]]

## 2019-07-31 NOTE — PROGRESS NOTE ADULT - ASSESSMENT
59M, hx DM, HTN presents from [facility] via EMS due to resp distress. Patient at [facility] due to right diabetic foot ulcer. Patient reported to be hypoxic on room air to 60s. Improved on 15L face mask top 100% O2 sat. Patient awake, alert, oriented. States he has been coughing for 2-3 weeks (dry cough) as well as weakness and shortness of breath for few days.    Acute on chronic diastolic chf  - lasix 80 qd  - strict Is and Os  - cardiology consult appreciated  - keep O2 sats above 92%    diabetic foot ulcer with osteo  - s/p BKA  - off cefepime  - follow up by vascular    pain  - percocet prn  -  d/c Neurontin For phantom pain    essential tremors resolved  - may have  been secondary to infection  - d/c primidone  - seen by neuro consult    CKD 4  - monitor cre  - avoid nephrotoxins    anemia of chronic disease  - c/w iron and epogen    diabetes  - fs qid  - hgb a1c   7.1  - endocrine consult following    htn  - cw norvasc  - c/w hydralazine    constipation  -  had BM  - miralax and senna    urinary retention  - c/w flomax  - voiding freely    tenia cruris  - nystatin cream bid    DVT px    d/c planning

## 2019-07-31 NOTE — PROGRESS NOTE ADULT - PROBLEM SELECTOR PROBLEM 4
Acute on chronic diastolic heart failure
Hypertension
Acute on chronic diastolic heart failure
Hypertension
Acute on chronic diastolic heart failure
Hypertension
Acute on chronic diastolic heart failure
Hypertension
Acute on chronic diastolic heart failure
Hypertension

## 2019-07-31 NOTE — PROGRESS NOTE ADULT - SUBJECTIVE AND OBJECTIVE BOX
Subjective: Patient seen and examined. No new events except as noted.   feels weak   no cp or sob   sitting up in bed     REVIEW OF SYSTEMS:    CONSTITUTIONAL: +weakness, fevers or chills  EYES/ENT: No visual changes;  No vertigo or throat pain   NECK: No pain or stiffness  RESPIRATORY: No cough, wheezing, hemoptysis; No shortness of breath  CARDIOVASCULAR: No chest pain or palpitations  GASTROINTESTINAL: No abdominal or epigastric pain. No nausea, vomiting, or hematemesis; No diarrhea or constipation. No melena or hematochezia.  GENITOURINARY: No dysuria, frequency or hematuria  NEUROLOGICAL: No numbness or weakness  SKIN: No itching, burning, rashes, or lesions   All other review of systems is negative unless indicated above.    MEDICATIONS:  MEDICATIONS  (STANDING):  amLODIPine   Tablet 10 milliGRAM(s) Oral daily  chlorhexidine 2% Cloths 1 Application(s) Topical daily  dextrose 5%. 1000 milliLiter(s) (50 mL/Hr) IV Continuous <Continuous>  dextrose 50% Injectable 12.5 Gram(s) IV Push once  dextrose 50% Injectable 25 Gram(s) IV Push once  dextrose 50% Injectable 25 Gram(s) IV Push once  dronabinol 2.5 milliGRAM(s) Oral two times a day  epoetin braden Injectable 19572 Unit(s) SubCutaneous <User Schedule>  ferrous    sulfate 325 milliGRAM(s) Oral daily  furosemide    Tablet 80 milliGRAM(s) Oral daily  heparin  Injectable 5000 Unit(s) SubCutaneous every 8 hours  hydrALAZINE 100 milliGRAM(s) Oral three times a day  insulin glargine Injectable (LANTUS) 25 Unit(s) SubCutaneous at bedtime  insulin lispro (HumaLOG) corrective regimen sliding scale   SubCutaneous three times a day before meals  insulin lispro (HumaLOG) corrective regimen sliding scale   SubCutaneous at bedtime  insulin lispro Injectable (HumaLOG) 6 Unit(s) SubCutaneous three times a day before meals  polyethylene glycol 3350 17 Gram(s) Oral every 12 hours  senna 2 Tablet(s) Oral at bedtime  tamsulosin 0.4 milliGRAM(s) Oral at bedtime      PHYSICAL EXAM:  T(C): 36.5 (07-31-19 @ 04:34), Max: 36.8 (07-30-19 @ 21:08)  HR: 70 (07-31-19 @ 04:34) (70 - 82)  BP: 120/75 (07-31-19 @ 04:34) (120/75 - 156/81)  RR: 17 (07-31-19 @ 04:34) (17 - 18)  SpO2: 96% (07-31-19 @ 04:34) (96% - 98%)  Wt(kg): --  I&O's Summary    30 Jul 2019 07:01  -  31 Jul 2019 07:00  --------------------------------------------------------  IN: 960 mL / OUT: 2450 mL / NET: -1490 mL          Appearance: Normal	  HEENT:   Normal oral mucosa, PERRL, EOMI	  Lymphatic: No lymphadenopathy , no edema  Cardiovascular: Normal S1 S2, No JVD, No murmurs , Peripheral pulses palpable 2+ bilaterally  Respiratory: Lungs clear to auscultation, normal effort 	  Gastrointestinal:  Soft, Non-tender, + BS	  Skin: No rashes, No ecchymoses, No cyanosis, warm to touch  Musculoskeletal: Normal range of motion, normal strength  Psychiatry:  Mood & affect appropriate  Ext: R BKA       LABS:    CARDIAC MARKERS:            07-31    138  |  97  |  16  ----------------------------<  100<H>  3.9   |  28  |  1.49<H>    Ca    9.1      31 Jul 2019 07:45      proBNP:   Lipid Profile:   HgA1c:   TSH:             TELEMETRY: 	  SR  ECG:  	  RADIOLOGY:   DIAGNOSTIC TESTING:  [ ] Echocardiogram:  [ ]  Catheterization:  [ ] Stress Test:    OTHER:

## 2019-07-31 NOTE — PROGRESS NOTE ADULT - SUBJECTIVE AND OBJECTIVE BOX
Patient is a 59y old  Male who presents with a chief complaint of SOB (31 Jul 2019 12:19)      SUBJECTIVE / OVERNIGHT EVENTS:  No chest pain. No shortness of breath.  No events overnight.  c/o rash herve groin    MEDICATIONS  (STANDING):  amLODIPine   Tablet 10 milliGRAM(s) Oral daily  amoxicillin  500 milliGRAM(s)/clavulanate 1 Tablet(s) Oral two times a day  chlorhexidine 2% Cloths 1 Application(s) Topical daily  dextrose 5%. 1000 milliLiter(s) (50 mL/Hr) IV Continuous <Continuous>  dextrose 50% Injectable 12.5 Gram(s) IV Push once  dextrose 50% Injectable 25 Gram(s) IV Push once  dextrose 50% Injectable 25 Gram(s) IV Push once  dronabinol 2.5 milliGRAM(s) Oral two times a day  epoetin braden Injectable 59731 Unit(s) SubCutaneous <User Schedule>  ferrous    sulfate 325 milliGRAM(s) Oral daily  furosemide    Tablet 80 milliGRAM(s) Oral daily  heparin  Injectable 5000 Unit(s) SubCutaneous every 8 hours  hydrALAZINE 100 milliGRAM(s) Oral three times a day  insulin glargine Injectable (LANTUS) 25 Unit(s) SubCutaneous at bedtime  insulin lispro (HumaLOG) corrective regimen sliding scale   SubCutaneous three times a day before meals  insulin lispro (HumaLOG) corrective regimen sliding scale   SubCutaneous at bedtime  insulin lispro Injectable (HumaLOG) 6 Unit(s) SubCutaneous three times a day before meals  polyethylene glycol 3350 17 Gram(s) Oral every 12 hours  senna 2 Tablet(s) Oral at bedtime  tamsulosin 0.4 milliGRAM(s) Oral at bedtime    MEDICATIONS  (PRN):  acetaminophen   Tablet .. 650 milliGRAM(s) Oral every 6 hours PRN Mild Pain (1 - 3)  dextrose 40% Gel 15 Gram(s) Oral once PRN Blood Glucose LESS THAN 70 milliGRAM(s)/deciliter  glucagon  Injectable 1 milliGRAM(s) IntraMuscular once PRN Glucose LESS THAN 70 milligrams/deciliter  oxyCODONE    5 mG/acetaminophen 325 mG 1 Tablet(s) Oral every 4 hours PRN Moderate Pain (4 - 6)      Vital Signs Last 24 Hrs  T(C): 36.8 (31 Jul 2019 14:03), Max: 36.8 (30 Jul 2019 21:08)  T(F): 98.3 (31 Jul 2019 14:03), Max: 98.3 (31 Jul 2019 14:03)  HR: 146 (31 Jul 2019 14:03) (70 - 146)  BP: 146/82 (31 Jul 2019 14:03) (120/75 - 156/81)  BP(mean): --  RR: 18 (31 Jul 2019 14:03) (17 - 18)  SpO2: 94% (31 Jul 2019 14:03) (94% - 98%)  CAPILLARY BLOOD GLUCOSE      POCT Blood Glucose.: 180 mg/dL (31 Jul 2019 11:57)  POCT Blood Glucose.: 98 mg/dL (31 Jul 2019 08:00)  POCT Blood Glucose.: 195 mg/dL (30 Jul 2019 21:48)  POCT Blood Glucose.: 224 mg/dL (30 Jul 2019 17:02)    I&O's Summary    30 Jul 2019 07:01  -  31 Jul 2019 07:00  --------------------------------------------------------  IN: 960 mL / OUT: 2450 mL / NET: -1490 mL        PHYSICAL EXAM:  GENERAL: NAD, well-developed  HEAD:  Atraumatic, Normocephalic  EYES: EOMI, PERRLA, conjunctiva and sclera clear  NECK: Supple, No JVD  CHEST/LUNG: Clear to auscultation bilaterally; No wheeze  HEART: Regular rate and rhythm; No murmurs, rubs, or gallops  ABDOMEN: Soft, Nontender, Nondistended; Bowel sounds present  EXTREMITIES:  2+ Peripheral Pulses, No clubbing, cyanosis, or edema right bka  PSYCH: AAOx3  NEUROLOGY: non-focal  SKIN: No rashes or lesions    LABS:    07-31    138  |  97  |  16  ----------------------------<  100<H>  3.9   |  28  |  1.49<H>    Ca    9.1      31 Jul 2019 07:45                RADIOLOGY & ADDITIONAL TESTS:    Imaging Personally Reviewed:    Consultant(s) Notes Reviewed:      Care Discussed with Consultants/Other Providers:

## 2019-07-31 NOTE — PROGRESS NOTE ADULT - SUBJECTIVE AND OBJECTIVE BOX
Patient is a 59y old  Male who presents with a chief complaint of SOB (30 Jul 2019 12:41)      Vascular Surgery Attending Progress Note    Interval HPI: pt states good pain control    Medications:  acetaminophen   Tablet .. 650 milliGRAM(s) Oral every 6 hours PRN  amLODIPine   Tablet 10 milliGRAM(s) Oral daily  chlorhexidine 2% Cloths 1 Application(s) Topical daily  dextrose 40% Gel 15 Gram(s) Oral once PRN  dextrose 5%. 1000 milliLiter(s) IV Continuous <Continuous>  dextrose 50% Injectable 12.5 Gram(s) IV Push once  dextrose 50% Injectable 25 Gram(s) IV Push once  dextrose 50% Injectable 25 Gram(s) IV Push once  dronabinol 2.5 milliGRAM(s) Oral two times a day  epoetin braden Injectable 07527 Unit(s) SubCutaneous <User Schedule>  ferrous    sulfate 325 milliGRAM(s) Oral daily  furosemide    Tablet 80 milliGRAM(s) Oral daily  glucagon  Injectable 1 milliGRAM(s) IntraMuscular once PRN  heparin  Injectable 5000 Unit(s) SubCutaneous every 8 hours  hydrALAZINE 100 milliGRAM(s) Oral three times a day  insulin glargine Injectable (LANTUS) 25 Unit(s) SubCutaneous at bedtime  insulin lispro (HumaLOG) corrective regimen sliding scale   SubCutaneous three times a day before meals  insulin lispro (HumaLOG) corrective regimen sliding scale   SubCutaneous at bedtime  insulin lispro Injectable (HumaLOG) 6 Unit(s) SubCutaneous three times a day before meals  oxyCODONE    5 mG/acetaminophen 325 mG 1 Tablet(s) Oral every 4 hours PRN  polyethylene glycol 3350 17 Gram(s) Oral every 12 hours  senna 2 Tablet(s) Oral at bedtime  tamsulosin 0.4 milliGRAM(s) Oral at bedtime      Vital Signs Last 24 Hrs  T(C): 36.5 (31 Jul 2019 04:34), Max: 36.8 (30 Jul 2019 21:08)  T(F): 97.7 (31 Jul 2019 04:34), Max: 98.2 (30 Jul 2019 21:08)  HR: 70 (31 Jul 2019 04:34) (70 - 82)  BP: 120/75 (31 Jul 2019 04:34) (120/75 - 156/81)  BP(mean): --  RR: 17 (31 Jul 2019 04:34) (17 - 18)  SpO2: 96% (31 Jul 2019 04:34) (96% - 98%)  I&O's Summary    30 Jul 2019 07:01  -  31 Jul 2019 07:00  --------------------------------------------------------  IN: 960 mL / OUT: 2450 mL / NET: -1490 mL        Physical Exam:  Neuro  A&Ox3 VSS  Vascular:  bka stump stable     LABS:    07-31    138  |  97  |  16  ----------------------------<  100<H>  3.9   |  28  |  1.49<H>    Ca    9.1      31 Jul 2019 07:45          CHRISTIANO KERR MD  [phone]

## 2019-07-31 NOTE — PROGRESS NOTE ADULT - ASSESSMENT
Assessment  DMT2: 59y Male with DM T2 with hyperglycemia, on basal bolus insulin,  blood sugarers high at PM, and bed time  , on Lantus 25 units and lispro 6 units prebreakfast  , no new hypoglycemic episode, eating meals.  Foot wound: On wound care, meds, stable.  CAD: on medications, no chest pain, stable, monitored.  HTN: Controlled,  on antihypertensive medications.  CKD: Monitor labs/BMP,     conner Burden  Cell: [phone]

## 2019-07-31 NOTE — PROGRESS NOTE ADULT - PROVIDER SPECIALTY LIST ADULT
Cardiology
Endocrinology
Internal Medicine
Neurology
Neurology
Podiatry
Vascular Surgery
Cardiology
Vascular Surgery
Cardiology
Internal Medicine
Neurology
Endocrinology
Vascular Surgery
Cardiology
Endocrinology
Cardiology

## 2019-07-31 NOTE — DISCHARGE NOTE PROVIDER - CARE PROVIDERS DIRECT ADDRESSES
[email or web address],DirectAddress_Unknown

## 2019-07-31 NOTE — PROGRESS NOTE ADULT - PROBLEM SELECTOR PROBLEM 1
Acute on chronic diastolic heart failure
Acute on chronic diastolic heart failure
Diabetes
Acute on chronic diastolic heart failure
Diabetes
Acute on chronic diastolic heart failure
Diabetes
Other acute osteomyelitis of right foot
Acute on chronic diastolic heart failure
Diabetes
Other acute osteomyelitis of right foot
Acute on chronic diastolic heart failure
Diabetes

## 2019-07-31 NOTE — DISCHARGE NOTE PROVIDER - HOSPITAL COURSE
59 y.o M w/ PMH IDDM c/b R. foot ulcer s/p debridement and wound vac (June 2019), HTN, and CKD who presented from [facility] rehab w/ hypoxia to 60s. Patient reports that he has been feeling SOB over the past few days, dry cough for 2-3 weeks, orthopnea, some chills/sweating. Improved on 15L face mask top 100% O2 sat and IV Lasix and transitioned to PO Lasix    Vascular consulted for non healing ulcer right and Xray of right foot with acute osteomyelitis of right foot and  also JOSIANE/PVR with Arterial insufficiency with ischemic ulcer. S/P s/p right BKA on 7/23 - s/p cefepime and     discharged to complete Augmentin x 10 days. Post Sx with  Urinary retention - failed TOV on 7/24 Careron reinserted by urology on 7/25, Flomax started now passed TOV    Medically cleared for discharge with follow up with Vascular - Dr. Osorio and cardiologist - Dr. Christine in 1 week

## 2019-07-31 NOTE — DISCHARGE NOTE PROVIDER - NSDCCPCAREPLAN_GEN_ALL_CORE_FT
PRINCIPAL DISCHARGE DIAGNOSIS  Diagnosis: Acute on chronic diastolic HF (heart failure)  Assessment and Plan of Treatment: Take all of your medication as directed.  If you become dizzy call your Health Care Provider.  Weigh yourself daily.  If you gain 3lbs in 3 days, or 5lbs in a week call your Health Care Provider.  Do not eat or drink foods containing more than 2000mg of salt (sodium) in your diet every day.  Call your Health Care Provider if you have any swelling or increased swelling in your feet, ankles, and/or stomach.      SECONDARY DISCHARGE DIAGNOSES  Diagnosis: Osteomyelitis of right foot  Assessment and Plan of Treatment: S/P BKA on 7/23  Completed Cefepime IV via PICC  Augmentin 500 BID X 10 days per vascular   Fpllow up with vascular in 1 week    Diagnosis: Acute respiratory failure with hypoxia  Assessment and Plan of Treatment: Secondary to Diastolic Heart failure now resolved    Diagnosis: CKD stage 4 due to type 2 diabetes mellitus  Assessment and Plan of Treatment: Avoid taking (NSAIDs) - (ex: Ibuprofen, Advil, Celebrex, Naprosyn)  Avoid taking any nephrotoxic agents (can harm kidneys) - Intravenous contrast for diagnostic testing, combination cold medications.  Have all medications adjusted for your renal function by your Health Care Provider.  Blood pressure control is important.  Take all medication as prescribed.      Diagnosis: Urinary retention  Assessment and Plan of Treatment: Post surgery - Passed TOV    Diagnosis: Insulin dependent diabetes mellitus  Assessment and Plan of Treatment: Follow up by Rehab MD  HgA1C this admission - 7.1  Make sure you get your HgA1c checked every three months.  If you take insulin, check your blood glucose before meals and at bedtime.  It's important not to skip any meals.  Keep a log of your blood glucose results and always take it with you to your doctor appointments.  Keep a list of your current medications including injectables and over the counter medications and bring this medication list with you to all your doctor appointments.  If you have not seen your ophthalmologist this year call for appointment.  Check your feet daily for redness, sores, or openings. Do not self treat. If no improvement in two days call your primary care physician for an appointment.  Low blood sugar (hypoglycemia) is a blood sugar below 70mg/dl. Check your blood sugar if you feel signs/symptoms of hypoglycemia. If your blood sugar is below 70 take 15 grams of carbohydrates (ex 4 oz of apple juice, 3-4 glucose tablets, or 4-6 oz of regular soda) wait 15 minutes and repeat blood sugar to make sure it comes up above 70.  If your blood sugar is above 70 and you are due for a meal, have a meal.  If you are not due for a meal have a snack.  This snack helps keeps your blood sugar at a safe range. PRINCIPAL DISCHARGE DIAGNOSIS  Diagnosis: Acute on chronic diastolic HF (heart failure)  Assessment and Plan of Treatment: Take all of your medication as directed.  If you become dizzy call your Health Care Provider.  Weigh yourself daily.  If you gain 3lbs in 3 days, or 5lbs in a week call your Health Care Provider.  Do not eat or drink foods containing more than 2000mg of salt (sodium) in your diet every day.  Call your Health Care Provider if you have any swelling or increased swelling in your feet, ankles, and/or stomach.      SECONDARY DISCHARGE DIAGNOSES  Diagnosis: Osteomyelitis of right foot  Assessment and Plan of Treatment: S/P BKA on 7/23  Completed Cefepime IV via PICC  Augmentin 500 BID X 10 days per vascular   Fpllow up with vascular in 1 week    Diagnosis: Acute respiratory failure with hypoxia  Assessment and Plan of Treatment: Secondary to Diastolic Heart failure now resolved    Diagnosis: CKD stage 4 due to type 2 diabetes mellitus  Assessment and Plan of Treatment: Avoid taking (NSAIDs) - (ex: Ibuprofen, Advil, Celebrex, Naprosyn)  Avoid taking any nephrotoxic agents (can harm kidneys) - Intravenous contrast for diagnostic testing, combination cold medications.  Have all medications adjusted for your renal function by your Health Care Provider.  Blood pressure control is important.  Take all medication as prescribed.      Diagnosis: Urinary retention  Assessment and Plan of Treatment: Post surgery - Passed TOV    Diagnosis: Insulin dependent diabetes mellitus  Assessment and Plan of Treatment: Follow up by Rehab MD  HgA1C this admission - 7.1  Make sure you get your HgA1c checked every three months.  If you take insulin, check your blood glucose before meals and at bedtime.  It's important not to skip any meals.  Keep a log of your blood glucose results and always take it with you to your doctor appointments.  Keep a list of your current medications including injectables and over the counter medications and bring this medication list with you to all your doctor appointments.  If you have not seen your ophthalmologist this year call for appointment.  Check your feet daily for redness, sores, or openings. Do not self treat. If no improvement in two days call your primary care physician for an appointment.  Low blood sugar (hypoglycemia) is a blood sugar below 70mg/dl. Check your blood sugar if you feel signs/symptoms of hypoglycemia. If your blood sugar is below 70 take 15 grams of carbohydrates (ex 4 oz of apple juice, 3-4 glucose tablets, or 4-6 oz of regular soda) wait 15 minutes and repeat blood sugar to make sure it comes up above 70.  If your blood sugar is above 70 and you are due for a meal, have a meal.  If you are not due for a meal have a snack.  This snack helps keeps your blood sugar at a safe range.      Diagnosis: Acute on chronic anemia  Assessment and Plan of Treatment: Continue Epogen, and iron supplement

## 2019-08-13 ENCOUNTER — APPOINTMENT (OUTPATIENT)
Dept: VASCULAR SURGERY | Facility: CLINIC | Age: 59
End: 2019-08-13
Payer: COMMERCIAL

## 2019-08-13 VITALS
HEIGHT: 72 IN | BODY MASS INDEX: 26.41 KG/M2 | WEIGHT: 195 LBS | DIASTOLIC BLOOD PRESSURE: 80 MMHG | SYSTOLIC BLOOD PRESSURE: 136 MMHG | TEMPERATURE: 98.8 F | HEART RATE: 78 BPM

## 2019-08-13 PROCEDURE — 99024 POSTOP FOLLOW-UP VISIT: CPT

## 2019-08-13 NOTE — PHYSICAL EXAM
[JVD] : no jugular venous distention  [Normal Breath Sounds] : Normal breath sounds [2+] : right 2+ [0] : right 0 [Ankle Swelling (On Exam)] : present [Ankle Swelling On The Left] : of the left ankle [Varicose Veins Of Lower Extremities] : present [Varicose Veins Of The Left Leg] : of the left leg [Ankle Swelling On The Right] : mild [] : of the left leg [Abdomen Masses] : No abdominal masses [No HSM] : no hepatosplenomegaly [Tender] : was nontender [Stool Sample Taken] : No stool obtained  on rectal exam [No Rash or Lesion] : No rash or lesion [Alert] : alert [Oriented to Place] : oriented to place [Oriented to Person] : oriented to person [Oriented to Time] : oriented to time [Calm] : calm [de-identified] : nad [de-identified] : wnl [de-identified] : wnl [FreeTextEntry1] : rt bka stump healing well  [de-identified] : in wheelchair  [de-identified] : Jeff Cranial nerves 2-12 jeff grossly intact [de-identified] : cooperative

## 2019-08-13 NOTE — END OF VISIT
[>50% of Time Spent on Counseling for ____] : Greater than 50% of the encounter time was spent on counseling for [unfilled] [Time Spent: ___ minutes] : I have spent [unfilled] minutes of face to face time with the patient [FreeTextEntry1] : LAMIN Osorio MD

## 2019-08-13 NOTE — DATA REVIEWED
[FreeTextEntry1] : 7/29/2015 Venous Doppler herve le no dvt svt RLE sig gsv insuff sfj to prox calf LLE insuff sfj to mid thigh w herve le insuff collaterals\par \par 1/15/2016 venous Doppler herve le no acute dvt svt RLE insuff gsv sff to bk levelw insuff distal collateral lle insuff gsv sfj to knee w insuff collateral herve le w sig edema \par \par 11/23/2016 RUE venous duplex no dvt svt rt skoulder area no collection noted

## 2019-08-13 NOTE — ASSESSMENT
[Arterial/Venous Disease] : arterial/venous disease [Other: _____] : [unfilled] [Foot care/Footwear] : foot care/footwear [FreeTextEntry1] : Impression  s/p rt bka doing well \par \par \par [employer] management leg elevation, continue knee high comp stockings on lle prn\par continue pt/ot\par continue pletal 50 bid\par ov in 10-14 days to d/c remaing staples

## 2019-08-27 ENCOUNTER — APPOINTMENT (OUTPATIENT)
Dept: VASCULAR SURGERY | Facility: CLINIC | Age: 59
End: 2019-08-27
Payer: COMMERCIAL

## 2019-08-27 VITALS
HEART RATE: 85 BPM | BODY MASS INDEX: 27.22 KG/M2 | TEMPERATURE: 98.5 F | WEIGHT: 201 LBS | DIASTOLIC BLOOD PRESSURE: 72 MMHG | SYSTOLIC BLOOD PRESSURE: 136 MMHG | HEIGHT: 72 IN

## 2019-08-27 DIAGNOSIS — T87.9 UNSPECIFIED COMPLICATIONS OF AMPUTATION STUMP: ICD-10-CM

## 2019-08-27 PROCEDURE — 99024 POSTOP FOLLOW-UP VISIT: CPT

## 2019-08-27 NOTE — ASSESSMENT
[Arterial/Venous Disease] : arterial/venous disease [Other: _____] : [unfilled] [Foot care/Footwear] : foot care/footwear [FreeTextEntry1] : Impression  s/p rt bka w middle of incision wound  s/p 2nd fall at rehab \par \par \par Med [employer] management leg elevation, continue knee high comp stockings on lle prn, woundcare (betadine)\par continue pt/ot\par continue pletal 50 bid\par ov in 7-10  days to d/c remaing staples

## 2019-08-27 NOTE — PHYSICAL EXAM
[Normal Breath Sounds] : Normal breath sounds [2+] : right 2+ [0] : right 0 [Ankle Swelling (On Exam)] : present [Ankle Swelling On The Left] : of the left ankle [Varicose Veins Of Lower Extremities] : present [Varicose Veins Of The Left Leg] : of the left leg [] : of the left leg [Ankle Swelling On The Right] : mild [No HSM] : no hepatosplenomegaly [Alert] : alert [No Rash or Lesion] : No rash or lesion [Oriented to Place] : oriented to place [Oriented to Person] : oriented to person [Oriented to Time] : oriented to time [Calm] : calm [JVD] : no jugular venous distention  [Tender] : was nontender [Abdomen Masses] : No abdominal masses [Stool Sample Taken] : No stool obtained  on rectal exam [de-identified] : nad [de-identified] : wnl [FreeTextEntry1] : rt bka stump healing well except in the middle of the incision sig for wound  1 cm x2mm  w good granulation tissue  [de-identified] : wnl [de-identified] : in wheelchair  [de-identified] : Jeff Cranial nerves 2-12 jeff grossly intact [de-identified] : cooperative

## 2019-08-27 NOTE — HISTORY OF PRESENT ILLNESS
[FreeTextEntry1] : pt is in rehab\par pt is receiving pt/ot \par pt states that he fell onto the rt bka stump again sev days ago \par mild discomfort

## 2019-09-03 ENCOUNTER — APPOINTMENT (OUTPATIENT)
Dept: VASCULAR SURGERY | Facility: CLINIC | Age: 59
End: 2019-09-03
Payer: COMMERCIAL

## 2019-09-03 VITALS
HEART RATE: 85 BPM | BODY MASS INDEX: 27.77 KG/M2 | WEIGHT: 205 LBS | SYSTOLIC BLOOD PRESSURE: 116 MMHG | HEIGHT: 72 IN | TEMPERATURE: 98.7 F | DIASTOLIC BLOOD PRESSURE: 77 MMHG

## 2019-09-03 PROCEDURE — 99024 POSTOP FOLLOW-UP VISIT: CPT

## 2019-09-03 RX ORDER — INSULIN GLARGINE 100 [IU]/ML
100 INJECTION, SOLUTION SUBCUTANEOUS
Refills: 0 | Status: ACTIVE | COMMUNITY

## 2019-09-03 RX ORDER — OXYCODONE AND ACETAMINOPHEN 5; 325 MG/1; MG/1
5-325 TABLET ORAL
Refills: 0 | Status: ACTIVE | COMMUNITY

## 2019-09-03 RX ORDER — SENNOSIDES 8.6 MG TABLETS 8.6 MG/1
TABLET ORAL
Refills: 0 | Status: ACTIVE | COMMUNITY

## 2019-09-03 RX ORDER — AMLODIPINE BESYLATE 5 MG/1
TABLET ORAL
Refills: 0 | Status: ACTIVE | COMMUNITY

## 2019-09-03 RX ORDER — ACETAMINOPHEN 325 MG/1
325 TABLET ORAL
Refills: 0 | Status: ACTIVE | COMMUNITY

## 2019-09-03 RX ORDER — HYDRALAZINE HYDROCHLORIDE 50 MG/1
TABLET ORAL
Refills: 0 | Status: ACTIVE | COMMUNITY

## 2019-09-03 RX ORDER — TAMSULOSIN HYDROCHLORIDE 0.4 MG/1
0.4 CAPSULE ORAL
Refills: 0 | Status: ACTIVE | COMMUNITY

## 2019-09-03 RX ORDER — CHOLECALCIFEROL (VITAMIN D3) 125 MCG
TABLET ORAL
Refills: 0 | Status: ACTIVE | COMMUNITY

## 2019-09-03 RX ORDER — POLYETHYLENE GLYCOL 3350 17 G/17G
17 POWDER, FOR SOLUTION ORAL
Refills: 0 | Status: ACTIVE | COMMUNITY

## 2019-09-03 RX ORDER — CILOSTAZOL 50 MG/1
50 TABLET ORAL
Refills: 0 | Status: ACTIVE | COMMUNITY

## 2019-09-03 RX ORDER — INSULIN LISPRO 100 U/ML
INJECTION, SOLUTION INTRAVENOUS; SUBCUTANEOUS
Refills: 0 | Status: ACTIVE | COMMUNITY

## 2019-09-03 NOTE — ASSESSMENT
[Arterial/Venous Disease] : arterial/venous disease [Other: _____] : [unfilled] [Foot care/Footwear] : foot care/footwear [FreeTextEntry1] : Impression  s/p rt bka healing well \par \par \par Med Conserv management leg elevation, continue knee high comp stockings on lle prn, woundcare (betadine)\par continue pt/ot\par continue pletal 50 bid\par ov 1mo to re eval\par will need lle art insuff surveillance  [Medication Management] : medication management

## 2019-09-03 NOTE — PHYSICAL EXAM
[Normal Breath Sounds] : Normal breath sounds [2+] : right 2+ [Ankle Swelling (On Exam)] : present [0] : right 0 [Ankle Swelling On The Left] : of the left ankle [Varicose Veins Of Lower Extremities] : present [Varicose Veins Of The Left Leg] : of the left leg [] : of the left leg [Ankle Swelling On The Right] : mild [No HSM] : no hepatosplenomegaly [No Rash or Lesion] : No rash or lesion [Alert] : alert [Oriented to Person] : oriented to person [Oriented to Place] : oriented to place [Calm] : calm [Oriented to Time] : oriented to time [JVD] : no jugular venous distention  [Abdomen Masses] : No abdominal masses [Tender] : was nontender [Stool Sample Taken] : No stool obtained  on rectal exam [de-identified] : nad [de-identified] : wnl [FreeTextEntry1] : rt bka stump healing well [de-identified] : wnl [de-identified] : in wheelchair  [de-identified] : Jeff Cranial nerves 2-12 jeff grossly intact [de-identified] : cooperative

## 2019-09-03 NOTE — HISTORY OF PRESENT ILLNESS
[FreeTextEntry1] : pt is in rehab\par pt is receiving pt/ot \par pt  presents for eval for remaining staples d/c

## 2019-10-08 ENCOUNTER — APPOINTMENT (OUTPATIENT)
Dept: VASCULAR SURGERY | Facility: CLINIC | Age: 59
End: 2019-10-08
Payer: COMMERCIAL

## 2019-10-08 VITALS
SYSTOLIC BLOOD PRESSURE: 136 MMHG | WEIGHT: 206 LBS | DIASTOLIC BLOOD PRESSURE: 82 MMHG | HEIGHT: 72 IN | TEMPERATURE: 98.1 F | BODY MASS INDEX: 27.9 KG/M2 | HEART RATE: 85 BPM

## 2019-10-08 PROCEDURE — 99024 POSTOP FOLLOW-UP VISIT: CPT

## 2019-10-08 PROCEDURE — 93923 UPR/LXTR ART STDY 3+ LVLS: CPT | Mod: LT

## 2019-10-08 NOTE — HISTORY OF PRESENT ILLNESS
[FreeTextEntry1] : pt is in rehab\par pt is receiving pt/ot \par pt  presents for eval for remaining staples d/c  [de-identified] : pt presents for lle art insuff f/u\par pt is on pletal 50 bid\par pt c/o mild lle edema \par intensity mild \par onset in the past sev weeks, no pain c/o\par pt is using rt bka prosthesis w/o c/o

## 2019-10-08 NOTE — DATA REVIEWED
[FreeTextEntry1] : 7/29/2015 Venous Doppler herve le no dvt svt RLE sig gsv insuff sfj to prox calf LLE insuff sfj to mid thigh w herve le insuff collaterals\par \par 1/15/2016 venous Doppler herve le no acute dvt svt RLE insuff gsv sff to bk levelw insuff distal collateral lle insuff gsv sfj to knee w insuff collateral herve le w sig edema \par \par 11/23/2016 RUE venous duplex no dvt svt rt skoulder area no collection noted\par \par 10/8/2019 JOSIANE/PVR LLE  mild infragenic art insuff w vessel calcification lt josiane 1.26

## 2019-10-08 NOTE — PHYSICAL EXAM
[Normal Breath Sounds] : Normal breath sounds [2+] : right 2+ [0] : right 0 [Ankle Swelling On The Left] : of the left ankle [Ankle Swelling (On Exam)] : present [Varicose Veins Of Lower Extremities] : present [Varicose Veins Of The Left Leg] : of the left leg [Ankle Swelling On The Right] : mild [] : of the left leg [No HSM] : no hepatosplenomegaly [No Rash or Lesion] : No rash or lesion [Alert] : alert [Oriented to Person] : oriented to person [Oriented to Place] : oriented to place [Oriented to Time] : oriented to time [Calm] : calm [Abdomen Masses] : No abdominal masses [JVD] : no jugular venous distention  [Tender] : was nontender [Stool Sample Taken] : No stool obtained  on rectal exam [de-identified] : nad [de-identified] : wnl [FreeTextEntry1] : rt bka stump stable well healed\par lle mild venous isnuff w mild edema\par mild art insuff w mild trophic skin changes\par  [de-identified] : wnl [de-identified] : in wheelchair  [de-identified] : Jeff Cranial nerves 2-12 jeff grossly intact [de-identified] : cooperative

## 2019-10-08 NOTE — ASSESSMENT
[Arterial/Venous Disease] : arterial/venous disease [Medication Management] : medication management [Other: _____] : [unfilled] [Foot care/Footwear] : foot care/footwear [FreeTextEntry1] : Impression  s/p rt bka healing well , lle art insuff \par \par \par Med Conserv management leg elevation, continue knee high comp stockings on lle prn\par continue pt/ot prn \par continue pletal 50 bid\par ov w lle claudette/pvr s/o art insuff 12mo oct 2020 \par ov 6mo prn

## 2019-10-14 ENCOUNTER — TRANSCRIPTION ENCOUNTER (OUTPATIENT)
Age: 59
End: 2019-10-14

## 2019-11-04 ENCOUNTER — RX CHANGE (OUTPATIENT)
Age: 59
End: 2019-11-04

## 2019-11-04 RX ORDER — CILOSTAZOL 50 MG/1
50 TABLET ORAL
Qty: 180 | Refills: 3 | Status: ACTIVE | COMMUNITY
Start: 2019-10-08 | End: 1900-01-01

## 2020-02-10 NOTE — PROGRESS NOTE ADULT - SUBJECTIVE AND OBJECTIVE BOX
Endocrinology Attending Covering for Dr. Zhu      Chief complaint  Patient is a 59y old  Male who presents with a chief complaint of SOB (26 Jul 2019 10:03)   Review of systems  Patient in bed, looks comfortable, no fever,  had no hypoglycemia.    Labs and Fingersticks  CAPILLARY BLOOD GLUCOSE      POCT Blood Glucose.: 100 mg/dL (26 Jul 2019 12:06)  POCT Blood Glucose.: 107 mg/dL (26 Jul 2019 08:03)  POCT Blood Glucose.: 112 mg/dL (25 Jul 2019 22:32)  POCT Blood Glucose.: 115 mg/dL (25 Jul 2019 21:02)  POCT Blood Glucose.: 124 mg/dL (25 Jul 2019 17:17)      Anion Gap, Serum: 16 (07-26 @ 05:15)  Anion Gap, Serum: 12 (07-25 @ 05:57)      Calcium, Total Serum: 9.0 (07-26 @ 05:15)  Calcium, Total Serum: 9.0 (07-25 @ 05:57)          07-26    139  |  101  |  17  ----------------------------<  119<H>  4.4   |  22  |  1.66<H>    Ca    9.0      26 Jul 2019 05:15                          8.8    5.34  )-----------( 251      ( 26 Jul 2019 08:29 )             28.3     Medications  MEDICATIONS  (STANDING):  amLODIPine   Tablet 10 milliGRAM(s) Oral daily  chlorhexidine 2% Cloths 1 Application(s) Topical daily  collagenase Ointment 1 Application(s) Topical <User Schedule>  dextrose 5%. 1000 milliLiter(s) (50 mL/Hr) IV Continuous <Continuous>  dextrose 50% Injectable 12.5 Gram(s) IV Push once  dextrose 50% Injectable 25 Gram(s) IV Push once  dextrose 50% Injectable 25 Gram(s) IV Push once  dronabinol 2.5 milliGRAM(s) Oral two times a day  epoetin braden Injectable 39454 Unit(s) SubCutaneous <User Schedule>  ferrous    sulfate 325 milliGRAM(s) Oral daily  furosemide    Tablet 40 milliGRAM(s) Oral daily  gabapentin 100 milliGRAM(s) Oral every 8 hours  heparin  Injectable 5000 Unit(s) SubCutaneous every 8 hours  hydrALAZINE 100 milliGRAM(s) Oral three times a day  insulin glargine Injectable (LANTUS) 22 Unit(s) SubCutaneous at bedtime  insulin lispro (HumaLOG) corrective regimen sliding scale   SubCutaneous three times a day before meals  insulin lispro (HumaLOG) corrective regimen sliding scale   SubCutaneous at bedtime  insulin lispro Injectable (HumaLOG) 4 Unit(s) SubCutaneous before breakfast  polyethylene glycol 3350 17 Gram(s) Oral every 12 hours  primidone 50 milliGRAM(s) Oral two times a day  senna 2 Tablet(s) Oral at bedtime  sodium chloride 0.9%. 1000 milliLiter(s) (30 mL/Hr) IV Continuous <Continuous>  tamsulosin 0.4 milliGRAM(s) Oral at bedtime      Physical Exam  General: Patient comfortable in bed  Vital Signs Last 12 Hrs  T(F): 98.1 (07-26-19 @ 11:20), Max: 98.1 (07-26-19 @ 11:20)  HR: 87 (07-26-19 @ 12:59) (74 - 87)  BP: 123/64 (07-26-19 @ 12:59) (123/64 - 145/67)  BP(mean): --  RR: 17 (07-26-19 @ 11:20) (17 - 18)  SpO2: 98% (07-26-19 @ 11:20) (97% - 98%)  Neck: No palpable thyroid nodules.  CVS: S1S2, No murmurs  Respiratory: No wheezing, no crepitations  GI: Abdomen soft, bowel sounds positive  Musculoskeletal:  edema lower extremities.   Skin: No skin rashes, no ecchymosis    Diagnostics Complex Repair And Graft Additional Text (Will Appearing After The Standard Complex Repair Text): The complex repair was not sufficient to completely close the primary defect. The remaining additional defect was repaired with the graft mentioned below.

## 2020-06-01 NOTE — ED PROVIDER NOTE - MUSCULOSKELETAL NEGATIVE STATEMENT, MLM
Patient calling for follow up, do you want her in office  She also wanted me to let you know that her boyfriend now   Seemed to have the same spots on his skin also. no back pain, no gout, no musculoskeletal pain, no neck pain

## 2020-06-10 ENCOUNTER — NON-APPOINTMENT (OUTPATIENT)
Age: 60
End: 2020-06-10

## 2020-06-10 ENCOUNTER — APPOINTMENT (OUTPATIENT)
Dept: VASCULAR SURGERY | Facility: CLINIC | Age: 60
End: 2020-06-10
Payer: COMMERCIAL

## 2020-06-10 DIAGNOSIS — I77.1 STRICTURE OF ARTERY: ICD-10-CM

## 2020-06-10 DIAGNOSIS — I83.899 VARICOSE VEINS OF UNSPECIFIED LOWER EXTREMITY WITH OTHER COMPLICATIONS: ICD-10-CM

## 2020-06-10 DIAGNOSIS — I83.892 VARICOSE VEINS OF LEFT LOWER EXTREMITY WITH OTHER COMPLICATIONS: ICD-10-CM

## 2020-06-10 PROCEDURE — 99443: CPT

## 2020-06-10 NOTE — PHYSICAL EXAM
[2+] : right 2+ [0] : right 0 [Ankle Swelling (On Exam)] : present [Ankle Swelling On The Left] : moderate [Varicose Veins Of Lower Extremities] : present [Varicose Veins Of The Left Leg] : of the left leg [] : of the left leg [Ankle Swelling On The Right] : mild [No Rash or Lesion] : No rash or lesion [Alert] : alert [Oriented to Person] : oriented to person [Oriented to Place] : oriented to place [Oriented to Time] : oriented to time [Calm] : calm [JVD] : no jugular venous distention  [de-identified] : nad [de-identified] : no resp effort  [de-identified] : wnl [FreeTextEntry1] : rt bka stump stable well healed\par lle mild venous isnuff w mild to mod edema edema\par mild art insuff w mild trophic skin changes\par no wounds/ulcers\par findings via tele visit inspection of the extremities \par  [de-identified] : siting in chair  [de-identified] : Jeff Cranial nerves 2-12 jeff grossly intact [de-identified] : cooperative

## 2020-06-10 NOTE — HISTORY OF PRESENT ILLNESS
[FreeTextEntry1] : pt is in rehab\par pt is receiving pt/ot \par pt  presents for eval for remaining staples d/c  [de-identified] : pt presents for lle art insuff f/u\par pt is on pletal 50 bid\par pt c/o mild lle edema \par intensity mild \par onset in the past sev weeks, no pain c/o\par pt is using rt bka prosthesis w/o c/o

## 2020-06-10 NOTE — ASSESSMENT
[Arterial/Venous Disease] : arterial/venous disease [Medication Management] : medication management [Other: _____] : [unfilled] [Foot care/Footwear] : foot care/footwear [FreeTextEntry1] : Impression  s/p rt bka healing well , lle art insuff \par \par \par Med Conserv management leg elevation, continue knee high comp stockings on lle prn\par continue pletal 50 bid\par ov w lle claudette/pvr s/o art insuff 12mo oct 2020 \par pt wants ov next time  \par televisit duration 27 min

## 2020-06-10 NOTE — REASON FOR VISIT
[Home] : at home, [unfilled] , at the time of the visit. [Medical Office: ([facility])___] : at the medical office located in  [Other:____] : [unfilled] [Verbal consent obtained from patient] : the patient, [unfilled] [Follow-Up: _____] : a [unfilled] follow-up visit [FreeTextEntry1] : s/p rt bka

## 2020-06-10 NOTE — DATA REVIEWED
[FreeTextEntry1] : 7/29/2015 Venous Doppler herve le no dvt svt RLE sig gsv insuff sfj to prox calf LLE insuff sfj to mid thigh w herve le insuff collaterals\par \par 1/15/2016 venous Doppler herve le no acute dvt svt RLE insuff gsv sff to bk levelw insuff distal collateral lle insuff gsv sfj to knee w insuff collateral herve le w sig edema \par \par 11/23/2016 RUE venous duplex no dvt svt rt skoulder area no collection noted\par \par 10/8/2019 JOSIANE/PVR LLE  mild infragenic art insuff w vessel calcification lt josiane 1.26\par \par

## 2020-09-01 NOTE — PROGRESS NOTE ADULT - ASSESSMENT
Bacterial vaginosis (BV)    -An imbalance of the bacteria of the vagina associated with elevated (less acidic) pH  -Exact cause is not known  -Some cases can be chronic or recurrent  -initial treatment with antibiotic  -over time, repeated courses of antib 58M with IDDM, HTN, and CKD presenting with a diabetic foot ulcer s/p debridement. Renal consulted for MARIKA on CKD.

## 2020-09-02 NOTE — CHART NOTE - NSCHARTNOTEFT_GEN_A_CORE
9/2/2020  Cardiology/Nallely  See my telephone note from yesterday where I reviewed the entire procedure over the telephone.  I saw her today and she had no questions and wanted to proceed with AP.  She understands the risks of the AP and wants to proceed with the PA.   Fever    Notified over night patient had a T-max of 102.6 overnight with chills.    Vital Signs Last 24 Hrs  T(C): 37.9 (25 May 2019 05:23), Max: 39.2 (24 May 2019 20:42)  T(F): 100.3 (25 May 2019 05:23), Max: 102.6 (24 May 2019 20:42)  HR: 98 (25 May 2019 05:23) (90 - 102)  BP: 166/79 (25 May 2019 05:23) (151/78 - 176/78)  BP(mean): --  RR: 18 (25 May 2019 05:23) (18 - 18)  SpO2: 98% (25 May 2019 05:23) (97% - 98%)    Awake alert oriented no acute distress  s1 s2  Respiratory no accessory muscle use    PAST MEDICAL & SURGICAL HISTORY:  Hypertension  Insulin dependent diabetes mellitus  Venous insufficiency of both lower extremities: R &gt; L  HTN (hypertension)  BPH (benign prostatic hypertrophy)  Diabetes  History of cholecystectomy  S/P laparoscopic cholecystectomy  Status post incision and drainage: Rt groin abscess    MEDICATIONS  (STANDING):  aspirin enteric coated 81 milliGRAM(s) Oral daily  ciprofloxacin     Tablet 250 milliGRAM(s) Oral two times a day  dextrose 5%. 1000 milliLiter(s) (50 mL/Hr) IV Continuous <Continuous>  dextrose 50% Injectable 12.5 Gram(s) IV Push once  dextrose 50% Injectable 25 Gram(s) IV Push once  dextrose 50% Injectable 25 Gram(s) IV Push once  ergocalciferol 37170 Unit(s) Oral every week  ergocalciferol 21859 Unit(s) Oral every week  heparin  Injectable 5000 Unit(s) SubCutaneous every 8 hours  hydrALAZINE 50 milliGRAM(s) Oral three times a day  insulin glargine Injectable (LANTUS) 38 Unit(s) SubCutaneous at bedtime  insulin lispro (HumaLOG) corrective regimen sliding scale   SubCutaneous three times a day before meals  insulin lispro (HumaLOG) corrective regimen sliding scale   SubCutaneous at bedtime  insulin lispro Injectable (HumaLOG) 26 Unit(s) SubCutaneous three times a day before meals  lisinopril 40 milliGRAM(s) Oral daily  sodium chloride 0.9%. 1000 milliLiter(s) (125 mL/Hr) IV Continuous <Continuous>    MEDICATIONS  (PRN):  acetaminophen   Tablet .. 650 milliGRAM(s) Oral every 6 hours PRN Temp greater or equal to 38C (100.4F)  acetaminophen   Tablet .. 650 milliGRAM(s) Oral every 6 hours PRN Mild Pain (1 - 3)  dextrose 40% Gel 15 Gram(s) Oral once PRN Blood Glucose LESS THAN 70 milliGRAM(s)/deciliter  glucagon  Injectable 1 milliGRAM(s) IntraMuscular once PRN Glucose LESS THAN 70 milligrams/deciliter  oxyCODONE    5 mG/acetaminophen 325 mG 1 Tablet(s) Oral every 4 hours PRN Moderate Pain (4 - 6)  oxyCODONE    5 mG/acetaminophen 325 mG 2 Tablet(s) Oral every 4 hours PRN Severe Pain (7 - 10)    HPI:  59 y/o male with history of IDDM, HTN, presents to the ED sent by Podiatrist (Dr. Giovanni Hanna) for evaluation and treatment of infected R foot ulcer, failing outpatient Augmentin. Patient reports that he has been followed by Podiatry outpatient for R plantar foot ulcer x3-4 weeks. Approx 2 weeks ago pt developed fever, chills, diaphoresis, malaise, decreased PO intake and nausea. Treated with Augmentin for the last 1 week without improvement. now with recurrent fevers    Plan  chest x-ray  blood cx's  urinalysis  Tylenol  lactate  ID following  continue cipro for now  incentive spirometer  call placed to ID service    Will endorse to primary day team, attending to follow,  Linette gonzalez [phone]

## 2021-10-11 NOTE — PATIENT PROFILE ADULT - NSPROPOAPRESSUREINJURY_GEN_A_NUR
What Is The Reason For Today's Visit?: Mole Check Additional History: Patient here for annual mole check up. no

## 2022-01-23 NOTE — PROGRESS NOTE ADULT - ASSESSMENT
Lara cath information and teaching done/ pt verbalized understanding with demo back 59 yo male admitted with acute on chronic diastolic heart failure

## 2022-05-02 NOTE — PROGRESS NOTE ADULT - SUBJECTIVE AND OBJECTIVE BOX
Subjective: Patient seen and examined. No new events except as noted.   resting comfortably       REVIEW OF SYSTEMS:    CONSTITUTIONAL: + weakness, fevers or chills  EYES/ENT: No visual changes;  No vertigo or throat pain   NECK: No pain or stiffness  RESPIRATORY: No cough, wheezing, hemoptysis; No shortness of breath  CARDIOVASCULAR: No chest pain or palpitations  GASTROINTESTINAL: No abdominal or epigastric pain. No nausea, vomiting, or hematemesis; No diarrhea or constipation. No melena or hematochezia.  GENITOURINARY: No dysuria, frequency or hematuria  NEUROLOGICAL: No numbness or weakness  SKIN: No itching, burning, rashes, or lesions   All other review of systems is negative unless indicated above.    MEDICATIONS:  MEDICATIONS  (STANDING):  amLODIPine   Tablet 10 milliGRAM(s) Oral daily  aspirin enteric coated 81 milliGRAM(s) Oral daily  bisacodyl Suppository 10 milliGRAM(s) Rectal once  collagenase Ointment 1 Application(s) Topical daily  dextrose 5%. 1000 milliLiter(s) (50 mL/Hr) IV Continuous <Continuous>  dextrose 50% Injectable 12.5 Gram(s) IV Push once  dextrose 50% Injectable 25 Gram(s) IV Push once  dextrose 50% Injectable 25 Gram(s) IV Push once  dronabinol 2.5 milliGRAM(s) Oral two times a day  ergocalciferol 27876 Unit(s) Oral every week  heparin  Injectable 5000 Unit(s) SubCutaneous every 8 hours  hydrALAZINE 100 milliGRAM(s) Oral three times a day  insulin glargine Injectable (LANTUS) 38 Unit(s) SubCutaneous at bedtime  insulin lispro (HumaLOG) corrective regimen sliding scale   SubCutaneous three times a day before meals  insulin lispro (HumaLOG) corrective regimen sliding scale   SubCutaneous three times a day before meals  insulin lispro (HumaLOG) corrective regimen sliding scale   SubCutaneous at bedtime  insulin lispro Injectable (HumaLOG) 22 Unit(s) SubCutaneous three times a day before meals  ondansetron Injectable 4 milliGRAM(s) IV Push once  piperacillin/tazobactam IVPB. 3.375 Gram(s) IV Intermittent every 8 hours  polyethylene glycol 3350 17 Gram(s) Oral every 12 hours  senna 2 Tablet(s) Oral at bedtime      PHYSICAL EXAM:  T(C): 36.6 (06-13-19 @ 09:18), Max: 37.3 (06-12-19 @ 20:57)  HR: 69 (06-13-19 @ 09:18) (67 - 89)  BP: 132/77 (06-13-19 @ 09:18) (131/74 - 155/80)  RR: 18 (06-13-19 @ 09:18) (18 - 20)  SpO2: 92% (06-13-19 @ 09:18) (90% - 98%)  Wt(kg): --  I&O's Summary    12 Jun 2019 07:01  -  13 Jun 2019 07:00  --------------------------------------------------------  IN: 1160 mL / OUT: 1100 mL / NET: 60 mL          Appearance: NAD   HEENT:   Normal oral mucosa, PERRL, EOMI	  Lymphatic: No lymphadenopathy  Cardiovascular: Normal S1 S2, No JVD, No murmurs , Peripheral pulses palpable 2+ bilaterally  Respiratory: Lungs clear to auscultation, normal effort 	  Gastrointestinal:  Soft, Non-tender, + BS	  Skin: No rashes, No ecchymoses, No cyanosis, warm to touch  Musculoskeletal: Decreased range of motion, normal strength  Psychiatry:  Mood & affect appropriate  Ext: +edema , chronic venous stasis skin discoloration   right foot wrapped , + ulcer   +VAC  2+ pitting edema         LABS:    CARDIAC MARKERS:                                8.9    6.8   )-----------( 344      ( 13 Jun 2019 06:34 )             27.5     06-13    133<L>  |  101  |  18  ----------------------------<  257<H>  5.0   |  22  |  2.07<H>    Ca    9.1      13 Jun 2019 06:34      proBNP:   Lipid Profile:   HgA1c:   TSH:             TELEMETRY: 	    ECG:  	  RADIOLOGY:   DIAGNOSTIC TESTING:  [ ] Echocardiogram:  [ ]  Catheterization:  [ ] Stress Test:    OTHER: Yes

## 2022-05-06 NOTE — BEHAVIORAL HEALTH ASSESSMENT NOTE - NS ED BHA MED ROS ENT MOUTH
No complaints Finasteride Male Counseling: Finasteride Counseling:  I discussed with the patient the risks of use of finasteride including but not limited to decreased libido, decreased ejaculate volume, gynecomastia, and depression. Women should not handle medication.  All of the patient's questions and concerns were addressed. Finasteride Counseling:  I discussed with the patient the risks of use of finasteride including but not limited to decreased libido, decreased ejaculate volume, gynecomastia, and depression. Women should not handle medication.  All of the patient's questions and concerns were addressed.

## 2022-05-23 NOTE — CONSULT NOTE ADULT - I HAVE PERSONALLY SEEN, EXAMINED, AND PARTICIPATED IN THE CARE OF THIS PATIENT.  I HAVE REVIEWED ALL PERTINENT CLINICAL INFORMATION, INCLUDING HISTORY, PHYSICAL EXAM, PLAN AND THE MEDICAL/PA/NP STUDENT’S NOTE AND AGREE EXCEPT AS NOTED.
Prior Authorization Retail Medication Request    Medication/Dose:   Prucalopride Succinate 2 MG TABS 30 tablet 11 5/23/2022  --   Sig - Route: Take 1 tablet (2 mg) by mouth daily - Oral   Class: E-Prescribe   Order: [id number]             ICD code (if different than what is on RX):    Previously Tried and Failed:    Rationale:      Insurance Name:    Insurance ID:        Pharmacy Information (if different than what is on RX)  Name:    Phone:     Statement Selected

## 2022-07-13 NOTE — PATIENT PROFILE ADULT - NSPROEDALEARNPREF_GEN_A_NUR
----- Message from David Calvin sent at 7/13/2022 10:07 AM CDT -----  Pt daughter wanted to see if she could get and order for PT. Pt  daughter stated she stop walking a couple of days ago     verbal instruction

## 2022-08-08 NOTE — ED ADULT NURSE NOTE - NS ED NURSE RECORD ANOTHER VITAL SIGN
Reason For Visit  Chief Complaint   Patient presents with   • Follow-up     HTN, Murmur, COPD.           History of Present Illness  Patient here for 6-month follow-up  No issues to address today  Tolerating the change in medications offered earlier this year blood pressure is well regulated she is asymptomatic he has no dizziness or lightheadedness.     Visit Vitals  /75 (BP Location: RUE - Right upper extremity, Patient Position: Sitting)   Pulse 70   Wt 50 kg (110 lb 3.7 oz)   SpO2 100%   BMI 19.53 kg/m²         Physical Exam  Physical Exam   Constitutional: No distress.   HENT: Tympanic membranes normal.   Eyes: Pupils are equal, round, and reactive to light.   Neck: Thyroid normal.   Cardiovascular: Normal rate, regular rhythm, S1 normal and S2 normal.   No murmur heard.  Pulses:       Carotid pulses are 0 on the right side and 0 on the left side.       Radial pulses are 2+ on the right side and 2+ on the left side.        Right popliteal pulse not accessible and left popliteal pulse not accessible.        Right dorsalis pedis pulse not accessible and left dorsalis pedis pulse not accessible.        Right posterior tibial pulse not accessible and left posterior tibial pulse not accessible.   Pulmonary/Chest: Breath sounds normal. She has no wheezes. She has no rales. She exhibits no tenderness.   Abdominal: Soft. Bowel sounds are normal.   Musculoskeletal:         General: Normal range of motion.      Cervical back: Normal range of motion and neck supple.   Neurological: She is alert and oriented to person, place, and time.   Skin: Skin is warm and dry.       Labs  No results found for: CHOLESTEROL  No results found for: HDL  No results found for: CHOHDL  No results found for: TRIGLYCERIDE  No results found for: CALCLDL  No results found for: SODIUM  No results found for: POTASSIUM  No results found for: CHLORIDE  No results found for: GLUCOSE  No results found for: CALCIUM  No results found for: CO2  No  Yes results found for: BUN  No results found for: CREATININE   No results found for: WBC  No results found for: RBC  No results found for: HCT  No results found for: HGB  No results found for: PLT     Assessment  Plan:      Essential (primary) hypertension  Hypertension is improving with treatment.  Continue current treatment regimen.  Dietary sodium restriction.  Weight loss.  Regular aerobic exercise.  Continue current medications.  Blood pressure will be reassessed at the next regular appointment.  After advancing her lisinopril hydrochlorothiazide we found her blood pressure will well-regulated and she feels well without any dizziness lightheadedness.    12-month follow-up.  Heart murmur  Her echocardiogram July 2021 identifies a systolic ejection murmur of the aortic valve, by echo shows no aortic stenosis there is mild regurgitation.  COPD (chronic obstructive pulmonary disease) (CMS/[facility])  COPD is improving with treatment.  Continue current medications.         End of Visit Meds  Current Outpatient Medications   Medication Sig Dispense Refill   • carbidopa-levodopa (SINEMET)  MG per tablet Take 1 tablet by mouth 3 times daily.     • anastrozole (ARIMIDEX) 1 MG tablet Take 1 tablet by mouth daily. Indications: Cancer of the Breast      • lisinopril-hydroCHLOROthiazide (ZESTORETIC) 10-12.5 MG per tablet Take 1 tablet by mouth 2 times daily. (Patient taking differently: Take 1 tablet by mouth in the morning and 1 tablet in the evening. Indications: High Blood Pressure Disorder.) 180 tablet 3   • dorzolamide (TRUSOPT) 2 % ophthalmic solution      • timolol (TIMOPTIC) 0.5 % ophthalmic solution      • cycloSPORINE (RESTASIS OP)      • Multiple Vitamins-Minerals (MULTIVITAMIN ADULTS PO)        No current facility-administered medications for this visit.          Sherman Jara DO, MS, FACC, [facility]  Interventional Cardiology  [facility] [place]

## 2022-08-30 ENCOUNTER — APPOINTMENT (OUTPATIENT)
Dept: VASCULAR SURGERY | Facility: CLINIC | Age: 62
End: 2022-08-30

## 2022-08-30 VITALS
HEART RATE: 90 BPM | TEMPERATURE: 96.1 F | HEIGHT: 72 IN | SYSTOLIC BLOOD PRESSURE: 188 MMHG | DIASTOLIC BLOOD PRESSURE: 118 MMHG | BODY MASS INDEX: 34.54 KG/M2 | WEIGHT: 255 LBS

## 2022-08-30 DIAGNOSIS — I83.813 VARICOSE VEINS OF BILATERAL LOWER EXTREMITIES WITH PAIN: ICD-10-CM

## 2022-08-30 DIAGNOSIS — L98.499 STRICTURE OF ARTERY: ICD-10-CM

## 2022-08-30 DIAGNOSIS — I77.1 STRICTURE OF ARTERY: ICD-10-CM

## 2022-08-30 DIAGNOSIS — I87.2 VENOUS INSUFFICIENCY (CHRONIC) (PERIPHERAL): ICD-10-CM

## 2022-08-30 PROCEDURE — 99214 OFFICE O/P EST MOD 30 MIN: CPT

## 2022-08-30 PROCEDURE — 93923 UPR/LXTR ART STDY 3+ LVLS: CPT

## 2022-08-30 RX ORDER — CILOSTAZOL 100 MG/1
100 TABLET ORAL TWICE DAILY
Qty: 60 | Refills: 6 | Status: ACTIVE | COMMUNITY
Start: 2022-08-30 | End: 1900-01-01

## 2022-08-30 RX ORDER — AMOXICILLIN AND CLAVULANATE POTASSIUM 500; 125 MG/1; MG/1
500-125 TABLET, FILM COATED ORAL
Qty: 20 | Refills: 1 | Status: ACTIVE | COMMUNITY
Start: 2022-08-30 | End: 1900-01-01

## 2022-08-30 NOTE — HISTORY OF PRESENT ILLNESS
[FreeTextEntry1] : pt is in rehab\par pt is receiving pt/ot \par pt  presents for eval for remaining staples d/c  [de-identified] : pt d/c pletal \par pt states new left foot wound onset 1 mo ago

## 2022-08-30 NOTE — DATA REVIEWED
[FreeTextEntry1] : 7/29/2015 Venous Doppler herve le no dvt svt RLE sig gsv insuff sfj to prox calf LLE insuff sfj to mid thigh w herve le insuff collaterals\par \par 1/15/2016 venous Doppler herve le no acute dvt svt RLE insuff gsv sff to bk levelw insuff distal collateral lle insuff gsv sfj to knee w insuff collateral herve le w sig edema \par \par 11/23/2016 RUE venous duplex no dvt svt rt skoulder area no collection noted\par \par 10/8/2019 JOSIANE/PVR LLE  mild infragenic art insuff w vessel calcification lt josiane 1.26\par \par 8/30/22  JOSIANE/PVR LLE  mild infragenic art insuff w vessel calcification lt josiane NC \par \par

## 2022-08-30 NOTE — ASSESSMENT
[Arterial/Venous Disease] : arterial/venous disease [Medication Management] : medication management [Other: _____] : [unfilled] [Foot care/Footwear] : foot care/footwear [FreeTextEntry1] : Impression  s/p rt bka stable  well , lle art insuff  w new left foot wound \par \par \par Med Conserv management leg elevation, continue knee high comp stockings on lle prn\par continue  foot care and woundcare w Dr ESSIE Issa DPM \par restart pletal 100 bid \par d/w pt to proceed w lle angio indications risks and benefits d/w pt who consents\par

## 2022-08-30 NOTE — PHYSICAL EXAM
[Normal Breath Sounds] : Normal breath sounds [2+] : right 2+ [0] : right 0 [Ankle Swelling (On Exam)] : present [Varicose Veins Of Lower Extremities] : present [Varicose Veins Of The Left Leg] : of the left leg [Ankle Swelling On The Right] : mild [] : of the left leg [No HSM] : no hepatosplenomegaly [No Rash or Lesion] : No rash or lesion [Alert] : alert [Oriented to Person] : oriented to person [Oriented to Place] : oriented to place [Oriented to Time] : oriented to time [Calm] : calm [1+] : left 1+ [Ankle Swelling On The Left] : moderate [JVD] : no jugular venous distention  [Abdomen Masses] : No abdominal masses [Tender] : was nontender [Stool Sample Taken] : No stool obtained  on rectal exam [de-identified] : nad [de-identified] : wnl [de-identified] : no resp effort  [FreeTextEntry1] : rt bka stump stable well healed\par lle mild venous isnuff w mild edema\par mild art insuff w mild trophic skin changes\par left lateral mid foot  ulcer 1cm diam w limited granulation tissue  [de-identified] : ambulates slowly  [de-identified] : Jeff Cranial nerves 2-12 jeff grossly intact [de-identified] : cooperative

## 2022-09-21 ENCOUNTER — NON-APPOINTMENT (OUTPATIENT)
Age: 62
End: 2022-09-21

## 2022-10-06 ENCOUNTER — TRANSCRIPTION ENCOUNTER (OUTPATIENT)
Age: 62
End: 2022-10-06

## 2022-10-07 ENCOUNTER — RESULT REVIEW (OUTPATIENT)
Age: 62
End: 2022-10-07

## 2022-10-07 ENCOUNTER — INPATIENT (INPATIENT)
Facility: HOSPITAL | Age: 62
LOS: 14 days | Discharge: NURSING, REHAB OR LONG-TERM CARE | End: 2022-10-22
Attending: SURGERY | Admitting: SURGERY

## 2022-10-07 ENCOUNTER — APPOINTMENT (OUTPATIENT)
Dept: VASCULAR SURGERY | Facility: HOSPITAL | Age: 62
End: 2022-10-07

## 2022-10-07 VITALS
OXYGEN SATURATION: 97 % | WEIGHT: 255.07 LBS | HEIGHT: 72 IN | HEART RATE: 99 BPM | TEMPERATURE: 101 F | RESPIRATION RATE: 20 BRPM | SYSTOLIC BLOOD PRESSURE: 158 MMHG | DIASTOLIC BLOOD PRESSURE: 85 MMHG

## 2022-10-07 DIAGNOSIS — Z89.511 ACQUIRED ABSENCE OF RIGHT LEG BELOW KNEE: Chronic | ICD-10-CM

## 2022-10-07 DIAGNOSIS — Z90.49 ACQUIRED ABSENCE OF OTHER SPECIFIED PARTS OF DIGESTIVE TRACT: Chronic | ICD-10-CM

## 2022-10-07 LAB
ANION GAP SERPL CALC-SCNC: 12 MMOL/L — SIGNIFICANT CHANGE UP (ref 7–14)
ANION GAP SERPL CALC-SCNC: 14 MMOL/L — SIGNIFICANT CHANGE UP (ref 7–14)
ANION GAP SERPL CALC-SCNC: 17 MMOL/L — HIGH (ref 7–14)
BLD GP AB SCN SERPL QL: NEGATIVE — SIGNIFICANT CHANGE UP
BLOOD GAS ARTERIAL - LYTES,HGB,ICA,LACT RESULT: SIGNIFICANT CHANGE UP
BUN SERPL-MCNC: 19 MG/DL — SIGNIFICANT CHANGE UP (ref 7–23)
BUN SERPL-MCNC: 22 MG/DL — SIGNIFICANT CHANGE UP (ref 7–23)
BUN SERPL-MCNC: 22 MG/DL — SIGNIFICANT CHANGE UP (ref 7–23)
CALCIUM SERPL-MCNC: 8.1 MG/DL — LOW (ref 8.4–10.5)
CALCIUM SERPL-MCNC: 8.2 MG/DL — LOW (ref 8.4–10.5)
CALCIUM SERPL-MCNC: 8.6 MG/DL — SIGNIFICANT CHANGE UP (ref 8.4–10.5)
CHLORIDE SERPL-SCNC: 100 MMOL/L — SIGNIFICANT CHANGE UP (ref 98–107)
CHLORIDE SERPL-SCNC: 101 MMOL/L — SIGNIFICANT CHANGE UP (ref 98–107)
CHLORIDE SERPL-SCNC: 98 MMOL/L — SIGNIFICANT CHANGE UP (ref 98–107)
CO2 SERPL-SCNC: 17 MMOL/L — LOW (ref 22–31)
CO2 SERPL-SCNC: 21 MMOL/L — LOW (ref 22–31)
CO2 SERPL-SCNC: 21 MMOL/L — LOW (ref 22–31)
CREAT SERPL-MCNC: 2.94 MG/DL — HIGH (ref 0.5–1.3)
CREAT SERPL-MCNC: 3.31 MG/DL — HIGH (ref 0.5–1.3)
CREAT SERPL-MCNC: 3.38 MG/DL — HIGH (ref 0.5–1.3)
EGFR: 20 ML/MIN/1.73M2 — LOW
EGFR: 20 ML/MIN/1.73M2 — LOW
EGFR: 23 ML/MIN/1.73M2 — LOW
GLUCOSE BLDC GLUCOMTR-MCNC: 166 MG/DL — HIGH (ref 70–99)
GLUCOSE BLDC GLUCOMTR-MCNC: 299 MG/DL — HIGH (ref 70–99)
GLUCOSE BLDC GLUCOMTR-MCNC: 302 MG/DL — HIGH (ref 70–99)
GLUCOSE BLDC GLUCOMTR-MCNC: 314 MG/DL — HIGH (ref 70–99)
GLUCOSE BLDC GLUCOMTR-MCNC: 353 MG/DL — HIGH (ref 70–99)
GLUCOSE BLDC GLUCOMTR-MCNC: 360 MG/DL — HIGH (ref 70–99)
GLUCOSE BLDC GLUCOMTR-MCNC: 368 MG/DL — HIGH (ref 70–99)
GLUCOSE SERPL-MCNC: 337 MG/DL — HIGH (ref 70–99)
GLUCOSE SERPL-MCNC: 347 MG/DL — HIGH (ref 70–99)
GLUCOSE SERPL-MCNC: 351 MG/DL — HIGH (ref 70–99)
HCT VFR BLD CALC: 31.5 % — LOW (ref 39–50)
HGB BLD-MCNC: 10.5 G/DL — LOW (ref 13–17)
MAGNESIUM SERPL-MCNC: 1.3 MG/DL — LOW (ref 1.6–2.6)
MAGNESIUM SERPL-MCNC: 1.6 MG/DL — SIGNIFICANT CHANGE UP (ref 1.6–2.6)
MAGNESIUM SERPL-MCNC: 1.7 MG/DL — SIGNIFICANT CHANGE UP (ref 1.6–2.6)
MCHC RBC-ENTMCNC: 29.8 PG — SIGNIFICANT CHANGE UP (ref 27–34)
MCHC RBC-ENTMCNC: 33.3 GM/DL — SIGNIFICANT CHANGE UP (ref 32–36)
MCV RBC AUTO: 89.5 FL — SIGNIFICANT CHANGE UP (ref 80–100)
NRBC # BLD: 0 /100 WBCS — SIGNIFICANT CHANGE UP (ref 0–0)
NRBC # FLD: 0 K/UL — SIGNIFICANT CHANGE UP (ref 0–0)
PHOSPHATE SERPL-MCNC: 3.3 MG/DL — SIGNIFICANT CHANGE UP (ref 2.5–4.5)
PHOSPHATE SERPL-MCNC: 3.9 MG/DL — SIGNIFICANT CHANGE UP (ref 2.5–4.5)
PLATELET # BLD AUTO: 344 K/UL — SIGNIFICANT CHANGE UP (ref 150–400)
POTASSIUM SERPL-MCNC: 3.6 MMOL/L — SIGNIFICANT CHANGE UP (ref 3.5–5.3)
POTASSIUM SERPL-MCNC: 3.8 MMOL/L — SIGNIFICANT CHANGE UP (ref 3.5–5.3)
POTASSIUM SERPL-MCNC: 5.6 MMOL/L — HIGH (ref 3.5–5.3)
POTASSIUM SERPL-SCNC: 3.6 MMOL/L — SIGNIFICANT CHANGE UP (ref 3.5–5.3)
POTASSIUM SERPL-SCNC: 3.8 MMOL/L — SIGNIFICANT CHANGE UP (ref 3.5–5.3)
POTASSIUM SERPL-SCNC: 5.6 MMOL/L — HIGH (ref 3.5–5.3)
RBC # BLD: 3.52 M/UL — LOW (ref 4.2–5.8)
RBC # FLD: 12.9 % — SIGNIFICANT CHANGE UP (ref 10.3–14.5)
RH IG SCN BLD-IMP: POSITIVE — SIGNIFICANT CHANGE UP
SODIUM SERPL-SCNC: 133 MMOL/L — LOW (ref 135–145)
SODIUM SERPL-SCNC: 134 MMOL/L — LOW (ref 135–145)
SODIUM SERPL-SCNC: 134 MMOL/L — LOW (ref 135–145)
WBC # BLD: 14.55 K/UL — HIGH (ref 3.8–10.5)
WBC # FLD AUTO: 14.55 K/UL — HIGH (ref 3.8–10.5)

## 2022-10-07 PROCEDURE — 99222 1ST HOSP IP/OBS MODERATE 55: CPT | Mod: 57

## 2022-10-07 PROCEDURE — 88307 TISSUE EXAM BY PATHOLOGIST: CPT | Mod: 26

## 2022-10-07 PROCEDURE — 73620 X-RAY EXAM OF FOOT: CPT | Mod: 26,LT

## 2022-10-07 PROCEDURE — 88311 DECALCIFY TISSUE: CPT | Mod: 26

## 2022-10-07 PROCEDURE — 27882 AMPUTATION OF LOWER LEG: CPT | Mod: LT

## 2022-10-07 PROCEDURE — 99291 CRITICAL CARE FIRST HOUR: CPT | Mod: 25

## 2022-10-07 PROCEDURE — 93010 ELECTROCARDIOGRAM REPORT: CPT

## 2022-10-07 RX ORDER — MAGNESIUM SULFATE 500 MG/ML
2 VIAL (ML) INJECTION ONCE
Refills: 0 | Status: COMPLETED | OUTPATIENT
Start: 2022-10-07 | End: 2022-10-07

## 2022-10-07 RX ORDER — METOCLOPRAMIDE HCL 10 MG
10 TABLET ORAL ONCE
Refills: 0 | Status: DISCONTINUED | OUTPATIENT
Start: 2022-10-07 | End: 2022-10-08

## 2022-10-07 RX ORDER — TAMSULOSIN HYDROCHLORIDE 0.4 MG/1
0.4 CAPSULE ORAL AT BEDTIME
Refills: 0 | Status: DISCONTINUED | OUTPATIENT
Start: 2022-10-07 | End: 2022-10-22

## 2022-10-07 RX ORDER — PIPERACILLIN AND TAZOBACTAM 4; .5 G/20ML; G/20ML
3.38 INJECTION, POWDER, LYOPHILIZED, FOR SOLUTION INTRAVENOUS EVERY 8 HOURS
Refills: 0 | Status: DISCONTINUED | OUTPATIENT
Start: 2022-10-07 | End: 2022-10-08

## 2022-10-07 RX ORDER — HYDROMORPHONE HYDROCHLORIDE 2 MG/ML
0.5 INJECTION INTRAMUSCULAR; INTRAVENOUS; SUBCUTANEOUS
Refills: 0 | Status: DISCONTINUED | OUTPATIENT
Start: 2022-10-07 | End: 2022-10-08

## 2022-10-07 RX ORDER — GLUCAGON INJECTION, SOLUTION 0.5 MG/.1ML
1 INJECTION, SOLUTION SUBCUTANEOUS ONCE
Refills: 0 | Status: DISCONTINUED | OUTPATIENT
Start: 2022-10-07 | End: 2022-10-11

## 2022-10-07 RX ORDER — ACETAMINOPHEN 500 MG
2 TABLET ORAL
Qty: 0 | Refills: 0 | DISCHARGE

## 2022-10-07 RX ORDER — INSULIN LISPRO 100/ML
0 VIAL (ML) SUBCUTANEOUS
Qty: 0 | Refills: 0 | DISCHARGE

## 2022-10-07 RX ORDER — PIPERACILLIN AND TAZOBACTAM 4; .5 G/20ML; G/20ML
3.38 INJECTION, POWDER, LYOPHILIZED, FOR SOLUTION INTRAVENOUS EVERY 12 HOURS
Refills: 0 | Status: DISCONTINUED | OUTPATIENT
Start: 2022-10-07 | End: 2022-10-07

## 2022-10-07 RX ORDER — ASPIRIN/CALCIUM CARB/MAGNESIUM 324 MG
1 TABLET ORAL
Qty: 0 | Refills: 0 | DISCHARGE

## 2022-10-07 RX ORDER — SODIUM CHLORIDE 9 MG/ML
1000 INJECTION, SOLUTION INTRAVENOUS
Refills: 0 | Status: DISCONTINUED | OUTPATIENT
Start: 2022-10-07 | End: 2022-10-11

## 2022-10-07 RX ORDER — INSULIN LISPRO 100/ML
VIAL (ML) SUBCUTANEOUS
Refills: 0 | Status: DISCONTINUED | OUTPATIENT
Start: 2022-10-07 | End: 2022-10-07

## 2022-10-07 RX ORDER — PIPERACILLIN AND TAZOBACTAM 4; .5 G/20ML; G/20ML
3.38 INJECTION, POWDER, LYOPHILIZED, FOR SOLUTION INTRAVENOUS ONCE
Refills: 0 | Status: COMPLETED | OUTPATIENT
Start: 2022-10-07 | End: 2022-10-07

## 2022-10-07 RX ORDER — SENNA PLUS 8.6 MG/1
2 TABLET ORAL
Qty: 0 | Refills: 0 | DISCHARGE

## 2022-10-07 RX ORDER — INSULIN HUMAN 100 [IU]/ML
3.5 INJECTION, SOLUTION SUBCUTANEOUS
Qty: 100 | Refills: 0 | Status: DISCONTINUED | OUTPATIENT
Start: 2022-10-07 | End: 2022-10-08

## 2022-10-07 RX ORDER — LABETALOL HCL 100 MG
10 TABLET ORAL ONCE
Refills: 0 | Status: COMPLETED | OUTPATIENT
Start: 2022-10-07 | End: 2022-10-07

## 2022-10-07 RX ORDER — INSULIN LISPRO 100/ML
20 VIAL (ML) SUBCUTANEOUS
Refills: 0 | Status: DISCONTINUED | OUTPATIENT
Start: 2022-10-07 | End: 2022-10-07

## 2022-10-07 RX ORDER — INSULIN GLARGINE 100 [IU]/ML
25 INJECTION, SOLUTION SUBCUTANEOUS
Qty: 0 | Refills: 0 | DISCHARGE

## 2022-10-07 RX ORDER — INSULIN LISPRO 100/ML
6 VIAL (ML) SUBCUTANEOUS
Qty: 0 | Refills: 0 | DISCHARGE

## 2022-10-07 RX ORDER — ERYTHROPOIETIN 10000 [IU]/ML
0 INJECTION, SOLUTION INTRAVENOUS; SUBCUTANEOUS
Qty: 0 | Refills: 0 | DISCHARGE

## 2022-10-07 RX ORDER — INSULIN LISPRO 100/ML
VIAL (ML) SUBCUTANEOUS AT BEDTIME
Refills: 0 | Status: DISCONTINUED | OUTPATIENT
Start: 2022-10-07 | End: 2022-10-07

## 2022-10-07 RX ORDER — FERROUS SULFATE 325(65) MG
1 TABLET ORAL
Qty: 0 | Refills: 0 | DISCHARGE

## 2022-10-07 RX ORDER — SODIUM CHLORIDE 9 MG/ML
3 INJECTION INTRAMUSCULAR; INTRAVENOUS; SUBCUTANEOUS EVERY 8 HOURS
Refills: 0 | Status: DISCONTINUED | OUTPATIENT
Start: 2022-10-07 | End: 2022-10-22

## 2022-10-07 RX ORDER — SODIUM CHLORIDE 9 MG/ML
1000 INJECTION, SOLUTION INTRAVENOUS
Refills: 0 | Status: DISCONTINUED | OUTPATIENT
Start: 2022-10-07 | End: 2022-10-09

## 2022-10-07 RX ORDER — TAMSULOSIN HYDROCHLORIDE 0.4 MG/1
1 CAPSULE ORAL
Qty: 0 | Refills: 0 | DISCHARGE

## 2022-10-07 RX ORDER — DEXTROSE 50 % IN WATER 50 %
15 SYRINGE (ML) INTRAVENOUS ONCE
Refills: 0 | Status: DISCONTINUED | OUTPATIENT
Start: 2022-10-07 | End: 2022-10-11

## 2022-10-07 RX ORDER — DEXTROSE 50 % IN WATER 50 %
25 SYRINGE (ML) INTRAVENOUS ONCE
Refills: 0 | Status: DISCONTINUED | OUTPATIENT
Start: 2022-10-07 | End: 2022-10-11

## 2022-10-07 RX ORDER — ERGOCALCIFEROL 1.25 MG/1
1 CAPSULE ORAL
Qty: 0 | Refills: 0 | DISCHARGE

## 2022-10-07 RX ORDER — DEXTROSE 50 % IN WATER 50 %
12.5 SYRINGE (ML) INTRAVENOUS ONCE
Refills: 0 | Status: DISCONTINUED | OUTPATIENT
Start: 2022-10-07 | End: 2022-10-07

## 2022-10-07 RX ORDER — HEPARIN SODIUM 5000 [USP'U]/ML
5000 INJECTION INTRAVENOUS; SUBCUTANEOUS EVERY 8 HOURS
Refills: 0 | Status: DISCONTINUED | OUTPATIENT
Start: 2022-10-07 | End: 2022-10-22

## 2022-10-07 RX ORDER — HYDROMORPHONE HYDROCHLORIDE 2 MG/ML
1 INJECTION INTRAMUSCULAR; INTRAVENOUS; SUBCUTANEOUS
Refills: 0 | Status: DISCONTINUED | OUTPATIENT
Start: 2022-10-07 | End: 2022-10-08

## 2022-10-07 RX ORDER — PIPERACILLIN AND TAZOBACTAM 4; .5 G/20ML; G/20ML
3.38 INJECTION, POWDER, LYOPHILIZED, FOR SOLUTION INTRAVENOUS ONCE
Refills: 0 | Status: DISCONTINUED | OUTPATIENT
Start: 2022-10-07 | End: 2022-10-07

## 2022-10-07 RX ADMIN — INSULIN HUMAN 11 UNIT(S)/HR: 100 INJECTION, SOLUTION SUBCUTANEOUS at 22:30

## 2022-10-07 RX ADMIN — Medication 25 GRAM(S): at 14:22

## 2022-10-07 RX ADMIN — HYDROMORPHONE HYDROCHLORIDE 0.5 MILLIGRAM(S): 2 INJECTION INTRAMUSCULAR; INTRAVENOUS; SUBCUTANEOUS at 23:48

## 2022-10-07 RX ADMIN — Medication 10 MILLIGRAM(S): at 16:50

## 2022-10-07 RX ADMIN — INSULIN HUMAN 13 UNIT(S)/HR: 100 INJECTION, SOLUTION SUBCUTANEOUS at 23:26

## 2022-10-07 RX ADMIN — PIPERACILLIN AND TAZOBACTAM 25 GRAM(S): 4; .5 INJECTION, POWDER, LYOPHILIZED, FOR SOLUTION INTRAVENOUS at 22:48

## 2022-10-07 RX ADMIN — HEPARIN SODIUM 5000 UNIT(S): 5000 INJECTION INTRAVENOUS; SUBCUTANEOUS at 22:45

## 2022-10-07 RX ADMIN — SODIUM CHLORIDE 3 MILLILITER(S): 9 INJECTION INTRAMUSCULAR; INTRAVENOUS; SUBCUTANEOUS at 14:18

## 2022-10-07 RX ADMIN — INSULIN HUMAN 8 UNIT(S)/HR: 100 INJECTION, SOLUTION SUBCUTANEOUS at 22:13

## 2022-10-07 RX ADMIN — Medication 10 MILLIGRAM(S): at 17:36

## 2022-10-07 RX ADMIN — Medication 5: at 14:05

## 2022-10-07 RX ADMIN — PIPERACILLIN AND TAZOBACTAM 200 GRAM(S): 4; .5 INJECTION, POWDER, LYOPHILIZED, FOR SOLUTION INTRAVENOUS at 17:06

## 2022-10-07 RX ADMIN — SODIUM CHLORIDE 3 MILLILITER(S): 9 INJECTION INTRAMUSCULAR; INTRAVENOUS; SUBCUTANEOUS at 22:11

## 2022-10-07 NOTE — CONSULT NOTE ADULT - SUBJECTIVE AND OBJECTIVE BOX
HPI: Mr. Curry is a 62 year-old man with history of multiple medical issues including hypertension, type 2 diabetes mellitus, and right BKA in setting of nonhealing diabetic ulcer. She presented today to [facility] for LLE angiogram, in setting of nonhealing ulcer of the left foot, as well as chills for 2 weeks with associated myalgias and lethargy. He was noted on admission to have azotemia with creatinine of 2.94mg/dL, and potassium of 5.6meq/L; in light of this a renal consultation was requested.      PAST MEDICAL & SURGICAL HISTORY:  DM2  HTN  BPH  Venous insufficiency  R-BKA  Laparoscopic cholecystectomy    Allergies  No Known Allergies    SOCIAL HISTORY:  Denies ETOh,Smoking,     FAMILY HISTORY:  Family history of diabetes mellitus (DM) (Grandparent)  Paternal family history of emphysema    REVIEW OF SYSTEMS:  CONSTITUTIONAL: No weakness, fevers or chills  EYES/ENT: No visual changes;  No vertigo or throat pain   NECK: No pain or stiffness  RESPIRATORY: No cough, wheezing, hemoptysis; No shortness of breath  CARDIOVASCULAR: No chest pain or palpitations  GASTROINTESTINAL: No abdominal or epigastric pain. No nausea, vomiting, or hematemesis; No diarrhea or constipation. No melena or hematochezia.  GENITOURINARY: No dysuria, frequency or hematuria  NEUROLOGICAL: No numbness or weakness  SKIN: No itching, burning, rashes, or lesions   All other review of systems is negative unless indicated above.    VITAL:  BP: 190s/80s      PHYSICAL EXAM:  Constitutional: NAD, Alert  HEENT: NCAT, MMM  Neck: Supple, No JVD  Respiratory: CTA-b/l  Cardiovascular: RRR s1s2, no m/r/g  Gastrointestinal: BS+, soft, NT/ND  Extremities: No peripheral edema b/l  Neurological: no focal deficits; strength grossly intact  Back: no CVAT b/l  Skin: No rashes, no nevi    LABS:                        10.5   14.55 )-----------( 344      ( 07 Oct 2022 13:31 )             31.5     Na(134)/K(5.6)/Cl(100)/HCO3(17)/BUN(19)/Cr(2.94)Glu(347)/Ca(8.2)/Mg(1.30)/PO4(--)    10-07 @ 13:31    (10/2019) - BUN/creatinine: 27/1.86; UA:30-150mg/dL prot       IMAGING:  < from: MR Foot w/wo IV Cont, Right (07.15.19 @ 22:45) >  1.  Skin ulcer of the plantar aspect of the second web space with   associated bone uncovering and osteomyelitis of the second and third   metatarsal head,  2.  Severe osteomyelitis of the second and third metatarsal heads.   Osteomyelitis of the second and third proximal phalanges.  3.  Findings suspicious for reactive osteitis versus early osteomyelitis   of the fourth and fifth metatarsal heads.  4.  New edema and enhancement within the sesamoids concerning for   osteomyelitis.  5.  Tenosynovitis of the flexor pollicis longus tendon sheath of the   infectious or inflammatory in etiology.    < from: Xray Foot AP + Lateral + Oblique, Right (07.13.19 @ 20:32) >  Overall, findings suspicious for osteomyelitis. Correlation with MRI can   be performed for confirmation and further characterization.  Lucencies in the distal aspect of the third metatarsal consistent with   cortical erosions. Lucencies in the third toe proximal phalanx are   nonspecific, question osteomyelitis. There may be nondisplaced fracture   of the third toe proximal phalanx. Nonspecific lucency in the third toe   middle phalanx, possible erosive change. Likely cortical erosions in the   second and fourth metatarsal heads. Possible lucencies in the second toe   proximal phalanx. Soft tissue defect at the plantar aspect of the mid   foot/hindfoot. Pes planus. Diffuse soft tissue swelling of the foot.      ASSESSMENT:  (1)Renal - CKD4 - likely multifactorial including component from diabetic nephropathy. Likely that the creatinine in the high 2s represents his current baseline. GFR ~20-25ml/min.    (2)Hyperkalemia- CKD-associated    (3)CV - hypertensive    (4)ID - febrile illness    (5)Vasc - PAD    RECOMMEND:      Thank you for involving [facility] in this patient's care.    With warm regards,    Olegario Chavez MD   [facility]  Office: [phone]  Cell: [phone]             HPI: Mr. Curry is a 62 year-old man with history of multiple medical issues including hypertension, type 2 diabetes mellitus, and right BKA in setting of nonhealing diabetic ulcer. She presented today to [facility] for LLE angiogram, in setting of nonhealing ulcer of the left foot, as well as chills for 2 weeks with associated myalgias and lethargy. He was noted on admission to have azotemia with creatinine of 2.94mg/dL, and potassium of 5.6meq/L; in light of this a renal consultation was requested.    I understand that his angiogram was cancelled, due to his azotemia as well as his left foot infection. Podiatry is on board/at bedside; potentially for faiza molina.    Mr. Curry shares that he has had diabetes for 25 years. He admits that it has not been well-controlled. Not on any antihypertensives at home. He denies NSAID use. He attests to a strong odor to his urine of late; denies gross hematuria/any other urinary changes. He does not follow with a nephrologist on the outside.    PAST MEDICAL & SURGICAL HISTORY:  DM2  HTN  BPH  Venous insufficiency  R-BKA  Laparoscopic cholecystectomy    Allergies  No Known Allergies    SOCIAL HISTORY:  Denies ETOh,Smoking,     FAMILY HISTORY:  Family history of diabetes mellitus (DM) (Grandparent)  Paternal family history of emphysema    REVIEW OF SYSTEMS:  CONSTITUTIONAL: No weakness, fevers or chills  EYES/ENT: No visual changes;  No vertigo or throat pain   NECK: No pain or stiffness  RESPIRATORY: No cough, wheezing, hemoptysis; No shortness of breath  CARDIOVASCULAR: No chest pain or palpitations  GASTROINTESTINAL: No abdominal or epigastric pain. No nausea, vomiting, or hematemesis; No diarrhea or constipation. No melena or hematochezia.  GENITOURINARY: No dysuria, frequency or hematuria; (+)malodorous urine  NEUROLOGICAL: No numbness or weakness  SKIN: (+)left foot poor wound healing  All other review of systems is negative unless indicated above.    VITAL:  BP: 190s/80s  HR 80s  R 15    PHYSICAL EXAM:  Constitutional: obese, NAD  HEENT: NCAT, MMM  Neck: Supple, No JVD  Respiratory: CTA-b/l  Cardiovascular: RRR s1s2, no m/r/g  Gastrointestinal: BS+, soft, NT/ND  Extremities: No peripheral edema b/l  Neurological: no focal deficits; strength grossly intact  Back: no CVAT b/l  Skin: L foot ulceration with surrounding erythema/induration    LABS:                        10.5   14.55 )-----------( 344      ( 07 Oct 2022 13:31 )             31.5     Na(134)/K(5.6)/Cl(100)/HCO3(17)/BUN(19)/Cr(2.94)Glu(347)/Ca(8.2)/Mg(1.30)/PO4(--)    10-07 @ 13:31    (10/2019) - BUN/creatinine: 27/1.86; UA:30-150mg/dL prot       IMAGING:  < from: MR Foot w/wo IV Cont, Right (07.15.19 @ 22:45) >  1.  Skin ulcer of the plantar aspect of the second web space with   associated bone uncovering and osteomyelitis of the second and third   metatarsal head,  2.  Severe osteomyelitis of the second and third metatarsal heads.   Osteomyelitis of the second and third proximal phalanges.  3.  Findings suspicious for reactive osteitis versus early osteomyelitis   of the fourth and fifth metatarsal heads.  4.  New edema and enhancement within the sesamoids concerning for   osteomyelitis.  5.  Tenosynovitis of the flexor pollicis longus tendon sheath of the   infectious or inflammatory in etiology.    < from: Xray Foot AP + Lateral + Oblique, Right (07.13.19 @ 20:32) >  Overall, findings suspicious for osteomyelitis. Correlation with MRI can   be performed for confirmation and further characterization.  Lucencies in the distal aspect of the third metatarsal consistent with   cortical erosions. Lucencies in the third toe proximal phalanx are   nonspecific, question osteomyelitis. There may be nondisplaced fracture   of the third toe proximal phalanx. Nonspecific lucency in the third toe   middle phalanx, possible erosive change. Likely cortical erosions in the   second and fourth metatarsal heads. Possible lucencies in the second toe   proximal phalanx. Soft tissue defect at the plantar aspect of the mid   foot/hindfoot. Pes planus. Diffuse soft tissue swelling of the foot.      ASSESSMENT:  (1)Renal - CKD4 - likely multifactorial including component from diabetic nephropathy. Likely that the creatinine in the high 2s represents his current baseline. GFR ~20-25ml/min.    (2)Hyperkalemia- CKD-associated    (3)CV - hypertensive - not on any antihypertensives at home    (4)ID - febrile illness - infected L foot - on IV Zosyn - potentially for BKA tonight    (5)Vasc - PAD    RECOMMEND:  (1)Add Hydralazine 50mg po q8h for now - hold for sbp<140  (2)Could also add beta-blockade - Coreg 12.5mg po bid? - hold for sbp<130/hold for hr <55  (3)No IVF/No diuretics for now  (4)Urine: lytes, creatinine, UA  (5)Renal US  (6)Abx for GFR 20-25ml/min - q8h Zosyn  (7)Please d/w us if planning for LE angio so that we can further weigh risks/benefits  (8)No renal objection to BKA as planned    Thank you for involving [facility] in this patient's care.    With warm regards,    Olegario Chavez MD   [facility]  Office: [phone]  Cell: [phone]             HPI: Mr. Curry is a 62 year-old man with history of multiple medical issues including hypertension, type 2 diabetes mellitus, and right BKA in setting of nonhealing diabetic ulcer. She presented today to [facility] for LLE angiogram, in setting of nonhealing ulcer of the left foot, as well as chills for 2 weeks with associated myalgias and lethargy. He was noted on admission to have azotemia with creatinine of 2.94mg/dL, and potassium of 5.6meq/L; in light of this a renal consultation was requested.    I understand that his angiogram was cancelled, due to his azotemia as well as his left foot infection. Podiatry is on board/at bedside; potentially for faiza molina.    Mr. Curry shares that he has had diabetes for 25 years. He admits that it has not been well-controlled. Not on any antihypertensives at home. He denies NSAID use. He attests to a strong odor to his urine of late; denies gross hematuria/any other urinary changes. He does not follow with a nephrologist on the outside.    PAST MEDICAL & SURGICAL HISTORY:  DM2  HTN  BPH  Venous insufficiency  R-BKA  Laparoscopic cholecystectomy    Allergies  No Known Allergies    SOCIAL HISTORY:  Denies ETOh,Smoking,     FAMILY HISTORY:  Family history of diabetes mellitus (DM) (Grandparent)  Paternal family history of emphysema    REVIEW OF SYSTEMS:  CONSTITUTIONAL: No weakness, fevers or chills  EYES/ENT: No visual changes;  No vertigo or throat pain   NECK: No pain or stiffness  RESPIRATORY: No cough, wheezing, hemoptysis; No shortness of breath  CARDIOVASCULAR: No chest pain or palpitations  GASTROINTESTINAL: No abdominal or epigastric pain. No nausea, vomiting, or hematemesis; No diarrhea or constipation. No melena or hematochezia.  GENITOURINARY: No dysuria, frequency or hematuria; (+)malodorous urine  NEUROLOGICAL: No numbness or weakness  SKIN: (+)left foot poor wound healing  All other review of systems is negative unless indicated above.    VITAL:  BP: 190s/80s  HR 80s  R 15    PHYSICAL EXAM:  Constitutional: obese, NAD  HEENT: NCAT, MMM  Neck: Supple, No JVD  Respiratory: CTA-b/l  Cardiovascular: RRR s1s2, no m/r/g  Gastrointestinal: BS+, soft, NT/ND  Extremities: No peripheral edema b/l  Neurological: no focal deficits; strength grossly intact  Back: no CVAT b/l  Skin: L foot ulceration with surrounding erythema/induration    LABS:                        10.5   14.55 )-----------( 344      ( 07 Oct 2022 13:31 )             31.5     Na(134)/K(5.6)/Cl(100)/HCO3(17)/BUN(19)/Cr(2.94)Glu(347)/Ca(8.2)/Mg(1.30)/PO4(--)    10-07 @ 13:31    (10/2019) - BUN/creatinine: 27/1.86; UA:30-150mg/dL prot       IMAGING:  < from: MR Foot w/wo IV Cont, Right (07.15.19 @ 22:45) >  1.  Skin ulcer of the plantar aspect of the second web space with   associated bone uncovering and osteomyelitis of the second and third   metatarsal head,  2.  Severe osteomyelitis of the second and third metatarsal heads.   Osteomyelitis of the second and third proximal phalanges.  3.  Findings suspicious for reactive osteitis versus early osteomyelitis   of the fourth and fifth metatarsal heads.  4.  New edema and enhancement within the sesamoids concerning for   osteomyelitis.  5.  Tenosynovitis of the flexor pollicis longus tendon sheath of the   infectious or inflammatory in etiology.    < from: Xray Foot AP + Lateral + Oblique, Right (07.13.19 @ 20:32) >  Overall, findings suspicious for osteomyelitis. Correlation with MRI can   be performed for confirmation and further characterization.  Lucencies in the distal aspect of the third metatarsal consistent with   cortical erosions. Lucencies in the third toe proximal phalanx are   nonspecific, question osteomyelitis. There may be nondisplaced fracture   of the third toe proximal phalanx. Nonspecific lucency in the third toe   middle phalanx, possible erosive change. Likely cortical erosions in the   second and fourth metatarsal heads. Possible lucencies in the second toe   proximal phalanx. Soft tissue defect at the plantar aspect of the mid   foot/hindfoot. Pes planus. Diffuse soft tissue swelling of the foot.      ASSESSMENT:  (1)Renal - CKD4 - likely multifactorial including component from diabetic nephropathy. Likely that the creatinine in the high 2s represents his current baseline. GFR ~20-25ml/min.    (2)Hyperkalemia- CKD-associated    (3)CV - hypertensive - not on any antihypertensives at home    (4)ID - febrile illness - infected L foot - on IV Zosyn - potentially for BKA tonight    (5)Vasc - PAD    RECOMMEND:  (1)Add Hydralazine 50mg po q8h for now - hold for sbp<140  (2)Could also add beta-blockade - Coreg 12.5mg po bid? - hold for sbp<130/hold for hr <55  (3)No IVF/No diuretics for now  (4)Low-K diet  (5)Urine: lytes, creatinine, UA  (6)Renal US  (7)Abx for GFR 20-25ml/min - q8h Zosyn  (8)Please d/w us if planning for LE angio so that we can further weigh risks/benefits  (9)Lokelma 10gm po x 1  (10)No renal objection to proceeding with OR tonight    Thank you for involving [facility] in this patient's care.    With warm regards,    Olegario Chavez MD   [facility]  Office: [phone]  Cell: [phone]             HPI: Mr. Curry is a 62 year-old man with history of multiple medical issues including hypertension, type 2 diabetes mellitus, and right BKA in setting of nonhealing diabetic ulcer. She presented today to [facility] for LLE angiogram, in setting of nonhealing ulcer of the left foot, as well as chills for 2 weeks with associated myalgias and lethargy. He was noted on admission to have azotemia with creatinine of 2.94mg/dL, and potassium of 5.6meq/L; in light of this a renal consultation was requested.    I understand that his angiogram was cancelled, due to his azotemia as well as his left foot infection. Podiatry is on board/at bedside; potentially for faiza molina.    Mr. Curry shares that he has had diabetes for 25 years. He admits that it has not been well-controlled. Not on any antihypertensives at home. He denies NSAID use. He attests to a strong odor to his urine of late; denies gross hematuria/any other urinary changes. He does not follow with a nephrologist on the outside.    PAST MEDICAL & SURGICAL HISTORY:  DM2  HTN  BPH  Venous insufficiency  R-BKA  Laparoscopic cholecystectomy    Allergies  No Known Allergies    SOCIAL HISTORY:  Denies ETOh,Smoking,     FAMILY HISTORY:  Family history of diabetes mellitus (DM) (Grandparent)  Paternal family history of emphysema    REVIEW OF SYSTEMS:  CONSTITUTIONAL: No weakness, fevers or chills  EYES/ENT: No visual changes;  No vertigo or throat pain   NECK: No pain or stiffness  RESPIRATORY: No cough, wheezing, hemoptysis; No shortness of breath  CARDIOVASCULAR: No chest pain or palpitations  GASTROINTESTINAL: No abdominal or epigastric pain. No nausea, vomiting, or hematemesis; No diarrhea or constipation. No melena or hematochezia.  GENITOURINARY: No dysuria, frequency or hematuria; (+)malodorous urine  NEUROLOGICAL: No numbness or weakness  SKIN: (+)left foot poor wound healing  All other review of systems is negative unless indicated above.    VITAL:  BP: 190s/80s  HR 80s  R 15    PHYSICAL EXAM:  Constitutional: obese, NAD  HEENT: NCAT, MMM  Neck: Supple, No JVD  Respiratory: CTA-b/l  Cardiovascular: RRR s1s2, no m/r/g  Gastrointestinal: BS+, soft, NT/ND  Extremities: No peripheral edema b/l  Neurological: no focal deficits; strength grossly intact  Back: no CVAT b/l  Skin: L foot ulceration with surrounding erythema/induration    LABS:                        10.5   14.55 )-----------( 344      ( 07 Oct 2022 13:31 )             31.5     Na(134)/K(5.6)/Cl(100)/HCO3(17)/BUN(19)/Cr(2.94)Glu(347)/Ca(8.2)/Mg(1.30)/PO4(--)    10-07 @ 13:31    (10/2019) - BUN/creatinine: 27/1.86; UA:30-150mg/dL prot       IMAGING:  < from: MR Foot w/wo IV Cont, Right (07.15.19 @ 22:45) >  1.  Skin ulcer of the plantar aspect of the second web space with   associated bone uncovering and osteomyelitis of the second and third   metatarsal head,  2.  Severe osteomyelitis of the second and third metatarsal heads.   Osteomyelitis of the second and third proximal phalanges.  3.  Findings suspicious for reactive osteitis versus early osteomyelitis   of the fourth and fifth metatarsal heads.  4.  New edema and enhancement within the sesamoids concerning for   osteomyelitis.  5.  Tenosynovitis of the flexor pollicis longus tendon sheath of the   infectious or inflammatory in etiology.    < from: Xray Foot AP + Lateral + Oblique, Right (07.13.19 @ 20:32) >  Overall, findings suspicious for osteomyelitis. Correlation with MRI can   be performed for confirmation and further characterization.  Lucencies in the distal aspect of the third metatarsal consistent with   cortical erosions. Lucencies in the third toe proximal phalanx are   nonspecific, question osteomyelitis. There may be nondisplaced fracture   of the third toe proximal phalanx. Nonspecific lucency in the third toe   middle phalanx, possible erosive change. Likely cortical erosions in the   second and fourth metatarsal heads. Possible lucencies in the second toe   proximal phalanx. Soft tissue defect at the plantar aspect of the mid   foot/hindfoot. Pes planus. Diffuse soft tissue swelling of the foot.      ASSESSMENT:  (1)Renal - CKD4 - likely multifactorial including component from diabetic nephropathy. Likely that the creatinine in the high 2s represents his current baseline. GFR ~20-25ml/min.    (2)Hyperkalemia- spurious labwork - severely hemolyzed specimen     (3)CV - hypertensive - not on any antihypertensives at home    (4)ID - febrile illness - infected L foot - on IV Zosyn - potentially for BKA tonight    (5)Vasc - PAD    RECOMMEND:  (1)Add Hydralazine 50mg po q8h for now - hold for sbp<140  (2)Could also add beta-blockade - Coreg 12.5mg po bid? - hold for sbp<130/hold for hr <55  (3)No IVF/No diuretics for now  (4)Low-K diet  (5)Urine: lytes, creatinine, UA  (6)Renal US  (7)Abx for GFR 20-25ml/min - q8h Zosyn  (8)Please d/w us if planning for LE angio so that we can further weigh risks/benefits  (9)No renal objection to proceeding with OR tonight    Thank you for involving [facility] in this patient's care.    With warm regards,    Olegario Chavez MD   [facility]  Office: [phone]  Cell: [phone]

## 2022-10-07 NOTE — H&P CARDIOLOGY - HISTORY OF PRESENT ILLNESS
61 y/o M with PMH of HTN, DM type II, HLD, BPH and PAD, Right BKA 2/2 to unhealed DM ulcer presented to [facility] for LLE peripheral angiogram. As per the patient he developed initially a scab on the bottom of his foot in August of this year and then progressively worsened to an ulcer. Patient stated that the ulcer is located between the 4th web space and recently fell off the bed and cut the adjacent skin for which he received stitched and PO Augmentin. Patient stated that the ulcer cunningham drain a clear liquid but denied any bleeding or purulent discharge. Patient also endorsed of chills for the past 2 weeks with associated body aches and lethargy. Patient was incidental found to be COVID positive on 09/19/22 and that time angiogram was cancelled. Patient otherwise denied any LLE foot pain, claudication or fevers. Patient denied any calf pain, CP, SOB, N/V/D/C, abdominal pain, dysuria, melena, hematochezia, recent travel, sick contact, cough, body aches, pleuritic or positional chest pain.    COVID PCR positive on 09/19/22  63 y/o M with PMH of HTN, DM type II, HLD, BPH and PAD, Right BKA 2/2 to unhealed DM ulcer presented to [facility] for LLE peripheral angiogram. As per the patient he developed initially a scab on the bottom of his foot in August of this year and then progressively worsened to an ulcer. Patient stated that the ulcer is located between the 4th web space and recently fell off the bed and cut the adjacent skin for which he received stitched and PO Augmentin. Patient stated that the ulcer cunningham drain a clear liquid but denied any bleeding or purulent discharge. Patient also endorsed of chills for the past 2 weeks with associated body aches and lethargy. Patient was incidental found to be COVID positive on 09/19/22 and that time angiogram was cancelled. Patient otherwise denied any LLE foot pain, claudication or fevers. Patient denied any calf pain, CP, SOB, N/V/D/C, abdominal pain, dysuria, melena, hematochezia, recent travel, sick contact, cough, body aches, pleuritic or positional chest pain.    COVID PCR positive on 09/19/22   VASCULAR SURG ATT ADDENDUM  Pt presents  and states left foot wound w increase in size and odor

## 2022-10-07 NOTE — CONSULT NOTE ADULT - ASSESSMENT
63 y/o M with PMH of HTN, DM type II, HLD, BPH and PAD, Right BKA 2/2 to unhealed DM ulcer presented to [facility] for LLE peripheral angiogram. Patient now s/p L BKA for necrotic left foot ulcer w/ concern for gas gangrene. Insulin infusion was started interoperatively; patient given 12u and started on 5u/hr. Postoperative glucose of 344, measured in PACU. SICU consulted for management of insulin infusion.    PLAN:   Neurologic:   - AOx4    Respiratory:  - O2 > 92%     Cardiovascular:  - MAP > 65     Gastrointestinal/Nutrition:   - Diet per team    Renal/Genitourinary:  - Strict I&Os     Hematologic:  - Daily CBC     Infectious Disease:  - Zosyn     Lines/Tubes:    Endocrine:  - Q1hr fingersticks  - Insulin per protocol, currently 5u/hr     Disposition:   - SICU, remain in PACU    --------------------------------------------------------------------------------------    Critical Care Diagnoses:   61 y/o M with PMH of HTN, DM type II, HLD, BPH and PAD, Right BKA 2/2 to unhealed DM ulcer presented to [facility] for LLE peripheral angiogram. Patient now s/p L BKA for necrotic left foot ulcer w/ concern for gas gangrene. Insulin infusion was started interoperatively; patient given 12u and started on 5u/hr. Postoperative glucose of 344, measured in PACU. SICU consulted for management of insulin infusion.    PLAN:   Neurologic:   - AOx4    Respiratory:  - O2 > 92%     Cardiovascular:  - MAP > 65     Gastrointestinal/Nutrition:   - Diet per team    Renal/Genitourinary:  - Strict I&Os     Hematologic:  - Daily CBC     Infectious Disease:  - Zosyn     Lines/Tubes:    Endocrine:  - Q1hr fingersticks  - Insulin per protocol, currently 8u/hr     Disposition:   - SICU, remain in PACU    --------------------------------------------------------------------------------------    Critical Care Diagnoses:

## 2022-10-07 NOTE — H&P CARDIOLOGY - ADDITIONAL PE
Left LE 4th webspace with open wound with clear drainage. Left LE open wound on lateral plantar aspect. Active draining of serosanguinous fluid with malodorous discharge

## 2022-10-07 NOTE — H&P CARDIOLOGY - NSICDXPASTSURGICALHX_GEN_ALL_CORE_FT
PAST SURGICAL HISTORY:  History of cholecystectomy     S/P BKA (below knee amputation), right     S/P laparoscopic cholecystectomy     Status post incision and drainage Rt groin abscess

## 2022-10-07 NOTE — H&P CARDIOLOGY - VASCULAR DETAILS
rle amp well healed   left foot w mid arch  necrotic  tissue and  purulent drainage  mod venous insuff w severe st dermatitis  moderate art insuff w moderate trophic skin changes

## 2022-10-07 NOTE — H&P CARDIOLOGY - ATTENDING COMMENTS
KELSI Osorio MD performed a history and physical exam of the patient and discussed  the findings and plan with the house officer. I reviewed the resident note and agree with the findings and plan   I Garrick Osorio MD have personally seen and examined the patient at bedside today at  4pm    case d/w dr ESSIE Issa DPM  left foot is not salvageable   will proceed w lle  guillotine amp for left foot sepsis control   indications risks and benefits  d/w pt who consents  this is a surg emergency  Dr JAMES Angulo will perform operative intervention

## 2022-10-07 NOTE — H&P CARDIOLOGY - NSICDXPASTMEDICALHX_GEN_ALL_CORE_FT
PAST MEDICAL HISTORY:  BPH (benign prostatic hypertrophy)     Diabetes Type II    HTN (hypertension)     Venous insufficiency of both lower extremities R > L

## 2022-10-07 NOTE — CONSULT NOTE ADULT - SUBJECTIVE AND OBJECTIVE BOX
Patient is a 62y old  Male who presents with a chief complaint of     HPI:  61 y/o M with PMH of HTN, DM type II, HLD, BPH and PAD, Right BKA 2/2 to unhealed DM ulcer presented to [facility] for LLE peripheral angiogram. As per the patient he developed initially a scab on the bottom of his foot in August of this year and then progressively worsened to an ulcer. Patient stated that the ulcer is located between the 4th web space and recently fell off the bed and cut the adjacent skin for which he received stitched and PO Augmentin. Patient stated that the ulcer cunningham drain a clear liquid but denied any bleeding or purulent discharge. Patient also endorsed of chills for the past 2 weeks with associated body aches and lethargy. Patient was incidental found to be COVID positive on 09/19/22 and that time angiogram was cancelled. Patient otherwise denied any LLE foot pain, claudication or fevers. Patient denied any calf pain, CP, SOB, N/V/D/C, abdominal pain, dysuria, melena, hematochezia, recent travel, sick contact, cough, body aches, pleuritic or positional chest pain.    COVID PCR positive on 09/19/22  (07 Oct 2022 14:29)      PAST MEDICAL & SURGICAL HISTORY:  Diabetes  Type II      BPH (benign prostatic hypertrophy)      HTN (hypertension)      Venous insufficiency of both lower extremities  R &gt; L      Status post incision and drainage  Rt groin abscess      S/P laparoscopic cholecystectomy      History of cholecystectomy      S/P BKA (below knee amputation), right          MEDICATIONS  (STANDING):  dextrose 5%. 1000 milliLiter(s) (100 mL/Hr) IV Continuous <Continuous>  dextrose 5%. 1000 milliLiter(s) (50 mL/Hr) IV Continuous <Continuous>  dextrose 50% Injectable 25 Gram(s) IV Push once  dextrose 50% Injectable 25 Gram(s) IV Push once  glucagon  Injectable 1 milliGRAM(s) IntraMuscular once  heparin   Injectable 5000 Unit(s) SubCutaneous every 8 hours  insulin lispro (ADMELOG) corrective regimen sliding scale   SubCutaneous three times a day before meals  insulin lispro (ADMELOG) corrective regimen sliding scale   SubCutaneous at bedtime  insulin lispro Injectable (ADMELOG) 20 Unit(s) SubCutaneous three times a day before meals  piperacillin/tazobactam IVPB.. 3.375 Gram(s) IV Intermittent every 8 hours  sodium chloride 0.9% lock flush 3 milliLiter(s) IV Push every 8 hours  tamsulosin 0.4 milliGRAM(s) Oral at bedtime    MEDICATIONS  (PRN):  dextrose Oral Gel 15 Gram(s) Oral once PRN Blood Glucose LESS THAN 70 milliGRAM(s)/deciliter      Allergies    No Known Allergies    Intolerances        VITALS:    Vital Signs Last 24 Hrs  T(C): --  T(F): --  HR: --  BP: --  BP(mean): --  RR: --  SpO2: --        LABS:                          10.5   14.55 )-----------( 344      ( 07 Oct 2022 13:31 )             31.5       10-07    134<L>  |  100  |  19  ----------------------------<  347<H>  5.6<H>   |  17<L>  |  2.94<H>    Ca    8.2<L>      07 Oct 2022 13:31  Mg     1.30     10-07        CAPILLARY BLOOD GLUCOSE      POCT Blood Glucose.: 314 mg/dL (07 Oct 2022 17:00)  POCT Blood Glucose.: 360 mg/dL (07 Oct 2022 13:28)          LOWER EXTREMITY PHYSICAL EXAM:    Vascular: DP/PT _/4, B/L, CFT <_ seconds B/L, Temperature gradient _, B/L.   Neuro: Epicritic sensation _ to the level of _, B/L.  Musculoskeletal/Ortho:  Skin:  Wound #1:   Location:  Size:  Depth:  Wound bed:   Drainage:   Odor:   Periwound:  Etiology:     RADIOLOGY & ADDITIONAL STUDIES:     Patient is a 62y old  Male who presents with a chief complaint of     HPI:  61 y/o M with PMH of HTN, DM type II, HLD, BPH and PAD, Right BKA 2/2 to unhealed DM ulcer presented to [facility] for LLE peripheral angiogram. As per the patient he developed initially a scab on the bottom of his foot in August of this year and then progressively worsened to an ulcer. Patient stated that the ulcer is located between the 4th web space and recently fell off the bed and cut the adjacent skin for which he received stitched and PO Augmentin. Patient stated that the ulcer cunningham drain a clear liquid but denied any bleeding or purulent discharge. Patient also endorsed of chills for the past 2 weeks with associated body aches and lethargy. Patient was incidental found to be COVID positive on 09/19/22 and that time angiogram was cancelled. Patient otherwise denied any LLE foot pain, claudication or fevers. Patient denied any calf pain, CP, SOB, N/V/D/C, abdominal pain, dysuria, melena, hematochezia, recent travel, sick contact, cough, body aches, pleuritic or positional chest pain.    COVID PCR positive on 09/19/22  (07 Oct 2022 14:29)      PAST MEDICAL & SURGICAL HISTORY:  Diabetes  Type II      BPH (benign prostatic hypertrophy)      HTN (hypertension)      Venous insufficiency of both lower extremities  R &gt; L      Status post incision and drainage  Rt groin abscess      S/P laparoscopic cholecystectomy      History of cholecystectomy      S/P BKA (below knee amputation), right          MEDICATIONS  (STANDING):  dextrose 5%. 1000 milliLiter(s) (100 mL/Hr) IV Continuous <Continuous>  dextrose 5%. 1000 milliLiter(s) (50 mL/Hr) IV Continuous <Continuous>  dextrose 50% Injectable 25 Gram(s) IV Push once  dextrose 50% Injectable 25 Gram(s) IV Push once  glucagon  Injectable 1 milliGRAM(s) IntraMuscular once  heparin   Injectable 5000 Unit(s) SubCutaneous every 8 hours  insulin lispro (ADMELOG) corrective regimen sliding scale   SubCutaneous three times a day before meals  insulin lispro (ADMELOG) corrective regimen sliding scale   SubCutaneous at bedtime  insulin lispro Injectable (ADMELOG) 20 Unit(s) SubCutaneous three times a day before meals  piperacillin/tazobactam IVPB.. 3.375 Gram(s) IV Intermittent every 8 hours  sodium chloride 0.9% lock flush 3 milliLiter(s) IV Push every 8 hours  tamsulosin 0.4 milliGRAM(s) Oral at bedtime    MEDICATIONS  (PRN):  dextrose Oral Gel 15 Gram(s) Oral once PRN Blood Glucose LESS THAN 70 milliGRAM(s)/deciliter      Allergies    No Known Allergies    Intolerances        VITALS:    Vital Signs Last 24 Hrs  T(C): --  T(F): --  HR: --  BP: --  BP(mean): --  RR: --  SpO2: --        LABS:                          10.5   14.55 )-----------( 344      ( 07 Oct 2022 13:31 )             31.5       10-07    134<L>  |  100  |  19  ----------------------------<  347<H>  5.6<H>   |  17<L>  |  2.94<H>    Ca    8.2<L>      07 Oct 2022 13:31  Mg     1.30     10-07        CAPILLARY BLOOD GLUCOSE      POCT Blood Glucose.: 314 mg/dL (07 Oct 2022 17:00)  POCT Blood Glucose.: 360 mg/dL (07 Oct 2022 13:28)          LOWER EXTREMITY PHYSICAL EXAM:  Vascular: DP and PT are palpable of Left foot, capillary refill delayed Left foot. Temp gradient increased warmth to plantar aspect of the foot.   Neuro: Epicritic sensation not intact, left foot.   Ortho: contractures digits 2-5, Left foot. Charcot Left foot.   Derm: sutures intact to left 5th toe. Left plantar midfoot 4cm necrotic ulcer with fluctuance and increased warmth to the touch. No pain due to profound neuropathy    RADIOLOGY & ADDITIONAL STUDIES:     Patient is a 62y old  Male who presents with a chief complaint of     HPI:  63 y/o M with PMH of HTN, DM type II, HLD, BPH and PAD, Right BKA due to OM.  DM w/ profound Neuropathy/PAD presents to [facility] for LLE peripheral angiogram by Dr. Osorio. Dr. Osorio admitted the patient for L foot infected ulcer today.  As per the patient he developed initially a blister on the bottom of his L foot.  The patient was treated w/ Local wound care, & a referral to Dr. Osorio for Vascular work up & placed on PO Augmentin x 2 weeks.    Patient recently developed a laceration on the plantar L 5th toe when trying to push his foot into a slipper when the material lacerated the base of the L 5th toe. This was sutured in Dr Issa's office and the patient was continued on PO Augmentin. Patient stated that the L plantar ulcer does drain a clear liquid but denied any bleeding or purulent discharge or odor. Patient also states he had chills for the past 2 weeks with associated body aches and lethargy. Patient was incidental found to be COVID positive on 09/19/22 and that time angiogram was cancelled. Patient otherwise denied any LLE foot pain, claudication or fevers. Patient denied any calf pain, CP, SOB, N/V/D/C, abdominal pain, dysuria, melena, hematochezia, recent travel, sick contact, cough, body aches, pleuritic or positional chest pain.    COVID PCR positive on 09/19/22  (07 Oct 2022 14:29)      PAST MEDICAL & SURGICAL HISTORY:  Diabetes  Type II      BPH (benign prostatic hypertrophy)      HTN (hypertension)      Venous insufficiency of both lower extremities  R &gt; L      Status post incision and drainage  Rt groin abscess      S/P laparoscopic cholecystectomy      History of cholecystectomy      S/P BKA (below knee amputation), right          MEDICATIONS  (STANDING):  dextrose 5%. 1000 milliLiter(s) (100 mL/Hr) IV Continuous <Continuous>  dextrose 5%. 1000 milliLiter(s) (50 mL/Hr) IV Continuous <Continuous>  dextrose 50% Injectable 25 Gram(s) IV Push once  dextrose 50% Injectable 25 Gram(s) IV Push once  glucagon  Injectable 1 milliGRAM(s) IntraMuscular once  heparin   Injectable 5000 Unit(s) SubCutaneous every 8 hours  insulin lispro (ADMELOG) corrective regimen sliding scale   SubCutaneous three times a day before meals  insulin lispro (ADMELOG) corrective regimen sliding scale   SubCutaneous at bedtime  insulin lispro Injectable (ADMELOG) 20 Unit(s) SubCutaneous three times a day before meals  piperacillin/tazobactam IVPB.. 3.375 Gram(s) IV Intermittent every 8 hours  sodium chloride 0.9% lock flush 3 milliLiter(s) IV Push every 8 hours  tamsulosin 0.4 milliGRAM(s) Oral at bedtime    MEDICATIONS  (PRN):  dextrose Oral Gel 15 Gram(s) Oral once PRN Blood Glucose LESS THAN 70 milliGRAM(s)/deciliter      Allergies    No Known Allergies    Intolerances        VITALS:    Vital Signs Last 24 Hrs  T(C): --  T(F): --  HR: --  BP: --  BP(mean): --  RR: --  SpO2: --        LABS:                          10.5   14.55 )-----------( 344      ( 07 Oct 2022 13:31 )             31.5       10-07    134<L>  |  100  |  19  ----------------------------<  347<H>  5.6<H>   |  17<L>  |  2.94<H>    Ca    8.2<L>      07 Oct 2022 13:31  Mg     1.30     10-07        CAPILLARY BLOOD GLUCOSE      POCT Blood Glucose.: 314 mg/dL (07 Oct 2022 17:00)  POCT Blood Glucose.: 360 mg/dL (07 Oct 2022 13:28)          LOWER EXTREMITY PHYSICAL EXAM:  Vascular: DP and PT are palpable of Left foot, capillary refill delayed Left foot. Temp gradient increased warmth to plantar aspect of the foot.   Neuro: Epicritic sensation not intact, left foot.   Ortho: contractures digits 2-5, Left foot. Charcot Left foot.   Derm: sutures intact to left 5th toe. Left plantar midfoot 4cm necrotic ulcer with fluctuance and increased warmth to the touch. No pain due to profound neuropathy    RADIOLOGY & ADDITIONAL STUDIES:

## 2022-10-07 NOTE — CONSULT NOTE ADULT - ASSESSMENT
62 y.o DM with prodound neurtopathy admitted for sepsis L foot.   - Pt was seen and evaluated.   - Afebrile, WBC ?, CRP ?, ESR ?.  - Pt is accompanied by his wife   - Pt is feeling fever, chills, and malaise.   - Physical examination: Vascular: DP and PT are palpable of Left foot, capillary refill delayed Left foot. Temp gradient increased warmth to plantar aspect of the foot.   - Neuro: Epicritic sensation not intact, left foot.   - Ortho: contractures digits 2-5, Left foot. Charcot Left foot.   - Derm: sutures intact to left 5th toe. Left plantar midfoot 4cm necrotic ulcer with fluctuance and increased warmth to the touch. no pain due to profound neuropathy  - Xray: left foot gas noted on plantar aspect of the foot with erossive changes consistent with OM of 5th met base and 5th met with pathological fracture as well as signs of OM of cuboid and navicular bone.     - Afebrile, WBC ?, CRP ?, ESR ?.  - X-Ray: Cortical erosions noted on ?. No signs of gas in the ? first toe, foot, or leg.  - PEPITO:  - Using 15 blade, the distal aspect of wound with hyperkeratotic tissue was debrided revealing underlying wound to bone, an incision was then made down to bone and not beyond planatrly along areas of tracking expressing 1cc pus, flushed and packed, dressed with dry sterile dressing   - Recommend admission through medicine.  - Recommend IV antibiotics (Vanco and Zosyn if smells, Vanco and Cefepime if no smell).  - ? foot wound cultured  - Ordered MRI of the ? foot. (with and without contrast)  - Pod Plan: Local wound care versus possible ? foot first ray resection pending MRI results   - Please document medical clearance for surgical intervention   - Seen with attending    62 y.o DM with prodound neurtopathy admitted for sepsis L foot.   - Pt was seen and evaluated.   - Pt is accompanied by his wife   - Pt is feeling fever, chills, and malaise.   - Febrile, WBC 14   - Physical examination: Vascular: DP and PT are palpable of Left foot, capillary refill delayed Left foot. Temp gradient increased warmth to plantar aspect of the foot.   - Neuro: Epicritic sensation not intact, left foot.   - Ortho: contractures digits 2-5, Left foot. Charcot Left foot.   - Derm: sutures intact to left 5th toe. Left plantar midfoot 4cm necrotic ulcer with fluctuance and increased warmth to the touch. no pain due to profound neuropathy  - Xray: left foot gas noted on plantar aspect of the foot with erosive changes consistent with OM of 5th met base and 5th met with pathological fracture as well as signs of OM of cuboid and navicular bone.   - After extensive discussion with the wife about risks and benefits of regarding limb salvage, discussed with the patient the best optimal medical planning would be for proximal amputation.   - Patient is planning for emergency guillotine surgery with Dr. Jo molina.   - Seen with attending        62 y.o DM with prodound neurtopathy admitted for sepsis L foot.   - Pt was seen and evaluated.   - Pt is accompanied by his wife   - Pt is feeling fever, chills, and malaise.   - Febrile, WBC 14   - Physical examination: Vascular: DP and PT are palpable of Left foot, capillary refill delayed Left foot. Temp gradient increased warmth to plantar aspect of the foot.   - Neuro: Epicritic sensation not intact, left foot.   - Ortho: contractures digits 2-5, Left foot. Charcot Left foot.   - Derm: sutures intact to left 5th toe. Left plantar midfoot 4cm necrotic ulcer with fluctuance and increased warmth to the touch. no pain due to profound neuropathy  - Xray: left foot gas noted on plantar aspect of the foot with erosive changes consistent with OM of 5th met base and 5th met with pathological fracture as well as signs of OM of cuboid and navicular bone.   - After extensive discussion with the wife about risks and benefits of regarding limb salvage, discussed with the patient the best optimal medical planning would be for proximal amputation.   - Patient is planning for emergency guillotine surgery with Dr. Jo molina.   - The patient was seen and evaluated with me today. Agree w/ above findings. The patient has L foot plantar midfoot necrotic ulcer with a foul fetid odor along w/ fluctuance. L 5th toe sutures intact plantar L 5th sulcus. moderate - severe edema about the L foot/LE. X rays L foot revealed gas in the plantar tissues as well as extensive erosive changes about the 5th met base w/ pathological fx of the 5th met bone as well as erosive changes of the cuboid bone bone consistent w/ OM. WBC 14  A/P 1. 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 OM L cuboid bone        3. DM w/ Profound Neurological Manifestations  Plan: 1. Since the patient is septic Due to extensive infection of the L foot w/ gas gangrene & Osteomyelitis L 5th Met & cuboid bone foot salvage is not viable and the patient will need proximal amputation to control sepsis. Discussed case w/ Dr. Osorio who agrees w/ proposed procedure to control sepsis. Explained the proposed procedure with the patient and his wife.

## 2022-10-07 NOTE — CONSULT NOTE ADULT - SUBJECTIVE AND OBJECTIVE BOX
SICU Consultation Note  =====================================================  HPI: 62y Male  HPI:  61 y/o M with PMH of HTN, DM type II, HLD, BPH and PAD, Right BKA 2/2 to unhealed DM ulcer presented to [facility] for LLE peripheral angiogram. As per the patient he developed initially a scab on the bottom of his foot in August of this year and then progressively worsened to an ulcer. Patient stated that the ulcer is located between the 4th web space and recently fell off the bed and cut the adjacent skin for which he received stitched and PO Augmentin. Patient stated that the ulcer cunningham drain a clear liquid but denied any bleeding or purulent discharge. Patient also endorsed of chills for the past 2 weeks with associated body aches and lethargy. Patient was incidental found to be COVID positive on 09/19/22 and that time angiogram was cancelled. Patient otherwise denied any LLE foot pain, claudication or fevers. Patient denied any calf pain, CP, SOB, N/V/D/C, abdominal pain, dysuria, melena, hematochezia, recent travel, sick contact, cough, body aches, pleuritic or positional chest pain.    Patient now s/p L BKA for necrotic left foot ulcer w/ concern for gas gangrene. Insulin infusion was started interoperatively; patient given 12u and started on 5u/hr. Postoperative glucose of 344, measured in PACU. SICU consulted for management of insulin infusion.    COVID PCR positive on 09/19/22   VASCULAR SURG ATT ADDENDUM  Pt presents  and states left foot wound w increase in size and odor  (07 Oct 2022 14:29)      Surgery Information  OR time:      EBL:          IV Fluids:       Blood Products:   UOP:          PAST MEDICAL & SURGICAL HISTORY:  Diabetes  Type II      BPH (benign prostatic hypertrophy)      HTN (hypertension)      Venous insufficiency of both lower extremities  R &gt; L      Status post incision and drainage  Rt groin abscess      S/P laparoscopic cholecystectomy      History of cholecystectomy      S/P BKA (below knee amputation), right        Home Meds: Home Medications:  Aspirin Enteric Coated 81 mg oral delayed release tablet: 1 tab(s) orally once a day (07 Oct 2022 14:16)  cilostazol 100 mg oral tablet: 1 tab(s) orally 2 times a day (07 Oct 2022 14:16)  Flomax 0.4 mg oral capsule: 1 cap(s) orally once a day (07 Oct 2022 14:16)  NovoLOG 100 units/mL injectable solution: 25 unit(s) injectable 3 times a day before each meal (07 Oct 2022 14:16)    Allergies: Allergies    No Known Allergies    Intolerances      Soc:   Advanced Directives: Presumed Full Code     ROS:    REVIEW OF SYSTEMS    [ ] A ten-point review of systems was otherwise negative except as noted.  [ ] Due to altered mental status/intubation, subjective information were not able to be obtained from the patient. History was obtained, to the extent possible, from review of the chart and collateral sources of information.      CURRENT MEDICATIONS:   --------------------------------------------------------------------------------------  Neurologic Medications  HYDROmorphone  Injectable 0.5 milliGRAM(s) IV Push every 10 minutes PRN Moderate Pain (4 - 6)  HYDROmorphone  Injectable 1 milliGRAM(s) IV Push every 10 minutes PRN Severe Pain (7 - 10)  metoclopramide Injectable 10 milliGRAM(s) IV Push once PRN Nausea and/or Vomiting    Respiratory Medications    Cardiovascular Medications  tamsulosin 0.4 milliGRAM(s) Oral at bedtime    Gastrointestinal Medications  dextrose 5%. 1000 milliLiter(s) IV Continuous <Continuous>  dextrose 5%. 1000 milliLiter(s) IV Continuous <Continuous>  sodium chloride 0.9% lock flush 3 milliLiter(s) IV Push every 8 hours    Genitourinary Medications    Hematologic/Oncologic Medications  heparin   Injectable 5000 Unit(s) SubCutaneous every 8 hours    Antimicrobial/Immunologic Medications  piperacillin/tazobactam IVPB.. 3.375 Gram(s) IV Intermittent every 8 hours    Endocrine/Metabolic Medications  dextrose 50% Injectable 25 Gram(s) IV Push once  dextrose 50% Injectable 25 Gram(s) IV Push once  dextrose Oral Gel 15 Gram(s) Oral once PRN Blood Glucose LESS THAN 70 milliGRAM(s)/deciliter  glucagon  Injectable 1 milliGRAM(s) IntraMuscular once  insulin lispro (ADMELOG) corrective regimen sliding scale   SubCutaneous three times a day before meals  insulin lispro (ADMELOG) corrective regimen sliding scale   SubCutaneous at bedtime  insulin lispro Injectable (ADMELOG) 20 Unit(s) SubCutaneous three times a day before meals    Topical/Other Medications    --------------------------------------------------------------------------------------    VITAL SIGNS, INS/OUTS (last 24 hours):  --------------------------------------------------------------------------------------  ICU Vital Signs Last 24 Hrs  T(C): 38.1 (07 Oct 2022 18:26), Max: 38.1 (07 Oct 2022 18:26)  T(F): 100.5 (07 Oct 2022 18:26), Max: 100.5 (07 Oct 2022 18:26)  HR: 99 (07 Oct 2022 18:26) (99 - 99)  BP: 158/85 (07 Oct 2022 18:26) (158/85 - 158/85)  BP(mean): --  ABP: --  ABP(mean): --  RR: 20 (07 Oct 2022 18:26) (20 - 20)  SpO2: 97% (07 Oct 2022 18:26) (97% - 97%)    O2 Parameters below as of 07 Oct 2022 18:26  Patient On (Oxygen Delivery Method): room air          I&O's Summary    --------------------------------------------------------------------------------------    EXAM:  General/Neuro  RASS:   GCS:   Exam: Normal, NAD, alert, oriented x 3, no focal deficits. PERRLA  ***    Respiratory  Exam: Lungs clear to auscultation, Normal expansion/effort.  ***  [] Tracheostomy   [] Intubated  Mechanical Ventilation:     Cardiovascular  Exam: S1, S2.  Regular rate and rhythm.  Peripheral edema  ***  Cardiac Rhythm: Normal Sinus Rhythm  ECHO:     GI  Exam: Abdomen soft, Non-tender, Non-distended.  Gastrostomy / Jejunostomy tube in place.  Nasogastric tube in place.  Colostomy / Ileostomy.  ***  Wound:   ***  Current Diet:  NPO***      Tubes/Lines/Drains  ***  [x] Peripheral IV  [] Central Venous Line     	[] R	[] L	[] IJ	[] Fem	[] SC        Type:	    Date Placed:   [] Arterial Line		[] R	[] L	[] Fem	[] Rad	[] Ax	Date Placed:   [] PICC:         	[] Midline		[] Mediport           [] Urinary Catheter		Date Placed:     Extremities  Exam: Extremities warm, pink, well-perfused.        Derm:  Exam: Good skin turgor, no skin breakdown.      :   Exam: Carreon catheter in place.     LABS  --------------------------------------------------------------------------------------  Labs:  CAPILLARY BLOOD GLUCOSE      POCT Blood Glucose.: 353 mg/dL (07 Oct 2022 21:32)  POCT Blood Glucose.: 368 mg/dL (07 Oct 2022 19:10)  POCT Blood Glucose.: 314 mg/dL (07 Oct 2022 17:00)  POCT Blood Glucose.: 360 mg/dL (07 Oct 2022 13:28)                          10.5   14.55 )-----------( 344      ( 07 Oct 2022 13:31 )             31.5         10-07    133<L>  |  98  |  22  ----------------------------<  351<H>  3.8   |  21<L>  |  3.31<H>      Calcium, Total Serum: 8.6 mg/dL (10-07-22 @ 18:20)      LFTs:     Blood Gas Arterial, Lactate: 1.4 mmol/L (10-07-22 @ 20:50)    ABG - ( 07 Oct 2022 20:50 )  pH: 7.42  /  pCO2: 32    /  pO2: 300   / HCO3: 21    / Base Excess: -3.2  /  SaO2: 99.3              Coags:                  --------------------------------------------------------------------------------------    OTHER LABS    IMAGING RESULTS         SICU Consultation Note  =====================================================  HPI: 62y Male  HPI:  63 y/o M with PMH of HTN, DM type II, HLD, BPH and PAD, Right BKA 2/2 to unhealed DM ulcer presented to [facility] for LLE peripheral angiogram. As per the patient he developed initially a scab on the bottom of his foot in August of this year and then progressively worsened to an ulcer. Patient stated that the ulcer is located between the 4th web space and recently fell off the bed and cut the adjacent skin for which he received stitched and PO Augmentin. Patient stated that the ulcer cunningham drain a clear liquid but denied any bleeding or purulent discharge. Patient also endorsed of chills for the past 2 weeks with associated body aches and lethargy. Patient was incidental found to be COVID positive on 09/19/22 and that time angiogram was cancelled. Patient otherwise denied any LLE foot pain, claudication or fevers. Patient denied any calf pain, CP, SOB, N/V/D/C, abdominal pain, dysuria, melena, hematochezia, recent travel, sick contact, cough, body aches, pleuritic or positional chest pain.    Patient now s/p L BKA for necrotic left foot ulcer w/ concern for gas gangrene. Insulin infusion was started interoperatively; patient given 12u and started on 5u/hr. Postoperative glucose of 344, measured in PACU. SICU consulted for management of insulin infusion.    COVID PCR positive on 09/19/22   VASCULAR SURG ATT ADDENDUM  Pt presents  and states left foot wound w increase in size and odor  (07 Oct 2022 14:29)      Surgery Information  OR time:      EBL:          IV Fluids:       Blood Products:   UOP:          PAST MEDICAL & SURGICAL HISTORY:  Diabetes  Type II      BPH (benign prostatic hypertrophy)      HTN (hypertension)      Venous insufficiency of both lower extremities  R &gt; L      Status post incision and drainage  Rt groin abscess      S/P laparoscopic cholecystectomy      History of cholecystectomy      S/P BKA (below knee amputation), right        Home Meds: Home Medications:  Aspirin Enteric Coated 81 mg oral delayed release tablet: 1 tab(s) orally once a day (07 Oct 2022 14:16)  cilostazol 100 mg oral tablet: 1 tab(s) orally 2 times a day (07 Oct 2022 14:16)  Flomax 0.4 mg oral capsule: 1 cap(s) orally once a day (07 Oct 2022 14:16)  NovoLOG 100 units/mL injectable solution: 25 unit(s) injectable 3 times a day before each meal (07 Oct 2022 14:16)    Allergies: Allergies    No Known Allergies    Intolerances      Soc:   Advanced Directives: Presumed Full Code     ROS:    REVIEW OF SYSTEMS    [ ] A ten-point review of systems was otherwise negative except as noted.  [ ] Due to altered mental status/intubation, subjective information were not able to be obtained from the patient. History was obtained, to the extent possible, from review of the chart and collateral sources of information.      CURRENT MEDICATIONS:   --------------------------------------------------------------------------------------  Neurologic Medications  HYDROmorphone  Injectable 0.5 milliGRAM(s) IV Push every 10 minutes PRN Moderate Pain (4 - 6)  HYDROmorphone  Injectable 1 milliGRAM(s) IV Push every 10 minutes PRN Severe Pain (7 - 10)  metoclopramide Injectable 10 milliGRAM(s) IV Push once PRN Nausea and/or Vomiting    Respiratory Medications    Cardiovascular Medications  tamsulosin 0.4 milliGRAM(s) Oral at bedtime    Gastrointestinal Medications  dextrose 5%. 1000 milliLiter(s) IV Continuous <Continuous>  dextrose 5%. 1000 milliLiter(s) IV Continuous <Continuous>  sodium chloride 0.9% lock flush 3 milliLiter(s) IV Push every 8 hours    Genitourinary Medications    Hematologic/Oncologic Medications  heparin   Injectable 5000 Unit(s) SubCutaneous every 8 hours    Antimicrobial/Immunologic Medications  piperacillin/tazobactam IVPB.. 3.375 Gram(s) IV Intermittent every 8 hours    Endocrine/Metabolic Medications  dextrose 50% Injectable 25 Gram(s) IV Push once  dextrose 50% Injectable 25 Gram(s) IV Push once  dextrose Oral Gel 15 Gram(s) Oral once PRN Blood Glucose LESS THAN 70 milliGRAM(s)/deciliter  glucagon  Injectable 1 milliGRAM(s) IntraMuscular once  insulin lispro (ADMELOG) corrective regimen sliding scale   SubCutaneous three times a day before meals  insulin lispro (ADMELOG) corrective regimen sliding scale   SubCutaneous at bedtime  insulin lispro Injectable (ADMELOG) 20 Unit(s) SubCutaneous three times a day before meals    Topical/Other Medications    --------------------------------------------------------------------------------------    VITAL SIGNS, INS/OUTS (last 24 hours):  --------------------------------------------------------------------------------------  ICU Vital Signs Last 24 Hrs  T(C): 38.1 (07 Oct 2022 18:26), Max: 38.1 (07 Oct 2022 18:26)  T(F): 100.5 (07 Oct 2022 18:26), Max: 100.5 (07 Oct 2022 18:26)  HR: 99 (07 Oct 2022 18:26) (99 - 99)  BP: 158/85 (07 Oct 2022 18:26) (158/85 - 158/85)  BP(mean): --  ABP: --  ABP(mean): --  RR: 20 (07 Oct 2022 18:26) (20 - 20)  SpO2: 97% (07 Oct 2022 18:26) (97% - 97%)    O2 Parameters below as of 07 Oct 2022 18:26  Patient On (Oxygen Delivery Method): room air          I&O's Summary    --------------------------------------------------------------------------------------    EXAM:  General/Neuro  RASS:   GCS:   Exam: Arousable, no focal deficits    Respiratory  Exam: Lungs clear to auscultation, Normal expansion/effort.  [] Tracheostomy   [] Intubated  Mechanical Ventilation:     Cardiovascular  Exam: S1, S2.  Regular rate and rhythm. Wound dressed.  Cardiac Rhythm: Normal Sinus Rhythm  ECHO:     GI  Exam: Abdomen soft, Non-tender, Non-distended.   Current Diet: Regular      Tubes/Lines/Drains   [x] Peripheral IV  [] Central Venous Line     	[] R	[] L	[] IJ	[] Fem	[] SC        Type:	    Date Placed:   [] Arterial Line		[] R	[] L	[] Fem	[] Rad	[] Ax	Date Placed:   [] PICC:         	[] Midline		[] Mediport           [] Urinary Catheter		Date Placed:     Extremities  Exam: Extremities warm, pink, well-perfused.        Derm:  Exam: Good skin turgor, no skin breakdown.      :   Exam: Carreon catheter in place.     LABS  --------------------------------------------------------------------------------------  Labs:  CAPILLARY BLOOD GLUCOSE      POCT Blood Glucose.: 353 mg/dL (07 Oct 2022 21:32)  POCT Blood Glucose.: 368 mg/dL (07 Oct 2022 19:10)  POCT Blood Glucose.: 314 mg/dL (07 Oct 2022 17:00)  POCT Blood Glucose.: 360 mg/dL (07 Oct 2022 13:28)                          10.5   14.55 )-----------( 344      ( 07 Oct 2022 13:31 )             31.5         10-07    133<L>  |  98  |  22  ----------------------------<  351<H>  3.8   |  21<L>  |  3.31<H>      Calcium, Total Serum: 8.6 mg/dL (10-07-22 @ 18:20)      LFTs:     Blood Gas Arterial, Lactate: 1.4 mmol/L (10-07-22 @ 20:50)    ABG - ( 07 Oct 2022 20:50 )  pH: 7.42  /  pCO2: 32    /  pO2: 300   / HCO3: 21    / Base Excess: -3.2  /  SaO2: 99.3              Coags:                  --------------------------------------------------------------------------------------    OTHER LABS    IMAGING RESULTS

## 2022-10-08 LAB
A1C WITH ESTIMATED AVERAGE GLUCOSE RESULT: 8.5 % — HIGH (ref 4–5.6)
ANION GAP SERPL CALC-SCNC: 12 MMOL/L — SIGNIFICANT CHANGE UP (ref 7–14)
BUN SERPL-MCNC: 21 MG/DL — SIGNIFICANT CHANGE UP (ref 7–23)
CALCIUM SERPL-MCNC: 8.2 MG/DL — LOW (ref 8.4–10.5)
CHLORIDE SERPL-SCNC: 106 MMOL/L — SIGNIFICANT CHANGE UP (ref 98–107)
CO2 SERPL-SCNC: 22 MMOL/L — SIGNIFICANT CHANGE UP (ref 22–31)
CREAT SERPL-MCNC: 3.38 MG/DL — HIGH (ref 0.5–1.3)
EGFR: 20 ML/MIN/1.73M2 — LOW
ESTIMATED AVERAGE GLUCOSE: 197 — SIGNIFICANT CHANGE UP
GLUCOSE BLDC GLUCOMTR-MCNC: 103 MG/DL — HIGH (ref 70–99)
GLUCOSE BLDC GLUCOMTR-MCNC: 110 MG/DL — HIGH (ref 70–99)
GLUCOSE BLDC GLUCOMTR-MCNC: 120 MG/DL — HIGH (ref 70–99)
GLUCOSE BLDC GLUCOMTR-MCNC: 128 MG/DL — HIGH (ref 70–99)
GLUCOSE BLDC GLUCOMTR-MCNC: 131 MG/DL — HIGH (ref 70–99)
GLUCOSE BLDC GLUCOMTR-MCNC: 134 MG/DL — HIGH (ref 70–99)
GLUCOSE BLDC GLUCOMTR-MCNC: 136 MG/DL — HIGH (ref 70–99)
GLUCOSE BLDC GLUCOMTR-MCNC: 162 MG/DL — HIGH (ref 70–99)
GLUCOSE BLDC GLUCOMTR-MCNC: 192 MG/DL — HIGH (ref 70–99)
GLUCOSE BLDC GLUCOMTR-MCNC: 201 MG/DL — HIGH (ref 70–99)
GLUCOSE BLDC GLUCOMTR-MCNC: 205 MG/DL — HIGH (ref 70–99)
GLUCOSE BLDC GLUCOMTR-MCNC: 230 MG/DL — HIGH (ref 70–99)
GLUCOSE BLDC GLUCOMTR-MCNC: 233 MG/DL — HIGH (ref 70–99)
GLUCOSE BLDC GLUCOMTR-MCNC: 256 MG/DL — HIGH (ref 70–99)
GLUCOSE BLDC GLUCOMTR-MCNC: 98 MG/DL — SIGNIFICANT CHANGE UP (ref 70–99)
GLUCOSE SERPL-MCNC: 197 MG/DL — HIGH (ref 70–99)
HCT VFR BLD CALC: 26.1 % — LOW (ref 39–50)
HGB BLD-MCNC: 8.5 G/DL — LOW (ref 13–17)
MAGNESIUM SERPL-MCNC: 1.9 MG/DL — SIGNIFICANT CHANGE UP (ref 1.6–2.6)
MCHC RBC-ENTMCNC: 29.8 PG — SIGNIFICANT CHANGE UP (ref 27–34)
MCHC RBC-ENTMCNC: 32.6 GM/DL — SIGNIFICANT CHANGE UP (ref 32–36)
MCV RBC AUTO: 91.6 FL — SIGNIFICANT CHANGE UP (ref 80–100)
NRBC # BLD: 0 /100 WBCS — SIGNIFICANT CHANGE UP (ref 0–0)
NRBC # FLD: 0 K/UL — SIGNIFICANT CHANGE UP (ref 0–0)
PHOSPHATE SERPL-MCNC: 3.5 MG/DL — SIGNIFICANT CHANGE UP (ref 2.5–4.5)
PLATELET # BLD AUTO: 279 K/UL — SIGNIFICANT CHANGE UP (ref 150–400)
POTASSIUM SERPL-MCNC: 3.5 MMOL/L — SIGNIFICANT CHANGE UP (ref 3.5–5.3)
POTASSIUM SERPL-SCNC: 3.5 MMOL/L — SIGNIFICANT CHANGE UP (ref 3.5–5.3)
RBC # BLD: 2.85 M/UL — LOW (ref 4.2–5.8)
RBC # FLD: 12.9 % — SIGNIFICANT CHANGE UP (ref 10.3–14.5)
SODIUM SERPL-SCNC: 140 MMOL/L — SIGNIFICANT CHANGE UP (ref 135–145)
WBC # BLD: 9.02 K/UL — SIGNIFICANT CHANGE UP (ref 3.8–10.5)
WBC # FLD AUTO: 9.02 K/UL — SIGNIFICANT CHANGE UP (ref 3.8–10.5)

## 2022-10-08 PROCEDURE — 99233 SBSQ HOSP IP/OBS HIGH 50: CPT | Mod: GC

## 2022-10-08 RX ORDER — GABAPENTIN 400 MG/1
300 CAPSULE ORAL DAILY
Refills: 0 | Status: DISCONTINUED | OUTPATIENT
Start: 2022-10-08 | End: 2022-10-22

## 2022-10-08 RX ORDER — POTASSIUM CHLORIDE 20 MEQ
10 PACKET (EA) ORAL
Refills: 0 | Status: COMPLETED | OUTPATIENT
Start: 2022-10-08 | End: 2022-10-08

## 2022-10-08 RX ORDER — MAGNESIUM SULFATE 500 MG/ML
2 VIAL (ML) INJECTION ONCE
Refills: 0 | Status: COMPLETED | OUTPATIENT
Start: 2022-10-08 | End: 2022-10-08

## 2022-10-08 RX ORDER — OXYCODONE HYDROCHLORIDE 5 MG/1
10 TABLET ORAL EVERY 4 HOURS
Refills: 0 | Status: DISCONTINUED | OUTPATIENT
Start: 2022-10-08 | End: 2022-10-10

## 2022-10-08 RX ORDER — OXYCODONE HYDROCHLORIDE 5 MG/1
5 TABLET ORAL EVERY 4 HOURS
Refills: 0 | Status: DISCONTINUED | OUTPATIENT
Start: 2022-10-08 | End: 2022-10-10

## 2022-10-08 RX ORDER — INSULIN LISPRO 100/ML
VIAL (ML) SUBCUTANEOUS EVERY 6 HOURS
Refills: 0 | Status: DISCONTINUED | OUTPATIENT
Start: 2022-10-08 | End: 2022-10-08

## 2022-10-08 RX ORDER — DEXTROSE 50 % IN WATER 50 %
25 SYRINGE (ML) INTRAVENOUS ONCE
Refills: 0 | Status: COMPLETED | OUTPATIENT
Start: 2022-10-08 | End: 2022-10-08

## 2022-10-08 RX ORDER — INSULIN LISPRO 100/ML
VIAL (ML) SUBCUTANEOUS AT BEDTIME
Refills: 0 | Status: DISCONTINUED | OUTPATIENT
Start: 2022-10-08 | End: 2022-10-10

## 2022-10-08 RX ORDER — INSULIN LISPRO 100/ML
VIAL (ML) SUBCUTANEOUS
Refills: 0 | Status: DISCONTINUED | OUTPATIENT
Start: 2022-10-08 | End: 2022-10-10

## 2022-10-08 RX ORDER — SODIUM CHLORIDE 9 MG/ML
1000 INJECTION, SOLUTION INTRAVENOUS
Refills: 0 | Status: DISCONTINUED | OUTPATIENT
Start: 2022-10-08 | End: 2022-10-08

## 2022-10-08 RX ORDER — INFLUENZA VIRUS VACCINE 15; 15; 15; 15 UG/.5ML; UG/.5ML; UG/.5ML; UG/.5ML
0.5 SUSPENSION INTRAMUSCULAR ONCE
Refills: 0 | Status: DISCONTINUED | OUTPATIENT
Start: 2022-10-08 | End: 2022-10-22

## 2022-10-08 RX ORDER — PIPERACILLIN AND TAZOBACTAM 4; .5 G/20ML; G/20ML
3.38 INJECTION, POWDER, LYOPHILIZED, FOR SOLUTION INTRAVENOUS EVERY 8 HOURS
Refills: 0 | Status: COMPLETED | OUTPATIENT
Start: 2022-10-08 | End: 2022-10-15

## 2022-10-08 RX ORDER — INSULIN GLARGINE 100 [IU]/ML
25 INJECTION, SOLUTION SUBCUTANEOUS EVERY MORNING
Refills: 0 | Status: DISCONTINUED | OUTPATIENT
Start: 2022-10-08 | End: 2022-10-11

## 2022-10-08 RX ORDER — ACETAMINOPHEN 500 MG
975 TABLET ORAL EVERY 6 HOURS
Refills: 0 | Status: DISCONTINUED | OUTPATIENT
Start: 2022-10-08 | End: 2022-10-14

## 2022-10-08 RX ADMIN — SODIUM CHLORIDE 75 MILLILITER(S): 9 INJECTION, SOLUTION INTRAVENOUS at 03:29

## 2022-10-08 RX ADMIN — PIPERACILLIN AND TAZOBACTAM 25 GRAM(S): 4; .5 INJECTION, POWDER, LYOPHILIZED, FOR SOLUTION INTRAVENOUS at 05:16

## 2022-10-08 RX ADMIN — HYDROMORPHONE HYDROCHLORIDE 0.5 MILLIGRAM(S): 2 INJECTION INTRAMUSCULAR; INTRAVENOUS; SUBCUTANEOUS at 00:15

## 2022-10-08 RX ADMIN — HEPARIN SODIUM 5000 UNIT(S): 5000 INJECTION INTRAVENOUS; SUBCUTANEOUS at 13:43

## 2022-10-08 RX ADMIN — OXYCODONE HYDROCHLORIDE 10 MILLIGRAM(S): 5 TABLET ORAL at 08:00

## 2022-10-08 RX ADMIN — Medication 100 MILLIEQUIVALENT(S): at 01:07

## 2022-10-08 RX ADMIN — SODIUM CHLORIDE 3 MILLILITER(S): 9 INJECTION INTRAMUSCULAR; INTRAVENOUS; SUBCUTANEOUS at 21:39

## 2022-10-08 RX ADMIN — PIPERACILLIN AND TAZOBACTAM 25 GRAM(S): 4; .5 INJECTION, POWDER, LYOPHILIZED, FOR SOLUTION INTRAVENOUS at 13:42

## 2022-10-08 RX ADMIN — Medication 25 GRAM(S): at 01:06

## 2022-10-08 RX ADMIN — INSULIN HUMAN 8 UNIT(S)/HR: 100 INJECTION, SOLUTION SUBCUTANEOUS at 03:29

## 2022-10-08 RX ADMIN — Medication 975 MILLIGRAM(S): at 15:30

## 2022-10-08 RX ADMIN — PIPERACILLIN AND TAZOBACTAM 25 GRAM(S): 4; .5 INJECTION, POWDER, LYOPHILIZED, FOR SOLUTION INTRAVENOUS at 21:15

## 2022-10-08 RX ADMIN — INSULIN GLARGINE 25 UNIT(S): 100 INJECTION, SOLUTION SUBCUTANEOUS at 07:56

## 2022-10-08 RX ADMIN — OXYCODONE HYDROCHLORIDE 10 MILLIGRAM(S): 5 TABLET ORAL at 07:16

## 2022-10-08 RX ADMIN — SODIUM CHLORIDE 3 MILLILITER(S): 9 INJECTION INTRAMUSCULAR; INTRAVENOUS; SUBCUTANEOUS at 05:56

## 2022-10-08 RX ADMIN — Medication 100 MILLIEQUIVALENT(S): at 03:00

## 2022-10-08 RX ADMIN — SODIUM CHLORIDE 75 MILLILITER(S): 9 INJECTION, SOLUTION INTRAVENOUS at 07:56

## 2022-10-08 RX ADMIN — Medication 975 MILLIGRAM(S): at 14:59

## 2022-10-08 RX ADMIN — Medication 4: at 16:28

## 2022-10-08 RX ADMIN — SODIUM CHLORIDE 3 MILLILITER(S): 9 INJECTION INTRAMUSCULAR; INTRAVENOUS; SUBCUTANEOUS at 14:01

## 2022-10-08 RX ADMIN — GABAPENTIN 300 MILLIGRAM(S): 400 CAPSULE ORAL at 13:52

## 2022-10-08 RX ADMIN — Medication 100 MILLIEQUIVALENT(S): at 05:56

## 2022-10-08 RX ADMIN — HEPARIN SODIUM 5000 UNIT(S): 5000 INJECTION INTRAVENOUS; SUBCUTANEOUS at 21:15

## 2022-10-08 RX ADMIN — HEPARIN SODIUM 5000 UNIT(S): 5000 INJECTION INTRAVENOUS; SUBCUTANEOUS at 05:16

## 2022-10-08 RX ADMIN — Medication 25 MILLILITER(S): at 06:25

## 2022-10-08 RX ADMIN — TAMSULOSIN HYDROCHLORIDE 0.4 MILLIGRAM(S): 0.4 CAPSULE ORAL at 21:16

## 2022-10-08 NOTE — PATIENT PROFILE ADULT - FALL HARM RISK - HARM RISK INTERVENTIONS
Assistance with ambulation/Assistance OOB with selected safe patient handling equipment/Communicate Risk of Fall with Harm to all staff/Discuss with provider need for PT consult/Monitor gait and stability/Reinforce activity limits and safety measures with patient and family/Sit up slowly, dangle for a short time, stand at bedside before walking/Tailored Fall Risk Interventions/Use of alarms - bed, chair and/or voice tab/Visual Cue: Yellow wristband and red socks/Bed in lowest position, wheels locked, appropriate side rails in place/Call bell, personal items and telephone in reach/Instruct patient to call for assistance before getting out of bed or chair/Non-slip footwear when patient is out of bed/[place] to call system/Physically safe environment - no spills, clutter or unnecessary equipment/Purposeful Proactive Rounding/Room/bathroom lighting operational, light cord in reach Assistance with ambulation/Assistance OOB with selected safe patient handling equipment/Communicate Risk of Fall with Harm to all staff/Discuss with provider need for PT consult/Monitor gait and stability/Provide patient with walking aids - walker, cane, crutches/Reinforce activity limits and safety measures with patient and family/Sit up slowly, dangle for a short time, stand at bedside before walking/Tailored Fall Risk Interventions/Use of alarms - bed, chair and/or voice tab/Visual Cue: Yellow wristband and red socks/Bed in lowest position, wheels locked, appropriate side rails in place/Call bell, personal items and telephone in reach/Instruct patient to call for assistance before getting out of bed or chair/Non-slip footwear when patient is out of bed/[place] to call system/Physically safe environment - no spills, clutter or unnecessary equipment/Purposeful Proactive Rounding/Room/bathroom lighting operational, light cord in reach

## 2022-10-08 NOTE — PROGRESS NOTE ADULT - SUBJECTIVE AND OBJECTIVE BOX
SURGERY DAILY PROGRESS NOTE:       SUBJECTIVE/ROS: Patient seen and evaluated on AM rounds. Patient with glucose in the 380s intraoperatively, started on insulin drip which SICU managed in the PACU.  Denies nausea, vomiting, chest pain, shortness of breath       OBJECTIVE:  Vital Signs Last 24 Hrs  T(C): 37.6 (07 Oct 2022 21:30), Max: 38.1 (07 Oct 2022 18:26)  T(F): 99.7 (07 Oct 2022 21:30), Max: 100.5 (07 Oct 2022 18:26)  HR: 86 (07 Oct 2022 23:30) (86 - 99)  BP: 157/66 (07 Oct 2022 23:30) (148/59 - 163/60)  BP(mean): 83 (07 Oct 2022 23:30) (67 - 85)  RR: 25 (07 Oct 2022 23:30) (17 - 25)  SpO2: 92% (07 Oct 2022 23:30) (92% - 100%)    Parameters below as of 07 Oct 2022 23:00  Patient On (Oxygen Delivery Method): room air      I&O's Detail    07 Oct 2022 07:01  -  08 Oct 2022 00:18  --------------------------------------------------------  IN:    Insulin: 19 mL    Insulin: 13 mL  Total IN: 32 mL    OUT:  Total OUT: 0 mL    Total NET: 32 mL        Daily Height in cm: 182.88 (07 Oct 2022 18:26)    Daily   MEDICATIONS  (STANDING):  dextrose 5%. 1000 milliLiter(s) (100 mL/Hr) IV Continuous <Continuous>  dextrose 5%. 1000 milliLiter(s) (50 mL/Hr) IV Continuous <Continuous>  dextrose 50% Injectable 25 Gram(s) IV Push once  dextrose 50% Injectable 25 Gram(s) IV Push once  glucagon  Injectable 1 milliGRAM(s) IntraMuscular once  heparin   Injectable 5000 Unit(s) SubCutaneous every 8 hours  insulin regular Infusion 13 Unit(s)/Hr (13 mL/Hr) IV Continuous <Continuous>  piperacillin/tazobactam IVPB.. 3.375 Gram(s) IV Intermittent every 8 hours  sodium chloride 0.9% lock flush 3 milliLiter(s) IV Push every 8 hours  tamsulosin 0.4 milliGRAM(s) Oral at bedtime    MEDICATIONS  (PRN):  dextrose Oral Gel 15 Gram(s) Oral once PRN Blood Glucose LESS THAN 70 milliGRAM(s)/deciliter  HYDROmorphone  Injectable 0.5 milliGRAM(s) IV Push every 10 minutes PRN Moderate Pain (4 - 6)  HYDROmorphone  Injectable 1 milliGRAM(s) IV Push every 10 minutes PRN Severe Pain (7 - 10)  metoclopramide Injectable 10 milliGRAM(s) IV Push once PRN Nausea and/or Vomiting      LABS:                        10.5   14.55 )-----------( 344      ( 07 Oct 2022 13:31 )             31.5     10-07    134<L>  |  101  |  22  ----------------------------<  337<H>  3.6   |  21<L>  |  3.38<H>    Ca    8.1<L>      07 Oct 2022 22:32  Phos  3.9     10-07  Mg     1.60     10-07                      PHYSICAL EXAM:  General: well developed, well nourished, NAD  HEENT: NCAT, trachea midline  Respiratory: respirations non labored  Gastrointestinal: soft, nontender, nondistended  Extremities: LLE in KI with stump wrapped with xeroform, 4x4, abd pads, kerlix, coband, and ace; dressing c/d/i  Neurological: non-focal

## 2022-10-08 NOTE — PROGRESS NOTE ADULT - ASSESSMENT
63 y/o M with PMH of HTN, DM type II, HLD, BPH and PAD, Right BKA 2/2 to unhealed DM ulcer presented to [facility] for LLE peripheral angiogram. Patient now s/p L BKA for necrotic left foot ulcer w/ concern for gas gangrene. Insulin infusion was started interoperatively; patient given 12u and started on 5u/hr.     Plan:  - Zosyn  - Hep SubQ  - d/c dressing on POD#3  - OR planning for BKA formalization   - insulin drip for glucose  - f/u q1 glucose while on insulin drip   - f/u endo recs  63 y/o M with PMH of HTN, DM type II, HLD, BPH and PAD, Right BKA 2/2 to unhealed DM ulcer presented to [facility] for LLE peripheral angiogram. Patient now s/p L BKA for necrotic left foot ulcer w/ concern for gas gangrene. Insulin infusion was started intraoperatively; patient given 12u and started on 5u/hr.     Plan:  - Continue Zosyn  - Dressing change on POD#3  - OR planning for BKA formalization   - Insulin drip for glucose, wean per SICU    - f/u q1 glucose while on insulin drip     - f/u Endo recs  - DVT ppx: SQH  - Appreciate SICU care       Vascular Surgery, C Team Surgery  P# [id number]

## 2022-10-08 NOTE — PROGRESS NOTE ADULT - SUBJECTIVE AND OBJECTIVE BOX
NEPHROLOGY     Patient seen and examined resting comfortably, denies sob, comfortable on NC, pain controlled, in no acute distress.     MEDICATIONS  (STANDING):  dextrose 5%. 1000 milliLiter(s) (100 mL/Hr) IV Continuous <Continuous>  dextrose 5%. 1000 milliLiter(s) (50 mL/Hr) IV Continuous <Continuous>  dextrose 50% Injectable 25 Gram(s) IV Push once  dextrose 50% Injectable 25 Gram(s) IV Push once  gabapentin 300 milliGRAM(s) Oral daily  glucagon  Injectable 1 milliGRAM(s) IntraMuscular once  heparin   Injectable 5000 Unit(s) SubCutaneous every 8 hours  insulin glargine Injectable (LANTUS) 25 Unit(s) SubCutaneous every morning  insulin lispro (ADMELOG) corrective regimen sliding scale   SubCutaneous three times a day before meals  insulin lispro (ADMELOG) corrective regimen sliding scale   SubCutaneous at bedtime  piperacillin/tazobactam IVPB.. 3.375 Gram(s) IV Intermittent every 8 hours  sodium chloride 0.9% lock flush 3 milliLiter(s) IV Push every 8 hours  tamsulosin 0.4 milliGRAM(s) Oral at bedtime    VITALS:  T(C): , Max: 38.1 (10-07-22 @ 18:26)  T(F): , Max: 100.5 (10-07-22 @ 18:26)  HR: 79 (10-08-22 @ 14:00)  BP: 151/89 (10-08-22 @ 02:48)  BP(mean): 106 (10-08-22 @ 02:48)  RR: 17 (10-08-22 @ 14:00)  SpO2: 98% (10-08-22 @ 14:00)    I and O's:    10-07 @ 07:01  -  10-08 @ 07:00  --------------------------------------------------------  IN: 884.5 mL / OUT: 400 mL / NET: 484.5 mL    10-08 @ 07:01  -  10-08 @ 15:01  --------------------------------------------------------  IN: 230.5 mL / OUT: 250 mL / NET: -19.5 mL    Height (cm): 182.9 (10-07 @ 18:26)  Weight (kg): 115.7 (10-07 @ 18:26)  BMI (kg/m2): 34.6 (10-07 @ 18:26)  BSA (m2): 2.36 (10-07 @ 18:26)    PHYSICAL EXAM:  Constitutional: obese, NAD  HEENT: NCAT, MMM  Neck: Supple, No JVD  Respiratory: CTA-b/l  Cardiovascular: RRR s1s2, no m/r/g  Gastrointestinal: BS+, soft, NT/ND  Extremities: No peripheral edema b/l  Neurological: no focal deficits; strength grossly intact  Back: no CVAT b/l  Skin: L BKA, dressing in place     LABS:                        8.5    9.02  )-----------( 279      ( 08 Oct 2022 06:33 )             26.1     10-08    140  |  106  |  21  ----------------------------<  197<H>  3.5   |  22  |  3.38<H>    Ca    8.2<L>      08 Oct 2022 06:33  Phos  3.5     10-08  Mg     1.90     10-08   NEPHROLOGY     Patient seen and examined resting comfortably s/p L BKA last night, pain controlled, denies sob, comfortable on NC, currently in no acute distress.     MEDICATIONS  (STANDING):  dextrose 5%. 1000 milliLiter(s) (100 mL/Hr) IV Continuous <Continuous>  dextrose 5%. 1000 milliLiter(s) (50 mL/Hr) IV Continuous <Continuous>  dextrose 50% Injectable 25 Gram(s) IV Push once  dextrose 50% Injectable 25 Gram(s) IV Push once  gabapentin 300 milliGRAM(s) Oral daily  glucagon  Injectable 1 milliGRAM(s) IntraMuscular once  heparin   Injectable 5000 Unit(s) SubCutaneous every 8 hours  insulin glargine Injectable (LANTUS) 25 Unit(s) SubCutaneous every morning  insulin lispro (ADMELOG) corrective regimen sliding scale   SubCutaneous three times a day before meals  insulin lispro (ADMELOG) corrective regimen sliding scale   SubCutaneous at bedtime  piperacillin/tazobactam IVPB.. 3.375 Gram(s) IV Intermittent every 8 hours  sodium chloride 0.9% lock flush 3 milliLiter(s) IV Push every 8 hours  tamsulosin 0.4 milliGRAM(s) Oral at bedtime    VITALS:  T(C): , Max: 38.1 (10-07-22 @ 18:26)  T(F): , Max: 100.5 (10-07-22 @ 18:26)  HR: 79 (10-08-22 @ 14:00)  BP: 151/89 (10-08-22 @ 02:48)  BP(mean): 106 (10-08-22 @ 02:48)  RR: 17 (10-08-22 @ 14:00)  SpO2: 98% (10-08-22 @ 14:00)    I and O's:    10-07 @ 07:01  -  10-08 @ 07:00  --------------------------------------------------------  IN: 884.5 mL / OUT: 400 mL / NET: 484.5 mL    10-08 @ 07:01  -  10-08 @ 15:01  --------------------------------------------------------  IN: 230.5 mL / OUT: 250 mL / NET: -19.5 mL    Height (cm): 182.9 (10-07 @ 18:26)  Weight (kg): 115.7 (10-07 @ 18:26)  BMI (kg/m2): 34.6 (10-07 @ 18:26)  BSA (m2): 2.36 (10-07 @ 18:26)    PHYSICAL EXAM:  Constitutional: obese, NAD  HEENT: NCAT, MMM  Neck: Supple, No JVD  Respiratory: CTA-b/l  Cardiovascular: RRR s1s2, no m/r/g  Gastrointestinal: BS+, soft, NT/ND  Extremities: No peripheral edema b/l  Neurological: no focal deficits; strength grossly intact  Back: no CVAT b/l  Skin: L BKA, dressing in place     LABS:                        8.5    9.02  )-----------( 279      ( 08 Oct 2022 06:33 )             26.1     10-08    140  |  106  |  21  ----------------------------<  197<H>  3.5   |  22  |  3.38<H>    Ca    8.2<L>      08 Oct 2022 06:33  Phos  3.5     10-08  Mg     1.90     10-08   NEPHROLOGY     Patient seen and examined resting comfortably s/p L BKA last night, pain controlled, denies sob, comfortable on NC, currently in no acute distress.     MEDICATIONS  (STANDING):  dextrose 5%. 1000 milliLiter(s) (100 mL/Hr) IV Continuous <Continuous>  dextrose 5%. 1000 milliLiter(s) (50 mL/Hr) IV Continuous <Continuous>  dextrose 50% Injectable 25 Gram(s) IV Push once  dextrose 50% Injectable 25 Gram(s) IV Push once  gabapentin 300 milliGRAM(s) Oral daily  glucagon  Injectable 1 milliGRAM(s) IntraMuscular once  heparin   Injectable 5000 Unit(s) SubCutaneous every 8 hours  insulin glargine Injectable (LANTUS) 25 Unit(s) SubCutaneous every morning  insulin lispro (ADMELOG) corrective regimen sliding scale   SubCutaneous three times a day before meals  insulin lispro (ADMELOG) corrective regimen sliding scale   SubCutaneous at bedtime  piperacillin/tazobactam IVPB.. 3.375 Gram(s) IV Intermittent every 8 hours  sodium chloride 0.9% lock flush 3 milliLiter(s) IV Push every 8 hours  tamsulosin 0.4 milliGRAM(s) Oral at bedtime    VITALS:  T(C): , Max: 38.1 (10-07-22 @ 18:26)  T(F): , Max: 100.5 (10-07-22 @ 18:26)  HR: 79 (10-08-22 @ 14:00)  BP: 151/89 (10-08-22 @ 02:48)  BP(mean): 106 (10-08-22 @ 02:48)  RR: 17 (10-08-22 @ 14:00)  SpO2: 98% (10-08-22 @ 14:00)    I and O's:    10-07 @ 07:01  -  10-08 @ 07:00  --------------------------------------------------------  IN: 884.5 mL / OUT: 400 mL / NET: 484.5 mL    10-08 @ 07:01  -  10-08 @ 15:01  --------------------------------------------------------  IN: 230.5 mL / OUT: 250 mL / NET: -19.5 mL    Height (cm): 182.9 (10-07 @ 18:26)  Weight (kg): 115.7 (10-07 @ 18:26)  BMI (kg/m2): 34.6 (10-07 @ 18:26)  BSA (m2): 2.36 (10-07 @ 18:26)    PHYSICAL EXAM:  Constitutional: obese, NAD  HEENT: NCAT, MMM  Neck: Supple, No JVD  Respiratory: CTA-b/l  Cardiovascular: RRR s1s2, no m/r/g  Gastrointestinal: BS+, soft, NT/ND  Extremities: LLE in dsg  Neurological: no focal deficits; strength grossly intact  Back: no CVAT b/l  Skin: L BKA, dressing in place     LABS:                        8.5    9.02  )-----------( 279      ( 08 Oct 2022 06:33 )             26.1     10-08    140  |  106  |  21  ----------------------------<  197<H>  3.5   |  22  |  3.38<H>    Ca    8.2<L>      08 Oct 2022 06:33  Phos  3.5     10-08  Mg     1.90     10-08

## 2022-10-08 NOTE — PROGRESS NOTE ADULT - ASSESSMENT
ASSESSMENT:  (1)Renal - CKD4 - likely multifactorial including component from diabetic nephropathy. Likely that the creatinine in the high 2s represents his current baseline. GFR ~20-25ml/min.  (2)Hyperkalemia- spurious labwork - severely hemolyzed specimen, improved   (3)CV - hypertensive - not on any antihypertensives at home  (4)ID - febrile illness - infected L foot - on IV Zosyn - s/p BKA last night   (5)Vasc - PAD    RECOMMEND:  (1)Add Hydralazine 50mg po q8h for now - hold for sbp<140  (2)Could also add beta-blockade - Coreg 12.5mg po bid? - hold for sbp<130/hold for hr <55  (3)No IVF/No diuretics for now  (4)Urine: lytes, creatinine, UA  (5)Renal US  (6)Abx for GFR 20-25ml/min - q8h Zosyn  (7)Please d/w us if planning for LE angio so that we can further weigh risks/benefits    Ashyl Jones NP-C  [facility]  [phone]          ASSESSMENT:  (1)Renal - CKD4 - likely multifactorial including component from diabetic nephropathy. Likely that the creatinine in the high 2s represents his current baseline. GFR ~20-25ml/min.  (2)Hyperkalemia- spurious labwork - severely hemolyzed specimen, improved   (3)CV - hypertensive - not on any antihypertensives at home  (4)ID - febrile illness - infected L foot - on IV Zosyn - s/p L BKA last night   (5)Vasc - PAD    RECOMMEND:  (1)Add Hydralazine 50mg po q8h for now - hold for sbp<140  (2)Could also add beta-blockade - Coreg 12.5mg po bid? - hold for sbp<130/hold for hr <55  (3)No IVF/No diuretics for now  (4)Urine: lytes, creatinine, UA  (5)Renal US  (6)Abx for GFR 20-25ml/min - q8h Zosyn  (7)Please d/w us if planning for LE angio so that we can further weigh risks/benefits    Ashly Jones NP-C  [facility]  [phone]

## 2022-10-08 NOTE — PROGRESS NOTE ADULT - SUBJECTIVE AND OBJECTIVE BOX
SICU DAILY PROGRESS NOTE    24 HOUR EVENTS:  - On insulin gtt, currently 13u/hr    SUBJECTIVE/ROS:  [x] A ten-point review of systems was otherwise negative except as noted.  [ ] Due to altered mental status/intubation, subjective information were not able to be obtained from the patient. History was obtained, to the extent possible, from review of the chart and collateral sources of information.      NEURO  RASS:     GCS:     CAM ICU:  Exam: awake, alert, oriented  Meds: HYDROmorphone  Injectable 0.5 milliGRAM(s) IV Push every 10 minutes PRN Moderate Pain (4 - 6)  HYDROmorphone  Injectable 1 milliGRAM(s) IV Push every 10 minutes PRN Severe Pain (7 - 10)  metoclopramide Injectable 10 milliGRAM(s) IV Push once PRN Nausea and/or Vomiting    [x] Adequacy of sedation and pain control has been assessed and adjusted      RESPIRATORY  RR: 11 (10-08-22 @ 00:15) (11 - 25)  SpO2: 100% (10-08-22 @ 00:15) (92% - 100%)  Wt(kg): --  Exam: unlabored, clear to auscultation bilaterally  Mechanical Ventilation:   ABG - ( 07 Oct 2022 20:50 )  pH: 7.42  /  pCO2: 32    /  pO2: 300   / HCO3: 21    / Base Excess: -3.2  /  SaO2: 99.3    Lactate: x                [N/A] Extubation Readiness Assessed  Meds:       CARDIOVASCULAR  HR: 76 (10-08-22 @ 00:15) (76 - 99)  BP: 135/63 (10-08-22 @ 00:15) (135/63 - 163/60)  BP(mean): 75 (10-08-22 @ 00:15) (67 - 85)  ABP: 130/60 (10-08-22 @ 00:15) (130/60 - 164/66)  ABP(mean): 78 (10-08-22 @ 00:15) (78 - 94)  Wt(kg): --  CVP(cm H2O): --      Exam: regular rate and rhythm  Cardiac Rhythm: sinus  Perfusion     [x]Adequate   [ ]Inadequate  Mentation   [x]Normal       [ ]Reduced  Extremities  [x]Warm         [ ]Cool  Volume Status [ ]Hypervolemic [x]Euvolemic [ ]Hypovolemic  Meds: tamsulosin 0.4 milliGRAM(s) Oral at bedtime        GI/NUTRITION  Exam: soft, nontender, nondistended, incision C/D/I  Diet:  Meds:     GENITOURINARY  I&O's Detail    10-07 @ 07:01  -  10-08 @ 00:40  --------------------------------------------------------  IN:    Insulin: 19 mL    Insulin: 13 mL    IV PiggyBack: 25 mL  Total IN: 57 mL    OUT:  Total OUT: 0 mL    Total NET: 57 mL        Weight (kg): 115.7 (10-07 @ 18:26)  10-07    134<L>  |  101  |  22  ----------------------------<  337<H>  3.6   |  21<L>  |  3.38<H>    Ca    8.1<L>      07 Oct 2022 22:32  Phos  3.9     10-07  Mg     1.60     10-07      [ ] Carreon catheter, indication: N/A  Meds: dextrose 5%. 1000 milliLiter(s) IV Continuous <Continuous>  dextrose 5%. 1000 milliLiter(s) IV Continuous <Continuous>  sodium chloride 0.9% lock flush 3 milliLiter(s) IV Push every 8 hours        HEMATOLOGIC  Meds: heparin   Injectable 5000 Unit(s) SubCutaneous every 8 hours    [x] VTE Prophylaxis                        10.5   14.55 )-----------( 344      ( 07 Oct 2022 13:31 )             31.5       Transfusion     [ ] PRBC   [ ] Platelets   [ ] FFP   [ ] Cryoprecipitate      INFECTIOUS DISEASES  WBC Count: 14.55 K/uL (10-07 @ 13:31)    RECENT CULTURES:    Meds: piperacillin/tazobactam IVPB.. 3.375 Gram(s) IV Intermittent every 8 hours        ENDOCRINE  CAPILLARY BLOOD GLUCOSE      POCT Blood Glucose.: 256 mg/dL (08 Oct 2022 00:17)  POCT Blood Glucose.: 299 mg/dL (07 Oct 2022 23:09)  POCT Blood Glucose.: 302 mg/dL (07 Oct 2022 22:20)  POCT Blood Glucose.: 166 mg/dL (07 Oct 2022 22:17)  POCT Blood Glucose.: 353 mg/dL (07 Oct 2022 21:32)  POCT Blood Glucose.: 368 mg/dL (07 Oct 2022 19:10)  POCT Blood Glucose.: 314 mg/dL (07 Oct 2022 17:00)  POCT Blood Glucose.: 360 mg/dL (07 Oct 2022 13:28)    Meds: dextrose 50% Injectable 25 Gram(s) IV Push once  dextrose 50% Injectable 25 Gram(s) IV Push once  dextrose Oral Gel 15 Gram(s) Oral once PRN  glucagon  Injectable 1 milliGRAM(s) IntraMuscular once  insulin regular Infusion 13 Unit(s)/Hr IV Continuous <Continuous>        ACCESS DEVICES:  [ ] Peripheral IV  [ ] Central Venous Line	[ ] R	[ ] L	[ ] IJ	[ ] Fem	[ ] SC	Placed:   [ ] Arterial Line		[ ] R	[ ] L	[ ] Fem	[ ] Rad	[ ] Ax	Placed:   [ ] PICC:					[ ] Mediport  [ ] Urinary Catheter, Date Placed:   [x] Necessity of urinary, arterial, and venous catheters discussed    OTHER MEDICATIONS:      CODE STATUS:      IMAGING:

## 2022-10-08 NOTE — PROGRESS NOTE ADULT - ASSESSMENT
63 y/o M with PMH of HTN, DM type II, HLD, BPH and PAD, Right BKA 2/2 to unhealed DM ulcer presented to [facility] for LLE peripheral angiogram. Patient now s/p L BKA for necrotic left foot ulcer w/ concern for gas gangrene. Insulin infusion was started interoperatively; patient given 12u and started on 5u/hr. Postoperative glucose of 344, measured in PACU. SICU consulted for management of insulin infusion.    PLAN:   Neurologic:   - AOx4    Respiratory:  - O2 > 92%     Cardiovascular:  - MAP > 65     Gastrointestinal/Nutrition:   - Diet per team    Renal/Genitourinary:  - Strict I&Os     Hematologic:  - Daily CBC     Infectious Disease:  - Zosyn     Lines/Tubes:    Endocrine:  - Q1hr fingersticks  - Insulin per protocol, currently 13u/hr     Disposition:   - SICU, remain in PACU     63 y/o M with PMH of HTN, DM type II, HLD, BPH and PAD, Right BKA 2/2 to unhealed DM ulcer presented to [facility] for LLE peripheral angiogram. Patient now s/p L BKA for necrotic left foot ulcer w/ concern for gas gangrene. Insulin infusion was started interoperatively; patient given 12u and started on 5u/hr. Postoperative glucose of 344, measured in PACU. SICU consulted for management of insulin infusion.    PLAN:   Neurologic:   - AOx4    Respiratory:  - O2 > 92%     Cardiovascular:  - MAP > 65     Gastrointestinal/Nutrition:   - Diet per team    Renal/Genitourinary:  - Strict I&Os     Hematologic:  - Daily CBC     Infectious Disease:  - Zosyn     Lines/Tubes:    Endocrine:  - Q1hr fingersticks  - Insulin per protocol, currently 13u/hr but bridging to SC insulin     Disposition:   - SICU, remain in PACU

## 2022-10-09 LAB
ANION GAP SERPL CALC-SCNC: 11 MMOL/L — SIGNIFICANT CHANGE UP (ref 7–14)
BUN SERPL-MCNC: 20 MG/DL — SIGNIFICANT CHANGE UP (ref 7–23)
CALCIUM SERPL-MCNC: 8.1 MG/DL — LOW (ref 8.4–10.5)
CHLORIDE SERPL-SCNC: 102 MMOL/L — SIGNIFICANT CHANGE UP (ref 98–107)
CO2 SERPL-SCNC: 21 MMOL/L — LOW (ref 22–31)
CREAT SERPL-MCNC: 3.29 MG/DL — HIGH (ref 0.5–1.3)
EGFR: 20 ML/MIN/1.73M2 — LOW
GLUCOSE BLDC GLUCOMTR-MCNC: 149 MG/DL — HIGH (ref 70–99)
GLUCOSE BLDC GLUCOMTR-MCNC: 183 MG/DL — HIGH (ref 70–99)
GLUCOSE BLDC GLUCOMTR-MCNC: 222 MG/DL — HIGH (ref 70–99)
GLUCOSE BLDC GLUCOMTR-MCNC: 224 MG/DL — HIGH (ref 70–99)
GLUCOSE SERPL-MCNC: 218 MG/DL — HIGH (ref 70–99)
HCT VFR BLD CALC: 27 % — LOW (ref 39–50)
HGB BLD-MCNC: 8.8 G/DL — LOW (ref 13–17)
MAGNESIUM SERPL-MCNC: 1.8 MG/DL — SIGNIFICANT CHANGE UP (ref 1.6–2.6)
MCHC RBC-ENTMCNC: 30.1 PG — SIGNIFICANT CHANGE UP (ref 27–34)
MCHC RBC-ENTMCNC: 32.6 GM/DL — SIGNIFICANT CHANGE UP (ref 32–36)
MCV RBC AUTO: 92.5 FL — SIGNIFICANT CHANGE UP (ref 80–100)
NRBC # BLD: 0 /100 WBCS — SIGNIFICANT CHANGE UP (ref 0–0)
NRBC # FLD: 0 K/UL — SIGNIFICANT CHANGE UP (ref 0–0)
PHOSPHATE SERPL-MCNC: 3.7 MG/DL — SIGNIFICANT CHANGE UP (ref 2.5–4.5)
PLATELET # BLD AUTO: 299 K/UL — SIGNIFICANT CHANGE UP (ref 150–400)
POTASSIUM SERPL-MCNC: 4 MMOL/L — SIGNIFICANT CHANGE UP (ref 3.5–5.3)
POTASSIUM SERPL-SCNC: 4 MMOL/L — SIGNIFICANT CHANGE UP (ref 3.5–5.3)
RBC # BLD: 2.92 M/UL — LOW (ref 4.2–5.8)
RBC # FLD: 13.2 % — SIGNIFICANT CHANGE UP (ref 10.3–14.5)
SODIUM SERPL-SCNC: 134 MMOL/L — LOW (ref 135–145)
WBC # BLD: 6.16 K/UL — SIGNIFICANT CHANGE UP (ref 3.8–10.5)
WBC # FLD AUTO: 6.16 K/UL — SIGNIFICANT CHANGE UP (ref 3.8–10.5)

## 2022-10-09 PROCEDURE — 76770 US EXAM ABDO BACK WALL COMP: CPT | Mod: 26

## 2022-10-09 PROCEDURE — 99233 SBSQ HOSP IP/OBS HIGH 50: CPT | Mod: GC

## 2022-10-09 RX ORDER — LABETALOL HCL 100 MG
10 TABLET ORAL ONCE
Refills: 0 | Status: COMPLETED | OUTPATIENT
Start: 2022-10-09 | End: 2022-10-09

## 2022-10-09 RX ORDER — AMLODIPINE BESYLATE 2.5 MG/1
5 TABLET ORAL DAILY
Refills: 0 | Status: DISCONTINUED | OUTPATIENT
Start: 2022-10-09 | End: 2022-10-09

## 2022-10-09 RX ORDER — AMLODIPINE BESYLATE 2.5 MG/1
5 TABLET ORAL ONCE
Refills: 0 | Status: COMPLETED | OUTPATIENT
Start: 2022-10-09 | End: 2022-10-09

## 2022-10-09 RX ORDER — AMLODIPINE BESYLATE 2.5 MG/1
10 TABLET ORAL DAILY
Refills: 0 | Status: DISCONTINUED | OUTPATIENT
Start: 2022-10-10 | End: 2022-10-22

## 2022-10-09 RX ADMIN — SODIUM CHLORIDE 3 MILLILITER(S): 9 INJECTION INTRAMUSCULAR; INTRAVENOUS; SUBCUTANEOUS at 11:01

## 2022-10-09 RX ADMIN — SODIUM CHLORIDE 3 MILLILITER(S): 9 INJECTION INTRAMUSCULAR; INTRAVENOUS; SUBCUTANEOUS at 22:37

## 2022-10-09 RX ADMIN — Medication 4: at 08:01

## 2022-10-09 RX ADMIN — HEPARIN SODIUM 5000 UNIT(S): 5000 INJECTION INTRAVENOUS; SUBCUTANEOUS at 14:34

## 2022-10-09 RX ADMIN — PIPERACILLIN AND TAZOBACTAM 25 GRAM(S): 4; .5 INJECTION, POWDER, LYOPHILIZED, FOR SOLUTION INTRAVENOUS at 05:14

## 2022-10-09 RX ADMIN — AMLODIPINE BESYLATE 5 MILLIGRAM(S): 2.5 TABLET ORAL at 12:05

## 2022-10-09 RX ADMIN — HEPARIN SODIUM 5000 UNIT(S): 5000 INJECTION INTRAVENOUS; SUBCUTANEOUS at 22:29

## 2022-10-09 RX ADMIN — Medication 10 MILLIGRAM(S): at 02:54

## 2022-10-09 RX ADMIN — SODIUM CHLORIDE 3 MILLILITER(S): 9 INJECTION INTRAMUSCULAR; INTRAVENOUS; SUBCUTANEOUS at 05:53

## 2022-10-09 RX ADMIN — PIPERACILLIN AND TAZOBACTAM 25 GRAM(S): 4; .5 INJECTION, POWDER, LYOPHILIZED, FOR SOLUTION INTRAVENOUS at 13:54

## 2022-10-09 RX ADMIN — HEPARIN SODIUM 5000 UNIT(S): 5000 INJECTION INTRAVENOUS; SUBCUTANEOUS at 05:13

## 2022-10-09 RX ADMIN — PIPERACILLIN AND TAZOBACTAM 25 GRAM(S): 4; .5 INJECTION, POWDER, LYOPHILIZED, FOR SOLUTION INTRAVENOUS at 22:30

## 2022-10-09 RX ADMIN — INSULIN GLARGINE 25 UNIT(S): 100 INJECTION, SOLUTION SUBCUTANEOUS at 08:01

## 2022-10-09 RX ADMIN — AMLODIPINE BESYLATE 5 MILLIGRAM(S): 2.5 TABLET ORAL at 05:13

## 2022-10-09 RX ADMIN — GABAPENTIN 300 MILLIGRAM(S): 400 CAPSULE ORAL at 12:04

## 2022-10-09 RX ADMIN — TAMSULOSIN HYDROCHLORIDE 0.4 MILLIGRAM(S): 0.4 CAPSULE ORAL at 22:30

## 2022-10-09 RX ADMIN — Medication 4: at 12:00

## 2022-10-09 NOTE — PROGRESS NOTE ADULT - SUBJECTIVE AND OBJECTIVE BOX
NEPHROLOGY     Patient seen and examined, no complaints, denies pain, no sob, in no acute distress.     MEDICATIONS  (STANDING):  dextrose 5%. 1000 milliLiter(s) (100 mL/Hr) IV Continuous <Continuous>  dextrose 5%. 1000 milliLiter(s) (50 mL/Hr) IV Continuous <Continuous>  dextrose 50% Injectable 25 Gram(s) IV Push once  dextrose 50% Injectable 25 Gram(s) IV Push once  gabapentin 300 milliGRAM(s) Oral daily  glucagon  Injectable 1 milliGRAM(s) IntraMuscular once  heparin   Injectable 5000 Unit(s) SubCutaneous every 8 hours  influenza   Vaccine 0.5 milliLiter(s) IntraMuscular once  insulin glargine Injectable (LANTUS) 25 Unit(s) SubCutaneous every morning  insulin lispro (ADMELOG) corrective regimen sliding scale   SubCutaneous three times a day before meals  insulin lispro (ADMELOG) corrective regimen sliding scale   SubCutaneous at bedtime  piperacillin/tazobactam IVPB.. 3.375 Gram(s) IV Intermittent every 8 hours  sodium chloride 0.9% lock flush 3 milliLiter(s) IV Push every 8 hours  tamsulosin 0.4 milliGRAM(s) Oral at bedtime    VITALS:  T(C): , Max: 37.1 (10-09-22 @ 00:00)  T(F): , Max: 98.7 (10-09-22 @ 00:00)  HR: 89 (10-09-22 @ 13:00)  BP: 172/97 (10-09-22 @ 13:00)  BP(mean): 119 (10-09-22 @ 13:00)  RR: 17 (10-09-22 @ 13:00)  SpO2: 96% (10-09-22 @ 13:00)    I and O's:    10-08 @ 07:01  -  10-09 @ 07:00  --------------------------------------------------------  IN: 430.5 mL / OUT: 1100 mL / NET: -669.5 mL    10-09 @ 07:01  -  10-09 @ 13:48  --------------------------------------------------------  IN: 0 mL / OUT: 475 mL / NET: -475 mL    PHYSICAL EXAM:  Constitutional: obese, NAD  HEENT: NCAT, MMM  Neck: Supple, No JVD  Respiratory: CTA-b/l  Cardiovascular: RRR s1s2, no m/r/g  Gastrointestinal: BS+, soft, NT/ND  Extremities: LLE in dsg  Neurological: no focal deficits; strength grossly intact  Back: no CVAT b/l  Skin: L BKA, dressing in place     LABS:                        8.8    6.16  )-----------( 299      ( 09 Oct 2022 01:00 )             27.0     10-09    134<L>  |  102  |  20  ----------------------------<  218<H>  4.0   |  21<L>  |  3.29<H>    Ca    8.1<L>      09 Oct 2022 01:00  Phos  3.7     10-09  Mg     1.80     10-09

## 2022-10-09 NOTE — PROGRESS NOTE ADULT - SUBJECTIVE AND OBJECTIVE BOX
VASCULAR SURGERY DAILY PROGRESS NOTE:     SUBJECTIVE/ROS:   Patient seen and evaluated on AM rounds. Overnight, weaned off insulin gtt, on Lantus 25 qAM  Patient otherwise denies nausea, vomiting, chest pain, shortness of breath     OBJECTIVE:  Vital Signs Last 24 Hrs  T(C): 36.6 (09 Oct 2022 04:00), Max: 37.1 (09 Oct 2022 00:00)  T(F): 97.9 (09 Oct 2022 04:00), Max: 98.7 (09 Oct 2022 00:00)  HR: 80 (09 Oct 2022 07:00) (71 - 87)  BP: 182/79 (09 Oct 2022 07:00) (153/77 - 207/97)  BP(mean): 108 (09 Oct 2022 07:00) (100 - 127)  RR: 15 (09 Oct 2022 07:00) (12 - 24)  SpO2: 100% (09 Oct 2022 07:00) (60% - 100%)    Parameters below as of 09 Oct 2022 07:00  Patient On (Oxygen Delivery Method): nasal cannula  O2 Flow (L/min): 4    I&O's Detail    08 Oct 2022 07:01  -  09 Oct 2022 07:00  --------------------------------------------------------  IN:    dextrose 5% + lactated ringers: 225 mL    Insulin: 5.5 mL    IV PiggyBack: 200 mL  Total IN: 430.5 mL    OUT:    Voided (mL): 1100 mL  Total OUT: 1100 mL    Total NET: -669.5 mL        Daily     Daily Weight in k.1 (09 Oct 2022 06:00)  MEDICATIONS  (STANDING):  amLODIPine   Tablet 5 milliGRAM(s) Oral daily  dextrose 5%. 1000 milliLiter(s) (100 mL/Hr) IV Continuous <Continuous>  dextrose 5%. 1000 milliLiter(s) (50 mL/Hr) IV Continuous <Continuous>  dextrose 50% Injectable 25 Gram(s) IV Push once  dextrose 50% Injectable 25 Gram(s) IV Push once  gabapentin 300 milliGRAM(s) Oral daily  glucagon  Injectable 1 milliGRAM(s) IntraMuscular once  heparin   Injectable 5000 Unit(s) SubCutaneous every 8 hours  influenza   Vaccine 0.5 milliLiter(s) IntraMuscular once  insulin glargine Injectable (LANTUS) 25 Unit(s) SubCutaneous every morning  insulin lispro (ADMELOG) corrective regimen sliding scale   SubCutaneous three times a day before meals  insulin lispro (ADMELOG) corrective regimen sliding scale   SubCutaneous at bedtime  piperacillin/tazobactam IVPB.. 3.375 Gram(s) IV Intermittent every 8 hours  sodium chloride 0.9% lock flush 3 milliLiter(s) IV Push every 8 hours  tamsulosin 0.4 milliGRAM(s) Oral at bedtime    MEDICATIONS  (PRN):  acetaminophen     Tablet .. 975 milliGRAM(s) Oral every 6 hours PRN Mild Pain (1 - 3)  dextrose Oral Gel 15 Gram(s) Oral once PRN Blood Glucose LESS THAN 70 milliGRAM(s)/deciliter  oxyCODONE    IR 5 milliGRAM(s) Oral every 4 hours PRN Moderate Pain (4 - 6)  oxyCODONE    IR 10 milliGRAM(s) Oral every 4 hours PRN Severe Pain (7 - 10)      Labs:                        8.8    6.16  )-----------( 299      ( 09 Oct 2022 01:00 )             27.0     10-09    134<L>  |  102  |  20  ----------------------------<  218<H>  4.0   |  21<L>  |  3.29<H>    Ca    8.1<L>      09 Oct 2022 01:00  Phos  3.7     10-  Mg     1.80     10-                    Physical Exam:  General: well developed, well nourished, NAD  HEENT: NCAT, trachea midline  Respiratory: respirations non labored  Gastrointestinal: soft, nontender, nondistended  Extremities: LLE in KI with stump wrapped with xeroform, 4x4, abd pads, kerlix, coband, and ace; dressing c/d/i  Neurological: non-focal         VASCULAR SURGERY DAILY PROGRESS NOTE:     SUBJECTIVE/ROS:   Patient seen and evaluated on AM rounds. Feeling well. Overnight, weaned off insulin gtt, on Lantus 25 qAM  Patient otherwise denies nausea, vomiting, chest pain, shortness of breath     OBJECTIVE:  Vital Signs Last 24 Hrs  T(C): 36.6 (09 Oct 2022 04:00), Max: 37.1 (09 Oct 2022 00:00)  T(F): 97.9 (09 Oct 2022 04:00), Max: 98.7 (09 Oct 2022 00:00)  HR: 80 (09 Oct 2022 07:00) (71 - 87)  BP: 182/79 (09 Oct 2022 07:00) (153/77 - 207/97)  BP(mean): 108 (09 Oct 2022 07:00) (100 - 127)  RR: 15 (09 Oct 2022 07:00) (12 - 24)  SpO2: 100% (09 Oct 2022 07:00) (60% - 100%)    Parameters below as of 09 Oct 2022 07:00  Patient On (Oxygen Delivery Method): nasal cannula  O2 Flow (L/min): 4          I&O's Detail    08 Oct 2022 07:01  -  09 Oct 2022 07:00  --------------------------------------------------------  IN:    dextrose 5% + lactated ringers: 225 mL    Insulin: 5.5 mL    IV PiggyBack: 200 mL  Total IN: 430.5 mL    OUT:    Voided (mL): 1100 mL  Total OUT: 1100 mL    Total NET: -669.5 mL        Daily     Daily Weight in k.1 (09 Oct 2022 06:00)  MEDICATIONS  (STANDING):  amLODIPine   Tablet 5 milliGRAM(s) Oral daily  dextrose 5%. 1000 milliLiter(s) (100 mL/Hr) IV Continuous <Continuous>  dextrose 5%. 1000 milliLiter(s) (50 mL/Hr) IV Continuous <Continuous>  dextrose 50% Injectable 25 Gram(s) IV Push once  dextrose 50% Injectable 25 Gram(s) IV Push once  gabapentin 300 milliGRAM(s) Oral daily  glucagon  Injectable 1 milliGRAM(s) IntraMuscular once  heparin   Injectable 5000 Unit(s) SubCutaneous every 8 hours  influenza   Vaccine 0.5 milliLiter(s) IntraMuscular once  insulin glargine Injectable (LANTUS) 25 Unit(s) SubCutaneous every morning  insulin lispro (ADMELOG) corrective regimen sliding scale   SubCutaneous three times a day before meals  insulin lispro (ADMELOG) corrective regimen sliding scale   SubCutaneous at bedtime  piperacillin/tazobactam IVPB.. 3.375 Gram(s) IV Intermittent every 8 hours  sodium chloride 0.9% lock flush 3 milliLiter(s) IV Push every 8 hours  tamsulosin 0.4 milliGRAM(s) Oral at bedtime    MEDICATIONS  (PRN):  acetaminophen     Tablet .. 975 milliGRAM(s) Oral every 6 hours PRN Mild Pain (1 - 3)  dextrose Oral Gel 15 Gram(s) Oral once PRN Blood Glucose LESS THAN 70 milliGRAM(s)/deciliter  oxyCODONE    IR 5 milliGRAM(s) Oral every 4 hours PRN Moderate Pain (4 - 6)  oxyCODONE    IR 10 milliGRAM(s) Oral every 4 hours PRN Severe Pain (7 - 10)      Labs:                        8.8    6.16  )-----------( 299      ( 09 Oct 2022 01:00 )             27.0     10-09    134<L>  |  102  |  20  ----------------------------<  218<H>  4.0   |  21<L>  |  3.29<H>    Ca    8.1<L>      09 Oct 2022 01:00  Phos  3.7     10-09  Mg     1.80     10-      Physical Exam:  General: well developed, well nourished, NAD  HEENT: NCAT, trachea midline  Respiratory: respirations non labored  Gastrointestinal: soft, nontender, nondistended  Extremities: LLE in KI with stump wrapped in ace bandage w/ gauze and abd underneath; dressing c/d/i  Neurological: non-focal

## 2022-10-09 NOTE — PROGRESS NOTE ADULT - ASSESSMENT
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 Patient now s/p L BKA for necrotic left foot ulcer w/ concern for gas gangrene. Insulin infusion was started interoperatively; patient given 12u and started on 5u/hr. Postoperative glucose of 344, measured in PACU. SICU consulted for management of insulin infusion.    PLAN:   Neurologic:   - AOx4    Respiratory:  - O2 > 92%     Cardiovascular:  - MAP > 65     Gastrointestinal/Nutrition:   - regular cc diet    Renal/Genitourinary:  - Strict I&Os  - h/o CKD     Hematologic:  - Daily CBC     Infectious Disease:  - Zosyn     Endocrine:  - off insulin gtt, s/p Lantus 25u qAM/ Mod ISS     Disposition:   - floors in AM   61 y/o M with PMH of HTN, DM type II, HLD, BPH and PAD, Right BKA 2/2 to unhealed DM ulcer presented to [facility] for LLE peripheral angiogram. Patient now s/p L BKA for necrotic left foot ulcer w/ concern for gas gangrene. Insulin infusion was started interoperatively; patient given 12u and started on 5u/hr. Postoperative glucose of 344, measured in PACU. SICU consulted for management of insulin infusion.    PLAN:   Neurologic:   - AOx4    Respiratory:  - O2 > 92%     Cardiovascular:  - MAP > 65     Gastrointestinal/Nutrition:   - regular cc diet    Renal/Genitourinary:  - Strict I&Os  - h/o CKD     Hematologic:  - Daily CBC  - SQH     Infectious Disease:  - Zosyn     Endocrine:  - off insulin gtt, s/p Lantus 25u qAM/ Mod ISS     Disposition:   - PT consult  - floors in AM   61 y/o M with PMH of HTN, DM type II, HLD, BPH and PAD, Right BKA 2/2 to unhealed DM ulcer presented to [facility] for LLE peripheral angiogram. Patient now s/p L BKA for necrotic left foot ulcer w/ concern for gas gangrene. Insulin infusion was started interoperatively; patient given 12u and started on 5u/hr. Postoperative glucose of 344, measured in PACU. SICU consulted for management of insulin infusion.    PLAN:   Neurologic:   - AOx4    Respiratory:  - O2 > 92%     Cardiovascular:  - MAP > 65   - amlodipine 10    Gastrointestinal/Nutrition:   - regular cc diet    Renal/Genitourinary:  - Strict I&Os  - h/o CKD     Hematologic:  - Daily CBC  - SQH     Infectious Disease:  - Zosyn     Endocrine:  - off insulin gtt, s/p Lantus 25u qAM/ Mod ISS     Disposition:   - PT consult  - floors in AM

## 2022-10-09 NOTE — PROGRESS NOTE ADULT - SUBJECTIVE AND OBJECTIVE BOX
SICU Daily Progress Note  =====================================================  Interval/Overnight Events:    - weaned off insulin gtt, on Lantus 25 qAM    ALLERGIES:  No Known Allergies    --------------------------------------------------------------------------------------    MEDICATIONS:    Neurologic Medications  acetaminophen     Tablet .. 975 milliGRAM(s) Oral every 6 hours PRN Mild Pain (1 - 3)  gabapentin 300 milliGRAM(s) Oral daily  oxyCODONE    IR 5 milliGRAM(s) Oral every 4 hours PRN Moderate Pain (4 - 6)  oxyCODONE    IR 10 milliGRAM(s) Oral every 4 hours PRN Severe Pain (7 - 10)    Cardiovascular Medications  tamsulosin 0.4 milliGRAM(s) Oral at bedtime    Gastrointestinal Medications  dextrose 5%. 1000 milliLiter(s) IV Continuous <Continuous>  dextrose 5%. 1000 milliLiter(s) IV Continuous <Continuous>  sodium chloride 0.9% lock flush 3 milliLiter(s) IV Push every 8 hours    Hematologic/Oncologic Medications  heparin   Injectable 5000 Unit(s) SubCutaneous every 8 hours  influenza   Vaccine 0.5 milliLiter(s) IntraMuscular once    Antimicrobial/Immunologic Medications  piperacillin/tazobactam IVPB.. 3.375 Gram(s) IV Intermittent every 8 hours    Endocrine/Metabolic Medications  dextrose 50% Injectable 25 Gram(s) IV Push once  dextrose 50% Injectable 25 Gram(s) IV Push once  dextrose Oral Gel 15 Gram(s) Oral once PRN Blood Glucose LESS THAN 70 milliGRAM(s)/deciliter  glucagon  Injectable 1 milliGRAM(s) IntraMuscular once  insulin glargine Injectable (LANTUS) 25 Unit(s) SubCutaneous every morning  insulin lispro (ADMELOG) corrective regimen sliding scale   SubCutaneous three times a day before meals  insulin lispro (ADMELOG) corrective regimen sliding scale   SubCutaneous at bedtime    Topical/Other Medications    --------------------------------------------------------------------------------------    VITAL SIGNS:  T(C): 36.7 (10-08-22 @ 20:00), Max: 36.7 (10-08-22 @ 16:00)  HR: 74 (10-08-22 @ 23:00) (67 - 87)  BP: 173/92 (10-08-22 @ 20:00) (141/71 - 173/92)  RR: 20 (10-08-22 @ 23:00) (12 - 22)  SpO2: 95% (10-08-22 @ 23:00) (95% - 100%)  --------------------------------------------------------------------------------------    INS AND OUTS:    10-07-22 @ 07:01  -  10-08-22 @ 07:00  --------------------------------------------------------  IN: 884.5 mL / OUT: 400 mL / NET: 484.5 mL    10-08-22 @ 07:01  -  10-09-22 @ 00:16  --------------------------------------------------------  IN: 430.5 mL / OUT: 600 mL / NET: -169.5 mL      --------------------------------------------------------------------------------------    EXAM    NEURO: NAD,   HEENT: NC/AT  RESPIRATORY: nonlabored respirations, normal CW expansion  CARDIO: RRR, S1S2  ABDOMEN: soft, nontender, nondistended,  EXTREMITIES: s/p R BKA, s/p L guillotine BKA ACE wrap in place.     --------------------------------------------------------------------------------------    LABS      -------------------------------------------------------------------------------------- SICU Daily Progress Note  =====================================================  Interval/Overnight Events:    - weaned off insulin gtt, on Lantus 25 qAM  - amlodipine increased to 10    ALLERGIES:  No Known Allergies    --------------------------------------------------------------------------------------    MEDICATIONS:    Neurologic Medications  acetaminophen     Tablet .. 975 milliGRAM(s) Oral every 6 hours PRN Mild Pain (1 - 3)  gabapentin 300 milliGRAM(s) Oral daily  oxyCODONE    IR 5 milliGRAM(s) Oral every 4 hours PRN Moderate Pain (4 - 6)  oxyCODONE    IR 10 milliGRAM(s) Oral every 4 hours PRN Severe Pain (7 - 10)    Cardiovascular Medications  tamsulosin 0.4 milliGRAM(s) Oral at bedtime    Gastrointestinal Medications  dextrose 5%. 1000 milliLiter(s) IV Continuous <Continuous>  dextrose 5%. 1000 milliLiter(s) IV Continuous <Continuous>  sodium chloride 0.9% lock flush 3 milliLiter(s) IV Push every 8 hours    Hematologic/Oncologic Medications  heparin   Injectable 5000 Unit(s) SubCutaneous every 8 hours  influenza   Vaccine 0.5 milliLiter(s) IntraMuscular once    Antimicrobial/Immunologic Medications  piperacillin/tazobactam IVPB.. 3.375 Gram(s) IV Intermittent every 8 hours    Endocrine/Metabolic Medications  dextrose 50% Injectable 25 Gram(s) IV Push once  dextrose 50% Injectable 25 Gram(s) IV Push once  dextrose Oral Gel 15 Gram(s) Oral once PRN Blood Glucose LESS THAN 70 milliGRAM(s)/deciliter  glucagon  Injectable 1 milliGRAM(s) IntraMuscular once  insulin glargine Injectable (LANTUS) 25 Unit(s) SubCutaneous every morning  insulin lispro (ADMELOG) corrective regimen sliding scale   SubCutaneous three times a day before meals  insulin lispro (ADMELOG) corrective regimen sliding scale   SubCutaneous at bedtime    Topical/Other Medications    --------------------------------------------------------------------------------------    VITAL SIGNS:  T(C): 36.7 (10-08-22 @ 20:00), Max: 36.7 (10-08-22 @ 16:00)  HR: 74 (10-08-22 @ 23:00) (67 - 87)  BP: 173/92 (10-08-22 @ 20:00) (141/71 - 173/92)  RR: 20 (10-08-22 @ 23:00) (12 - 22)  SpO2: 95% (10-08-22 @ 23:00) (95% - 100%)  --------------------------------------------------------------------------------------    INS AND OUTS:    10-07-22 @ 07:01  -  10-08-22 @ 07:00  --------------------------------------------------------  IN: 884.5 mL / OUT: 400 mL / NET: 484.5 mL    10-08-22 @ 07:01  -  10-09-22 @ 00:16  --------------------------------------------------------  IN: 430.5 mL / OUT: 600 mL / NET: -169.5 mL      --------------------------------------------------------------------------------------    EXAM    NEURO: NAD,   HEENT: NC/AT  RESPIRATORY: nonlabored respirations, normal CW expansion  CARDIO: RRR, S1S2  ABDOMEN: soft, nontender, nondistended,  EXTREMITIES: s/p R BKA, s/p L guillotine BKA ACE wrap in place.     --------------------------------------------------------------------------------------    LABS      --------------------------------------------------------------------------------------

## 2022-10-09 NOTE — PHYSICAL THERAPY INITIAL EVALUATION ADULT - ADDITIONAL COMMENTS
Patient report lives with wife in apartment, elevator access, ambulated with right BKA prosthesis and walker.

## 2022-10-09 NOTE — PHYSICAL THERAPY INITIAL EVALUATION ADULT - GENERAL OBSERVATIONS, REHAB EVAL
Patient seen semi supine in bed in SICU, knee immobilizer on left knee, ace wrap dressing on left stump,  on right stump, connected by lines. Yes

## 2022-10-09 NOTE — PROGRESS NOTE ADULT - ASSESSMENT
ASSESSMENT:  (1)Renal - CKD4 - likely multifactorial including component from diabetic nephropathy. Likely that the creatinine in the high 2s represents his current baseline. GFR ~20-25ml/min.  (2)Hyperkalemia- spurious labwork - severely hemolyzed specimen, improved   (3)CV - hypertensive - not on any antihypertensives at home  (4)ID - febrile illness - infected L foot - on IV Zosyn   (5)Vasc - PAD, s/p L BKA (10/7)    RECOMMEND:  (1)Add Hydralazine 50mg po q8h for now - hold for sbp<140  (2)Could also add beta-blockade - Coreg 12.5mg po bid? - hold for sbp<130/hold for hr <55  (3)No IVF/No diuretics for now  (4)Urine: lytes, creatinine, UA  (5)Renal US  (6)Abx for GFR 20-25ml/min - q8h Zosyn    EVELYN CummingsC  [facility]  [phone]

## 2022-10-09 NOTE — PROGRESS NOTE ADULT - ASSESSMENT
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 Patient now s/p L BKA for necrotic left foot ulcer w/ concern for gas gangrene. Insulin infusion was started intraoperatively; patient given 12u and started on 5u/hr.     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 Patient now s/p L BKA for necrotic left foot ulcer w/ concern for gas gangrene. Insulin infusion was started intraoperatively; patient given 12u and started on 5u/hr.     Plan:  - Continue Zosyn  - Dressing change on POD#3  - OR planning for BKA formalization friday  - Please obtain cardiology clearance  - appreciate nephrology recs   - f/u Endo recs  - DVT ppx: SQH  - Appreciate SICU care       Vascular Surgery, C Team Surgery  P# [id number]

## 2022-10-10 DIAGNOSIS — I10 ESSENTIAL (PRIMARY) HYPERTENSION: ICD-10-CM

## 2022-10-10 DIAGNOSIS — E78.5 HYPERLIPIDEMIA, UNSPECIFIED: ICD-10-CM

## 2022-10-10 DIAGNOSIS — E11.65 TYPE 2 DIABETES MELLITUS WITH HYPERGLYCEMIA: ICD-10-CM

## 2022-10-10 LAB
ANION GAP SERPL CALC-SCNC: 11 MMOL/L — SIGNIFICANT CHANGE UP (ref 7–14)
BUN SERPL-MCNC: 20 MG/DL — SIGNIFICANT CHANGE UP (ref 7–23)
CALCIUM SERPL-MCNC: 8.4 MG/DL — SIGNIFICANT CHANGE UP (ref 8.4–10.5)
CHLORIDE SERPL-SCNC: 104 MMOL/L — SIGNIFICANT CHANGE UP (ref 98–107)
CHLORIDE UR-SCNC: 34 MMOL/L — SIGNIFICANT CHANGE UP
CO2 SERPL-SCNC: 22 MMOL/L — SIGNIFICANT CHANGE UP (ref 22–31)
CREAT ?TM UR-MCNC: 224 MG/DL — SIGNIFICANT CHANGE UP
CREAT SERPL-MCNC: 3.16 MG/DL — HIGH (ref 0.5–1.3)
EGFR: 21 ML/MIN/1.73M2 — LOW
GLUCOSE BLDC GLUCOMTR-MCNC: 169 MG/DL — HIGH (ref 70–99)
GLUCOSE BLDC GLUCOMTR-MCNC: 202 MG/DL — HIGH (ref 70–99)
GLUCOSE BLDC GLUCOMTR-MCNC: 264 MG/DL — HIGH (ref 70–99)
GLUCOSE BLDC GLUCOMTR-MCNC: 307 MG/DL — HIGH (ref 70–99)
GLUCOSE SERPL-MCNC: 178 MG/DL — HIGH (ref 70–99)
HCT VFR BLD CALC: 29.3 % — LOW (ref 39–50)
HGB BLD-MCNC: 9.2 G/DL — LOW (ref 13–17)
MAGNESIUM SERPL-MCNC: 1.7 MG/DL — SIGNIFICANT CHANGE UP (ref 1.6–2.6)
MCHC RBC-ENTMCNC: 29.2 PG — SIGNIFICANT CHANGE UP (ref 27–34)
MCHC RBC-ENTMCNC: 31.4 GM/DL — LOW (ref 32–36)
MCV RBC AUTO: 93 FL — SIGNIFICANT CHANGE UP (ref 80–100)
NRBC # BLD: 0 /100 WBCS — SIGNIFICANT CHANGE UP (ref 0–0)
NRBC # FLD: 0 K/UL — SIGNIFICANT CHANGE UP (ref 0–0)
PHOSPHATE SERPL-MCNC: 3.4 MG/DL — SIGNIFICANT CHANGE UP (ref 2.5–4.5)
PLATELET # BLD AUTO: 334 K/UL — SIGNIFICANT CHANGE UP (ref 150–400)
POTASSIUM SERPL-MCNC: 4.4 MMOL/L — SIGNIFICANT CHANGE UP (ref 3.5–5.3)
POTASSIUM SERPL-SCNC: 4.4 MMOL/L — SIGNIFICANT CHANGE UP (ref 3.5–5.3)
POTASSIUM UR-SCNC: 29.9 MMOL/L — SIGNIFICANT CHANGE UP
RBC # BLD: 3.15 M/UL — LOW (ref 4.2–5.8)
RBC # FLD: 13.1 % — SIGNIFICANT CHANGE UP (ref 10.3–14.5)
SODIUM SERPL-SCNC: 137 MMOL/L — SIGNIFICANT CHANGE UP (ref 135–145)
SODIUM UR-SCNC: 67 MMOL/L — SIGNIFICANT CHANGE UP
WBC # BLD: 5.84 K/UL — SIGNIFICANT CHANGE UP (ref 3.8–10.5)
WBC # FLD AUTO: 5.84 K/UL — SIGNIFICANT CHANGE UP (ref 3.8–10.5)

## 2022-10-10 PROCEDURE — 99223 1ST HOSP IP/OBS HIGH 75: CPT | Mod: GC

## 2022-10-10 PROCEDURE — 99232 SBSQ HOSP IP/OBS MODERATE 35: CPT

## 2022-10-10 RX ORDER — HYDRALAZINE HCL 50 MG
10 TABLET ORAL DAILY
Refills: 0 | Status: DISCONTINUED | OUTPATIENT
Start: 2022-10-10 | End: 2022-10-10

## 2022-10-10 RX ORDER — INSULIN LISPRO 100/ML
VIAL (ML) SUBCUTANEOUS
Refills: 0 | Status: DISCONTINUED | OUTPATIENT
Start: 2022-10-10 | End: 2022-10-22

## 2022-10-10 RX ORDER — MAGNESIUM SULFATE 500 MG/ML
2 VIAL (ML) INJECTION ONCE
Refills: 0 | Status: COMPLETED | OUTPATIENT
Start: 2022-10-10 | End: 2022-10-10

## 2022-10-10 RX ORDER — INSULIN LISPRO 100/ML
VIAL (ML) SUBCUTANEOUS AT BEDTIME
Refills: 0 | Status: DISCONTINUED | OUTPATIENT
Start: 2022-10-10 | End: 2022-10-22

## 2022-10-10 RX ORDER — HYDRALAZINE HCL 50 MG
10 TABLET ORAL DAILY
Refills: 0 | Status: DISCONTINUED | OUTPATIENT
Start: 2022-10-10 | End: 2022-10-11

## 2022-10-10 RX ORDER — INSULIN LISPRO 100/ML
6 VIAL (ML) SUBCUTANEOUS
Refills: 0 | Status: DISCONTINUED | OUTPATIENT
Start: 2022-10-10 | End: 2022-10-12

## 2022-10-10 RX ORDER — POLYETHYLENE GLYCOL 3350 17 G/17G
17 POWDER, FOR SOLUTION ORAL DAILY
Refills: 0 | Status: DISCONTINUED | OUTPATIENT
Start: 2022-10-10 | End: 2022-10-22

## 2022-10-10 RX ADMIN — HEPARIN SODIUM 5000 UNIT(S): 5000 INJECTION INTRAVENOUS; SUBCUTANEOUS at 06:07

## 2022-10-10 RX ADMIN — INSULIN GLARGINE 25 UNIT(S): 100 INJECTION, SOLUTION SUBCUTANEOUS at 07:58

## 2022-10-10 RX ADMIN — Medication 6: at 16:25

## 2022-10-10 RX ADMIN — SODIUM CHLORIDE 3 MILLILITER(S): 9 INJECTION INTRAMUSCULAR; INTRAVENOUS; SUBCUTANEOUS at 22:52

## 2022-10-10 RX ADMIN — SODIUM CHLORIDE 3 MILLILITER(S): 9 INJECTION INTRAMUSCULAR; INTRAVENOUS; SUBCUTANEOUS at 13:30

## 2022-10-10 RX ADMIN — TAMSULOSIN HYDROCHLORIDE 0.4 MILLIGRAM(S): 0.4 CAPSULE ORAL at 22:31

## 2022-10-10 RX ADMIN — Medication 2: at 08:00

## 2022-10-10 RX ADMIN — PIPERACILLIN AND TAZOBACTAM 25 GRAM(S): 4; .5 INJECTION, POWDER, LYOPHILIZED, FOR SOLUTION INTRAVENOUS at 13:24

## 2022-10-10 RX ADMIN — AMLODIPINE BESYLATE 10 MILLIGRAM(S): 2.5 TABLET ORAL at 06:07

## 2022-10-10 RX ADMIN — PIPERACILLIN AND TAZOBACTAM 25 GRAM(S): 4; .5 INJECTION, POWDER, LYOPHILIZED, FOR SOLUTION INTRAVENOUS at 06:10

## 2022-10-10 RX ADMIN — Medication 25 GRAM(S): at 22:30

## 2022-10-10 RX ADMIN — Medication 975 MILLIGRAM(S): at 06:07

## 2022-10-10 RX ADMIN — PIPERACILLIN AND TAZOBACTAM 25 GRAM(S): 4; .5 INJECTION, POWDER, LYOPHILIZED, FOR SOLUTION INTRAVENOUS at 22:31

## 2022-10-10 RX ADMIN — Medication 975 MILLIGRAM(S): at 06:26

## 2022-10-10 RX ADMIN — Medication 2: at 22:31

## 2022-10-10 RX ADMIN — GABAPENTIN 300 MILLIGRAM(S): 400 CAPSULE ORAL at 11:55

## 2022-10-10 RX ADMIN — Medication 4: at 11:55

## 2022-10-10 RX ADMIN — HEPARIN SODIUM 5000 UNIT(S): 5000 INJECTION INTRAVENOUS; SUBCUTANEOUS at 22:30

## 2022-10-10 RX ADMIN — Medication 10 MILLIGRAM(S): at 11:55

## 2022-10-10 RX ADMIN — SODIUM CHLORIDE 3 MILLILITER(S): 9 INJECTION INTRAMUSCULAR; INTRAVENOUS; SUBCUTANEOUS at 06:25

## 2022-10-10 RX ADMIN — POLYETHYLENE GLYCOL 3350 17 GRAM(S): 17 POWDER, FOR SOLUTION ORAL at 17:33

## 2022-10-10 RX ADMIN — HEPARIN SODIUM 5000 UNIT(S): 5000 INJECTION INTRAVENOUS; SUBCUTANEOUS at 13:24

## 2022-10-10 NOTE — CONSULT NOTE ADULT - SUBJECTIVE AND OBJECTIVE BOX
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 As per the patient he developed initially a scab on the bottom of his foot in August of this year and then progressively worsened to an ulcer. Patient stated that the ulcer is located between the 4th web space and recently fell off the bed and cut the adjacent skin for which he received stitched and PO Augmentin. Patient stated that the ulcer cunningham drain a clear liquid but denied any bleeding or purulent discharge. Patient also endorsed of chills for the past 2 weeks with associated body aches and lethargy. Patient was incidental found to be COVID positive on 09/19/22 and that time angiogram was cancelled. Patient otherwise denied any LLE foot pain, claudication or fevers. Patient denied any calf pain, CP, SOB, N/V/D/C, abdominal pain, dysuria, melena, hematochezia, recent travel, sick contact, cough, body aches, pleuritic or positional chest pain.    COVID PCR positive on 09/19/22   VASCULAR SURG ATT ADDENDUM  Pt presents  and states left foot wound w increase in size and odor  (07 Oct 2022 14:29)      Consulted for: Uncontrolled T2DM    Diabetes History:   Most recent A1c 8.5%  -Diagnosed 30 years ago  -Endocrinologist- None  -Current Regimen - Novolog 25-30 units qAC, no long acting  -Past Regimens - Metformin ER, didn't tolerate  -FS at home - uses Noel - generally > 200, sometimes 100s and < 70 when not eating much   -Diet - average  -Complications/Diabetes HCM - (+) retinopathy, (+) foot wound requiring amputation      PAST MEDICAL & SURGICAL HISTORY:  Diabetes  Type II      BPH (benign prostatic hypertrophy)      HTN (hypertension)      Venous insufficiency of both lower extremities  R &gt; L      Status post incision and drainage  Rt groin abscess      S/P laparoscopic cholecystectomy      History of cholecystectomy      S/P BKA (below knee amputation), right          FAMILY HISTORY:  Family history of diabetes mellitus (DM) (Grandparent)    Paternal family history of emphysema        Social History:    Home Medications:  Aspirin Enteric Coated 81 mg oral delayed release tablet: 1 tab(s) orally once a day (07 Oct 2022 14:16)  cilostazol 100 mg oral tablet: 1 tab(s) orally 2 times a day (07 Oct 2022 14:16)  Flomax 0.4 mg oral capsule: 1 cap(s) orally once a day (07 Oct 2022 14:16)  NovoLOG 100 units/mL injectable solution: 25 unit(s) injectable 3 times a day before each meal (07 Oct 2022 14:16)      MEDICATIONS  (STANDING):  amLODIPine   Tablet 10 milliGRAM(s) Oral daily  dextrose 5%. 1000 milliLiter(s) (50 mL/Hr) IV Continuous <Continuous>  dextrose 50% Injectable 25 Gram(s) IV Push once  dextrose 50% Injectable 25 Gram(s) IV Push once  gabapentin 300 milliGRAM(s) Oral daily  glucagon  Injectable 1 milliGRAM(s) IntraMuscular once  heparin   Injectable 5000 Unit(s) SubCutaneous every 8 hours  hydrALAZINE 10 milliGRAM(s) Oral daily  influenza   Vaccine 0.5 milliLiter(s) IntraMuscular once  insulin glargine Injectable (LANTUS) 25 Unit(s) SubCutaneous every morning  insulin lispro (ADMELOG) corrective regimen sliding scale   SubCutaneous three times a day before meals  insulin lispro (ADMELOG) corrective regimen sliding scale   SubCutaneous at bedtime  piperacillin/tazobactam IVPB.. 3.375 Gram(s) IV Intermittent every 8 hours  sodium chloride 0.9% lock flush 3 milliLiter(s) IV Push every 8 hours  tamsulosin 0.4 milliGRAM(s) Oral at bedtime    MEDICATIONS  (PRN):  acetaminophen     Tablet .. 975 milliGRAM(s) Oral every 6 hours PRN Mild Pain (1 - 3)  dextrose Oral Gel 15 Gram(s) Oral once PRN Blood Glucose LESS THAN 70 milliGRAM(s)/deciliter      Allergies    No Known Allergies    Intolerances      Review of Systems:  Constitutional: No fever  Eyes: No blurry vision  Neuro: No tremors  HEENT: No pain  Cardiovascular: No chest pain, palpitations  Respiratory: No SOB, no cough  GI: No nausea, vomiting, abdominal pain  : No dysuria  Skin: no rash  Psych: no depression  Endocrine: no polyuria, polydipsia  Hem/lymph: no swelling  Osteoporosis: no fractures    PHYSICAL EXAM:  VITALS: T(C): 36.3 (10-10-22 @ 12:00)  T(F): 97.3 (10-10-22 @ 12:00), Max: 98.6 (10-09-22 @ 20:00)  HR: 83 (10-10-22 @ 12:00) (64 - 83)  BP: 154/85 (10-10-22 @ 12:00) (144/77 - 176/96)  RR:  (17 - 23)  SpO2:  (98% - 100%)  Wt(kg): --  GENERAL: NAD  EYES: No proptosis, no lid lag, anicteric  THYROID: Normal size, no palpable nodules  RESPIRATORY: Clear to auscultation bilaterally  CARDIOVASCULAR: Regular rate and rhythm  GI: Soft, nontender, non distended  PSYCH: Alert and oriented x 3, reactive mood    POCT Blood Glucose.: 202 mg/dL (10-10-22 @ 11:47)  POCT Blood Glucose.: 169 mg/dL (10-10-22 @ 07:56)  POCT Blood Glucose.: 183 mg/dL (10-09-22 @ 22:26)  POCT Blood Glucose.: 149 mg/dL (10-09-22 @ 17:40)  POCT Blood Glucose.: 224 mg/dL (10-09-22 @ 11:46)  POCT Blood Glucose.: 222 mg/dL (10-09-22 @ 07:55)  POCT Blood Glucose.: 201 mg/dL (10-08-22 @ 21:13)  POCT Blood Glucose.: 230 mg/dL (10-08-22 @ 16:26)  POCT Blood Glucose.: 131 mg/dL (10-08-22 @ 11:02)  POCT Blood Glucose.: 120 mg/dL (10-08-22 @ 10:02)  POCT Blood Glucose.: 128 mg/dL (10-08-22 @ 08:11)  POCT Blood Glucose.: 136 mg/dL (10-08-22 @ 07:14)  POCT Blood Glucose.: 162 mg/dL (10-08-22 @ 06:31)  POCT Blood Glucose.: 205 mg/dL (10-08-22 @ 06:28)  POCT Blood Glucose.: 98 mg/dL (10-08-22 @ 06:01)  POCT Blood Glucose.: 103 mg/dL (10-08-22 @ 05:13)  POCT Blood Glucose.: 110 mg/dL (10-08-22 @ 04:12)  POCT Blood Glucose.: 134 mg/dL (10-08-22 @ 03:07)  POCT Blood Glucose.: 192 mg/dL (10-08-22 @ 02:04)  POCT Blood Glucose.: 233 mg/dL (10-08-22 @ 01:12)  POCT Blood Glucose.: 256 mg/dL (10-08-22 @ 00:17)  POCT Blood Glucose.: 299 mg/dL (10-07-22 @ 23:09)  POCT Blood Glucose.: 302 mg/dL (10-07-22 @ 22:20)  POCT Blood Glucose.: 166 mg/dL (10-07-22 @ 22:17)  POCT Blood Glucose.: 353 mg/dL (10-07-22 @ 21:32)  POCT Blood Glucose.: 368 mg/dL (10-07-22 @ 19:10)  POCT Blood Glucose.: 314 mg/dL (10-07-22 @ 17:00)                            9.2    5.84  )-----------( 334      ( 10 Oct 2022 05:52 )             29.3       10-10    137  |  104  |  20  ----------------------------<  178<H>  4.4   |  22  |  3.16<H>    eGFR: 21<L>    Ca    8.4      10-10  Mg     1.70     10-10  Phos  3.4     10-10        Thyroid Function Tests:      A1C with Estimated Average Glucose Result: 8.5 % (10-08-22 @ 06:33)          Radiology:                HPI:  61 y/o M with PMH of HTN, DM type II, HLD, BPH and PAD, 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 As per the patient he developed initially a scab on the bottom of his foot in August of this year and then progressively worsened to an ulcer. Patient stated that the ulcer is located between the 4th web space and recently fell off the bed and cut the adjacent skin for which he received stitched and PO Augmentin. Patient stated that the ulcer cunningham drain a clear liquid but denied any bleeding or purulent discharge. Patient also endorsed of chills for the past 2 weeks with associated body aches and lethargy. Patient was incidental found to be COVID positive on 09/19/22 and that time angiogram was cancelled. Patient otherwise denied any LLE foot pain, claudication or fevers. Patient denied any calf pain, CP, SOB, N/V/D/C, abdominal pain, dysuria, melena, hematochezia, recent travel, sick contact, cough, body aches, pleuritic or positional chest pain.    COVID PCR positive on 09/19/22   VASCULAR SURG ATT ADDENDUM  Pt presents  and states left foot wound w increase in size and odor  (07 Oct 2022 14:29)      Consulted for: Uncontrolled T2DM    Diabetes History:   Most recent A1c 8.5%  -Diagnosed 30 years ago  -Endocrinologist- None  -Current Regimen - Novolog 25-30 units qAC, no long acting  -Past Regimens - Metformin ER, didn't tolerate  -FS at home - uses Noel - generally > 200, sometimes 100s and < 70 when not eating much   -Diet - average  -Complications/Diabetes HCM - (+) retinopathy, (+) foot wound requiring amputation      PAST MEDICAL & SURGICAL HISTORY:  Diabetes  Type II      BPH (benign prostatic hypertrophy)      HTN (hypertension)      Venous insufficiency of both lower extremities  R &gt; L      Status post incision and drainage  Rt groin abscess      S/P laparoscopic cholecystectomy      History of cholecystectomy      S/P BKA (below knee amputation), right          FAMILY HISTORY:  Family history of diabetes mellitus (DM) (Grandparent)    Paternal family history of emphysema        Social History:  does not report illcitis    Home Medications:  Aspirin Enteric Coated 81 mg oral delayed release tablet: 1 tab(s) orally once a day (07 Oct 2022 14:16)  cilostazol 100 mg oral tablet: 1 tab(s) orally 2 times a day (07 Oct 2022 14:16)  Flomax 0.4 mg oral capsule: 1 cap(s) orally once a day (07 Oct 2022 14:16)  NovoLOG 100 units/mL injectable solution: 25 unit(s) injectable 3 times a day before each meal (07 Oct 2022 14:16)      MEDICATIONS  (STANDING):  amLODIPine   Tablet 10 milliGRAM(s) Oral daily  dextrose 5%. 1000 milliLiter(s) (50 mL/Hr) IV Continuous <Continuous>  dextrose 50% Injectable 25 Gram(s) IV Push once  dextrose 50% Injectable 25 Gram(s) IV Push once  gabapentin 300 milliGRAM(s) Oral daily  glucagon  Injectable 1 milliGRAM(s) IntraMuscular once  heparin   Injectable 5000 Unit(s) SubCutaneous every 8 hours  hydrALAZINE 10 milliGRAM(s) Oral daily  influenza   Vaccine 0.5 milliLiter(s) IntraMuscular once  insulin glargine Injectable (LANTUS) 25 Unit(s) SubCutaneous every morning  insulin lispro (ADMELOG) corrective regimen sliding scale   SubCutaneous three times a day before meals  insulin lispro (ADMELOG) corrective regimen sliding scale   SubCutaneous at bedtime  piperacillin/tazobactam IVPB.. 3.375 Gram(s) IV Intermittent every 8 hours  sodium chloride 0.9% lock flush 3 milliLiter(s) IV Push every 8 hours  tamsulosin 0.4 milliGRAM(s) Oral at bedtime    MEDICATIONS  (PRN):  acetaminophen     Tablet .. 975 milliGRAM(s) Oral every 6 hours PRN Mild Pain (1 - 3)  dextrose Oral Gel 15 Gram(s) Oral once PRN Blood Glucose LESS THAN 70 milliGRAM(s)/deciliter      Allergies    No Known Allergies    Intolerances      Review of Systems:  Constitutional: No fever  Eyes: No blurry vision  Neuro: No tremors  HEENT: No pain  Cardiovascular: No chest pain, palpitations  Respiratory: No SOB, no cough  GI: No nausea, vomiting, abdominal pain  : No dysuria  Skin: no rash  Psych: no depression  Endocrine: no polyuria, polydipsia  Hem/lymph: no swelling  Osteoporosis: no fractures    PHYSICAL EXAM:  VITALS: T(C): 36.3 (10-10-22 @ 12:00)  T(F): 97.3 (10-10-22 @ 12:00), Max: 98.6 (10-09-22 @ 20:00)  HR: 83 (10-10-22 @ 12:00) (64 - 83)  BP: 154/85 (10-10-22 @ 12:00) (144/77 - 176/96)  RR:  (17 - 23)  SpO2:  (98% - 100%)  Wt(kg): --  GENERAL: NAD  EYES: No proptosis, no lid lag, anicteric  THYROID: Normal size, no palpable nodules  RESPIRATORY: Clear to auscultation bilaterally  CARDIOVASCULAR: Regular rate and rhythm  GI: Soft, nontender, non distended  PSYCH: Alert and oriented x 3, reactive mood    POCT Blood Glucose.: 202 mg/dL (10-10-22 @ 11:47)  POCT Blood Glucose.: 169 mg/dL (10-10-22 @ 07:56)  POCT Blood Glucose.: 183 mg/dL (10-09-22 @ 22:26)  POCT Blood Glucose.: 149 mg/dL (10-09-22 @ 17:40)  POCT Blood Glucose.: 224 mg/dL (10-09-22 @ 11:46)  POCT Blood Glucose.: 222 mg/dL (10-09-22 @ 07:55)  POCT Blood Glucose.: 201 mg/dL (10-08-22 @ 21:13)  POCT Blood Glucose.: 230 mg/dL (10-08-22 @ 16:26)  POCT Blood Glucose.: 131 mg/dL (10-08-22 @ 11:02)  POCT Blood Glucose.: 120 mg/dL (10-08-22 @ 10:02)  POCT Blood Glucose.: 128 mg/dL (10-08-22 @ 08:11)  POCT Blood Glucose.: 136 mg/dL (10-08-22 @ 07:14)  POCT Blood Glucose.: 162 mg/dL (10-08-22 @ 06:31)  POCT Blood Glucose.: 205 mg/dL (10-08-22 @ 06:28)  POCT Blood Glucose.: 98 mg/dL (10-08-22 @ 06:01)  POCT Blood Glucose.: 103 mg/dL (10-08-22 @ 05:13)  POCT Blood Glucose.: 110 mg/dL (10-08-22 @ 04:12)  POCT Blood Glucose.: 134 mg/dL (10-08-22 @ 03:07)  POCT Blood Glucose.: 192 mg/dL (10-08-22 @ 02:04)  POCT Blood Glucose.: 233 mg/dL (10-08-22 @ 01:12)  POCT Blood Glucose.: 256 mg/dL (10-08-22 @ 00:17)  POCT Blood Glucose.: 299 mg/dL (10-07-22 @ 23:09)  POCT Blood Glucose.: 302 mg/dL (10-07-22 @ 22:20)  POCT Blood Glucose.: 166 mg/dL (10-07-22 @ 22:17)  POCT Blood Glucose.: 353 mg/dL (10-07-22 @ 21:32)  POCT Blood Glucose.: 368 mg/dL (10-07-22 @ 19:10)  POCT Blood Glucose.: 314 mg/dL (10-07-22 @ 17:00)                            9.2    5.84  )-----------( 334      ( 10 Oct 2022 05:52 )             29.3       10-10    137  |  104  |  20  ----------------------------<  178<H>  4.4   |  22  |  3.16<H>    eGFR: 21<L>    Ca    8.4      10-10  Mg     1.70     10-10  Phos  3.4     10-10        Thyroid Function Tests:      A1C with Estimated Average Glucose Result: 8.5 % (10-08-22 @ 06:33)          Radiology:

## 2022-10-10 NOTE — PROGRESS NOTE ADULT - ASSESSMENT
ASSESSMENT:  (1)Renal - CKD4 - likely multifactorial including component from diabetic nephropathy. Likely that the creatinine in the high 2s represents his current baseline. GFR ~20-25ml/min.  US right kidney no mass, no  hydro, left not visualized well   (2)Hyperkalemia- spurious labwork - severely hemolyzed specimen, improved   (3)CV - hypertensive - not on any antihypertensives at home  (4)ID - febrile illness - infected L foot - on IV Zosyn   (5)Vasc - PAD, s/p L BKA (10/7)    RECOMMEND:  (1)add  Coreg 12.5mg po bid for Bp control .  hold for sbp<130/hold for hr <55  (2)No IVF/No diuretics for now  (3)Abx for GFR 20-25ml/min - q8h Zosyn              Thank you for involving [facility] in this patient's care.    With warm regards    Shani Donnelly  [facility]  Office: [phone]             ASSESSMENT:  (1)Renal - CKD4 - likely multifactorial including component from diabetic nephropathy. Likely that the creatinine in the high 2s represents his current baseline. GFR ~20-25ml/min.  US right kidney no mass, no  hydro, left not visualized well   (2)Hyperkalemia- spurious labwork - severely hemolyzed specimen, improved   (3)CV - hypertensive - not on any antihypertensives at home , awaiting foe echo   (4)ID - febrile illness - infected L foot - on IV Zosyn   (5)Vasc - PAD, s/p L BKA (10/7)    RECOMMEND:  on [id number] mg  and  hydralazine 10 mg daily   (1)increase hydralazine to 10 mg tid  or switch   Coreg 6.25mg po bid for Bp control .  hold for sbp<130/hold for hr <55  (2)No IVF/No diuretics for now  (3)Abx for GFR 20-25ml/min - q8h Zosyn              Thank you for involving [facility] in this patient's care.    With warm regards    Shani Donnelly  [facility]  Office: [phone]

## 2022-10-10 NOTE — PROGRESS NOTE ADULT - SUBJECTIVE AND OBJECTIVE BOX
24 HOUR EVENTS:  - remains listed for floor   - F/u urine lytes, patient has not yet urinated      SUBJECTIVE/ROS:  [x] A ten-point review of systems was otherwise negative except as noted.  [ ] Due to altered mental status/intubation, subjective information were not able to be obtained from the patient. History was obtained, to the extent possible, from review of the chart and collateral sources of information.      NEURO  Exam:  A&O* 3  Meds: acetaminophen     Tablet .. 975 milliGRAM(s) Oral every 6 hours PRN Mild Pain (1 - 3)  gabapentin 300 milliGRAM(s) Oral daily  oxyCODONE    IR 5 milliGRAM(s) Oral every 4 hours PRN Moderate Pain (4 - 6)  oxyCODONE    IR 10 milliGRAM(s) Oral every 4 hours PRN Severe Pain (7 - 10)    [x] Adequacy of sedation and pain control has been assessed and adjusted      RESPIRATORY  RR: 17 (10-10-22 @ 00:00) (13 - 23)  SpO2: 99% (10-10-22 @ 00:00) (96% - 100%)  Wt(kg): --  Exam: unlabored, clear to auscultation bilaterally        CARDIOVASCULAR  HR: 64 (10-10-22 @ 00:00) (64 - 94)  BP: 144/77 (10-10-22 @ 00:00) (144/77 - 188/93)  BP(mean): 96 (10-10-22 @ 00:00) (96 - 127)  ABP: 186/84 (10-09-22 @ 05:00) (186/84 - 188/89)  ABP(mean): 119 (10-09-22 @ 05:00) (119 - 123)  Wt(kg): --  CVP(cm H2O): --      Exam:RRR  Cardiac Rhythm:sinus  Perfusion     [x]Adequate   [ ]Inadequate  Mentation   [x]Normal       [ ]Reduced  Extremities  [x]Warm         [ ]Cool  Volume Status [ ]Hypervolemic [x]Euvolemic [ ]Hypovolemic  Meds: amLODIPine   Tablet 10 milliGRAM(s) Oral daily  tamsulosin 0.4 milliGRAM(s) Oral at bedtime        GI/NUTRITION  Exam: soft, NTND  Diet: regular    GENITOURINARY  I&O's Detail    10-08 @ 07:01  -  10-09 @ 07:00  --------------------------------------------------------  IN:    dextrose 5% + lactated ringers: 225 mL    Insulin: 5.5 mL    IV PiggyBack: 200 mL  Total IN: 430.5 mL    OUT:    Voided (mL): 1100 mL  Total OUT: 1100 mL    Total NET: -669.5 mL      10-09 @ 07:01  -  10-10 @ 03:35  --------------------------------------------------------  IN:    Oral Fluid: 1000 mL  Total IN: 1000 mL    OUT:    Voided (mL): 1200 mL  Total OUT: 1200 mL    Total NET: -200 mL          10-09    134<L>  |  102  |  20  ----------------------------<  218<H>  4.0   |  21<L>  |  3.29<H>    Ca    8.1<L>      09 Oct 2022 01:00  Phos  3.7     10-09  Mg     1.80     10-09      [ ] Carreon catheter, indication: strict I&O  Meds: dextrose 5%. 1000 milliLiter(s) IV Continuous <Continuous>  sodium chloride 0.9% lock flush 3 milliLiter(s) IV Push every 8 hours        HEMATOLOGIC  Meds: heparin   Injectable 5000 Unit(s) SubCutaneous every 8 hours    [*] VTE Prophylaxis                        8.8    6.16  )-----------( 299      ( 09 Oct 2022 01:00 )             27.0           INFECTIOUS DISEASES  T(C): 36.4 (10-10-22 @ 00:00), Max: 37 (10-09-22 @ 20:00)  Wt(kg): --    Recent Cultures:    Meds: influenza   Vaccine 0.5 milliLiter(s) IntraMuscular once  piperacillin/tazobactam IVPB.. 3.375 Gram(s) IV Intermittent every 8 hours        ENDOCRINE  Capillary Blood Glucose    Meds: dextrose 50% Injectable 25 Gram(s) IV Push once  dextrose 50% Injectable 25 Gram(s) IV Push once  dextrose Oral Gel 15 Gram(s) Oral once PRN  glucagon  Injectable 1 milliGRAM(s) IntraMuscular once  insulin glargine Injectable (LANTUS) 25 Unit(s) SubCutaneous every morning  insulin lispro (ADMELOG) corrective regimen sliding scale   SubCutaneous three times a day before meals  insulin lispro (ADMELOG) corrective regimen sliding scale   SubCutaneous at bedtime        ACCESS DEVICES:  [x ] Peripheral IV  [ ] Central Venous Line	[ ] R	[ ] L	[ ] IJ	[ ] Fem	[ ] SC	Placed:   [ ] Arterial Line		[ ] R	[ ] L	[ ] Fem	[ ] Rad	[ ] Ax	Placed:   [ ] PICC:					[ ] Mediport  [ ] Urinary Catheter, Date Placed:   [x ] Necessity of urinary, arterial, and venous catheters discussed    OTHER MEDICATIONS:        IMAGING: 24 HOUR EVENTS:  - remains listed for floor   - F/u urine lytes, patient has not yet urinated  - Started CPAP at night  - Started hydral 10mg qday    SUBJECTIVE/ROS:  [x] A ten-point review of systems was otherwise negative except as noted.  [ ] Due to altered mental status/intubation, subjective information were not able to be obtained from the patient. History was obtained, to the extent possible, from review of the chart and collateral sources of information.      NEURO  Exam:  A&O* 3  Meds: acetaminophen     Tablet .. 975 milliGRAM(s) Oral every 6 hours PRN Mild Pain (1 - 3)  gabapentin 300 milliGRAM(s) Oral daily  oxyCODONE    IR 5 milliGRAM(s) Oral every 4 hours PRN Moderate Pain (4 - 6)  oxyCODONE    IR 10 milliGRAM(s) Oral every 4 hours PRN Severe Pain (7 - 10)    [x] Adequacy of sedation and pain control has been assessed and adjusted      RESPIRATORY  RR: 17 (10-10-22 @ 00:00) (13 - 23)  SpO2: 99% (10-10-22 @ 00:00) (96% - 100%)  Wt(kg): --  Exam: unlabored, clear to auscultation bilaterally        CARDIOVASCULAR  HR: 64 (10-10-22 @ 00:00) (64 - 94)  BP: 144/77 (10-10-22 @ 00:00) (144/77 - 188/93)  BP(mean): 96 (10-10-22 @ 00:00) (96 - 127)  ABP: 186/84 (10-09-22 @ 05:00) (186/84 - 188/89)  ABP(mean): 119 (10-09-22 @ 05:00) (119 - 123)  Wt(kg): --  CVP(cm H2O): --      Exam:RRR  Cardiac Rhythm:sinus  Perfusion     [x]Adequate   [ ]Inadequate  Mentation   [x]Normal       [ ]Reduced  Extremities  [x]Warm         [ ]Cool  Volume Status [ ]Hypervolemic [x]Euvolemic [ ]Hypovolemic  Meds: amLODIPine   Tablet 10 milliGRAM(s) Oral daily  tamsulosin 0.4 milliGRAM(s) Oral at bedtime        GI/NUTRITION  Exam: soft, NTND  Diet: regular    GENITOURINARY  I&O's Detail    10-08 @ 07:01  -  10-09 @ 07:00  --------------------------------------------------------  IN:    dextrose 5% + lactated ringers: 225 mL    Insulin: 5.5 mL    IV PiggyBack: 200 mL  Total IN: 430.5 mL    OUT:    Voided (mL): 1100 mL  Total OUT: 1100 mL    Total NET: -669.5 mL      10-09 @ 07:01  -  10-10 @ 03:35  --------------------------------------------------------  IN:    Oral Fluid: 1000 mL  Total IN: 1000 mL    OUT:    Voided (mL): 1200 mL  Total OUT: 1200 mL    Total NET: -200 mL          10-09    134<L>  |  102  |  20  ----------------------------<  218<H>  4.0   |  21<L>  |  3.29<H>    Ca    8.1<L>      09 Oct 2022 01:00  Phos  3.7     10-09  Mg     1.80     10-09      [ ] Carreon catheter, indication: strict I&O  Meds: dextrose 5%. 1000 milliLiter(s) IV Continuous <Continuous>  sodium chloride 0.9% lock flush 3 milliLiter(s) IV Push every 8 hours        HEMATOLOGIC  Meds: heparin   Injectable 5000 Unit(s) SubCutaneous every 8 hours    [*] VTE Prophylaxis                        8.8    6.16  )-----------( 299      ( 09 Oct 2022 01:00 )             27.0           INFECTIOUS DISEASES  T(C): 36.4 (10-10-22 @ 00:00), Max: 37 (10-09-22 @ 20:00)  Wt(kg): --    Recent Cultures:    Meds: influenza   Vaccine 0.5 milliLiter(s) IntraMuscular once  piperacillin/tazobactam IVPB.. 3.375 Gram(s) IV Intermittent every 8 hours        ENDOCRINE  Capillary Blood Glucose    Meds: dextrose 50% Injectable 25 Gram(s) IV Push once  dextrose 50% Injectable 25 Gram(s) IV Push once  dextrose Oral Gel 15 Gram(s) Oral once PRN  glucagon  Injectable 1 milliGRAM(s) IntraMuscular once  insulin glargine Injectable (LANTUS) 25 Unit(s) SubCutaneous every morning  insulin lispro (ADMELOG) corrective regimen sliding scale   SubCutaneous three times a day before meals  insulin lispro (ADMELOG) corrective regimen sliding scale   SubCutaneous at bedtime        ACCESS DEVICES:  [x ] Peripheral IV  [ ] Central Venous Line	[ ] R	[ ] L	[ ] IJ	[ ] Fem	[ ] SC	Placed:   [ ] Arterial Line		[ ] R	[ ] L	[ ] Fem	[ ] Rad	[ ] Ax	Placed:   [ ] PICC:					[ ] Mediport  [ ] Urinary Catheter, Date Placed:   [x ] Necessity of urinary, arterial, and venous catheters discussed    OTHER MEDICATIONS:        IMAGING:

## 2022-10-10 NOTE — PROGRESS NOTE ADULT - ASSESSMENT
61 y/o M with PMH of HTN, DM type II, HLD, BPH and PAD, Right BKA 2/2 to unhealed DM ulcer presented to [facility] for LLE peripheral angiogram. Patient now s/p L BKA for necrotic left foot ulcer w/ concern for gas gangrene. Insulin infusion was started interoperatively; patient given 12u and started on 5u/hr. Postoperative glucose of 344, measured in PACU. SICU consulted for management of insulin infusion.    PLAN:   Neurologic:   - AOx4    Respiratory:  - O2 > 92%     Cardiovascular:  - MAP > 65   - amlodipine 10mg qd    Gastrointestinal/Nutrition:   - regular cc diet    Renal/Genitourinary:  - Strict I&Os  - h/o CKD     Hematologic:  - Daily CBC  - SQH     Infectious Disease:  - Zosyn     Endocrine:  - off insulin gtt, s/p Lantus 25u qAM/ Mod ISS     Disposition:   - PT consult  - listed for floors      61 y/o M with PMH of HTN, DM type II, HLD, BPH and PAD, Right BKA 2/2 to unhealed DM ulcer presented to [facility] for LLE peripheral angiogram. Patient now s/p L BKA for necrotic left foot ulcer w/ concern for gas gangrene. Insulin infusion was started interoperatively; patient given 12u and started on 5u/hr. Postoperative glucose of 344, measured in PACU. SICU consulted for management of insulin infusion.    PLAN:   Neurologic:   - AOx4    Respiratory:  - O2 > 92%   - CPAP @night for likely KWABENA    Cardiovascular:  - MAP > 65   - amlodipine 10mg qd  - hydralazine 10mg qd    Gastrointestinal/Nutrition:   - regular cc diet    Renal/Genitourinary:  - Strict I&Os  - h/o CKD     Hematologic:  - Daily CBC  - SQH     Infectious Disease:  - Zosyn     Endocrine:  - off insulin gtt, s/p Lantus 25u qAM/ Mod ISS     Disposition:   - PT consult  - listed for floors

## 2022-10-10 NOTE — CONSULT NOTE ADULT - ATTENDING COMMENTS
Patient s/p bka for wet gangrene. Patient requiring insulin gtt postop for management of hyperglycemia.  N mentating well, pain control multimodal pain therapy  resp on room air, extubated doing well  cv hemodynamically stable  gi diet as tolerated  gu/renal monitor uop  heme vte ppx  id abx broad spectrum f/u cultures  endo insulin gtt, monitor fs per protocol    The patient is a critical care patient with life threatening hemodynamic and metabolic instability in SICU.  I have personally interviewed when possible and examined the patient, reviewed data and laboratory tests/x-rays and all pertinent electronic images.  I was physically present for the key portions of the evaluation and management (E/M) service provided.   The SICU team has a constant risk benefit analyzes discussion with the primary team, all consultants, House Staff and PA's on all decisions.  These diagnoses are unrelated to the surgical procedure noted above.  I meet with family if needed to get further history, discuss the case and make care decisions for this patient who might not be able to participate.  Time involved in performance of separately billable procedures was not counted toward my critical care time. There is no overlap.  I spent 55-75 minutes ( 0800Hrs-0915Hrs in AM/ 1600Hrs-1715Hrs in PM, or other time indicated) of critical care time for the diagnoses, assessment, plan and interventions.  This time excludes time spent on separate procedures and teaching.
Uncontrolled DM2ge.  Agree with basal/bolus plan as outlined, adjust as plan above   Endocrine team consulted for uncontrolled diabetes. Patient is high risk with high level decision making due to uncontrolled diabetes which places patient at high risk for cardiovascular and cerebrovascular events. Patient with lability of glucose requiring close monitoring and insulin adjustments.

## 2022-10-10 NOTE — PROGRESS NOTE ADULT - ASSESSMENT
61 y/o M with PMH of HTN, DM type II, HLD, BPH and PAD, Right BKA 2/2 to unhealed DM ulcer presented to [facility] for LLE peripheral angiogram. Patient now s/p L BKA for necrotic left foot ulcer w/ concern for gas gangrene. Insulin infusion was started intraoperatively; patient given 12u and started on 5u/hr.     Plan:  - Continue Zosyn  - Dressing change on POD#3  - OR planning for BKA formalization   - f/u Endo recs  - DVT ppx: SQH  - Appreciate SICU care       Vascular Surgery, C Team Surgery  P# [phone]

## 2022-10-10 NOTE — CONSULT NOTE ADULT - ASSESSMENT
63 y/o M with PMH of HTN, DM type II, HLD, BPH and PAD, Right BKA 2/2 to unhealed DM ulcer presented to [facility] for LLE peripheral angiogram, now s/p amputation    #Uncontrolled T2DM c/b foot wounds requiring amputation, retinopathy  A1c 8.5% Goal < 7%  Inpatient FS goal 140-180  Home regimen: Novolog 20-35 units qAC  Was on insulin gtt postoperatively for hyperglycemia, now transitioned off gtt (transitioned to 25 units of Lantus qAM, gtt requirements with 35 units of Lantus)     Recs:   -Continue Lantus 25 units every morning   -Admelog __ units qAC. HOLD if NPO  -low dose correctional scale before each meal and low dose correctional scale at bedtime  -consistent carb diet  -FS qAC and qHS    For d/c:   -basal/bolus, final regimen TBD  -Did not tolerate Metformin  mg as an outpatient. Patient states possibly hx of pancreatitis, also with retinopathy- unclear if active as patient has not seen ophtho in a while. Will defer GLP1 agonist. Hold off on SGLT2 inhibitors given recent amputation  -Can fu with Endocrine Practice at [address] [place] [address], [address], [place] [address]; Ph # [phone]    #HTN  BP Goal < 130/80  Continue management per primary team    #HLD  LDL goal < 70  Statin if no contraindications    Catherine Dumont MD  Endocrine Fellow  Can be reached via teams.     For all urgent matters, can call answering service at [phone] (weekdays); [phone] (nights/weekends)  Any non-urgent consults or questions can be emailed to [email or web address] or [email or web address]     63 y/o M with PMH of HTN, DM type II, HLD, BPH and PAD, Right BKA 2/2 to unhealed DM ulcer presented to [facility] for LLE peripheral angiogram, now s/p amputation    #Uncontrolled T2DM c/b foot wounds requiring amputation, retinopathy  A1c 8.5% Goal < 7%  Inpatient FS goal 140-180  Home regimen: Novolog 20-35 units qAC  Was on insulin gtt postoperatively for hyperglycemia, now transitioned off gtt (transitioned to 25 units of Lantus qAM, gtt requirements with 35 units of Lantus)     Recs:   -Continue Lantus 25 units every morning   -Admelog 6 units qAC. HOLD if NPO  -low dose correctional scale before each meal and low dose correctional scale at bedtime  -consistent carb diet  -FS qAC and qHS    For d/c:   -basal/bolus, final regimen TBD  -Did not tolerate Metformin  mg as an outpatient. Patient states possibly hx of pancreatitis, also with retinopathy- unclear if active as patient has not seen ophtho in a while. Will defer GLP1 agonist. Hold off on SGLT2 inhibitors given recent amputation  -Can fu with Endocrine Practice at [address] [facility], [address], [place] [address]; Ph # [phone]    #HTN  BP Goal < 130/80  Continue management per primary team    #HLD  LDL goal < 70  Statin if no contraindications    Catherine Dumont MD  Endocrine Fellow  Can be reached via teams.     For all urgent matters, can call answering service at [phone] (weekdays); [phone] (nights/weekends)  Any non-urgent consults or questions can be emailed to [email or web address] or [email or web address]

## 2022-10-10 NOTE — PROGRESS NOTE ADULT - SUBJECTIVE AND OBJECTIVE BOX
VASCULAR SURGERY PROGRESS NOTE    STATUS POST:    POST OPERATIVE DAY #:       SUBJECTIVE    OVERNIGHT EVENTS:  - weaned off insulin gtt, on Lantus 25 qAM  - amlodipine increased to 10    10-point review of systems completed and negative except as noted above.      OBJECTIVE    MEDICATIONS  acetaminophen     Tablet .. 975 milliGRAM(s) Oral every 6 hours PRN  amLODIPine   Tablet 10 milliGRAM(s) Oral daily  dextrose 5%. 1000 milliLiter(s) IV Continuous <Continuous>  dextrose 50% Injectable 25 Gram(s) IV Push once  dextrose 50% Injectable 25 Gram(s) IV Push once  dextrose Oral Gel 15 Gram(s) Oral once PRN  gabapentin 300 milliGRAM(s) Oral daily  glucagon  Injectable 1 milliGRAM(s) IntraMuscular once  heparin   Injectable 5000 Unit(s) SubCutaneous every 8 hours  influenza   Vaccine 0.5 milliLiter(s) IntraMuscular once  insulin glargine Injectable (LANTUS) 25 Unit(s) SubCutaneous every morning  insulin lispro (ADMELOG) corrective regimen sliding scale   SubCutaneous three times a day before meals  insulin lispro (ADMELOG) corrective regimen sliding scale   SubCutaneous at bedtime  oxyCODONE    IR 5 milliGRAM(s) Oral every 4 hours PRN  oxyCODONE    IR 10 milliGRAM(s) Oral every 4 hours PRN  piperacillin/tazobactam IVPB.. 3.375 Gram(s) IV Intermittent every 8 hours  sodium chloride 0.9% lock flush 3 milliLiter(s) IV Push every 8 hours  tamsulosin 0.4 milliGRAM(s) Oral at bedtime      PHYSICAL EXAM  T(C): 36.4 (10-10-22 @ 00:00), Max: 37 (10-09-22 @ 20:00)  HR: 64 (10-10-22 @ 00:00) (64 - 94)  BP: 144/77 (10-10-22 @ 00:00) (144/77 - 188/93)  RR: 17 (10-10-22 @ 00:00) (13 - 23)  SpO2: 99% (10-10-22 @ 00:00) (96% - 100%)    10-08-22 @ 07:01  -  10-09-22 @ 07:00  --------------------------------------------------------  IN: 430.5 mL / OUT: 1100 mL / NET: -669.5 mL    10-09-22 @ 07:01  -  10-10-22 @ 03:38  --------------------------------------------------------  IN: 1000 mL / OUT: 1200 mL / NET: -200 mL        General: well developed, well nourished, NAD  HEENT: NCAT, trachea midline  Respiratory: respirations non labored  Gastrointestinal: soft, nontender, nondistended  Extremities: LLE in KI with stump wrapped with xeroform, 4x4, abd pads, kerlix, coband, and ace; dressing c/d/i  Neurological: non-focal      LABS                        8.8    6.16  )-----------( 299      ( 09 Oct 2022 01:00 )             27.0     10-09    134<L>  |  102  |  20  ----------------------------<  218<H>  4.0   |  21<L>  |  3.29<H>    Ca    8.1<L>      09 Oct 2022 01:00  Phos  3.7     10-09  Mg     1.80     10-09            RADIOLOGY & ADDITIONAL STUDIES

## 2022-10-10 NOTE — PROGRESS NOTE ADULT - SUBJECTIVE AND OBJECTIVE BOX
NEPHROLOGY   seen and examined at  the bedside   no distress      MEDICATIONS  (STANDING):  dextrose 5%. 1000 milliLiter(s) (100 mL/Hr) IV Continuous <Continuous>  dextrose 5%. 1000 milliLiter(s) (50 mL/Hr) IV Continuous <Continuous>  dextrose 50% Injectable 25 Gram(s) IV Push once  dextrose 50% Injectable 25 Gram(s) IV Push once  gabapentin 300 milliGRAM(s) Oral daily  glucagon  Injectable 1 milliGRAM(s) IntraMuscular once  heparin   Injectable 5000 Unit(s) SubCutaneous every 8 hours  influenza   Vaccine 0.5 milliLiter(s) IntraMuscular once  insulin glargine Injectable (LANTUS) 25 Unit(s) SubCutaneous every morning  insulin lispro (ADMELOG) corrective regimen sliding scale   SubCutaneous three times a day before meals  insulin lispro (ADMELOG) corrective regimen sliding scale   SubCutaneous at bedtime  piperacillin/tazobactam IVPB.. 3.375 Gram(s) IV Intermittent every 8 hours  sodium chloride 0.9% lock flush 3 milliLiter(s) IV Push every 8 hours  tamsulosin 0.4 milliGRAM(s) Oral at bedtime    VITALS:  T(C): , Max: 37.1 (10-09-22 @ 00:00)  T(F): , Max: 98.7 (10-09-22 @ 00:00)  HR: 89 (10-09-22 @ 13:00)  BP: 172/97 (10-09-22 @ 13:00)  BP(mean): 119 (10-09-22 @ 13:00)  RR: 17 (10-09-22 @ 13:00)  SpO2: 96% (10-09-22 @ 13:00)    I and O's:    10-08 @ 07:01  -  10-09 @ 07:00  --------------------------------------------------------  IN: 430.5 mL / OUT: 1100 mL / NET: -669.5 mL    10-09 @ 07:01  -  10-09 @ 13:48  --------------------------------------------------------  IN: 0 mL / OUT: 475 mL / NET: -475 mL    PHYSICAL EXAM:  Constitutional: obese, NAD  HEENT: NCAT, MMM  Neck: Supple, No JVD  Respiratory: CTA-b/l  Cardiovascular: RRR s1s2, no m/r/g  Gastrointestinal: BS+, soft, NT/ND  Extremities: LLE  dressing in placed   Neurological: no focal deficits; strength grossly intact  Back: no CVAT b/l  Skin: L BKA, dressing in place     LABS:                        8.8    6.16  )-----------( 299      ( 09 Oct 2022 01:00 )             27.0     10-09    134<L>  |  102  |  20  ----------------------------<  218<H>  4.0   |  21<L>  |  3.29<H>    Ca    8.1<L>      09 Oct 2022 01:00  Phos  3.7     10-09  Mg     1.80     10-09

## 2022-10-11 DIAGNOSIS — Z02.9 ENCOUNTER FOR ADMINISTRATIVE EXAMINATIONS, UNSPECIFIED: ICD-10-CM

## 2022-10-11 LAB
ANION GAP SERPL CALC-SCNC: 10 MMOL/L — SIGNIFICANT CHANGE UP (ref 7–14)
APPEARANCE UR: ABNORMAL
BACTERIA # UR AUTO: ABNORMAL
BILIRUB UR-MCNC: NEGATIVE — SIGNIFICANT CHANGE UP
BUN SERPL-MCNC: 23 MG/DL — SIGNIFICANT CHANGE UP (ref 7–23)
CALCIUM SERPL-MCNC: 8.3 MG/DL — LOW (ref 8.4–10.5)
CHLORIDE SERPL-SCNC: 102 MMOL/L — SIGNIFICANT CHANGE UP (ref 98–107)
CO2 SERPL-SCNC: 24 MMOL/L — SIGNIFICANT CHANGE UP (ref 22–31)
COLOR SPEC: YELLOW — SIGNIFICANT CHANGE UP
CREAT ?TM UR-MCNC: 171 MG/DL — SIGNIFICANT CHANGE UP
CREAT SERPL-MCNC: 3.13 MG/DL — HIGH (ref 0.5–1.3)
DIFF PNL FLD: ABNORMAL
EGFR: 22 ML/MIN/1.73M2 — LOW
EPI CELLS # UR: 3 /HPF — SIGNIFICANT CHANGE UP (ref 0–5)
GLUCOSE BLDC GLUCOMTR-MCNC: 148 MG/DL — HIGH (ref 70–99)
GLUCOSE BLDC GLUCOMTR-MCNC: 165 MG/DL — HIGH (ref 70–99)
GLUCOSE BLDC GLUCOMTR-MCNC: 169 MG/DL — HIGH (ref 70–99)
GLUCOSE BLDC GLUCOMTR-MCNC: 210 MG/DL — HIGH (ref 70–99)
GLUCOSE SERPL-MCNC: 267 MG/DL — HIGH (ref 70–99)
GLUCOSE UR QL: ABNORMAL
HCT VFR BLD CALC: 24.9 % — LOW (ref 39–50)
HCT VFR BLD CALC: 26.4 % — LOW (ref 39–50)
HGB BLD-MCNC: 8 G/DL — LOW (ref 13–17)
HGB BLD-MCNC: 8.4 G/DL — LOW (ref 13–17)
HYALINE CASTS # UR AUTO: 1 /LPF — SIGNIFICANT CHANGE UP (ref 0–7)
KETONES UR-MCNC: NEGATIVE — SIGNIFICANT CHANGE UP
LEUKOCYTE ESTERASE UR-ACNC: NEGATIVE — SIGNIFICANT CHANGE UP
MAGNESIUM SERPL-MCNC: 2.1 MG/DL — SIGNIFICANT CHANGE UP (ref 1.6–2.6)
MCHC RBC-ENTMCNC: 29.6 PG — SIGNIFICANT CHANGE UP (ref 27–34)
MCHC RBC-ENTMCNC: 29.7 PG — SIGNIFICANT CHANGE UP (ref 27–34)
MCHC RBC-ENTMCNC: 31.8 GM/DL — LOW (ref 32–36)
MCHC RBC-ENTMCNC: 32.1 GM/DL — SIGNIFICANT CHANGE UP (ref 32–36)
MCV RBC AUTO: 92.2 FL — SIGNIFICANT CHANGE UP (ref 80–100)
MCV RBC AUTO: 93.3 FL — SIGNIFICANT CHANGE UP (ref 80–100)
NITRITE UR-MCNC: NEGATIVE — SIGNIFICANT CHANGE UP
NRBC # BLD: 0 /100 WBCS — SIGNIFICANT CHANGE UP (ref 0–0)
NRBC # BLD: 0 /100 WBCS — SIGNIFICANT CHANGE UP (ref 0–0)
NRBC # FLD: 0 K/UL — SIGNIFICANT CHANGE UP (ref 0–0)
NRBC # FLD: 0 K/UL — SIGNIFICANT CHANGE UP (ref 0–0)
PH UR: 6 — SIGNIFICANT CHANGE UP (ref 5–8)
PHOSPHATE SERPL-MCNC: 2.9 MG/DL — SIGNIFICANT CHANGE UP (ref 2.5–4.5)
PLATELET # BLD AUTO: 302 K/UL — SIGNIFICANT CHANGE UP (ref 150–400)
PLATELET # BLD AUTO: 337 K/UL — SIGNIFICANT CHANGE UP (ref 150–400)
POTASSIUM SERPL-MCNC: 4.3 MMOL/L — SIGNIFICANT CHANGE UP (ref 3.5–5.3)
POTASSIUM SERPL-SCNC: 4.3 MMOL/L — SIGNIFICANT CHANGE UP (ref 3.5–5.3)
PROT ?TM UR-MCNC: 329 MG/DL — SIGNIFICANT CHANGE UP
PROT UR-MCNC: ABNORMAL
PROT/CREAT UR-RTO: 1.9 RATIO — HIGH (ref 0–0.2)
RBC # BLD: 2.7 M/UL — LOW (ref 4.2–5.8)
RBC # BLD: 2.83 M/UL — LOW (ref 4.2–5.8)
RBC # FLD: 13 % — SIGNIFICANT CHANGE UP (ref 10.3–14.5)
RBC # FLD: 13.1 % — SIGNIFICANT CHANGE UP (ref 10.3–14.5)
RBC CASTS # UR COMP ASSIST: 11 /HPF — HIGH (ref 0–4)
SODIUM SERPL-SCNC: 136 MMOL/L — SIGNIFICANT CHANGE UP (ref 135–145)
SP GR SPEC: 1.02 — SIGNIFICANT CHANGE UP (ref 1.01–1.05)
UROBILINOGEN FLD QL: ABNORMAL
WBC # BLD: 4.62 K/UL — SIGNIFICANT CHANGE UP (ref 3.8–10.5)
WBC # BLD: 5.01 K/UL — SIGNIFICANT CHANGE UP (ref 3.8–10.5)
WBC # FLD AUTO: 4.62 K/UL — SIGNIFICANT CHANGE UP (ref 3.8–10.5)
WBC # FLD AUTO: 5.01 K/UL — SIGNIFICANT CHANGE UP (ref 3.8–10.5)
WBC UR QL: 2 /HPF — SIGNIFICANT CHANGE UP (ref 0–5)

## 2022-10-11 PROCEDURE — 99232 SBSQ HOSP IP/OBS MODERATE 35: CPT | Mod: 25

## 2022-10-11 PROCEDURE — 99232 SBSQ HOSP IP/OBS MODERATE 35: CPT

## 2022-10-11 PROCEDURE — 93306 TTE W/DOPPLER COMPLETE: CPT | Mod: 26

## 2022-10-11 RX ORDER — HYDRALAZINE HCL 50 MG
25 TABLET ORAL DAILY
Refills: 0 | Status: DISCONTINUED | OUTPATIENT
Start: 2022-10-11 | End: 2022-10-13

## 2022-10-11 RX ORDER — INSULIN GLARGINE 100 [IU]/ML
30 INJECTION, SOLUTION SUBCUTANEOUS
Refills: 0 | Status: DISCONTINUED | OUTPATIENT
Start: 2022-10-12 | End: 2022-10-13

## 2022-10-11 RX ADMIN — Medication 6 UNIT(S): at 17:09

## 2022-10-11 RX ADMIN — SODIUM CHLORIDE 3 MILLILITER(S): 9 INJECTION INTRAMUSCULAR; INTRAVENOUS; SUBCUTANEOUS at 21:53

## 2022-10-11 RX ADMIN — HEPARIN SODIUM 5000 UNIT(S): 5000 INJECTION INTRAVENOUS; SUBCUTANEOUS at 14:02

## 2022-10-11 RX ADMIN — SODIUM CHLORIDE 3 MILLILITER(S): 9 INJECTION INTRAMUSCULAR; INTRAVENOUS; SUBCUTANEOUS at 06:58

## 2022-10-11 RX ADMIN — SODIUM CHLORIDE 3 MILLILITER(S): 9 INJECTION INTRAMUSCULAR; INTRAVENOUS; SUBCUTANEOUS at 11:24

## 2022-10-11 RX ADMIN — PIPERACILLIN AND TAZOBACTAM 25 GRAM(S): 4; .5 INJECTION, POWDER, LYOPHILIZED, FOR SOLUTION INTRAVENOUS at 05:05

## 2022-10-11 RX ADMIN — TAMSULOSIN HYDROCHLORIDE 0.4 MILLIGRAM(S): 0.4 CAPSULE ORAL at 21:43

## 2022-10-11 RX ADMIN — Medication 1: at 11:18

## 2022-10-11 RX ADMIN — Medication 10 MILLIGRAM(S): at 05:05

## 2022-10-11 RX ADMIN — Medication 6 UNIT(S): at 08:16

## 2022-10-11 RX ADMIN — GABAPENTIN 300 MILLIGRAM(S): 400 CAPSULE ORAL at 11:17

## 2022-10-11 RX ADMIN — Medication 2: at 08:15

## 2022-10-11 RX ADMIN — HEPARIN SODIUM 5000 UNIT(S): 5000 INJECTION INTRAVENOUS; SUBCUTANEOUS at 21:43

## 2022-10-11 RX ADMIN — PIPERACILLIN AND TAZOBACTAM 25 GRAM(S): 4; .5 INJECTION, POWDER, LYOPHILIZED, FOR SOLUTION INTRAVENOUS at 21:43

## 2022-10-11 RX ADMIN — Medication 25 MILLIGRAM(S): at 11:17

## 2022-10-11 RX ADMIN — INSULIN GLARGINE 25 UNIT(S): 100 INJECTION, SOLUTION SUBCUTANEOUS at 08:15

## 2022-10-11 RX ADMIN — AMLODIPINE BESYLATE 10 MILLIGRAM(S): 2.5 TABLET ORAL at 05:05

## 2022-10-11 RX ADMIN — Medication 6 UNIT(S): at 11:18

## 2022-10-11 RX ADMIN — PIPERACILLIN AND TAZOBACTAM 25 GRAM(S): 4; .5 INJECTION, POWDER, LYOPHILIZED, FOR SOLUTION INTRAVENOUS at 14:02

## 2022-10-11 RX ADMIN — HEPARIN SODIUM 5000 UNIT(S): 5000 INJECTION INTRAVENOUS; SUBCUTANEOUS at 05:05

## 2022-10-11 NOTE — PROGRESS NOTE ADULT - SUBJECTIVE AND OBJECTIVE BOX
Overnight events noted      VITAL:  T(C): , Max: 36.6 (10-10-22 @ 16:00)  T(F): , Max: 97.9 (10-10-22 @ 16:00)  HR: 64 (10-11-22 @ 05:10)  BP: 164/83 (10-11-22 @ 04:00)  BP(mean): 106 (10-11-22 @ 04:00)  RR: 16 (10-11-22 @ 04:00)  SpO2: 100% (10-11-22 @ 05:10)  Wt(kg): --      PHYSICAL EXAM:  deferred in accordance with current standards limiting patient contact      LABS:                        8.0    4.62  )-----------( 302      ( 11 Oct 2022 01:30 )             24.9     Na(136)/K(4.3)/Cl(102)/HCO3(24)/BUN(23)/Cr(3.13)Glu(267)/Ca(8.3)/Mg(2.10)/PO4(2.9)    10-11 @ 01:30  Na(137)/K(4.4)/Cl(104)/HCO3(22)/BUN(20)/Cr(3.16)Glu(178)/Ca(8.4)/Mg(1.70)/PO4(3.4)    10-10 @ 05:52  Na(134)/K(4.0)/Cl(102)/HCO3(21)/BUN(20)/Cr(3.29)Glu(218)/Ca(8.1)/Mg(1.80)/PO4(3.7)    10-09 @ 01:00      Sodium, Random Urine: 67 mmol/L (10-10 @ 04:11)  Potassium, Random Urine: 29.9 mmol/L (10-10 @ 04:11)  Chloride, Random Urine: 34 mmol/L (10-10 @ 04:11)  Creatinine, Random Urine: 224 mg/dL (10-10 @ 04:11)    IMAGING:  < from: US Kidney and Bladder (10.09.22 @ 15:51) >  Right kidney: 12.6 cm. The kidney is inferiorly located. No renal mass,   hydronephrosis or calculi.  Left kidney: Not well visualized.  Urinary bladder: Within normal limits.      IMPRESSION: 62M w/ HTN, DM2, CKD, and PAD-R BKA, 10/7/22 p/w necrotic L foot ulcer; s/p L-BKA 10/7/22; now being planned for BKA formalization  (1)Renal - CKD4 - GFR ~20-25ml/min - multifactorial - likely a component from diabetic nephropathy. Stable creatinine in low 3s over past few days, corresponding to GFR ~20-25ml/min  (2)Lytes - acceptable  (3)CV - hypertensive - I see no contraindication to adding ACEI/ARB at this point  (4)Anemia - CKD-associated?  (5)ID - necrotic L foot ulcer; s/p BKA; on IV Zosyn  (6)Vasc - awaiting L BKA formalization    RECOMMEND:  (1)Urine: UA, protein/creatinine ratio  (2)Serum: Iron studies   (3)Can add low-dose ACEI vs ARB at this point  (4)Dose new meds for GFR 20-25ml/min      Olegario Chavez MD  [facility]  Office: [phone]  Cell: [phone]       No pain, no sob  S/p BM this a.m.      VITAL:  T(C): , Max: 36.6 (10-10-22 @ 16:00)  T(F): , Max: 97.9 (10-10-22 @ 16:00)  HR: 64 (10-11-22 @ 05:10)  BP: 164/83 (10-11-22 @ 04:00)  BP(mean): 106 (10-11-22 @ 04:00)  RR: 16 (10-11-22 @ 04:00)  SpO2: 100% (10-11-22 @ 05:10)  urine output 1600cc/24h      PHYSICAL EXAM:  Constitutional: obese, NAD  HEENT: NCAT, MMM  Neck: Supple, No JVD  Respiratory: CTA-b/l  Cardiovascular: RRR s1s2, no m/r/g  Gastrointestinal: BS+, soft, NT/ND  Extremities: b/l BKA; LLE dressed  Neurological: no focal deficits; strength grossly intact  Back: no CVAT b/l      LABS:                        8.0    4.62  )-----------( 302      ( 11 Oct 2022 01:30 )             24.9     Na(136)/K(4.3)/Cl(102)/HCO3(24)/BUN(23)/Cr(3.13)Glu(267)/Ca(8.3)/Mg(2.10)/PO4(2.9)    10-11 @ 01:30  Na(137)/K(4.4)/Cl(104)/HCO3(22)/BUN(20)/Cr(3.16)Glu(178)/Ca(8.4)/Mg(1.70)/PO4(3.4)    10-10 @ 05:52  Na(134)/K(4.0)/Cl(102)/HCO3(21)/BUN(20)/Cr(3.29)Glu(218)/Ca(8.1)/Mg(1.80)/PO4(3.7)    10-09 @ 01:00      Sodium, Random Urine: 67 mmol/L (10-10 @ 04:11)  Potassium, Random Urine: 29.9 mmol/L (10-10 @ 04:11)  Chloride, Random Urine: 34 mmol/L (10-10 @ 04:11)  Creatinine, Random Urine: 224 mg/dL (10-10 @ 04:11)    IMAGING:  < from: US Kidney and Bladder (10.09.22 @ 15:51) >  Right kidney: 12.6 cm. The kidney is inferiorly located. No renal mass,   hydronephrosis or calculi.  Left kidney: Not well visualized.  Urinary bladder: Within normal limits.      IMPRESSION: 62M w/ HTN, DM2, CKD, and PAD-R BKA, 10/7/22 p/w necrotic L foot ulcer; s/p L-BKA 10/7/22; now being planned for BKA formalization  (1)Renal - CKD4 - GFR ~20-25ml/min - multifactorial - likely a component from diabetic nephropathy. Stable creatinine in low 3s over past few days, corresponding to GFR ~20-25ml/min  (2)Lytes - acceptable  (3)CV - hypertensive - I see no contraindication to adding ACEI/ARB at this point  (4)Anemia - CKD-associated?  (5)ID - necrotic L foot ulcer; s/p BKA; on IV Zosyn  (6)Vasc - awaiting L BKA formalization    RECOMMEND:  (1)Urine: UA, protein/creatinine ratio  (2)Serum: Iron studies   (3)Can add low-dose ACEI vs ARB at this point  (4)Dose new meds for GFR 20-25ml/min      Olegario Chavez MD  [facility]  Office: [phone]  Cell: [phone]

## 2022-10-11 NOTE — PROGRESS NOTE ADULT - SUBJECTIVE AND OBJECTIVE BOX
Chief Complaint: DM 2    History: Patient seen at bedside in SICU. Reports he is eating meals, denies n/v, denies s/s of hypoglycemia   Noted with hyperglycemia this AM, now resolved to 165 mg/dl   Patient reports he was only taking Novolog at home (25-30 units before meals), no basal insulin. Eats 2 meals per day so typically takes Novolog twice  Patient reporting difficulty caring for himself at home. Has been ordering food out a lot, cannot get up to prepare fresh meals and wife is unable to help. Limited resources for support  Would like to followup with Health system Endocrinology - would prefer [facility] [place]    MEDICATIONS  (STANDING):  amLODIPine   Tablet 10 milliGRAM(s) Oral daily  gabapentin 300 milliGRAM(s) Oral daily  heparin   Injectable 5000 Unit(s) SubCutaneous every 8 hours  hydrALAZINE 25 milliGRAM(s) Oral daily  influenza   Vaccine 0.5 milliLiter(s) IntraMuscular once  insulin glargine Injectable (LANTUS) 25 Unit(s) SubCutaneous every morning  insulin lispro (ADMELOG) corrective regimen sliding scale   SubCutaneous three times a day before meals  insulin lispro (ADMELOG) corrective regimen sliding scale   SubCutaneous at bedtime  insulin lispro Injectable (ADMELOG) 6 Unit(s) SubCutaneous three times a day before meals  piperacillin/tazobactam IVPB.. 3.375 Gram(s) IV Intermittent every 8 hours  polyethylene glycol 3350 17 Gram(s) Oral daily  sodium chloride 0.9% lock flush 3 milliLiter(s) IV Push every 8 hours  tamsulosin 0.4 milliGRAM(s) Oral at bedtime    MEDICATIONS  (PRN):  acetaminophen     Tablet .. 975 milliGRAM(s) Oral every 6 hours PRN Mild Pain (1 - 3)    No Known Allergies    Review of Systems:  Cardiovascular: No chest pain  Respiratory: No SOB  GI: No nausea, vomiting  Endocrine: no hypoglycemia     PHYSICAL EXAM:  VITALS: T(C): 36.7 (10-11-22 @ 12:00)  T(F): 98 (10-11-22 @ 12:00), Max: 98 (10-11-22 @ 12:00)  HR: 85 (10-11-22 @ 12:00) (64 - 85)  BP: 130/68 (10-11-22 @ 12:00) (130/68 - 170/91)  RR:  (13 - 17)  SpO2:  (97% - 100%)  Wt(kg): --  GENERAL: NAD  EYES: No proptosis, no lid lag, anicteric  HEENT:  Atraumatic, Normocephalic, moist mucous membranes  RESPIRATORY: unlabored respirations     CAPILLARY BLOOD GLUCOSE    POCT Blood Glucose.: 165 mg/dL (11 Oct 2022 11:16)  POCT Blood Glucose.: 210 mg/dL (11 Oct 2022 08:14)  POCT Blood Glucose.: 307 mg/dL (10 Oct 2022 22:21)  POCT Blood Glucose.: 264 mg/dL (10 Oct 2022 16:14)      10-11    136  |  102  |  23  ----------------------------<  267<H>  4.3   |  24  |  3.13<H>    eGFR: 22<L>    Ca    8.3<L>      10-11  Mg     2.10     10-11  Phos  2.9     10-11        A1C with Estimated Average Glucose Result: 8.5 % (10-08-22 @ 06:33)    Diet, Consistent Carbohydrate w/Evening Snack (10-08-22 @ 06:53) [Active]

## 2022-10-11 NOTE — PROGRESS NOTE ADULT - SUBJECTIVE AND OBJECTIVE BOX
SICU Daily Progress Note  =====================================================  Interval/Overnight Events:       - Added hydralazine 10mg qday  - CPAP @night for possible KWABENA  - Cards consulted for AKA clearance  - endo consulted for management of DM      PAST MEDICAL & SURGICAL HISTORY:  Diabetes Type II  BPH (benign prostatic hypertrophy)  HTN (hypertension)  Venous insufficiency of both lower extremities  Status post incision and drainage Rt groin abscess  S/P laparoscopic cholecystectomy  S/P BKA (below knee amputation), right      ALLERGIES:  No Known Allergies      --------------------------------------------------------------------------------------    MEDICATIONS:    Neurologic Medications  acetaminophen     Tablet .. 975 milliGRAM(s) Oral every 6 hours PRN Mild Pain (1 - 3)  gabapentin 300 milliGRAM(s) Oral daily    Respiratory Medications    Cardiovascular Medications  amLODIPine   Tablet 10 milliGRAM(s) Oral daily  hydrALAZINE 10 milliGRAM(s) Oral daily  tamsulosin 0.4 milliGRAM(s) Oral at bedtime    Gastrointestinal Medications  dextrose 5%. 1000 milliLiter(s) IV Continuous <Continuous>  polyethylene glycol 3350 17 Gram(s) Oral daily  sodium chloride 0.9% lock flush 3 milliLiter(s) IV Push every 8 hours    Genitourinary Medications    Hematologic/Oncologic Medications  heparin   Injectable 5000 Unit(s) SubCutaneous every 8 hours  influenza   Vaccine 0.5 milliLiter(s) IntraMuscular once    Antimicrobial/Immunologic Medications  piperacillin/tazobactam IVPB.. 3.375 Gram(s) IV Intermittent every 8 hours    Endocrine/Metabolic Medications  dextrose 50% Injectable 25 Gram(s) IV Push once  dextrose 50% Injectable 25 Gram(s) IV Push once  dextrose Oral Gel 15 Gram(s) Oral once PRN Blood Glucose LESS THAN 70 milliGRAM(s)/deciliter  glucagon  Injectable 1 milliGRAM(s) IntraMuscular once  insulin glargine Injectable (LANTUS) 25 Unit(s) SubCutaneous every morning  insulin lispro (ADMELOG) corrective regimen sliding scale   SubCutaneous three times a day before meals  insulin lispro (ADMELOG) corrective regimen sliding scale   SubCutaneous at bedtime  insulin lispro Injectable (ADMELOG) 6 Unit(s) SubCutaneous three times a day before meals    Topical/Other Medications    --------------------------------------------------------------------------------------    VITAL SIGNS:  ICU Vital Signs Last 24 Hrs  T(C): 36.6 (11 Oct 2022 00:00), Max: 36.7 (10 Oct 2022 04:00)  T(F): 97.9 (11 Oct 2022 00:00), Max: 98.1 (10 Oct 2022 04:00)  HR: 68 (11 Oct 2022 00:00) (64 - 83)  BP: 163/84 (10 Oct 2022 20:00) (149/86 - 163/84)  BP(mean): 104 (10 Oct 2022 20:00) (103 - 110)  RR: 17 (11 Oct 2022 00:00) (13 - 21)  SpO2: 97% (11 Oct 2022 00:00) (97% - 100%)    O2 Parameters below as of 11 Oct 2022 00:00  Patient On (Oxygen Delivery Method): room air    --------------------------------------------------------------------------------------    INS AND OUTS:  I&O's Detail    09 Oct 2022 07:01  -  10 Oct 2022 07:00  --------------------------------------------------------  IN:    IV PiggyBack: 100 mL    Oral Fluid: 1000 mL  Total IN: 1100 mL    OUT:    Voided (mL): 1450 mL  Total OUT: 1450 mL    Total NET: -350 mL      10 Oct 2022 07:01  -  11 Oct 2022 00:25  --------------------------------------------------------  IN:    IV PiggyBack: 250 mL    Oral Fluid: 480 mL  Total IN: 730 mL    OUT:    Voided (mL): 1350 mL  Total OUT: 1350 mL    Total NET: -620 mL        --------------------------------------------------------------------------------------    EXAM  NEUROLOGY  Exam: Normal, in no acute distress.  Alert and oriented x4.  No focal neurologic deficits.     RESPIRATORY  Exam:  Normal expansion/effort. No signs of respiratory distress.     CARDIOVASCULAR  Exam: S1, S2.  Regular rate and rhythm on monitor.     GI/NUTRITION  Exam: Abdomen soft, Non-tender, Non-distended.    Current Diet: Regular CC    VASCULAR  Exam: right BKA. left stump with dressing in place, c/d/i.         METABOLIC / FLUIDS / ELECTROLYTES  dextrose 5%. 1000 milliLiter(s) IV Continuous <Continuous>  sodium chloride 0.9% lock flush 3 milliLiter(s) IV Push every 8 hours      HEMATOLOGIC  [x] VTE Prophylaxis: heparin   Injectable 5000 Unit(s) SubCutaneous every 8 hours      INFECTIOUS DISEASE  Antimicrobials/Immunologic Medications:  influenza   Vaccine 0.5 milliLiter(s) IntraMuscular once  piperacillin/tazobactam IVPB.. 3.375 Gram(s) IV Intermittent every 8 hours      TUBES / LINES / DRAINS    [x] Peripheral IV  [] Central Venous Line     	[] R	[] L	[] IJ	[] Fem	[] SC	Date Placed:   [] Arterial Line		[] R	[] L	[] Fem	[] Rad	[] Ax	Date Placed:   [] PICC		[] Midline		[] Mediport  [] Urinary Catheter		Date Placed:   [x] Necessity of urinary, arterial, and venous catheters discussed    --------------------------------------------------------------------------------------    LABS    ((Insert SICU Labs here))***  --------------------------------------------------------------------------------------     SICU Daily Progress Note  =====================================================  Interval/Overnight Events:       - Added hydralazine 10mg qday yesterday   - CPAP @night for possible KWABENA  - Cards consulted yesterday for AKA clearance  - endo consulted yesterday for management of DM      PAST MEDICAL & SURGICAL HISTORY:  Diabetes Type II  BPH (benign prostatic hypertrophy)  HTN (hypertension)  Venous insufficiency of both lower extremities  Status post incision and drainage Rt groin abscess  S/P laparoscopic cholecystectomy  S/P BKA (below knee amputation), right      ALLERGIES:  No Known Allergies      --------------------------------------------------------------------------------------    MEDICATIONS:    Neurologic Medications  acetaminophen     Tablet .. 975 milliGRAM(s) Oral every 6 hours PRN Mild Pain (1 - 3)  gabapentin 300 milliGRAM(s) Oral daily    Respiratory Medications    Cardiovascular Medications  amLODIPine   Tablet 10 milliGRAM(s) Oral daily  hydrALAZINE 10 milliGRAM(s) Oral daily  tamsulosin 0.4 milliGRAM(s) Oral at bedtime    Gastrointestinal Medications  dextrose 5%. 1000 milliLiter(s) IV Continuous <Continuous>  polyethylene glycol 3350 17 Gram(s) Oral daily  sodium chloride 0.9% lock flush 3 milliLiter(s) IV Push every 8 hours    Genitourinary Medications    Hematologic/Oncologic Medications  heparin   Injectable 5000 Unit(s) SubCutaneous every 8 hours  influenza   Vaccine 0.5 milliLiter(s) IntraMuscular once    Antimicrobial/Immunologic Medications  piperacillin/tazobactam IVPB.. 3.375 Gram(s) IV Intermittent every 8 hours    Endocrine/Metabolic Medications  dextrose 50% Injectable 25 Gram(s) IV Push once  dextrose 50% Injectable 25 Gram(s) IV Push once  dextrose Oral Gel 15 Gram(s) Oral once PRN Blood Glucose LESS THAN 70 milliGRAM(s)/deciliter  glucagon  Injectable 1 milliGRAM(s) IntraMuscular once  insulin glargine Injectable (LANTUS) 25 Unit(s) SubCutaneous every morning  insulin lispro (ADMELOG) corrective regimen sliding scale   SubCutaneous three times a day before meals  insulin lispro (ADMELOG) corrective regimen sliding scale   SubCutaneous at bedtime  insulin lispro Injectable (ADMELOG) 6 Unit(s) SubCutaneous three times a day before meals    Topical/Other Medications    --------------------------------------------------------------------------------------    VITAL SIGNS:  ICU Vital Signs Last 24 Hrs  T(C): 36.6 (11 Oct 2022 00:00), Max: 36.7 (10 Oct 2022 04:00)  T(F): 97.9 (11 Oct 2022 00:00), Max: 98.1 (10 Oct 2022 04:00)  HR: 68 (11 Oct 2022 00:00) (64 - 83)  BP: 163/84 (10 Oct 2022 20:00) (149/86 - 163/84)  BP(mean): 104 (10 Oct 2022 20:00) (103 - 110)  RR: 17 (11 Oct 2022 00:00) (13 - 21)  SpO2: 97% (11 Oct 2022 00:00) (97% - 100%)    O2 Parameters below as of 11 Oct 2022 00:00  Patient On (Oxygen Delivery Method): room air    --------------------------------------------------------------------------------------    INS AND OUTS:  I&O's Detail    09 Oct 2022 07:01  -  10 Oct 2022 07:00  --------------------------------------------------------  IN:    IV PiggyBack: 100 mL    Oral Fluid: 1000 mL  Total IN: 1100 mL    OUT:    Voided (mL): 1450 mL  Total OUT: 1450 mL    Total NET: -350 mL      10 Oct 2022 07:01  -  11 Oct 2022 00:25  --------------------------------------------------------  IN:    IV PiggyBack: 250 mL    Oral Fluid: 480 mL  Total IN: 730 mL    OUT:    Voided (mL): 1350 mL  Total OUT: 1350 mL    Total NET: -620 mL        --------------------------------------------------------------------------------------    EXAM  NEUROLOGY  Exam: Normal, in no acute distress.  Alert and oriented x4.  No focal neurologic deficits.     RESPIRATORY  Exam:  Normal expansion/effort. No signs of respiratory distress.     CARDIOVASCULAR  Exam: S1, S2.  Regular rate and rhythm on monitor.     GI/NUTRITION  Exam: Abdomen soft, Non-tender, Non-distended.    Current Diet: Regular CC    VASCULAR  Exam: right BKA. left stump with dressing in place, c/d/i.         METABOLIC / FLUIDS / ELECTROLYTES  dextrose 5%. 1000 milliLiter(s) IV Continuous <Continuous>  sodium chloride 0.9% lock flush 3 milliLiter(s) IV Push every 8 hours      HEMATOLOGIC  [x] VTE Prophylaxis: heparin   Injectable 5000 Unit(s) SubCutaneous every 8 hours      INFECTIOUS DISEASE  Antimicrobials/Immunologic Medications:  influenza   Vaccine 0.5 milliLiter(s) IntraMuscular once  piperacillin/tazobactam IVPB.. 3.375 Gram(s) IV Intermittent every 8 hours      TUBES / LINES / DRAINS    [x] Peripheral IV  [] Central Venous Line     	[] R	[] L	[] IJ	[] Fem	[] SC	Date Placed:   [] Arterial Line		[] R	[] L	[] Fem	[] Rad	[] Ax	Date Placed:   [] PICC		[] Midline		[] Mediport  [] Urinary Catheter		Date Placed:   [x] Necessity of urinary, arterial, and venous catheters discussed    --------------------------------------------------------------------------------------    LABS    ((Insert SICU Labs here))***  --------------------------------------------------------------------------------------     SICU Daily Progress Note  =====================================================  Interval/Overnight Events:       - Added hydralazine 10mg qday yesterday   - CPAP @night for possible KWABENA  - Cards consulted for AKA clearance  - endo consulted for management of DM      PAST MEDICAL & SURGICAL HISTORY:  Diabetes Type II  BPH (benign prostatic hypertrophy)  HTN (hypertension)  Venous insufficiency of both lower extremities  Status post incision and drainage Rt groin abscess  S/P laparoscopic cholecystectomy  S/P BKA (below knee amputation), right      ALLERGIES:  No Known Allergies      --------------------------------------------------------------------------------------    MEDICATIONS:    Neurologic Medications  acetaminophen     Tablet .. 975 milliGRAM(s) Oral every 6 hours PRN Mild Pain (1 - 3)  gabapentin 300 milliGRAM(s) Oral daily    Respiratory Medications    Cardiovascular Medications  amLODIPine   Tablet 10 milliGRAM(s) Oral daily  hydrALAZINE 10 milliGRAM(s) Oral daily  tamsulosin 0.4 milliGRAM(s) Oral at bedtime    Gastrointestinal Medications  dextrose 5%. 1000 milliLiter(s) IV Continuous <Continuous>  polyethylene glycol 3350 17 Gram(s) Oral daily  sodium chloride 0.9% lock flush 3 milliLiter(s) IV Push every 8 hours    Genitourinary Medications    Hematologic/Oncologic Medications  heparin   Injectable 5000 Unit(s) SubCutaneous every 8 hours  influenza   Vaccine 0.5 milliLiter(s) IntraMuscular once    Antimicrobial/Immunologic Medications  piperacillin/tazobactam IVPB.. 3.375 Gram(s) IV Intermittent every 8 hours    Endocrine/Metabolic Medications  dextrose 50% Injectable 25 Gram(s) IV Push once  dextrose 50% Injectable 25 Gram(s) IV Push once  dextrose Oral Gel 15 Gram(s) Oral once PRN Blood Glucose LESS THAN 70 milliGRAM(s)/deciliter  glucagon  Injectable 1 milliGRAM(s) IntraMuscular once  insulin glargine Injectable (LANTUS) 25 Unit(s) SubCutaneous every morning  insulin lispro (ADMELOG) corrective regimen sliding scale   SubCutaneous three times a day before meals  insulin lispro (ADMELOG) corrective regimen sliding scale   SubCutaneous at bedtime  insulin lispro Injectable (ADMELOG) 6 Unit(s) SubCutaneous three times a day before meals    Topical/Other Medications    --------------------------------------------------------------------------------------    VITAL SIGNS:  ICU Vital Signs Last 24 Hrs  T(C): 36.6 (11 Oct 2022 00:00), Max: 36.7 (10 Oct 2022 04:00)  T(F): 97.9 (11 Oct 2022 00:00), Max: 98.1 (10 Oct 2022 04:00)  HR: 68 (11 Oct 2022 00:00) (64 - 83)  BP: 163/84 (10 Oct 2022 20:00) (149/86 - 163/84)  BP(mean): 104 (10 Oct 2022 20:00) (103 - 110)  RR: 17 (11 Oct 2022 00:00) (13 - 21)  SpO2: 97% (11 Oct 2022 00:00) (97% - 100%)    O2 Parameters below as of 11 Oct 2022 00:00  Patient On (Oxygen Delivery Method): room air    --------------------------------------------------------------------------------------    INS AND OUTS:  I&O's Detail    09 Oct 2022 07:01  -  10 Oct 2022 07:00  --------------------------------------------------------  IN:    IV PiggyBack: 100 mL    Oral Fluid: 1000 mL  Total IN: 1100 mL    OUT:    Voided (mL): 1450 mL  Total OUT: 1450 mL    Total NET: -350 mL      10 Oct 2022 07:01  -  11 Oct 2022 00:25  --------------------------------------------------------  IN:    IV PiggyBack: 250 mL    Oral Fluid: 480 mL  Total IN: 730 mL    OUT:    Voided (mL): 1350 mL  Total OUT: 1350 mL    Total NET: -620 mL        --------------------------------------------------------------------------------------    EXAM  NEUROLOGY  Exam: Normal, in no acute distress.  Alert and oriented x4.  No focal neurologic deficits.     RESPIRATORY  Exam:  Normal expansion/effort. No signs of respiratory distress.     CARDIOVASCULAR  Exam: S1, S2.  Regular rate and rhythm on monitor.     GI/NUTRITION  Exam: Abdomen soft, Non-tender, Non-distended.    Current Diet: Regular CC    VASCULAR  Exam: right BKA. left stump with dressing in place, c/d/i.         METABOLIC / FLUIDS / ELECTROLYTES  dextrose 5%. 1000 milliLiter(s) IV Continuous <Continuous>  sodium chloride 0.9% lock flush 3 milliLiter(s) IV Push every 8 hours      HEMATOLOGIC  [x] VTE Prophylaxis: heparin   Injectable 5000 Unit(s) SubCutaneous every 8 hours      INFECTIOUS DISEASE  Antimicrobials/Immunologic Medications:  influenza   Vaccine 0.5 milliLiter(s) IntraMuscular once  piperacillin/tazobactam IVPB.. 3.375 Gram(s) IV Intermittent every 8 hours      TUBES / LINES / DRAINS    [x] Peripheral IV  [] Central Venous Line     	[] R	[] L	[] IJ	[] Fem	[] SC	Date Placed:   [] Arterial Line		[] R	[] L	[] Fem	[] Rad	[] Ax	Date Placed:   [] PICC		[] Midline		[] Mediport  [] Urinary Catheter		Date Placed:   [x] Necessity of urinary, arterial, and venous catheters discussed    --------------------------------------------------------------------------------------    LABS                          8.0    4.62  )-----------( 302      ( 11 Oct 2022 01:30 )             24.9       10-11    136  |  102  |  23  ----------------------------<  267<H>  4.3   |  24  |  3.13<H>    Ca    8.3<L>      11 Oct 2022 01:30  Phos  2.9     10-11  Mg     2.10     10-11      --------------------------------------------------------------------------------------     SICU Daily Progress Note  =====================================================  Interval/Overnight Events:       - Increased hydralazine from 10mg to 25mg  - CPAP @night for possible KWABENA  - Cards consulted for AKA clearance  - endo consulted for management of DM  - Added 6u lispro premeal per endocrine      PAST MEDICAL & SURGICAL HISTORY:  Diabetes Type II  BPH (benign prostatic hypertrophy)  HTN (hypertension)  Venous insufficiency of both lower extremities  Status post incision and drainage Rt groin abscess  S/P laparoscopic cholecystectomy  S/P BKA (below knee amputation), right      ALLERGIES:  No Known Allergies      --------------------------------------------------------------------------------------    MEDICATIONS:    Neurologic Medications  acetaminophen     Tablet .. 975 milliGRAM(s) Oral every 6 hours PRN Mild Pain (1 - 3)  gabapentin 300 milliGRAM(s) Oral daily    Respiratory Medications    Cardiovascular Medications  amLODIPine   Tablet 10 milliGRAM(s) Oral daily  hydrALAZINE 10 milliGRAM(s) Oral daily  tamsulosin 0.4 milliGRAM(s) Oral at bedtime    Gastrointestinal Medications  dextrose 5%. 1000 milliLiter(s) IV Continuous <Continuous>  polyethylene glycol 3350 17 Gram(s) Oral daily  sodium chloride 0.9% lock flush 3 milliLiter(s) IV Push every 8 hours    Genitourinary Medications    Hematologic/Oncologic Medications  heparin   Injectable 5000 Unit(s) SubCutaneous every 8 hours  influenza   Vaccine 0.5 milliLiter(s) IntraMuscular once    Antimicrobial/Immunologic Medications  piperacillin/tazobactam IVPB.. 3.375 Gram(s) IV Intermittent every 8 hours    Endocrine/Metabolic Medications  dextrose 50% Injectable 25 Gram(s) IV Push once  dextrose 50% Injectable 25 Gram(s) IV Push once  dextrose Oral Gel 15 Gram(s) Oral once PRN Blood Glucose LESS THAN 70 milliGRAM(s)/deciliter  glucagon  Injectable 1 milliGRAM(s) IntraMuscular once  insulin glargine Injectable (LANTUS) 25 Unit(s) SubCutaneous every morning  insulin lispro (ADMELOG) corrective regimen sliding scale   SubCutaneous three times a day before meals  insulin lispro (ADMELOG) corrective regimen sliding scale   SubCutaneous at bedtime  insulin lispro Injectable (ADMELOG) 6 Unit(s) SubCutaneous three times a day before meals    Topical/Other Medications    --------------------------------------------------------------------------------------    VITAL SIGNS:  ICU Vital Signs Last 24 Hrs  T(C): 36.6 (11 Oct 2022 00:00), Max: 36.7 (10 Oct 2022 04:00)  T(F): 97.9 (11 Oct 2022 00:00), Max: 98.1 (10 Oct 2022 04:00)  HR: 68 (11 Oct 2022 00:00) (64 - 83)  BP: 163/84 (10 Oct 2022 20:00) (149/86 - 163/84)  BP(mean): 104 (10 Oct 2022 20:00) (103 - 110)  RR: 17 (11 Oct 2022 00:00) (13 - 21)  SpO2: 97% (11 Oct 2022 00:00) (97% - 100%)    O2 Parameters below as of 11 Oct 2022 00:00  Patient On (Oxygen Delivery Method): room air    --------------------------------------------------------------------------------------    INS AND OUTS:  I&O's Detail    09 Oct 2022 07:01  -  10 Oct 2022 07:00  --------------------------------------------------------  IN:    IV PiggyBack: 100 mL    Oral Fluid: 1000 mL  Total IN: 1100 mL    OUT:    Voided (mL): 1450 mL  Total OUT: 1450 mL    Total NET: -350 mL      10 Oct 2022 07:01  -  11 Oct 2022 00:25  --------------------------------------------------------  IN:    IV PiggyBack: 250 mL    Oral Fluid: 480 mL  Total IN: 730 mL    OUT:    Voided (mL): 1350 mL  Total OUT: 1350 mL    Total NET: -620 mL        --------------------------------------------------------------------------------------    EXAM  NEUROLOGY  Exam: Normal, in no acute distress.  Alert and oriented x4.  No focal neurologic deficits.     RESPIRATORY  Exam:  Normal expansion/effort. No signs of respiratory distress.     CARDIOVASCULAR  Exam: S1, S2.  Regular rate and rhythm on monitor.     GI/NUTRITION  Exam: Abdomen soft, Non-tender, Non-distended.    Current Diet: Regular CC    VASCULAR  Exam: right BKA. left stump with dressing in place, c/d/i.         METABOLIC / FLUIDS / ELECTROLYTES  dextrose 5%. 1000 milliLiter(s) IV Continuous <Continuous>  sodium chloride 0.9% lock flush 3 milliLiter(s) IV Push every 8 hours      HEMATOLOGIC  [x] VTE Prophylaxis: heparin   Injectable 5000 Unit(s) SubCutaneous every 8 hours      INFECTIOUS DISEASE  Antimicrobials/Immunologic Medications:  influenza   Vaccine 0.5 milliLiter(s) IntraMuscular once  piperacillin/tazobactam IVPB.. 3.375 Gram(s) IV Intermittent every 8 hours      TUBES / LINES / DRAINS    [x] Peripheral IV  [] Central Venous Line     	[] R	[] L	[] IJ	[] Fem	[] SC	Date Placed:   [] Arterial Line		[] R	[] L	[] Fem	[] Rad	[] Ax	Date Placed:   [] PICC		[] Midline		[] Mediport  [] Urinary Catheter		Date Placed:   [x] Necessity of urinary, arterial, and venous catheters discussed    --------------------------------------------------------------------------------------    LABS                          8.0    4.62  )-----------( 302      ( 11 Oct 2022 01:30 )             24.9       10-11    136  |  102  |  23  ----------------------------<  267<H>  4.3   |  24  |  3.13<H>    Ca    8.3<L>      11 Oct 2022 01:30  Phos  2.9     10-11  Mg     2.10     10-11      --------------------------------------------------------------------------------------     SICU Daily Progress Note  =====================================================  Interval/Overnight Events:       - Increased hydralazine from 10mg to 25mg  - CPAP @night for possible KWABENA  - Cards consulted for AKA clearance  - Added 6u lispro premeal per endocrine, f/u further recs      PAST MEDICAL & SURGICAL HISTORY:  Diabetes Type II  BPH (benign prostatic hypertrophy)  HTN (hypertension)  Venous insufficiency of both lower extremities  Status post incision and drainage Rt groin abscess  S/P laparoscopic cholecystectomy  S/P BKA (below knee amputation), right      ALLERGIES:  No Known Allergies      --------------------------------------------------------------------------------------    MEDICATIONS:    Neurologic Medications  acetaminophen     Tablet .. 975 milliGRAM(s) Oral every 6 hours PRN Mild Pain (1 - 3)  gabapentin 300 milliGRAM(s) Oral daily    Respiratory Medications    Cardiovascular Medications  amLODIPine   Tablet 10 milliGRAM(s) Oral daily  hydrALAZINE 10 milliGRAM(s) Oral daily  tamsulosin 0.4 milliGRAM(s) Oral at bedtime    Gastrointestinal Medications  dextrose 5%. 1000 milliLiter(s) IV Continuous <Continuous>  polyethylene glycol 3350 17 Gram(s) Oral daily  sodium chloride 0.9% lock flush 3 milliLiter(s) IV Push every 8 hours    Genitourinary Medications    Hematologic/Oncologic Medications  heparin   Injectable 5000 Unit(s) SubCutaneous every 8 hours  influenza   Vaccine 0.5 milliLiter(s) IntraMuscular once    Antimicrobial/Immunologic Medications  piperacillin/tazobactam IVPB.. 3.375 Gram(s) IV Intermittent every 8 hours    Endocrine/Metabolic Medications  dextrose 50% Injectable 25 Gram(s) IV Push once  dextrose 50% Injectable 25 Gram(s) IV Push once  dextrose Oral Gel 15 Gram(s) Oral once PRN Blood Glucose LESS THAN 70 milliGRAM(s)/deciliter  glucagon  Injectable 1 milliGRAM(s) IntraMuscular once  insulin glargine Injectable (LANTUS) 25 Unit(s) SubCutaneous every morning  insulin lispro (ADMELOG) corrective regimen sliding scale   SubCutaneous three times a day before meals  insulin lispro (ADMELOG) corrective regimen sliding scale   SubCutaneous at bedtime  insulin lispro Injectable (ADMELOG) 6 Unit(s) SubCutaneous three times a day before meals    Topical/Other Medications    --------------------------------------------------------------------------------------    VITAL SIGNS:  ICU Vital Signs Last 24 Hrs  T(C): 36.6 (11 Oct 2022 00:00), Max: 36.7 (10 Oct 2022 04:00)  T(F): 97.9 (11 Oct 2022 00:00), Max: 98.1 (10 Oct 2022 04:00)  HR: 68 (11 Oct 2022 00:00) (64 - 83)  BP: 163/84 (10 Oct 2022 20:00) (149/86 - 163/84)  BP(mean): 104 (10 Oct 2022 20:00) (103 - 110)  RR: 17 (11 Oct 2022 00:00) (13 - 21)  SpO2: 97% (11 Oct 2022 00:00) (97% - 100%)    O2 Parameters below as of 11 Oct 2022 00:00  Patient On (Oxygen Delivery Method): room air    --------------------------------------------------------------------------------------    INS AND OUTS:  I&O's Detail    09 Oct 2022 07:01  -  10 Oct 2022 07:00  --------------------------------------------------------  IN:    IV PiggyBack: 100 mL    Oral Fluid: 1000 mL  Total IN: 1100 mL    OUT:    Voided (mL): 1450 mL  Total OUT: 1450 mL    Total NET: -350 mL      10 Oct 2022 07:01  -  11 Oct 2022 00:25  --------------------------------------------------------  IN:    IV PiggyBack: 250 mL    Oral Fluid: 480 mL  Total IN: 730 mL    OUT:    Voided (mL): 1350 mL  Total OUT: 1350 mL    Total NET: -620 mL        --------------------------------------------------------------------------------------    EXAM  NEUROLOGY  Exam: Normal, in no acute distress.  Alert and oriented x4.  No focal neurologic deficits.     RESPIRATORY  Exam:  Normal expansion/effort. No signs of respiratory distress.     CARDIOVASCULAR  Exam: S1, S2.  Regular rate and rhythm on monitor.     GI/NUTRITION  Exam: Abdomen soft, Non-tender, Non-distended.    Current Diet: Regular CC    VASCULAR  Exam: right BKA. left stump with dressing in place, c/d/i.         METABOLIC / FLUIDS / ELECTROLYTES  dextrose 5%. 1000 milliLiter(s) IV Continuous <Continuous>  sodium chloride 0.9% lock flush 3 milliLiter(s) IV Push every 8 hours      HEMATOLOGIC  [x] VTE Prophylaxis: heparin   Injectable 5000 Unit(s) SubCutaneous every 8 hours      INFECTIOUS DISEASE  Antimicrobials/Immunologic Medications:  influenza   Vaccine 0.5 milliLiter(s) IntraMuscular once  piperacillin/tazobactam IVPB.. 3.375 Gram(s) IV Intermittent every 8 hours      TUBES / LINES / DRAINS    [x] Peripheral IV  [] Central Venous Line     	[] R	[] L	[] IJ	[] Fem	[] SC	Date Placed:   [] Arterial Line		[] R	[] L	[] Fem	[] Rad	[] Ax	Date Placed:   [] PICC		[] Midline		[] Mediport  [] Urinary Catheter		Date Placed:   [x] Necessity of urinary, arterial, and venous catheters discussed    --------------------------------------------------------------------------------------    LABS                          8.0    4.62  )-----------( 302      ( 11 Oct 2022 01:30 )             24.9       10-11    136  |  102  |  23  ----------------------------<  267<H>  4.3   |  24  |  3.13<H>    Ca    8.3<L>      11 Oct 2022 01:30  Phos  2.9     10-11  Mg     2.10     10-11      --------------------------------------------------------------------------------------

## 2022-10-11 NOTE — PROGRESS NOTE ADULT - ASSESSMENT
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 Patient now s/p L BKA for necrotic left foot ulcer w/ concern for gas gangrene. Insulin infusion was started intraoperatively. SICU consulted for management of insulin infusion. Patient now off insulin infusion.     PLAN:  Neurologic:   - AOx4  - pain control prn    Respiratory:  - maintain SPO2 > 92%   - CPAP @night for likely KWABENA    Cardiovascular:  - MAP > 65   - amlodipine 10mg qd  - hydralazine 10mg qd  - monitor hemodynamics     Gastrointestinal/Nutrition:   - Diet: Regular CC  - bowel regimen    Renal/Genitourinary:  - Strict I&Os  - monitor electrolytes and replete prn  - h/o CKD  - hx of BPH: tamulosin     Hematologic:  - DVT ppx: SQH   - monitor H/H on daily CBC    Infectious Disease:  - continue Zosyn  - monitor WBC and trend fever curve      Endocrine:  - currently off insulin infusion  - lantus 25, premeal 6, ISS before meals and at bedtime per endo recs  - FS  - continue to monitor glucose      Disposition:   - PT consult  - listed for floors        ASSESSMENT:  63 y/o M with PMH of HTN, DM type II, HLD, BPH and PAD, 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 Patient now s/p L BKA for necrotic left foot ulcer w/ concern for gas gangrene. Insulin infusion was started intraoperatively. SICU consulted for management of insulin infusion. Patient now off insulin infusion.     PLAN:  Neurologic:   - AOx4  - pain control prn    Respiratory:  - maintain SPO2 > 92%   - CPAP @night for likely KWABENA    Cardiovascular:  - MAP > 65   - amlodipine 10mg qd  - hydralazine increased to 25mg qd  - monitor hemodynamics     Gastrointestinal/Nutrition:   - Diet: Regular CC  - bowel regimen    Renal/Genitourinary:  - Strict I&Os  - monitor electrolytes and replete prn  - h/o CKD  - hx of BPH: tamulosin     Hematologic:  - DVT ppx: SQH   - monitor H/H on daily CBC  - f/u PM CBC for downtrending H/H    Infectious Disease:  - continue Zosyn  - monitor WBC and trend fever curve      Endocrine:  - currently off insulin infusion  - lantus 25, premeal 6, ISS before meals and at bedtime per endo recs  - FS  - continue to monitor glucose      Disposition:   - PT consult  - listed for floors        ASSESSMENT:  63 y/o M with PMH of HTN, DM type II, HLD, BPH and PAD, Right BKA 2/2 to unhealed DM ulcer presented to [facility] for LLE peripheral angiogram. Patient now s/p L BKA for necrotic left foot ulcer w/ concern for gas gangrene. Insulin infusion was started intraoperatively. SICU consulted for management of insulin infusion. Patient now off insulin infusion.     PLAN:  Neurologic:   - AOx4  - pain control prn    Respiratory:  - maintain SPO2 > 92%   - CPAP PRN    Cardiovascular:  - MAP > 65   - amlodipine 10mg qd  - hydralazine increased to 25mg qd  - monitor hemodynamics     Gastrointestinal/Nutrition:   - Diet: Regular CC  - bowel regimen    Renal/Genitourinary:  - Strict I&Os  - monitor electrolytes and replete prn  - h/o CKD  - hx of BPH: tamulosin     Hematologic:  - DVT ppx: SQH   - monitor H/H on daily CBC  - f/u PM CBC for downtrending H/H    Infectious Disease:  - continue Zosyn  - monitor WBC and trend fever curve      Endocrine:  - currently off insulin infusion  - lantus 25, premeal 6, ISS before meals and at bedtime per endo recs  - FS  - continue to monitor glucose      Disposition:   - PT consult  - listed for floors

## 2022-10-11 NOTE — DIETITIAN INITIAL EVALUATION ADULT - REASON FOR ADMISSION
Left foot BKA  63 y/o M with PMH of HTN, DM type II, HLD, BPH and PAD, Right BKA 2/2 to unhealed DM ulcer presented to [facility] for LLE peripheral angiogram. Patient now s/p L BKA for necrotic left foot ulcer w/ concern for gas gangrene. Insulin infusion was started intraoperatively. SICU consulted for management of insulin infusion. Patient now off insulin infusion.

## 2022-10-11 NOTE — DIETITIAN INITIAL EVALUATION ADULT - OTHER INFO
A&Ox4. Reports good appetite; eating >75% of meals. No reported GI issues (nausea/vomiting/diarrhea/constipation.) Denies any chewing or swallowing difficulties at this time. NKFA. Pt declined nutrition education. The basic of pairing carbs with protein/balancing meals was discussed with patient.     Noted pt with MAGDA/L BKA.

## 2022-10-11 NOTE — DIETITIAN INITIAL EVALUATION ADULT - ADD RECOMMEND
1. Encourage PO intake and honor food preferences as able.   2. Continue to document PO in RN flow sheets and monitor weekly weights.   3. F/u with diabetes education PRN.

## 2022-10-11 NOTE — PROGRESS NOTE ADULT - SUBJECTIVE AND OBJECTIVE BOX
VASCULAR SURGERY PROGRESS NOTE    STATUS POST:    POST OPERATIVE DAY #:       SUBJECTIVE    OVERNIGHT EVENTS:  - Added hydralazine 10mg qday yesterday   - CPAP @night for possible KWABENA  - Cards consulted for AKA clearance  - endo consulted for management of     10-point review of systems completed and negative except as noted above.      OBJECTIVE    MEDICATIONS  acetaminophen     Tablet .. 975 milliGRAM(s) Oral every 6 hours PRN  amLODIPine   Tablet 10 milliGRAM(s) Oral daily  dextrose 5%. 1000 milliLiter(s) IV Continuous <Continuous>  dextrose 50% Injectable 25 Gram(s) IV Push once  dextrose 50% Injectable 25 Gram(s) IV Push once  dextrose Oral Gel 15 Gram(s) Oral once PRN  gabapentin 300 milliGRAM(s) Oral daily  glucagon  Injectable 1 milliGRAM(s) IntraMuscular once  heparin   Injectable 5000 Unit(s) SubCutaneous every 8 hours  hydrALAZINE 10 milliGRAM(s) Oral daily  influenza   Vaccine 0.5 milliLiter(s) IntraMuscular once  insulin glargine Injectable (LANTUS) 25 Unit(s) SubCutaneous every morning  insulin lispro (ADMELOG) corrective regimen sliding scale   SubCutaneous three times a day before meals  insulin lispro (ADMELOG) corrective regimen sliding scale   SubCutaneous at bedtime  insulin lispro Injectable (ADMELOG) 6 Unit(s) SubCutaneous three times a day before meals  piperacillin/tazobactam IVPB.. 3.375 Gram(s) IV Intermittent every 8 hours  polyethylene glycol 3350 17 Gram(s) Oral daily  sodium chloride 0.9% lock flush 3 milliLiter(s) IV Push every 8 hours  tamsulosin 0.4 milliGRAM(s) Oral at bedtime      PHYSICAL EXAM  T(C): 36.6 (10-11-22 @ 00:00), Max: 36.7 (10-10-22 @ 04:00)  HR: 68 (10-11-22 @ 00:00) (64 - 83)  BP: 170/91 (10-11-22 @ 00:00) (149/86 - 170/91)  RR: 17 (10-11-22 @ 00:00) (13 - 21)  SpO2: 97% (10-11-22 @ 00:00) (97% - 100%)    10-09-22 @ 07:01  -  10-10-22 @ 07:00  --------------------------------------------------------  IN: 1100 mL / OUT: 1450 mL / NET: -350 mL    10-10-22 @ 07:01  -  10-11-22 @ 02:35  --------------------------------------------------------  IN: 730 mL / OUT: 1350 mL / NET: -620 mL        General: well developed, well nourished, NAD  HEENT: NCAT, trachea midline  Respiratory: respirations non labored  Gastrointestinal: soft, nontender, nondistended  Extremities: LLE in KI with stump wrapped with xeroform, 4x4, abd pads, kerlix, coband, and ace; dressing c/d/i  Neurological: non-focal    LABS                        8.0    4.62  )-----------( 302      ( 11 Oct 2022 01:30 )             24.9     10-11    136  |  102  |  23  ----------------------------<  267<H>  4.3   |  24  |  3.13<H>    Ca    8.3<L>      11 Oct 2022 01:30  Phos  2.9     10-11  Mg     2.10     10-11            RADIOLOGY & ADDITIONAL STUDIES VASCULAR SURGERY PROGRESS NOTE    STATUS POST:    POST OPERATIVE DAY #: 4 JOSIE HOLDER      SUBJECTIVE: Patient seen and evaluated at bedside the AM. No concenrs     OVERNIGHT EVENTS:  - Added hydralazine 10mg qday yesterday   - CPAP @night for possible KWABENA  - Cards consulted for AKA clearance  - endo consulted for management of     10-point review of systems completed and negative except as noted above.      OBJECTIVE    MEDICATIONS  acetaminophen     Tablet .. 975 milliGRAM(s) Oral every 6 hours PRN  amLODIPine   Tablet 10 milliGRAM(s) Oral daily  dextrose 5%. 1000 milliLiter(s) IV Continuous <Continuous>  dextrose 50% Injectable 25 Gram(s) IV Push once  dextrose 50% Injectable 25 Gram(s) IV Push once  dextrose Oral Gel 15 Gram(s) Oral once PRN  gabapentin 300 milliGRAM(s) Oral daily  glucagon  Injectable 1 milliGRAM(s) IntraMuscular once  heparin   Injectable 5000 Unit(s) SubCutaneous every 8 hours  hydrALAZINE 10 milliGRAM(s) Oral daily  influenza   Vaccine 0.5 milliLiter(s) IntraMuscular once  insulin glargine Injectable (LANTUS) 25 Unit(s) SubCutaneous every morning  insulin lispro (ADMELOG) corrective regimen sliding scale   SubCutaneous three times a day before meals  insulin lispro (ADMELOG) corrective regimen sliding scale   SubCutaneous at bedtime  insulin lispro Injectable (ADMELOG) 6 Unit(s) SubCutaneous three times a day before meals  piperacillin/tazobactam IVPB.. 3.375 Gram(s) IV Intermittent every 8 hours  polyethylene glycol 3350 17 Gram(s) Oral daily  sodium chloride 0.9% lock flush 3 milliLiter(s) IV Push every 8 hours  tamsulosin 0.4 milliGRAM(s) Oral at bedtime      PHYSICAL EXAM  T(C): 36.6 (10-11-22 @ 00:00), Max: 36.7 (10-10-22 @ 04:00)  HR: 68 (10-11-22 @ 00:00) (64 - 83)  BP: 170/91 (10-11-22 @ 00:00) (149/86 - 170/91)  RR: 17 (10-11-22 @ 00:00) (13 - 21)  SpO2: 97% (10-11-22 @ 00:00) (97% - 100%)    10-09-22 @ 07:01  -  10-10-22 @ 07:00  --------------------------------------------------------  IN: 1100 mL / OUT: 1450 mL / NET: -350 mL    10-10-22 @ 07:01  -  10-11-22 @ 02:35  --------------------------------------------------------  IN: 730 mL / OUT: 1350 mL / NET: -620 mL        General: well developed, well nourished, NAD  HEENT: NCAT, trachea midline  Respiratory: respirations non labored  Gastrointestinal: soft, nontender, nondistended  Extremities: LLE in KI. Dressing changed at bedside.   Neurological: non-focal    LABS                        8.0    4.62  )-----------( 302      ( 11 Oct 2022 01:30 )             24.9     10-11    136  |  102  |  23  ----------------------------<  267<H>  4.3   |  24  |  3.13<H>    Ca    8.3<L>      11 Oct 2022 01:30  Phos  2.9     10-11  Mg     2.10     10-11            RADIOLOGY & ADDITIONAL STUDIES

## 2022-10-11 NOTE — DIETITIAN INITIAL EVALUATION ADULT - PERTINENT LABORATORY DATA
10-11    136  |  102  |  23  ----------------------------<  267<H>  4.3   |  24  |  3.13<H>    Ca    8.3<L>      11 Oct 2022 01:30  Phos  2.9     10-11  Mg     2.10     10-11    POCT Blood Glucose.: 165 mg/dL (10-11-22 @ 11:16)  A1C with Estimated Average Glucose Result: 8.5 % (10-08-22 @ 06:33)

## 2022-10-11 NOTE — PROGRESS NOTE ADULT - ASSESSMENT
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 Patient now s/p L BKA for necrotic left foot ulcer w/ concern for gas gangrene. Insulin infusion was started intraoperatively; patient given 12u and started on 5u/hr.     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 Patient now s/p L BKA for necrotic left foot ulcer w/ concern for gas gangrene. Insulin infusion was started intraoperatively.       Plan:  - Continue Zosyn  - Daily dressing change  - OR planning for BKA formalization    - Tentatively Friday for completion     - Will need medical and cardiac clearance for OR    - f/u Endo recs  - DVT ppx: SQH  - Appreciate SICU care       Vascular Surgery, C Team Surgery  P# [id number]

## 2022-10-11 NOTE — PROGRESS NOTE ADULT - ASSESSMENT
61 y/o M with PMH of HTN, DM type II, HLD, BPH and PAD, Right BKA 2/2 to unhealed DM ulcer presented to [facility] for LLE peripheral angiogram, now s/p amputation    1. Uncontrolled T2DM c/b foot wounds requiring amputation, retinopathy  A1c 8.5% Goal < 7%  Home Regimen: Novolog 20-35 units qAC (typically eats 2 meals per day, takes Novolog twice). Does not take basal insulin     While inpatient:   BG target 100-180 mg/dl  Increase Lantus to 30 units SQ qAM (0800 daily)  Continue Admelog 6 units SQ TID before meals (Hold if NPO/skips meal)  Continue Admelog low dose correctional scale before meals, low dose at bedtime  Consistent carb diet  BG before meals and bedtime  Hypoglycemia protocol     Discharge Plan:  Basal/bolus, final regimen TBD  Can resume Novolog (dose TBD), will need new prescription for basal insulin PEN (Lantus, Basaglar, Tresiba, Semglee)  Did not tolerate Metformin  mg as an outpatient. Patient states possibly hx of pancreatitis, also with retinopathy- unclear if active as patient has not seen ophtho in a while. Will defer GLP1 agonist. Hold off on SGLT2 inhibitors given recent amputation  Followup with Endocrine Practice at [address] [place] [address], [address], [place] [address]; Ph # [phone]  OR if prefers a [place] location - Dr. Jo: [address] [facility], [address], [facility] [address], [phone]    2. Discharge Planning Issues  Patient expressing difficulty caring for himself, especially now s/p amputation   At home, he lives with elderly wife, reports difficulty with food prep, specifically healthy food, typically orders out  Limited resources for support.   Recommend CM and SW consult (recommend initial consult to be done now, even if patient remaining admitted for several days)  Discussed with team    3. HTN  BP Goal < 130/80  On Amlodipine and Hydralazine  Titration per SICU team    4. HLD  LDL goal < 70  Recommend to check fasting lipid profile  Statin if no contraindications    Discussed recommendations with SICU team ([id number]), orders not placed  Arlet Owens  Nurse Practitioner  Division of Endocrinology & Diabetes  In house pager #[phone]/long range pager #[phone]    If before 9AM or after 6PM, or on weekends/holidays, please call endocrine answering service for assistance ([phone]).  For nonurgent matters email [email or web address] for assistance.

## 2022-10-11 NOTE — DIETITIAN INITIAL EVALUATION ADULT - PERTINENT MEDS FT
MEDICATIONS  (STANDING):  amLODIPine   Tablet 10 milliGRAM(s) Oral daily  gabapentin 300 milliGRAM(s) Oral daily  heparin   Injectable 5000 Unit(s) SubCutaneous every 8 hours  hydrALAZINE 25 milliGRAM(s) Oral daily  influenza   Vaccine 0.5 milliLiter(s) IntraMuscular once  insulin lispro (ADMELOG) corrective regimen sliding scale   SubCutaneous three times a day before meals  insulin lispro (ADMELOG) corrective regimen sliding scale   SubCutaneous at bedtime  insulin lispro Injectable (ADMELOG) 6 Unit(s) SubCutaneous three times a day before meals  piperacillin/tazobactam IVPB.. 3.375 Gram(s) IV Intermittent every 8 hours  polyethylene glycol 3350 17 Gram(s) Oral daily  sodium chloride 0.9% lock flush 3 milliLiter(s) IV Push every 8 hours  tamsulosin 0.4 milliGRAM(s) Oral at bedtime    MEDICATIONS  (PRN):  acetaminophen     Tablet .. 975 milliGRAM(s) Oral every 6 hours PRN Mild Pain (1 - 3)

## 2022-10-12 LAB
ANION GAP SERPL CALC-SCNC: 13 MMOL/L — SIGNIFICANT CHANGE UP (ref 7–14)
BUN SERPL-MCNC: 26 MG/DL — HIGH (ref 7–23)
CALCIUM SERPL-MCNC: 8.6 MG/DL — SIGNIFICANT CHANGE UP (ref 8.4–10.5)
CHLORIDE SERPL-SCNC: 102 MMOL/L — SIGNIFICANT CHANGE UP (ref 98–107)
CO2 SERPL-SCNC: 21 MMOL/L — LOW (ref 22–31)
CREAT SERPL-MCNC: 3.05 MG/DL — HIGH (ref 0.5–1.3)
EGFR: 22 ML/MIN/1.73M2 — LOW
GLUCOSE BLDC GLUCOMTR-MCNC: 196 MG/DL — HIGH (ref 70–99)
GLUCOSE BLDC GLUCOMTR-MCNC: 236 MG/DL — HIGH (ref 70–99)
GLUCOSE BLDC GLUCOMTR-MCNC: 249 MG/DL — HIGH (ref 70–99)
GLUCOSE BLDC GLUCOMTR-MCNC: 255 MG/DL — HIGH (ref 70–99)
GLUCOSE SERPL-MCNC: 248 MG/DL — HIGH (ref 70–99)
HCT VFR BLD CALC: 28.3 % — LOW (ref 39–50)
HGB BLD-MCNC: 9.1 G/DL — LOW (ref 13–17)
MAGNESIUM SERPL-MCNC: 1.8 MG/DL — SIGNIFICANT CHANGE UP (ref 1.6–2.6)
MCHC RBC-ENTMCNC: 29.8 PG — SIGNIFICANT CHANGE UP (ref 27–34)
MCHC RBC-ENTMCNC: 32.2 GM/DL — SIGNIFICANT CHANGE UP (ref 32–36)
MCV RBC AUTO: 92.8 FL — SIGNIFICANT CHANGE UP (ref 80–100)
NRBC # BLD: 0 /100 WBCS — SIGNIFICANT CHANGE UP (ref 0–0)
NRBC # FLD: 0 K/UL — SIGNIFICANT CHANGE UP (ref 0–0)
PHOSPHATE SERPL-MCNC: 2.8 MG/DL — SIGNIFICANT CHANGE UP (ref 2.5–4.5)
PLATELET # BLD AUTO: 327 K/UL — SIGNIFICANT CHANGE UP (ref 150–400)
POTASSIUM SERPL-MCNC: 4.5 MMOL/L — SIGNIFICANT CHANGE UP (ref 3.5–5.3)
POTASSIUM SERPL-SCNC: 4.5 MMOL/L — SIGNIFICANT CHANGE UP (ref 3.5–5.3)
RBC # BLD: 3.05 M/UL — LOW (ref 4.2–5.8)
RBC # FLD: 13.1 % — SIGNIFICANT CHANGE UP (ref 10.3–14.5)
SODIUM SERPL-SCNC: 136 MMOL/L — SIGNIFICANT CHANGE UP (ref 135–145)
WBC # BLD: 4.83 K/UL — SIGNIFICANT CHANGE UP (ref 3.8–10.5)
WBC # FLD AUTO: 4.83 K/UL — SIGNIFICANT CHANGE UP (ref 3.8–10.5)

## 2022-10-12 PROCEDURE — 99232 SBSQ HOSP IP/OBS MODERATE 35: CPT

## 2022-10-12 RX ORDER — INSULIN LISPRO 100/ML
8 VIAL (ML) SUBCUTANEOUS
Refills: 0 | Status: DISCONTINUED | OUTPATIENT
Start: 2022-10-12 | End: 2022-10-13

## 2022-10-12 RX ADMIN — HEPARIN SODIUM 5000 UNIT(S): 5000 INJECTION INTRAVENOUS; SUBCUTANEOUS at 05:28

## 2022-10-12 RX ADMIN — Medication 3: at 12:03

## 2022-10-12 RX ADMIN — Medication 8 UNIT(S): at 17:53

## 2022-10-12 RX ADMIN — HEPARIN SODIUM 5000 UNIT(S): 5000 INJECTION INTRAVENOUS; SUBCUTANEOUS at 21:53

## 2022-10-12 RX ADMIN — TAMSULOSIN HYDROCHLORIDE 0.4 MILLIGRAM(S): 0.4 CAPSULE ORAL at 21:53

## 2022-10-12 RX ADMIN — SODIUM CHLORIDE 3 MILLILITER(S): 9 INJECTION INTRAMUSCULAR; INTRAVENOUS; SUBCUTANEOUS at 14:06

## 2022-10-12 RX ADMIN — PIPERACILLIN AND TAZOBACTAM 25 GRAM(S): 4; .5 INJECTION, POWDER, LYOPHILIZED, FOR SOLUTION INTRAVENOUS at 14:18

## 2022-10-12 RX ADMIN — SODIUM CHLORIDE 3 MILLILITER(S): 9 INJECTION INTRAMUSCULAR; INTRAVENOUS; SUBCUTANEOUS at 05:46

## 2022-10-12 RX ADMIN — HEPARIN SODIUM 5000 UNIT(S): 5000 INJECTION INTRAVENOUS; SUBCUTANEOUS at 14:19

## 2022-10-12 RX ADMIN — INSULIN GLARGINE 30 UNIT(S): 100 INJECTION, SOLUTION SUBCUTANEOUS at 08:06

## 2022-10-12 RX ADMIN — PIPERACILLIN AND TAZOBACTAM 25 GRAM(S): 4; .5 INJECTION, POWDER, LYOPHILIZED, FOR SOLUTION INTRAVENOUS at 21:52

## 2022-10-12 RX ADMIN — Medication 6 UNIT(S): at 12:02

## 2022-10-12 RX ADMIN — POLYETHYLENE GLYCOL 3350 17 GRAM(S): 17 POWDER, FOR SOLUTION ORAL at 12:05

## 2022-10-12 RX ADMIN — PIPERACILLIN AND TAZOBACTAM 25 GRAM(S): 4; .5 INJECTION, POWDER, LYOPHILIZED, FOR SOLUTION INTRAVENOUS at 05:29

## 2022-10-12 RX ADMIN — SODIUM CHLORIDE 3 MILLILITER(S): 9 INJECTION INTRAMUSCULAR; INTRAVENOUS; SUBCUTANEOUS at 21:42

## 2022-10-12 RX ADMIN — Medication 2: at 08:07

## 2022-10-12 RX ADMIN — Medication 25 MILLIGRAM(S): at 05:28

## 2022-10-12 RX ADMIN — Medication 1: at 17:52

## 2022-10-12 RX ADMIN — AMLODIPINE BESYLATE 10 MILLIGRAM(S): 2.5 TABLET ORAL at 05:28

## 2022-10-12 RX ADMIN — GABAPENTIN 300 MILLIGRAM(S): 400 CAPSULE ORAL at 12:04

## 2022-10-12 RX ADMIN — Medication 6 UNIT(S): at 08:07

## 2022-10-12 NOTE — PROGRESS NOTE ADULT - ASSESSMENT
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 Uncontrolled T2DM c/b foot wounds requiring amputation, retinopathy  A1c 8.5% Goal < 7%  Home Regimen: Novolog 20-35 units qAC (typically eats 2 meals per day, takes Novolog twice). Does not take basal insulin     While inpatient:   BG target 100-180 mg/dl  Continue Lantus 30 units SQ qAM (0800 daily) - first dose was today so will see full effect tomorrow morning  Increase Admelog to 8 units SQ TID before meals (Hold if NPO/skips meal)  Continue Admelog low dose correctional scale before meals, low dose at bedtime  Consistent carb diet  BG before meals and bedtime  Hypoglycemia protocol     Discharge Plan:  Basal/bolus, final regimen TBD  Can resume Novolog (dose TBD), will need new prescription for basal insulin PEN (Lantus, Basaglar, Tresiba, Semglee)  Did not tolerate Metformin  mg as an outpatient. Patient states possibly hx of pancreatitis, also with retinopathy- unclear if active as patient has not seen ophtho in a while. Will defer GLP1 agonist. Hold off on SGLT2 inhibitors given recent amputation  Followup with Endocrine Practice at [address] [place] [address], [address], [place] [address];  # [phone]  OR if prefers a [place] location - Dr. Jo: [address] [facility], [address], [facility] [address], [phone]    2. Discharge Planning Issues  Patient expressing difficulty caring for himself, especially now s/p amputation   At home, he lives with elderly wife, reports difficulty with food prep, specifically healthy food, typically orders out  Limited resources for support  Recommend CM and SW consult   Discussed with team    3. HTN  BP Goal < 130/80  On Amlodipine and Hydralazine  Titration per primary team  4. HLD  LDL goal < 70  Recommend to check fasting lipid profile  Statin if no contraindications    Arlet Owens  Nurse Practitioner  Division of Endocrinology & Diabetes  In house pager #[phone]/long range pager #[phone]    If before 9AM or after 6PM, or on weekends/holidays, please call endocrine answering service for assistance ([phone]).  For nonurgent matters email [email or web address] for assistance.

## 2022-10-12 NOTE — PROGRESS NOTE ADULT - SUBJECTIVE AND OBJECTIVE BOX
Chief Complaint: DM 2 with hyperglycemia     History: Patient seen at bedside. Reports he is eating meals, denies n/v, denies s/s of hypoglycemia  Assisted him in filling out his menu for tomorrow. Patient reports the text size is difficult to see - has not seen ophthalmologist recently. Reviewed education on consistent carbohydrate diet as well as routine opthalmology exams     MEDICATIONS  (STANDING):  amLODIPine   Tablet 10 milliGRAM(s) Oral daily  gabapentin 300 milliGRAM(s) Oral daily  heparin   Injectable 5000 Unit(s) SubCutaneous every 8 hours  hydrALAZINE 25 milliGRAM(s) Oral daily  influenza   Vaccine 0.5 milliLiter(s) IntraMuscular once  insulin glargine Injectable (LANTUS) 30 Unit(s) SubCutaneous <User Schedule>  insulin lispro (ADMELOG) corrective regimen sliding scale   SubCutaneous three times a day before meals  insulin lispro (ADMELOG) corrective regimen sliding scale   SubCutaneous at bedtime  insulin lispro Injectable (ADMELOG) 6 Unit(s) SubCutaneous three times a day before meals  piperacillin/tazobactam IVPB.. 3.375 Gram(s) IV Intermittent every 8 hours  polyethylene glycol 3350 17 Gram(s) Oral daily  sodium chloride 0.9% lock flush 3 milliLiter(s) IV Push every 8 hours  tamsulosin 0.4 milliGRAM(s) Oral at bedtime    MEDICATIONS  (PRN):  acetaminophen     Tablet .. 975 milliGRAM(s) Oral every 6 hours PRN Mild Pain (1 - 3)    No Known Allergies    Review of Systems:  Cardiovascular: No chest pain  Respiratory: No SOB  GI: No nausea, vomiting  Endocrine: no hypoglycemia    PHYSICAL EXAM:  VITALS: T(C): 37 (10-12-22 @ 09:39)  T(F): 98.6 (10-12-22 @ 09:39), Max: 98.6 (10-12-22 @ 09:39)  HR: 87 (10-12-22 @ 09:39) (62 - 87)  BP: 148/73 (10-12-22 @ 09:39) (140/87 - 165/84)  RR:  (16 - 19)  SpO2:  (98% - 100%)  Wt(kg): --  GENERAL: NAD  EYES: No proptosis, no lid lag, anicteric  HEENT:  Atraumatic, Normocephalic  RESPIRATORY: unlabored respirations     CAPILLARY BLOOD GLUCOSE    POCT Blood Glucose.: 255 mg/dL (12 Oct 2022 11:49)  POCT Blood Glucose.: 236 mg/dL (12 Oct 2022 08:00)  POCT Blood Glucose.: 169 mg/dL (11 Oct 2022 21:41)  POCT Blood Glucose.: 148 mg/dL (11 Oct 2022 17:06)      10-12    136  |  102  |  26<H>  ----------------------------<  248<H>  4.5   |  21<L>  |  3.05<H>    eGFR: 22<L>    Ca    8.6      10-12  Mg     1.80     10-12  Phos  2.8     10-12      A1C with Estimated Average Glucose Result: 8.5 % (10-08-22 @ 06:33)    Diet, Consistent Carbohydrate w/Evening Snack (10-08-22 @ 06:53) [Active]

## 2022-10-12 NOTE — PROGRESS NOTE ADULT - SUBJECTIVE AND OBJECTIVE BOX
VASCULAR SURGERY PROGRESS NOTE    STATUS POST:    POST OPERATIVE DAY #:       SUBJECTIVE    OVERNIGHT EVENTS:  Transferred from SICU to floor. No acute events    10-point review of systems completed and negative except as noted above.      OBJECTIVE    MEDICATIONS  acetaminophen     Tablet .. 975 milliGRAM(s) Oral every 6 hours PRN  amLODIPine   Tablet 10 milliGRAM(s) Oral daily  gabapentin 300 milliGRAM(s) Oral daily  heparin   Injectable 5000 Unit(s) SubCutaneous every 8 hours  hydrALAZINE 25 milliGRAM(s) Oral daily  influenza   Vaccine 0.5 milliLiter(s) IntraMuscular once  insulin glargine Injectable (LANTUS) 30 Unit(s) SubCutaneous <User Schedule>  insulin lispro (ADMELOG) corrective regimen sliding scale   SubCutaneous three times a day before meals  insulin lispro (ADMELOG) corrective regimen sliding scale   SubCutaneous at bedtime  insulin lispro Injectable (ADMELOG) 6 Unit(s) SubCutaneous three times a day before meals  piperacillin/tazobactam IVPB.. 3.375 Gram(s) IV Intermittent every 8 hours  polyethylene glycol 3350 17 Gram(s) Oral daily  sodium chloride 0.9% lock flush 3 milliLiter(s) IV Push every 8 hours  tamsulosin 0.4 milliGRAM(s) Oral at bedtime      PHYSICAL EXAM  T(C): 36.8 (10-12-22 @ :55), Max: 36.8 (10-11-22 @ 23:16)  HR: 72 (10-12-22 @ :55) (62 - 85)  BP: 164/80 (10-12-22 @ 01:55) (130/68 - 164/83)  RR: 18 (10-12-22 @ 01:55) (14 - 19)  SpO2: 100% (10-12-22 @ 01:55) (97% - 100%)    10-10-22 @ 07:01  -  10-11-22 @ 07:00  --------------------------------------------------------  IN: 830 mL / OUT: 1600 mL / NET: -770 mL    10-11-22 @ 07:01  -  10-12-22 @ 02:37  --------------------------------------------------------  IN: 800 mL / OUT: 650 mL / NET: 150 mL        General: Appears well, NAD  Neuro: AAOx3  CHEST: Clear to auscultation bilaterally  CV: Regular rate and rhythm  Abdomen: soft, nontender, nondistended, no rebound or guarding  Extremities: Grossly symmetric    LABS                        8.4    5.01  )-----------( 337      ( 11 Oct 2022 15:24 )             26.4     10-11    136  |  102  |  23  ----------------------------<  267<H>  4.3   |  24  |  3.13<H>    Ca    8.3<L>      11 Oct 2022 01:30  Phos  2.9     10-11  Mg     2.10     10-11        Urinalysis Basic - ( 11 Oct 2022 13:03 )    Color: Yellow / Appearance: Slightly Turbid / S.025 / pH: x  Gluc: x / Ketone: Negative  / Bili: Negative / Urobili: 3 mg/dL   Blood: x / Protein: 300 mg/dL / Nitrite: Negative   Leuk Esterase: Negative / RBC: 11 /HPF / WBC 2 /HPF   Sq Epi: x / Non Sq Epi: 3 /HPF / Bacteria: Few        RADIOLOGY & ADDITIONAL STUDIES Surgery Daily Progress Note  =====================================================  Interval / Overnight Events: Transferred from SICU to surgical floor overnight.      HPI:  Patient is a 62 year old male with a PMHx of HTN, DM2, HLD, BPH, PAD and right BKA secondary to unhealed DM2 ulcer who presented to [facility] for left lower extremity peripheral angiogram. (07 Oct 2022 14:29)      PAST MEDICAL & SURGICAL HISTORY:  Diabetes  Type II  BPH (benign prostatic hypertrophy)  HTN (hypertension)  Venous insufficiency of both lower extremities  R &gt; L  Status post incision and drainage  Rt groin abscess  S/P laparoscopic cholecystectomy  History of cholecystectomy  S/P BKA (below knee amputation), right      ALLERGIES:  No Known Allergies    --------------------------------------------------------------------------------------    MEDICATIONS:    Neurologic Medications  acetaminophen     Tablet .. 975 milliGRAM(s) Oral every 6 hours PRN Mild Pain (1 - 3)  gabapentin 300 milliGRAM(s) Oral daily    Cardiovascular Medications  amLODIPine   Tablet 10 milliGRAM(s) Oral daily  hydrALAZINE 25 milliGRAM(s) Oral daily  tamsulosin 0.4 milliGRAM(s) Oral at bedtime    Gastrointestinal Medications  polyethylene glycol 3350 17 Gram(s) Oral daily  sodium chloride 0.9% lock flush 3 milliLiter(s) IV Push every 8 hours    Hematologic/Oncologic Medications  heparin   Injectable 5000 Unit(s) SubCutaneous every 8 hours  influenza   Vaccine 0.5 milliLiter(s) IntraMuscular once    Antimicrobial/Immunologic Medications  piperacillin/tazobactam IVPB.. 3.375 Gram(s) IV Intermittent every 8 hours    Endocrine/Metabolic Medications  insulin glargine Injectable (LANTUS) 30 Unit(s) SubCutaneous <User Schedule>  insulin lispro (ADMELOG) corrective regimen sliding scale   SubCutaneous three times a day before meals  insulin lispro (ADMELOG) corrective regimen sliding scale   SubCutaneous at bedtime  insulin lispro Injectable (ADMELOG) 6 Unit(s) SubCutaneous three times a day before meals    --------------------------------------------------------------------------------------    VITAL SIGNS:  T(C): 37 (12 Oct 2022 09:39), Max: 37 (12 Oct 2022 09:39)  T(F): 98.6 (12 Oct 2022 09:39), Max: 98.6 (12 Oct 2022 09:39)  HR: 87 (12 Oct 2022 09:39) (62 - 87)  BP: 148/73 (12 Oct 2022 09:39) (130/68 - 165/84)  BP(mean): 100 (11 Oct 2022 20:00) (86 - 100)  RR: 17 (12 Oct 2022 09:39) (14 - 19)  SpO2: 100% (12 Oct 2022 09:39) (98% - 100%)    --------------------------------------------------------------------------------------    INS AND OUTS:    11 Oct 2022 07:01  -  12 Oct 2022 07:00  --------------------------------------------------------  IN:    IV PiggyBack: 100 mL    Oral Fluid: 800 mL  Total IN: 900 mL    OUT:    Voided (mL): 950 mL  Total OUT: 950 mL    Total NET: -50 mL    --------------------------------------------------------------------------------------    EXAM    NEUROLOGY   Exam: Normal, in no acute distress.    HEENT  Exam: Normocephalic, atraumatic.    RESPIRATORY  Exam: Normal expansion / effort.    CARDIOVASCULAR  Exam: S1, S2.  Regular rate and rhythm.    GI/NUTRITION  Exam: Abdomen soft, Non-tender, Non-distended.  Current Diet: Regular diet    VASCULAR  Exam: Bilateral lower extremity amputations.    MUSCULOSKELETAL  Exam: All extremities moving spontaneously without limitations.      METABOLIC / FLUIDS / ELECTROLYTES  sodium chloride 0.9% lock flush 3 milliLiter(s) IV Push every 8 hours      HEMATOLOGIC  [x] VTE Prophylaxis: heparin   Injectable 5000 Unit(s) SubCutaneous every 8 hours      INFECTIOUS DISEASE  Antimicrobials/Immunologic Medications:  influenza   Vaccine 0.5 milliLiter(s) IntraMuscular once  piperacillin/tazobactam IVPB.. 3.375 Gram(s) IV Intermittent every 8 hours    --------------------------------------------------------------------------------------

## 2022-10-12 NOTE — PROGRESS NOTE ADULT - SUBJECTIVE AND OBJECTIVE BOX
Overnight events noted      VITAL:  T(C): , Max: 36.9 (10-12-22 @ 05:24)  T(F): , Max: 98.5 (10-12-22 @ 05:24)  HR: 87 (10-12-22 @ 05:24)  BP: 165/84 (10-12-22 @ 05:24)  BP(mean): 100 (10-11-22 @ 20:00)  RR: 16 (10-12-22 @ 05:24)  SpO2: 99% (10-12-22 @ 05:24)      PHYSICAL EXAM:  Constitutional: obese, NAD  HEENT: NCAT, MMM  Neck: Supple, No JVD  Respiratory: CTA-b/l  Cardiovascular: RRR s1s2, no m/r/g  Gastrointestinal: BS+, soft, NT/ND  Extremities: b/l BKA; LLE dressed  Neurological: no focal deficits; strength grossly intact  Back: no CVAT b/l    LABS:                        9.1    4.83  )-----------( 327      ( 12 Oct 2022 05:49 )             28.3     Na(136)/K(4.5)/Cl(102)/HCO3(21)/BUN(26)/Cr(3.05)Glu(248)/Ca(8.6)/Mg(1.80)/PO4(2.8)    10-12 @ 05:49  Na(136)/K(4.3)/Cl(102)/HCO3(24)/BUN(23)/Cr(3.13)Glu(267)/Ca(8.3)/Mg(2.10)/PO4(2.9)    10-11 @ 01:30  Na(137)/K(4.4)/Cl(104)/HCO3(22)/BUN(20)/Cr(3.16)Glu(178)/Ca(8.4)/Mg(1.70)/PO4(3.4)    10-10 @ 05:52    Urinalysis Basic - ( 11 Oct 2022 13:03 )  Color: Yellow / Appearance: Slightly Turbid / S.025 / pH: x  Gluc: x / Ketone: Negative  / Bili: Negative / Urobili: 3 mg/dL   Blood: x / Protein: 300 mg/dL / Nitrite: Negative   Leuk Esterase: Negative / RBC: 11 /HPF / WBC 2 /HPF   Sq Epi: x / Non Sq Epi: 3 /HPF / Bacteria: Few  Creatinine, Random Urine: 171 mg/dL (10-11 @ 13:03)  Protein/Creatinine Ratio Calculation: 1.9 Ratio (10-11 @ 13:03)      IMPRESSION: 62M w/ HTN, DM2, CKD, and PAD-R BKA, 10/7/22 p/w necrotic L foot ulcer; s/p L-BKA 10/7/22; now being planned for BKA formalization  (1)Renal - CKD4 - GFR ~20-25ml/min - ~2gm proteinuria. Likely at least in part from diabetic nephropathy.   (2)Lytes - acceptable  (3)CV - hypertensive - ACEI/ARB indicated on chronic basis not only to help with BP control but to delay CKD progression; I see no contraindication to adding low-dose ACEI/ARB now  (4)Anemia - CKD-associated?  (5)ID - necrotic L foot ulcer; s/p BKA; on IV Zosyn  (6)Vasc - awaiting L BKA formalization    RECOMMEND:  (1)Serum: Iron studies   (2)Can add low-dose ACEI vs ARB at this point  (3)Dose new meds for GFR 20-25ml/min  (4)BKA formalization per Vascular      Olegario Chavez MD  [facility]  Office: [phone]  Cell: [phone]       No pain, no sob      VITAL:  T(C): , Max: 36.9 (10-12-22 @ 05:24)  T(F): , Max: 98.5 (10-12-22 @ 05:24)  HR: 87 (10-12-22 @ 05:24)  BP: 165/84 (10-12-22 @ 05:24)  BP(mean): 100 (10-11-22 @ 20:00)  RR: 16 (10-12-22 @ 05:24)  SpO2: 99% (10-12-22 @ 05:24)      PHYSICAL EXAM:  Constitutional: obese, NAD  HEENT: NCAT, MMM  Neck: Supple, No JVD  Respiratory: CTA-b/l  Cardiovascular: RRR s1s2, no m/r/g  Gastrointestinal: BS+, soft, NT/ND  Extremities: b/l BKA; LLE dressed  Neurological: no focal deficits; strength grossly intact  Back: no CVAT b/l    LABS:                        9.1    4.83  )-----------( 327      ( 12 Oct 2022 05:49 )             28.3     Na(136)/K(4.5)/Cl(102)/HCO3(21)/BUN(26)/Cr(3.05)Glu(248)/Ca(8.6)/Mg(1.80)/PO4(2.8)    10-12 @ 05:49  Na(136)/K(4.3)/Cl(102)/HCO3(24)/BUN(23)/Cr(3.13)Glu(267)/Ca(8.3)/Mg(2.10)/PO4(2.9)    10-11 @ 01:30  Na(137)/K(4.4)/Cl(104)/HCO3(22)/BUN(20)/Cr(3.16)Glu(178)/Ca(8.4)/Mg(1.70)/PO4(3.4)    10-10 @ 05:52    Urinalysis Basic - ( 11 Oct 2022 13:03 )  Color: Yellow / Appearance: Slightly Turbid / S.025 / pH: x  Gluc: x / Ketone: Negative  / Bili: Negative / Urobili: 3 mg/dL   Blood: x / Protein: 300 mg/dL / Nitrite: Negative   Leuk Esterase: Negative / RBC: 11 /HPF / WBC 2 /HPF   Sq Epi: x / Non Sq Epi: 3 /HPF / Bacteria: Few  Creatinine, Random Urine: 171 mg/dL (10-11 @ 13:03)  Protein/Creatinine Ratio Calculation: 1.9 Ratio (10-11 @ 13:03)      IMPRESSION: 62M w/ HTN, DM2, CKD, and PAD-R BKA, 10/7/22 p/w necrotic L foot ulcer; s/p L-BKA 10/7/22; now being planned for BKA formalization  (1)Renal - CKD4 - GFR ~20-25ml/min - ~2gm proteinuria. Likely at least in part from diabetic nephropathy.   (2)Lytes - acceptable  (3)CV - hypertensive - ACEI/ARB indicated on chronic basis not only to help with BP control but to delay CKD progression; I see no contraindication to adding low-dose ACEI/ARB now  (4)Anemia - CKD-associated?  (5)ID - necrotic L foot ulcer; s/p BKA; on IV Zosyn  (6)Vasc - awaiting L BKA formalization    RECOMMEND:  (1)Serum: Iron studies   (2)Can add low-dose ACEI vs ARB at this point  (3)Dose new meds for GFR 20-25ml/min  (4)BKA formalization per Vascular      Olegario Chavez MD  [facility]  Office: [phone]  Cell: [phone]

## 2022-10-12 NOTE — PROGRESS NOTE ADULT - ASSESSMENT
Patient is a 62 year old male with a PMHx of HTN, DM2, HLD, BPH, PAD and right BKA secondary to unhealed DM2 ulcer who presented to [facility] for left lower extremity peripheral angiogram.  Patient is now S/P left lower extremity BKA on 10/7/22.      PLAN:  - Regular diet  - Continue IV Zosyn  - Out of bed  - Pain control  - VTE prophylaxis with Heparin subcutaneous      #[id number]  Vascular Surgery Patient is a 62 year old male with a PMHx of HTN, DM2, HLD, BPH, PAD and right BKA secondary to unhealed DM2 ulcer who presented to [facility] for left lower extremity peripheral angiogram.  Patient is now S/P left lower extremity BKA on 10/7/22.      PLAN:  - Regular diet  - Continue IV Zosyn  - Planned for OR Friday  - Cardiology clearance pending  - Out of bed  - Pain control  - VTE prophylaxis with Heparin subcutaneous      #[id number]  Vascular Surgery

## 2022-10-13 ENCOUNTER — TRANSCRIPTION ENCOUNTER (OUTPATIENT)
Age: 62
End: 2022-10-13

## 2022-10-13 DIAGNOSIS — I73.9 PERIPHERAL VASCULAR DISEASE, UNSPECIFIED: ICD-10-CM

## 2022-10-13 LAB
ANION GAP SERPL CALC-SCNC: 11 MMOL/L — SIGNIFICANT CHANGE UP (ref 7–14)
BUN SERPL-MCNC: 27 MG/DL — HIGH (ref 7–23)
CALCIUM SERPL-MCNC: 8.8 MG/DL — SIGNIFICANT CHANGE UP (ref 8.4–10.5)
CHLORIDE SERPL-SCNC: 105 MMOL/L — SIGNIFICANT CHANGE UP (ref 98–107)
CHOLEST SERPL-MCNC: 141 MG/DL — SIGNIFICANT CHANGE UP
CO2 SERPL-SCNC: 22 MMOL/L — SIGNIFICANT CHANGE UP (ref 22–31)
CREAT SERPL-MCNC: 3.36 MG/DL — HIGH (ref 0.5–1.3)
EGFR: 20 ML/MIN/1.73M2 — LOW
GLUCOSE BLDC GLUCOMTR-MCNC: 154 MG/DL — HIGH (ref 70–99)
GLUCOSE BLDC GLUCOMTR-MCNC: 162 MG/DL — HIGH (ref 70–99)
GLUCOSE BLDC GLUCOMTR-MCNC: 208 MG/DL — HIGH (ref 70–99)
GLUCOSE BLDC GLUCOMTR-MCNC: 222 MG/DL — HIGH (ref 70–99)
GLUCOSE SERPL-MCNC: 223 MG/DL — HIGH (ref 70–99)
HCT VFR BLD CALC: 28.6 % — LOW (ref 39–50)
HDLC SERPL-MCNC: 28 MG/DL — LOW
HGB BLD-MCNC: 9.1 G/DL — LOW (ref 13–17)
LIPID PNL WITH DIRECT LDL SERPL: 87 MG/DL — SIGNIFICANT CHANGE UP
MAGNESIUM SERPL-MCNC: 1.8 MG/DL — SIGNIFICANT CHANGE UP (ref 1.6–2.6)
MCHC RBC-ENTMCNC: 29.4 PG — SIGNIFICANT CHANGE UP (ref 27–34)
MCHC RBC-ENTMCNC: 31.8 GM/DL — LOW (ref 32–36)
MCV RBC AUTO: 92.6 FL — SIGNIFICANT CHANGE UP (ref 80–100)
NON HDL CHOLESTEROL: 113 MG/DL — SIGNIFICANT CHANGE UP
NRBC # BLD: 0 /100 WBCS — SIGNIFICANT CHANGE UP (ref 0–0)
NRBC # FLD: 0 K/UL — SIGNIFICANT CHANGE UP (ref 0–0)
PHOSPHATE SERPL-MCNC: 3.1 MG/DL — SIGNIFICANT CHANGE UP (ref 2.5–4.5)
PLATELET # BLD AUTO: 339 K/UL — SIGNIFICANT CHANGE UP (ref 150–400)
POTASSIUM SERPL-MCNC: 4.5 MMOL/L — SIGNIFICANT CHANGE UP (ref 3.5–5.3)
POTASSIUM SERPL-SCNC: 4.5 MMOL/L — SIGNIFICANT CHANGE UP (ref 3.5–5.3)
RBC # BLD: 3.09 M/UL — LOW (ref 4.2–5.8)
RBC # FLD: 13.2 % — SIGNIFICANT CHANGE UP (ref 10.3–14.5)
SARS-COV-2 RNA SPEC QL NAA+PROBE: SIGNIFICANT CHANGE UP
SODIUM SERPL-SCNC: 138 MMOL/L — SIGNIFICANT CHANGE UP (ref 135–145)
TRIGL SERPL-MCNC: 131 MG/DL — SIGNIFICANT CHANGE UP
WBC # BLD: 4.89 K/UL — SIGNIFICANT CHANGE UP (ref 3.8–10.5)
WBC # FLD AUTO: 4.89 K/UL — SIGNIFICANT CHANGE UP (ref 3.8–10.5)

## 2022-10-13 PROCEDURE — 99232 SBSQ HOSP IP/OBS MODERATE 35: CPT

## 2022-10-13 RX ORDER — HYDRALAZINE HCL 50 MG
25 TABLET ORAL
Refills: 0 | Status: DISCONTINUED | OUTPATIENT
Start: 2022-10-13 | End: 2022-10-14

## 2022-10-13 RX ORDER — MAGNESIUM SULFATE 500 MG/ML
2 VIAL (ML) INJECTION ONCE
Refills: 0 | Status: COMPLETED | OUTPATIENT
Start: 2022-10-13 | End: 2022-10-13

## 2022-10-13 RX ORDER — INSULIN LISPRO 100/ML
10 VIAL (ML) SUBCUTANEOUS
Refills: 0 | Status: DISCONTINUED | OUTPATIENT
Start: 2022-10-13 | End: 2022-10-16

## 2022-10-13 RX ORDER — SODIUM CHLORIDE 9 MG/ML
1000 INJECTION, SOLUTION INTRAVENOUS
Refills: 0 | Status: DISCONTINUED | OUTPATIENT
Start: 2022-10-13 | End: 2022-10-16

## 2022-10-13 RX ORDER — INSULIN GLARGINE 100 [IU]/ML
24 INJECTION, SOLUTION SUBCUTANEOUS
Refills: 0 | Status: DISCONTINUED | OUTPATIENT
Start: 2022-10-14 | End: 2022-10-15

## 2022-10-13 RX ADMIN — SODIUM CHLORIDE 3 MILLILITER(S): 9 INJECTION INTRAMUSCULAR; INTRAVENOUS; SUBCUTANEOUS at 06:13

## 2022-10-13 RX ADMIN — AMLODIPINE BESYLATE 10 MILLIGRAM(S): 2.5 TABLET ORAL at 05:15

## 2022-10-13 RX ADMIN — PIPERACILLIN AND TAZOBACTAM 25 GRAM(S): 4; .5 INJECTION, POWDER, LYOPHILIZED, FOR SOLUTION INTRAVENOUS at 05:15

## 2022-10-13 RX ADMIN — Medication 25 MILLIGRAM(S): at 17:38

## 2022-10-13 RX ADMIN — Medication 10 UNIT(S): at 12:35

## 2022-10-13 RX ADMIN — POLYETHYLENE GLYCOL 3350 17 GRAM(S): 17 POWDER, FOR SOLUTION ORAL at 12:40

## 2022-10-13 RX ADMIN — HEPARIN SODIUM 5000 UNIT(S): 5000 INJECTION INTRAVENOUS; SUBCUTANEOUS at 14:04

## 2022-10-13 RX ADMIN — Medication 25 MILLIGRAM(S): at 05:15

## 2022-10-13 RX ADMIN — INSULIN GLARGINE 30 UNIT(S): 100 INJECTION, SOLUTION SUBCUTANEOUS at 08:15

## 2022-10-13 RX ADMIN — Medication 1: at 12:35

## 2022-10-13 RX ADMIN — PIPERACILLIN AND TAZOBACTAM 25 GRAM(S): 4; .5 INJECTION, POWDER, LYOPHILIZED, FOR SOLUTION INTRAVENOUS at 14:03

## 2022-10-13 RX ADMIN — Medication 25 GRAM(S): at 12:34

## 2022-10-13 RX ADMIN — Medication 2: at 17:22

## 2022-10-13 RX ADMIN — GABAPENTIN 300 MILLIGRAM(S): 400 CAPSULE ORAL at 12:39

## 2022-10-13 RX ADMIN — PIPERACILLIN AND TAZOBACTAM 25 GRAM(S): 4; .5 INJECTION, POWDER, LYOPHILIZED, FOR SOLUTION INTRAVENOUS at 22:40

## 2022-10-13 RX ADMIN — Medication 2: at 08:16

## 2022-10-13 RX ADMIN — SODIUM CHLORIDE 3 MILLILITER(S): 9 INJECTION INTRAMUSCULAR; INTRAVENOUS; SUBCUTANEOUS at 14:00

## 2022-10-13 RX ADMIN — SODIUM CHLORIDE 3 MILLILITER(S): 9 INJECTION INTRAMUSCULAR; INTRAVENOUS; SUBCUTANEOUS at 21:24

## 2022-10-13 RX ADMIN — TAMSULOSIN HYDROCHLORIDE 0.4 MILLIGRAM(S): 0.4 CAPSULE ORAL at 22:40

## 2022-10-13 RX ADMIN — HEPARIN SODIUM 5000 UNIT(S): 5000 INJECTION INTRAVENOUS; SUBCUTANEOUS at 05:23

## 2022-10-13 RX ADMIN — Medication 10 UNIT(S): at 17:23

## 2022-10-13 RX ADMIN — HEPARIN SODIUM 5000 UNIT(S): 5000 INJECTION INTRAVENOUS; SUBCUTANEOUS at 22:40

## 2022-10-13 RX ADMIN — Medication 8 UNIT(S): at 08:17

## 2022-10-13 NOTE — PROGRESS NOTE ADULT - ASSESSMENT
Patient is a 62 year old male with a PMHx of HTN, DM2, HLD, BPH, PAD and right BKA secondary to unhealed DM2 ulcer who presented to [facility] for left lower extremity peripheral angiogram.  Patient is now S/P left lower extremity BKA on 10/7/22.      PLAN:  - Will increase hydralazine for BP control   - Appreciate endo recs   - Regular diet  - Continue IV Zosyn  - Planned for OR tomorrow   - Cardiology clearance pending  - Out of bed  - Pain control  - VTE prophylaxis with Heparin subcutaneous      Vascular Surgery, C Team Surgery  P# [id number]

## 2022-10-13 NOTE — CONSULT NOTE ADULT - SUBJECTIVE AND OBJECTIVE BOX
CHIEF COMPLAINT:    HISTORY OF PRESENT ILLNESS:  2 y/o M with PMH of HTN, DM type II, HLD, BPH and PAD, Right BKA 2/2 to unhealed DM ulcer presented to [facility] for LLE peripheral angiogram. As per the patient he developed initially a scab on the bottom of his foot in August of this year and then progressively worsened to an ulcer. Patient stated that the ulcer is located between the 4th web space and recently fell off the bed and cut the adjacent skin for which he received stitched and PO Augmentin. Patient stated that the ulcer cunningham drain a clear liquid but denied any bleeding or purulent discharge. Patient also endorsed of chills for the past 2 weeks with associated body aches and lethargy. Patient was incidental found to be COVID positive on 09/19/22 and that time angiogram was cancelled. Patient otherwise denied any LLE foot pain, claudication or fevers. Patient denied any calf pain, CP, SOB, N/V/D/C, abdominal pain, dysuria, melena, hematochezia, recent travel, sick contact, cough, body aches, pleuritic or positional chest pain.  plan for OR tomorrow for completion L BKA.     PAST MEDICAL & SURGICAL HISTORY:  Diabetes  Type II      Diabetes  Type II      BPH (benign prostatic hypertrophy)      HTN (hypertension)      Venous insufficiency of both lower extremities  R &gt; L      Status post incision and drainage  Rt groin abscess      S/P laparoscopic cholecystectomy      History of cholecystectomy      S/P BKA (below knee amputation), right              MEDICATIONS:  amLODIPine   Tablet 10 milliGRAM(s) Oral daily  heparin   Injectable 5000 Unit(s) SubCutaneous every 8 hours  hydrALAZINE 25 milliGRAM(s) Oral two times a day  tamsulosin 0.4 milliGRAM(s) Oral at bedtime    piperacillin/tazobactam IVPB.. 3.375 Gram(s) IV Intermittent every 8 hours      acetaminophen     Tablet .. 975 milliGRAM(s) Oral every 6 hours PRN  gabapentin 300 milliGRAM(s) Oral daily    polyethylene glycol 3350 17 Gram(s) Oral daily    insulin glargine Injectable (LANTUS) 30 Unit(s) SubCutaneous <User Schedule>  insulin lispro (ADMELOG) corrective regimen sliding scale   SubCutaneous three times a day before meals  insulin lispro (ADMELOG) corrective regimen sliding scale   SubCutaneous at bedtime  insulin lispro Injectable (ADMELOG) 8 Unit(s) SubCutaneous three times a day before meals    influenza   Vaccine 0.5 milliLiter(s) IntraMuscular once  lactated ringers. 1000 milliLiter(s) IV Continuous <Continuous>  magnesium sulfate  IVPB 2 Gram(s) IV Intermittent once  sodium chloride 0.9% lock flush 3 milliLiter(s) IV Push every 8 hours      FAMILY HISTORY:  Family history of diabetes mellitus (DM) (Grandparent)    Paternal family history of emphysema        SOCIAL HISTORY:    [ ] Non-smoker  [ ] Smoker  [ ] Alcohol    Allergies    No Known Allergies    Intolerances    	    REVIEW OF SYSTEMS:  CONSTITUTIONAL: No fever, weight loss, or fatigue  EYES: No eye pain, visual disturbances, or discharge  ENMT:  No difficulty hearing, tinnitus, vertigo; No sinus or throat pain  NECK: No pain or stiffness  RESPIRATORY: No cough, wheezing, chills or hemoptysis; No Shortness of Breath  CARDIOVASCULAR: No chest pain, palpitations, passing out, dizziness, or leg swelling  GASTROINTESTINAL: No abdominal or epigastric pain. No nausea, vomiting, or hematemesis; No diarrhea or constipation. No melena or hematochezia.  GENITOURINARY: No dysuria, frequency, hematuria, or incontinence  NEUROLOGICAL: No headaches, memory loss, loss of strength, numbness, or tremors  SKIN: No itching, burning, rashes, or lesions   LYMPH Nodes: No enlarged glands  ENDOCRINE: No heat or cold intolerance; No hair loss  MUSCULOSKELETAL: No joint pain or swelling; No muscle, back, or extremity pain  PSYCHIATRIC: No depression, anxiety, mood swings, or difficulty sleeping  HEME/LYMPH: No easy bruising, or bleeding gums  ALLERY AND IMMUNOLOGIC: No hives or eczema	    [ ] All others negative	  [ ] Unable to obtain    PHYSICAL EXAM:  T(C): 36.6 (10-13-22 @ 05:30), Max: 37 (10-12-22 @ 09:39)  HR: 82 (10-13-22 @ 05:30) (60 - 87)  BP: 171/80 (10-13-22 @ 05:30) (139/69 - 193/80)  RR: 18 (10-13-22 @ 05:30) (17 - 18)  SpO2: 100% (10-13-22 @ 05:30) (97% - 100%)  Wt(kg): --  I&O's Summary    12 Oct 2022 07:01  -  13 Oct 2022 07:00  --------------------------------------------------------  IN: 520 mL / OUT: 900 mL / NET: -380 mL        Appearance: NAD	  HEENT:  Dry  oral mucosa, PERRL, EOMI	  Lymphatic: No lymphadenopathy  Cardiovascular: Normal S1 S2, No JVD, No murmurs, No edema  Respiratory: Lungs clear to auscultation	  Psychiatry: A & O x 3, Mood & affect appropriate  Gastrointestinal:  Soft, Non-tender, + BS	  Skin: No rashes, No ecchymoses, No cyanosis	  Neurologic: Non-focal  Extremities: Bilateral lower extremity amputations  Vascular: Peripheral pulses palpable 2+ bilaterally    TELEMETRY: 	    ECG:  	Sinus tach non specific stt changes   RADIOLOGY:  < from: Xray Foot AP + Lateral, Left (10.07.22 @ 16:40) >    ACC: [id number] EXAM:  XR FOOT 2 VIEWS LT                          PROCEDURE DATE:  10/07/2022          INTERPRETATION:  Clinical Information: Left lower extremity wound. Rule   out osteomyelitis.    Two views of the left foot were performed.  Comparison: None.    Findings: There is a wound at the lateral aspect of the foot at the level   of the base of the fifth metatarsal. There is marked erosion greatest at   the lateral aspect of the proximal fifth metatarsal which is consistent   with osteomyelitis. There is also erosion at the distal lateral aspect of   the cuboid which may be related to osteomyelitis or septic arthritis as   well.    There is old fracture deformity at the distal shaft of the fifth   metatarsal. There is a comminuted intra-articular fracture at the   proximal aspect of the fifth proximal phalanx which may be acute or   subacute in etiology. There may be slight erosion at the base of the   fifth proximal phalanx; correlate for any wound in this location.    There is mild first metatarsophalangeal osteoarthrosis. There is dorsal   spurring at the midfoot and hindfoot greatest at the dorsal talus. There   is diffuse soft tissue swelling.    IMPRESSION:  Osteomyelitis at the proximal fifth metatarsal. Possible osteomyelitis at   the distal lateral aspect of the cuboid.  Acute or subacute comminuted intra-articular fracture at the base of the   fifth proximal phalanx. There may be slight erosion at the base of the   fifth proximal phalanx laterally; correlate forany soft tissue wound in   this location.    --- End of Report ---          < end of copied text >  < from: TTE with Doppler (w/Cont) (10.11.22 @ 15:48) >    Patient name: AGUSTIN BRAR  YOB: 1960   Age: 62 (M)   MR#: [id number]  Study Date: 10/11/2022  [facility]**Sonographer: QUINTEN Miller  Study quality: Technically Difficult  Referring Physician: Garrick Osorio MD  Blood Pressure: 164/83 mmHg  Height: 183 cm  Weight: 104 kg  BSA: 2.3 m2  ------------------------------------------------------------------------  PROCEDURE: Transthoracic echocardiogram with 2-D, M-Mode  and complete spectral and color flow Doppler.  Intravenous ultrasound enhancing agent was administered for  improved left ventricular endocardial border definition.  Following the intravenous injection of ultrasound enhancing  agent, harmonic imaging was performed.  INDICATION: Hypertensive heart disease without heart  failure (I11.9)  ------------------------------------------------------------------------  DIMENSIONS:  Dimensions:     Normal Values:  LA:     4.0 cm    2.0 - 4.0 cm  Ao:     2.8 cm    2.0 - 3.8 cm  SEPTUM: 0.9 cm    0.6 - 1.2 cm  PWT:    1.1 cm    0.6 - 1.1 cm  LVIDd:  4.2 cm    3.0 - 5.6 cm  LVIDs:  2.6 cm    1.8 - 4.0 cm  Derived Variables:  LVMI: 61 g/m2  RWT: 0.52  Fractional short: 38 %  Ejection Fraction (Visual Estimate): 60-65 %  ------------------------------------------------------------------------  OBSERVATIONS:  Mitral Valve: Normal mitral valve.  Aortic Root: Normal aortic root.  Aortic Valve: Calcified trileaflet aortic valve with normal  opening.  Left Atrium: Normal left atrium.  Left Ventricle: Endocardial visualization enhanced with  intravenous injection of echo contrast (Definity).  Endocardium not well visualized; grossly normal left  ventricular systolic function. Normal left ventricular  internal dimensions and wall thicknesses. Reversal of the  E-A  waves of the mitral inflow pattern is consistent with  diastolic LV dysfunction.  Right Heart: Normal right atrium. The right ventricle is  not well visualized; grossly normal right ventricular  systolic function. Normal tricuspid valve. Normal pulmonic  valve.  Pericardium/PleuraNormal pericardium with no pericardial  effusion.  ------------------------------------------------------------------------  CONCLUSIONS:  1. Endocardial visualization enhanced with intravenous  injection of echo contrast (Definity). Endocardium not well  visualized; grossly normal left ventricular systolic  function.  2. Reversal of the E-A  waves of the mitral inflow pattern  is consistent with diastolic LV dysfunction.  3. The right ventricle is not well visualized; grossly  normal right ventricular systolic function.  ------------------------------------------------------------------------  Confirmed on  10/11/2022 - 16:56:22 by Gio Marte M.D.  ----------------    < end of copied text >    OTHER: 	  	  LABS:	 	    CARDIAC MARKERS:                                  9.1    4.89  )-----------( 339      ( 13 Oct 2022 05:20 )             28.6     10-13    138  |  105  |  27<H>  ----------------------------<  223<H>  4.5   |  22  |  3.36<H>    Ca    8.8      13 Oct 2022 05:20  Phos  3.1     10-13  Mg     1.80     10-13      proBNP:   Lipid Profile:   HgA1c:   TSH:

## 2022-10-13 NOTE — PROGRESS NOTE ADULT - ASSESSMENT
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 Uncontrolled T2DM c/b foot wounds requiring amputation, retinopathy  A1c 8.5% Goal < 7%  Home Regimen: Novolog 20-35 units qAC (typically eats 2 meals per day, takes Novolog twice). Does not take basal insulin     While inpatient:   BG target 100-180 mg/dl  Noted he will be NPO at midnight. Recommend to reduce Lantus to 24 units SQ qAM tomorrow  When diet resumed, expect patient will need more Lantus - suggest standing Lantus 34 units SQ qAM (0800 daily)   Increase Admelog to 10 units SQ TID before meals (Hold if NPO/skips meal)  Continue Admelog low dose correctional scale before meals, low dose at bedtime  Consistent carb diet  BG before meals and bedtime  Hypoglycemia protocol     Discharge Plan:  Basal/bolus, final regimen TBD  Can resume Novolog (dose TBD), will need new prescription for basal insulin PEN (Lantus, Basaglar, Tresiba, Semglee)  Did not tolerate Metformin  mg as an outpatient. Patient states possibly hx of pancreatitis, also with retinopathy- unclear if active as patient has not seen ophtho in a while. Will defer GLP1 agonist. Hold off on SGLT2 inhibitors given recent amputation  Followup with Endocrine Practice at [address] [place] [address], [address], [place] [address]; Ph # [phone]  OR if prefers a [place] location - Dr. Jo: [address] [facility], [address], [facility] [address], [phone]    2. Discharge Planning Issues  Patient expressing difficulty caring for himself, especially now s/p amputation   At home, he lives with elderly wife, reports difficulty with food prep, specifically healthy food, typically orders out  Limited resources for support  Recommend CM and SW consult   Discussed with team    3. HTN  BP Goal < 130/80  On Amlodipine and Hydralazine  Titration per primary team  4. HLD  LDL goal < 70  LDL 87 (10/2022)  Statin if no contraindications    Arlet Owens  Nurse Practitioner  Division of Endocrinology & Diabetes  In house pager #[phone]/long range pager #[phone]    If before 9AM or after 6PM, or on weekends/holidays, please call endocrine answering service for assistance ([phone]).  For nonurgent matters email [email or web address] for assistance.

## 2022-10-13 NOTE — CONSULT NOTE ADULT - PROBLEM SELECTOR RECOMMENDATION 9
S/P left lower extremity BKA on 10/7/22.  Plan for RTOR for formalization tomorrow. Acceptable cardiac risk to proceed   IV abx   vascular surgery following   Pain control

## 2022-10-13 NOTE — PROGRESS NOTE ADULT - SUBJECTIVE AND OBJECTIVE BOX
Chief Complaint: DM 2    History: Patient seen at bedside. Reports he ate full breakfast, denies n/v, denies s/s of hypoglycemia  Reports he thinks he had extra carbohydrates last night (ate pulled pork sandwich + rice), hyperglycemia noted at bedtime  Ordered for NPO at midnight    MEDICATIONS  (STANDING):  amLODIPine   Tablet 10 milliGRAM(s) Oral daily  gabapentin 300 milliGRAM(s) Oral daily  heparin   Injectable 5000 Unit(s) SubCutaneous every 8 hours  hydrALAZINE 25 milliGRAM(s) Oral two times a day  influenza   Vaccine 0.5 milliLiter(s) IntraMuscular once  insulin glargine Injectable (LANTUS) 30 Unit(s) SubCutaneous <User Schedule>  insulin lispro (ADMELOG) corrective regimen sliding scale   SubCutaneous three times a day before meals  insulin lispro (ADMELOG) corrective regimen sliding scale   SubCutaneous at bedtime  insulin lispro Injectable (ADMELOG) 8 Unit(s) SubCutaneous three times a day before meals  lactated ringers. 1000 milliLiter(s) (100 mL/Hr) IV Continuous <Continuous>  magnesium sulfate  IVPB 2 Gram(s) IV Intermittent once  piperacillin/tazobactam IVPB.. 3.375 Gram(s) IV Intermittent every 8 hours  polyethylene glycol 3350 17 Gram(s) Oral daily  sodium chloride 0.9% lock flush 3 milliLiter(s) IV Push every 8 hours  tamsulosin 0.4 milliGRAM(s) Oral at bedtime    MEDICATIONS  (PRN):  acetaminophen     Tablet .. 975 milliGRAM(s) Oral every 6 hours PRN Mild Pain (1 - 3)    No Known Allergies    Review of Systems:  HEENT: No pain  Cardiovascular: No chest pain  Respiratory: No SOB  GI: No nausea, vomiting    PHYSICAL EXAM:  VITALS: T(C): 36.4 (10-13-22 @ 09:58)  T(F): 97.6 (10-13-22 @ 09:58), Max: 98.5 (10-12-22 @ 18:00)  HR: 80 (10-13-22 @ 09:58) (60 - 82)  BP: 149/74 (10-13-22 @ 09:58) (139/69 - 193/80)  RR:  (18 - 18)  SpO2:  (96% - 100%)  Wt(kg): --  GENERAL: NAD  EYES: No proptosis, no lid lag, anicteric  HEENT:  Atraumatic, Normocephalic, moist mucous membranes  RESPIRATORY: unlabored respirations     CAPILLARY BLOOD GLUCOSE    POCT Blood Glucose.: 222 mg/dL (13 Oct 2022 07:28)  POCT Blood Glucose.: 249 mg/dL (12 Oct 2022 21:31)  POCT Blood Glucose.: 196 mg/dL (12 Oct 2022 17:20)  POCT Blood Glucose.: 255 mg/dL (12 Oct 2022 11:49)      10-13    138  |  105  |  27<H>  ----------------------------<  223<H>  4.5   |  22  |  3.36<H>    eGFR: 20<L>    Ca    8.8      10-13  Mg     1.80     10-13  Phos  3.1     10-13      A1C with Estimated Average Glucose Result: 8.5 % (10-08-22 @ 06:33)    Diet, NPO after Midnight:      NPO Start Date: 13-Oct-2022,   NPO Start Time: 23:59  Except Medications (10-13-22 @ 07:37) [Active]  Diet, Consistent Carbohydrate w/Evening Snack (10-08-22 @ 06:53) [Active]

## 2022-10-13 NOTE — PROVIDER CONTACT NOTE (OTHER) - BACKGROUND
Patient is A&O x4, admitted for LLE angiogram, now s/p L BKA for necrotic left foot ulcer w/ concern for gas gangrene. Hx of DM2, HTN, BPH, HLD

## 2022-10-13 NOTE — CONSULT NOTE ADULT - ASSESSMENT
62 year old male with a PMHx of HTN, DM2, HLD, BPH, PAD and right BKA secondary to unhealed DM2 ulcer who presented to [facility] for left lower extremity peripheral angiogram.  Patient is now S/P left lower extremity BKA on 10/7/22.

## 2022-10-13 NOTE — PROVIDER CONTACT NOTE (OTHER) - ASSESSMENT
Patient has a blood pressure of 171/86, HR of 66, other vitals stable, on room air. Pt has mild headache, refusing tylenol at this time

## 2022-10-13 NOTE — PROGRESS NOTE ADULT - SUBJECTIVE AND OBJECTIVE BOX
NEPHROLOGY     Patient seen and examined.    MEDICATIONS  (STANDING):  amLODIPine   Tablet 10 milliGRAM(s) Oral daily  gabapentin 300 milliGRAM(s) Oral daily  heparin   Injectable 5000 Unit(s) SubCutaneous every 8 hours  hydrALAZINE 25 milliGRAM(s) Oral two times a day  influenza   Vaccine 0.5 milliLiter(s) IntraMuscular once  insulin lispro (ADMELOG) corrective regimen sliding scale   SubCutaneous three times a day before meals  insulin lispro (ADMELOG) corrective regimen sliding scale   SubCutaneous at bedtime  insulin lispro Injectable (ADMELOG) 10 Unit(s) SubCutaneous three times a day before meals  lactated ringers. 1000 milliLiter(s) (100 mL/Hr) IV Continuous <Continuous>  piperacillin/tazobactam IVPB.. 3.375 Gram(s) IV Intermittent every 8 hours  polyethylene glycol 3350 17 Gram(s) Oral daily  sodium chloride 0.9% lock flush 3 milliLiter(s) IV Push every 8 hours  tamsulosin 0.4 milliGRAM(s) Oral at bedtime    VITALS:  T(C): , Max: 36.9 (10-12-22 @ 18:00)  T(F): , Max: 98.5 (10-12-22 @ 18:00)  HR: 80 (10-13-22 @ 09:58)  BP: 149/74 (10-13-22 @ 09:58)  RR: 18 (10-13-22 @ 09:58)  SpO2: 96% (10-13-22 @ 09:58)    I and O's:    10-12 @ 07:01  -  10-13 @ 07:00  --------------------------------------------------------  IN: 520 mL / OUT: 900 mL / NET: -380 mL    PHYSICAL EXAM:  Constitutional: obese, NAD  HEENT: NCAT, MMM  Neck: Supple, No JVD  Respiratory: CTA-b/l  Cardiovascular: RRR s1s2, no m/r/g  Gastrointestinal: BS+, soft, NT/ND  Extremities: b/l BKA; LLE dressed  Neurological: no focal deficits; strength grossly intact  Back: no CVAT b/l    LABS:                        9.1    4.89  )-----------( 339      ( 13 Oct 2022 05:20 )             28.6     10-13    138  |  105  |  27<H>  ----------------------------<  223<H>  4.5   |  22  |  3.36<H>    Ca    8.8      13 Oct 2022 05:20  Phos  3.1     10-13  Mg     1.80     10-13    Urine Studies:  Urinalysis Basic - ( 11 Oct 2022 13:03 )    Color: Yellow / Appearance: Slightly Turbid / S.025 / pH: x  Gluc: x / Ketone: Negative  / Bili: Negative / Urobili: 3 mg/dL   Blood: x / Protein: 300 mg/dL / Nitrite: Negative   Leuk Esterase: Negative / RBC: 11 /HPF / WBC 2 /HPF   Sq Epi: x / Non Sq Epi: 3 /HPF / Bacteria: Few    Creatinine, Random Urine: 171 mg/dL (10-11 @ 13:03)  Protein/Creatinine Ratio Calculation: 1.9 Ratio (10-11 @ 13:03)   NEPHROLOGY     Patient seen and examined, no new complaints, denies pain, no sob, in no acute distress.     MEDICATIONS  (STANDING):  amLODIPine   Tablet 10 milliGRAM(s) Oral daily  gabapentin 300 milliGRAM(s) Oral daily  heparin   Injectable 5000 Unit(s) SubCutaneous every 8 hours  hydrALAZINE 25 milliGRAM(s) Oral two times a day  influenza   Vaccine 0.5 milliLiter(s) IntraMuscular once  insulin lispro (ADMELOG) corrective regimen sliding scale   SubCutaneous three times a day before meals  insulin lispro (ADMELOG) corrective regimen sliding scale   SubCutaneous at bedtime  insulin lispro Injectable (ADMELOG) 10 Unit(s) SubCutaneous three times a day before meals  lactated ringers. 1000 milliLiter(s) (100 mL/Hr) IV Continuous <Continuous>  piperacillin/tazobactam IVPB.. 3.375 Gram(s) IV Intermittent every 8 hours  polyethylene glycol 3350 17 Gram(s) Oral daily  sodium chloride 0.9% lock flush 3 milliLiter(s) IV Push every 8 hours  tamsulosin 0.4 milliGRAM(s) Oral at bedtime    VITALS:  T(C): , Max: 36.9 (10-12-22 @ 18:00)  T(F): , Max: 98.5 (10-12-22 @ 18:00)  HR: 80 (10-13-22 @ 09:58)  BP: 149/74 (10-13-22 @ 09:58)  RR: 18 (10-13-22 @ 09:58)  SpO2: 96% (10-13-22 @ 09:58)    I and O's:    10-12 @ 07:01  -  10-13 @ 07:00  --------------------------------------------------------  IN: 520 mL / OUT: 900 mL / NET: -380 mL    PHYSICAL EXAM:  Constitutional: obese, NAD  HEENT: NCAT, MMM  Neck: Supple, No JVD  Respiratory: CTA-b/l  Cardiovascular: RRR s1s2, no m/r/g  Gastrointestinal: BS+, soft, NT/ND  Extremities: b/l BKA; LLE dressed  Neurological: no focal deficits; strength grossly intact  Back: no CVAT b/l    LABS:                        9.1    4.89  )-----------( 339      ( 13 Oct 2022 05:20 )             28.6     10-13    138  |  105  |  27<H>  ----------------------------<  223<H>  4.5   |  22  |  3.36<H>    Ca    8.8      13 Oct 2022 05:20  Phos  3.1     10-13  Mg     1.80     10-13    Urine Studies:  Urinalysis Basic - ( 11 Oct 2022 13:03 )    Color: Yellow / Appearance: Slightly Turbid / S.025 / pH: x  Gluc: x / Ketone: Negative  / Bili: Negative / Urobili: 3 mg/dL   Blood: x / Protein: 300 mg/dL / Nitrite: Negative   Leuk Esterase: Negative / RBC: 11 /HPF / WBC 2 /HPF   Sq Epi: x / Non Sq Epi: 3 /HPF / Bacteria: Few    Creatinine, Random Urine: 171 mg/dL (10-11 @ 13:03)  Protein/Creatinine Ratio Calculation: 1.9 Ratio (10-11 @ 13:03)

## 2022-10-13 NOTE — PROGRESS NOTE ADULT - ASSESSMENT
IMPRESSION: 62M w/ HTN, DM2, CKD, and PAD-R BKA, 10/7/22 p/w necrotic L foot ulcer; s/p L-BKA 10/7/22; now being planned for BKA formalization  (1)Renal - CKD4 - GFR ~20-25ml/min - ~2gm proteinuria. Likely at least in part from diabetic nephropathy.   (2)Lytes - acceptable  (3)CV - hypertensive - ACEI/ARB indicated on chronic basis not only to help with BP control but to delay CKD progression;   (4)Anemia - CKD-associated?  (5)ID - necrotic L foot ulcer; s/p BKA; on IV Zosyn  (6)Vasc - awaiting L BKA formalization       IMPRESSION: 62M w/ HTN, DM2, CKD, and PAD-R BKA, 10/7/22 p/w necrotic L foot ulcer; s/p L-BKA 10/7/22; now being planned for BKA formalization  (1)Renal - CKD4 - GFR ~20-25ml/min - ~2gm proteinuria. Likely at least in part from diabetic nephropathy. Slightly higher  (2)Lytes - acceptable  (3)CV - hypertensive - hydralazine increased from 25 daily to bid  (4)Anemia - CKD-associated?  (5)ID - necrotic L foot ulcer; s/p BKA; on IV Zosyn  (6)Vasc - planned forr L BKA formalization tomorrow    RECOMMEND:  (1)Serum: Iron studies   (2)Defer ACEI/ARB for now  (3)Dose new meds for GFR 20-25ml/min  (4)BKA formalization per Vascular    EVELYN CummingsC  [facility]  [phone]   IMPRESSION: 62M w/ HTN, DM2, CKD, and PAD-R BKA, 10/7/22 p/w necrotic L foot ulcer; s/p L-BKA 10/7/22; now being planned for BKA formalization  (1)Renal - CKD4 - GFR ~20-25ml/min - ~2gm proteinuria. Likely at least in part from diabetic nephropathy. Slightly higher  (2)Lytes - acceptable  (3)CV - hypertensive - hydralazine increased from 25 daily to bid  (4)Anemia - CKD-associated?  (5)ID - necrotic L foot ulcer; s/p BKA; on IV Zosyn  (6)Vasc - planned for L BKA formalization tomorrow    RECOMMEND:  (1)Serum: Iron studies   (2)Defer ACEI/ARB for now  (3)Dose new meds for GFR 20-25ml/min  (4)BKA formalization per Vascular    EVELYN CummingsC  [facility]  [phone]   IMPRESSION: 62M w/ HTN, DM2, CKD, and PAD-R BKA, 10/7/22 p/w necrotic L foot ulcer; s/p L-BKA 10/7/22; now being planned for BKA formalization  (1)Renal - CKD4 - GFR ~20-25ml/min - ~2gm proteinuria. Likely at least in part from diabetic nephropathy. Slightly higher  (2)Lytes - acceptable  (3)CV - hypertensive - hydralazine increased from 25 daily to bid  (4)Anemia - CKD-associated?  (5)ID - necrotic L foot ulcer; s/p BKA; on IV Zosyn  (6)Vasc - planned for L BKA formalization tomorrow    RECOMMEND:  (1)Serum: Iron studies   (2)Defer ACEI/ARB for now  (3)Dose new meds for GFR 20-25ml/min  (4)BKA formalization per Vascular    Ashly Jones NP-C  [facility]  [phone]    RENAL ATTENDING NOTE  Patient seen and examined with NP. Agree with assessment and plan as above.  Agree with forgoing ACEI/ARB given the (mild) uptick in creatinine     Olegario Chavez MD  [facility]  [phone]

## 2022-10-13 NOTE — PROGRESS NOTE ADULT - SUBJECTIVE AND OBJECTIVE BOX
VASCULAR SURGERY PROGRESS NOTE    STATUS POST:    POST OPERATIVE DAY #:       SUBJECTIVE    OVERNIGHT EVENTS:    10-point review of systems completed and negative except as noted above.      OBJECTIVE    MEDICATIONS  acetaminophen     Tablet .. 975 milliGRAM(s) Oral every 6 hours PRN  amLODIPine   Tablet 10 milliGRAM(s) Oral daily  gabapentin 300 milliGRAM(s) Oral daily  heparin   Injectable 5000 Unit(s) SubCutaneous every 8 hours  hydrALAZINE 25 milliGRAM(s) Oral daily  influenza   Vaccine 0.5 milliLiter(s) IntraMuscular once  insulin glargine Injectable (LANTUS) 30 Unit(s) SubCutaneous <User Schedule>  insulin lispro (ADMELOG) corrective regimen sliding scale   SubCutaneous three times a day before meals  insulin lispro (ADMELOG) corrective regimen sliding scale   SubCutaneous at bedtime  insulin lispro Injectable (ADMELOG) 8 Unit(s) SubCutaneous three times a day before meals  piperacillin/tazobactam IVPB.. 3.375 Gram(s) IV Intermittent every 8 hours  polyethylene glycol 3350 17 Gram(s) Oral daily  sodium chloride 0.9% lock flush 3 milliLiter(s) IV Push every 8 hours  tamsulosin 0.4 milliGRAM(s) Oral at bedtime      PHYSICAL EXAM  T(C): 36.7 (10-13-22 @ 01:31), Max: 37 (10-12-22 @ 09:39)  HR: 66 (10-13-22 @ 01:45) (60 - 87)  BP: 171/86 (10-13-22 @ 01:45) (139/69 - 193/80)  RR: 18 (10-13-22 @ 01:45) (16 - 18)  SpO2: 100% (10-13-22 @ 01:45) (97% - 100%)    10-11-22 @ 07:01  -  10-12-22 @ 07:00  --------------------------------------------------------  IN: 900 mL / OUT: 950 mL / NET: -50 mL    10-12-22 @ 07:01  -  10-13-22 @ 02:15  --------------------------------------------------------  IN: 520 mL / OUT: 900 mL / NET: -380 mL        General: Appears well, NAD  Neuro: AAOx3  CHEST: Clear to auscultation bilaterally  CV: Regular rate and rhythm  Abdomen: soft, nontender, nondistended, no rebound or guarding  Extremities: Grossly symmetric    LABS                        9.1    4.83  )-----------( 327      ( 12 Oct 2022 05:49 )             28.3     10-12    136  |  102  |  26<H>  ----------------------------<  248<H>  4.5   |  21<L>  |  3.05<H>    Ca    8.6      12 Oct 2022 05:49  Phos  2.8     10-12  Mg     1.80     10-12        Urinalysis Basic - ( 11 Oct 2022 13:03 )    Color: Yellow / Appearance: Slightly Turbid / S.025 / pH: x  Gluc: x / Ketone: Negative  / Bili: Negative / Urobili: 3 mg/dL   Blood: x / Protein: 300 mg/dL / Nitrite: Negative   Leuk Esterase: Negative / RBC: 11 /HPF / WBC 2 /HPF   Sq Epi: x / Non Sq Epi: 3 /HPF / Bacteria: Few        RADIOLOGY & ADDITIONAL STUDIES VASCULAR SURGERY PROGRESS NOTE    STATUS POST:    POST OPERATIVE DAY #: 6 from [facility]      SUBJECTIVE  Patient seen and evaluated at bedside this AM. Resting comfortably in bed. No complaints. Aware of plan for OR tomorrow for completion L BKA.       OVERNIGHT EVENTS: Elevated BP to 193/80 and high blood sugars.    10-point review of systems completed and negative except as noted above.      OBJECTIVE    MEDICATIONS  acetaminophen     Tablet .. 975 milliGRAM(s) Oral every 6 hours PRN  amLODIPine   Tablet 10 milliGRAM(s) Oral daily  gabapentin 300 milliGRAM(s) Oral daily  heparin   Injectable 5000 Unit(s) SubCutaneous every 8 hours  hydrALAZINE 25 milliGRAM(s) Oral daily  influenza   Vaccine 0.5 milliLiter(s) IntraMuscular once  insulin glargine Injectable (LANTUS) 30 Unit(s) SubCutaneous <User Schedule>  insulin lispro (ADMELOG) corrective regimen sliding scale   SubCutaneous three times a day before meals  insulin lispro (ADMELOG) corrective regimen sliding scale   SubCutaneous at bedtime  insulin lispro Injectable (ADMELOG) 8 Unit(s) SubCutaneous three times a day before meals  piperacillin/tazobactam IVPB.. 3.375 Gram(s) IV Intermittent every 8 hours  polyethylene glycol 3350 17 Gram(s) Oral daily  sodium chloride 0.9% lock flush 3 milliLiter(s) IV Push every 8 hours  tamsulosin 0.4 milliGRAM(s) Oral at bedtime      PHYSICAL EXAM  T(C): 36.7 (10-13-22 @ 01:31), Max: 37 (10-12-22 @ 09:39)  HR: 66 (10-13-22 @ 01:45) (60 - 87)  BP: 171/86 (10-13-22 @ 01:45) (139/69 - 193/80)  RR: 18 (10-13-22 @ 01:45) (16 - 18)  SpO2: 100% (10-13-22 @ 01:45) (97% - 100%)    10-11-22 @ 07:01  -  10-12-22 @ 07:00  --------------------------------------------------------  IN: 900 mL / OUT: 950 mL / NET: -50 mL    10-12-22 @ 07:01  -  10-13-22 @ 02:15  --------------------------------------------------------  IN: 520 mL / OUT: 900 mL / NET: -380 mL        General: Appears well, NAD  Neuro: AAOx3  CHEST: Clear to auscultation bilaterally  CV: Regular rate and rhythm  Abdomen: soft, nontender, nondistended, no rebound or guarding  Extremities:Bilateral lower extremity amputations.      LABS                        9.1    4.83  )-----------( 327      ( 12 Oct 2022 05:49 )             28.3     10-12    136  |  102  |  26<H>  ----------------------------<  248<H>  4.5   |  21<L>  |  3.05<H>    Ca    8.6      12 Oct 2022 05:49  Phos  2.8     10-12  Mg     1.80     10-12        Urinalysis Basic - ( 11 Oct 2022 13:03 )    Color: Yellow / Appearance: Slightly Turbid / S.025 / pH: x  Gluc: x / Ketone: Negative  / Bili: Negative / Urobili: 3 mg/dL   Blood: x / Protein: 300 mg/dL / Nitrite: Negative   Leuk Esterase: Negative / RBC: 11 /HPF / WBC 2 /HPF   Sq Epi: x / Non Sq Epi: 3 /HPF / Bacteria: Few        RADIOLOGY & ADDITIONAL STUDIES

## 2022-10-14 ENCOUNTER — RESULT REVIEW (OUTPATIENT)
Age: 62
End: 2022-10-14

## 2022-10-14 LAB
ANION GAP SERPL CALC-SCNC: 12 MMOL/L — SIGNIFICANT CHANGE UP (ref 7–14)
APTT BLD: 31.3 SEC — SIGNIFICANT CHANGE UP (ref 27–36.3)
BLD GP AB SCN SERPL QL: NEGATIVE — SIGNIFICANT CHANGE UP
BUN SERPL-MCNC: 25 MG/DL — HIGH (ref 7–23)
CALCIUM SERPL-MCNC: 8.1 MG/DL — LOW (ref 8.4–10.5)
CHLORIDE SERPL-SCNC: 104 MMOL/L — SIGNIFICANT CHANGE UP (ref 98–107)
CO2 SERPL-SCNC: 19 MMOL/L — LOW (ref 22–31)
CREAT SERPL-MCNC: 2.9 MG/DL — HIGH (ref 0.5–1.3)
EGFR: 24 ML/MIN/1.73M2 — LOW
GLUCOSE BLDC GLUCOMTR-MCNC: 205 MG/DL — HIGH (ref 70–99)
GLUCOSE BLDC GLUCOMTR-MCNC: 248 MG/DL — HIGH (ref 70–99)
GLUCOSE BLDC GLUCOMTR-MCNC: 308 MG/DL — HIGH (ref 70–99)
GLUCOSE BLDC GLUCOMTR-MCNC: 315 MG/DL — HIGH (ref 70–99)
GLUCOSE BLDC GLUCOMTR-MCNC: 356 MG/DL — HIGH (ref 70–99)
GLUCOSE SERPL-MCNC: 178 MG/DL — HIGH (ref 70–99)
HCT VFR BLD CALC: 24.1 % — LOW (ref 39–50)
HGB BLD-MCNC: 7.7 G/DL — LOW (ref 13–17)
INR BLD: 1.11 RATIO — SIGNIFICANT CHANGE UP (ref 0.88–1.16)
MAGNESIUM SERPL-MCNC: 1.6 MG/DL — SIGNIFICANT CHANGE UP (ref 1.6–2.6)
MCHC RBC-ENTMCNC: 30.1 PG — SIGNIFICANT CHANGE UP (ref 27–34)
MCHC RBC-ENTMCNC: 32 GM/DL — SIGNIFICANT CHANGE UP (ref 32–36)
MCV RBC AUTO: 94.1 FL — SIGNIFICANT CHANGE UP (ref 80–100)
NRBC # BLD: 0 /100 WBCS — SIGNIFICANT CHANGE UP (ref 0–0)
NRBC # FLD: 0 K/UL — SIGNIFICANT CHANGE UP (ref 0–0)
PHOSPHATE SERPL-MCNC: 2.6 MG/DL — SIGNIFICANT CHANGE UP (ref 2.5–4.5)
PLATELET # BLD AUTO: 306 K/UL — SIGNIFICANT CHANGE UP (ref 150–400)
POTASSIUM SERPL-MCNC: 4.7 MMOL/L — SIGNIFICANT CHANGE UP (ref 3.5–5.3)
POTASSIUM SERPL-SCNC: 4.7 MMOL/L — SIGNIFICANT CHANGE UP (ref 3.5–5.3)
PROTHROM AB SERPL-ACNC: 12.9 SEC — SIGNIFICANT CHANGE UP (ref 10.5–13.4)
RBC # BLD: 2.56 M/UL — LOW (ref 4.2–5.8)
RBC # FLD: 13.2 % — SIGNIFICANT CHANGE UP (ref 10.3–14.5)
RH IG SCN BLD-IMP: POSITIVE — SIGNIFICANT CHANGE UP
SODIUM SERPL-SCNC: 135 MMOL/L — SIGNIFICANT CHANGE UP (ref 135–145)
WBC # BLD: 4.68 K/UL — SIGNIFICANT CHANGE UP (ref 3.8–10.5)
WBC # FLD AUTO: 4.68 K/UL — SIGNIFICANT CHANGE UP (ref 3.8–10.5)

## 2022-10-14 PROCEDURE — 88311 DECALCIFY TISSUE: CPT | Mod: 26

## 2022-10-14 PROCEDURE — 88307 TISSUE EXAM BY PATHOLOGIST: CPT | Mod: 26

## 2022-10-14 PROCEDURE — 27880 AMPUTATION OF LOWER LEG: CPT | Mod: 58,LT

## 2022-10-14 PROCEDURE — 97605 NEG PRS WND THER DME<=50SQCM: CPT | Mod: 59

## 2022-10-14 DEVICE — CLIP APPLIER COVIDIEN SURGICLIP 11.5" MEDIUM: Type: IMPLANTABLE DEVICE | Site: LEFT | Status: FUNCTIONAL

## 2022-10-14 DEVICE — CLIP APPLIER COVIDIEN SURGICLIP III 9" SM: Type: IMPLANTABLE DEVICE | Site: LEFT | Status: FUNCTIONAL

## 2022-10-14 DEVICE — CLIP APPLIER COVIDIEN SURGICLIP 13" LARGE: Type: IMPLANTABLE DEVICE | Site: LEFT | Status: FUNCTIONAL

## 2022-10-14 RX ORDER — ATORVASTATIN CALCIUM 80 MG/1
40 TABLET, FILM COATED ORAL AT BEDTIME
Refills: 0 | Status: DISCONTINUED | OUTPATIENT
Start: 2022-10-14 | End: 2022-10-22

## 2022-10-14 RX ORDER — FENTANYL CITRATE 50 UG/ML
25 INJECTION INTRAVENOUS
Refills: 0 | Status: DISCONTINUED | OUTPATIENT
Start: 2022-10-14 | End: 2022-10-14

## 2022-10-14 RX ORDER — HYDROMORPHONE HYDROCHLORIDE 2 MG/ML
1 INJECTION INTRAMUSCULAR; INTRAVENOUS; SUBCUTANEOUS ONCE
Refills: 0 | Status: DISCONTINUED | OUTPATIENT
Start: 2022-10-14 | End: 2022-10-14

## 2022-10-14 RX ORDER — SODIUM CHLORIDE 9 MG/ML
500 INJECTION, SOLUTION INTRAVENOUS ONCE
Refills: 0 | Status: COMPLETED | OUTPATIENT
Start: 2022-10-14 | End: 2022-10-14

## 2022-10-14 RX ORDER — HYDRALAZINE HCL 50 MG
50 TABLET ORAL
Refills: 0 | Status: DISCONTINUED | OUTPATIENT
Start: 2022-10-14 | End: 2022-10-15

## 2022-10-14 RX ORDER — OXYCODONE HYDROCHLORIDE 5 MG/1
10 TABLET ORAL EVERY 4 HOURS
Refills: 0 | Status: DISCONTINUED | OUTPATIENT
Start: 2022-10-14 | End: 2022-10-15

## 2022-10-14 RX ORDER — FENTANYL CITRATE 50 UG/ML
50 INJECTION INTRAVENOUS
Refills: 0 | Status: DISCONTINUED | OUTPATIENT
Start: 2022-10-14 | End: 2022-10-14

## 2022-10-14 RX ORDER — ACETAMINOPHEN 500 MG
1000 TABLET ORAL EVERY 6 HOURS
Refills: 0 | Status: DISCONTINUED | OUTPATIENT
Start: 2022-10-14 | End: 2022-10-22

## 2022-10-14 RX ORDER — ACETAMINOPHEN 500 MG
650 TABLET ORAL EVERY 6 HOURS
Refills: 0 | Status: DISCONTINUED | OUTPATIENT
Start: 2022-10-14 | End: 2022-10-14

## 2022-10-14 RX ORDER — HYDROMORPHONE HYDROCHLORIDE 2 MG/ML
0.5 INJECTION INTRAMUSCULAR; INTRAVENOUS; SUBCUTANEOUS
Refills: 0 | Status: DISCONTINUED | OUTPATIENT
Start: 2022-10-14 | End: 2022-10-14

## 2022-10-14 RX ORDER — ONDANSETRON 8 MG/1
4 TABLET, FILM COATED ORAL ONCE
Refills: 0 | Status: DISCONTINUED | OUTPATIENT
Start: 2022-10-14 | End: 2022-10-14

## 2022-10-14 RX ORDER — HYDRALAZINE HCL 50 MG
50 TABLET ORAL
Refills: 0 | Status: DISCONTINUED | OUTPATIENT
Start: 2022-10-14 | End: 2022-10-14

## 2022-10-14 RX ORDER — HYDROMORPHONE HYDROCHLORIDE 2 MG/ML
0.5 INJECTION INTRAMUSCULAR; INTRAVENOUS; SUBCUTANEOUS EVERY 4 HOURS
Refills: 0 | Status: DISCONTINUED | OUTPATIENT
Start: 2022-10-14 | End: 2022-10-16

## 2022-10-14 RX ORDER — HYDRALAZINE HCL 50 MG
10 TABLET ORAL ONCE
Refills: 0 | Status: COMPLETED | OUTPATIENT
Start: 2022-10-14 | End: 2022-10-14

## 2022-10-14 RX ORDER — OXYCODONE HYDROCHLORIDE 5 MG/1
5 TABLET ORAL EVERY 4 HOURS
Refills: 0 | Status: DISCONTINUED | OUTPATIENT
Start: 2022-10-14 | End: 2022-10-15

## 2022-10-14 RX ADMIN — PIPERACILLIN AND TAZOBACTAM 25 GRAM(S): 4; .5 INJECTION, POWDER, LYOPHILIZED, FOR SOLUTION INTRAVENOUS at 22:26

## 2022-10-14 RX ADMIN — SODIUM CHLORIDE 100 MILLILITER(S): 9 INJECTION, SOLUTION INTRAVENOUS at 00:50

## 2022-10-14 RX ADMIN — SODIUM CHLORIDE 3 MILLILITER(S): 9 INJECTION INTRAMUSCULAR; INTRAVENOUS; SUBCUTANEOUS at 14:14

## 2022-10-14 RX ADMIN — GABAPENTIN 300 MILLIGRAM(S): 400 CAPSULE ORAL at 16:53

## 2022-10-14 RX ADMIN — TAMSULOSIN HYDROCHLORIDE 0.4 MILLIGRAM(S): 0.4 CAPSULE ORAL at 22:26

## 2022-10-14 RX ADMIN — Medication 25 MILLIGRAM(S): at 06:01

## 2022-10-14 RX ADMIN — ATORVASTATIN CALCIUM 40 MILLIGRAM(S): 80 TABLET, FILM COATED ORAL at 22:27

## 2022-10-14 RX ADMIN — Medication 4: at 17:52

## 2022-10-14 RX ADMIN — HYDROMORPHONE HYDROCHLORIDE 0.5 MILLIGRAM(S): 2 INJECTION INTRAMUSCULAR; INTRAVENOUS; SUBCUTANEOUS at 13:14

## 2022-10-14 RX ADMIN — SODIUM CHLORIDE 100 MILLILITER(S): 9 INJECTION, SOLUTION INTRAVENOUS at 12:50

## 2022-10-14 RX ADMIN — HYDROMORPHONE HYDROCHLORIDE 0.5 MILLIGRAM(S): 2 INJECTION INTRAMUSCULAR; INTRAVENOUS; SUBCUTANEOUS at 13:30

## 2022-10-14 RX ADMIN — SODIUM CHLORIDE 3 MILLILITER(S): 9 INJECTION INTRAMUSCULAR; INTRAVENOUS; SUBCUTANEOUS at 22:00

## 2022-10-14 RX ADMIN — HYDROMORPHONE HYDROCHLORIDE 1 MILLIGRAM(S): 2 INJECTION INTRAMUSCULAR; INTRAVENOUS; SUBCUTANEOUS at 16:52

## 2022-10-14 RX ADMIN — SODIUM CHLORIDE 1000 MILLILITER(S): 9 INJECTION, SOLUTION INTRAVENOUS at 12:55

## 2022-10-14 RX ADMIN — AMLODIPINE BESYLATE 10 MILLIGRAM(S): 2.5 TABLET ORAL at 05:50

## 2022-10-14 RX ADMIN — Medication 650 MILLIGRAM(S): at 19:34

## 2022-10-14 RX ADMIN — HYDROMORPHONE HYDROCHLORIDE 1 MILLIGRAM(S): 2 INJECTION INTRAMUSCULAR; INTRAVENOUS; SUBCUTANEOUS at 17:22

## 2022-10-14 RX ADMIN — SODIUM CHLORIDE 3 MILLILITER(S): 9 INJECTION INTRAMUSCULAR; INTRAVENOUS; SUBCUTANEOUS at 06:00

## 2022-10-14 RX ADMIN — Medication 3: at 23:09

## 2022-10-14 RX ADMIN — HEPARIN SODIUM 5000 UNIT(S): 5000 INJECTION INTRAVENOUS; SUBCUTANEOUS at 14:32

## 2022-10-14 RX ADMIN — Medication 2: at 12:23

## 2022-10-14 RX ADMIN — Medication 10 MILLIGRAM(S): at 12:33

## 2022-10-14 RX ADMIN — PIPERACILLIN AND TAZOBACTAM 25 GRAM(S): 4; .5 INJECTION, POWDER, LYOPHILIZED, FOR SOLUTION INTRAVENOUS at 05:49

## 2022-10-14 RX ADMIN — PIPERACILLIN AND TAZOBACTAM 25 GRAM(S): 4; .5 INJECTION, POWDER, LYOPHILIZED, FOR SOLUTION INTRAVENOUS at 14:31

## 2022-10-14 RX ADMIN — HEPARIN SODIUM 5000 UNIT(S): 5000 INJECTION INTRAVENOUS; SUBCUTANEOUS at 22:25

## 2022-10-14 RX ADMIN — Medication 50 MILLIGRAM(S): at 19:34

## 2022-10-14 NOTE — PROGRESS NOTE ADULT - SUBJECTIVE AND OBJECTIVE BOX
Subjective: Patient seen and examined. No new events except as noted.   for formalization of BKA today       REVIEW OF SYSTEMS:    CONSTITUTIONAL: No weakness, fevers or chills  EYES/ENT: No visual changes;  No vertigo or throat pain   NECK: No pain or stiffness  RESPIRATORY: No cough, wheezing, hemoptysis; No shortness of breath  CARDIOVASCULAR: No chest pain or palpitations  GASTROINTESTINAL: No abdominal or epigastric pain. No nausea, vomiting, or hematemesis; No diarrhea or constipation. No melena or hematochezia.  GENITOURINARY: No dysuria, frequency or hematuria  NEUROLOGICAL: No numbness or weakness  SKIN: No itching, burning, rashes, or lesions   All other review of systems is negative unless indicated above.    MEDICATIONS:  MEDICATIONS  (STANDING):  amLODIPine   Tablet 10 milliGRAM(s) Oral daily  gabapentin 300 milliGRAM(s) Oral daily  heparin   Injectable 5000 Unit(s) SubCutaneous every 8 hours  hydrALAZINE 25 milliGRAM(s) Oral two times a day  influenza   Vaccine 0.5 milliLiter(s) IntraMuscular once  insulin glargine Injectable (LANTUS) 24 Unit(s) SubCutaneous <User Schedule>  insulin lispro (ADMELOG) corrective regimen sliding scale   SubCutaneous three times a day before meals  insulin lispro (ADMELOG) corrective regimen sliding scale   SubCutaneous at bedtime  insulin lispro Injectable (ADMELOG) 10 Unit(s) SubCutaneous three times a day before meals  lactated ringers. 1000 milliLiter(s) (100 mL/Hr) IV Continuous <Continuous>  piperacillin/tazobactam IVPB.. 3.375 Gram(s) IV Intermittent every 8 hours  polyethylene glycol 3350 17 Gram(s) Oral daily  sodium chloride 0.9% lock flush 3 milliLiter(s) IV Push every 8 hours  tamsulosin 0.4 milliGRAM(s) Oral at bedtime      PHYSICAL EXAM:  T(C): 36.9 (10-14-22 @ 06:39), Max: 36.9 (10-14-22 @ 06:39)  HR: 81 (10-14-22 @ 06:39) (65 - 81)  BP: 176/86 (10-14-22 @ 06:39) (132/65 - 176/86)  RR: 16 (10-14-22 @ 06:39) (16 - 18)  SpO2: 98% (10-14-22 @ 06:39) (96% - 98%)  Wt(kg): --  I&O's Summary    13 Oct 2022 07:01  -  14 Oct 2022 07:00  --------------------------------------------------------  IN: 1070 mL / OUT: 810 mL / NET: 260 mL            Appearance: NAD	  HEENT:  Dry  oral mucosa, PERRL, EOMI	  Lymphatic: No lymphadenopathy  Cardiovascular: Normal S1 S2, No JVD, No murmurs, No edema  Respiratory: Lungs clear to auscultation	  Psychiatry: A & O x 3, Mood & affect appropriate  Gastrointestinal:  Soft, Non-tender, + BS	  Skin: No rashes, No ecchymoses, No cyanosis	  Neurologic: Non-focal  Extremities: Bilateral lower extremity amputations  Vascular: Peripheral pulses palpable 2+ bilaterally        LABS:    CARDIAC MARKERS:                                7.7    4.68  )-----------( 306      ( 14 Oct 2022 01:37 )             24.1     10-14    135  |  104  |  25<H>  ----------------------------<  178<H>  4.7   |  19<L>  |  2.90<H>    Ca    8.1<L>      14 Oct 2022 01:37  Phos  2.6     10-14  Mg     1.60     10-14      proBNP:   Lipid Profile:   HgA1c:   TSH:     1          TELEMETRY: 	    ECG:  	  RADIOLOGY:   DIAGNOSTIC TESTING:  [ ] Echocardiogram:  [ ]  Catheterization:  [ ] Stress Test:    OTHER:

## 2022-10-14 NOTE — PROGRESS NOTE ADULT - SUBJECTIVE AND OBJECTIVE BOX
Overnight events noted      VITAL:  T(C): , Max: 36.9 (10-14-22 @ 06:39)  T(F): , Max: 98.5 (10-14-22 @ 06:39)  HR: 81 (10-14-22 @ 06:39)  BP: 176/86 (10-14-22 @ 06:39)  BP(mean): --  RR: 16 (10-14-22 @ 06:39)  SpO2: 98% (10-14-22 @ 06:39)      PHYSICAL EXAM:  Constitutional: obese, NAD  HEENT: NCAT, MMM  Neck: Supple, No JVD  Respiratory: CTA-b/l  Cardiovascular: RRR s1s2, no m/r/g  Gastrointestinal: BS+, soft, NT/ND  Extremities: b/l BKA; LLE dressed  Neurological: no focal deficits; strength grossly intact  Back: no CVAT b/l      LABS:                        7.7    4.68  )-----------( 306      ( 14 Oct 2022 01:37 )             24.1     Na(135)/K(4.7)/Cl(104)/HCO3(19)/BUN(25)/Cr(2.90)Glu(178)/Ca(8.1)/Mg(1.60)/PO4(2.6)    10-14 @ 01:37  Na(138)/K(4.5)/Cl(105)/HCO3(22)/BUN(27)/Cr(3.36)Glu(223)/Ca(8.8)/Mg(1.80)/PO4(3.1)    10-13 @ 05:20  Na(136)/K(4.5)/Cl(102)/HCO3(21)/BUN(26)/Cr(3.05)Glu(248)/Ca(8.6)/Mg(1.80)/PO4(2.8)    10-12 @ 05:49        IMPRESSION: 62M w/ HTN, DM2, CKD, and PAD-R BKA, 10/7/22 p/w necrotic L foot ulcer; s/p L-BKA 10/7/22; now being planned for BKA formalization  (1)Renal - CKD4 - GFR ~20-25ml/min - ~2gm proteinuria. Likely at least in part from diabetic nephropathy. Slightly higher  (2)Lytes - acceptable  (3)CV - hypertensive - hydralazine increased from 25 daily to bid  (4)Anemia - multifactorial including from CKD  (5)ID - necrotic L foot ulcer; s/p BKA; on IV Zosyn  (6)Vasc - s/p BKA formalization today      RECOMMEND:  (1)PRBCs as planned  (2)Defer ACEI/ARB for now  (3)Dose new meds for GFR 20-25ml/min  (4)BMP+Mg+PO4 daily        Olegario Chavez MD  [facility]  Office: [phone]  Cell: [phone]       no complaints      VITAL:  T(C): , Max: 36.9 (10-14-22 @ 06:39)  T(F): , Max: 98.5 (10-14-22 @ 06:39)  HR: 81 (10-14-22 @ 06:39)  BP: 176/86 (10-14-22 @ 06:39)  BP(mean): --  RR: 16 (10-14-22 @ 06:39)  SpO2: 98% (10-14-22 @ 06:39)      PHYSICAL EXAM:  Constitutional: obese, NAD  HEENT: NCAT, MMM  Neck: Supple, No JVD  Respiratory: CTA-b/l  Cardiovascular: RRR s1s2, no m/r/g  Gastrointestinal: BS+, soft, NT/ND  Extremities: b/l BKA; LLE dressed  Neurological: no focal deficits; strength grossly intact  Back: no CVAT b/l      LABS:                        7.7    4.68  )-----------( 306      ( 14 Oct 2022 01:37 )             24.1     Na(135)/K(4.7)/Cl(104)/HCO3(19)/BUN(25)/Cr(2.90)Glu(178)/Ca(8.1)/Mg(1.60)/PO4(2.6)    10-14 @ 01:37  Na(138)/K(4.5)/Cl(105)/HCO3(22)/BUN(27)/Cr(3.36)Glu(223)/Ca(8.8)/Mg(1.80)/PO4(3.1)    10-13 @ 05:20  Na(136)/K(4.5)/Cl(102)/HCO3(21)/BUN(26)/Cr(3.05)Glu(248)/Ca(8.6)/Mg(1.80)/PO4(2.8)    10-12 @ 05:49        IMPRESSION: 62M w/ HTN, DM2, CKD, and PAD-R BKA, 10/7/22 p/w necrotic L foot ulcer; s/p L-BKA 10/7/22; now being planned for BKA formalization  (1)Renal - CKD4 - GFR ~20-25ml/min - ~2gm proteinuria. Likely at least in part from diabetic nephropathy. Slightly higher  (2)Lytes - acceptable  (3)CV - hypertensive - hydralazine increased from 25 daily to bid  (4)Anemia - multifactorial including from CKD  (5)ID - necrotic L foot ulcer; s/p BKA; on IV Zosyn  (6)Vasc - s/p BKA formalization today      RECOMMEND:  (1)PRBCs as planned  (2)Defer ACEI/ARB for now  (3)Dose new meds for GFR 20-25ml/min  (4)BMP+Mg+PO4 daily        Olegario Chavez MD  [facility]  Office: [phone]  Cell: [phone]

## 2022-10-14 NOTE — PROGRESS NOTE ADULT - ASSESSMENT
62 year old male with a PMHx of HTN, DM2, HLD, BPH, PAD and right BKA secondary to unhealed DM2 ulcer who presented to [facility] for left lower extremity peripheral angiogram.  Patient is now S/P left lower extremity BKA on 10/7/22.

## 2022-10-14 NOTE — PROGRESS NOTE ADULT - SUBJECTIVE AND OBJECTIVE BOX
VASCULAR SURGERY PROGRESS NOTE    CC:   Hospital Day #  Post-Op Day #    Procedure:    Events of past 24 hours:    ROS otherwise negative except per subjective and HPI      Vital Signs Last 24 Hrs  T(C): 36.6 (14 Oct 2022 01:56), Max: 36.6 (13 Oct 2022 05:30)  T(F): 97.9 (14 Oct 2022 01:56), Max: 97.9 (14 Oct 2022 01:56)  HR: 70 (14 Oct 2022 01:56) (66 - 82)  BP: 141/71 (14 Oct 2022 01:56) (132/65 - 171/80)  BP(mean): --  RR: 18 (14 Oct 2022 01:56) (18 - 18)  SpO2: 98% (14 Oct 2022 01:56) (96% - 100%)    Parameters below as of 14 Oct 2022 01:56  Patient On (Oxygen Delivery Method): room air        Pain (0-10):            Pain Control Adequate: [] YES [] N    I&O's Detail    12 Oct 2022 07:01  -  13 Oct 2022 07:00  --------------------------------------------------------  IN:    Oral Fluid: 520 mL  Total IN: 520 mL    OUT:    Voided (mL): 900 mL  Total OUT: 900 mL    Total NET: -380 mL      13 Oct 2022 07:01  -  14 Oct 2022 03:21  --------------------------------------------------------  IN:    IV PiggyBack: 100 mL    Oral Fluid: 360 mL  Total IN: 460 mL    OUT:    Voided (mL): 410 mL  Total OUT: 410 mL    Total NET: 50 mL          PHYSICAL EXAM    Appearance: Normal	  HEENT:   Normal oral mucosa, PERRL, EOMI	  Neck: Supple, - JVD; Carotid Bruit   Cardiovascular: Normal S1 S2, No JVD, No murmurs,   Respiratory: Lungs clear to auscultation/Decreased Breath Sounds/No Rales, Rhonchi, Wheezing	  Gastrointestinal:  Soft, Non-tender, + BS	  Skin: No rashes, No ecchymoses, No cyanosis  Extremities: Normal range of motion, No clubbing, cyanosis or edema  Neurologic: Non-focal  Psychiatry: A & O x 3, Mood & affect appropriate    PULSES:  Femoral:  Popliteal:  Dorsal Pedal:  Posterior Tibial:  Capillary:      MEDICATIONS:   MEDICATIONS  (STANDING):  amLODIPine   Tablet 10 milliGRAM(s) Oral daily  gabapentin 300 milliGRAM(s) Oral daily  heparin   Injectable 5000 Unit(s) SubCutaneous every 8 hours  hydrALAZINE 25 milliGRAM(s) Oral two times a day  influenza   Vaccine 0.5 milliLiter(s) IntraMuscular once  insulin glargine Injectable (LANTUS) 24 Unit(s) SubCutaneous <User Schedule>  insulin lispro (ADMELOG) corrective regimen sliding scale   SubCutaneous three times a day before meals  insulin lispro (ADMELOG) corrective regimen sliding scale   SubCutaneous at bedtime  insulin lispro Injectable (ADMELOG) 10 Unit(s) SubCutaneous three times a day before meals  lactated ringers. 1000 milliLiter(s) (100 mL/Hr) IV Continuous <Continuous>  piperacillin/tazobactam IVPB.. 3.375 Gram(s) IV Intermittent every 8 hours  polyethylene glycol 3350 17 Gram(s) Oral daily  sodium chloride 0.9% lock flush 3 milliLiter(s) IV Push every 8 hours  tamsulosin 0.4 milliGRAM(s) Oral at bedtime    MEDICATIONS  (PRN):  acetaminophen     Tablet .. 975 milliGRAM(s) Oral every 6 hours PRN Mild Pain (1 - 3)      LAB/STUDIES:                        7.7    4.68  )-----------( 306      ( 14 Oct 2022 01:37 )             24.1     10-14    135  |  104  |  25<H>  ----------------------------<  178<H>  4.7   |  19<L>  |  2.90<H>    Ca    8.1<L>      14 Oct 2022 01:37  Phos  2.6     10-14  Mg     1.60     10-14      PT/INR - ( 14 Oct 2022 01:37 )   PT: 12.9 sec;   INR: 1.11 ratio         PTT - ( 14 Oct 2022 01:37 )  PTT:31.3 sec                      IMAGING:       VASCULAR SURGERY PROGRESS NOTE    STATUS POST:    POST OPERATIVE DAY #: 6 from [facility]      SUBJECTIVE  Patient seen and evaluated at bedside this AM. Resting comfortably in bed. No complaints. Aware of plan for OR tomorrow for completion L BKA.       OVERNIGHT EVENTS: Elevated BP to 193/80 and high blood sugars.    10-point review of systems completed and negative except as noted above.      OBJECTIVE    MEDICATIONS  acetaminophen     Tablet .. 975 milliGRAM(s) Oral every 6 hours PRN  amLODIPine   Tablet 10 milliGRAM(s) Oral daily  gabapentin 300 milliGRAM(s) Oral daily  heparin   Injectable 5000 Unit(s) SubCutaneous every 8 hours  hydrALAZINE 25 milliGRAM(s) Oral daily  influenza   Vaccine 0.5 milliLiter(s) IntraMuscular once  insulin glargine Injectable (LANTUS) 30 Unit(s) SubCutaneous <User Schedule>  insulin lispro (ADMELOG) corrective regimen sliding scale   SubCutaneous three times a day before meals  insulin lispro (ADMELOG) corrective regimen sliding scale   SubCutaneous at bedtime  insulin lispro Injectable (ADMELOG) 8 Unit(s) SubCutaneous three times a day before meals  piperacillin/tazobactam IVPB.. 3.375 Gram(s) IV Intermittent every 8 hours  polyethylene glycol 3350 17 Gram(s) Oral daily  sodium chloride 0.9% lock flush 3 milliLiter(s) IV Push every 8 hours  tamsulosin 0.4 milliGRAM(s) Oral at bedtime      PHYSICAL EXAM  ICU Vital Signs Last 24 Hrs  T(C): 36.9 (14 Oct 2022 06:39), Max: 36.9 (14 Oct 2022 06:39)  T(F): 98.5 (14 Oct 2022 06:39), Max: 98.5 (14 Oct 2022 06:39)  HR: 81 (14 Oct 2022 06:39) (65 - 81)  BP: 176/86 (14 Oct 2022 06:39) (132/65 - 176/86)  BP(mean): --  ABP: --  ABP(mean): --  RR: 16 (14 Oct 2022 06:39) (16 - 18)  SpO2: 98% (14 Oct 2022 06:39) (96% - 98%)    O2 Parameters below as of 14 Oct 2022 06:39  Patient On (Oxygen Delivery Method): room air      General: Appears well, NAD  Neuro: AAOx3  CHEST: Clear to auscultation bilaterally  CV: Regular rate and rhythm  Abdomen: soft, nontender, nondistended, no rebound or guarding  Extremities:Bilateral lower extremity amputations.        LABS                                   7.7    4.68  )-----------( 306      ( 14 Oct 2022 01:37 )             24.1     10-14    135  |  104  |  25<H>  ----------------------------<  178<H>  4.7   |  19<L>  |  2.90<H>    Ca    8.1<L>      14 Oct 2022 01:37  Phos  2.6     10-14  Mg     1.60     10-14          Urinalysis Basic - ( 11 Oct 2022 13:03 )    Color: Yellow / Appearance: Slightly Turbid / S.025 / pH: x  Gluc: x / Ketone: Negative  / Bili: Negative / Urobili: 3 mg/dL   Blood: x / Protein: 300 mg/dL / Nitrite: Negative   Leuk Esterase: Negative / RBC: 11 /HPF / WBC 2 /HPF   Sq Epi: x / Non Sq Epi: 3 /HPF / Bacteria: Few        RADIOLOGY & ADDITIONAL STUDIES

## 2022-10-14 NOTE — PRE-OP CHECKLIST - IV STARTED
Received a fax from [facility] requesting refill for calcitriol 0.25 mcg daily, vitamin D3 1,000 units daily, potassium CL 10 MEQ daily. Pt was last seen by LIVE Mendoza 1 year ago. Pt is currently on [facility] neph provider recall list. RN refill meds for 90 day supply with 1 refill.   
Has left arm #22 and left hand #20/no
yes

## 2022-10-14 NOTE — BRIEF OPERATIVE NOTE - NSICDXBRIEFPROCEDURE_GEN_ALL_CORE_FT
PROCEDURES:  BKA (below-knee amputation) 07-Oct-2022 21:32:56  Shahida Avila  
PROCEDURES:  BKA (below-knee amputation) 07-Oct-2022 21:32:56  Shahida Avila

## 2022-10-15 LAB
ANION GAP SERPL CALC-SCNC: 10 MMOL/L — SIGNIFICANT CHANGE UP (ref 7–14)
ANION GAP SERPL CALC-SCNC: 12 MMOL/L — SIGNIFICANT CHANGE UP (ref 7–14)
BUN SERPL-MCNC: 32 MG/DL — HIGH (ref 7–23)
BUN SERPL-MCNC: 34 MG/DL — HIGH (ref 7–23)
CALCIUM SERPL-MCNC: 8.6 MG/DL — SIGNIFICANT CHANGE UP (ref 8.4–10.5)
CALCIUM SERPL-MCNC: 8.7 MG/DL — SIGNIFICANT CHANGE UP (ref 8.4–10.5)
CHLORIDE SERPL-SCNC: 98 MMOL/L — SIGNIFICANT CHANGE UP (ref 98–107)
CHLORIDE SERPL-SCNC: 98 MMOL/L — SIGNIFICANT CHANGE UP (ref 98–107)
CO2 SERPL-SCNC: 21 MMOL/L — LOW (ref 22–31)
CO2 SERPL-SCNC: 22 MMOL/L — SIGNIFICANT CHANGE UP (ref 22–31)
CREAT SERPL-MCNC: 3.16 MG/DL — HIGH (ref 0.5–1.3)
CREAT SERPL-MCNC: 3.26 MG/DL — HIGH (ref 0.5–1.3)
EGFR: 20 ML/MIN/1.73M2 — LOW
EGFR: 21 ML/MIN/1.73M2 — LOW
GLUCOSE BLDC GLUCOMTR-MCNC: 164 MG/DL — HIGH (ref 70–99)
GLUCOSE BLDC GLUCOMTR-MCNC: 204 MG/DL — HIGH (ref 70–99)
GLUCOSE BLDC GLUCOMTR-MCNC: 281 MG/DL — HIGH (ref 70–99)
GLUCOSE BLDC GLUCOMTR-MCNC: 349 MG/DL — HIGH (ref 70–99)
GLUCOSE BLDC GLUCOMTR-MCNC: 353 MG/DL — HIGH (ref 70–99)
GLUCOSE BLDC GLUCOMTR-MCNC: 364 MG/DL — HIGH (ref 70–99)
GLUCOSE BLDC GLUCOMTR-MCNC: 400 MG/DL — HIGH (ref 70–99)
GLUCOSE SERPL-MCNC: 248 MG/DL — HIGH (ref 70–99)
GLUCOSE SERPL-MCNC: 356 MG/DL — HIGH (ref 70–99)
HCT VFR BLD CALC: 28.1 % — LOW (ref 39–50)
HGB BLD-MCNC: 8.9 G/DL — LOW (ref 13–17)
MAGNESIUM SERPL-MCNC: 1.6 MG/DL — SIGNIFICANT CHANGE UP (ref 1.6–2.6)
MAGNESIUM SERPL-MCNC: 1.7 MG/DL — SIGNIFICANT CHANGE UP (ref 1.6–2.6)
MCHC RBC-ENTMCNC: 29.4 PG — SIGNIFICANT CHANGE UP (ref 27–34)
MCHC RBC-ENTMCNC: 31.7 GM/DL — LOW (ref 32–36)
MCV RBC AUTO: 92.7 FL — SIGNIFICANT CHANGE UP (ref 80–100)
NRBC # BLD: 0 /100 WBCS — SIGNIFICANT CHANGE UP (ref 0–0)
NRBC # FLD: 0 K/UL — SIGNIFICANT CHANGE UP (ref 0–0)
PHOSPHATE SERPL-MCNC: 2.7 MG/DL — SIGNIFICANT CHANGE UP (ref 2.5–4.5)
PHOSPHATE SERPL-MCNC: 3.2 MG/DL — SIGNIFICANT CHANGE UP (ref 2.5–4.5)
PLATELET # BLD AUTO: 360 K/UL — SIGNIFICANT CHANGE UP (ref 150–400)
POTASSIUM SERPL-MCNC: 4.8 MMOL/L — SIGNIFICANT CHANGE UP (ref 3.5–5.3)
POTASSIUM SERPL-MCNC: 6.4 MMOL/L — CRITICAL HIGH (ref 3.5–5.3)
POTASSIUM SERPL-SCNC: 4.8 MMOL/L — SIGNIFICANT CHANGE UP (ref 3.5–5.3)
POTASSIUM SERPL-SCNC: 6.4 MMOL/L — CRITICAL HIGH (ref 3.5–5.3)
RBC # BLD: 3.03 M/UL — LOW (ref 4.2–5.8)
RBC # FLD: 13.2 % — SIGNIFICANT CHANGE UP (ref 10.3–14.5)
SODIUM SERPL-SCNC: 130 MMOL/L — LOW (ref 135–145)
SODIUM SERPL-SCNC: 131 MMOL/L — LOW (ref 135–145)
WBC # BLD: 11.96 K/UL — HIGH (ref 3.8–10.5)
WBC # FLD AUTO: 11.96 K/UL — HIGH (ref 3.8–10.5)

## 2022-10-15 PROCEDURE — 93010 ELECTROCARDIOGRAM REPORT: CPT

## 2022-10-15 RX ORDER — DEXTROSE 50 % IN WATER 50 %
50 SYRINGE (ML) INTRAVENOUS ONCE
Refills: 0 | Status: COMPLETED | OUTPATIENT
Start: 2022-10-15 | End: 2022-10-15

## 2022-10-15 RX ORDER — INSULIN GLARGINE 100 [IU]/ML
34 INJECTION, SOLUTION SUBCUTANEOUS
Refills: 0 | Status: DISCONTINUED | OUTPATIENT
Start: 2022-10-15 | End: 2022-10-20

## 2022-10-15 RX ORDER — SODIUM ZIRCONIUM CYCLOSILICATE 10 G/10G
10 POWDER, FOR SUSPENSION ORAL ONCE
Refills: 0 | Status: COMPLETED | OUTPATIENT
Start: 2022-10-15 | End: 2022-10-15

## 2022-10-15 RX ORDER — CALCIUM GLUCONATE 100 MG/ML
1 VIAL (ML) INTRAVENOUS ONCE
Refills: 0 | Status: COMPLETED | OUTPATIENT
Start: 2022-10-15 | End: 2022-10-15

## 2022-10-15 RX ORDER — SODIUM ZIRCONIUM CYCLOSILICATE 10 G/10G
5 POWDER, FOR SUSPENSION ORAL ONCE
Refills: 0 | Status: COMPLETED | OUTPATIENT
Start: 2022-10-15 | End: 2022-10-15

## 2022-10-15 RX ORDER — INSULIN HUMAN 100 [IU]/ML
10 INJECTION, SOLUTION SUBCUTANEOUS ONCE
Refills: 0 | Status: COMPLETED | OUTPATIENT
Start: 2022-10-15 | End: 2022-10-15

## 2022-10-15 RX ORDER — HYDRALAZINE HCL 50 MG
75 TABLET ORAL THREE TIMES A DAY
Refills: 0 | Status: DISCONTINUED | OUTPATIENT
Start: 2022-10-15 | End: 2022-10-22

## 2022-10-15 RX ADMIN — Medication 75 MILLIGRAM(S): at 13:45

## 2022-10-15 RX ADMIN — Medication 100 GRAM(S): at 09:00

## 2022-10-15 RX ADMIN — Medication 10 UNIT(S): at 12:31

## 2022-10-15 RX ADMIN — INSULIN HUMAN 10 UNIT(S): 100 INJECTION, SOLUTION SUBCUTANEOUS at 08:59

## 2022-10-15 RX ADMIN — HEPARIN SODIUM 5000 UNIT(S): 5000 INJECTION INTRAVENOUS; SUBCUTANEOUS at 22:44

## 2022-10-15 RX ADMIN — Medication 50 MILLILITER(S): at 08:59

## 2022-10-15 RX ADMIN — ATORVASTATIN CALCIUM 40 MILLIGRAM(S): 80 TABLET, FILM COATED ORAL at 22:52

## 2022-10-15 RX ADMIN — OXYCODONE HYDROCHLORIDE 10 MILLIGRAM(S): 5 TABLET ORAL at 09:06

## 2022-10-15 RX ADMIN — Medication 10 UNIT(S): at 08:19

## 2022-10-15 RX ADMIN — OXYCODONE HYDROCHLORIDE 5 MILLIGRAM(S): 5 TABLET ORAL at 01:50

## 2022-10-15 RX ADMIN — INSULIN GLARGINE 34 UNIT(S): 100 INJECTION, SOLUTION SUBCUTANEOUS at 08:21

## 2022-10-15 RX ADMIN — HYDROMORPHONE HYDROCHLORIDE 0.5 MILLIGRAM(S): 2 INJECTION INTRAMUSCULAR; INTRAVENOUS; SUBCUTANEOUS at 11:34

## 2022-10-15 RX ADMIN — HYDROMORPHONE HYDROCHLORIDE 0.5 MILLIGRAM(S): 2 INJECTION INTRAMUSCULAR; INTRAVENOUS; SUBCUTANEOUS at 06:00

## 2022-10-15 RX ADMIN — PIPERACILLIN AND TAZOBACTAM 25 GRAM(S): 4; .5 INJECTION, POWDER, LYOPHILIZED, FOR SOLUTION INTRAVENOUS at 13:46

## 2022-10-15 RX ADMIN — SODIUM ZIRCONIUM CYCLOSILICATE 10 GRAM(S): 10 POWDER, FOR SUSPENSION ORAL at 13:45

## 2022-10-15 RX ADMIN — SODIUM CHLORIDE 3 MILLILITER(S): 9 INJECTION INTRAMUSCULAR; INTRAVENOUS; SUBCUTANEOUS at 06:51

## 2022-10-15 RX ADMIN — SODIUM CHLORIDE 3 MILLILITER(S): 9 INJECTION INTRAMUSCULAR; INTRAVENOUS; SUBCUTANEOUS at 13:32

## 2022-10-15 RX ADMIN — OXYCODONE HYDROCHLORIDE 5 MILLIGRAM(S): 5 TABLET ORAL at 01:02

## 2022-10-15 RX ADMIN — HYDROMORPHONE HYDROCHLORIDE 0.5 MILLIGRAM(S): 2 INJECTION INTRAMUSCULAR; INTRAVENOUS; SUBCUTANEOUS at 04:52

## 2022-10-15 RX ADMIN — PIPERACILLIN AND TAZOBACTAM 25 GRAM(S): 4; .5 INJECTION, POWDER, LYOPHILIZED, FOR SOLUTION INTRAVENOUS at 06:12

## 2022-10-15 RX ADMIN — GABAPENTIN 300 MILLIGRAM(S): 400 CAPSULE ORAL at 11:39

## 2022-10-15 RX ADMIN — HYDROMORPHONE HYDROCHLORIDE 0.5 MILLIGRAM(S): 2 INJECTION INTRAMUSCULAR; INTRAVENOUS; SUBCUTANEOUS at 12:34

## 2022-10-15 RX ADMIN — OXYCODONE HYDROCHLORIDE 10 MILLIGRAM(S): 5 TABLET ORAL at 04:45

## 2022-10-15 RX ADMIN — HEPARIN SODIUM 5000 UNIT(S): 5000 INJECTION INTRAVENOUS; SUBCUTANEOUS at 06:11

## 2022-10-15 RX ADMIN — Medication 75 MILLIGRAM(S): at 22:52

## 2022-10-15 RX ADMIN — Medication 3: at 12:30

## 2022-10-15 RX ADMIN — Medication 2: at 17:25

## 2022-10-15 RX ADMIN — HEPARIN SODIUM 5000 UNIT(S): 5000 INJECTION INTRAVENOUS; SUBCUTANEOUS at 13:46

## 2022-10-15 RX ADMIN — OXYCODONE HYDROCHLORIDE 10 MILLIGRAM(S): 5 TABLET ORAL at 10:06

## 2022-10-15 RX ADMIN — OXYCODONE HYDROCHLORIDE 10 MILLIGRAM(S): 5 TABLET ORAL at 03:50

## 2022-10-15 RX ADMIN — Medication 10 UNIT(S): at 17:26

## 2022-10-15 RX ADMIN — Medication 50 MILLIGRAM(S): at 06:11

## 2022-10-15 RX ADMIN — Medication 4: at 08:19

## 2022-10-15 RX ADMIN — TAMSULOSIN HYDROCHLORIDE 0.4 MILLIGRAM(S): 0.4 CAPSULE ORAL at 22:52

## 2022-10-15 RX ADMIN — SODIUM ZIRCONIUM CYCLOSILICATE 5 GRAM(S): 10 POWDER, FOR SUSPENSION ORAL at 19:44

## 2022-10-15 RX ADMIN — SODIUM CHLORIDE 3 MILLILITER(S): 9 INJECTION INTRAMUSCULAR; INTRAVENOUS; SUBCUTANEOUS at 22:00

## 2022-10-15 RX ADMIN — AMLODIPINE BESYLATE 10 MILLIGRAM(S): 2.5 TABLET ORAL at 06:12

## 2022-10-15 NOTE — PROGRESS NOTE ADULT - SUBJECTIVE AND OBJECTIVE BOX
Subjective: Patient seen and examined. No new events except as noted.   s/p L BKA formalization yesterday.    REVIEW OF SYSTEMS:    CONSTITUTIONAL: + weakness, fevers or chills  EYES/ENT: No visual changes;  No vertigo or throat pain   NECK: No pain or stiffness  RESPIRATORY: No cough, wheezing, hemoptysis; No shortness of breath  CARDIOVASCULAR: No chest pain or palpitations  GASTROINTESTINAL: No abdominal or epigastric pain. No nausea, vomiting, or hematemesis; No diarrhea or constipation. No melena or hematochezia.  GENITOURINARY: No dysuria, frequency or hematuria  NEUROLOGICAL: No numbness or weakness  SKIN: No itching, burning, rashes, or lesions   All other review of systems is negative unless indicated above.    MEDICATIONS:  MEDICATIONS  (STANDING):  amLODIPine   Tablet 10 milliGRAM(s) Oral daily  gabapentin 300 milliGRAM(s) Oral daily  heparin   Injectable 5000 Unit(s) SubCutaneous every 8 hours  hydrALAZINE 25 milliGRAM(s) Oral two times a day  influenza   Vaccine 0.5 milliLiter(s) IntraMuscular once  insulin glargine Injectable (LANTUS) 24 Unit(s) SubCutaneous <User Schedule>  insulin lispro (ADMELOG) corrective regimen sliding scale   SubCutaneous three times a day before meals  insulin lispro (ADMELOG) corrective regimen sliding scale   SubCutaneous at bedtime  insulin lispro Injectable (ADMELOG) 10 Unit(s) SubCutaneous three times a day before meals  lactated ringers. 1000 milliLiter(s) (100 mL/Hr) IV Continuous <Continuous>  piperacillin/tazobactam IVPB.. 3.375 Gram(s) IV Intermittent every 8 hours  polyethylene glycol 3350 17 Gram(s) Oral daily  sodium chloride 0.9% lock flush 3 milliLiter(s) IV Push every 8 hours  tamsulosin 0.4 milliGRAM(s) Oral at bedtime    PHYSICAL EXAM:  Vital Signs Last 24 Hrs  T(C): 36.5 (15 Oct 2022 08:55), Max: 37.3 (15 Oct 2022 02:45)  T(F): 97.7 (15 Oct 2022 08:55), Max: 99.1 (15 Oct 2022 02:45)  HR: 91 (15 Oct 2022 08:55) (70 - 98)  BP: 167/76 (15 Oct 2022 08:55) (143/69 - 191/90)  BP(mean): 86 (14 Oct 2022 14:30) (82 - 115)  RR: 18 (15 Oct 2022 08:55) (12 - 20)  SpO2: 98% (15 Oct 2022 08:55) (93% - 99%)    Parameters below as of 15 Oct 2022 08:55  Patient On (Oxygen Delivery Method): room air    I&O's Summary    14 Oct 2022 07:01  -  15 Oct 2022 07:00  --------------------------------------------------------  IN: 1990 mL / OUT: 1260 mL / NET: 730 mL    15 Oct 2022 07:01  -  15 Oct 2022 10:23  --------------------------------------------------------  IN: 240 mL / OUT: 1100 mL / NET: -860 mL    Appearance: NAD	  HEENT:  Dry  oral mucosa, PERRL, EOMI	  Lymphatic: No lymphadenopathy  Cardiovascular: Normal S1 S2, No JVD, No murmurs, No edema  Respiratory: Lungs clear to auscultation	  Psychiatry: A & O x 3, Mood & affect appropriate  Gastrointestinal:  Soft, Non-tender, + BS	  Skin: No rashes, No ecchymoses, No cyanosis	  Neurologic: Non-focal  Extremities: bilateral lower extremity amputations - L BKA dressing c/d/i  Vascular: Peripheral pulses palpable 2+ bilaterally    LABS:    CARDIAC MARKERS:                        8.9    11.96 )-----------( 360      ( 15 Oct 2022 06:48 )             28.1     10-15    130<L>  |  98  |  32<H>  ----------------------------<  356<H>  6.4<HH>   |  22  |  3.16<H>    Ca    8.6      15 Oct 2022 06:48  Phos  2.7     10-15  Mg     1.60     10-15    proBNP:   Lipid Profile:   HgA1c:   TSH:    TELEMETRY: 	    ECG:  	  RADIOLOGY:   DIAGNOSTIC TESTING:  [ ] Echocardiogram:  [ ]  Catheterization:  [ ] Stress Test:    OTHER:

## 2022-10-15 NOTE — PROGRESS NOTE ADULT - ASSESSMENT
IMPRESSION: 62M w/ HTN, DM2, CKD, and PAD-R BKA, 10/7/22 p/w necrotic L foot ulcer; s/p L-BKA 10/7/22; now being planned for BKA formalization  (1)Renal - CKD4 - GFR ~20-25ml/min - ~2gm proteinuria. Likely at least in part from diabetic nephropathy.   (2)Lytes - acceptable  (3)CV - hypertensive - hydralazine increased from 25 daily to bid  (4)Anemia - multifactorial including from CKD  (5)ID - necrotic L foot ulcer; s/p BKA; on IV Zosyn  (6)Vasc - s/p BKA formalization today  (7) Hyperkalemia- hyperkalemia protocol given  ( 8) Mild hyponatremia    RECOMMEND:  (1)a/w hyperkalemia protocol   (2) Repeat BMP at 2 pm  (3)Defer ACEI/ARB for now  (4)Dose new meds for GFR 20-25ml/min          Aure Davenport NP  [facility]  Office: [phone] IMPRESSION: 62M w/ HTN, DM2, CKD, and PAD-R BKA, 10/7/22 p/w necrotic L foot ulcer; s/p L-BKA 10/7/22; now being planned for BKA formalization  (1)Renal - CKD4 - GFR ~20-25ml/min - ~2gm proteinuria. Likely at least in part from diabetic nephropathy.   (2)Lytes - acceptable  (3)CV - hypertensive - hydralazine increased from 25 daily to bid  (4)Anemia - multifactorial including from CKD  (5)ID - necrotic L foot ulcer; s/p BKA; on IV Zosyn  (6)Vasc - s/p BKA formalization today  (7) Hyperkalemia- hyperkalemia protocol given this morning. Repeat potassium 4.8  ( 8) Mild hyponatremia    RECOMMEND:  (1)BMP daily  (2) Defer ACEI/ARB for now  (3)Dose new meds for GFR 20-25ml/min          Aure Davenport NP  [facility]  Office: [phone] IMPRESSION: 62M w/ HTN, DM2, CKD, and PAD-R BKA, 10/7/22 p/w necrotic L foot ulcer; s/p L-BKA 10/7/22; now being planned for BKA formalization  (1)Renal - CKD4 - GFR ~20-25ml/min - ~2gm proteinuria. Likely at least in part from diabetic nephropathy.   (2)CV - hypertensive - hydralazine increased from 25 daily to bid  (3)Anemia - multifactorial including from CKD  (4)ID - necrotic L foot ulcer; s/p BKA; on IV Zosyn  (5)Vasc - s/p BKA formalization today  (6) Hyperkalemia- hyperkalemia protocol given this morning. Repeat potassium 4.8  (7) Mild hyponatremia    RECOMMEND:  (1)BMP daily  (2) Defer ACEI/ARB for now  (3)Dose new meds for GFR 20-25ml/min          Aure Davenport NP  [facility]  Office: [phone]

## 2022-10-15 NOTE — PROGRESS NOTE ADULT - ASSESSMENT
Assessment: Patient is a 62 year old male with a PMHx of HTN, DM2, HLD, BPH, PAD and right BKA secondary to unhealed DM2 ulcer who presented to [facility] for left lower extremity peripheral angiogram.  Patient is now S/P left lower extremity BKA on 10/7/22. s/p LLE BKA Formalization 10/15/22.    - Pain control with IV dilaudid now and PO pain meds going forward  - continue Gabapentin  - continue BP meds. Caridology recs appreciated.  - diet as tolerated.   - VAC to wall suction.   - keep knee in immobilizer  - DVT ppx.    Assessment: Patient is a 62 year old male with a PMHx of HTN, DM2, HLD, BPH, PAD and right BKA secondary to unhealed DM2 ulcer who presented to [facility] for left lower extremity peripheral angiogram.  Patient is now S/P left lower extremity BKA on 10/7/22. s/p LLE BKA Formalization 10/15/22.    - Pain control with IV dilaudid now and PO pain meds going forward  - Potassium 6.4 this AM, Calcium gluconate, EKG (normal), Insulin/Dextrose given  - Per nephrology recs can give albuterol/Fluids+Lasix/lokelma or kayexelate to assist with potassium removal  - F/u BMP 6 hours post initial shift  - continue Gabapentin  - continue BP meds. Caridology recs appreciated.  - diet as tolerated.   - VAC to wall suction.   - keep knee in immobilizer  - DVT ppx.

## 2022-10-15 NOTE — PROGRESS NOTE ADULT - ASSESSMENT
62 year old male with a PMHx of HTN, DM2, HLD, BPH, PAD and right BKA secondary to unhealed DM2 ulcer who presented to [facility] for left lower extremity peripheral angiogram.  Patient is now S/P left lower extremity BKA on 10/7/22.

## 2022-10-15 NOTE — PROGRESS NOTE ADULT - SUBJECTIVE AND OBJECTIVE BOX
VASCULAR SURGERY PROGRESS NOTE    CC:   Hospital Day #  Post-Op Day #    Procedure:    Events of past 24 hours:    ROS otherwise negative except per subjective and HPI      Vital Signs Last 24 Hrs  T(C): 36.7 (14 Oct 2022 21:30), Max: 37.1 (14 Oct 2022 14:30)  T(F): 98.1 (14 Oct 2022 21:30), Max: 98.8 (14 Oct 2022 14:30)  HR: 90 (14 Oct 2022 21:30) (65 - 97)  BP: 167/78 (14 Oct 2022 21:30) (141/71 - 191/90)  BP(mean): 86 (14 Oct 2022 14:30) (82 - 115)  RR: 17 (14 Oct 2022 21:30) (12 - 20)  SpO2: 98% (14 Oct 2022 21:30) (93% - 99%)    Parameters below as of 14 Oct 2022 21:30  Patient On (Oxygen Delivery Method): room air        Pain (0-10):            Pain Control Adequate: [] YES [] N    I&O's Detail    13 Oct 2022 07:01  -  14 Oct 2022 07:00  --------------------------------------------------------  IN:    IV PiggyBack: 100 mL    Lactated Ringers: 400 mL    Oral Fluid: 570 mL  Total IN: 1070 mL    OUT:    Voided (mL): 810 mL  Total OUT: 810 mL    Total NET: 260 mL      14 Oct 2022 07:01  -  15 Oct 2022 00:23  --------------------------------------------------------  IN:    IV PiggyBack: 50 mL    Lactated Ringers: 500 mL    Oral Fluid: 240 mL  Total IN: 790 mL    OUT:    Indwelling Catheter - Urethral (mL): 1260 mL  Total OUT: 1260 mL    Total NET: -470 mL          PHYSICAL EXAM    Appearance: Normal	  HEENT:   Normal oral mucosa, PERRL, EOMI	  Neck: Supple, - JVD; Carotid Bruit   Cardiovascular: Normal S1 S2, No JVD, No murmurs,   Respiratory: Lungs clear to auscultation/Decreased Breath Sounds/No Rales, Rhonchi, Wheezing	  Gastrointestinal:  Soft, Non-tender, + BS	  Skin: No rashes, No ecchymoses, No cyanosis  Extremities: Normal range of motion, No clubbing, cyanosis or edema  Neurologic: Non-focal  Psychiatry: A & O x 3, Mood & affect appropriate    PULSES:  Femoral:  Popliteal:  Dorsal Pedal:  Posterior Tibial:  Capillary:      MEDICATIONS:   MEDICATIONS  (STANDING):  amLODIPine   Tablet 10 milliGRAM(s) Oral daily  atorvastatin 40 milliGRAM(s) Oral at bedtime  gabapentin 300 milliGRAM(s) Oral daily  heparin   Injectable 5000 Unit(s) SubCutaneous every 8 hours  hydrALAZINE 50 milliGRAM(s) Oral two times a day  influenza   Vaccine 0.5 milliLiter(s) IntraMuscular once  insulin glargine Injectable (LANTUS) 24 Unit(s) SubCutaneous <User Schedule>  insulin lispro (ADMELOG) corrective regimen sliding scale   SubCutaneous three times a day before meals  insulin lispro (ADMELOG) corrective regimen sliding scale   SubCutaneous at bedtime  insulin lispro Injectable (ADMELOG) 10 Unit(s) SubCutaneous three times a day before meals  lactated ringers. 1000 milliLiter(s) (100 mL/Hr) IV Continuous <Continuous>  piperacillin/tazobactam IVPB.. 3.375 Gram(s) IV Intermittent every 8 hours  polyethylene glycol 3350 17 Gram(s) Oral daily  sodium chloride 0.9% lock flush 3 milliLiter(s) IV Push every 8 hours  tamsulosin 0.4 milliGRAM(s) Oral at bedtime    MEDICATIONS  (PRN):  acetaminophen   IVPB .. 1000 milliGRAM(s) IV Intermittent every 6 hours PRN Moderate Pain (4 - 6)  HYDROmorphone  Injectable 0.5 milliGRAM(s) IV Push every 4 hours PRN Severe breakthrough Pain (7 - 10)  oxyCODONE    IR 5 milliGRAM(s) Oral every 4 hours PRN Moderate Pain (4 - 6)  oxyCODONE    IR 10 milliGRAM(s) Oral every 4 hours PRN Severe Pain (7 - 10)      LAB/STUDIES:                        7.7    4.68  )-----------( 306      ( 14 Oct 2022 01:37 )             24.1     10-14    135  |  104  |  25<H>  ----------------------------<  178<H>  4.7   |  19<L>  |  2.90<H>    Ca    8.1<L>      14 Oct 2022 01:37  Phos  2.6     10-14  Mg     1.60     10-14      PT/INR - ( 14 Oct 2022 01:37 )   PT: 12.9 sec;   INR: 1.11 ratio         PTT - ( 14 Oct 2022 01:37 )  PTT:31.3 sec                      IMAGING:       VASCULAR SURGERY PROGRESS NOTE    CC:     Post-Op Day #1    Procedure: L BKA formalization    Events of past 24 hours:  - OR yesterday for R BKA formalization  - POCT in 300s, endocrine recs Lantus 34u    ROS otherwise negative except per subjective and HPI      Vital Signs Last 24 Hrs  T(C): 36.7 (14 Oct 2022 21:30), Max: 37.1 (14 Oct 2022 14:30)  T(F): 98.1 (14 Oct 2022 21:30), Max: 98.8 (14 Oct 2022 14:30)  HR: 90 (14 Oct 2022 21:30) (65 - 97)  BP: 167/78 (14 Oct 2022 21:30) (141/71 - 191/90)  BP(mean): 86 (14 Oct 2022 14:30) (82 - 115)  RR: 17 (14 Oct 2022 21:30) (12 - 20)  SpO2: 98% (14 Oct 2022 21:30) (93% - 99%)    Parameters below as of 14 Oct 2022 21:30  Patient On (Oxygen Delivery Method): room air        Pain (0-10):            Pain Control Adequate: [] YES [] N    I&O's Detail    13 Oct 2022 07:01  -  14 Oct 2022 07:00  --------------------------------------------------------  IN:    IV PiggyBack: 100 mL    Lactated Ringers: 400 mL    Oral Fluid: 570 mL  Total IN: 1070 mL    OUT:    Voided (mL): 810 mL  Total OUT: 810 mL    Total NET: 260 mL      14 Oct 2022 07:01  -  15 Oct 2022 00:23  --------------------------------------------------------  IN:    IV PiggyBack: 50 mL    Lactated Ringers: 500 mL    Oral Fluid: 240 mL  Total IN: 790 mL    OUT:    Indwelling Catheter - Urethral (mL): 1260 mL  Total OUT: 1260 mL    Total NET: -470 mL          PHYSICAL EXAM    Appearance: Normal	  HEENT:   Normal oral mucosa, PERRL, EOMI	  Neck: Supple, - JVD; Carotid Bruit   Cardiovascular: Normal S1 S2, No JVD, No murmurs,   Respiratory: Lungs clear to auscultation/Decreased Breath Sounds/No Rales, Rhonchi, Wheezing	  Gastrointestinal:  Soft, Non-tender, + BS	  Skin: No rashes, No ecchymoses, No cyanosis  Extremities: Normal range of motion, No clubbing, cyanosis or edema  Neurologic: Non-focal  Psychiatry: A & O x 3, Mood & affect appropriate    PULSES:  Femoral:  Popliteal:  Dorsal Pedal:  Posterior Tibial:  Capillary:      MEDICATIONS:   MEDICATIONS  (STANDING):  amLODIPine   Tablet 10 milliGRAM(s) Oral daily  atorvastatin 40 milliGRAM(s) Oral at bedtime  gabapentin 300 milliGRAM(s) Oral daily  heparin   Injectable 5000 Unit(s) SubCutaneous every 8 hours  hydrALAZINE 50 milliGRAM(s) Oral two times a day  influenza   Vaccine 0.5 milliLiter(s) IntraMuscular once  insulin glargine Injectable (LANTUS) 24 Unit(s) SubCutaneous <User Schedule>  insulin lispro (ADMELOG) corrective regimen sliding scale   SubCutaneous three times a day before meals  insulin lispro (ADMELOG) corrective regimen sliding scale   SubCutaneous at bedtime  insulin lispro Injectable (ADMELOG) 10 Unit(s) SubCutaneous three times a day before meals  lactated ringers. 1000 milliLiter(s) (100 mL/Hr) IV Continuous <Continuous>  piperacillin/tazobactam IVPB.. 3.375 Gram(s) IV Intermittent every 8 hours  polyethylene glycol 3350 17 Gram(s) Oral daily  sodium chloride 0.9% lock flush 3 milliLiter(s) IV Push every 8 hours  tamsulosin 0.4 milliGRAM(s) Oral at bedtime    MEDICATIONS  (PRN):  acetaminophen   IVPB .. 1000 milliGRAM(s) IV Intermittent every 6 hours PRN Moderate Pain (4 - 6)  HYDROmorphone  Injectable 0.5 milliGRAM(s) IV Push every 4 hours PRN Severe breakthrough Pain (7 - 10)  oxyCODONE    IR 5 milliGRAM(s) Oral every 4 hours PRN Moderate Pain (4 - 6)  oxyCODONE    IR 10 milliGRAM(s) Oral every 4 hours PRN Severe Pain (7 - 10)      LAB/STUDIES:                        7.7    4.68  )-----------( 306      ( 14 Oct 2022 01:37 )             24.1     10-14    135  |  104  |  25<H>  ----------------------------<  178<H>  4.7   |  19<L>  |  2.90<H>    Ca    8.1<L>      14 Oct 2022 01:37  Phos  2.6     10-14  Mg     1.60     10-14      PT/INR - ( 14 Oct 2022 01:37 )   PT: 12.9 sec;   INR: 1.11 ratio         PTT - ( 14 Oct 2022 01:37 )  PTT:31.3 sec                      IMAGING:       VASCULAR SURGERY PROGRESS NOTE    CC:     Post-Op Day #1    Procedure: L BKA formalization    Events of past 24 hours:  - OR yesterday for R BKA formalization  - POCT in 300s, endocrine recs Lantus 34u    ROS otherwise negative except per subjective and HPI      Vital Signs Last 24 Hrs  T(C): 36.7 (14 Oct 2022 21:30), Max: 37.1 (14 Oct 2022 14:30)  T(F): 98.1 (14 Oct 2022 21:30), Max: 98.8 (14 Oct 2022 14:30)  HR: 90 (14 Oct 2022 21:30) (65 - 97)  BP: 167/78 (14 Oct 2022 21:30) (141/71 - 191/90)  BP(mean): 86 (14 Oct 2022 14:30) (82 - 115)  RR: 17 (14 Oct 2022 21:30) (12 - 20)  SpO2: 98% (14 Oct 2022 21:30) (93% - 99%)    Parameters below as of 14 Oct 2022 21:30  Patient On (Oxygen Delivery Method): room air        Pain (0-10):            Pain Control Adequate: [] YES [] N    I&O's Detail    13 Oct 2022 07:01  -  14 Oct 2022 07:00  --------------------------------------------------------  IN:    IV PiggyBack: 100 mL    Lactated Ringers: 400 mL    Oral Fluid: 570 mL  Total IN: 1070 mL    OUT:    Voided (mL): 810 mL  Total OUT: 810 mL    Total NET: 260 mL      14 Oct 2022 07:01  -  15 Oct 2022 00:23  --------------------------------------------------------  IN:    IV PiggyBack: 50 mL    Lactated Ringers: 500 mL    Oral Fluid: 240 mL  Total IN: 790 mL    OUT:    Indwelling Catheter - Urethral (mL): 1260 mL  Total OUT: 1260 mL    Total NET: -470 mL          PHYSICAL EXAM    `General: Appears well, NAD  Neuro: AAOx3  CHEST: Clear to auscultation bilaterally  CV: Regular rate and rhythm  Abdomen: soft, nontender, nondistended, no rebound or guarding  Extremities:Bilateral lower extremity amputations. L leg prevena vac in place      MEDICATIONS:   MEDICATIONS  (STANDING):  amLODIPine   Tablet 10 milliGRAM(s) Oral daily  atorvastatin 40 milliGRAM(s) Oral at bedtime  gabapentin 300 milliGRAM(s) Oral daily  heparin   Injectable 5000 Unit(s) SubCutaneous every 8 hours  hydrALAZINE 50 milliGRAM(s) Oral two times a day  influenza   Vaccine 0.5 milliLiter(s) IntraMuscular once  insulin glargine Injectable (LANTUS) 24 Unit(s) SubCutaneous <User Schedule>  insulin lispro (ADMELOG) corrective regimen sliding scale   SubCutaneous three times a day before meals  insulin lispro (ADMELOG) corrective regimen sliding scale   SubCutaneous at bedtime  insulin lispro Injectable (ADMELOG) 10 Unit(s) SubCutaneous three times a day before meals  lactated ringers. 1000 milliLiter(s) (100 mL/Hr) IV Continuous <Continuous>  piperacillin/tazobactam IVPB.. 3.375 Gram(s) IV Intermittent every 8 hours  polyethylene glycol 3350 17 Gram(s) Oral daily  sodium chloride 0.9% lock flush 3 milliLiter(s) IV Push every 8 hours  tamsulosin 0.4 milliGRAM(s) Oral at bedtime    MEDICATIONS  (PRN):  acetaminophen   IVPB .. 1000 milliGRAM(s) IV Intermittent every 6 hours PRN Moderate Pain (4 - 6)  HYDROmorphone  Injectable 0.5 milliGRAM(s) IV Push every 4 hours PRN Severe breakthrough Pain (7 - 10)  oxyCODONE    IR 5 milliGRAM(s) Oral every 4 hours PRN Moderate Pain (4 - 6)  oxyCODONE    IR 10 milliGRAM(s) Oral every 4 hours PRN Severe Pain (7 - 10)      LAB/STUDIES:                        7.7    4.68  )-----------( 306      ( 14 Oct 2022 01:37 )             24.1     10-14    135  |  104  |  25<H>  ----------------------------<  178<H>  4.7   |  19<L>  |  2.90<H>    Ca    8.1<L>      14 Oct 2022 01:37  Phos  2.6     10-14  Mg     1.60     10-14      PT/INR - ( 14 Oct 2022 01:37 )   PT: 12.9 sec;   INR: 1.11 ratio         PTT - ( 14 Oct 2022 01:37 )  PTT:31.3 sec                      IMAGING:

## 2022-10-16 LAB
ANION GAP SERPL CALC-SCNC: 11 MMOL/L — SIGNIFICANT CHANGE UP (ref 7–14)
BUN SERPL-MCNC: 36 MG/DL — HIGH (ref 7–23)
CALCIUM SERPL-MCNC: 8.4 MG/DL — SIGNIFICANT CHANGE UP (ref 8.4–10.5)
CHLORIDE SERPL-SCNC: 97 MMOL/L — LOW (ref 98–107)
CO2 SERPL-SCNC: 22 MMOL/L — SIGNIFICANT CHANGE UP (ref 22–31)
CREAT SERPL-MCNC: 3.46 MG/DL — HIGH (ref 0.5–1.3)
EGFR: 19 ML/MIN/1.73M2 — LOW
GLUCOSE BLDC GLUCOMTR-MCNC: 162 MG/DL — HIGH (ref 70–99)
GLUCOSE BLDC GLUCOMTR-MCNC: 178 MG/DL — HIGH (ref 70–99)
GLUCOSE BLDC GLUCOMTR-MCNC: 204 MG/DL — HIGH (ref 70–99)
GLUCOSE BLDC GLUCOMTR-MCNC: 205 MG/DL — HIGH (ref 70–99)
GLUCOSE SERPL-MCNC: 186 MG/DL — HIGH (ref 70–99)
HCT VFR BLD CALC: 23.8 % — LOW (ref 39–50)
HGB BLD-MCNC: 7.5 G/DL — LOW (ref 13–17)
MAGNESIUM SERPL-MCNC: 1.6 MG/DL — SIGNIFICANT CHANGE UP (ref 1.6–2.6)
MCHC RBC-ENTMCNC: 29.4 PG — SIGNIFICANT CHANGE UP (ref 27–34)
MCHC RBC-ENTMCNC: 31.5 GM/DL — LOW (ref 32–36)
MCV RBC AUTO: 93.3 FL — SIGNIFICANT CHANGE UP (ref 80–100)
NRBC # BLD: 0 /100 WBCS — SIGNIFICANT CHANGE UP (ref 0–0)
NRBC # FLD: 0 K/UL — SIGNIFICANT CHANGE UP (ref 0–0)
PHOSPHATE SERPL-MCNC: 3.6 MG/DL — SIGNIFICANT CHANGE UP (ref 2.5–4.5)
PLATELET # BLD AUTO: 297 K/UL — SIGNIFICANT CHANGE UP (ref 150–400)
POTASSIUM SERPL-MCNC: 4.8 MMOL/L — SIGNIFICANT CHANGE UP (ref 3.5–5.3)
POTASSIUM SERPL-SCNC: 4.8 MMOL/L — SIGNIFICANT CHANGE UP (ref 3.5–5.3)
RBC # BLD: 2.55 M/UL — LOW (ref 4.2–5.8)
RBC # FLD: 13.5 % — SIGNIFICANT CHANGE UP (ref 10.3–14.5)
SODIUM SERPL-SCNC: 130 MMOL/L — LOW (ref 135–145)
WBC # BLD: 9.38 K/UL — SIGNIFICANT CHANGE UP (ref 3.8–10.5)
WBC # FLD AUTO: 9.38 K/UL — SIGNIFICANT CHANGE UP (ref 3.8–10.5)

## 2022-10-16 PROCEDURE — 99232 SBSQ HOSP IP/OBS MODERATE 35: CPT

## 2022-10-16 RX ORDER — DEXTROSE 50 % IN WATER 50 %
25 SYRINGE (ML) INTRAVENOUS ONCE
Refills: 0 | Status: DISCONTINUED | OUTPATIENT
Start: 2022-10-16 | End: 2022-10-22

## 2022-10-16 RX ORDER — DEXTROSE 50 % IN WATER 50 %
12.5 SYRINGE (ML) INTRAVENOUS ONCE
Refills: 0 | Status: DISCONTINUED | OUTPATIENT
Start: 2022-10-16 | End: 2022-10-22

## 2022-10-16 RX ORDER — GLUCAGON INJECTION, SOLUTION 0.5 MG/.1ML
1 INJECTION, SOLUTION SUBCUTANEOUS ONCE
Refills: 0 | Status: DISCONTINUED | OUTPATIENT
Start: 2022-10-16 | End: 2022-10-22

## 2022-10-16 RX ORDER — INSULIN LISPRO 100/ML
12 VIAL (ML) SUBCUTANEOUS
Refills: 0 | Status: DISCONTINUED | OUTPATIENT
Start: 2022-10-16 | End: 2022-10-19

## 2022-10-16 RX ORDER — MAGNESIUM SULFATE 500 MG/ML
2 VIAL (ML) INJECTION ONCE
Refills: 0 | Status: COMPLETED | OUTPATIENT
Start: 2022-10-16 | End: 2022-10-16

## 2022-10-16 RX ORDER — DEXTROSE 50 % IN WATER 50 %
15 SYRINGE (ML) INTRAVENOUS ONCE
Refills: 0 | Status: DISCONTINUED | OUTPATIENT
Start: 2022-10-16 | End: 2022-10-22

## 2022-10-16 RX ADMIN — HEPARIN SODIUM 5000 UNIT(S): 5000 INJECTION INTRAVENOUS; SUBCUTANEOUS at 23:05

## 2022-10-16 RX ADMIN — SODIUM CHLORIDE 3 MILLILITER(S): 9 INJECTION INTRAMUSCULAR; INTRAVENOUS; SUBCUTANEOUS at 06:19

## 2022-10-16 RX ADMIN — Medication 75 MILLIGRAM(S): at 14:43

## 2022-10-16 RX ADMIN — AMLODIPINE BESYLATE 10 MILLIGRAM(S): 2.5 TABLET ORAL at 05:30

## 2022-10-16 RX ADMIN — TAMSULOSIN HYDROCHLORIDE 0.4 MILLIGRAM(S): 0.4 CAPSULE ORAL at 23:03

## 2022-10-16 RX ADMIN — Medication 25 GRAM(S): at 11:38

## 2022-10-16 RX ADMIN — INSULIN GLARGINE 34 UNIT(S): 100 INJECTION, SOLUTION SUBCUTANEOUS at 08:02

## 2022-10-16 RX ADMIN — Medication 2: at 07:59

## 2022-10-16 RX ADMIN — HEPARIN SODIUM 5000 UNIT(S): 5000 INJECTION INTRAVENOUS; SUBCUTANEOUS at 14:44

## 2022-10-16 RX ADMIN — ATORVASTATIN CALCIUM 40 MILLIGRAM(S): 80 TABLET, FILM COATED ORAL at 23:02

## 2022-10-16 RX ADMIN — Medication 10 UNIT(S): at 12:25

## 2022-10-16 RX ADMIN — GABAPENTIN 300 MILLIGRAM(S): 400 CAPSULE ORAL at 11:40

## 2022-10-16 RX ADMIN — Medication 1: at 17:44

## 2022-10-16 RX ADMIN — SODIUM CHLORIDE 100 MILLILITER(S): 9 INJECTION, SOLUTION INTRAVENOUS at 05:10

## 2022-10-16 RX ADMIN — HEPARIN SODIUM 5000 UNIT(S): 5000 INJECTION INTRAVENOUS; SUBCUTANEOUS at 05:29

## 2022-10-16 RX ADMIN — Medication 2: at 12:24

## 2022-10-16 RX ADMIN — SODIUM CHLORIDE 3 MILLILITER(S): 9 INJECTION INTRAMUSCULAR; INTRAVENOUS; SUBCUTANEOUS at 14:40

## 2022-10-16 RX ADMIN — SODIUM CHLORIDE 3 MILLILITER(S): 9 INJECTION INTRAMUSCULAR; INTRAVENOUS; SUBCUTANEOUS at 22:00

## 2022-10-16 RX ADMIN — POLYETHYLENE GLYCOL 3350 17 GRAM(S): 17 POWDER, FOR SOLUTION ORAL at 11:39

## 2022-10-16 RX ADMIN — Medication 10 UNIT(S): at 08:00

## 2022-10-16 RX ADMIN — Medication 75 MILLIGRAM(S): at 23:03

## 2022-10-16 RX ADMIN — Medication 75 MILLIGRAM(S): at 05:30

## 2022-10-16 NOTE — PROGRESS NOTE ADULT - SUBJECTIVE AND OBJECTIVE BOX
Subjective: Patient seen and examined. No new events except as noted.     REVIEW OF SYSTEMS:    CONSTITUTIONAL:+ weakness, fevers or chills  EYES/ENT: No visual changes;  No vertigo or throat pain   NECK: No pain or stiffness  RESPIRATORY: No cough, wheezing, hemoptysis; No shortness of breath  CARDIOVASCULAR: No chest pain or palpitations  GASTROINTESTINAL: No abdominal or epigastric pain. No nausea, vomiting, or hematemesis; No diarrhea or constipation. No melena or hematochezia.  GENITOURINARY: No dysuria, frequency or hematuria  NEUROLOGICAL: No numbness or weakness  SKIN: No itching, burning, rashes, or lesions   All other review of systems is negative unless indicated above.    MEDICATIONS:  MEDICATIONS  (STANDING):  amLODIPine   Tablet 10 milliGRAM(s) Oral daily  atorvastatin 40 milliGRAM(s) Oral at bedtime  gabapentin 300 milliGRAM(s) Oral daily  heparin   Injectable 5000 Unit(s) SubCutaneous every 8 hours  hydrALAZINE 75 milliGRAM(s) Oral three times a day  influenza   Vaccine 0.5 milliLiter(s) IntraMuscular once  insulin glargine Injectable (LANTUS) 34 Unit(s) SubCutaneous <User Schedule>  insulin lispro (ADMELOG) corrective regimen sliding scale   SubCutaneous three times a day before meals  insulin lispro (ADMELOG) corrective regimen sliding scale   SubCutaneous at bedtime  insulin lispro Injectable (ADMELOG) 10 Unit(s) SubCutaneous three times a day before meals  magnesium sulfate  IVPB 2 Gram(s) IV Intermittent once  polyethylene glycol 3350 17 Gram(s) Oral daily  sodium chloride 0.9% lock flush 3 milliLiter(s) IV Push every 8 hours  tamsulosin 0.4 milliGRAM(s) Oral at bedtime      PHYSICAL EXAM:  T(C): 37.3 (10-16-22 @ 05:30), Max: 37.3 (10-16-22 @ 05:30)  HR: 87 (10-16-22 @ 05:30) (73 - 87)  BP: 168/83 (10-16-22 @ 05:30) (126/81 - 168/83)  RR: 19 (10-16-22 @ 05:30) (18 - 19)  SpO2: 97% (10-16-22 @ 05:30) (97% - 99%)  Wt(kg): --  I&O's Summary    15 Oct 2022 07:01  -  16 Oct 2022 07:00  --------------------------------------------------------  IN: 2140 mL / OUT: 1900 mL / NET: 240 mL            Appearance: NAD	  HEENT:  Dry  oral mucosa, PERRL, EOMI	  Lymphatic: No lymphadenopathy  Cardiovascular: Normal S1 S2, No JVD, No murmurs, No edema  Respiratory: Lungs clear to auscultation	  Psychiatry: A & O x 3, Mood & affect appropriate  Gastrointestinal:  Soft, Non-tender, + BS	  Skin: No rashes, No ecchymoses, No cyanosis	  Neurologic: Non-focal  Extremities: bilateral lower extremity amputations - L BKA dressing c/d/i  Vascular: Peripheral pulses palpable 2+ bilaterally      LABS:    CARDIAC MARKERS:                                7.5    9.38  )-----------( 297      ( 16 Oct 2022 06:20 )             23.8     10-16    130<L>  |  97<L>  |  36<H>  ----------------------------<  186<H>  4.8   |  22  |  3.46<H>    Ca    8.4      16 Oct 2022 06:20  Phos  3.6     10-16  Mg     1.60     10-16      proBNP:   Lipid Profile:   HgA1c:   TSH:             TELEMETRY: 	    ECG:  	  RADIOLOGY:   DIAGNOSTIC TESTING:  [ ] Echocardiogram:  [ ]  Catheterization:  [ ] Stress Test:    OTHER:

## 2022-10-16 NOTE — PROGRESS NOTE ADULT - SUBJECTIVE AND OBJECTIVE BOX
Chief Complaint: DM 2    History: Patient seen at bedside. Reports he ate full breakfast, denies n/v, denies s/s of hypoglycemia  Endorses fatigue     MEDICATIONS  (STANDING):  amLODIPine   Tablet 10 milliGRAM(s) Oral daily  atorvastatin 40 milliGRAM(s) Oral at bedtime  gabapentin 300 milliGRAM(s) Oral daily  heparin   Injectable 5000 Unit(s) SubCutaneous every 8 hours  hydrALAZINE 75 milliGRAM(s) Oral three times a day  influenza   Vaccine 0.5 milliLiter(s) IntraMuscular once  insulin glargine Injectable (LANTUS) 34 Unit(s) SubCutaneous <User Schedule>  insulin lispro (ADMELOG) corrective regimen sliding scale   SubCutaneous three times a day before meals  insulin lispro (ADMELOG) corrective regimen sliding scale   SubCutaneous at bedtime  insulin lispro Injectable (ADMELOG) 10 Unit(s) SubCutaneous three times a day before meals  polyethylene glycol 3350 17 Gram(s) Oral daily  sodium chloride 0.9% lock flush 3 milliLiter(s) IV Push every 8 hours  tamsulosin 0.4 milliGRAM(s) Oral at bedtime    No Known Allergies    Review of Systems:  HEENT: No pain  Cardiovascular: No chest pain  Respiratory: No SOB  GI: No nausea, vomiting    PHYSICAL EXAM:  Vital Signs Last 24 Hrs  T(C): 37.1 (16 Oct 2022 11:32), Max: 37.3 (16 Oct 2022 05:30)  T(F): 98.7 (16 Oct 2022 11:32), Max: 99.1 (16 Oct 2022 05:30)  HR: 89 (16 Oct 2022 11:32) (73 - 89)  BP: 139/82 (16 Oct 2022 11:32) (126/81 - 168/83)  BP(mean): --  RR: 17 (16 Oct 2022 11:32) (17 - 19)  SpO2: 99% (16 Oct 2022 11:32) (97% - 99%)    Parameters below as of 16 Oct 2022 11:32  Patient On (Oxygen Delivery Method): room air      GENERAL: NAD  EYES: No proptosis, no lid lag, anicteric  HEENT:  Atraumatic, Normocephalic, moist mucous membranes  RESPIRATORY: unlabored respirations     CAPILLARY BLOOD GLUCOSE  POCT Blood Glucose.: 205 mg/dL (16 Oct 2022 12:11)  POCT Blood Glucose.: 204 mg/dL (16 Oct 2022 07:25)  POCT Blood Glucose.: 164 mg/dL (15 Oct 2022 21:37)  POCT Blood Glucose.: 204 mg/dL (15 Oct 2022 17:08)    10-16    130<L>  |  97<L>  |  36<H>  ----------------------------<  186<H>  4.8   |  22  |  3.46<H>    Ca    8.4      16 Oct 2022 06:20  Phos  3.6     10-16  Mg     1.60     10-16        A1C with Estimated Average Glucose Result: 8.5 % (10-08-22 @ 06:33)      Diet, Consistent Carbohydrate w/Evening Snack (10-08-22 @ 06:53) [Active]

## 2022-10-16 NOTE — PROGRESS NOTE ADULT - SUBJECTIVE AND OBJECTIVE BOX
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 Patient now s/p L BKA for necrotic left foot ulcer w/ concern for gas gangrene. Insulin infusion was started intraoperatively. SICU consulted for management of insulin infusion. Patient now off insulin infusion.  (11 Oct 2022 16:03)      Overnight Events: No acute events overnight. Patient hyperkalemia yesterday to 6.4 shifted with insulin and glucose, given x2 dose of lokelma f/u BMP was 4.8 K+. POCT continue to be elevated but downtrending.    SUBJECTIVE: Patient seen and examined at bedside with surgical team, patient without complaints. Denies fever, chills, CP, SOB nausea, vomiting, abdominal pain.    START CHEMFISH10-15    131<L>  |  98  |  34<H>  ----------------------------<  248<H>  4.8   |  21<L>  |  3.26<H>    Ca    8.7      15 Oct 2022 13:58  Phos  3.2     10-15  Mg     1.70     10-15    POCT Blood Glucose.: 164 mg/dL (10-15-22 @ 21:37)  A1C with Estimated Average Glucose Result: 8.5 % (10-08-22 @ 06:33)  END CHEMFISH  START MEDSACTIVEMEDICATIONS  (STANDING):  amLODIPine   Tablet 10 milliGRAM(s) Oral daily  atorvastatin 40 milliGRAM(s) Oral at bedtime  gabapentin 300 milliGRAM(s) Oral daily  heparin   Injectable 5000 Unit(s) SubCutaneous every 8 hours  hydrALAZINE 75 milliGRAM(s) Oral three times a day  influenza   Vaccine 0.5 milliLiter(s) IntraMuscular once  insulin glargine Injectable (LANTUS) 34 Unit(s) SubCutaneous <User Schedule>  insulin lispro (ADMELOG) corrective regimen sliding scale   SubCutaneous three times a day before meals  insulin lispro (ADMELOG) corrective regimen sliding scale   SubCutaneous at bedtime  insulin lispro Injectable (ADMELOG) 10 Unit(s) SubCutaneous three times a day before meals  lactated ringers. 1000 milliLiter(s) (100 mL/Hr) IV Continuous <Continuous>  polyethylene glycol 3350 17 Gram(s) Oral daily  sodium chloride 0.9% lock flush 3 milliLiter(s) IV Push every 8 hours  tamsulosin 0.4 milliGRAM(s) Oral at bedtime    MEDICATIONS  (PRN):  acetaminophen   IVPB .. 1000 milliGRAM(s) IV Intermittent every 6 hours PRN Moderate Pain (4 - 6)  HYDROmorphone  Injectable 0.5 milliGRAM(s) IV Push every 4 hours PRN Severe breakthrough Pain (7 - 10)  oxyCODONE    IR 5 milliGRAM(s) Oral every 4 hours PRN Moderate Pain (4 - 6)  oxyCODONE    IR 10 milliGRAM(s) Oral every 4 hours PRN Severe Pain (7 - 10)  END MEDSACTIVE  START DIETORDEREND DIETORDER  START ADMITDXEND ADMITDX  START IOFS  END IOFS  START SKINPUEND SKINPU    OBJECTIVE:    Vital Signs Last 24 Hrs  T(C): 36.7 (15 Oct 2022 21:47), Max: 37.3 (15 Oct 2022 02:45)  T(F): 98.1 (15 Oct 2022 21:47), Max: 99.1 (15 Oct 2022 02:45)  HR: 73 (15 Oct 2022 21:47) (73 - 98)  BP: 126/81 (15 Oct 2022 21:47) (126/81 - 184/100)  BP(mean): --  RR: 18 (15 Oct 2022 21:47) (17 - 18)  SpO2: 98% (15 Oct 2022 21:47) (97% - 98%)    Parameters below as of 15 Oct 2022 21:47  Patient On (Oxygen Delivery Method): room air    I&O's Detail    14 Oct 2022 07:01  -  15 Oct 2022 07:00  --------------------------------------------------------  IN:    IV PiggyBack: 50 mL    Lactated Ringers: 1700 mL    Oral Fluid: 240 mL  Total IN: 1990 mL    OUT:    Indwelling Catheter - Urethral (mL): 1260 mL  Total OUT: 1260 mL    Total NET: 730 mL      15 Oct 2022 07:01  -  16 Oct 2022 01:20  --------------------------------------------------------  IN:    IV PiggyBack: 200 mL    Lactated Ringers: 1200 mL    Oral Fluid: 740 mL  Total IN: 2140 mL    OUT:    Indwelling Catheter - Urethral (mL): 1500 mL  Total OUT: 1500 mL    Total NET: 640 mL      MEDICATIONS  (STANDING):  amLODIPine   Tablet 10 milliGRAM(s) Oral daily  atorvastatin 40 milliGRAM(s) Oral at bedtime  gabapentin 300 milliGRAM(s) Oral daily  heparin   Injectable 5000 Unit(s) SubCutaneous every 8 hours  hydrALAZINE 75 milliGRAM(s) Oral three times a day  influenza   Vaccine 0.5 milliLiter(s) IntraMuscular once  insulin glargine Injectable (LANTUS) 34 Unit(s) SubCutaneous <User Schedule>  insulin lispro (ADMELOG) corrective regimen sliding scale   SubCutaneous three times a day before meals  insulin lispro (ADMELOG) corrective regimen sliding scale   SubCutaneous at bedtime  insulin lispro Injectable (ADMELOG) 10 Unit(s) SubCutaneous three times a day before meals  lactated ringers. 1000 milliLiter(s) (100 mL/Hr) IV Continuous <Continuous>  polyethylene glycol 3350 17 Gram(s) Oral daily  sodium chloride 0.9% lock flush 3 milliLiter(s) IV Push every 8 hours  tamsulosin 0.4 milliGRAM(s) Oral at bedtime    MEDICATIONS  (PRN):  acetaminophen   IVPB .. 1000 milliGRAM(s) IV Intermittent every 6 hours PRN Moderate Pain (4 - 6)  HYDROmorphone  Injectable 0.5 milliGRAM(s) IV Push every 4 hours PRN Severe breakthrough Pain (7 - 10)  oxyCODONE    IR 5 milliGRAM(s) Oral every 4 hours PRN Moderate Pain (4 - 6)  oxyCODONE    IR 10 milliGRAM(s) Oral every 4 hours PRN Severe Pain (7 - 10)      PHYSICAL EXAM:  Constitutional: A&Ox3, NAD  Respiratory: Unlabored breathing  Abdomen: Soft, nondistended, NTTP. No rebound or guarding.  Extremities: WWP, ALONSO spontaneously    LABS:                        8.9    11.96 )-----------( 360      ( 15 Oct 2022 06:48 )             28.1     10-15    131<L>  |  98  |  34<H>  ----------------------------<  248<H>  4.8   |  21<L>  |  3.26<H>    Ca    8.7      15 Oct 2022 13:58  Phos  3.2     10-15  Mg     1.70     10-15      PT/INR - ( 14 Oct 2022 01:37 )   PT: 12.9 sec;   INR: 1.11 ratio         PTT - ( 14 Oct 2022 01:37 )  PTT:31.3 sec          IMAGING:     TEAM C Surgery Progress Note  Patient is a 62y old male who presents with a chief complaint of Left foot BKA    Overnight Events: No acute events overnight. Patient hyperkalemia yesterday to 6.4 shifted with insulin and glucose, given x2 dose of lokelma f/u BMP was 4.8 K+. POCT continue to be elevated but downtrending.    SUBJECTIVE: Patient seen and examined at bedside with surgical team, patient without complaints. Denies fever, chills, CP, SOB nausea, vomiting, abdominal pain.    START CHEMFISH10-15    131<L>  |  98  |  34<H>  ----------------------------<  248<H>  4.8   |  21<L>  |  3.26<H>    Ca    8.7      15 Oct 2022 13:58  Phos  3.2     10-15  Mg     1.70     10-15    POCT Blood Glucose.: 164 mg/dL (10-15-22 @ 21:37)  A1C with Estimated Average Glucose Result: 8.5 % (10-08-22 @ 06:33)  END CHEMFISH  START MEDSACTIVEMEDICATIONS  (STANDING):  amLODIPine   Tablet 10 milliGRAM(s) Oral daily  atorvastatin 40 milliGRAM(s) Oral at bedtime  gabapentin 300 milliGRAM(s) Oral daily  heparin   Injectable 5000 Unit(s) SubCutaneous every 8 hours  hydrALAZINE 75 milliGRAM(s) Oral three times a day  influenza   Vaccine 0.5 milliLiter(s) IntraMuscular once  insulin glargine Injectable (LANTUS) 34 Unit(s) SubCutaneous <User Schedule>  insulin lispro (ADMELOG) corrective regimen sliding scale   SubCutaneous three times a day before meals  insulin lispro (ADMELOG) corrective regimen sliding scale   SubCutaneous at bedtime  insulin lispro Injectable (ADMELOG) 10 Unit(s) SubCutaneous three times a day before meals  lactated ringers. 1000 milliLiter(s) (100 mL/Hr) IV Continuous <Continuous>  polyethylene glycol 3350 17 Gram(s) Oral daily  sodium chloride 0.9% lock flush 3 milliLiter(s) IV Push every 8 hours  tamsulosin 0.4 milliGRAM(s) Oral at bedtime    MEDICATIONS  (PRN):  acetaminophen   IVPB .. 1000 milliGRAM(s) IV Intermittent every 6 hours PRN Moderate Pain (4 - 6)  HYDROmorphone  Injectable 0.5 milliGRAM(s) IV Push every 4 hours PRN Severe breakthrough Pain (7 - 10)  oxyCODONE    IR 5 milliGRAM(s) Oral every 4 hours PRN Moderate Pain (4 - 6)  oxyCODONE    IR 10 milliGRAM(s) Oral every 4 hours PRN Severe Pain (7 - 10)  END MEDSACTIVE  START DIETORDEREND DIETORDER  START ADMITDXEND ADMITDX  START IOFS  END IOFS  START SKINPUEND SKINPU    OBJECTIVE:    Vital Signs Last 24 Hrs  T(C): 36.7 (15 Oct 2022 21:47), Max: 37.3 (15 Oct 2022 02:45)  T(F): 98.1 (15 Oct 2022 21:47), Max: 99.1 (15 Oct 2022 02:45)  HR: 73 (15 Oct 2022 21:47) (73 - 98)  BP: 126/81 (15 Oct 2022 21:47) (126/81 - 184/100)  BP(mean): --  RR: 18 (15 Oct 2022 21:47) (17 - 18)  SpO2: 98% (15 Oct 2022 21:47) (97% - 98%)    Parameters below as of 15 Oct 2022 21:47  Patient On (Oxygen Delivery Method): room air    I&O's Detail    14 Oct 2022 07:01  -  15 Oct 2022 07:00  --------------------------------------------------------  IN:    IV PiggyBack: 50 mL    Lactated Ringers: 1700 mL    Oral Fluid: 240 mL  Total IN: 1990 mL    OUT:    Indwelling Catheter - Urethral (mL): 1260 mL  Total OUT: 1260 mL    Total NET: 730 mL      15 Oct 2022 07:01  -  16 Oct 2022 01:20  --------------------------------------------------------  IN:    IV PiggyBack: 200 mL    Lactated Ringers: 1200 mL    Oral Fluid: 740 mL  Total IN: 2140 mL    OUT:    Indwelling Catheter - Urethral (mL): 1500 mL  Total OUT: 1500 mL    Total NET: 640 mL      MEDICATIONS  (STANDING):  amLODIPine   Tablet 10 milliGRAM(s) Oral daily  atorvastatin 40 milliGRAM(s) Oral at bedtime  gabapentin 300 milliGRAM(s) Oral daily  heparin   Injectable 5000 Unit(s) SubCutaneous every 8 hours  hydrALAZINE 75 milliGRAM(s) Oral three times a day  influenza   Vaccine 0.5 milliLiter(s) IntraMuscular once  insulin glargine Injectable (LANTUS) 34 Unit(s) SubCutaneous <User Schedule>  insulin lispro (ADMELOG) corrective regimen sliding scale   SubCutaneous three times a day before meals  insulin lispro (ADMELOG) corrective regimen sliding scale   SubCutaneous at bedtime  insulin lispro Injectable (ADMELOG) 10 Unit(s) SubCutaneous three times a day before meals  lactated ringers. 1000 milliLiter(s) (100 mL/Hr) IV Continuous <Continuous>  polyethylene glycol 3350 17 Gram(s) Oral daily  sodium chloride 0.9% lock flush 3 milliLiter(s) IV Push every 8 hours  tamsulosin 0.4 milliGRAM(s) Oral at bedtime    MEDICATIONS  (PRN):  acetaminophen   IVPB .. 1000 milliGRAM(s) IV Intermittent every 6 hours PRN Moderate Pain (4 - 6)  HYDROmorphone  Injectable 0.5 milliGRAM(s) IV Push every 4 hours PRN Severe breakthrough Pain (7 - 10)  oxyCODONE    IR 5 milliGRAM(s) Oral every 4 hours PRN Moderate Pain (4 - 6)  oxyCODONE    IR 10 milliGRAM(s) Oral every 4 hours PRN Severe Pain (7 - 10)      PHYSICAL EXAM:  Constitutional: A&Ox3, NAD  Respiratory: Unlabored breathing  Abdomen: Soft, nondistended, NTTP. No rebound or guarding.  Extremities: WWP, ALONSO spontaneously  - RLE: h/o BKA  - LLE: prevena over BKA incision, holding suction well. knee immobilizer in place     LABS:                        8.9    11.96 )-----------( 360      ( 15 Oct 2022 06:48 )             28.1     10-15    131<L>  |  98  |  34<H>  ----------------------------<  248<H>  4.8   |  21<L>  |  3.26<H>    Ca    8.7      15 Oct 2022 13:58  Phos  3.2     10-15  Mg     1.70     10-15      PT/INR - ( 14 Oct 2022 01:37 )   PT: 12.9 sec;   INR: 1.11 ratio         PTT - ( 14 Oct 2022 01:37 )  PTT:31.3 sec          IMAGING:

## 2022-10-16 NOTE — PROGRESS NOTE ADULT - ASSESSMENT
Assessment: Patient is a 62 year old male with a PMHx of HTN, DM2, HLD, BPH, PAD and right BKA secondary to unhealed DM2 ulcer who presented to [facility] for left lower extremity peripheral angiogram.  Patient is now S/P left lower extremity BKA on 10/7/22. s/p LLE BKA Formalization 10/15/22.    - Pain control with IV dilaudid now and PO pain meds going forward  - Potassium 6.4 this AM, Calcium gluconate, EKG (normal), Insulin/Dextrose given  - Per nephrology recs can give albuterol/Fluids+Lasix/lokelma or kayexelate to assist with potassium removal  - F/u BMP 6 hours post initial shift  - continue Gabapentin  - continue BP meds. Caridology recs appreciated.  - diet as tolerated.   - VAC to wall suction.   - keep knee in immobilizer  - DVT ppx.  Assessment: Patient is a 62 year old male with a PMHx of HTN, DM2, HLD, BPH, PAD and right BKA secondary to unhealed DM2 ulcer who presented to [facility] for left lower extremity peripheral angiogram.  Patient is now S/P left lower extremity BKA on 10/7/22. s/p LLE BKA Formalization 10/15/22.    Plan:   - Monitor BMP, AM K 4.8      - Per nephrology recs can give albuterol/Fluids+Lasix/lokelma or kayexelate to assist with potassium removal  - Prevena vac to wall suction      - Plan to remove tomorrow   - Continue knee immobilizer   - Pain control  - Regular diet   - DVT ppx: SQH  - Dispo: will have PT eval tomorrow       Vascular Surgery, C Team Surgery  P# [id number] Assessment: Patient is a 62 year old male with a PMHx of HTN, DM2, HLD, BPH, PAD and right BKA secondary to unhealed DM2 ulcer who presented to [facility] for left lower extremity peripheral angiogram.  Patient is now S/P left lower extremity BKA on 10/7/22. s/p LLE BKA Formalization 10/15/22.    Plan:   - Monitor BMP, AM K 4.8      - Per nephrology recs can give albuterol/Fluids+Lasix/lokelma or kayexelate to assist with potassium removal  - Prevena vac to wall suction      - Plan to remove tomorrow   - TOV check @ noon today   - Continue knee immobilizer   - Pain control  - Regular diet   - DVT ppx: SQH  - Dispo: will have PT eval tomorrow       Vascular Surgery, C Team Surgery  P# [id number]

## 2022-10-16 NOTE — PROGRESS NOTE ADULT - ASSESSMENT
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 Uncontrolled T2DM c/b foot wounds requiring amputation, retinopathy  A1c 8.5% Goal < 7%  Home Regimen: Novolog 20-35 units qAC (typically eats 2 meals per day, takes Novolog twice). Does not take basal insulin     While inpatient:   BG target 100-180 mg/dl  Continue Lantus 34 units SQ qAM (0800 daily)   Increase Admelog to 12 units SQ TID before meals (Hold if NPO/skips meal)  Continue Admelog low dose correctional scale before meals, low dose at bedtime  Consistent carb diet  BG before meals and bedtime  Hypoglycemia protocol - re-ordered 10/16    Discharge Plan:  Basal/bolus, final regimen TBD  Can resume Novolog (dose TBD), will need new prescription for basal insulin PEN (Lantus, Basaglar, Tresiba, Semglee)  Did not tolerate Metformin  mg as an outpatient. Patient states possibly hx of pancreatitis, also with retinopathy- unclear if active as patient has not seen ophtho in a while. Will defer GLP1 agonist. Hold off on SGLT2 inhibitors given recent amputation  Followup with Endocrine Practice at [address] [place] [address], [address], [place] [address]; Ph # [phone]  OR if prefers a [place] location - Dr. Jo: [address] [facility], [address] [address], [phone]    2. Discharge Planning Issues  Patient expressing difficulty caring for himself, especially now s/p amputation   At home, he lives with elderly wife, reports difficulty with food prep, specifically healthy food, typically orders out  Limited resources for support  Recommend CM and SW consult   Discussed with team    3. HTN  BP Goal < 130/80  On Amlodipine and Hydralazine  Titration per primary team    4. HLD  LDL goal < 70  LDL 87 (10/2022)  Statin if no contraindications  Now started on Lipitor     RODRÍGUEZ Vaz-BC  Nurse Practitioner   Division of Endocrinology  Pager: RAUL pager [phone]    If out of hospital/unavailable when paged, please note: patient will be cared for by another provider on the endocrine service.  Please call the endocrine answering service for assistance to reach covering provider ([phone]). For non-urgent matters, please email [email or web address] for assistance.

## 2022-10-16 NOTE — PROGRESS NOTE ADULT - ASSESSMENT
62 year old male with a PMHx of HTN, DM2, HLD, BPH, PAD and right BKA secondary to unhealed DM2 ulcer who presented to [facility] for left lower extremity peripheral angiogram.  Patient is now S/P left lower extremity BKA on 10/7/22.

## 2022-10-17 LAB
ANION GAP SERPL CALC-SCNC: 10 MMOL/L — SIGNIFICANT CHANGE UP (ref 7–14)
BUN SERPL-MCNC: 38 MG/DL — HIGH (ref 7–23)
CALCIUM SERPL-MCNC: 8.6 MG/DL — SIGNIFICANT CHANGE UP (ref 8.4–10.5)
CHLORIDE SERPL-SCNC: 97 MMOL/L — LOW (ref 98–107)
CHLORIDE UR-SCNC: <20 MMOL/L — SIGNIFICANT CHANGE UP
CO2 SERPL-SCNC: 22 MMOL/L — SIGNIFICANT CHANGE UP (ref 22–31)
CREAT ?TM UR-MCNC: 79 MG/DL — SIGNIFICANT CHANGE UP
CREAT SERPL-MCNC: 3.63 MG/DL — HIGH (ref 0.5–1.3)
EGFR: 18 ML/MIN/1.73M2 — LOW
GLUCOSE BLDC GLUCOMTR-MCNC: 108 MG/DL — HIGH (ref 70–99)
GLUCOSE BLDC GLUCOMTR-MCNC: 158 MG/DL — HIGH (ref 70–99)
GLUCOSE BLDC GLUCOMTR-MCNC: 165 MG/DL — HIGH (ref 70–99)
GLUCOSE BLDC GLUCOMTR-MCNC: 94 MG/DL — SIGNIFICANT CHANGE UP (ref 70–99)
GLUCOSE BLDC GLUCOMTR-MCNC: 97 MG/DL — SIGNIFICANT CHANGE UP (ref 70–99)
GLUCOSE SERPL-MCNC: 166 MG/DL — HIGH (ref 70–99)
HCT VFR BLD CALC: 24.5 % — LOW (ref 39–50)
HGB BLD-MCNC: 7.9 G/DL — LOW (ref 13–17)
MAGNESIUM SERPL-MCNC: 2 MG/DL — SIGNIFICANT CHANGE UP (ref 1.6–2.6)
MCHC RBC-ENTMCNC: 30.2 PG — SIGNIFICANT CHANGE UP (ref 27–34)
MCHC RBC-ENTMCNC: 32.2 GM/DL — SIGNIFICANT CHANGE UP (ref 32–36)
MCV RBC AUTO: 93.5 FL — SIGNIFICANT CHANGE UP (ref 80–100)
NRBC # BLD: 0 /100 WBCS — SIGNIFICANT CHANGE UP (ref 0–0)
NRBC # FLD: 0 K/UL — SIGNIFICANT CHANGE UP (ref 0–0)
PHOSPHATE SERPL-MCNC: 3.6 MG/DL — SIGNIFICANT CHANGE UP (ref 2.5–4.5)
PLATELET # BLD AUTO: 299 K/UL — SIGNIFICANT CHANGE UP (ref 150–400)
POTASSIUM SERPL-MCNC: 4.4 MMOL/L — SIGNIFICANT CHANGE UP (ref 3.5–5.3)
POTASSIUM SERPL-SCNC: 4.4 MMOL/L — SIGNIFICANT CHANGE UP (ref 3.5–5.3)
POTASSIUM UR-SCNC: 19.3 MMOL/L — SIGNIFICANT CHANGE UP
RBC # BLD: 2.62 M/UL — LOW (ref 4.2–5.8)
RBC # FLD: 13.6 % — SIGNIFICANT CHANGE UP (ref 10.3–14.5)
SODIUM SERPL-SCNC: 129 MMOL/L — LOW (ref 135–145)
SODIUM UR-SCNC: 25 MMOL/L — SIGNIFICANT CHANGE UP
WBC # BLD: 7.28 K/UL — SIGNIFICANT CHANGE UP (ref 3.8–10.5)
WBC # FLD AUTO: 7.28 K/UL — SIGNIFICANT CHANGE UP (ref 3.8–10.5)

## 2022-10-17 PROCEDURE — 93010 ELECTROCARDIOGRAM REPORT: CPT

## 2022-10-17 RX ORDER — LANOLIN ALCOHOL/MO/W.PET/CERES
3 CREAM (GRAM) TOPICAL AT BEDTIME
Refills: 0 | Status: DISCONTINUED | OUTPATIENT
Start: 2022-10-17 | End: 2022-10-22

## 2022-10-17 RX ORDER — SODIUM CHLORIDE 9 MG/ML
500 INJECTION, SOLUTION INTRAVENOUS ONCE
Refills: 0 | Status: DISCONTINUED | OUTPATIENT
Start: 2022-10-17 | End: 2022-10-17

## 2022-10-17 RX ADMIN — INSULIN GLARGINE 34 UNIT(S): 100 INJECTION, SOLUTION SUBCUTANEOUS at 08:03

## 2022-10-17 RX ADMIN — Medication 12 UNIT(S): at 12:09

## 2022-10-17 RX ADMIN — GABAPENTIN 300 MILLIGRAM(S): 400 CAPSULE ORAL at 12:09

## 2022-10-17 RX ADMIN — HEPARIN SODIUM 5000 UNIT(S): 5000 INJECTION INTRAVENOUS; SUBCUTANEOUS at 22:59

## 2022-10-17 RX ADMIN — SODIUM CHLORIDE 3 MILLILITER(S): 9 INJECTION INTRAMUSCULAR; INTRAVENOUS; SUBCUTANEOUS at 22:38

## 2022-10-17 RX ADMIN — Medication 12 UNIT(S): at 08:04

## 2022-10-17 RX ADMIN — POLYETHYLENE GLYCOL 3350 17 GRAM(S): 17 POWDER, FOR SOLUTION ORAL at 12:10

## 2022-10-17 RX ADMIN — Medication 1: at 12:10

## 2022-10-17 RX ADMIN — HEPARIN SODIUM 5000 UNIT(S): 5000 INJECTION INTRAVENOUS; SUBCUTANEOUS at 07:09

## 2022-10-17 RX ADMIN — Medication 75 MILLIGRAM(S): at 07:08

## 2022-10-17 RX ADMIN — SODIUM CHLORIDE 3 MILLILITER(S): 9 INJECTION INTRAMUSCULAR; INTRAVENOUS; SUBCUTANEOUS at 14:59

## 2022-10-17 RX ADMIN — TAMSULOSIN HYDROCHLORIDE 0.4 MILLIGRAM(S): 0.4 CAPSULE ORAL at 23:00

## 2022-10-17 RX ADMIN — Medication 75 MILLIGRAM(S): at 23:00

## 2022-10-17 RX ADMIN — HEPARIN SODIUM 5000 UNIT(S): 5000 INJECTION INTRAVENOUS; SUBCUTANEOUS at 14:46

## 2022-10-17 RX ADMIN — SODIUM CHLORIDE 3 MILLILITER(S): 9 INJECTION INTRAMUSCULAR; INTRAVENOUS; SUBCUTANEOUS at 07:18

## 2022-10-17 RX ADMIN — Medication 75 MILLIGRAM(S): at 14:46

## 2022-10-17 RX ADMIN — ATORVASTATIN CALCIUM 40 MILLIGRAM(S): 80 TABLET, FILM COATED ORAL at 23:00

## 2022-10-17 RX ADMIN — AMLODIPINE BESYLATE 10 MILLIGRAM(S): 2.5 TABLET ORAL at 07:07

## 2022-10-17 RX ADMIN — Medication 1: at 08:04

## 2022-10-17 NOTE — PROVIDER CONTACT NOTE (OTHER) - ACTION/TREATMENT ORDERED:
No antihypertensives at this time due to history as per provider, continue to monitoring and notify if SBP >180
EKG ordered

## 2022-10-17 NOTE — PROGRESS NOTE ADULT - ASSESSMENT
62 year old male with a PMHx of HTN, DM2, HLD, BPH, PAD and right BKA secondary to unhealed DM2 ulcer who presented to [facility] for left lower extremity peripheral angiogram.  Patient is now S/P left lower extremity BKA on 10/7/22. s/p LLE BKA Formalization 10/15/22.    Plan:   - Monitor BMP was hyperkalemic over the weekend  - Continue knee immobilizer   - Pain control  - Regular diet   - DVT ppx: SQH  - Dispo: PT and DC      Vascular Surgery, C Team Surgery  P# [id number]

## 2022-10-17 NOTE — PROGRESS NOTE ADULT - ASSESSMENT
62 year old male with a PMHx of HTN, DM2, HLD, BPH, PAD and right BKA secondary to unhealed DM2 ulcer who presented to [facility] for left lower extremity peripheral angiogram.  Patient is now S/P left lower extremity BKA on 10/7/22.

## 2022-10-17 NOTE — PROVIDER CONTACT NOTE (OTHER) - SITUATION
Pt c/o sob, placed on 2L O2 NCL, vitals taken
PCA checked blood pressure and it was 193/80, I rechecked the BP twice and it was 171/66, HR 66

## 2022-10-17 NOTE — PROGRESS NOTE ADULT - SUBJECTIVE AND OBJECTIVE BOX
VASCULAR SURGERY PROGRESS NOTE    CC:   Hospital Day #  Post-Op Day #    Procedure:    Events of past 24 hours:    ROS otherwise negative except per subjective and HPI      Vital Signs Last 24 Hrs  T(C): 37.2 (16 Oct 2022 22:06), Max: 37.3 (16 Oct 2022 05:30)  T(F): 99 (16 Oct 2022 22:06), Max: 99.1 (16 Oct 2022 05:30)  HR: 94 (16 Oct 2022 22:06) (82 - 94)  BP: 158/62 (16 Oct 2022 22:06) (139/82 - 168/83)  BP(mean): --  RR: 17 (16 Oct 2022 22:06) (17 - 19)  SpO2: 97% (16 Oct 2022 22:06) (97% - 100%)    Parameters below as of 16 Oct 2022 22:06  Patient On (Oxygen Delivery Method): room air        Pain (0-10):            Pain Control Adequate: [] YES [] N    I&O's Detail    15 Oct 2022 07:01  -  16 Oct 2022 07:00  --------------------------------------------------------  IN:    IV PiggyBack: 200 mL    Lactated Ringers: 1200 mL    Oral Fluid: 740 mL  Total IN: 2140 mL    OUT:    Indwelling Catheter - Urethral (mL): 1500 mL    Intermittent Catheterization - Urethral (mL): 400 mL  Total OUT: 1900 mL    Total NET: 240 mL      16 Oct 2022 07:01  -  17 Oct 2022 04:15  --------------------------------------------------------  IN:  Total IN: 0 mL    OUT:    Intermittent Catheterization - Urethral (mL): 400 mL    Post-Void Residual per Intermittent Catheterization (mL): 17 mL    Voided (mL): 550 mL  Total OUT: 967 mL    Total NET: -967 mL          PHYSICAL EXAM    Appearance: Normal	  HEENT:   Normal oral mucosa, PERRL, EOMI	  Neck: Supple, - JVD; Carotid Bruit   Cardiovascular: Normal S1 S2, No JVD, No murmurs,   Respiratory: Lungs clear to auscultation/Decreased Breath Sounds/No Rales, Rhonchi, Wheezing	  Gastrointestinal:  Soft, Non-tender, + BS	  Skin: No rashes, No ecchymoses, No cyanosis  Extremities: Normal range of motion, No clubbing, cyanosis or edema  Neurologic: Non-focal  Psychiatry: A & O x 3, Mood & affect appropriate    PULSES:  Femoral:  Popliteal:  Dorsal Pedal:  Posterior Tibial:  Capillary:      MEDICATIONS:   MEDICATIONS  (STANDING):  amLODIPine   Tablet 10 milliGRAM(s) Oral daily  atorvastatin 40 milliGRAM(s) Oral at bedtime  dextrose 50% Injectable 25 Gram(s) IV Push once  dextrose 50% Injectable 12.5 Gram(s) IV Push once  dextrose 50% Injectable 25 Gram(s) IV Push once  dextrose Oral Gel 15 Gram(s) Oral once  gabapentin 300 milliGRAM(s) Oral daily  glucagon  Injectable 1 milliGRAM(s) IntraMuscular once  heparin   Injectable 5000 Unit(s) SubCutaneous every 8 hours  hydrALAZINE 75 milliGRAM(s) Oral three times a day  influenza   Vaccine 0.5 milliLiter(s) IntraMuscular once  insulin glargine Injectable (LANTUS) 34 Unit(s) SubCutaneous <User Schedule>  insulin lispro (ADMELOG) corrective regimen sliding scale   SubCutaneous three times a day before meals  insulin lispro (ADMELOG) corrective regimen sliding scale   SubCutaneous at bedtime  insulin lispro Injectable (ADMELOG) 12 Unit(s) SubCutaneous three times a day before meals  polyethylene glycol 3350 17 Gram(s) Oral daily  sodium chloride 0.9% lock flush 3 milliLiter(s) IV Push every 8 hours  tamsulosin 0.4 milliGRAM(s) Oral at bedtime    MEDICATIONS  (PRN):  acetaminophen   IVPB .. 1000 milliGRAM(s) IV Intermittent every 6 hours PRN Moderate Pain (4 - 6)  oxyCODONE    IR 5 milliGRAM(s) Oral every 4 hours PRN Moderate Pain (4 - 6)  oxyCODONE    IR 10 milliGRAM(s) Oral every 4 hours PRN Severe Pain (7 - 10)      LAB/STUDIES:                        7.5    9.38  )-----------( 297      ( 16 Oct 2022 06:20 )             23.8     10-16    130<L>  |  97<L>  |  36<H>  ----------------------------<  186<H>  4.8   |  22  |  3.46<H>    Ca    8.4      16 Oct 2022 06:20  Phos  3.6     10-16  Mg     1.60     10-16                            IMAGING:       VASCULAR SURGERY PROGRESS NOTE    Patient seen and examined at bedside with team patient without complaints.     Vital Signs Last 24 Hrs  T(C): 37.2 (16 Oct 2022 22:06), Max: 37.3 (16 Oct 2022 05:30)  T(F): 99 (16 Oct 2022 22:06), Max: 99.1 (16 Oct 2022 05:30)  HR: 94 (16 Oct 2022 22:06) (82 - 94)  BP: 158/62 (16 Oct 2022 22:06) (139/82 - 168/83)  BP(mean): --  RR: 17 (16 Oct 2022 22:06) (17 - 19)  SpO2: 97% (16 Oct 2022 22:06) (97% - 100%)    Parameters below as of 16 Oct 2022 22:06  Patient On (Oxygen Delivery Method): room air        Pain (0-10):            Pain Control Adequate: [] YES [] N    I&O's Detail    15 Oct 2022 07:01  -  16 Oct 2022 07:00  --------------------------------------------------------  IN:    IV PiggyBack: 200 mL    Lactated Ringers: 1200 mL    Oral Fluid: 740 mL  Total IN: 2140 mL    OUT:    Indwelling Catheter - Urethral (mL): 1500 mL    Intermittent Catheterization - Urethral (mL): 400 mL  Total OUT: 1900 mL    Total NET: 240 mL      16 Oct 2022 07:01  -  17 Oct 2022 04:15  --------------------------------------------------------  IN:  Total IN: 0 mL    OUT:    Intermittent Catheterization - Urethral (mL): 400 mL    Post-Void Residual per Intermittent Catheterization (mL): 17 mL    Voided (mL): 550 mL  Total OUT: 967 mL    Total NET: -967 mL    PHYSICAL EXAM    Appearance: Normal	  HEENT:   Normal oral mucosa, PERRL, EOMI	  Extremities: Normal range of motion, RLE BKA well healed (old), LLE BKA staples c/d/i no edema, no erythema, soft.  Neurologic: Non-focal  Psychiatry: A & O x 3, Mood & affect appropriate      MEDICATIONS:   MEDICATIONS  (STANDING):  amLODIPine   Tablet 10 milliGRAM(s) Oral daily  atorvastatin 40 milliGRAM(s) Oral at bedtime  dextrose 50% Injectable 25 Gram(s) IV Push once  dextrose 50% Injectable 12.5 Gram(s) IV Push once  dextrose 50% Injectable 25 Gram(s) IV Push once  dextrose Oral Gel 15 Gram(s) Oral once  gabapentin 300 milliGRAM(s) Oral daily  glucagon  Injectable 1 milliGRAM(s) IntraMuscular once  heparin   Injectable 5000 Unit(s) SubCutaneous every 8 hours  hydrALAZINE 75 milliGRAM(s) Oral three times a day  influenza   Vaccine 0.5 milliLiter(s) IntraMuscular once  insulin glargine Injectable (LANTUS) 34 Unit(s) SubCutaneous <User Schedule>  insulin lispro (ADMELOG) corrective regimen sliding scale   SubCutaneous three times a day before meals  insulin lispro (ADMELOG) corrective regimen sliding scale   SubCutaneous at bedtime  insulin lispro Injectable (ADMELOG) 12 Unit(s) SubCutaneous three times a day before meals  polyethylene glycol 3350 17 Gram(s) Oral daily  sodium chloride 0.9% lock flush 3 milliLiter(s) IV Push every 8 hours  tamsulosin 0.4 milliGRAM(s) Oral at bedtime    MEDICATIONS  (PRN):  acetaminophen   IVPB .. 1000 milliGRAM(s) IV Intermittent every 6 hours PRN Moderate Pain (4 - 6)  oxyCODONE    IR 5 milliGRAM(s) Oral every 4 hours PRN Moderate Pain (4 - 6)  oxyCODONE    IR 10 milliGRAM(s) Oral every 4 hours PRN Severe Pain (7 - 10)      LAB/STUDIES:                        7.5    9.38  )-----------( 297      ( 16 Oct 2022 06:20 )             23.8     10-16    130<L>  |  97<L>  |  36<H>  ----------------------------<  186<H>  4.8   |  22  |  3.46<H>    Ca    8.4      16 Oct 2022 06:20  Phos  3.6     10-16  Mg     1.60     10-16                            IMAGING:

## 2022-10-17 NOTE — PROGRESS NOTE ADULT - SUBJECTIVE AND OBJECTIVE BOX
[facility] [phone]        Patient seen and examined on 2L nasal cannula in no distress.       MEDICATIONS  (STANDING):  amLODIPine   Tablet 10 milliGRAM(s) Oral daily  atorvastatin 40 milliGRAM(s) Oral at bedtime  dextrose 50% Injectable 25 Gram(s) IV Push once  dextrose 50% Injectable 12.5 Gram(s) IV Push once  dextrose 50% Injectable 25 Gram(s) IV Push once  dextrose Oral Gel 15 Gram(s) Oral once  gabapentin 300 milliGRAM(s) Oral daily  glucagon  Injectable 1 milliGRAM(s) IntraMuscular once  heparin   Injectable 5000 Unit(s) SubCutaneous every 8 hours  hydrALAZINE 75 milliGRAM(s) Oral three times a day  influenza   Vaccine 0.5 milliLiter(s) IntraMuscular once  insulin glargine Injectable (LANTUS) 34 Unit(s) SubCutaneous <User Schedule>  insulin lispro (ADMELOG) corrective regimen sliding scale   SubCutaneous three times a day before meals  insulin lispro (ADMELOG) corrective regimen sliding scale   SubCutaneous at bedtime  insulin lispro Injectable (ADMELOG) 12 Unit(s) SubCutaneous three times a day before meals  polyethylene glycol 3350 17 Gram(s) Oral daily  sodium chloride 0.9% lock flush 3 milliLiter(s) IV Push every 8 hours  tamsulosin 0.4 milliGRAM(s) Oral at bedtime      VITAL:  T(C): , Max: 37.2 (10-16-22 @ 22:06)  T(F): , Max: 99 (10-16-22 @ 22:06)  HR: 60 (10-17-22 @ 10:10)  BP: 145/65 (10-17-22 @ 10:10)  BP(mean): --  RR: 18 (10-17-22 @ 10:10)  SpO2: 100% (10-17-22 @ 10:10)  Wt(kg): --    I and O's:    10-16 @ 07:01  -  10-17 @ 07:00  --------------------------------------------------------  IN: 0 mL / OUT: 1367 mL / NET: -1367 mL          PHYSICAL EXAM:    Constitutional: NAD  Neck:  No JVD  Respiratory: CTAB/L  Cardiovascular: S1 and S2  Gastrointestinal: BS+, soft, NT/ND  Extremities: BLLE BKA, L BKA (+) immobilizer   Neurological: A/O x 3, no focal deficits  Psychiatric: Normal mood, normal affect  : No Carreon  Skin: No rashes  Access: Not applicable    LABS:                        7.9    7.28  )-----------( 299      ( 17 Oct 2022 07:25 )             24.5     10-17    129<L>  |  97<L>  |  38<H>  ----------------------------<  166<H>  4.4   |  22  |  3.63<H>    Ca    8.6      17 Oct 2022 07:25  Phos  3.6     10-17  Mg     2.00     10-17    IMPRESSION: 62M w/ HTN, DM2, CKD, and PAD-R BKA, 10/7/22 p/w necrotic L foot ulcer; s/p L-BKA 10/7/22; now being planned for BKA formalization  (1)Renal - CKD4 - GFR ~20-25ml/min - ~2gm proteinuria. Likely at least in part from diabetic nephropathy. Azotemia rising   (2)Hyponatremia   (3)CV - hypertensive - BP improving now on hydralazine 75 mg tid   (4)Anemia - multifactorial including from CKD, hgb improving though remains low   (5)ID - necrotic L foot ulcer; s/p BKA; s/p IV Zosyn  (6)Vasc - s/p BKA formalization 10/14      RECOMMENDATIONS  (1)Check urine lytes, urine osmo   (2)Maintain 1 L free water restriction   (2)Defer ACEI/ARB for now  (3)Dose new meds for GFR 20-25ml/min  (4)BMP+Mg+PO4 daily        Duyen Puente NP   [facility]  Office: [phone]         [facility] [phone]        Patient seen and examined on 2L nasal cannula in no distress.       MEDICATIONS  (STANDING):  amLODIPine   Tablet 10 milliGRAM(s) Oral daily  atorvastatin 40 milliGRAM(s) Oral at bedtime  dextrose 50% Injectable 25 Gram(s) IV Push once  dextrose 50% Injectable 12.5 Gram(s) IV Push once  dextrose 50% Injectable 25 Gram(s) IV Push once  dextrose Oral Gel 15 Gram(s) Oral once  gabapentin 300 milliGRAM(s) Oral daily  glucagon  Injectable 1 milliGRAM(s) IntraMuscular once  heparin   Injectable 5000 Unit(s) SubCutaneous every 8 hours  hydrALAZINE 75 milliGRAM(s) Oral three times a day  influenza   Vaccine 0.5 milliLiter(s) IntraMuscular once  insulin glargine Injectable (LANTUS) 34 Unit(s) SubCutaneous <User Schedule>  insulin lispro (ADMELOG) corrective regimen sliding scale   SubCutaneous three times a day before meals  insulin lispro (ADMELOG) corrective regimen sliding scale   SubCutaneous at bedtime  insulin lispro Injectable (ADMELOG) 12 Unit(s) SubCutaneous three times a day before meals  polyethylene glycol 3350 17 Gram(s) Oral daily  sodium chloride 0.9% lock flush 3 milliLiter(s) IV Push every 8 hours  tamsulosin 0.4 milliGRAM(s) Oral at bedtime      VITAL:  T(C): , Max: 37.2 (10-16-22 @ 22:06)  T(F): , Max: 99 (10-16-22 @ 22:06)  HR: 60 (10-17-22 @ 10:10)  BP: 145/65 (10-17-22 @ 10:10)  BP(mean): --  RR: 18 (10-17-22 @ 10:10)  SpO2: 100% (10-17-22 @ 10:10)  Wt(kg): --    I and O's:    10-16 @ 07:01  -  10-17 @ 07:00  --------------------------------------------------------  IN: 0 mL / OUT: 1367 mL / NET: -1367 mL          PHYSICAL EXAM:    Constitutional: NAD  Neck:  No JVD  Respiratory: CTAB/L  Cardiovascular: S1 and S2  Gastrointestinal: BS+, soft, NT/ND  Extremities: BLLE BKA, L BKA (+) immobilizer   Neurological: A/O x 3, no focal deficits  Psychiatric: Normal mood, normal affect  : No Carreon  Skin: No rashes  Access: Not applicable    LABS:                        7.9    7.28  )-----------( 299      ( 17 Oct 2022 07:25 )             24.5     10-17    129<L>  |  97<L>  |  38<H>  ----------------------------<  166<H>  4.4   |  22  |  3.63<H>    Ca    8.6      17 Oct 2022 07:25  Phos  3.6     10-17  Mg     2.00     10-17    IMPRESSION: 62M w/ HTN, DM2, CKD, and PAD-R BKA, 10/7/22 p/w necrotic L foot ulcer; s/p L-BKA 10/7/22; now being planned for BKA formalization  (1)Renal - CKD4 - GFR ~20-25ml/min - ~2gm proteinuria. Likely at least in part from diabetic nephropathy. Azotemia rising   (2)Hyponatremia   (3)CV - hypertensive - BP improving now on hydralazine 75 mg tid   (4)Anemia - multifactorial including from CKD, hgb improving though remains low   (5)ID - necrotic L foot ulcer; s/p BKA; s/p IV Zosyn  (6)Vasc - s/p BKA formalization 10/14      RECOMMENDATIONS  (1)Check urine lytes, urine osmo   (2)Maintain 1 L free water restriction   (2)Defer ACEI/ARB for now  (3)Dose new meds for GFR 20-25ml/min  (4)BMP+Mg+PO4 daily        Duyen Puente NP   [facility]  Office: [phone]        RENAL ATTENDING NOTE  Patient seen and examined with NP. Agree with assessment and plan as above.    Olegario Chavez MD  [facility]  [phone]

## 2022-10-17 NOTE — PROVIDER CONTACT NOTE (OTHER) - REASON
Pt feels he can not get enough air
PCA checked blood pressure and it was 193/80, I rechecked the BP twice and it was 171/66, HR 66

## 2022-10-17 NOTE — PROGRESS NOTE ADULT - SUBJECTIVE AND OBJECTIVE BOX
Subjective: Patient seen and examined. No new events except as noted.     REVIEW OF SYSTEMS:    CONSTITUTIONAL: + weakness, fevers or chills  EYES/ENT: No visual changes;  No vertigo or throat pain   NECK: No pain or stiffness  RESPIRATORY: No cough, wheezing, hemoptysis; No shortness of breath  CARDIOVASCULAR: No chest pain or palpitations  GASTROINTESTINAL: No abdominal or epigastric pain. No nausea, vomiting, or hematemesis; No diarrhea or constipation. No melena or hematochezia.  GENITOURINARY: No dysuria, frequency or hematuria  NEUROLOGICAL: No numbness or weakness  SKIN: No itching, burning, rashes, or lesions   All other review of systems is negative unless indicated above.    MEDICATIONS:  MEDICATIONS  (STANDING):  amLODIPine   Tablet 10 milliGRAM(s) Oral daily  atorvastatin 40 milliGRAM(s) Oral at bedtime  gabapentin 300 milliGRAM(s) Oral daily  heparin   Injectable 5000 Unit(s) SubCutaneous every 8 hours  hydrALAZINE 75 milliGRAM(s) Oral three times a day  influenza   Vaccine 0.5 milliLiter(s) IntraMuscular once  insulin glargine Injectable (LANTUS) 34 Unit(s) SubCutaneous <User Schedule>  insulin lispro (ADMELOG) corrective regimen sliding scale   SubCutaneous three times a day before meals  insulin lispro (ADMELOG) corrective regimen sliding scale   SubCutaneous at bedtime  insulin lispro Injectable (ADMELOG) 10 Unit(s) SubCutaneous three times a day before meals  magnesium sulfate  IVPB 2 Gram(s) IV Intermittent once  polyethylene glycol 3350 17 Gram(s) Oral daily  sodium chloride 0.9% lock flush 3 milliLiter(s) IV Push every 8 hours  tamsulosin 0.4 milliGRAM(s) Oral at bedtime    PHYSICAL EXAM:  Vital Signs Last 24 Hrs  T(C): 36.6 (17 Oct 2022 07:00), Max: 37.2 (16 Oct 2022 22:06)  T(F): 97.9 (17 Oct 2022 07:00), Max: 99 (16 Oct 2022 22:06)  HR: 76 (17 Oct 2022 07:49) (76 - 94)  BP: 144/64 (17 Oct 2022 07:00) (134/61 - 158/62)  BP(mean): --  RR: 18 (17 Oct 2022 07:00) (16 - 18)  SpO2: 96% (17 Oct 2022 07:49) (96% - 100%)    Parameters below as of 17 Oct 2022 07:00  Patient On (Oxygen Delivery Method): room air    I&O's Summary    16 Oct 2022 07:01  -  17 Oct 2022 07:00  --------------------------------------------------------  IN: 0 mL / OUT: 1367 mL / NET: -1367 mL    Appearance: NAD	  HEENT: dry oral mucosa, PERRL, EOMI	  Lymphatic: No lymphadenopathy  Cardiovascular: Normal S1 S2, No JVD, No murmurs, No edema  Respiratory: Lungs clear to auscultation	  Psychiatry: A & O x 3, Mood & affect appropriate  Gastrointestinal: Soft, Non-tender, + BS	  Skin: No rashes, No ecchymoses, No cyanosis	  Neurologic: Non-focal  Extremities: bilateral lower extremity amputations - L BKA dressing c/d/i  Vascular: Peripheral pulses palpable 2+ bilaterally    LABS:    CARDIAC MARKERS:                        7.5    9.38  )-----------( 297      ( 16 Oct 2022 06:20 )             23.8     10-16    130<L>  |  97<L>  |  36<H>  ----------------------------<  186<H>  4.8   |  22  |  3.46<H>    Ca    8.4      16 Oct 2022 06:20  Phos  3.6     10-16  Mg     1.60     10-16    proBNP:   Lipid Profile:   HgA1c:   TSH:     TELEMETRY: 	    ECG:  	  RADIOLOGY:   DIAGNOSTIC TESTING:  [ ] Echocardiogram:  [ ]  Catheterization:  [ ] Stress Test:    OTHER:

## 2022-10-18 DIAGNOSIS — R07.9 CHEST PAIN, UNSPECIFIED: ICD-10-CM

## 2022-10-18 LAB
ANION GAP SERPL CALC-SCNC: 13 MMOL/L — SIGNIFICANT CHANGE UP (ref 7–14)
BUN SERPL-MCNC: 37 MG/DL — HIGH (ref 7–23)
CALCIUM SERPL-MCNC: 8.6 MG/DL — SIGNIFICANT CHANGE UP (ref 8.4–10.5)
CHLORIDE SERPL-SCNC: 101 MMOL/L — SIGNIFICANT CHANGE UP (ref 98–107)
CK MB BLD-MCNC: <1.7 % — SIGNIFICANT CHANGE UP (ref 0–2.5)
CK MB CFR SERPL CALC: <1 NG/ML — SIGNIFICANT CHANGE UP
CK SERPL-CCNC: 60 U/L — SIGNIFICANT CHANGE UP (ref 30–200)
CO2 SERPL-SCNC: 21 MMOL/L — LOW (ref 22–31)
CREAT SERPL-MCNC: 3.32 MG/DL — HIGH (ref 0.5–1.3)
EGFR: 20 ML/MIN/1.73M2 — LOW
GLUCOSE BLDC GLUCOMTR-MCNC: 150 MG/DL — HIGH (ref 70–99)
GLUCOSE SERPL-MCNC: 135 MG/DL — HIGH (ref 70–99)
HCT VFR BLD CALC: 24 % — LOW (ref 39–50)
HGB BLD-MCNC: 7.5 G/DL — LOW (ref 13–17)
MAGNESIUM SERPL-MCNC: 2.2 MG/DL — SIGNIFICANT CHANGE UP (ref 1.6–2.6)
MCHC RBC-ENTMCNC: 29.8 PG — SIGNIFICANT CHANGE UP (ref 27–34)
MCHC RBC-ENTMCNC: 31.3 GM/DL — LOW (ref 32–36)
MCV RBC AUTO: 95.2 FL — SIGNIFICANT CHANGE UP (ref 80–100)
NRBC # BLD: 0 /100 WBCS — SIGNIFICANT CHANGE UP (ref 0–0)
NRBC # FLD: 0 K/UL — SIGNIFICANT CHANGE UP (ref 0–0)
OSMOLALITY UR: 370 MOSM/KG — SIGNIFICANT CHANGE UP (ref 50–1200)
PHOSPHATE SERPL-MCNC: 4.2 MG/DL — SIGNIFICANT CHANGE UP (ref 2.5–4.5)
PLATELET # BLD AUTO: 315 K/UL — SIGNIFICANT CHANGE UP (ref 150–400)
POTASSIUM SERPL-MCNC: 4.7 MMOL/L — SIGNIFICANT CHANGE UP (ref 3.5–5.3)
POTASSIUM SERPL-SCNC: 4.7 MMOL/L — SIGNIFICANT CHANGE UP (ref 3.5–5.3)
RBC # BLD: 2.52 M/UL — LOW (ref 4.2–5.8)
RBC # FLD: 13.5 % — SIGNIFICANT CHANGE UP (ref 10.3–14.5)
SODIUM SERPL-SCNC: 135 MMOL/L — SIGNIFICANT CHANGE UP (ref 135–145)
TROPONIN T, HIGH SENSITIVITY RESULT: 89 NG/L — CRITICAL HIGH
WBC # BLD: 6.89 K/UL — SIGNIFICANT CHANGE UP (ref 3.8–10.5)
WBC # FLD AUTO: 6.89 K/UL — SIGNIFICANT CHANGE UP (ref 3.8–10.5)

## 2022-10-18 PROCEDURE — 71045 X-RAY EXAM CHEST 1 VIEW: CPT | Mod: 26

## 2022-10-18 RX ORDER — ERYTHROPOIETIN 10000 [IU]/ML
10000 INJECTION, SOLUTION INTRAVENOUS; SUBCUTANEOUS ONCE
Refills: 0 | Status: COMPLETED | OUTPATIENT
Start: 2022-10-18 | End: 2022-10-18

## 2022-10-18 RX ADMIN — Medication 1: at 12:41

## 2022-10-18 RX ADMIN — Medication 3 MILLIGRAM(S): at 22:03

## 2022-10-18 RX ADMIN — TAMSULOSIN HYDROCHLORIDE 0.4 MILLIGRAM(S): 0.4 CAPSULE ORAL at 22:04

## 2022-10-18 RX ADMIN — SODIUM CHLORIDE 3 MILLILITER(S): 9 INJECTION INTRAMUSCULAR; INTRAVENOUS; SUBCUTANEOUS at 21:49

## 2022-10-18 RX ADMIN — Medication 75 MILLIGRAM(S): at 05:41

## 2022-10-18 RX ADMIN — INSULIN GLARGINE 34 UNIT(S): 100 INJECTION, SOLUTION SUBCUTANEOUS at 08:09

## 2022-10-18 RX ADMIN — Medication 75 MILLIGRAM(S): at 19:57

## 2022-10-18 RX ADMIN — GABAPENTIN 300 MILLIGRAM(S): 400 CAPSULE ORAL at 12:40

## 2022-10-18 RX ADMIN — HEPARIN SODIUM 5000 UNIT(S): 5000 INJECTION INTRAVENOUS; SUBCUTANEOUS at 05:41

## 2022-10-18 RX ADMIN — POLYETHYLENE GLYCOL 3350 17 GRAM(S): 17 POWDER, FOR SOLUTION ORAL at 12:40

## 2022-10-18 RX ADMIN — AMLODIPINE BESYLATE 10 MILLIGRAM(S): 2.5 TABLET ORAL at 05:40

## 2022-10-18 RX ADMIN — ERYTHROPOIETIN 10000 UNIT(S): 10000 INJECTION, SOLUTION INTRAVENOUS; SUBCUTANEOUS at 12:40

## 2022-10-18 RX ADMIN — ATORVASTATIN CALCIUM 40 MILLIGRAM(S): 80 TABLET, FILM COATED ORAL at 22:03

## 2022-10-18 RX ADMIN — Medication 12 UNIT(S): at 17:46

## 2022-10-18 RX ADMIN — SODIUM CHLORIDE 3 MILLILITER(S): 9 INJECTION INTRAMUSCULAR; INTRAVENOUS; SUBCUTANEOUS at 05:32

## 2022-10-18 RX ADMIN — HEPARIN SODIUM 5000 UNIT(S): 5000 INJECTION INTRAVENOUS; SUBCUTANEOUS at 19:57

## 2022-10-18 RX ADMIN — Medication 12 UNIT(S): at 12:42

## 2022-10-18 NOTE — PROGRESS NOTE ADULT - ATTENDING COMMENTS
I96 Gangrene of left foot   -s/p BKA, con't abx for systemic exposure of ascending infection, can stop in 4 days if all blood cultures confirmed negative  R65.21 Severe sepsis with septic shock   -resuscitation and broad spectrum abx provided. Source controlled   -monitor renal function   E11.8 Type 2 diabetes mellitus with complication   -aggressive BG mgmt with insulin gtt transitioned to basal/bolus regimen   -baseline A1c  N18.9 Chronic kidney disease, unspecified CKD stage   -will need to obtain outside records but currently with impaired renal function  -trend u/o, BMP    Onel Beyer MD  Acute and Critical Care Surgery    The Acute Care Surgery (B Team) Attending Group Practice:  Dr. Jabier Augustin, Dr. Rommel Dumas, Dr. Onel Beyer,  Dr. Ruddy Capellan and Dr. Peggy Reid     Urgent issues - spectra [phone] or [phone]  Nonurgent issues - [phone]  Patient appointments or after hours - [phone]
S/p left BKA. Stump with dressing/immobilizer. D/C planning to rehab. Continue antiplatelet and statin therapy and glycemic control.
S/p left faiza HOLDER. In SICU post-op for glycemic control. Stump with dressing/immobilizer in place. Awaiting cardiac clearance. To OR for formalization on 10/14/22.
No acute events overnight. Patient s/p open bka. Plan for revision per vascular team. Start on hydralazine. Patient floor eligible. With KWABENA, cpap overnight.    I have personally interviewed when possible and examined the patient, reviewed data and laboratory tests/x-rays and all pertinent electronic images.  I was physically present for the key portions of the evaluation and management (E/M) service provided.   The SICU team has a constant risk benefit analyzes discussion with the primary team, all consultants, House Staff and PA's on all decisions.  These diagnoses are unrelated to the surgical procedure noted above.  I meet with family if needed to get further history, discuss the case and make care decisions for this patient who might not be able to participate.  Time involved in performance of separately billable procedures was not counted toward my critical care time. There is no overlap.  I spent 30 minutes ( 0800Hrs-0915Hrs in AM/ 1600Hrs-1715Hrs in PM, or other time indicated) of critical care time for the diagnoses, assessment, plan and interventions.  This time excludes time spent on separate procedures and teaching.
S/p left BKA. Stump with dressing/immobilizer. D/C planning to rehab. Continue antiplatelet and statin therapy and glycemic control.
I96 Gangrene of left foot   -con't IV abx, monitor operative site  -pain control - IV tylenol and gabapentin with mild opioid use  E11.8 Type 2 diabetes mellitus with complication  -basal/bolus regimen, titrated off ggt    N18.9 Chronic kidney disease, unspecified CKD stage   -bmp stable, volume status is favorable     Floor care pending availability     Onel Beyer MD  Acute and Critical Care Surgery    The Acute Care Surgery (B Team) Attending Group Practice:  Dr. Jabier Augustin, Dr. Rommel Dumas, Dr. Onel Beyer,  Dr. Ruddy Capellan and Dr. Peggy Reid     Urgent issues - spectra [phone] or [phone]  Nonurgent issues - [phone]  Patient appointments or after hours - [phone]
S/p left fazia HOLDER. In SICU post-op for glycemic control. Stump with dressing/immobilizer in place. Awaiting cardiac clearance. To OR for formalization on 10/14/22.
Patient overnight no issues. On cpap overnight for undiagnosed KWABENA. Patient tolerating diet, plan for revision friday. Wound clean dry and intact. Normal wbc.  plan  floor eligible  endocrine for fs  maintain on abx until closure  night time cpap for kwabena    I have personally interviewed when possible and examined the patient, reviewed data and laboratory tests/x-rays and all pertinent electronic images.  I was physically present for the key portions of the evaluation and management (E/M) service provided.   The SICU team has a constant risk benefit analyzes discussion with the primary team, all consultants, House Staff and PA's on all decisions.  These diagnoses are unrelated to the surgical procedure noted above.  I meet with family if needed to get further history, discuss the case and make care decisions for this patient who might not be able to participate.  Time involved in performance of separately billable procedures was not counted toward my critical care time. There is no overlap.  I spent 30 minutes ( 0800Hrs-0915Hrs in AM/ 1600Hrs-1715Hrs in PM, or other time indicated) of critical care time for the diagnoses, assessment, plan and interventions.  This time excludes time spent on separate procedures and teaching.

## 2022-10-18 NOTE — PROGRESS NOTE ADULT - SUBJECTIVE AND OBJECTIVE BOX
Overnight events noted      VITAL:  T(C): , Max: 36.9 (10-17-22 @ 14:30)  T(F): , Max: 98.4 (10-17-22 @ 14:30)  HR: 72 (10-18-22 @ 09:44)  BP: 143/61 (10-18-22 @ 09:44)  BP(mean): --  RR: 18 (10-18-22 @ 09:44)  SpO2: 100% (10-18-22 @ 09:44)  Wt(kg): --      PHYSICAL EXAM:  Constitutional: NAD  Neck:  No JVD  Respiratory: CTAB/L  Cardiovascular: S1 and S2  Gastrointestinal: BS+, soft, NT/ND  Extremities: B/L LE BKA, L BKA (+) immobilizer   Neurological: A/O x 3, no focal deficits  : No Carreon  Skin: No rashes    LABS:                        7.5    6.89  )-----------( 315      ( 18 Oct 2022 07:20 )             24.0     Na(135)/K(4.7)/Cl(101)/HCO3(21)/BUN(37)/Cr(3.32)Glu(135)/Ca(8.6)/Mg(2.20)/PO4(4.2)    10-18 @ 07:20  Na(129)/K(4.4)/Cl(97)/HCO3(22)/BUN(38)/Cr(3.63)Glu(166)/Ca(8.6)/Mg(2.00)/PO4(3.6)    10-17 @ 07:25  Na(130)/K(4.8)/Cl(97)/HCO3(22)/BUN(36)/Cr(3.46)Glu(186)/Ca(8.4)/Mg(1.60)/PO4(3.6)    10-16 @ 06:20  Na(131)/K(4.8)/Cl(98)/HCO3(21)/BUN(34)/Cr(3.26)Glu(248)/Ca(8.7)/Mg(1.70)/PO4(3.2)    10-15 @ 13:58      Sodium, Random Urine: 25 mmol/L (10-17 @ 13:17)  Potassium, Random Urine: 19.3 mmol/L (10-17 @ 13:17)  Chloride, Random Urine: <20 mmol/L (10-17 @ 13:17)  Creatinine, Random Urine: 79 mg/dL (10-17 @ 13:17)      IMPRESSION: 62M w/ HTN, DM2, CKD, and PAD-R BKA, 10/7/22 p/w necrotic L foot ulcer; s/p L-BKA 10/7/22; now being planned for BKA formalization  (1)Renal - CKD4 - GFR ~20-25ml/min - ~2gm proteinuria. Likely at least in part from diabetic nephropathy. Azotemia rising   (2)Hyponatremia - due to intravascular depletion - improved as of today  (3)CV - BP/volume grossly acceptable  (4)Anemia - multifactorial including from CKD  (5)ID - necrotic L foot ulcer; s/p BKA; s/p IV Zosyn  (6)Vasc - s/p BKA formalization 10/14      RECOMMENDATIONS  (1)Retacrit 10K SQ x 1 today  (2)Favor adding standing FeSO4 - 325mg po qd  (3)Continue free water restriction as ordered  (4)Amlodipine as ordered  (5)If for discharge, he can f/u at my office in 1-4 weeks      Olegario Chavez MD  [facility]  Office: [phone]  Cell: [phone]       No pain, no sob      VITAL:  T(C): , Max: 36.9 (10-17-22 @ 14:30)  T(F): , Max: 98.4 (10-17-22 @ 14:30)  HR: 72 (10-18-22 @ 09:44)  BP: 143/61 (10-18-22 @ 09:44)  BP(mean): --  RR: 18 (10-18-22 @ 09:44)  SpO2: 100% (10-18-22 @ 09:44)  Wt(kg): --      PHYSICAL EXAM:  Constitutional: NAD  Neck:  No JVD  Respiratory: CTAB/L  Cardiovascular: S1 and S2  Gastrointestinal: BS+, soft, NT/ND  Extremities: B/L LE BKA, L BKA (+) immobilizer   Neurological: A/O x 3, no focal deficits  : No Carreon  Skin: No rashes    LABS:                        7.5    6.89  )-----------( 315      ( 18 Oct 2022 07:20 )             24.0     Na(135)/K(4.7)/Cl(101)/HCO3(21)/BUN(37)/Cr(3.32)Glu(135)/Ca(8.6)/Mg(2.20)/PO4(4.2)    10-18 @ 07:20  Na(129)/K(4.4)/Cl(97)/HCO3(22)/BUN(38)/Cr(3.63)Glu(166)/Ca(8.6)/Mg(2.00)/PO4(3.6)    10-17 @ 07:25  Na(130)/K(4.8)/Cl(97)/HCO3(22)/BUN(36)/Cr(3.46)Glu(186)/Ca(8.4)/Mg(1.60)/PO4(3.6)    10-16 @ 06:20  Na(131)/K(4.8)/Cl(98)/HCO3(21)/BUN(34)/Cr(3.26)Glu(248)/Ca(8.7)/Mg(1.70)/PO4(3.2)    10-15 @ 13:58      Sodium, Random Urine: 25 mmol/L (10-17 @ 13:17)  Potassium, Random Urine: 19.3 mmol/L (10-17 @ 13:17)  Chloride, Random Urine: <20 mmol/L (10-17 @ 13:17)  Creatinine, Random Urine: 79 mg/dL (10-17 @ 13:17)      IMPRESSION: 62M w/ HTN, DM2, CKD, and PAD-R BKA, 10/7/22 p/w necrotic L foot ulcer; s/p L-BKA 10/7/22; now being planned for BKA formalization  (1)Renal - CKD4 - GFR ~20-25ml/min - ~2gm proteinuria. Likely at least in part from diabetic nephropathy. Azotemia rising   (2)Hyponatremia - due to intravascular depletion - improved as of today  (3)CV - BP/volume grossly acceptable  (4)Anemia - multifactorial including from CKD  (5)ID - necrotic L foot ulcer; s/p BKA; s/p IV Zosyn  (6)Vasc - s/p BKA formalization 10/14      RECOMMENDATIONS  (1)Retacrit 10K SQ x 1 today  (2)Favor adding standing FeSO4 - 325mg po qd  (3)Continue free water restriction as ordered  (4)Amlodipine as ordered  (5)If for discharge, he can f/u at my office in 1-4 weeks      Olegario Chavez MD  [facility]  Office: [phone]  Cell: [phone]

## 2022-10-18 NOTE — PROGRESS NOTE ADULT - ASSESSMENT
Patient is a 62 year old male with a PMHx of HTN, DM2, HLD, BPH, PAD and right BKA secondary to unhealed DM2 ulcer who presented to [facility] for left lower extremity peripheral angiogram.  Patient is now S/P left lower extremity BKA on 10/7/22 with completion left lower extremity BKA on 10/14/22.      PLAN:  - Regular diet  - Continue home medications  - Out of bed  - Pain control  - VTE prophylaxis with Heparin subcutaneous      #[id number]  Vascular Surgery

## 2022-10-18 NOTE — PROGRESS NOTE ADULT - ASSESSMENT
62 year old male with a PMHx of HTN, DM2, HLD, BPH, PAD and right BKA secondary to unhealed DM2 ulcer who presented to [facility] for left lower extremity peripheral angiogram.  Patient is now S/P left lower extremity BKA on 10/7/22.

## 2022-10-18 NOTE — PROGRESS NOTE ADULT - SUBJECTIVE AND OBJECTIVE BOX
VASCULAR SURGERY PROGRESS NOTE    CC:   Hospital Day #  Post-Op Day #    Procedure:    Events of past 24 hours:    ROS otherwise negative except per subjective and HPI      Vital Signs Last 24 Hrs  T(C): 36.3 (17 Oct 2022 21:25), Max: 36.9 (17 Oct 2022 14:30)  T(F): 97.4 (17 Oct 2022 21:25), Max: 98.4 (17 Oct 2022 14:30)  HR: 74 (17 Oct 2022 23:38) (60 - 92)  BP: 129/64 (17 Oct 2022 21:25) (129/64 - 158/78)  BP(mean): --  RR: 17 (17 Oct 2022 21:25) (16 - 18)  SpO2: 100% (17 Oct 2022 23:38) (95% - 100%)    Parameters below as of 17 Oct 2022 21:25  Patient On (Oxygen Delivery Method): nasal cannula  O2 Flow (L/min): 2      Pain (0-10):            Pain Control Adequate: [] YES [] N    I&O's Detail    16 Oct 2022 07:01  -  17 Oct 2022 07:00  --------------------------------------------------------  IN:  Total IN: 0 mL    OUT:    Intermittent Catheterization - Urethral (mL): 400 mL    Post-Void Residual per Intermittent Catheterization (mL): 17 mL    Voided (mL): 950 mL  Total OUT: 1367 mL    Total NET: -1367 mL      17 Oct 2022 07:01  -  18 Oct 2022 00:12  --------------------------------------------------------  IN:  Total IN: 0 mL    OUT:    Voided (mL): 350 mL  Total OUT: 350 mL    Total NET: -350 mL          PHYSICAL EXAM    Appearance: Normal	  HEENT:   Normal oral mucosa, PERRL, EOMI	  Neck: Supple, - JVD; Carotid Bruit   Cardiovascular: Normal S1 S2, No JVD, No murmurs,   Respiratory: Lungs clear to auscultation/Decreased Breath Sounds/No Rales, Rhonchi, Wheezing	  Gastrointestinal:  Soft, Non-tender, + BS	  Skin: No rashes, No ecchymoses, No cyanosis  Extremities: Normal range of motion, No clubbing, cyanosis or edema  Neurologic: Non-focal  Psychiatry: A & O x 3, Mood & affect appropriate    PULSES:  Femoral:  Popliteal:  Dorsal Pedal:  Posterior Tibial:  Capillary:      MEDICATIONS:   MEDICATIONS  (STANDING):  amLODIPine   Tablet 10 milliGRAM(s) Oral daily  atorvastatin 40 milliGRAM(s) Oral at bedtime  dextrose 50% Injectable 25 Gram(s) IV Push once  dextrose 50% Injectable 12.5 Gram(s) IV Push once  dextrose 50% Injectable 25 Gram(s) IV Push once  dextrose Oral Gel 15 Gram(s) Oral once  gabapentin 300 milliGRAM(s) Oral daily  glucagon  Injectable 1 milliGRAM(s) IntraMuscular once  heparin   Injectable 5000 Unit(s) SubCutaneous every 8 hours  hydrALAZINE 75 milliGRAM(s) Oral three times a day  influenza   Vaccine 0.5 milliLiter(s) IntraMuscular once  insulin glargine Injectable (LANTUS) 34 Unit(s) SubCutaneous <User Schedule>  insulin lispro (ADMELOG) corrective regimen sliding scale   SubCutaneous three times a day before meals  insulin lispro (ADMELOG) corrective regimen sliding scale   SubCutaneous at bedtime  insulin lispro Injectable (ADMELOG) 12 Unit(s) SubCutaneous three times a day before meals  melatonin 3 milliGRAM(s) Oral at bedtime  polyethylene glycol 3350 17 Gram(s) Oral daily  sodium chloride 0.9% lock flush 3 milliLiter(s) IV Push every 8 hours  tamsulosin 0.4 milliGRAM(s) Oral at bedtime    MEDICATIONS  (PRN):  acetaminophen   IVPB .. 1000 milliGRAM(s) IV Intermittent every 6 hours PRN Moderate Pain (4 - 6)  aluminum hydroxide/magnesium hydroxide/simethicone Suspension 30 milliLiter(s) Oral once PRN Dyspepsia  oxyCODONE    IR 5 milliGRAM(s) Oral every 4 hours PRN Moderate Pain (4 - 6)  oxyCODONE    IR 10 milliGRAM(s) Oral every 4 hours PRN Severe Pain (7 - 10)      LAB/STUDIES:                        7.9    7.28  )-----------( 299      ( 17 Oct 2022 07:25 )             24.5     10-17    129<L>  |  97<L>  |  38<H>  ----------------------------<  166<H>  4.4   |  22  |  3.63<H>    Ca    8.6      17 Oct 2022 07:25  Phos  3.6     10-17  Mg     2.00     10-17                            IMAGING:       Vascular Surgery Daily Progress Note  =====================================================  Interval / Overnight Events: No acute events overnight.      HPI:  Patient is a 62 year old male with a PMHx of HTN, DM2, HLD, BPH, PAD and right BKA secondary to unhealed DM2 ulcer who presented to [facility] for left lower extremity peripheral angiogram. (07 Oct 2022 14:29)      PAST MEDICAL & SURGICAL HISTORY:  Diabetes  Type II  BPH (benign prostatic hypertrophy)  HTN (hypertension)  Venous insufficiency of both lower extremities  R &gt; L  Status post incision and drainage  Rt groin abscess  S/P laparoscopic cholecystectomy  History of cholecystectomy  S/P BKA (below knee amputation), right      ALLERGIES:  No Known Allergies    --------------------------------------------------------------------------------------    MEDICATIONS:    Neurologic Medications  acetaminophen   IVPB .. 1000 milliGRAM(s) IV Intermittent every 6 hours PRN Moderate Pain (4 - 6)  gabapentin 300 milliGRAM(s) Oral daily  melatonin 3 milliGRAM(s) Oral at bedtime  oxyCODONE    IR 5 milliGRAM(s) Oral every 4 hours PRN Moderate Pain (4 - 6)  oxyCODONE    IR 10 milliGRAM(s) Oral every 4 hours PRN Severe Pain (7 - 10)    Cardiovascular Medications  amLODIPine   Tablet 10 milliGRAM(s) Oral daily  hydrALAZINE 75 milliGRAM(s) Oral three times a day  tamsulosin 0.4 milliGRAM(s) Oral at bedtime    Gastrointestinal Medications  aluminum hydroxide/magnesium hydroxide/simethicone Suspension 30 milliLiter(s) Oral once PRN Dyspepsia  polyethylene glycol 3350 17 Gram(s) Oral daily  sodium chloride 0.9% lock flush 3 milliLiter(s) IV Push every 8 hours    Hematologic/Oncologic Medications  heparin   Injectable 5000 Unit(s) SubCutaneous every 8 hours  influenza   Vaccine 0.5 milliLiter(s) IntraMuscular once    Endocrine/Metabolic Medications  atorvastatin 40 milliGRAM(s) Oral at bedtime  dextrose 50% Injectable 25 Gram(s) IV Push once  dextrose 50% Injectable 12.5 Gram(s) IV Push once  dextrose 50% Injectable 25 Gram(s) IV Push once  dextrose Oral Gel 15 Gram(s) Oral once  glucagon  Injectable 1 milliGRAM(s) IntraMuscular once  insulin glargine Injectable (LANTUS) 34 Unit(s) SubCutaneous <User Schedule>  insulin lispro (ADMELOG) corrective regimen sliding scale   SubCutaneous three times a day before meals  insulin lispro (ADMELOG) corrective regimen sliding scale   SubCutaneous at bedtime  insulin lispro Injectable (ADMELOG) 12 Unit(s) SubCutaneous three times a day before meals    --------------------------------------------------------------------------------------    VITAL SIGNS:  T(C): 36.8 (18 Oct 2022 05:35), Max: 36.9 (17 Oct 2022 14:30)  T(F): 98.2 (18 Oct 2022 05:35), Max: 98.4 (17 Oct 2022 14:30)  HR: 78 (18 Oct 2022 07:40) (60 - 92)  BP: 154/69 (18 Oct 2022 05:35) (129/64 - 158/78)  RR: 16 (18 Oct 2022 05:35) (16 - 18)  SpO2: 98% (18 Oct 2022 07:40) (95% - 100%)    --------------------------------------------------------------------------------------    INS AND OUTS:    17 Oct 2022 07:01  -  18 Oct 2022 07:00  --------------------------------------------------------  IN:  Total IN: 0 mL    OUT:    Voided (mL): 750 mL  Total OUT: 750 mL    Total NET: -750 mL    --------------------------------------------------------------------------------------    EXAM    NEUROLOGY   Exam: Normal, in no acute distress.    HEENT  Exam: Normocephalic, atraumatic.    RESPIRATORY  Exam: Normal expansion / effort.    CARDIOVASCULAR  Exam: S1, S2.  Regular rate and rhythm.    GI/NUTRITION  Exam: Abdomen soft, Non-tender, Non-distended.  Current Diet: Regular diet    VASCULAR  Exam: Bilateral lower extremity amputations.    MUSCULOSKELETAL  Exam: All extremities moving spontaneously without limitations.      METABOLIC / FLUIDS / ELECTROLYTES  sodium chloride 0.9% lock flush 3 milliLiter(s) IV Push every 8 hours      HEMATOLOGIC  [x] VTE Prophylaxis: heparin   Injectable 5000 Unit(s) SubCutaneous every 8 hours      INFECTIOUS DISEASE  Antimicrobials/Immunologic Medications:  influenza   Vaccine 0.5 milliLiter(s) IntraMuscular once    --------------------------------------------------------------------------------------

## 2022-10-19 LAB
ANION GAP SERPL CALC-SCNC: 10 MMOL/L — SIGNIFICANT CHANGE UP (ref 7–14)
BUN SERPL-MCNC: 35 MG/DL — HIGH (ref 7–23)
CALCIUM SERPL-MCNC: 8.6 MG/DL — SIGNIFICANT CHANGE UP (ref 8.4–10.5)
CHLORIDE SERPL-SCNC: 104 MMOL/L — SIGNIFICANT CHANGE UP (ref 98–107)
CO2 SERPL-SCNC: 22 MMOL/L — SIGNIFICANT CHANGE UP (ref 22–31)
CREAT SERPL-MCNC: 3.26 MG/DL — HIGH (ref 0.5–1.3)
EGFR: 20 ML/MIN/1.73M2 — LOW
GLUCOSE SERPL-MCNC: 175 MG/DL — HIGH (ref 70–99)
HCT VFR BLD CALC: 24.3 % — LOW (ref 39–50)
HGB BLD-MCNC: 7.6 G/DL — LOW (ref 13–17)
MAGNESIUM SERPL-MCNC: 2 MG/DL — SIGNIFICANT CHANGE UP (ref 1.6–2.6)
MCHC RBC-ENTMCNC: 29.2 PG — SIGNIFICANT CHANGE UP (ref 27–34)
MCHC RBC-ENTMCNC: 31.3 GM/DL — LOW (ref 32–36)
MCV RBC AUTO: 93.5 FL — SIGNIFICANT CHANGE UP (ref 80–100)
NRBC # BLD: 0 /100 WBCS — SIGNIFICANT CHANGE UP (ref 0–0)
NRBC # FLD: 0 K/UL — SIGNIFICANT CHANGE UP (ref 0–0)
PHOSPHATE SERPL-MCNC: 4 MG/DL — SIGNIFICANT CHANGE UP (ref 2.5–4.5)
PLATELET # BLD AUTO: 326 K/UL — SIGNIFICANT CHANGE UP (ref 150–400)
POTASSIUM SERPL-MCNC: 4.6 MMOL/L — SIGNIFICANT CHANGE UP (ref 3.5–5.3)
POTASSIUM SERPL-SCNC: 4.6 MMOL/L — SIGNIFICANT CHANGE UP (ref 3.5–5.3)
RBC # BLD: 2.6 M/UL — LOW (ref 4.2–5.8)
RBC # FLD: 13.3 % — SIGNIFICANT CHANGE UP (ref 10.3–14.5)
SODIUM SERPL-SCNC: 136 MMOL/L — SIGNIFICANT CHANGE UP (ref 135–145)
WBC # BLD: 6.03 K/UL — SIGNIFICANT CHANGE UP (ref 3.8–10.5)
WBC # FLD AUTO: 6.03 K/UL — SIGNIFICANT CHANGE UP (ref 3.8–10.5)

## 2022-10-19 PROCEDURE — 99232 SBSQ HOSP IP/OBS MODERATE 35: CPT

## 2022-10-19 RX ORDER — INSULIN LISPRO 100/ML
10 VIAL (ML) SUBCUTANEOUS
Refills: 0 | Status: DISCONTINUED | OUTPATIENT
Start: 2022-10-19 | End: 2022-10-20

## 2022-10-19 RX ADMIN — AMLODIPINE BESYLATE 10 MILLIGRAM(S): 2.5 TABLET ORAL at 05:57

## 2022-10-19 RX ADMIN — SODIUM CHLORIDE 3 MILLILITER(S): 9 INJECTION INTRAMUSCULAR; INTRAVENOUS; SUBCUTANEOUS at 22:00

## 2022-10-19 RX ADMIN — Medication 10 UNIT(S): at 17:19

## 2022-10-19 RX ADMIN — Medication 75 MILLIGRAM(S): at 15:20

## 2022-10-19 RX ADMIN — POLYETHYLENE GLYCOL 3350 17 GRAM(S): 17 POWDER, FOR SOLUTION ORAL at 12:39

## 2022-10-19 RX ADMIN — Medication 3 MILLIGRAM(S): at 22:58

## 2022-10-19 RX ADMIN — Medication 75 MILLIGRAM(S): at 05:57

## 2022-10-19 RX ADMIN — SODIUM CHLORIDE 3 MILLILITER(S): 9 INJECTION INTRAMUSCULAR; INTRAVENOUS; SUBCUTANEOUS at 05:42

## 2022-10-19 RX ADMIN — Medication 1: at 08:16

## 2022-10-19 RX ADMIN — Medication 12 UNIT(S): at 12:26

## 2022-10-19 RX ADMIN — Medication 75 MILLIGRAM(S): at 23:08

## 2022-10-19 RX ADMIN — INSULIN GLARGINE 34 UNIT(S): 100 INJECTION, SOLUTION SUBCUTANEOUS at 08:15

## 2022-10-19 RX ADMIN — Medication 12 UNIT(S): at 08:16

## 2022-10-19 RX ADMIN — SODIUM CHLORIDE 3 MILLILITER(S): 9 INJECTION INTRAMUSCULAR; INTRAVENOUS; SUBCUTANEOUS at 14:00

## 2022-10-19 RX ADMIN — HEPARIN SODIUM 5000 UNIT(S): 5000 INJECTION INTRAVENOUS; SUBCUTANEOUS at 05:57

## 2022-10-19 RX ADMIN — HEPARIN SODIUM 5000 UNIT(S): 5000 INJECTION INTRAVENOUS; SUBCUTANEOUS at 22:58

## 2022-10-19 RX ADMIN — GABAPENTIN 300 MILLIGRAM(S): 400 CAPSULE ORAL at 12:39

## 2022-10-19 RX ADMIN — TAMSULOSIN HYDROCHLORIDE 0.4 MILLIGRAM(S): 0.4 CAPSULE ORAL at 22:59

## 2022-10-19 RX ADMIN — ATORVASTATIN CALCIUM 40 MILLIGRAM(S): 80 TABLET, FILM COATED ORAL at 22:58

## 2022-10-19 RX ADMIN — HEPARIN SODIUM 5000 UNIT(S): 5000 INJECTION INTRAVENOUS; SUBCUTANEOUS at 15:19

## 2022-10-19 NOTE — PROGRESS NOTE ADULT - ASSESSMENT
Assessment: 62M with a PMHx of HTN, DM2, HLD, BPH, PAD and right BKA secondary to unhealed DM2 ulcer who presented to [facility] for left lower extremity peripheral angiogram.  Patient is now S/P left lower extremity BKA on 10/7/22. s/p LLE BKA Formalization 10/15/22.    Plan:   - Monitor BMP was hyperkalemic over the weekend  - Continue knee immobilizer   - Pain control  - Regular diet   - DVT ppx: SQH  - Dispo: PT and DC      Vascular Surgery, C Team Surgery  P# [id number]

## 2022-10-19 NOTE — PROGRESS NOTE ADULT - SUBJECTIVE AND OBJECTIVE BOX
Chief Complaint: DM 2    History: Patient seen at bedside. Reports he is eating meals but has lower appetite last few days. No nausea/vomiting, no hypoglycemia     MEDICATIONS  (STANDING):  amLODIPine   Tablet 10 milliGRAM(s) Oral daily  atorvastatin 40 milliGRAM(s) Oral at bedtime  dextrose 50% Injectable 25 Gram(s) IV Push once  dextrose 50% Injectable 25 Gram(s) IV Push once  dextrose 50% Injectable 12.5 Gram(s) IV Push once  dextrose Oral Gel 15 Gram(s) Oral once  gabapentin 300 milliGRAM(s) Oral daily  glucagon  Injectable 1 milliGRAM(s) IntraMuscular once  heparin   Injectable 5000 Unit(s) SubCutaneous every 8 hours  hydrALAZINE 75 milliGRAM(s) Oral three times a day  influenza   Vaccine 0.5 milliLiter(s) IntraMuscular once  insulin glargine Injectable (LANTUS) 34 Unit(s) SubCutaneous <User Schedule>  insulin lispro (ADMELOG) corrective regimen sliding scale   SubCutaneous three times a day before meals  insulin lispro (ADMELOG) corrective regimen sliding scale   SubCutaneous at bedtime  insulin lispro Injectable (ADMELOG) 12 Unit(s) SubCutaneous three times a day before meals  melatonin 3 milliGRAM(s) Oral at bedtime  polyethylene glycol 3350 17 Gram(s) Oral daily  sodium chloride 0.9% lock flush 3 milliLiter(s) IV Push every 8 hours  tamsulosin 0.4 milliGRAM(s) Oral at bedtime    MEDICATIONS  (PRN):  acetaminophen   IVPB .. 1000 milliGRAM(s) IV Intermittent every 6 hours PRN Moderate Pain (4 - 6)  aluminum hydroxide/magnesium hydroxide/simethicone Suspension 30 milliLiter(s) Oral once PRN Dyspepsia  oxyCODONE    IR 5 milliGRAM(s) Oral every 4 hours PRN Moderate Pain (4 - 6)  oxyCODONE    IR 10 milliGRAM(s) Oral every 4 hours PRN Severe Pain (7 - 10)    No Known Allergies    Review of Systems:  Cardiovascular: No chest pain  Respiratory: No SOB  GI: No nausea, vomiting  Endocrine: no hypoglycemia    PHYSICAL EXAM:  VITALS: T(C): 36.3 (10-19-22 @ 09:43)  T(F): 97.4 (10-19-22 @ 09:43), Max: 98.2 (10-19-22 @ 02:06)  HR: 70 (10-19-22 @ 09:43) (68 - 85)  BP: 136/63 (10-19-22 @ 09:43) (134/61 - 165/71)  RR:  (16 - 18)  SpO2:  (96% - 100%)  Wt(kg): --  GENERAL: NAD  EYES: No proptosis, no lid lag, anicteric  HEENT:  Atraumatic, Normocephalic, moist mucous membranes  RESPIRATORY: unlabored respirations     CAPILLARY BLOOD GLUCOSE    POCT Blood Glucose.: 149 mg/dL (19 Oct 2022 12:08)  POCT Blood Glucose.: 180 mg/dL (19 Oct 2022 07:40)  POCT Blood Glucose.: 136 mg/dL (18 Oct 2022 21:38)  POCT Blood Glucose.: 139 mg/dL (18 Oct 2022 16:58)      10-19    136  |  104  |  35<H>  ----------------------------<  175<H>  4.6   |  22  |  3.26<H>    eGFR: 20<L>    Ca    8.6      10-19  Mg     2.00     10-19  Phos  4.0     10-19      A1C with Estimated Average Glucose Result: 8.5 % (10-08-22 @ 06:33)    Diet, Consistent Carbohydrate w/Evening Snack:   1000mL Fluid Restriction ([id number]) (10-17-22 @ 09:37) [Active]

## 2022-10-19 NOTE — PROGRESS NOTE ADULT - SUBJECTIVE AND OBJECTIVE BOX
NEPHROLOGY     Patient seen and examined.    MEDICATIONS  (STANDING):  amLODIPine   Tablet 10 milliGRAM(s) Oral daily  atorvastatin 40 milliGRAM(s) Oral at bedtime  dextrose 50% Injectable 25 Gram(s) IV Push once  dextrose 50% Injectable 12.5 Gram(s) IV Push once  dextrose 50% Injectable 25 Gram(s) IV Push once  dextrose Oral Gel 15 Gram(s) Oral once  gabapentin 300 milliGRAM(s) Oral daily  glucagon  Injectable 1 milliGRAM(s) IntraMuscular once  heparin   Injectable 5000 Unit(s) SubCutaneous every 8 hours  hydrALAZINE 75 milliGRAM(s) Oral three times a day  influenza   Vaccine 0.5 milliLiter(s) IntraMuscular once  insulin glargine Injectable (LANTUS) 34 Unit(s) SubCutaneous <User Schedule>  insulin lispro (ADMELOG) corrective regimen sliding scale   SubCutaneous three times a day before meals  insulin lispro (ADMELOG) corrective regimen sliding scale   SubCutaneous at bedtime  insulin lispro Injectable (ADMELOG) 12 Unit(s) SubCutaneous three times a day before meals  melatonin 3 milliGRAM(s) Oral at bedtime  polyethylene glycol 3350 17 Gram(s) Oral daily  sodium chloride 0.9% lock flush 3 milliLiter(s) IV Push every 8 hours  tamsulosin 0.4 milliGRAM(s) Oral at bedtime    VITALS:  T(C): , Max: 36.8 (10-19-22 @ 02:06)  T(F): , Max: 98.2 (10-19-22 @ 02:06)  HR: 70 (10-19-22 @ 09:43)  BP: 136/63 (10-19-22 @ 09:43)  RR: 18 (10-19-22 @ 09:43)  SpO2: 96% (10-19-22 @ 09:43)    I and O's:    10-18 @ 07:01  -  10-19 @ 07:00  --------------------------------------------------------  IN: 720 mL / OUT: 1650 mL / NET: -930 mL    PHYSICAL EXAM:    LABS:                        7.6    6.03  )-----------( 326      ( 19 Oct 2022 07:20 )             24.3     10-19    136  |  104  |  35<H>  ----------------------------<  175<H>  4.6   |  22  |  3.26<H>    Ca    8.6      19 Oct 2022 07:20  Phos  4.0     10-19  Mg     2.00     10-19    Urine Studies:    Osmolality, Random Urine: 370 mosm/kg (10-18 @ 20:20)  Sodium, Random Urine: 25 mmol/L (10-17 @ 13:17)  Potassium, Random Urine: 19.3 mmol/L (10-17 @ 13:17)  Chloride, Random Urine: <20 mmol/L (10-17 @ 13:17)  Creatinine, Random Urine: 79 mg/dL (10-17 @ 13:17)      RADIOLOGY & ADDITIONAL STUDIES:    ACC: [id number] EXAM:  XR CHEST AP OR PA 1V                          PROCEDURE DATE:  10/18/2022          INTERPRETATION:  EXAMINATION: XR CHEST    CLINICAL INDICATION: Heaviness in chest    TECHNIQUE: Single frontal, portable view of the chest was obtained.    COMPARISON: Chest x-ray 7/11/2019.    FINDINGS:  LINES/TUBES/SUPPORT DEVICES: None    LUNGS: No focal consolidation.    PLEURA: No pleural effusion or pneumothorax.    HEART AND MEDIASTINUM: Heart size is within normal limits.    BONES: Noacute bony abnormality.    IMPRESSION: Clear lungs.      --- End of Report ---          TREY FLORES MD; Resident Radiologist  This document has been electronically signed.  FRANCISCA SANDOVAL MD; Attending Radiologist  This document has been electronically signed. Oct 18 2022  7:52PM   NEPHROLOGY     Patient seen and examined resting comfortably, reports feeling tired, he denies pain, no sob, comfortable on 2L NC, in no acute distress.     MEDICATIONS  (STANDING):  amLODIPine   Tablet 10 milliGRAM(s) Oral daily  atorvastatin 40 milliGRAM(s) Oral at bedtime  dextrose 50% Injectable 25 Gram(s) IV Push once  dextrose 50% Injectable 12.5 Gram(s) IV Push once  dextrose 50% Injectable 25 Gram(s) IV Push once  dextrose Oral Gel 15 Gram(s) Oral once  gabapentin 300 milliGRAM(s) Oral daily  glucagon  Injectable 1 milliGRAM(s) IntraMuscular once  heparin   Injectable 5000 Unit(s) SubCutaneous every 8 hours  hydrALAZINE 75 milliGRAM(s) Oral three times a day  influenza   Vaccine 0.5 milliLiter(s) IntraMuscular once  insulin glargine Injectable (LANTUS) 34 Unit(s) SubCutaneous <User Schedule>  insulin lispro (ADMELOG) corrective regimen sliding scale   SubCutaneous three times a day before meals  insulin lispro (ADMELOG) corrective regimen sliding scale   SubCutaneous at bedtime  insulin lispro Injectable (ADMELOG) 12 Unit(s) SubCutaneous three times a day before meals  melatonin 3 milliGRAM(s) Oral at bedtime  polyethylene glycol 3350 17 Gram(s) Oral daily  sodium chloride 0.9% lock flush 3 milliLiter(s) IV Push every 8 hours  tamsulosin 0.4 milliGRAM(s) Oral at bedtime    VITALS:  T(C): , Max: 36.8 (10-19-22 @ 02:06)  T(F): , Max: 98.2 (10-19-22 @ 02:06)  HR: 70 (10-19-22 @ 09:43)  BP: 136/63 (10-19-22 @ 09:43)  RR: 18 (10-19-22 @ 09:43)  SpO2: 96% (10-19-22 @ 09:43)    I and O's:    10-18 @ 07:01  -  10-19 @ 07:00  --------------------------------------------------------  IN: 720 mL / OUT: 1650 mL / NET: -930 mL    PHYSICAL EXAM:  Constitutional: NAD  Neck:  No JVD  Respiratory: CTAB/L  Cardiovascular: S1 and S2  Gastrointestinal: BS+, soft, NT/ND  Extremities: B/L LE BKA, L BKA (+) immobilizer   Neurological: A/O x 3, no focal deficits  : No Carreon  Skin: No rashes    LABS:                        7.6    6.03  )-----------( 326      ( 19 Oct 2022 07:20 )             24.3     10-19    136  |  104  |  35<H>  ----------------------------<  175<H>  4.6   |  22  |  3.26<H>    Ca    8.6      19 Oct 2022 07:20  Phos  4.0     10-19  Mg     2.00     10-19    Urine Studies:    Osmolality, Random Urine: 370 mosm/kg (10-18 @ 20:20)  Sodium, Random Urine: 25 mmol/L (10-17 @ 13:17)  Potassium, Random Urine: 19.3 mmol/L (10-17 @ 13:17)  Chloride, Random Urine: <20 mmol/L (10-17 @ 13:17)  Creatinine, Random Urine: 79 mg/dL (10-17 @ 13:17)      RADIOLOGY & ADDITIONAL STUDIES:    ACC: [id number] EXAM:  XR CHEST AP OR PA 1V                          PROCEDURE DATE:  10/18/2022          INTERPRETATION:  EXAMINATION: XR CHEST    CLINICAL INDICATION: Heaviness in chest    TECHNIQUE: Single frontal, portable view of the chest was obtained.    COMPARISON: Chest x-ray 7/11/2019.    FINDINGS:  LINES/TUBES/SUPPORT DEVICES: None    LUNGS: No focal consolidation.    PLEURA: No pleural effusion or pneumothorax.    HEART AND MEDIASTINUM: Heart size is within normal limits.    BONES: Noacute bony abnormality.    IMPRESSION: Clear lungs.      --- End of Report ---          TREY FLORES MD; Resident Radiologist  This document has been electronically signed.  FRANCISCA SANDOVAL MD; Attending Radiologist  This document has been electronically signed. Oct 18 2022  7:52PM

## 2022-10-19 NOTE — PROGRESS NOTE ADULT - ASSESSMENT
IMPRESSION: 62M w/ HTN, DM2, CKD, and PAD-R BKA, 10/7/22 p/w necrotic L foot ulcer; s/p L-BKA 10/7/22; now being planned for BKA formalization  (1)Renal - CKD4 - GFR ~20-25ml/min - ~2gm proteinuria. Likely at least in part from diabetic nephropathy. Azotemia rising   (2)Hyponatremia - due to intravascular depletion - improving  (3)CV - BP/volume grossly acceptable  (4)Anemia - multifactorial including from CKD, s/p Retacrit 10k sq x 1 yesterday   (5)ID - necrotic L foot ulcer; s/p BKA; s/p IV Zosyn  (6)Vasc - s/p BKA formalization 10/14    RECOMMENDATIONS  (1)Favor adding standing FeSO4 - 325mg po qd  (2)Continue free water restriction as ordered  (3)Amlodipine as ordered  (4)If for discharge, he can f/u in office with Dr. Chavez in 1-4 weeks    Ashly Jones NP-C  [facility]  [phone]    IMPRESSION: 62M w/ HTN, DM2, CKD, and PAD-R BKA, 10/7/22 p/w necrotic L foot ulcer; s/p L-BKA 10/7/22; now being planned for BKA formalization  (1)Renal - CKD4 - GFR ~20-25ml/min - ~2gm proteinuria. Likely at least in part from diabetic nephropathy. Azotemia rising   (2)Hyponatremia - due to intravascular depletion - improving  (3)CV - BP/volume grossly acceptable  (4)Anemia - multifactorial including from CKD, s/p Retacrit 10k sq x 1 yesterday   (5)ID - necrotic L foot ulcer; s/p BKA; s/p IV Zosyn  (6)Vasc - s/p BKA formalization 10/14    RECOMMENDATIONS  (1)Check iron panel   (2)Favor adding standing FeSO4 - 325mg po qd for now  (3)Continue free water restriction as ordered  (4)Amlodipine as ordered  (5)If for discharge, he can f/u in office with Dr. Chavez in 1-4 weeks    Ashly Jones, NP-C  [facility]  [phone]    IMPRESSION: 62M w/ HTN, DM2, CKD, and PAD-R BKA, 10/7/22 p/w necrotic L foot ulcer; s/p L-BKA 10/7/22; now being planned for BKA formalization  (1)Renal - CKD4 - GFR ~20-25ml/min - ~2gm proteinuria. Likely at least in part from diabetic nephropathy. Azotemia rising   (2)Hyponatremia - due to intravascular depletion - improving  (3)CV - BP/volume grossly acceptable  (4)Anemia - multifactorial including from CKD, s/p Retacrit 10k sq x 1 yesterday   (5)ID - necrotic L foot ulcer; s/p BKA; s/p IV Zosyn  (6)Vasc - s/p BKA formalization 10/14    RECOMMENDATIONS  (1)Check iron panel   (2)Favor adding standing FeSO4 - 325mg po qd for now  (3)Continue free water restriction as ordered  (4)Amlodipine as ordered  (5)If for discharge, he can f/u in office with Dr. Chavez in 1-4 weeks    D/w Dr. Scott Jones, NP-C  [facility]  [phone]    IMPRESSION: 62M w/ HTN, DM2, CKD, and PAD-R BKA, 10/7/22 p/w necrotic L foot ulcer; s/p L-BKA 10/7/22; now being planned for BKA formalization  (1)Renal - CKD4 - GFR ~20-25ml/min - ~2gm proteinuria. Likely at least in part from diabetic nephropathy. Azotemia rising   (2)Hyponatremia - due to intravascular depletion - improving  (3)CV - BP/volume grossly acceptable  (4)Anemia - multifactorial including from CKD, s/p Retacrit 10k sq x 1 yesterday   (5)ID - necrotic L foot ulcer; s/p BKA; s/p IV Zosyn  (6)Vasc - s/p BKA formalization 10/14    RECOMMENDATIONS  (1)Check iron panel   (2)Favor adding standing FeSO4 - 325mg po qd for now  (3)Continue free water restriction as ordered  (4)Amlodipine as ordered  (5)If for discharge, he can f/u in office with Dr. Chavez in 1-4 weeks    D/w Dr. Scott Jones, NP-C  [facility]  [phone]     RENAL ATTENDING NOTE  Patient seen and examined with NP. Agree with assessment and plan as above.    Olegario Chavez MD  [facility]  [phone]

## 2022-10-19 NOTE — PROGRESS NOTE ADULT - SUBJECTIVE AND OBJECTIVE BOX
VASCULAR SURGERY PROGRESS NOTE    CC:   Hospital Day #  Post-Op Day #    Procedure:    Events of past 24 hours:    ROS otherwise negative except per subjective and HPI      Vital Signs Last 24 Hrs  T(C): 36.8 (19 Oct 2022 02:06), Max: 36.8 (18 Oct 2022 05:35)  T(F): 98.2 (19 Oct 2022 02:06), Max: 98.2 (18 Oct 2022 05:35)  HR: 82 (19 Oct 2022 03:35) (68 - 84)  BP: 134/61 (19 Oct 2022 02:06) (134/61 - 165/71)  BP(mean): --  RR: 16 (19 Oct 2022 02:06) (16 - 18)  SpO2: 96% (19 Oct 2022 03:35) (96% - 100%)    Parameters below as of 19 Oct 2022 02:06  Patient On (Oxygen Delivery Method): room air        Pain (0-10):            Pain Control Adequate: [] YES [] N    I&O's Detail    17 Oct 2022 07:01  -  18 Oct 2022 07:00  --------------------------------------------------------  IN:    Oral Fluid: 440 mL  Total IN: 440 mL    OUT:    Voided (mL): 1050 mL  Total OUT: 1050 mL    Total NET: -610 mL      18 Oct 2022 07:01  -  19 Oct 2022 04:01  --------------------------------------------------------  IN:    Oral Fluid: 720 mL  Total IN: 720 mL    OUT:    Voided (mL): 1350 mL  Total OUT: 1350 mL    Total NET: -630 mL          PHYSICAL EXAM    Appearance: Normal	  HEENT:   Normal oral mucosa, PERRL, EOMI	  Neck: Supple, - JVD; Carotid Bruit   Cardiovascular: Normal S1 S2, No JVD, No murmurs,   Respiratory: Lungs clear to auscultation/Decreased Breath Sounds/No Rales, Rhonchi, Wheezing	  Gastrointestinal:  Soft, Non-tender, + BS	  Skin: No rashes, No ecchymoses, No cyanosis  Extremities: Normal range of motion, No clubbing, cyanosis or edema  Neurologic: Non-focal  Psychiatry: A & O x 3, Mood & affect appropriate    PULSES:  Femoral:  Popliteal:  Dorsal Pedal:  Posterior Tibial:  Capillary:      MEDICATIONS:   MEDICATIONS  (STANDING):  amLODIPine   Tablet 10 milliGRAM(s) Oral daily  atorvastatin 40 milliGRAM(s) Oral at bedtime  dextrose 50% Injectable 25 Gram(s) IV Push once  dextrose 50% Injectable 12.5 Gram(s) IV Push once  dextrose 50% Injectable 25 Gram(s) IV Push once  dextrose Oral Gel 15 Gram(s) Oral once  gabapentin 300 milliGRAM(s) Oral daily  glucagon  Injectable 1 milliGRAM(s) IntraMuscular once  heparin   Injectable 5000 Unit(s) SubCutaneous every 8 hours  hydrALAZINE 75 milliGRAM(s) Oral three times a day  influenza   Vaccine 0.5 milliLiter(s) IntraMuscular once  insulin glargine Injectable (LANTUS) 34 Unit(s) SubCutaneous <User Schedule>  insulin lispro (ADMELOG) corrective regimen sliding scale   SubCutaneous three times a day before meals  insulin lispro (ADMELOG) corrective regimen sliding scale   SubCutaneous at bedtime  insulin lispro Injectable (ADMELOG) 12 Unit(s) SubCutaneous three times a day before meals  melatonin 3 milliGRAM(s) Oral at bedtime  polyethylene glycol 3350 17 Gram(s) Oral daily  sodium chloride 0.9% lock flush 3 milliLiter(s) IV Push every 8 hours  tamsulosin 0.4 milliGRAM(s) Oral at bedtime    MEDICATIONS  (PRN):  acetaminophen   IVPB .. 1000 milliGRAM(s) IV Intermittent every 6 hours PRN Moderate Pain (4 - 6)  aluminum hydroxide/magnesium hydroxide/simethicone Suspension 30 milliLiter(s) Oral once PRN Dyspepsia  oxyCODONE    IR 5 milliGRAM(s) Oral every 4 hours PRN Moderate Pain (4 - 6)  oxyCODONE    IR 10 milliGRAM(s) Oral every 4 hours PRN Severe Pain (7 - 10)      LAB/STUDIES:                        7.5    6.89  )-----------( 315      ( 18 Oct 2022 07:20 )             24.0     10-18    135  |  101  |  37<H>  ----------------------------<  135<H>  4.7   |  21<L>  |  3.32<H>    Ca    8.6      18 Oct 2022 07:20  Phos  4.2     10-18  Mg     2.20     10-18              CARDIAC MARKERS ( 18 Oct 2022 07:20 )  x     / x     / 60 U/L / x     / <1.0 ng/mL                IMAGING:       VASCULAR SURGERY PROGRESS NOTE    Procedure: S/p Tenalinda BKA with RTOR for closure / formalization BKA, POD #12/5    Patient seen on am rounds. "Chest heaviness" now resolved. Pain well-controlled. No new symptoms / events.       ROS otherwise negative except per subjective and HPI      Vital Signs Last 24 Hrs  T(C): 36.3 (19 Oct 2022 05:50), Max: 36.8 (19 Oct 2022 02:06)  T(F): 97.3 (19 Oct 2022 05:50), Max: 98.2 (19 Oct 2022 02:06)  HR: 85 (19 Oct 2022 08:27) (68 - 85)  BP: 137/61 (19 Oct 2022 05:50) (134/61 - 165/71)  BP(mean): --  RR: 18 (19 Oct 2022 05:50) (16 - 18)  SpO2: 98% (19 Oct 2022 08:27) (96% - 100%)    Parameters below as of 19 Oct 2022 05:50  Patient On (Oxygen Delivery Method): nasal cannula  O2 Flow (L/min): 2      I&O's Summary    18 Oct 2022 07:01  -  19 Oct 2022 07:00  --------------------------------------------------------  IN: 720 mL / OUT: 1650 mL / NET: -930 mL        PHYSICAL EXAM    Appearance: Normal	  HEENT:   Normal oral mucosa, PERRL, EOMI	  Extremities: Normal range of motion, RLE BKA well healed (old), LLE BKA staples c/d/i no edema, no erythema, soft.  Neurologic: Non-focal  Psychiatry: A & O x 3, Mood & affect appropriate        MEDICATIONS:   MEDICATIONS  (STANDING):  amLODIPine   Tablet 10 milliGRAM(s) Oral daily  atorvastatin 40 milliGRAM(s) Oral at bedtime  dextrose 50% Injectable 25 Gram(s) IV Push once  dextrose 50% Injectable 12.5 Gram(s) IV Push once  dextrose 50% Injectable 25 Gram(s) IV Push once  dextrose Oral Gel 15 Gram(s) Oral once  gabapentin 300 milliGRAM(s) Oral daily  glucagon  Injectable 1 milliGRAM(s) IntraMuscular once  heparin   Injectable 5000 Unit(s) SubCutaneous every 8 hours  hydrALAZINE 75 milliGRAM(s) Oral three times a day  influenza   Vaccine 0.5 milliLiter(s) IntraMuscular once  insulin glargine Injectable (LANTUS) 34 Unit(s) SubCutaneous <User Schedule>  insulin lispro (ADMELOG) corrective regimen sliding scale   SubCutaneous three times a day before meals  insulin lispro (ADMELOG) corrective regimen sliding scale   SubCutaneous at bedtime  insulin lispro Injectable (ADMELOG) 12 Unit(s) SubCutaneous three times a day before meals  melatonin 3 milliGRAM(s) Oral at bedtime  polyethylene glycol 3350 17 Gram(s) Oral daily  sodium chloride 0.9% lock flush 3 milliLiter(s) IV Push every 8 hours  tamsulosin 0.4 milliGRAM(s) Oral at bedtime    MEDICATIONS  (PRN):  acetaminophen   IVPB .. 1000 milliGRAM(s) IV Intermittent every 6 hours PRN Moderate Pain (4 - 6)  aluminum hydroxide/magnesium hydroxide/simethicone Suspension 30 milliLiter(s) Oral once PRN Dyspepsia  oxyCODONE    IR 5 milliGRAM(s) Oral every 4 hours PRN Moderate Pain (4 - 6)  oxyCODONE    IR 10 milliGRAM(s) Oral every 4 hours PRN Severe Pain (7 - 10)        LAB/STUDIES:                        7.5    6.89  )-----------( 315      ( 18 Oct 2022 07:20 )             24.0     10-18    135  |  101  |  37<H>  ----------------------------<  135<H>  4.7   |  21<L>  |  3.32<H>    Ca    8.6      18 Oct 2022 07:20  Phos  4.2     10-18  Mg     2.20     10-18        CARDIAC MARKERS ( 18 Oct 2022 07:20 )  x     / x     / 60 U/L / x     / <1.0 ng/mL        IMAGING:

## 2022-10-19 NOTE — PROGRESS NOTE ADULT - ASSESSMENT
63 y/o M with PMH of HTN, DM type II, HLD, BPH and PAD, Right BKA 2/2 to unhealed DM ulcer presented to [facility] for LLE peripheral angiogram, now s/p amputation    1. Uncontrolled T2DM c/b foot wounds requiring amputation, retinopathy  A1c 8.5% Goal < 7%  Home Regimen: Novolog 20-35 units qAC (typically eats 2 meals per day, takes Novolog twice). Does not take basal insulin     While inpatient:   BG target 100-180 mg/dl  Continue Lantus 34 units SQ qAM (0800 daily)   Reduce to Admelog 10 units SQ TID before meals (Hold if NPO/skips meal) - reporting low appetite  Continue Admelog low dose correctional scale before meals, low dose at bedtime  Consistent carb diet  BG before meals and bedtime  Hypoglycemia protocol - re-ordered 10/16    Discharge Plan:  Basal/bolus, final regimen TBD  Can resume Novolog (dose TBD), will need new prescription for basal insulin PEN (Lantus, Basaglar, Tresiba, Semglee)  Did not tolerate Metformin  mg as an outpatient. Patient states possibly hx of pancreatitis, also with retinopathy- unclear if active as patient has not seen ophtho in a while. Will defer GLP1 agonist. Hold off on SGLT2 inhibitors given recent amputation  Followup with Endocrine Practice at [address], [address], [place] [address]; Ph # [phone]  OR if prefers a [place] location - Dr. Jo: [address], [address], [facility] [address], [phone]    2. Discharge Planning Issues  Patient expressing difficulty caring for himself, especially now s/p amputation   At home, he lives with elderly wife, reports difficulty with food prep, specifically healthy food, typically orders out  Limited resources for support  Recommend CM and SW consult   Discussed with team    3. HTN  BP Goal < 130/80  On Amlodipine and Hydralazine  Titration per primary team    4. HLD  LDL goal < 70  LDL 87 (10/2022)  Statin if no contraindications  Now started on Lipitor     Arlet Owens  Nurse Practitioner  Division of Endocrinology & Diabetes  In house pager #[phone]/long range pager #[phone]    If before 9AM or after 6PM, or on weekends/holidays, please call endocrine answering service for assistance ([phone]).  For nonurgent matters email [email or web address] for assistance.

## 2022-10-20 ENCOUNTER — TRANSCRIPTION ENCOUNTER (OUTPATIENT)
Age: 62
End: 2022-10-20

## 2022-10-20 LAB
ANION GAP SERPL CALC-SCNC: 12 MMOL/L — SIGNIFICANT CHANGE UP (ref 7–14)
BUN SERPL-MCNC: 34 MG/DL — HIGH (ref 7–23)
CALCIUM SERPL-MCNC: 8.8 MG/DL — SIGNIFICANT CHANGE UP (ref 8.4–10.5)
CHLORIDE SERPL-SCNC: 103 MMOL/L — SIGNIFICANT CHANGE UP (ref 98–107)
CO2 SERPL-SCNC: 21 MMOL/L — LOW (ref 22–31)
CREAT SERPL-MCNC: 3.21 MG/DL — HIGH (ref 0.5–1.3)
EGFR: 21 ML/MIN/1.73M2 — LOW
FERRITIN SERPL-MCNC: 485 NG/ML — HIGH (ref 30–400)
GLUCOSE SERPL-MCNC: 101 MG/DL — HIGH (ref 70–99)
HCT VFR BLD CALC: 25.1 % — LOW (ref 39–50)
HGB BLD-MCNC: 7.8 G/DL — LOW (ref 13–17)
IRON SATN MFR SERPL: 30 % — SIGNIFICANT CHANGE UP (ref 14–50)
IRON SATN MFR SERPL: 52 UG/DL — SIGNIFICANT CHANGE UP (ref 45–165)
MAGNESIUM SERPL-MCNC: 2.1 MG/DL — SIGNIFICANT CHANGE UP (ref 1.6–2.6)
MCHC RBC-ENTMCNC: 29.3 PG — SIGNIFICANT CHANGE UP (ref 27–34)
MCHC RBC-ENTMCNC: 31.1 GM/DL — LOW (ref 32–36)
MCV RBC AUTO: 94.4 FL — SIGNIFICANT CHANGE UP (ref 80–100)
NRBC # BLD: 0 /100 WBCS — SIGNIFICANT CHANGE UP (ref 0–0)
NRBC # FLD: 0 K/UL — SIGNIFICANT CHANGE UP (ref 0–0)
PHOSPHATE SERPL-MCNC: 4 MG/DL — SIGNIFICANT CHANGE UP (ref 2.5–4.5)
PLATELET # BLD AUTO: 359 K/UL — SIGNIFICANT CHANGE UP (ref 150–400)
POTASSIUM SERPL-MCNC: 4.4 MMOL/L — SIGNIFICANT CHANGE UP (ref 3.5–5.3)
POTASSIUM SERPL-SCNC: 4.4 MMOL/L — SIGNIFICANT CHANGE UP (ref 3.5–5.3)
RBC # BLD: 2.66 M/UL — LOW (ref 4.2–5.8)
RBC # FLD: 13.5 % — SIGNIFICANT CHANGE UP (ref 10.3–14.5)
SARS-COV-2 RNA SPEC QL NAA+PROBE: SIGNIFICANT CHANGE UP
SODIUM SERPL-SCNC: 136 MMOL/L — SIGNIFICANT CHANGE UP (ref 135–145)
TIBC SERPL-MCNC: 175 UG/DL — LOW (ref 220–430)
UIBC SERPL-MCNC: 123 UG/DL — SIGNIFICANT CHANGE UP (ref 110–370)
WBC # BLD: 6.48 K/UL — SIGNIFICANT CHANGE UP (ref 3.8–10.5)
WBC # FLD AUTO: 6.48 K/UL — SIGNIFICANT CHANGE UP (ref 3.8–10.5)

## 2022-10-20 PROCEDURE — 99232 SBSQ HOSP IP/OBS MODERATE 35: CPT

## 2022-10-20 PROCEDURE — 99222 1ST HOSP IP/OBS MODERATE 55: CPT

## 2022-10-20 RX ORDER — SENNA PLUS 8.6 MG/1
2 TABLET ORAL AT BEDTIME
Refills: 0 | Status: DISCONTINUED | OUTPATIENT
Start: 2022-10-20 | End: 2022-10-22

## 2022-10-20 RX ORDER — INSULIN GLARGINE 100 [IU]/ML
28 INJECTION, SOLUTION SUBCUTANEOUS
Refills: 0 | Status: DISCONTINUED | OUTPATIENT
Start: 2022-10-21 | End: 2022-10-22

## 2022-10-20 RX ORDER — INSULIN LISPRO 100/ML
8 VIAL (ML) SUBCUTANEOUS
Refills: 0 | Status: DISCONTINUED | OUTPATIENT
Start: 2022-10-20 | End: 2022-10-22

## 2022-10-20 RX ORDER — INSULIN ASPART 100 [IU]/ML
25 INJECTION, SOLUTION SUBCUTANEOUS
Qty: 0 | Refills: 0 | DISCHARGE

## 2022-10-20 RX ORDER — INSULIN LISPRO 100/ML
0 VIAL (ML) SUBCUTANEOUS
Qty: 0 | Refills: 0 | DISCHARGE
Start: 2022-10-20

## 2022-10-20 RX ORDER — CILOSTAZOL 100 MG/1
1 TABLET ORAL
Qty: 0 | Refills: 0 | DISCHARGE

## 2022-10-20 RX ORDER — INSULIN GLARGINE 100 [IU]/ML
34 INJECTION, SOLUTION SUBCUTANEOUS
Qty: 0 | Refills: 0 | DISCHARGE
Start: 2022-10-20

## 2022-10-20 RX ORDER — ACETAMINOPHEN 500 MG
100 TABLET ORAL
Qty: 0 | Refills: 0 | DISCHARGE
Start: 2022-10-20

## 2022-10-20 RX ORDER — GABAPENTIN 400 MG/1
1 CAPSULE ORAL
Qty: 0 | Refills: 0 | DISCHARGE
Start: 2022-10-20

## 2022-10-20 RX ORDER — OXYCODONE HYDROCHLORIDE 5 MG/1
1 TABLET ORAL
Qty: 0 | Refills: 0 | DISCHARGE
Start: 2022-10-20

## 2022-10-20 RX ORDER — INSULIN LISPRO 100/ML
10 VIAL (ML) SUBCUTANEOUS
Qty: 0 | Refills: 0 | DISCHARGE
Start: 2022-10-20

## 2022-10-20 RX ORDER — AMLODIPINE BESYLATE 2.5 MG/1
1 TABLET ORAL
Qty: 0 | Refills: 0 | DISCHARGE
Start: 2022-10-20

## 2022-10-20 RX ORDER — ASPIRIN/CALCIUM CARB/MAGNESIUM 324 MG
81 TABLET ORAL DAILY
Refills: 0 | Status: DISCONTINUED | OUTPATIENT
Start: 2022-10-20 | End: 2022-10-22

## 2022-10-20 RX ORDER — LANOLIN ALCOHOL/MO/W.PET/CERES
1 CREAM (GRAM) TOPICAL
Qty: 0 | Refills: 0 | DISCHARGE
Start: 2022-10-20

## 2022-10-20 RX ORDER — HYDRALAZINE HCL 50 MG
3 TABLET ORAL
Qty: 0 | Refills: 0 | DISCHARGE
Start: 2022-10-20

## 2022-10-20 RX ORDER — ATORVASTATIN CALCIUM 80 MG/1
1 TABLET, FILM COATED ORAL
Qty: 0 | Refills: 0 | DISCHARGE
Start: 2022-10-20

## 2022-10-20 RX ORDER — POLYETHYLENE GLYCOL 3350 17 G/17G
17 POWDER, FOR SOLUTION ORAL
Qty: 0 | Refills: 0 | DISCHARGE
Start: 2022-10-20

## 2022-10-20 RX ADMIN — SODIUM CHLORIDE 3 MILLILITER(S): 9 INJECTION INTRAMUSCULAR; INTRAVENOUS; SUBCUTANEOUS at 22:00

## 2022-10-20 RX ADMIN — TAMSULOSIN HYDROCHLORIDE 0.4 MILLIGRAM(S): 0.4 CAPSULE ORAL at 22:24

## 2022-10-20 RX ADMIN — AMLODIPINE BESYLATE 10 MILLIGRAM(S): 2.5 TABLET ORAL at 07:12

## 2022-10-20 RX ADMIN — ATORVASTATIN CALCIUM 40 MILLIGRAM(S): 80 TABLET, FILM COATED ORAL at 22:25

## 2022-10-20 RX ADMIN — Medication 75 MILLIGRAM(S): at 22:24

## 2022-10-20 RX ADMIN — POLYETHYLENE GLYCOL 3350 17 GRAM(S): 17 POWDER, FOR SOLUTION ORAL at 13:09

## 2022-10-20 RX ADMIN — Medication 75 MILLIGRAM(S): at 07:14

## 2022-10-20 RX ADMIN — Medication 81 MILLIGRAM(S): at 18:46

## 2022-10-20 RX ADMIN — INSULIN GLARGINE 34 UNIT(S): 100 INJECTION, SOLUTION SUBCUTANEOUS at 08:02

## 2022-10-20 RX ADMIN — HEPARIN SODIUM 5000 UNIT(S): 5000 INJECTION INTRAVENOUS; SUBCUTANEOUS at 13:09

## 2022-10-20 RX ADMIN — HEPARIN SODIUM 5000 UNIT(S): 5000 INJECTION INTRAVENOUS; SUBCUTANEOUS at 07:20

## 2022-10-20 RX ADMIN — SENNA PLUS 2 TABLET(S): 8.6 TABLET ORAL at 22:26

## 2022-10-20 RX ADMIN — Medication 3 MILLIGRAM(S): at 22:24

## 2022-10-20 RX ADMIN — HEPARIN SODIUM 5000 UNIT(S): 5000 INJECTION INTRAVENOUS; SUBCUTANEOUS at 22:24

## 2022-10-20 RX ADMIN — Medication 75 MILLIGRAM(S): at 13:08

## 2022-10-20 RX ADMIN — GABAPENTIN 300 MILLIGRAM(S): 400 CAPSULE ORAL at 13:09

## 2022-10-20 RX ADMIN — Medication 8 UNIT(S): at 12:02

## 2022-10-20 RX ADMIN — SODIUM CHLORIDE 3 MILLILITER(S): 9 INJECTION INTRAMUSCULAR; INTRAVENOUS; SUBCUTANEOUS at 13:34

## 2022-10-20 RX ADMIN — SODIUM CHLORIDE 3 MILLILITER(S): 9 INJECTION INTRAMUSCULAR; INTRAVENOUS; SUBCUTANEOUS at 06:00

## 2022-10-20 NOTE — PROGRESS NOTE ADULT - SUBJECTIVE AND OBJECTIVE BOX
Chief Complaint: DM 2    History: Patient seen at bedside. Reports he is still having low appetite, only ate a few bites of lunch. No nausea/vomiting, no s/s of hypoglycemia   Assisted him with filling out his menu for tomorrow    MEDICATIONS  (STANDING):  amLODIPine   Tablet 10 milliGRAM(s) Oral daily  aspirin  chewable 81 milliGRAM(s) Oral daily  atorvastatin 40 milliGRAM(s) Oral at bedtime  dextrose 50% Injectable 25 Gram(s) IV Push once  dextrose 50% Injectable 12.5 Gram(s) IV Push once  dextrose 50% Injectable 25 Gram(s) IV Push once  dextrose Oral Gel 15 Gram(s) Oral once  gabapentin 300 milliGRAM(s) Oral daily  glucagon  Injectable 1 milliGRAM(s) IntraMuscular once  heparin   Injectable 5000 Unit(s) SubCutaneous every 8 hours  hydrALAZINE 75 milliGRAM(s) Oral three times a day  influenza   Vaccine 0.5 milliLiter(s) IntraMuscular once  insulin lispro (ADMELOG) corrective regimen sliding scale   SubCutaneous three times a day before meals  insulin lispro (ADMELOG) corrective regimen sliding scale   SubCutaneous at bedtime  insulin lispro Injectable (ADMELOG) 8 Unit(s) SubCutaneous three times a day before meals  melatonin 3 milliGRAM(s) Oral at bedtime  polyethylene glycol 3350 17 Gram(s) Oral daily  sodium chloride 0.9% lock flush 3 milliLiter(s) IV Push every 8 hours  tamsulosin 0.4 milliGRAM(s) Oral at bedtime    MEDICATIONS  (PRN):  acetaminophen   IVPB .. 1000 milliGRAM(s) IV Intermittent every 6 hours PRN Moderate Pain (4 - 6)  aluminum hydroxide/magnesium hydroxide/simethicone Suspension 30 milliLiter(s) Oral once PRN Dyspepsia  oxyCODONE    IR 5 milliGRAM(s) Oral every 4 hours PRN Moderate Pain (4 - 6)  oxyCODONE    IR 10 milliGRAM(s) Oral every 4 hours PRN Severe Pain (7 - 10)    No Known Allergies    Review of Systems:  Cardiovascular: No chest pain  Respiratory: No SOB  GI: No nausea, vomiting  Endocrine: no hypoglycemia     PHYSICAL EXAM:  VITALS: T(C): 36.5 (10-20-22 @ 09:19)  T(F): 97.7 (10-20-22 @ 09:19), Max: 98.7 (10-19-22 @ 18:00)  HR: 59 (10-20-22 @ 09:19) (59 - 90)  BP: 142/70 (10-20-22 @ 09:19) (137/64 - 150/64)  RR:  (18 - 18)  SpO2:  (92% - 98%)  Wt(kg): --  GENERAL: NAD  EYES: No proptosis, no lid lag, anicteric  HEENT:  Atraumatic, Normocephalic, moist mucous membranes  RESPIRATORY: unlabored respirations     CAPILLARY BLOOD GLUCOSE    POCT Blood Glucose.: 139 mg/dL (20 Oct 2022 11:51)  POCT Blood Glucose.: 111 mg/dL (20 Oct 2022 07:38)  POCT Blood Glucose.: 111 mg/dL (19 Oct 2022 21:46)  POCT Blood Glucose.: 126 mg/dL (19 Oct 2022 16:39)      10-20    136  |  103  |  34<H>  ----------------------------<  101<H>  4.4   |  21<L>  |  3.21<H>    eGFR: 21<L>    Ca    8.8      10-20  Mg     2.10     10-20  Phos  4.0     10-20      A1C with Estimated Average Glucose Result: 8.5 % (10-08-22 @ 06:33)    Diet, Consistent Carbohydrate w/Evening Snack:   1000mL Fluid Restriction ([id number]) (10-17-22 @ 09:37) [Active]

## 2022-10-20 NOTE — DISCHARGE NOTE PROVIDER - HOSPITAL COURSE
61 y/o M with PMH of HTN, DM type II, HLD, BPH and PAD, Right BKA 2/2 to unhealed DM ulcer presented to [facility] for LLE peripheral angiogram. As per the patient he developed initially a scab on the bottom of his foot in August of this year and then progressively worsened to an ulcer. Patient stated that the ulcer is located between the 4th web space and recently fell off the bed and cut the adjacent skin for which he received stitched and PO Augmentin. Patient stated that the ulcer cunningham drain a clear liquid but denied any bleeding or purulent discharge. Patient also endorsed of chills for the past 2 weeks with associated body aches and lethargy. Patient was incidental found to be COVID positive on 09/19/22 and that time angiogram was cancelled. Patient otherwise denied any LLE foot pain, claudication or fevers. Patient denied any calf pain, CP, SOB, N/V/D/C, abdominal pain, dysuria, melena, hematochezia, recent travel, sick contact, cough, body aches, pleuritic or positional chest pain.    Patient now s/p L BKA for necrotic left foot ulcer w/ concern for gas gangrene. Insulin infusion was started intraoperatively; patient given 12u and started on 5u/hr. Postoperative glucose of 344, measured in PACU. SICU consulted for management of insulin infusion.    10/9 Insulin gtt transitioned to Lantus, patient was transferred to the floors.  Endocrinology was consulted for additional recommendations during his admission.   10/13 Cardiology consulted for preoperative clearance.   10/14 Completion L BKA  10/15 BMP w/ hyperkalemia, nephrology following, recommendations appreciated.   Preveena vac was removed POD5    The patient had daily wound care and was seen by physical therapy which recommended discharge to home/rehab. The patient's pain was controlled by IV pain medications and then by PO pain medications. The patient was advanced to a regular diet and tolerated it well. The patient was placed on home medications. At the time of discharge, the patient was hemodynamically stable, was tolerating PO diet, was voiding urine and passing stool, was ambulating, and was comfortable with adequate pain control. The patient was instructed to follow up with Dr. Angulo within 1-2 weeks after discharge from the hospital. The patient/family felt comfortable with discharge. The patient had no other issues. 63 y/o M with PMH of HTN, DM type II, HLD, BPH and PAD, Right BKA 2/2 to unhealed DM ulcer presented to [facility] for LLE peripheral angiogram. As per the patient he developed initially a scab on the bottom of his foot in August of this year and then progressively worsened to an ulcer. Patient stated that the ulcer is located between the 4th web space and recently fell off the bed and cut the adjacent skin for which he received stitched and PO Augmentin. Patient stated that the ulcer cunningham drain a clear liquid but denied any bleeding or purulent discharge. Patient also endorsed of chills for the past 2 weeks with associated body aches and lethargy. Patient was incidental found to be COVID positive on 09/19/22 and that time angiogram was cancelled. Patient otherwise denied any LLE foot pain, claudication or fevers. Patient denied any calf pain, CP, SOB, N/V/D/C, abdominal pain, dysuria, melena, hematochezia, recent travel, sick contact, cough, body aches, pleuritic or positional chest pain.    Patient now s/p L BKA for necrotic left foot ulcer w/ concern for gas gangrene. Insulin infusion was started intraoperatively; patient given 12u and started on 5u/hr. Postoperative glucose of 344, measured in PACU. SICU consulted for management of insulin infusion.    10/9 Insulin gtt transitioned to Lantus, patient was transferred to the floors.  Endocrinology was consulted for additional recommendations during his admission. Cardiology consulted for preoperative clearance.   10/14 Completion L BKA  10/15 BMP w/ hyperkalemia, nephrology following, recommendations appreciated.   Preveena vac was removed POD5    The patient had daily wound care and was seen by physical therapy which recommended discharge to home/rehab. The patient's pain was controlled by IV pain medications and then by PO pain medications. The patient was advanced to a regular diet and tolerated it well. The patient was placed on home medications. At the time of discharge, the patient was hemodynamically stable, was tolerating PO diet, was voiding urine and passing stool, was ambulating, and was comfortable with adequate pain control. The patient was instructed to follow up with Dr. Angulo within 1-2 weeks after discharge from the hospital. The patient/family felt comfortable with discharge. The patient had no other issues. 61 y/o M with PMH of HTN, DM type II, HLD, BPH and PAD, Right BKA 2/2 to unhealed DM ulcer presented to [facility] for LLE peripheral angiogram. As per the patient he developed initially a scab on the bottom of his foot in August of this year and then progressively worsened to an ulcer. Patient stated that the ulcer is located between the 4th web space and recently fell off the bed and cut the adjacent skin for which he received stitched and PO Augmentin. Patient stated that the ulcer cunningham drain a clear liquid but denied any bleeding or purulent discharge. Patient also endorsed of chills for the past 2 weeks with associated body aches and lethargy. Patient was incidental found to be COVID positive on 09/19/22 and that time angiogram was cancelled. Patient otherwise denied any LLE foot pain, claudication or fevers. Patient denied any calf pain, CP, SOB, N/V/D/C, abdominal pain, dysuria, melena, hematochezia, recent travel, sick contact, cough, body aches, pleuritic or positional chest pain.    Patient now s/p L BKA for necrotic left foot ulcer w/ concern for gas gangrene. Insulin infusion was started intraoperatively; patient given 12u and started on 5u/hr. Postoperative glucose of 344, measured in PACU. SICU consulted for management of insulin infusion.    10/9 Insulin gtt transitioned to Lantus, patient was transferred to the floors.  Endocrinology was consulted for additional recommendations during his admission. Cardiology consulted for preoperative clearance.   10/14 Completion L BKA  10/15 BMP w/ hyperkalemia, nephrology following, recommendations appreciated.   Preveena vac was removed POD5      The patient had daily wound care and was seen by physical therapy which recommended discharge to home/rehab. The patient's pain was controlled by IV pain medications and then by PO pain medications. The patient was advanced to a regular diet and tolerated it well. The patient was placed on home medications. At the time of discharge, the patient was hemodynamically stable, was tolerating PO diet, was voiding urine and passing stool, was ambulating, and was comfortable with adequate pain control. The patient was instructed to follow up with Dr. Angulo within 1-2 weeks after discharge from the hospital. The patient/family felt comfortable with discharge. The patient had no other issues.

## 2022-10-20 NOTE — CHART NOTE - NSCHARTNOTESELECT_GEN_ALL_CORE
Endocrine
Pre-Operative Note/Event Note
SICU transfer
Vascular Surgery Post-Op Check
Event Note
Follow-up/Nutrition Services
Post Op Check/Event Note
Transfer to Floor

## 2022-10-20 NOTE — DISCHARGE NOTE PROVIDER - NSDCMRMEDTOKEN_GEN_ALL_CORE_FT
acetaminophen 10 mg/mL intravenous solution: 100 milliliter(s) intravenous every 6 hours, As needed, Moderate Pain (4 - 6)  amLODIPine 10 mg oral tablet: 1 tab(s) orally once a day  Aspirin Enteric Coated 81 mg oral delayed release tablet: 1 tab(s) orally once a day  atorvastatin 40 mg oral tablet: 1 tab(s) orally once a day (at bedtime)  cilostazol 100 mg oral tablet: 1 tab(s) orally 2 times a day  Flomax 0.4 mg oral capsule: 1 cap(s) orally once a day  gabapentin 300 mg oral capsule: 1 cap(s) orally once a day  hydrALAZINE 25 mg oral tablet: 3 tab(s) orally 3 times a day  insulin glargine 100 units/mL subcutaneous solution: 34 unit(s) subcutaneous once a day (in the morning)  insulin lispro 100 units/mL injectable solution: 10 unit(s) subcutaneous 3 times a day (before meals)  insulin lispro 100 units/mL injectable solution: unit(s) subcutaneous 3 times a day (before meals) - 250  3 Unit(s) if Glucose 251 - 300  4 Unit(s) if Glucose 301 - 350  5 Unit(s) if Glucose 351 - 400  6 Unit(s) if Glucose Greater Than 400    Give correctional scale insulin  REGARDLESS of PO status NOTIFY Provider for blood glucose LESS THAN 70 milliGRAM(s)/deciLiter or GREATER THAN 400 milliGRAM(s)/deciLiter    Administration instructions:  *Per Sliding Scale*  Dispose unused medication in BLACK bin.  This is a High Alert Medication.  insulin lispro 100 units/mL injectable solution: unit(s) subcutaneous once a day (at bedtime) - 250  1 Unit(s) if Glucose 251 - 300  2 Unit(s) if Glucose 301 - 350  3 Unit(s) if Glucose 351 - 400  4 Unit(s) if Glucose Greater Than 400    Give correctional scale insulin  REGARDLESS of PO status NOTIFY Provider for blood glucose LESS THAN 70 milliGRAM(s)/deciLiter or GREATER THAN 400 milliGRAM(s)/deciLiter      Administer:  *Per Sliding Scale*  Dispose unused medication in BLACK bin.  This is a High Alert Medication.  melatonin 3 mg oral tablet: 1 tab(s) orally once a day (at bedtime)  oxyCODONE 10 mg oral tablet: 1 tab(s) orally every 4 hours, As needed, Severe Pain (7 - 10)  oxyCODONE 5 mg oral tablet: 1 tab(s) orally every 4 hours, As needed, Moderate Pain (4 - 6)  polyethylene glycol 3350 oral powder for reconstitution: 17 gram(s) orally once a day   acetaminophen 10 mg/mL intravenous solution: 100 milliliter(s) intravenous every 6 hours, As needed, Moderate Pain (4 - 6)  amLODIPine 10 mg oral tablet: 1 tab(s) orally once a day  Aspirin Enteric Coated 81 mg oral delayed release tablet: 1 tab(s) orally once a day  atorvastatin 40 mg oral tablet: 1 tab(s) orally once a day (at bedtime)  Flomax 0.4 mg oral capsule: 1 cap(s) orally once a day  gabapentin 300 mg oral capsule: 1 cap(s) orally once a day  hydrALAZINE 25 mg oral tablet: 3 tab(s) orally 3 times a day  insulin glargine 100 units/mL subcutaneous solution: 34 unit(s) subcutaneous once a day (in the morning)  insulin lispro 100 units/mL injectable solution: 10 unit(s) subcutaneous 3 times a day (before meals)  insulin lispro 100 units/mL injectable solution: unit(s) subcutaneous 3 times a day (before meals) - 250  3 Unit(s) if Glucose 251 - 300  4 Unit(s) if Glucose 301 - 350  5 Unit(s) if Glucose 351 - 400  6 Unit(s) if Glucose Greater Than 400    Give correctional scale insulin  REGARDLESS of PO status NOTIFY Provider for blood glucose LESS THAN 70 milliGRAM(s)/deciLiter or GREATER THAN 400 milliGRAM(s)/deciLiter    Administration instructions:  *Per Sliding Scale*  Dispose unused medication in BLACK bin.  This is a High Alert Medication.  insulin lispro 100 units/mL injectable solution: unit(s) subcutaneous once a day (at bedtime) - 250  1 Unit(s) if Glucose 251 - 300  2 Unit(s) if Glucose 301 - 350  3 Unit(s) if Glucose 351 - 400  4 Unit(s) if Glucose Greater Than 400    Give correctional scale insulin  REGARDLESS of PO status NOTIFY Provider for blood glucose LESS THAN 70 milliGRAM(s)/deciLiter or GREATER THAN 400 milliGRAM(s)/deciLiter      Administer:  *Per Sliding Scale*  Dispose unused medication in BLACK bin.  This is a High Alert Medication.  melatonin 3 mg oral tablet: 1 tab(s) orally once a day (at bedtime)  oxyCODONE 10 mg oral tablet: 1 tab(s) orally every 4 hours, As needed, Severe Pain (7 - 10)  oxyCODONE 5 mg oral tablet: 1 tab(s) orally every 4 hours, As needed, Moderate Pain (4 - 6)  polyethylene glycol 3350 oral powder for reconstitution: 17 gram(s) orally once a day   amLODIPine 10 mg oral tablet: 1 tab(s) orally once a day  Aspirin Enteric Coated 81 mg oral delayed release tablet: 1 tab(s) orally once a day  atorvastatin 40 mg oral tablet: 1 tab(s) orally once a day (at bedtime)  ferrous sulfate 325 mg (65 mg elemental iron) oral tablet: 1 tab(s) orally once a day  Flomax 0.4 mg oral capsule: 1 cap(s) orally once a day  gabapentin 300 mg oral capsule: 1 cap(s) orally once a day  hydrALAZINE 25 mg oral tablet: 3 tab(s) orally 3 times a day  insulin glargine 100 units/mL subcutaneous solution: 34 unit(s) subcutaneous once a day (in the morning)  insulin lispro 100 units/mL injectable solution: 10 unit(s) subcutaneous 3 times a day (before meals)  insulin lispro 100 units/mL injectable solution: unit(s) subcutaneous 3 times a day (before meals) - 250  3 Unit(s) if Glucose 251 - 300  4 Unit(s) if Glucose 301 - 350  5 Unit(s) if Glucose 351 - 400  6 Unit(s) if Glucose Greater Than 400    Give correctional scale insulin  REGARDLESS of PO status NOTIFY Provider for blood glucose LESS THAN 70 milliGRAM(s)/deciLiter or GREATER THAN 400 milliGRAM(s)/deciLiter    Administration instructions:  *Per Sliding Scale*  Dispose unused medication in BLACK bin.  This is a High Alert Medication.  insulin lispro 100 units/mL injectable solution: unit(s) subcutaneous once a day (at bedtime) - 250  1 Unit(s) if Glucose 251 - 300  2 Unit(s) if Glucose 301 - 350  3 Unit(s) if Glucose 351 - 400  4 Unit(s) if Glucose Greater Than 400    Give correctional scale insulin  REGARDLESS of PO status NOTIFY Provider for blood glucose LESS THAN 70 milliGRAM(s)/deciLiter or GREATER THAN 400 milliGRAM(s)/deciLiter      Administer:  *Per Sliding Scale*  Dispose unused medication in BLACK bin.  This is a High Alert Medication.  melatonin 3 mg oral tablet: 1 tab(s) orally once a day (at bedtime)  oxyCODONE 10 mg oral tablet: 1 tab(s) orally every 4 hours, As needed, Severe Pain (7 - 10)  oxyCODONE 5 mg oral tablet: 1 tab(s) orally every 4 hours, As needed, Moderate Pain (4 - 6)  polyethylene glycol 3350 oral powder for reconstitution: 17 gram(s) orally once a day

## 2022-10-20 NOTE — PROGRESS NOTE ADULT - SUBJECTIVE AND OBJECTIVE BOX
VASCULAR SURGERY PROGRESS NOTE    CC:   Hospital Day #  Post-Op Day #    Procedure:    Events of past 24 hours:    ROS otherwise negative except per subjective and HPI      Vital Signs Last 24 Hrs  T(C): 36.1 (19 Oct 2022 20:32), Max: 37.1 (19 Oct 2022 18:00)  T(F): 97 (19 Oct 2022 20:32), Max: 98.7 (19 Oct 2022 18:00)  HR: 74 (19 Oct 2022 23:40) (68 - 90)  BP: 144/70 (19 Oct 2022 20:32) (134/61 - 150/64)  BP(mean): --  RR: 18 (19 Oct 2022 20:32) (16 - 18)  SpO2: 94% (19 Oct 2022 23:40) (92% - 100%)    Parameters below as of 19 Oct 2022 20:32  Patient On (Oxygen Delivery Method): room air        Pain (0-10):            Pain Control Adequate: [] YES [] N    I&O's Detail    18 Oct 2022 07:01  -  19 Oct 2022 07:00  --------------------------------------------------------  IN:    Oral Fluid: 720 mL  Total IN: 720 mL    OUT:    Voided (mL): 1650 mL  Total OUT: 1650 mL    Total NET: -930 mL      19 Oct 2022 07:01  -  20 Oct 2022 00:58  --------------------------------------------------------  IN:    Oral Fluid: 560 mL  Total IN: 560 mL    OUT:    Voided (mL): 570 mL  Total OUT: 570 mL    Total NET: -10 mL          PHYSICAL EXAM    Appearance: Normal	  HEENT:   Normal oral mucosa, PERRL, EOMI	  Neck: Supple, - JVD; Carotid Bruit   Cardiovascular: Normal S1 S2, No JVD, No murmurs,   Respiratory: Lungs clear to auscultation/Decreased Breath Sounds/No Rales, Rhonchi, Wheezing	  Gastrointestinal:  Soft, Non-tender, + BS	  Skin: No rashes, No ecchymoses, No cyanosis  Extremities: Normal range of motion, No clubbing, cyanosis or edema  Neurologic: Non-focal  Psychiatry: A & O x 3, Mood & affect appropriate    PULSES:  Femoral:  Popliteal:  Dorsal Pedal:  Posterior Tibial:  Capillary:      MEDICATIONS:   MEDICATIONS  (STANDING):  amLODIPine   Tablet 10 milliGRAM(s) Oral daily  atorvastatin 40 milliGRAM(s) Oral at bedtime  dextrose 50% Injectable 25 Gram(s) IV Push once  dextrose 50% Injectable 25 Gram(s) IV Push once  dextrose 50% Injectable 12.5 Gram(s) IV Push once  dextrose Oral Gel 15 Gram(s) Oral once  gabapentin 300 milliGRAM(s) Oral daily  glucagon  Injectable 1 milliGRAM(s) IntraMuscular once  heparin   Injectable 5000 Unit(s) SubCutaneous every 8 hours  hydrALAZINE 75 milliGRAM(s) Oral three times a day  influenza   Vaccine 0.5 milliLiter(s) IntraMuscular once  insulin glargine Injectable (LANTUS) 34 Unit(s) SubCutaneous <User Schedule>  insulin lispro (ADMELOG) corrective regimen sliding scale   SubCutaneous three times a day before meals  insulin lispro (ADMELOG) corrective regimen sliding scale   SubCutaneous at bedtime  insulin lispro Injectable (ADMELOG) 10 Unit(s) SubCutaneous three times a day before meals  melatonin 3 milliGRAM(s) Oral at bedtime  polyethylene glycol 3350 17 Gram(s) Oral daily  sodium chloride 0.9% lock flush 3 milliLiter(s) IV Push every 8 hours  tamsulosin 0.4 milliGRAM(s) Oral at bedtime    MEDICATIONS  (PRN):  acetaminophen   IVPB .. 1000 milliGRAM(s) IV Intermittent every 6 hours PRN Moderate Pain (4 - 6)  aluminum hydroxide/magnesium hydroxide/simethicone Suspension 30 milliLiter(s) Oral once PRN Dyspepsia  oxyCODONE    IR 5 milliGRAM(s) Oral every 4 hours PRN Moderate Pain (4 - 6)  oxyCODONE    IR 10 milliGRAM(s) Oral every 4 hours PRN Severe Pain (7 - 10)      LAB/STUDIES:                        7.6    6.03  )-----------( 326      ( 19 Oct 2022 07:20 )             24.3     10-19    136  |  104  |  35<H>  ----------------------------<  175<H>  4.6   |  22  |  3.26<H>    Ca    8.6      19 Oct 2022 07:20  Phos  4.0     10-19  Mg     2.00     10-19              CARDIAC MARKERS ( 18 Oct 2022 07:20 )  x     / x     / 60 U/L / x     / <1.0 ng/mL                IMAGING:       VASCULAR SURGERY PROGRESS NOTE    Subjective:   Events of past 24 hours: No acute events overnight    Patient seen and evaluated this AM. No complaints.    ROS otherwise negative except per subjective and HPI      Vital Signs Last 24 Hrs  T(C): 36.1 (19 Oct 2022 20:32), Max: 37.1 (19 Oct 2022 18:00)  T(F): 97 (19 Oct 2022 20:32), Max: 98.7 (19 Oct 2022 18:00)  HR: 74 (19 Oct 2022 23:40) (68 - 90)  BP: 144/70 (19 Oct 2022 20:32) (134/61 - 150/64)  BP(mean): --  RR: 18 (19 Oct 2022 20:32) (16 - 18)  SpO2: 94% (19 Oct 2022 23:40) (92% - 100%)    Parameters below as of 19 Oct 2022 20:32  Patient On (Oxygen Delivery Method): room air        Pain (0-10):            Pain Control Adequate: [] YES [] N    I&O's Detail    18 Oct 2022 07:01  -  19 Oct 2022 07:00  --------------------------------------------------------  IN:    Oral Fluid: 720 mL  Total IN: 720 mL    OUT:    Voided (mL): 1650 mL  Total OUT: 1650 mL    Total NET: -930 mL      19 Oct 2022 07:01  -  20 Oct 2022 00:58  --------------------------------------------------------  IN:    Oral Fluid: 560 mL  Total IN: 560 mL    OUT:    Voided (mL): 570 mL  Total OUT: 570 mL    Total NET: -10 mL          PHYSICAL EXAM  Appearance: Normal	  HEENT:   Normal oral mucosa, PERRL, EOMI	  Extremities: Normal range of motion, RLE BKA well healed (old), LLE BKA staples c/d/i no edema, no erythema, soft.  Neurologic: Non-focal  Psychiatry: A & O x 3, Mood & affect appropriate        MEDICATIONS:   MEDICATIONS  (STANDING):  amLODIPine   Tablet 10 milliGRAM(s) Oral daily  atorvastatin 40 milliGRAM(s) Oral at bedtime  dextrose 50% Injectable 25 Gram(s) IV Push once  dextrose 50% Injectable 25 Gram(s) IV Push once  dextrose 50% Injectable 12.5 Gram(s) IV Push once  dextrose Oral Gel 15 Gram(s) Oral once  gabapentin 300 milliGRAM(s) Oral daily  glucagon  Injectable 1 milliGRAM(s) IntraMuscular once  heparin   Injectable 5000 Unit(s) SubCutaneous every 8 hours  hydrALAZINE 75 milliGRAM(s) Oral three times a day  influenza   Vaccine 0.5 milliLiter(s) IntraMuscular once  insulin glargine Injectable (LANTUS) 34 Unit(s) SubCutaneous <User Schedule>  insulin lispro (ADMELOG) corrective regimen sliding scale   SubCutaneous three times a day before meals  insulin lispro (ADMELOG) corrective regimen sliding scale   SubCutaneous at bedtime  insulin lispro Injectable (ADMELOG) 10 Unit(s) SubCutaneous three times a day before meals  melatonin 3 milliGRAM(s) Oral at bedtime  polyethylene glycol 3350 17 Gram(s) Oral daily  sodium chloride 0.9% lock flush 3 milliLiter(s) IV Push every 8 hours  tamsulosin 0.4 milliGRAM(s) Oral at bedtime    MEDICATIONS  (PRN):  acetaminophen   IVPB .. 1000 milliGRAM(s) IV Intermittent every 6 hours PRN Moderate Pain (4 - 6)  aluminum hydroxide/magnesium hydroxide/simethicone Suspension 30 milliLiter(s) Oral once PRN Dyspepsia  oxyCODONE    IR 5 milliGRAM(s) Oral every 4 hours PRN Moderate Pain (4 - 6)  oxyCODONE    IR 10 milliGRAM(s) Oral every 4 hours PRN Severe Pain (7 - 10)      LAB/STUDIES:                        7.6    6.03  )-----------( 326      ( 19 Oct 2022 07:20 )             24.3     10-19    136  |  104  |  35<H>  ----------------------------<  175<H>  4.6   |  22  |  3.26<H>    Ca    8.6      19 Oct 2022 07:20  Phos  4.0     10-19  Mg     2.00     10-19              CARDIAC MARKERS ( 18 Oct 2022 07:20 )  x     / x     / 60 U/L / x     / <1.0 ng/mL                IMAGING:

## 2022-10-20 NOTE — PROGRESS NOTE ADULT - ASSESSMENT
IMPRESSION: 62M w/ HTN, DM2, CKD, and PAD-R BKA, 10/7/22 p/w necrotic L foot ulcer; s/p L-BKA 10/7/22; now being planned for BKA formalization  (1)Renal - CKD4 - GFR ~20-25ml/min - ~2gm proteinuria. Likely at least in part from diabetic nephropathy. Azotemia improving, stable  (2)Hyponatremia - due to intravascular depletion - improving, stable   (3)CV - BP/volume grossly acceptable  (4)Anemia - multifactorial including from CKD, s/p Retacrit 10k sq x 1 (10/18)   (5)ID - necrotic L foot ulcer; s/p BKA; s/p IV Zosyn  (6)Vasc - s/p BKA formalization 10/14    RECOMMENDATIONS    Ashly Jones, NP-C  [facility]  [id number])   IMPRESSION: 62M w/ HTN, DM2, CKD, and PAD-R BKA, 10/7/22 p/w necrotic L foot ulcer; s/p L-BKA 10/7/22; now being planned for BKA formalization  (1)Renal - CKD4 - GFR ~20-25ml/min - ~2gm proteinuria. Likely at least in part from diabetic nephropathy. Azotemia improving, stable  (2)Hyponatremia - due to intravascular depletion - improving, stable   (3)CV - BP/volume grossly acceptable  (4)Anemia - multifactorial including from CKD, s/p Retacrit 10k sq x 1 (10/18)   (5)ID - necrotic L foot ulcer; s/p BKA; s/p IV Zosyn  (6)Vasc - s/p BKA formalization 10/14    RECOMMENDATIONS  (1)Favor adding standing FeSO4 - 325mg po qd for now  (2)Continue free water restriction as ordered  (3)Amlodipine as ordered  (4)If for discharge, he can f/u in office with Dr. Chavez in 1-4 weeks    Ashly Jones, NP-C  [facility]  [phone]    IMPRESSION: 62M w/ HTN, DM2, CKD, and PAD-R BKA, 10/7/22 p/w necrotic L foot ulcer; s/p L-BKA 10/7/22; now being planned for BKA formalization  (1)Renal - CKD4 - GFR ~20-25ml/min - ~2gm proteinuria. Likely at least in part from diabetic nephropathy. Azotemia improving, stable  (2)Hyponatremia - due to intravascular depletion - improving, stable   (3)CV - BP/volume grossly acceptable  (4)Anemia - multifactorial including from CKD, s/p Retacrit 10k sq x 1 (10/18)   (5)ID - necrotic L foot ulcer; s/p BKA; s/p IV Zosyn  (6)Vasc - s/p BKA formalization 10/14    RECOMMENDATIONS  (1)Favor adding standing FeSO4 - 325mg po qd for now  (2)Continue free water restriction as ordered  (3)Amlodipine as ordered  (4)If for discharge, he can f/u in office with Dr. Chavez in 1-4 weeks    D/w Dr. Scott Jones, NP-C  [facility]  [phone]    IMPRESSION: 62M w/ HTN, DM2, CKD, and PAD-R BKA, 10/7/22 p/w necrotic L foot ulcer; s/p L-BKA 10/7/22; now being planned for BKA formalization  (1)Renal - CKD4 - GFR ~20-25ml/min - ~2gm proteinuria. Likely at least in part from diabetic nephropathy. Azotemia improving, stable  (2)Hyponatremia - due to intravascular depletion - improving, stable   (3)CV - BP/volume grossly acceptable  (4)Anemia - multifactorial including from CKD, s/p Retacrit 10k sq x 1 (10/18)   (5)ID - necrotic L foot ulcer; s/p BKA; s/p IV Zosyn  (6)Vasc - s/p BKA formalization 10/14    RECOMMENDATIONS  (1)Favor adding standing FeSO4 - 325mg po qd for now  (2)Continue free water restriction as ordered  (3)Amlodipine as ordered  (4)If for discharge, he can f/u in office with Dr. Chavez in 1-4 weeks    D/w Dr. Chavez      RENAL ATTENDING NOTE  Patient seen and examined with NP. Agree with assessment and plan as above.    Olegario Chavez MD  [facility]  [phone]  VIKAS Cummings  [facility]  [phone]

## 2022-10-20 NOTE — PROGRESS NOTE ADULT - SUBJECTIVE AND OBJECTIVE BOX
Subjective: Patient seen and examined. No new events except as noted.     REVIEW OF SYSTEMS:    CONSTITUTIONAL:+ weakness, fevers or chills  EYES/ENT: No visual changes;  No vertigo or throat pain   NECK: No pain or stiffness  RESPIRATORY: No cough, wheezing, hemoptysis; No shortness of breath  CARDIOVASCULAR: No chest pain or palpitations  GASTROINTESTINAL: No abdominal or epigastric pain. No nausea, vomiting, or hematemesis; No diarrhea or constipation. No melena or hematochezia.  GENITOURINARY: No dysuria, frequency or hematuria  NEUROLOGICAL: No numbness or weakness  SKIN: No itching, burning, rashes, or lesions   All other review of systems is negative unless indicated above.    MEDICATIONS:  MEDICATIONS  (STANDING):  amLODIPine   Tablet 10 milliGRAM(s) Oral daily  atorvastatin 40 milliGRAM(s) Oral at bedtime  dextrose 50% Injectable 25 Gram(s) IV Push once  dextrose 50% Injectable 12.5 Gram(s) IV Push once  dextrose 50% Injectable 25 Gram(s) IV Push once  dextrose Oral Gel 15 Gram(s) Oral once  gabapentin 300 milliGRAM(s) Oral daily  glucagon  Injectable 1 milliGRAM(s) IntraMuscular once  heparin   Injectable 5000 Unit(s) SubCutaneous every 8 hours  hydrALAZINE 75 milliGRAM(s) Oral three times a day  influenza   Vaccine 0.5 milliLiter(s) IntraMuscular once  insulin glargine Injectable (LANTUS) 34 Unit(s) SubCutaneous <User Schedule>  insulin lispro (ADMELOG) corrective regimen sliding scale   SubCutaneous three times a day before meals  insulin lispro (ADMELOG) corrective regimen sliding scale   SubCutaneous at bedtime  insulin lispro Injectable (ADMELOG) 10 Unit(s) SubCutaneous three times a day before meals  melatonin 3 milliGRAM(s) Oral at bedtime  polyethylene glycol 3350 17 Gram(s) Oral daily  sodium chloride 0.9% lock flush 3 milliLiter(s) IV Push every 8 hours  tamsulosin 0.4 milliGRAM(s) Oral at bedtime      PHYSICAL EXAM:  T(C): 36.1 (10-19-22 @ 20:32), Max: 37.1 (10-19-22 @ 18:00)  HR: 75 (10-20-22 @ 07:14) (69 - 90)  BP: 144/70 (10-19-22 @ 20:32) (136/63 - 150/64)  RR: 18 (10-19-22 @ 20:32) (18 - 18)  SpO2: 96% (10-20-22 @ 07:14) (92% - 97%)  Wt(kg): --  I&O's Summary    19 Oct 2022 07:01  -  20 Oct 2022 07:00  --------------------------------------------------------  IN: 560 mL / OUT: 570 mL / NET: -10 mL      Appearance: NAD	  HEENT: dry oral mucosa, PERRL, EOMI	  Lymphatic: No lymphadenopathy  Cardiovascular: Normal S1 S2, No JVD, No murmurs, No edema  Respiratory: Lungs clear to auscultation	  Psychiatry: A & O x 3, Mood & affect appropriate  Gastrointestinal: Soft, Non-tender, + BS	  Skin: No rashes, No ecchymoses, No cyanosis	  Neurologic: Non-focal  Extremities: bilateral lower extremity amputations - L BKA dressing c/d/i  Vascular: Peripheral pulses palpable 2+ bilaterally        LABS:    CARDIAC MARKERS:  CARDIAC MARKERS ( 18 Oct 2022 07:20 )  x     / x     / 60 U/L / x     / <1.0 ng/mL                                7.8    6.48  )-----------( 359      ( 20 Oct 2022 07:25 )             25.1     10-20    136  |  103  |  34<H>  ----------------------------<  101<H>  4.4   |  21<L>  |  3.21<H>    Ca    8.8      20 Oct 2022 07:25  Phos  4.0     10-20  Mg     2.10     10-20      proBNP:   Lipid Profile:   HgA1c:   TSH:             TELEMETRY: 	    ECG:  	  RADIOLOGY:   DIAGNOSTIC TESTING:  [ ] Echocardiogram:  [ ]  Catheterization:  [ ] Stress Test:    OTHER:

## 2022-10-20 NOTE — CONSULT NOTE ADULT - CONSULT REQUESTED DATE/TIME
20-Oct-2022 15:40
07-Oct-2022 21:36
07-Oct-2022 17:20
13-Oct-2022 08:21
07-Oct-2022 16:51
10-Oct-2022 14:27

## 2022-10-20 NOTE — DISCHARGE NOTE PROVIDER - PROVIDER TOKENS
PROVIDER:[TOKEN:[[id number]],FOLLOWUP:[2 weeks]] PROVIDER:[TOKEN:[[id number]],FOLLOWUP:[2 weeks]],PROVIDER:[TOKEN:[[id number]]]

## 2022-10-20 NOTE — DISCHARGE NOTE PROVIDER - CARE PROVIDER_API CALL
Teresa Angulo)  Surgery  [address] [facility], [address]  [place] [address]  Phone: [phone]  Fax: [phone]  Follow Up Time: 2 weeks   Teresa Angulo)  Surgery  [address] [facility], [address]  [place] [address]  Phone: [phone]  Fax: [phone]  Follow Up Time: 2 weeks    Bj Christine ()  Cardiology; Internal Medicine  [address] [place] [address], [address]  [place] [address]  Phone: [phone]  Fax: [phone]  Follow Up Time:

## 2022-10-20 NOTE — PROGRESS NOTE ADULT - ASSESSMENT
61 y/o M with PMH of HTN, DM type II, HLD, BPH and PAD, Right BKA 2/2 to unhealed DM ulcer presented to [facility] for LLE peripheral angiogram, now s/p amputation    1. Uncontrolled T2DM c/b foot wounds requiring amputation, retinopathy  A1c 8.5% Goal < 7%  Home Regimen: Novolog 20-35 units qAC (typically eats 2 meals per day, takes Novolog twice). Does not take basal insulin     While inpatient:   BG target 100-180 mg/dl  Reduce Lantus to 28 units SQ qAM (0800 daily)   Reduce to Admelog 8 units SQ TID before meals (Hold if NPO/skips meal) - reporting low appetite  Continue Admelog low dose correctional scale before meals, low dose at bedtime  Consistent carb diet  BG before meals and bedtime  Hypoglycemia protocol - re-ordered 10/16    Discharge Plan:  Basal/bolus, final regimen TBD - current dosing to rehab if discharged today  Can resume Novolog (dose TBD), will need new prescription for basal insulin PEN (Lantus, Basaglar, Tresiba, Semglee)  Did not tolerate Metformin  mg as an outpatient. Patient states possibly hx of pancreatitis, also with retinopathy- unclear if active as patient has not seen ophtho in a while. Will defer GLP1 agonist. Hold off on SGLT2 inhibitors given recent amputation  Followup with Endocrine Practice at [address] [facility], [address], [place] [address]; Ph # [phone]  OR if prefers a [place] location - Dr. Jo: [address] [facility], [address] [address], [phone]    2. Discharge Planning Issues  Patient expressing difficulty caring for himself, especially now s/p amputation   At home, he lives with elderly wife, reports difficulty with food prep, specifically healthy food, typically orders out  Limited resources for support  Recommend CM and SW consult   Discussed with team    3. HTN  BP Goal < 130/80  On Amlodipine and Hydralazine  Titration per primary team    4. HLD  LDL goal < 70  LDL 87 (10/2022)  Statin if no contraindications  Now started on Lipitor     Arlet Owens  Nurse Practitioner  Division of Endocrinology & Diabetes  In house pager #[phone]/long range pager #[phone]    If before 9AM or after 6PM, or on weekends/holidays, please call endocrine answering service for assistance ([phone]).  For nonurgent matters email [email or web address] for assistance.

## 2022-10-20 NOTE — DISCHARGE NOTE PROVIDER - CARE PROVIDERS DIRECT ADDRESSES
,[email or web address],DirectAddress_Unknown

## 2022-10-20 NOTE — CONSULT NOTE ADULT - SUBJECTIVE AND OBJECTIVE BOX
Patient is a 62y old  Male who presents with a chief complaint of Left foot BKA  63 y/o M with PMH of HTN, DM type II, HLD, BPH and PAD, Right BKA 2/2 to unhealed DM ulcer presented to [facility] for LLE peripheral angiogram. Patient now s/p L BKA for necrotic left foot ulcer w/ concern for gas gangrene. Insulin infusion was started intraoperatively. SICU consulted for management of insulin infusion. Patient now off insulin infusion.  (11 Oct 2022 16:03)      HPI:  63 y/o M with PMH of HTN, DM type II, HLD, BPH and PAD, Right BKA 2/2 to unhealed DM ulcer presented to [facility] for LLE peripheral angiogram. As per the patient he developed initially a scab on the bottom of his foot in August of this year and then progressively worsened to an ulcer. Patient stated that the ulcer is located between the 4th web space and recently fell off the bed and cut the adjacent skin for which he received stitched and PO Augmentin. Patient stated that the ulcer cunningham drain a clear liquid but denied any bleeding or purulent discharge. Patient also endorsed of chills for the past 2 weeks with associated body aches and lethargy. Patient was incidental found to be COVID positive on 09/19/22 and that time angiogram was cancelled. Patient otherwise denied any LLE foot pain, claudication or fevers. Patient denied any calf pain, CP, SOB, N/V/D/C, abdominal pain, dysuria, melena, hematochezia, recent travel, sick contact, cough, body aches, pleuritic or positional chest pain.    COVID PCR positive on 09/19/22   VASCULAR SURG ATT ADDENDUM  Pt presents  and states left foot wound w increase in size and odor  (07 Oct 2022 14:29)    healed old r bka, s/p lle bka    REVIEW OF SYSTEMS  s/p bka    PAST MEDICAL & SURGICAL HISTORY  Diabetes    BPH (benign prostatic hypertrophy)    HTN (hypertension)    Venous insufficiency of both lower extremities    Insulin dependent diabetes mellitus    Hypertension    No significant past surgical history    Status post incision and drainage    S/P laparoscopic cholecystectomy    No significant past surgical history    History of cholecystectomy    S/P BKA (below knee amputation), right        SOCIAL HISTORY  Smoking - Denied  EtOH - Denied   Drugs - Denied    FUNCTIONAL HISTORY  Lives with wife in apartment with elevator  Independent with RLE prosthesis and rw    CURRENT FUNCTIONAL STATUS  declined pt today due to episode of nausea yesterday with pt.      FAMILY HISTORY    Family history of diabetes mellitus (DM) (Grandparent)  Paternal family history of emphysema        RECENT LABS/IMAGING  CBC Full  -  ( 20 Oct 2022 07:25 )  WBC Count : 6.48 K/uL  RBC Count : 2.66 M/uL  Hemoglobin : 7.8 g/dL  Hematocrit : 25.1 %  Platelet Count - Automated : 359 K/uL  Mean Cell Volume : 94.4 fL  Mean Cell Hemoglobin : 29.3 pg  Mean Cell Hemoglobin Concentration : 31.1 gm/dL  Auto Neutrophil # : x  Auto Lymphocyte # : x  Auto Monocyte # : x  Auto Eosinophil # : x  Auto Basophil # : x  Auto Neutrophil % : x  Auto Lymphocyte % : x  Auto Monocyte % : x  Auto Eosinophil % : x  Auto Basophil % : x    10-20    136  |  103  |  34<H>  ----------------------------<  101<H>  4.4   |  21<L>  |  3.21<H>    Ca    8.8      20 Oct 2022 07:25  Phos  4.0     10-20  Mg     2.10     10-20          VITALS  T(C): 36.8 (10-20-22 @ 13:00), Max: 37.1 (10-19-22 @ 18:00)  HR: 87 (10-20-22 @ 13:00) (59 - 90)  BP: 155/70 (10-20-22 @ 13:00) (137/64 - 155/70)  RR: 18 (10-20-22 @ 13:00) (18 - 18)  SpO2: 100% (10-20-22 @ 13:00) (92% - 100%)  Wt(kg): --    ALLERGIES  No Known Allergies      MEDICATIONS   acetaminophen   IVPB .. 1000 milliGRAM(s) IV Intermittent every 6 hours PRN  aluminum hydroxide/magnesium hydroxide/simethicone Suspension 30 milliLiter(s) Oral once PRN  amLODIPine   Tablet 10 milliGRAM(s) Oral daily  aspirin  chewable 81 milliGRAM(s) Oral daily  atorvastatin 40 milliGRAM(s) Oral at bedtime  dextrose 50% Injectable 25 Gram(s) IV Push once  dextrose 50% Injectable 12.5 Gram(s) IV Push once  dextrose 50% Injectable 25 Gram(s) IV Push once  dextrose Oral Gel 15 Gram(s) Oral once  gabapentin 300 milliGRAM(s) Oral daily  glucagon  Injectable 1 milliGRAM(s) IntraMuscular once  heparin   Injectable 5000 Unit(s) SubCutaneous every 8 hours  hydrALAZINE 75 milliGRAM(s) Oral three times a day  influenza   Vaccine 0.5 milliLiter(s) IntraMuscular once  insulin lispro (ADMELOG) corrective regimen sliding scale   SubCutaneous three times a day before meals  insulin lispro (ADMELOG) corrective regimen sliding scale   SubCutaneous at bedtime  insulin lispro Injectable (ADMELOG) 8 Unit(s) SubCutaneous three times a day before meals  melatonin 3 milliGRAM(s) Oral at bedtime  oxyCODONE    IR 5 milliGRAM(s) Oral every 4 hours PRN  oxyCODONE    IR 10 milliGRAM(s) Oral every 4 hours PRN  polyethylene glycol 3350 17 Gram(s) Oral daily  sodium chloride 0.9% lock flush 3 milliLiter(s) IV Push every 8 hours  tamsulosin 0.4 milliGRAM(s) Oral at bedtime      ----------------------------------------------------------------------------------------  PHYSICAL EXAM  Constitutional - NAD, Comfortable  HEENT - NCAT, EOMI   Chest - no respiratory distress  Cardiovascular - RRR, S1S2   Abdomen -  Soft, NTND  Extremities - No C/C/E, No calf tenderness   Neurologic Exam -                    Cognitive - Awake, Alert, AAO to self, place, date, year, situation     Communication - Fluent, No dysarthria     Cranial Nerves - CN 2-12 intact     Motor - No focal deficits                    LEFT    UE - ShAB 5/5, EF 5/5, EE 5/5, WE 5/5,  5/5                    RIGHT UE - ShAB 5/5, EF 5/5, EE 5/5, WE 5/5,  5/5                    LEFT    LE - HF 5/5, KE 5/5, DF 5/5, PF 5/5                    RIGHT LE - HF 5/5, KE 5/5, DF 5/5, PF 5/5        Sensory - Intact to LT     Reflexes - DTR Intact, No primitive reflexive     Coordination - FTN intact     OculoVestibular - No saccades, No nystagmus, VOR         Balance - WNL Static  Psychiatric - Mood stable, Affect WNL  ----------------------------------------------------------------------------------------  ASSESSMENT/PLAN 63 yo m s/p L bka for gas gangrene  s/p formalization on 10/15.   Pain -acetaminophen, oxy ir prn, gabapentin  diet: consistent carb w evening snack  DVT PPX - heparin  Rehab -    Recommend ACUTE inpatient rehabilitation for the functional deficits consisting of 3 hours of therapy/day & 24 hour RN/daily PMR physician for comorbid medical management. Will continue to follow for ongoing rehab needs and recommendations.   Recommend [facility], patient DOES NOT meet acute inpatient rehabilitation criteria   Recommend DC HOME with outpatient   Recommend DC HOME with homecare   Follow up with [facility] - Call [phone] for an appointment Patient is a 62y old  Male who presents with a chief complaint of Left foot BKA  63 y/o M with PMH of HTN, DM type II, HLD, BPH and PAD, Right BKA 2/2 to unhealed DM ulcer presented to [facility] for LLE peripheral angiogram. Patient now s/p L BKA for necrotic left foot ulcer w/ concern for gas gangrene. Insulin infusion was started intraoperatively. SICU consulted for management of insulin infusion. Patient now off insulin infusion.  (11 Oct 2022 16:03)      HPI:  63 y/o M with PMH of HTN, DM type II, HLD, BPH and PAD, Right BKA 2/2 to unhealed DM ulcer presented to [facility] for LLE peripheral angiogram. As per the patient he developed initially a scab on the bottom of his foot in August of this year and then progressively worsened to an ulcer. Patient stated that the ulcer is located between the 4th web space and recently fell off the bed and cut the adjacent skin for which he received stitched and PO Augmentin. Patient stated that the ulcer cunningham drain a clear liquid but denied any bleeding or purulent discharge. Patient also endorsed of chills for the past 2 weeks with associated body aches and lethargy. Patient was incidental found to be COVID positive on 09/19/22 and that time angiogram was cancelled. Patient otherwise denied any LLE foot pain, claudication or fevers. Patient denied any calf pain, CP, SOB, N/V/D/C, abdominal pain, dysuria, melena, hematochezia, recent travel, sick contact, cough, body aches, pleuritic or positional chest pain.    COVID PCR positive on 09/19/22   VASCULAR SURG ATT ADDENDUM  Pt presents  and states left foot wound w increase in size and odor  (07 Oct 2022 14:29)    healed old r bka, s/p lle bka    patient falls asleep during exam, states he went to [facility] in the past and would consider going back, but he had also discussed acute rehab options with PT. He states he had nausea when working with PT, denies nausea today but reports decreased appetite.    REVIEW OF SYSTEMS  s/p bka    PAST MEDICAL & SURGICAL HISTORY  Diabetes    BPH (benign prostatic hypertrophy)    HTN (hypertension)    Venous insufficiency of both lower extremities    Insulin dependent diabetes mellitus    Hypertension    No significant past surgical history    Status post incision and drainage    S/P laparoscopic cholecystectomy    No significant past surgical history    History of cholecystectomy    S/P BKA (below knee amputation), right         FUNCTIONAL HISTORY  Lives with wife in apartment with elevator  Independent with RLE prosthesis and rw    CURRENT FUNCTIONAL STATUS  declined pt today due to episode of nausea yesterday with pt.      FAMILY HISTORY    Family history of diabetes mellitus (DM) (Grandparent)  Paternal family history of emphysema        RECENT LABS/IMAGING  CBC Full  -  ( 20 Oct 2022 07:25 )  WBC Count : 6.48 K/uL  RBC Count : 2.66 M/uL  Hemoglobin : 7.8 g/dL  Hematocrit : 25.1 %  Platelet Count - Automated : 359 K/uL  Mean Cell Volume : 94.4 fL  Mean Cell Hemoglobin : 29.3 pg  Mean Cell Hemoglobin Concentration : 31.1 gm/dL  Auto Neutrophil # : x  Auto Lymphocyte # : x  Auto Monocyte # : x  Auto Eosinophil # : x  Auto Basophil # : x  Auto Neutrophil % : x  Auto Lymphocyte % : x  Auto Monocyte % : x  Auto Eosinophil % : x  Auto Basophil % : x    10-20    136  |  103  |  34<H>  ----------------------------<  101<H>  4.4   |  21<L>  |  3.21<H>    Ca    8.8      20 Oct 2022 07:25  Phos  4.0     10-20  Mg     2.10     10-20          VITALS  T(C): 36.8 (10-20-22 @ 13:00), Max: 37.1 (10-19-22 @ 18:00)  HR: 87 (10-20-22 @ 13:00) (59 - 90)  BP: 155/70 (10-20-22 @ 13:00) (137/64 - 155/70)  RR: 18 (10-20-22 @ 13:00) (18 - 18)  SpO2: 100% (10-20-22 @ 13:00) (92% - 100%)  Wt(kg): --    ALLERGIES  No Known Allergies      MEDICATIONS   acetaminophen   IVPB .. 1000 milliGRAM(s) IV Intermittent every 6 hours PRN  aluminum hydroxide/magnesium hydroxide/simethicone Suspension 30 milliLiter(s) Oral once PRN  amLODIPine   Tablet 10 milliGRAM(s) Oral daily  aspirin  chewable 81 milliGRAM(s) Oral daily  atorvastatin 40 milliGRAM(s) Oral at bedtime  dextrose 50% Injectable 25 Gram(s) IV Push once  dextrose 50% Injectable 12.5 Gram(s) IV Push once  dextrose 50% Injectable 25 Gram(s) IV Push once  dextrose Oral Gel 15 Gram(s) Oral once  gabapentin 300 milliGRAM(s) Oral daily  glucagon  Injectable 1 milliGRAM(s) IntraMuscular once  heparin   Injectable 5000 Unit(s) SubCutaneous every 8 hours  hydrALAZINE 75 milliGRAM(s) Oral three times a day  influenza   Vaccine 0.5 milliLiter(s) IntraMuscular once  insulin lispro (ADMELOG) corrective regimen sliding scale   SubCutaneous three times a day before meals  insulin lispro (ADMELOG) corrective regimen sliding scale   SubCutaneous at bedtime  insulin lispro Injectable (ADMELOG) 8 Unit(s) SubCutaneous three times a day before meals  melatonin 3 milliGRAM(s) Oral at bedtime  oxyCODONE    IR 5 milliGRAM(s) Oral every 4 hours PRN  oxyCODONE    IR 10 milliGRAM(s) Oral every 4 hours PRN  polyethylene glycol 3350 17 Gram(s) Oral daily  sodium chloride 0.9% lock flush 3 milliLiter(s) IV Push every 8 hours  tamsulosin 0.4 milliGRAM(s) Oral at bedtime      ----------------------------------------------------------------------------------------  PHYSICAL EXAM  Constitutional - NAD, Comfortable, sleepy  HEENT - NCAT, EOMI   Chest - no respiratory distress  Cardiovascular - RRR, S1S2   Abdomen -  Soft, NTND  Extremities - RLE well healed incisions, LLE immobilizer in place  Neurologic Exam -                    Cognitive - Awake, Alert, AAO to self, place, date, year, situation     Communication - Fluent, No dysarthria     Cranial Nerves - CN 2-12 intact     Motor -   limited by lethargy, grossly intact     Sensory - Intact to LT in areas tested     Balance - WNL Static  Psychiatric - Mood stable, Affect WNL  ----------------------------------------------------------------------------------------  ASSESSMENT/PLAN 61 yo m s/p L bka for gas gangrene  s/p formalization on 10/15.   Pain -acetaminophen, oxy ir prn, gabapentin  diet: consistent carb w evening snack  DVT PPX - heparin  recommend OT evaluation  Rehab -  recommend subacute rehab, patient is limited by lethargy currently and has limited participation with bedside therapy.  will monitor for improvement in tolerance.

## 2022-10-20 NOTE — PROGRESS NOTE ADULT - ASSESSMENT
62 year old male with a PMHx of HTN, DM2, HLD, BPH, PAD and right BKA secondary to unhealed DM2 ulcer who presented to [facility] for left lower extremity peripheral angiogram.  Patient is now S/P left lower extremity BKA on 10/7/22.

## 2022-10-20 NOTE — PROGRESS NOTE ADULT - ASSESSMENT
62M with a PMHx of HTN, DM2, HLD, BPH, PAD and right BKA secondary to unhealed DM2 ulcer who presented to [facility] for left lower extremity peripheral angiogram.  Patient is now S/P left lower extremity BKA on 10/7/22. s/p LLE BKA Formalization 10/15/22.      Plan:   - Continue aspirin   - Continue knee immobilizer   - Pain control  - Regular diet   - DVT ppx: SQH  - Dispo: sub-acute rehab planning      # [id number]  Vascular Surgery  62M with a PMHx of HTN, DM2, HLD, BPH, PAD and right BKA secondary to unhealed DM2 ulcer who presented to [facility] for left lower extremity peripheral angiogram.  Patient is now S/P left lower extremity BKA on 10/7/22. s/p LLE BKA Formalization 10/15/22.      Plan:   - Continue aspirin   - Continue knee immobilizer   - Pain control  - Regular diet   - DVT ppx: SQ  - Dispo: acute rehab planning      # [id number]  Vascular Surgery

## 2022-10-20 NOTE — PROGRESS NOTE ADULT - SUBJECTIVE AND OBJECTIVE BOX
NEPHROLOGY     Patient seen and examined.    MEDICATIONS  (STANDING):  amLODIPine   Tablet 10 milliGRAM(s) Oral daily  aspirin  chewable 81 milliGRAM(s) Oral daily  atorvastatin 40 milliGRAM(s) Oral at bedtime  dextrose 50% Injectable 25 Gram(s) IV Push once  dextrose 50% Injectable 12.5 Gram(s) IV Push once  dextrose 50% Injectable 25 Gram(s) IV Push once  dextrose Oral Gel 15 Gram(s) Oral once  gabapentin 300 milliGRAM(s) Oral daily  glucagon  Injectable 1 milliGRAM(s) IntraMuscular once  heparin   Injectable 5000 Unit(s) SubCutaneous every 8 hours  hydrALAZINE 75 milliGRAM(s) Oral three times a day  influenza   Vaccine 0.5 milliLiter(s) IntraMuscular once  insulin lispro (ADMELOG) corrective regimen sliding scale   SubCutaneous three times a day before meals  insulin lispro (ADMELOG) corrective regimen sliding scale   SubCutaneous at bedtime  insulin lispro Injectable (ADMELOG) 8 Unit(s) SubCutaneous three times a day before meals  melatonin 3 milliGRAM(s) Oral at bedtime  polyethylene glycol 3350 17 Gram(s) Oral daily  sodium chloride 0.9% lock flush 3 milliLiter(s) IV Push every 8 hours  tamsulosin 0.4 milliGRAM(s) Oral at bedtime    VITALS:  T(C): , Max: 37.1 (10-19-22 @ 18:00)  T(F): , Max: 98.7 (10-19-22 @ 18:00)  HR: 59 (10-20-22 @ 09:19)  BP: 142/70 (10-20-22 @ 09:19)  RR: 18 (10-20-22 @ 09:19)  SpO2: 96% (10-20-22 @ 09:19)    I and O's:    10-19 @ 07:01  -  10-20 @ 07:00  --------------------------------------------------------  IN: 560 mL / OUT: 570 mL / NET: -10 mL    10-20 @ 07:01  -  10-20 @ 13:12  --------------------------------------------------------  IN: 240 mL / OUT: 350 mL / NET: -110 mL    PHYSICAL EXAM:  Constitutional: NAD  Neck:  No JVD  Respiratory: CTAB/L  Cardiovascular: S1 and S2  Gastrointestinal: BS+, soft, NT/ND  Extremities: B/L LE BKA, L BKA (+) immobilizer   Neurological: A/O x 3, no focal deficits  : No Carreon  Skin: No rashes    LABS:                        7.8    6.48  )-----------( 359      ( 20 Oct 2022 07:25 )             25.1     10-20    136  |  103  |  34<H>  ----------------------------<  101<H>  4.4   |  21<L>  |  3.21<H>    Ca    8.8      20 Oct 2022 07:25  Phos  4.0     10-20  Mg     2.10     10-20      Urine Studies:    Osmolality, Random Urine: 370 mosm/kg (10-18 @ 20:20)     NEPHROLOGY     Patient seen and examined resting comfortably, continues to report feeling tired, no new complaints, denies sob, in no acute distress.     MEDICATIONS  (STANDING):  amLODIPine   Tablet 10 milliGRAM(s) Oral daily  aspirin  chewable 81 milliGRAM(s) Oral daily  atorvastatin 40 milliGRAM(s) Oral at bedtime  dextrose 50% Injectable 25 Gram(s) IV Push once  dextrose 50% Injectable 12.5 Gram(s) IV Push once  dextrose 50% Injectable 25 Gram(s) IV Push once  dextrose Oral Gel 15 Gram(s) Oral once  gabapentin 300 milliGRAM(s) Oral daily  glucagon  Injectable 1 milliGRAM(s) IntraMuscular once  heparin   Injectable 5000 Unit(s) SubCutaneous every 8 hours  hydrALAZINE 75 milliGRAM(s) Oral three times a day  influenza   Vaccine 0.5 milliLiter(s) IntraMuscular once  insulin lispro (ADMELOG) corrective regimen sliding scale   SubCutaneous three times a day before meals  insulin lispro (ADMELOG) corrective regimen sliding scale   SubCutaneous at bedtime  insulin lispro Injectable (ADMELOG) 8 Unit(s) SubCutaneous three times a day before meals  melatonin 3 milliGRAM(s) Oral at bedtime  polyethylene glycol 3350 17 Gram(s) Oral daily  sodium chloride 0.9% lock flush 3 milliLiter(s) IV Push every 8 hours  tamsulosin 0.4 milliGRAM(s) Oral at bedtime    VITALS:  T(C): , Max: 37.1 (10-19-22 @ 18:00)  T(F): , Max: 98.7 (10-19-22 @ 18:00)  HR: 59 (10-20-22 @ 09:19)  BP: 142/70 (10-20-22 @ 09:19)  RR: 18 (10-20-22 @ 09:19)  SpO2: 96% (10-20-22 @ 09:19)    I and O's:    10-19 @ 07:01  -  10-20 @ 07:00  --------------------------------------------------------  IN: 560 mL / OUT: 570 mL / NET: -10 mL    10-20 @ 07:01  -  10-20 @ 13:12  --------------------------------------------------------  IN: 240 mL / OUT: 350 mL / NET: -110 mL    PHYSICAL EXAM:  Constitutional: NAD  Neck:  No JVD  Respiratory: CTAB/L  Cardiovascular: S1 and S2  Gastrointestinal: BS+, soft, NT/ND  Extremities: B/L LE BKA, L BKA (+) immobilizer   Neurological: A/O x 3, no focal deficits  : No Carreon  Skin: No rashes    LABS:                        7.8    6.48  )-----------( 359      ( 20 Oct 2022 07:25 )             25.1     10-20    136  |  103  |  34<H>  ----------------------------<  101<H>  4.4   |  21<L>  |  3.21<H>    Ca    8.8      20 Oct 2022 07:25  Phos  4.0     10-20  Mg     2.10     10-20      Urine Studies:    Osmolality, Random Urine: 370 mosm/kg (10-18 @ 20:20)

## 2022-10-20 NOTE — DISCHARGE NOTE PROVIDER - NSDCFUADDAPPT_GEN_ALL_CORE_FT
Followup with Endocrine Practice at [address], [address], [place] [address]; Ph # [phone]  OR if prefers a [place] location - Dr. Jo: [address], [address] [address], [phone] Followup with Endocrine Practice at [address], [address], [place] [address]; Ph # [phone]  OR if prefers a [place] location - Dr. Jo: [address], [address], [facility] [address], [phone]    Follow up w/ Nephrologist Rubio Chavez [facility]  [phone] call for appointment.

## 2022-10-20 NOTE — DISCHARGE NOTE PROVIDER - NSDCCPCAREPLAN_GEN_ALL_CORE_FT
PRINCIPAL DISCHARGE DIAGNOSIS  Diagnosis: S/P BKA (below knee amputation), left  Assessment and Plan of Treatment: WOUND CARE:  Please keep incisions clean and dry. Please do not Scrub or rub incisions. Do not use lotion or powder on incisions.   BATHING: You may shower and/or sponge bathe. You may use warm soapy water in the shower and rinse, pat dry.  ACTIVITY: No heavy lifting or straining. Otherwise, you may return to your usual level of physical activity. If you are taking narcotic pain medication DO NOT drive a car, operate machinery or make important decisions.  DIET: Consistent carb diet for diabetes  NOTIFY YOUR SURGEON IF YOU HAVE: any bleeding that does not stop, any pus draining from your wound(s), any fever (over 100.4 F) persistent nausea/vomiting, or if your pain is not controlled on your discharge pain medications, unable to urinate.  Please follow up with your primary care physician in one week regarding your hospitalization, bring copies of your discharge paperwork.  Please follow up with your surgeon, Dr. Angulo within 2 weeks of discharge. Please call to schedule an appointment.

## 2022-10-20 NOTE — CHART NOTE - NSCHARTNOTEFT_GEN_A_CORE
POST-OPERATIVE NOTE    Subjective:  Patient is s/p LLE BKA. Patient denies any n/v, chest pain, or SOB. Pain is currently well controlled. Recovering appropriately.    Objective:  Physical Exam:  General: NAD, resting comfortably in bed  Pulmonary: Nonlabored breathing, no respiratory distress  Cardiovascular: RRR  Abdominal: soft, nontender  Extremities: LLE in KI with stump wrapped with xeroform, 4x4, abd pads, kerlix, coband, and ace; dressing c/d/i      Vital Signs Last 24 Hrs  T(C): 37.6 (07 Oct 2022 21:30), Max: 38.1 (07 Oct 2022 18:26)  T(F): 99.7 (07 Oct 2022 21:30), Max: 100.5 (07 Oct 2022 18:26)  HR: 75 (08 Oct 2022 00:30) (75 - 99)  BP: 135/63 (08 Oct 2022 00:15) (135/63 - 163/60)  BP(mean): 75 (08 Oct 2022 00:15) (67 - 85)  RR: 13 (08 Oct 2022 00:30) (11 - 25)  SpO2: 100% (08 Oct 2022 00:30) (92% - 100%)    Parameters below as of 07 Oct 2022 23:00  Patient On (Oxygen Delivery Method): room air      I&O's Detail    07 Oct 2022 07:01  -  08 Oct 2022 01:01  --------------------------------------------------------  IN:    Insulin: 19 mL    Insulin: 13 mL    Insulin: 14.5 mL    IV PiggyBack: 50 mL  Total IN: 96.5 mL    OUT:  Total OUT: 0 mL    Total NET: 96.5 mL        piperacillin/tazobactam IVPB.. 3.375  heparin   Injectable 5000  piperacillin/tazobactam IVPB.. 3.375  tamsulosin 0.4    PAST MEDICAL & SURGICAL HISTORY:  Diabetes  Type II      BPH (benign prostatic hypertrophy)      HTN (hypertension)      Venous insufficiency of both lower extremities  R &gt; L      Status post incision and drainage  Rt groin abscess      S/P laparoscopic cholecystectomy      History of cholecystectomy      S/P BKA (below knee amputation), right              LABS:                        10.5   14.55 )-----------( 344      ( 07 Oct 2022 13:31 )             31.5     10-07    134<L>  |  101  |  22  ----------------------------<  337<H>  3.6   |  21<L>  |  3.38<H>    Ca    8.1<L>      07 Oct 2022 22:32  Phos  3.9     10-07  Mg     1.60     10-07        CAPILLARY BLOOD GLUCOSE      POCT Blood Glucose.: 256 mg/dL (08 Oct 2022 00:17)  POCT Blood Glucose.: 299 mg/dL (07 Oct 2022 23:09)  POCT Blood Glucose.: 302 mg/dL (07 Oct 2022 22:20)  POCT Blood Glucose.: 166 mg/dL (07 Oct 2022 22:17)  POCT Blood Glucose.: 353 mg/dL (07 Oct 2022 21:32)  POCT Blood Glucose.: 368 mg/dL (07 Oct 2022 19:10)  POCT Blood Glucose.: 314 mg/dL (07 Oct 2022 17:00)  POCT Blood Glucose.: 360 mg/dL (07 Oct 2022 13:28)      Radiology and Additional Studies:    Assessment:  The patient is a 62y Male who is now several hours post-op from a LLE BKA.     Plan:  - Zosyn  - Hep SubQ  - d/c dressing on POD#3  - OR planning for BKA formalization   - insulin drip for glucose  - f/u q1 glucose while on insulin drip   - f/u AM labs
Patient presented to [facility] for LLE angiogram with Dr. Osorio. When evaluating patient he was noted to have rigors and oral temp was 99.7 with rectal temp being 100.5. Patient was noted to be in Sinus tachycardia to 104, blood pressure of 190/88 with WBC count of 14.55. Patient also noted to have elevated serum creatinine to 2.94 from previous serum cr from 2019 of 1.88. Patient with open leg wound of the plantar LLE that is draining and foul smelling. Spoke with Dr. Osorio who will cancel case and call podiatry urgently to r/o osteo. Spoke with Vascular team and they will order the necessary antibiotics and blood and wound cultures. Patient to be admitted to Vascular surgery team.
Patient stable for surgical floor and no longer requires critical care per SICU team and SICU attending.     ICU Vital Signs Last 24 Hrs  T(C): 36.8 (11 Oct 2022 23:16), Max: 36.8 (11 Oct 2022 23:16)  T(F): 98.2 (11 Oct 2022 23:16), Max: 98.2 (11 Oct 2022 23:16)  HR: 79 (11 Oct 2022 23:16) (62 - 85)  BP: 152/79 (11 Oct 2022 23:16) (130/68 - 164/83)  BP(mean): 100 (11 Oct 2022 20:00) (86 - 113)  ABP: --  ABP(mean): --  RR: 18 (11 Oct 2022 23:16) (14 - 19)  SpO2: 100% (11 Oct 2022 23:16) (97% - 100%)    O2 Parameters below as of 11 Oct 2022 23:16  Patient On (Oxygen Delivery Method): room air    General: well developed, well nourished, NAD  HEENT: NCAT, trachea midline  Respiratory: respirations non labored  Gastrointestinal: soft, nontender, nondistended  Extremities: LLE in KI.    Neurological: non-focal    Vascular Surgery Team C  [id number]
Source: Patient [x]    Family [ ]     other [x ]Chart review    Current Diet : Diet, Consistent Carbohydrate w/Evening Snack:   1000mL Fluid Restriction ([id number]) (10-17-22 @ 09:37)    PO intake:  < 50% [x ]  Height (cm): 182.9 (10/7/2022)  Weight :   113.5kg/ 250.2lbs (10/11/2022, per flow sheet)  115.7kg/255lbs (10/7/2022, per flow sheet)  BMI: 38.5kg/m2 (adjusted BMI, bilateral BKA)  Weight change: weight loss of 4.8lbs/-1.8% BW noted x 4days, fluid shift might contribute to weight changes.   Skin: As per flow sheet, patient has surgical incision to left leg BKA, right leg BKA.  Edema: As per flow sheet, noted patient has 1+ generalized edema.     Nutrition Note:  62M with a PMHx of HTN, DM2, HLD, BPH, PAD and right BKA secondary to unhealed DM2 ulcer who presented to [facility] for left lower extremity peripheral angiogram. Patient is now S/P left lower extremity BKA on 10/7/22. s/p LLE BKA Formalization 10/15/22, per chart.  Patient reports appetite decreased due to pain in leg, reports <50% of meal consumption. Food preferences obtained. Adequate po intake and fluids encouraged. No reports of difficulty chewing and swallowing at this time. Patient reports no bowel movement for a week, experienced nausea on 10/19/2022. Patient is on polyethylene glycol, recommend to increase bowel regimen, and consider zofran, per MD discretion. RD encouraged patient to avoid foods or beverages high in concentrated sweets, increase vegetables/fiber consumption. As per reported po intake, patient is not meeting his estimated nutrient needs, patient is amendable to Glucerna supplement.     __________________ Pertinent Medications__________________   MEDICATIONS  (STANDING):  amLODIPine   Tablet 10 milliGRAM(s) Oral daily  aspirin  chewable 81 milliGRAM(s) Oral daily  atorvastatin 40 milliGRAM(s) Oral at bedtime  dextrose 50% Injectable 25 Gram(s) IV Push once  dextrose 50% Injectable 12.5 Gram(s) IV Push once  dextrose 50% Injectable 25 Gram(s) IV Push once  dextrose Oral Gel 15 Gram(s) Oral once  gabapentin 300 milliGRAM(s) Oral daily  glucagon  Injectable 1 milliGRAM(s) IntraMuscular once  heparin   Injectable 5000 Unit(s) SubCutaneous every 8 hours  hydrALAZINE 75 milliGRAM(s) Oral three times a day  influenza   Vaccine 0.5 milliLiter(s) IntraMuscular once  insulin lispro (ADMELOG) corrective regimen sliding scale   SubCutaneous three times a day before meals  insulin lispro (ADMELOG) corrective regimen sliding scale   SubCutaneous at bedtime  insulin lispro Injectable (ADMELOG) 8 Unit(s) SubCutaneous three times a day before meals  melatonin 3 milliGRAM(s) Oral at bedtime  polyethylene glycol 3350 17 Gram(s) Oral daily  sodium chloride 0.9% lock flush 3 milliLiter(s) IV Push every 8 hours  tamsulosin 0.4 milliGRAM(s) Oral at bedtime    MEDICATIONS  (PRN):  acetaminophen   IVPB .. 1000 milliGRAM(s) IV Intermittent every 6 hours PRN Moderate Pain (4 - 6)  aluminum hydroxide/magnesium hydroxide/simethicone Suspension 30 milliLiter(s) Oral once PRN Dyspepsia  oxyCODONE    IR 5 milliGRAM(s) Oral every 4 hours PRN Moderate Pain (4 - 6)  oxyCODONE    IR 10 milliGRAM(s) Oral every 4 hours PRN Severe Pain (7 - 10)      __________________ Pertinent Labs__________________   10-20 Na136 mmol/L Glu 101 mg/dL<H> K+ 4.4 mmol/L Cr  3.21 mg/dL<H> BUN 34 mg/dL<H> 10-20 Phos 4.0 mg/dL 10-13 Chol 141 mg/dL LDL --    HDL 28 mg/dL<L> Trig 131 mg/dL      Estimated Needs:   [x ] no change since previous assessment    Previous Nutrition Diagnosis:     [ ] Altered nutrition labs    Nutrition Diagnosis is [x ] ongoing    New Nutrition Diagnosis: [x ] not applicable    Interventions:     Recommend    [x ] Continue with current diet. Fluid needs per MD discretion.  [x ] Recommend to add Glucerna shake 8oz po 2x/day for nutrient support (440kcal, 20gm protein).  [x ] Recommend to increase bowel regimen, and consider zofran, per MD discretion.     Monitoring and Evaluation:     [x ] PO intake [x ] Tolerance to diet prescription [x ] weights [ x] follow up per protocol  [x ] other: Bowel movement, labs.
C Team Surgery Post Op Note     Patient seen and evaluated once back to surgical floor. Patient having significant Post-operative pain at this time. Patient denies headache, fever, chills, chest pain, SOB, nausea and vomiting.       Vital Signs Last 24 Hrs  T(C): 36.3 (14 Oct 2022 16:30), Max: 37.1 (14 Oct 2022 14:30)  T(F): 97.3 (14 Oct 2022 16:30), Max: 98.8 (14 Oct 2022 14:30)  HR: 96 (14 Oct 2022 16:30) (65 - 96)  BP: 153/77 (14 Oct 2022 16:30) (132/65 - 191/90)  BP(mean): 86 (14 Oct 2022 14:30) (82 - 115)  RR: 18 (14 Oct 2022 16:30) (12 - 20)  SpO2: 99% (14 Oct 2022 16:30) (93% - 99%)    Parameters below as of 14 Oct 2022 16:30  Patient On (Oxygen Delivery Method): room air        10-13-22 @ 07:01  -  10-14-22 @ 07:00  --------------------------------------------------------  IN: 1070 mL / OUT: 810 mL / NET: 260 mL    10-14-22 @ 07:01  -  10-14-22 @ 16:59  --------------------------------------------------------  IN: 250 mL / OUT: 260 mL / NET: -10 mL        PHYSICAL EXAM:  Constitutional: Well-developed, well-nourished, NAD.  Neuro: AOx3, no focal deficits  Respiratory:  Lungs CTA, B/L, no rales , no wheezing, no rhonchi.  Cardiovascular:  S1, S2, Reg rate  Gastrointestinal: Abdomen soft, non distended, + BS, non tender  Extremities: VaC in place LLE stump. knee immobilizer in place.         LABS:                        7.7    4.68  )-----------( 306      ( 14 Oct 2022 01:37 )             24.1     10-14    135  |  104  |  25<H>  ----------------------------<  178<H>  4.7   |  19<L>  |  2.90<H>    Ca    8.1<L>      14 Oct 2022 01:37  Phos  2.6     10-14  Mg     1.60     10-14      PT/INR - ( 14 Oct 2022 01:37 )   PT: 12.9 sec;   INR: 1.11 ratio    PTT - ( 14 Oct 2022 01:37 )  PTT:31.3 sec        A/P: s/p LLE BKA Formalization. HD stable post-op   - Pain control with IV dilaudid now and PO pain meds going forward  - continue Gabapentin  - continue BP meds.   - diet as tolerated.   - VAC to wall suction.   - keep knee in immobilizer  - DVT ppx.       C Team Surgery   [id number]
Chart reviewed, hyperglycemia noted  Lantus was NOT administered yesterday, despite dose being adjusted for NPO status (decreased from 30 to 24 units)  Dose increased by primary team today (34 units) and administered at 0800  Hyperglycemia this AM likely reflective of missed Lantus dose yesterday (10/14)  Would monitor glucose on insulin regimen as ordered for today before making additional adjustments:  insulin glargine Injectable (LANTUS) 34 Unit(s) SubCutaneous <User Schedule>  insulin lispro (ADMELOG) corrective regimen sliding scale   SubCutaneous three times a day before meals  insulin lispro (ADMELOG) corrective regimen sliding scale   SubCutaneous at bedtime  insulin lispro Injectable (ADMELOG) 10 Unit(s) SubCutaneous three times a day before meals  Endocrine      CAPILLARY BLOOD GLUCOSE      POCT Blood Glucose.: 353 mg/dL (15 Oct 2022 09:29)  POCT Blood Glucose.: 400 mg/dL (15 Oct 2022 09:27)  POCT Blood Glucose.: 364 mg/dL (15 Oct 2022 08:57)  POCT Blood Glucose.: 349 mg/dL (15 Oct 2022 07:29)  POCT Blood Glucose.: 356 mg/dL (14 Oct 2022 22:45)  POCT Blood Glucose.: 315 mg/dL (14 Oct 2022 17:09)  POCT Blood Glucose.: 308 mg/dL (14 Oct 2022 15:27)  POCT Blood Glucose.: 248 mg/dL (14 Oct 2022 11:49)      MEDICATIONS  (STANDING):  amLODIPine   Tablet 10 milliGRAM(s) Oral daily  atorvastatin 40 milliGRAM(s) Oral at bedtime  gabapentin 300 milliGRAM(s) Oral daily  heparin   Injectable 5000 Unit(s) SubCutaneous every 8 hours  hydrALAZINE 50 milliGRAM(s) Oral two times a day  influenza   Vaccine 0.5 milliLiter(s) IntraMuscular once  insulin glargine Injectable (LANTUS) 34 Unit(s) SubCutaneous <User Schedule>  insulin lispro (ADMELOG) corrective regimen sliding scale   SubCutaneous three times a day before meals  insulin lispro (ADMELOG) corrective regimen sliding scale   SubCutaneous at bedtime  insulin lispro Injectable (ADMELOG) 10 Unit(s) SubCutaneous three times a day before meals  lactated ringers. 1000 milliLiter(s) (100 mL/Hr) IV Continuous <Continuous>  piperacillin/tazobactam IVPB.. 3.375 Gram(s) IV Intermittent every 8 hours  polyethylene glycol 3350 17 Gram(s) Oral daily  sodium chloride 0.9% lock flush 3 milliLiter(s) IV Push every 8 hours  tamsulosin 0.4 milliGRAM(s) Oral at bedtime
PRE-OPERATIVE PROCEDURE NOTE      Patient Age: 62y    Patient Gender: male    Procedure: LLE completion GLORY    Attending Surgeon: Adele    Diagnosis/Indication: POD6 JOSIE HOLDER    Pertinent labs:                      9.1    4.89  )-----------( 339      ( 13 Oct 2022 05:20 )             28.6       10-13    138  |  105  |  27<H>  ----------------------------<  223<H>  4.5   |  22  |  3.36<H>    Ca    8.8      13 Oct 2022 05:20  Phos  3.1     10-13  Mg     1.80     10-13            Patient and Family Aware ? Yes    Plan:  - Consent obtained, in chart  - Preop labs ordered for 1AM tomorrow (CBC, BMP, Mg, Phos, Coags, Type and Screen)  - NPO at midnight, will start IVF @ 100cc/hr   - [place] document cardiac clearance  - 2 units pRBC blood on hold  - COVID ordered STAT 10/13      Vascular Surgery, C Team Surgery   P# [id number]
Patient stable for surgical floor and no longer requires critical care per SICU team and SICU attending. Patient going to 3N 324A. Patient signed out to C team resident.

## 2022-10-21 LAB
ANION GAP SERPL CALC-SCNC: 9 MMOL/L — SIGNIFICANT CHANGE UP (ref 7–14)
BUN SERPL-MCNC: 30 MG/DL — HIGH (ref 7–23)
CALCIUM SERPL-MCNC: 8.7 MG/DL — SIGNIFICANT CHANGE UP (ref 8.4–10.5)
CHLORIDE SERPL-SCNC: 104 MMOL/L — SIGNIFICANT CHANGE UP (ref 98–107)
CO2 SERPL-SCNC: 22 MMOL/L — SIGNIFICANT CHANGE UP (ref 22–31)
CREAT SERPL-MCNC: 3.16 MG/DL — HIGH (ref 0.5–1.3)
EGFR: 21 ML/MIN/1.73M2 — LOW
GLUCOSE SERPL-MCNC: 139 MG/DL — HIGH (ref 70–99)
HCT VFR BLD CALC: 23.8 % — LOW (ref 39–50)
HGB BLD-MCNC: 7.5 G/DL — LOW (ref 13–17)
MAGNESIUM SERPL-MCNC: 2 MG/DL — SIGNIFICANT CHANGE UP (ref 1.6–2.6)
MCHC RBC-ENTMCNC: 29.9 PG — SIGNIFICANT CHANGE UP (ref 27–34)
MCHC RBC-ENTMCNC: 31.5 GM/DL — LOW (ref 32–36)
MCV RBC AUTO: 94.8 FL — SIGNIFICANT CHANGE UP (ref 80–100)
NRBC # BLD: 0 /100 WBCS — SIGNIFICANT CHANGE UP (ref 0–0)
NRBC # FLD: 0.02 K/UL — HIGH (ref 0–0)
PHOSPHATE SERPL-MCNC: 3.4 MG/DL — SIGNIFICANT CHANGE UP (ref 2.5–4.5)
PLATELET # BLD AUTO: 342 K/UL — SIGNIFICANT CHANGE UP (ref 150–400)
POTASSIUM SERPL-MCNC: 4.6 MMOL/L — SIGNIFICANT CHANGE UP (ref 3.5–5.3)
POTASSIUM SERPL-SCNC: 4.6 MMOL/L — SIGNIFICANT CHANGE UP (ref 3.5–5.3)
RBC # BLD: 2.51 M/UL — LOW (ref 4.2–5.8)
RBC # FLD: 13.4 % — SIGNIFICANT CHANGE UP (ref 10.3–14.5)
SODIUM SERPL-SCNC: 135 MMOL/L — SIGNIFICANT CHANGE UP (ref 135–145)
WBC # BLD: 5.91 K/UL — SIGNIFICANT CHANGE UP (ref 3.8–10.5)
WBC # FLD AUTO: 5.91 K/UL — SIGNIFICANT CHANGE UP (ref 3.8–10.5)

## 2022-10-21 PROCEDURE — 99232 SBSQ HOSP IP/OBS MODERATE 35: CPT

## 2022-10-21 RX ADMIN — AMLODIPINE BESYLATE 10 MILLIGRAM(S): 2.5 TABLET ORAL at 07:00

## 2022-10-21 RX ADMIN — Medication 81 MILLIGRAM(S): at 13:44

## 2022-10-21 RX ADMIN — Medication 75 MILLIGRAM(S): at 07:00

## 2022-10-21 RX ADMIN — SODIUM CHLORIDE 3 MILLILITER(S): 9 INJECTION INTRAMUSCULAR; INTRAVENOUS; SUBCUTANEOUS at 06:56

## 2022-10-21 RX ADMIN — Medication 1: at 17:29

## 2022-10-21 RX ADMIN — Medication 75 MILLIGRAM(S): at 13:45

## 2022-10-21 RX ADMIN — POLYETHYLENE GLYCOL 3350 17 GRAM(S): 17 POWDER, FOR SOLUTION ORAL at 13:44

## 2022-10-21 RX ADMIN — INSULIN GLARGINE 28 UNIT(S): 100 INJECTION, SOLUTION SUBCUTANEOUS at 08:25

## 2022-10-21 RX ADMIN — HEPARIN SODIUM 5000 UNIT(S): 5000 INJECTION INTRAVENOUS; SUBCUTANEOUS at 13:43

## 2022-10-21 RX ADMIN — HEPARIN SODIUM 5000 UNIT(S): 5000 INJECTION INTRAVENOUS; SUBCUTANEOUS at 06:59

## 2022-10-21 RX ADMIN — Medication 75 MILLIGRAM(S): at 23:43

## 2022-10-21 RX ADMIN — Medication 8 UNIT(S): at 12:25

## 2022-10-21 RX ADMIN — TAMSULOSIN HYDROCHLORIDE 0.4 MILLIGRAM(S): 0.4 CAPSULE ORAL at 23:45

## 2022-10-21 RX ADMIN — HEPARIN SODIUM 5000 UNIT(S): 5000 INJECTION INTRAVENOUS; SUBCUTANEOUS at 23:42

## 2022-10-21 RX ADMIN — SODIUM CHLORIDE 3 MILLILITER(S): 9 INJECTION INTRAMUSCULAR; INTRAVENOUS; SUBCUTANEOUS at 13:52

## 2022-10-21 RX ADMIN — ATORVASTATIN CALCIUM 40 MILLIGRAM(S): 80 TABLET, FILM COATED ORAL at 23:43

## 2022-10-21 RX ADMIN — Medication 1: at 08:25

## 2022-10-21 RX ADMIN — Medication 3 MILLIGRAM(S): at 23:45

## 2022-10-21 RX ADMIN — Medication 8 UNIT(S): at 17:30

## 2022-10-21 RX ADMIN — GABAPENTIN 300 MILLIGRAM(S): 400 CAPSULE ORAL at 13:44

## 2022-10-21 RX ADMIN — Medication 2: at 12:25

## 2022-10-21 RX ADMIN — SODIUM CHLORIDE 3 MILLILITER(S): 9 INJECTION INTRAMUSCULAR; INTRAVENOUS; SUBCUTANEOUS at 22:00

## 2022-10-21 RX ADMIN — SENNA PLUS 2 TABLET(S): 8.6 TABLET ORAL at 23:44

## 2022-10-21 NOTE — OCCUPATIONAL THERAPY INITIAL EVALUATION ADULT - DIAGNOSIS, OT EVAL
s/p Left BKA for necrotic left foot ulcer; Hx of Right BKA; Decreased functional mobility; Decreased ADL management

## 2022-10-21 NOTE — OCCUPATIONAL THERAPY INITIAL EVALUATION ADULT - GENERAL OBSERVATIONS, REHAB EVAL
Pt. received semisupine in bed. No acute distress. Patient agreed to evaluation from Occupational Therapist. +Heplock, +Left LE Knee Immobilizer.

## 2022-10-21 NOTE — PROGRESS NOTE ADULT - SUBJECTIVE AND OBJECTIVE BOX
Chief Complaint: DM 2    History: Patient seen at bedside. Reports he ate breakfast today, although pre-meal Admelog was not marked as done, he has had low appetite recently. Is not sure if he will eat lunch. Denies n/v, denies s/s of hypoglycemia. Pending discharge to rehab    MEDICATIONS  (STANDING):  amLODIPine   Tablet 10 milliGRAM(s) Oral daily  aspirin  chewable 81 milliGRAM(s) Oral daily  atorvastatin 40 milliGRAM(s) Oral at bedtime  dextrose 50% Injectable 25 Gram(s) IV Push once  dextrose 50% Injectable 12.5 Gram(s) IV Push once  dextrose 50% Injectable 25 Gram(s) IV Push once  dextrose Oral Gel 15 Gram(s) Oral once  gabapentin 300 milliGRAM(s) Oral daily  glucagon  Injectable 1 milliGRAM(s) IntraMuscular once  heparin   Injectable 5000 Unit(s) SubCutaneous every 8 hours  hydrALAZINE 75 milliGRAM(s) Oral three times a day  influenza   Vaccine 0.5 milliLiter(s) IntraMuscular once  insulin glargine Injectable (LANTUS) 28 Unit(s) SubCutaneous <User Schedule>  insulin lispro (ADMELOG) corrective regimen sliding scale   SubCutaneous at bedtime  insulin lispro (ADMELOG) corrective regimen sliding scale   SubCutaneous three times a day before meals  insulin lispro Injectable (ADMELOG) 8 Unit(s) SubCutaneous three times a day before meals  melatonin 3 milliGRAM(s) Oral at bedtime  polyethylene glycol 3350 17 Gram(s) Oral daily  senna 2 Tablet(s) Oral at bedtime  sodium chloride 0.9% lock flush 3 milliLiter(s) IV Push every 8 hours  tamsulosin 0.4 milliGRAM(s) Oral at bedtime    MEDICATIONS  (PRN):  acetaminophen   IVPB .. 1000 milliGRAM(s) IV Intermittent every 6 hours PRN Moderate Pain (4 - 6)  aluminum hydroxide/magnesium hydroxide/simethicone Suspension 30 milliLiter(s) Oral once PRN Dyspepsia  oxyCODONE    IR 5 milliGRAM(s) Oral every 4 hours PRN Moderate Pain (4 - 6)  oxyCODONE    IR 10 milliGRAM(s) Oral every 4 hours PRN Severe Pain (7 - 10)    No Known Allergies    Review of Systems:  HEENT: No pain  Cardiovascular: No chest pain  Respiratory: No SOB  GI: No nausea, vomiting    PHYSICAL EXAM:  VITALS: T(C): 37.1 (10-21-22 @ 10:38)  T(F): 98.8 (10-21-22 @ 10:38), Max: 98.8 (10-21-22 @ 10:38)  HR: 81 (10-21-22 @ 10:38) (68 - 88)  BP: 146/65 (10-21-22 @ 10:38) (143/75 - 157/64)  RR:  (18 - 18)  SpO2:  (94% - 100%)  Wt(kg): --  GENERAL: NAD  EYES: No proptosis, no lid lag, anicteric  HEENT:  Atraumatic, Normocephalic, moist mucous membranes  RESPIRATORY: unlabored respirations     CAPILLARY BLOOD GLUCOSE    POCT Blood Glucose.: 203 mg/dL (21 Oct 2022 12:00)  POCT Blood Glucose.: 156 mg/dL (21 Oct 2022 07:39)  POCT Blood Glucose.: 120 mg/dL (20 Oct 2022 21:43)  POCT Blood Glucose.: 97 mg/dL (20 Oct 2022 16:41)      10-21    135  |  104  |  30<H>  ----------------------------<  139<H>  4.6   |  22  |  3.16<H>    eGFR: 21<L>    Ca    8.7      10-21  Mg     2.00     10-21  Phos  3.4     10-21      A1C with Estimated Average Glucose Result: 8.5 % (10-08-22 @ 06:33)    Diet, Consistent Carbohydrate w/Evening Snack:   1000mL Fluid Restriction ([id number])  Supplement Feeding Modality:  Oral  Glucerna Shake Cans or Servings Per Day:  2       Frequency:  Two Times a day (10-20-22 @ 16:38) [Active]

## 2022-10-21 NOTE — PROGRESS NOTE ADULT - ASSESSMENT
61 y/o M with PMH of HTN, DM type II, HLD, BPH and PAD, Right BKA 2/2 to unhealed DM ulcer presented to [facility] for LLE peripheral angiogram, now s/p amputation    1. Uncontrolled T2DM c/b foot wounds requiring amputation, retinopathy  A1c 8.5% Goal < 7%  Home Regimen: Novolog 20-35 units qAC (typically eats 2 meals per day, takes Novolog twice). Does not take basal insulin     While inpatient:   BG target 100-180 mg/dl   this afternoon but unclear if this was in setting of missed pre-meal: continue current management for now  Continue Lantus 28 units SQ qAM (0800 daily)   Continue Admelog 8 units SQ TID before meals (Hold if NPO/skips meal)   Continue Admelog low dose correctional scale before meals, low dose at bedtime  Consistent carb diet  BG before meals and bedtime  Hypoglycemia protocol - re-ordered 10/16    Discharge Plan:  Basal/bolus, final regimen TBD - current dosing to rehab if discharged today  Can resume Novolog (dose TBD), will need new prescription for basal insulin PEN (Lantus, Basaglar, Tresiba, Semglee)  Did not tolerate Metformin  mg as an outpatient. Patient states possibly hx of pancreatitis, also with retinopathy- unclear if active as patient has not seen ophtho in a while. Will defer GLP1 agonist. Hold off on SGLT2 inhibitors given recent amputation  Followup with Endocrine Practice at [address], [address], [place] [address]; Ph # [phone]  OR if prefers a [place] location - Dr. Jo: [address], [address], [facility] [facility] [address], [phone]    2. Discharge Planning Issues  Patient expressing difficulty caring for himself, especially now s/p amputation   At home, he lives with elderly wife, reports difficulty with food prep, specifically healthy food, typically orders out  Limited resources for support  Recommend CM and SW consult   Discussed with team    3. HTN  BP Goal < 130/80  On Amlodipine and Hydralazine  Titration per primary team    4. HLD  LDL goal < 70  LDL 87 (10/2022)  Statin if no contraindications  Now started on Lipitor     Arlet Owens  Nurse Practitioner  Division of Endocrinology & Diabetes  In house pager #[phone]/long range pager #[phone]    If before 9AM or after 6PM, or on weekends/holidays, please call endocrine answering service for assistance ([phone]).  For nonurgent matters email [email or web address] for [email or web address].

## 2022-10-21 NOTE — PROGRESS NOTE ADULT - SUBJECTIVE AND OBJECTIVE BOX
Subjective: Patient seen and examined. No new events except as noted.     REVIEW OF SYSTEMS:    CONSTITUTIONAL: + weakness, fevers or chills  EYES/ENT: No visual changes;  No vertigo or throat pain   NECK: No pain or stiffness  RESPIRATORY: No cough, wheezing, hemoptysis; No shortness of breath  CARDIOVASCULAR: No chest pain or palpitations  GASTROINTESTINAL: No abdominal or epigastric pain. No nausea, vomiting, or hematemesis; No diarrhea or constipation. No melena or hematochezia.  GENITOURINARY: No dysuria, frequency or hematuria  NEUROLOGICAL: No numbness or weakness  SKIN: No itching, burning, rashes, or lesions   All other review of systems is negative unless indicated above.    MEDICATIONS:  MEDICATIONS  (STANDING):  amLODIPine   Tablet 10 milliGRAM(s) Oral daily  aspirin  chewable 81 milliGRAM(s) Oral daily  atorvastatin 40 milliGRAM(s) Oral at bedtime  dextrose 50% Injectable 25 Gram(s) IV Push once  dextrose 50% Injectable 12.5 Gram(s) IV Push once  dextrose 50% Injectable 25 Gram(s) IV Push once  dextrose Oral Gel 15 Gram(s) Oral once  gabapentin 300 milliGRAM(s) Oral daily  glucagon  Injectable 1 milliGRAM(s) IntraMuscular once  heparin   Injectable 5000 Unit(s) SubCutaneous every 8 hours  hydrALAZINE 75 milliGRAM(s) Oral three times a day  influenza   Vaccine 0.5 milliLiter(s) IntraMuscular once  insulin glargine Injectable (LANTUS) 28 Unit(s) SubCutaneous <User Schedule>  insulin lispro (ADMELOG) corrective regimen sliding scale   SubCutaneous three times a day before meals  insulin lispro (ADMELOG) corrective regimen sliding scale   SubCutaneous at bedtime  insulin lispro Injectable (ADMELOG) 8 Unit(s) SubCutaneous three times a day before meals  melatonin 3 milliGRAM(s) Oral at bedtime  polyethylene glycol 3350 17 Gram(s) Oral daily  senna 2 Tablet(s) Oral at bedtime  sodium chloride 0.9% lock flush 3 milliLiter(s) IV Push every 8 hours  tamsulosin 0.4 milliGRAM(s) Oral at bedtime      PHYSICAL EXAM:  T(C): 37.1 (10-21-22 @ 10:38), Max: 37.1 (10-20-22 @ 22:20)  HR: 81 (10-21-22 @ 10:38) (68 - 88)  BP: 146/65 (10-21-22 @ 10:38) (143/75 - 157/64)  RR: 18 (10-21-22 @ 10:38) (18 - 18)  SpO2: 96% (10-21-22 @ 10:38) (94% - 100%)  Wt(kg): --  I&O's Summary    20 Oct 2022 07:01  -  21 Oct 2022 07:00  --------------------------------------------------------  IN: 720 mL / OUT: 1050 mL / NET: -330 mL          Appearance: NAD	  HEENT: dry oral mucosa, PERRL, EOMI	  Lymphatic: No lymphadenopathy  Cardiovascular: Normal S1 S2, No JVD, No murmurs, No edema  Respiratory: Lungs clear to auscultation	  Psychiatry: A & O x 3, Mood & affect appropriate  Gastrointestinal: Soft, Non-tender, + BS	  Skin: No rashes, No ecchymoses, No cyanosis	  Neurologic: Non-focal  Extremities: bilateral lower extremity amputations - L BKA dressing c/d/i  Vascular: Peripheral pulses palpable 2+ bilaterally        LABS:    CARDIAC MARKERS:                                7.5    5.91  )-----------( 342      ( 21 Oct 2022 06:50 )             23.8     10-21    135  |  104  |  30<H>  ----------------------------<  139<H>  4.6   |  22  |  3.16<H>    Ca    8.7      21 Oct 2022 06:50  Phos  3.4     10-21  Mg     2.00     10-21          TELEMETRY: 	    ECG:  	  RADIOLOGY:   DIAGNOSTIC TESTING:  [ ] Echocardiogram:  [ ]  Catheterization:  [ ] Stress Test:    OTHER:

## 2022-10-21 NOTE — PROGRESS NOTE ADULT - ASSESSMENT
62M with a PMHx of HTN, DM2, HLD, BPH, PAD and right BKA secondary to unhealed DM2 ulcer who presented to [facility] for left lower extremity peripheral angiogram.  Patient is now S/P left lower extremity BKA on 10/7/22. s/p LLE BKA Formalization 10/15/22.      Plan:   - Continue aspirin   - Continue knee immobilizer   - Pain control  - Regular diet   - DVT ppx: SQ  - Dispo: acute rehab planning      # [id number]  Vascular Surgery

## 2022-10-21 NOTE — PROGRESS NOTE ADULT - ASSESSMENT
IMPRESSION: 62M w/ HTN, DM2, CKD, and PAD-R BKA, 10/7/22 p/w necrotic L foot ulcer; s/p L-BKA 10/7/22; now being planned for BKA formalization  (1)Renal - CKD4 - GFR ~20-25ml/min - ~2gm proteinuria. Likely at least in part from diabetic nephropathy. Azotemia improving, stable  (2)Hyponatremia - due to intravascular depletion - improving, stable   (3)CV - BP/volume grossly acceptable  (4)Anemia - multifactorial including from CKD, s/p Retacrit 10k sq x 1 (10/18)   (5)ID - necrotic L foot ulcer; s/p BKA; s/p IV Zosyn  (6)Vasc - s/p BKA formalization 10/14    RECOMMENDATIONS:  (1)Favor adding standing FeSO4 - 325mg po qd for now  (2)Continue free water restriction as ordered  (3)Amlodipine as ordered  (4)If for discharge, he can f/u in office with Dr. Chavez in 1-4 weeks       IMPRESSION: 62M w/ HTN, DM2, CKD, and PAD-R BKA, 10/7/22 p/w necrotic L foot ulcer; s/p L-BKA 10/7/22; now being planned for BKA formalization  (1)Renal - CKD4 - GFR ~20-25ml/min - ~2gm proteinuria. Likely at least in part from diabetic nephropathy. Azotemia improving, stable  (2)Hyponatremia - due to intravascular depletion - improving, stable   (3)CV - BP/volume grossly acceptable  (4)Anemia - multifactorial including from CKD, s/p Retacrit 10k sq x 1 (10/18)   (5)ID - necrotic L foot ulcer; s/p BKA; s/p IV Zosyn  (6)Vasc - s/p BKA formalization 10/14    RECOMMENDATIONS:  (1)Favor adding standing FeSO4 - 325mg po qd for now  (2)Continue free water restriction as ordered  (3)Amlodipine as ordered  (4)If for discharge, he can f/u in office with Dr. Chavez in 1-4 weeks    Ashly Jones NP      RENAL ATTENDING NOTE  Patient seen and examined with NP. Agree with assessment and plan as above.    Olegario Chavez MD  [facility]  [phone]

## 2022-10-21 NOTE — PROGRESS NOTE ADULT - SUBJECTIVE AND OBJECTIVE BOX
VASCULAR SURGERY PROGRESS NOTE    CC:   Hospital Day #  Post-Op Day #    Procedure:    Events of past 24 hours:    ROS otherwise negative except per subjective and HPI      Vital Signs Last 24 Hrs  T(C): 37.1 (20 Oct 2022 22:20), Max: 37.1 (20 Oct 2022 22:20)  T(F): 98.7 (20 Oct 2022 22:20), Max: 98.7 (20 Oct 2022 22:20)  HR: 88 (20 Oct 2022 22:20) (59 - 88)  BP: 157/64 (20 Oct 2022 22:20) (137/64 - 157/64)  BP(mean): --  RR: 18 (20 Oct 2022 22:20) (18 - 18)  SpO2: 100% (20 Oct 2022 22:20) (95% - 100%)    Parameters below as of 20 Oct 2022 22:20  Patient On (Oxygen Delivery Method): room air        Pain (0-10):            Pain Control Adequate: [] YES [] N    I&O's Detail    19 Oct 2022 07:01  -  20 Oct 2022 07:00  --------------------------------------------------------  IN:    Oral Fluid: 560 mL  Total IN: 560 mL    OUT:    Voided (mL): 570 mL  Total OUT: 570 mL    Total NET: -10 mL      20 Oct 2022 07:01  -  21 Oct 2022 00:39  --------------------------------------------------------  IN:    Oral Fluid: 720 mL  Total IN: 720 mL    OUT:    IV PiggyBack: 0 mL    Voided (mL): 1050 mL  Total OUT: 1050 mL    Total NET: -330 mL          PHYSICAL EXAM    Appearance: Normal	  HEENT:   Normal oral mucosa, PERRL, EOMI	  Neck: Supple, - JVD; Carotid Bruit   Cardiovascular: Normal S1 S2, No JVD, No murmurs,   Respiratory: Lungs clear to auscultation/Decreased Breath Sounds/No Rales, Rhonchi, Wheezing	  Gastrointestinal:  Soft, Non-tender, + BS	  Skin: No rashes, No ecchymoses, No cyanosis  Extremities: Normal range of motion, No clubbing, cyanosis or edema  Neurologic: Non-focal  Psychiatry: A & O x 3, Mood & affect appropriate    PULSES:  Femoral:  Popliteal:  Dorsal Pedal:  Posterior Tibial:  Capillary:      MEDICATIONS:   MEDICATIONS  (STANDING):  amLODIPine   Tablet 10 milliGRAM(s) Oral daily  aspirin  chewable 81 milliGRAM(s) Oral daily  atorvastatin 40 milliGRAM(s) Oral at bedtime  dextrose 50% Injectable 25 Gram(s) IV Push once  dextrose 50% Injectable 12.5 Gram(s) IV Push once  dextrose 50% Injectable 25 Gram(s) IV Push once  dextrose Oral Gel 15 Gram(s) Oral once  gabapentin 300 milliGRAM(s) Oral daily  glucagon  Injectable 1 milliGRAM(s) IntraMuscular once  heparin   Injectable 5000 Unit(s) SubCutaneous every 8 hours  hydrALAZINE 75 milliGRAM(s) Oral three times a day  influenza   Vaccine 0.5 milliLiter(s) IntraMuscular once  insulin glargine Injectable (LANTUS) 28 Unit(s) SubCutaneous <User Schedule>  insulin lispro (ADMELOG) corrective regimen sliding scale   SubCutaneous three times a day before meals  insulin lispro (ADMELOG) corrective regimen sliding scale   SubCutaneous at bedtime  insulin lispro Injectable (ADMELOG) 8 Unit(s) SubCutaneous three times a day before meals  melatonin 3 milliGRAM(s) Oral at bedtime  polyethylene glycol 3350 17 Gram(s) Oral daily  senna 2 Tablet(s) Oral at bedtime  sodium chloride 0.9% lock flush 3 milliLiter(s) IV Push every 8 hours  tamsulosin 0.4 milliGRAM(s) Oral at bedtime    MEDICATIONS  (PRN):  acetaminophen   IVPB .. 1000 milliGRAM(s) IV Intermittent every 6 hours PRN Moderate Pain (4 - 6)  aluminum hydroxide/magnesium hydroxide/simethicone Suspension 30 milliLiter(s) Oral once PRN Dyspepsia  oxyCODONE    IR 5 milliGRAM(s) Oral every 4 hours PRN Moderate Pain (4 - 6)  oxyCODONE    IR 10 milliGRAM(s) Oral every 4 hours PRN Severe Pain (7 - 10)      LAB/STUDIES:                        7.8    6.48  )-----------( 359      ( 20 Oct 2022 07:25 )             25.1     10-20    136  |  103  |  34<H>  ----------------------------<  101<H>  4.4   |  21<L>  |  3.21<H>    Ca    8.8      20 Oct 2022 07:25  Phos  4.0     10-20  Mg     2.10     10-20                            IMAGING:       VASCULAR SURGERY PROGRESS NOTE    Subjective:   Events of past 24 hours: No acute events overnight    Patient seen and evaluated this AM. No complaints.    ROS otherwise negative except per subjective and HPI      Vital Signs Last 24 Hrs  T(C): 36.1 (19 Oct 2022 20:32), Max: 37.1 (19 Oct 2022 18:00)  T(F): 97 (19 Oct 2022 20:32), Max: 98.7 (19 Oct 2022 18:00)  HR: 74 (19 Oct 2022 23:40) (68 - 90)  BP: 144/70 (19 Oct 2022 20:32) (134/61 - 150/64)  BP(mean): --  RR: 18 (19 Oct 2022 20:32) (16 - 18)  SpO2: 94% (19 Oct 2022 23:40) (92% - 100%)    Parameters below as of 19 Oct 2022 20:32  Patient On (Oxygen Delivery Method): room air        Pain (0-10):            Pain Control Adequate: [] YES [] N    I&O's Detail    18 Oct 2022 07:01  -  19 Oct 2022 07:00  --------------------------------------------------------  IN:    Oral Fluid: 720 mL  Total IN: 720 mL    OUT:    Voided (mL): 1650 mL  Total OUT: 1650 mL    Total NET: -930 mL      19 Oct 2022 07:01  -  20 Oct 2022 00:58  --------------------------------------------------------  IN:    Oral Fluid: 560 mL  Total IN: 560 mL    OUT:    Voided (mL): 570 mL  Total OUT: 570 mL    Total NET: -10 mL          PHYSICAL EXAM  Appearance: Normal	  HEENT:   Normal oral mucosa, PERRL, EOMI	  Extremities: Normal range of motion, RLE BKA well healed (old), LLE BKA staples c/d/i no edema, no erythema, soft.  Neurologic: Non-focal  Psychiatry: A & O x 3, Mood & affect appropriate        MEDICATIONS:   MEDICATIONS  (STANDING):  amLODIPine   Tablet 10 milliGRAM(s) Oral daily  atorvastatin 40 milliGRAM(s) Oral at bedtime  dextrose 50% Injectable 25 Gram(s) IV Push once  dextrose 50% Injectable 25 Gram(s) IV Push once  dextrose 50% Injectable 12.5 Gram(s) IV Push once  dextrose Oral Gel 15 Gram(s) Oral once  gabapentin 300 milliGRAM(s) Oral daily  glucagon  Injectable 1 milliGRAM(s) IntraMuscular once  heparin   Injectable 5000 Unit(s) SubCutaneous every 8 hours  hydrALAZINE 75 milliGRAM(s) Oral three times a day  influenza   Vaccine 0.5 milliLiter(s) IntraMuscular once  insulin glargine Injectable (LANTUS) 34 Unit(s) SubCutaneous <User Schedule>  insulin lispro (ADMELOG) corrective regimen sliding scale   SubCutaneous three times a day before meals  insulin lispro (ADMELOG) corrective regimen sliding scale   SubCutaneous at bedtime  insulin lispro Injectable (ADMELOG) 10 Unit(s) SubCutaneous three times a day before meals  melatonin 3 milliGRAM(s) Oral at bedtime  polyethylene glycol 3350 17 Gram(s) Oral daily  sodium chloride 0.9% lock flush 3 milliLiter(s) IV Push every 8 hours  tamsulosin 0.4 milliGRAM(s) Oral at bedtime    MEDICATIONS  (PRN):  acetaminophen   IVPB .. 1000 milliGRAM(s) IV Intermittent every 6 hours PRN Moderate Pain (4 - 6)  aluminum hydroxide/magnesium hydroxide/simethicone Suspension 30 milliLiter(s) Oral once PRN Dyspepsia  oxyCODONE    IR 5 milliGRAM(s) Oral every 4 hours PRN Moderate Pain (4 - 6)  oxyCODONE    IR 10 milliGRAM(s) Oral every 4 hours PRN Severe Pain (7 - 10)      LAB/STUDIES:                        7.6    6.03  )-----------( 326      ( 19 Oct 2022 07:20 )             24.3     10-19    136  |  104  |  35<H>  ----------------------------<  175<H>  4.6   |  22  |  3.26<H>    Ca    8.6      19 Oct 2022 07:20  Phos  4.0     10-19  Mg     2.00     10-19              CARDIAC MARKERS ( 18 Oct 2022 07:20 )  x     / x     / 60 U/L / x     / <1.0 ng/mL                IMAGING:       VASCULAR SURGERY PROGRESS NOTE    Subjective:   Events of past 24 hours: No acute events overnight    Patient seen and evaluated this AM. No complaints.    ROS otherwise negative except per subjective and HPI      Vital Signs Last 24 Hrs  T(C): 36.8 (21 Oct 2022 07:05), Max: 37.1 (20 Oct 2022 22:20)  T(F): 98.3 (21 Oct 2022 07:05), Max: 98.7 (20 Oct 2022 22:20)  HR: 69 (21 Oct 2022 07:36) (68 - 88)  BP: 143/75 (21 Oct 2022 07:05) (143/75 - 157/64)  BP(mean): --  RR: 18 (21 Oct 2022 07:05) (18 - 18)  SpO2: 96% (21 Oct 2022 07:36) (94% - 100%)    Parameters below as of 21 Oct 2022 07:05  Patient On (Oxygen Delivery Method): room air      I&O's Summary    20 Oct 2022 07:01  -  21 Oct 2022 07:00  --------------------------------------------------------  IN: 720 mL / OUT: 1050 mL / NET: -330 mL        PHYSICAL EXAM  Appearance: Normal	  HEENT:   Normal oral mucosa, PERRL, EOMI	  Extremities: Normal range of motion, RLE BKA well healed (old), LLE BKA staples c/d/i no edema, no erythema, soft.  Neurologic: Non-focal  Psychiatry: A & O x 3, Mood & affect appropriate      MEDICATIONS:   MEDICATIONS  (STANDING):  amLODIPine   Tablet 10 milliGRAM(s) Oral daily  atorvastatin 40 milliGRAM(s) Oral at bedtime  dextrose 50% Injectable 25 Gram(s) IV Push once  dextrose 50% Injectable 25 Gram(s) IV Push once  dextrose 50% Injectable 12.5 Gram(s) IV Push once  dextrose Oral Gel 15 Gram(s) Oral once  gabapentin 300 milliGRAM(s) Oral daily  glucagon  Injectable 1 milliGRAM(s) IntraMuscular once  heparin   Injectable 5000 Unit(s) SubCutaneous every 8 hours  hydrALAZINE 75 milliGRAM(s) Oral three times a day  influenza   Vaccine 0.5 milliLiter(s) IntraMuscular once  insulin glargine Injectable (LANTUS) 34 Unit(s) SubCutaneous <User Schedule>  insulin lispro (ADMELOG) corrective regimen sliding scale   SubCutaneous three times a day before meals  insulin lispro (ADMELOG) corrective regimen sliding scale   SubCutaneous at bedtime  insulin lispro Injectable (ADMELOG) 10 Unit(s) SubCutaneous three times a day before meals  melatonin 3 milliGRAM(s) Oral at bedtime  polyethylene glycol 3350 17 Gram(s) Oral daily  sodium chloride 0.9% lock flush 3 milliLiter(s) IV Push every 8 hours  tamsulosin 0.4 milliGRAM(s) Oral at bedtime    MEDICATIONS  (PRN):  acetaminophen   IVPB .. 1000 milliGRAM(s) IV Intermittent every 6 hours PRN Moderate Pain (4 - 6)  aluminum hydroxide/magnesium hydroxide/simethicone Suspension 30 milliLiter(s) Oral once PRN Dyspepsia  oxyCODONE    IR 5 milliGRAM(s) Oral every 4 hours PRN Moderate Pain (4 - 6)  oxyCODONE    IR 10 milliGRAM(s) Oral every 4 hours PRN Severe Pain (7 - 10)        LAB/STUDIES:                                   7.5    5.91  )-----------( 342      ( 21 Oct 2022 06:50 )             23.8     10-21    135  |  104  |  30<H>  ----------------------------<  139<H>  4.6   |  22  |  3.16<H>    Ca    8.7      21 Oct 2022 06:50  Phos  3.4     10-21  Mg     2.00     10-21        IMAGING:

## 2022-10-21 NOTE — OCCUPATIONAL THERAPY INITIAL EVALUATION ADULT - PERTINENT HX OF CURRENT PROBLEM, REHAB EVAL
Pt is a 62 year old male with hx of HTN, DM type II, HLD, BPH and PAD, Right BKA secondary to unhealed DM ulcer, who presented to [facility] on 10/7/22 for Left LE peripheral angiogram. Pt is now s/p Left BKA for necrotic left foot ulcer with concern for gas gangrene on 10/7/22.

## 2022-10-21 NOTE — OCCUPATIONAL THERAPY INITIAL EVALUATION ADULT - MD ORDER
Occupational Therapy (OT) to evaluate and treat. Per ROSI Ricks, pt is okay to participate in OT evaluation and perform activity as tolerated.

## 2022-10-21 NOTE — OCCUPATIONAL THERAPY INITIAL EVALUATION ADULT - LIVES WITH, PROFILE
Pt. reports he lives with his wife in an apartment building with one entrance 3 steps to enter and a second entrance with no steps to enter. Once inside, pt. reports he has an elevator available to reach apartment. Per pt., he has a bathtub in his bathroom with tub transfer bench available.

## 2022-10-21 NOTE — OCCUPATIONAL THERAPY INITIAL EVALUATION ADULT - LEVEL OF INDEPENDENCE: SUPINE/SIT, REHAB EVAL
Not assessed. Despite education and encouragement, pt deferred at this time secondary to fatigue. To be assessed at later date/time if and when safe and appropriate.

## 2022-10-21 NOTE — PROGRESS NOTE ADULT - SUBJECTIVE AND OBJECTIVE BOX
NEPHROLOGY     Patient seen and examined resting comfortably, reports feeling alittle better, had a small BM overnight, denies pain, no sob, in no acute distress.     MEDICATIONS  (STANDING):  amLODIPine   Tablet 10 milliGRAM(s) Oral daily  aspirin  chewable 81 milliGRAM(s) Oral daily  atorvastatin 40 milliGRAM(s) Oral at bedtime  dextrose 50% Injectable 25 Gram(s) IV Push once  dextrose 50% Injectable 12.5 Gram(s) IV Push once  dextrose 50% Injectable 25 Gram(s) IV Push once  dextrose Oral Gel 15 Gram(s) Oral once  gabapentin 300 milliGRAM(s) Oral daily  glucagon  Injectable 1 milliGRAM(s) IntraMuscular once  heparin   Injectable 5000 Unit(s) SubCutaneous every 8 hours  hydrALAZINE 75 milliGRAM(s) Oral three times a day  influenza   Vaccine 0.5 milliLiter(s) IntraMuscular once  insulin glargine Injectable (LANTUS) 28 Unit(s) SubCutaneous <User Schedule>  insulin lispro (ADMELOG) corrective regimen sliding scale   SubCutaneous three times a day before meals  insulin lispro (ADMELOG) corrective regimen sliding scale   SubCutaneous at bedtime  insulin lispro Injectable (ADMELOG) 8 Unit(s) SubCutaneous three times a day before meals  melatonin 3 milliGRAM(s) Oral at bedtime  polyethylene glycol 3350 17 Gram(s) Oral daily  senna 2 Tablet(s) Oral at bedtime  sodium chloride 0.9% lock flush 3 milliLiter(s) IV Push every 8 hours  tamsulosin 0.4 milliGRAM(s) Oral at bedtime    VITALS:  T(C): , Max: 37.1 (10-20-22 @ 22:20)  T(F): , Max: 98.7 (10-20-22 @ 22:20)  HR: 68 (10-21-22 @ 07:05)  BP: 143/75 (10-21-22 @ 07:05)  RR: 18 (10-21-22 @ 07:05)  SpO2: 95% (10-21-22 @ 07:05)    I and O's:    10-20 @ 07:01  -  10-21 @ 07:00  --------------------------------------------------------  IN: 720 mL / OUT: 1050 mL / NET: -330 mL    PHYSICAL EXAM:  Constitutional: NAD  Neck:  No JVD  Respiratory: CTAB/L  Cardiovascular: S1 and S2  Gastrointestinal: BS+, soft, NT/ND  Extremities: B/L LE BKA, L BKA (+) immobilizer   Neurological: A/O x 3, no focal deficits  : No Carreon  Skin: No rashes    LABS:                        7.5    5.91  )-----------( 342      ( 21 Oct 2022 06:50 )             23.8     10-21    135  |  104  |  30<H>  ----------------------------<  139<H>  4.6   |  22  |  3.16<H>    Ca    8.7      21 Oct 2022 06:50  Phos  3.4     10-21  Mg     2.00     10-21

## 2022-10-22 ENCOUNTER — TRANSCRIPTION ENCOUNTER (OUTPATIENT)
Age: 62
End: 2022-10-22

## 2022-10-22 VITALS
TEMPERATURE: 98 F | RESPIRATION RATE: 18 BRPM | OXYGEN SATURATION: 98 % | HEART RATE: 86 BPM | DIASTOLIC BLOOD PRESSURE: 72 MMHG | SYSTOLIC BLOOD PRESSURE: 158 MMHG

## 2022-10-22 LAB
ANION GAP SERPL CALC-SCNC: 10 MMOL/L — SIGNIFICANT CHANGE UP (ref 7–14)
BUN SERPL-MCNC: 30 MG/DL — HIGH (ref 7–23)
CALCIUM SERPL-MCNC: 8.9 MG/DL — SIGNIFICANT CHANGE UP (ref 8.4–10.5)
CHLORIDE SERPL-SCNC: 106 MMOL/L — SIGNIFICANT CHANGE UP (ref 98–107)
CO2 SERPL-SCNC: 23 MMOL/L — SIGNIFICANT CHANGE UP (ref 22–31)
CREAT SERPL-MCNC: 3.11 MG/DL — HIGH (ref 0.5–1.3)
EGFR: 22 ML/MIN/1.73M2 — LOW
GLUCOSE SERPL-MCNC: 164 MG/DL — HIGH (ref 70–99)
HCT VFR BLD CALC: 26.5 % — LOW (ref 39–50)
HGB BLD-MCNC: 8.2 G/DL — LOW (ref 13–17)
MAGNESIUM SERPL-MCNC: 1.9 MG/DL — SIGNIFICANT CHANGE UP (ref 1.6–2.6)
MCHC RBC-ENTMCNC: 29.4 PG — SIGNIFICANT CHANGE UP (ref 27–34)
MCHC RBC-ENTMCNC: 30.9 GM/DL — LOW (ref 32–36)
MCV RBC AUTO: 95 FL — SIGNIFICANT CHANGE UP (ref 80–100)
NRBC # BLD: 0 /100 WBCS — SIGNIFICANT CHANGE UP (ref 0–0)
NRBC # FLD: 0 K/UL — SIGNIFICANT CHANGE UP (ref 0–0)
PHOSPHATE SERPL-MCNC: 3.5 MG/DL — SIGNIFICANT CHANGE UP (ref 2.5–4.5)
PLATELET # BLD AUTO: 359 K/UL — SIGNIFICANT CHANGE UP (ref 150–400)
POTASSIUM SERPL-MCNC: 4.8 MMOL/L — SIGNIFICANT CHANGE UP (ref 3.5–5.3)
POTASSIUM SERPL-SCNC: 4.8 MMOL/L — SIGNIFICANT CHANGE UP (ref 3.5–5.3)
RBC # BLD: 2.79 M/UL — LOW (ref 4.2–5.8)
RBC # FLD: 13.5 % — SIGNIFICANT CHANGE UP (ref 10.3–14.5)
SODIUM SERPL-SCNC: 139 MMOL/L — SIGNIFICANT CHANGE UP (ref 135–145)
WBC # BLD: 5.82 K/UL — SIGNIFICANT CHANGE UP (ref 3.8–10.5)
WBC # FLD AUTO: 5.82 K/UL — SIGNIFICANT CHANGE UP (ref 3.8–10.5)

## 2022-10-22 PROCEDURE — 99232 SBSQ HOSP IP/OBS MODERATE 35: CPT

## 2022-10-22 RX ORDER — INSULIN LISPRO 100/ML
6 VIAL (ML) SUBCUTANEOUS
Refills: 0 | Status: DISCONTINUED | OUTPATIENT
Start: 2022-10-22 | End: 2022-10-22

## 2022-10-22 RX ORDER — INSULIN GLARGINE 100 [IU]/ML
28 INJECTION, SOLUTION SUBCUTANEOUS
Qty: 0 | Refills: 0 | DISCHARGE
Start: 2022-10-22

## 2022-10-22 RX ORDER — FERROUS SULFATE 325(65) MG
1 TABLET ORAL
Qty: 0 | Refills: 0 | DISCHARGE
Start: 2022-10-22

## 2022-10-22 RX ORDER — INSULIN ASPART 100 [IU]/ML
8 INJECTION, SOLUTION SUBCUTANEOUS
Qty: 336 | Refills: 0
Start: 2022-10-22 | End: 2022-11-04

## 2022-10-22 RX ORDER — FERROUS SULFATE 325(65) MG
325 TABLET ORAL DAILY
Refills: 0 | Status: DISCONTINUED | OUTPATIENT
Start: 2022-10-22 | End: 2022-10-22

## 2022-10-22 RX ADMIN — HEPARIN SODIUM 5000 UNIT(S): 5000 INJECTION INTRAVENOUS; SUBCUTANEOUS at 06:24

## 2022-10-22 RX ADMIN — INSULIN GLARGINE 28 UNIT(S): 100 INJECTION, SOLUTION SUBCUTANEOUS at 08:39

## 2022-10-22 RX ADMIN — GABAPENTIN 300 MILLIGRAM(S): 400 CAPSULE ORAL at 12:33

## 2022-10-22 RX ADMIN — Medication 2: at 12:32

## 2022-10-22 RX ADMIN — Medication 75 MILLIGRAM(S): at 06:23

## 2022-10-22 RX ADMIN — Medication 1: at 08:39

## 2022-10-22 RX ADMIN — Medication 325 MILLIGRAM(S): at 12:33

## 2022-10-22 RX ADMIN — Medication 75 MILLIGRAM(S): at 13:34

## 2022-10-22 RX ADMIN — HEPARIN SODIUM 5000 UNIT(S): 5000 INJECTION INTRAVENOUS; SUBCUTANEOUS at 13:34

## 2022-10-22 RX ADMIN — SODIUM CHLORIDE 3 MILLILITER(S): 9 INJECTION INTRAMUSCULAR; INTRAVENOUS; SUBCUTANEOUS at 13:40

## 2022-10-22 RX ADMIN — Medication 81 MILLIGRAM(S): at 12:33

## 2022-10-22 RX ADMIN — AMLODIPINE BESYLATE 10 MILLIGRAM(S): 2.5 TABLET ORAL at 06:24

## 2022-10-22 NOTE — PROGRESS NOTE ADULT - PROBLEM SELECTOR PROBLEM 4
Hyperlipidemia

## 2022-10-22 NOTE — PROGRESS NOTE ADULT - SUBJECTIVE AND OBJECTIVE BOX
VASCULAR SURGERY PROGRESS NOTE    CC:   Hospital Day #  Post-Op Day #    Procedure:    Events of past 24 hours:    ROS otherwise negative except per subjective and HPI      Vital Signs Last 24 Hrs  T(C): 36.7 (22 Oct 2022 01:45), Max: 37.1 (21 Oct 2022 10:38)  T(F): 98.1 (22 Oct 2022 01:45), Max: 98.8 (21 Oct 2022 10:38)  HR: 66 (22 Oct 2022 01:45) (65 - 89)  BP: 141/71 (22 Oct 2022 01:45) (139/80 - 147/67)  BP(mean): 3 (22 Oct 2022 01:45) (3 - 3)  RR: 16 (22 Oct 2022 01:45) (16 - 18)  SpO2: 93% (22 Oct 2022 01:45) (93% - 100%)    Parameters below as of 22 Oct 2022 01:45  Patient On (Oxygen Delivery Method): room air        Pain (0-10):            Pain Control Adequate: [] YES [] N    I&O's Detail    20 Oct 2022 07:01  -  21 Oct 2022 07:00  --------------------------------------------------------  IN:    Oral Fluid: 720 mL  Total IN: 720 mL    OUT:    IV PiggyBack: 0 mL    Voided (mL): 1050 mL  Total OUT: 1050 mL    Total NET: -330 mL      21 Oct 2022 07:01  -  22 Oct 2022 01:50  --------------------------------------------------------  IN:    Oral Fluid: 480 mL  Total IN: 480 mL    OUT:    IV PiggyBack: 0 mL    Voided (mL): 1350 mL  Total OUT: 1350 mL    Total NET: -870 mL          PHYSICAL EXAM    Appearance: Normal	  HEENT:   Normal oral mucosa, PERRL, EOMI	  Neck: Supple, - JVD; Carotid Bruit   Cardiovascular: Normal S1 S2, No JVD, No murmurs,   Respiratory: Lungs clear to auscultation/Decreased Breath Sounds/No Rales, Rhonchi, Wheezing	  Gastrointestinal:  Soft, Non-tender, + BS	  Skin: No rashes, No ecchymoses, No cyanosis  Extremities: Normal range of motion, No clubbing, cyanosis or edema  Neurologic: Non-focal  Psychiatry: A & O x 3, Mood & affect appropriate    PULSES:  Femoral:  Popliteal:  Dorsal Pedal:  Posterior Tibial:  Capillary:      MEDICATIONS:   MEDICATIONS  (STANDING):  amLODIPine   Tablet 10 milliGRAM(s) Oral daily  aspirin  chewable 81 milliGRAM(s) Oral daily  atorvastatin 40 milliGRAM(s) Oral at bedtime  dextrose 50% Injectable 25 Gram(s) IV Push once  dextrose 50% Injectable 12.5 Gram(s) IV Push once  dextrose 50% Injectable 25 Gram(s) IV Push once  dextrose Oral Gel 15 Gram(s) Oral once  gabapentin 300 milliGRAM(s) Oral daily  glucagon  Injectable 1 milliGRAM(s) IntraMuscular once  heparin   Injectable 5000 Unit(s) SubCutaneous every 8 hours  hydrALAZINE 75 milliGRAM(s) Oral three times a day  influenza   Vaccine 0.5 milliLiter(s) IntraMuscular once  insulin glargine Injectable (LANTUS) 28 Unit(s) SubCutaneous <User Schedule>  insulin lispro (ADMELOG) corrective regimen sliding scale   SubCutaneous three times a day before meals  insulin lispro (ADMELOG) corrective regimen sliding scale   SubCutaneous at bedtime  insulin lispro Injectable (ADMELOG) 8 Unit(s) SubCutaneous three times a day before meals  melatonin 3 milliGRAM(s) Oral at bedtime  polyethylene glycol 3350 17 Gram(s) Oral daily  senna 2 Tablet(s) Oral at bedtime  sodium chloride 0.9% lock flush 3 milliLiter(s) IV Push every 8 hours  tamsulosin 0.4 milliGRAM(s) Oral at bedtime    MEDICATIONS  (PRN):  acetaminophen   IVPB .. 1000 milliGRAM(s) IV Intermittent every 6 hours PRN Moderate Pain (4 - 6)  aluminum hydroxide/magnesium hydroxide/simethicone Suspension 30 milliLiter(s) Oral once PRN Dyspepsia  oxyCODONE    IR 5 milliGRAM(s) Oral every 4 hours PRN Moderate Pain (4 - 6)  oxyCODONE    IR 10 milliGRAM(s) Oral every 4 hours PRN Severe Pain (7 - 10)      LAB/STUDIES:                        7.5    5.91  )-----------( 342      ( 21 Oct 2022 06:50 )             23.8     10-21    135  |  104  |  30<H>  ----------------------------<  139<H>  4.6   |  22  |  3.16<H>    Ca    8.7      21 Oct 2022 06:50  Phos  3.4     10-21  Mg     2.00     10-21                            IMAGING:       VASCULAR SURGERY PROGRESS NOTE    CC:   Hospital Day #  Post-Op Day #7    Procedure: L BKA    Events of past 24 hours:  - dispo to [facility] per SW    Patient seen and evaluated, feeling well but having some stomach discomfort. No tenderness just pain no nausea. Denies fevers, chills, chest pain, dyspnea, headache, emesis.      ROS otherwise negative except per subjective and HPI      Vital Signs Last 24 Hrs  T(C): 36.7 (22 Oct 2022 01:45), Max: 37.1 (21 Oct 2022 10:38)  T(F): 98.1 (22 Oct 2022 01:45), Max: 98.8 (21 Oct 2022 10:38)  HR: 66 (22 Oct 2022 01:45) (65 - 89)  BP: 141/71 (22 Oct 2022 01:45) (139/80 - 147/67)  BP(mean): 3 (22 Oct 2022 01:45) (3 - 3)  RR: 16 (22 Oct 2022 01:45) (16 - 18)  SpO2: 93% (22 Oct 2022 01:45) (93% - 100%)    Parameters below as of 22 Oct 2022 01:45  Patient On (Oxygen Delivery Method): room air        Pain (0-10):            Pain Control Adequate: [] YES [] N    I&O's Detail    20 Oct 2022 07:01  -  21 Oct 2022 07:00  --------------------------------------------------------  IN:    Oral Fluid: 720 mL  Total IN: 720 mL    OUT:    IV PiggyBack: 0 mL    Voided (mL): 1050 mL  Total OUT: 1050 mL    Total NET: -330 mL      21 Oct 2022 07:01  -  22 Oct 2022 01:50  --------------------------------------------------------  IN:    Oral Fluid: 480 mL  Total IN: 480 mL    OUT:    IV PiggyBack: 0 mL    Voided (mL): 1350 mL  Total OUT: 1350 mL    Total NET: -870 mL            PHYSICAL EXAM  Appearance: Normal	  HEENT:   Normal oral mucosa, PERRL, EOMI	  Extremities: Normal range of motion, RLE BKA well healed (old), LLE BKA staples c/d/i no edema, no erythema, soft.  Neurologic: Non-focal  Psychiatry: A & O x 3, Mood & affect appropriate      MEDICATIONS:   MEDICATIONS  (STANDING):  amLODIPine   Tablet 10 milliGRAM(s) Oral daily  aspirin  chewable 81 milliGRAM(s) Oral daily  atorvastatin 40 milliGRAM(s) Oral at bedtime  dextrose 50% Injectable 25 Gram(s) IV Push once  dextrose 50% Injectable 12.5 Gram(s) IV Push once  dextrose 50% Injectable 25 Gram(s) IV Push once  dextrose Oral Gel 15 Gram(s) Oral once  gabapentin 300 milliGRAM(s) Oral daily  glucagon  Injectable 1 milliGRAM(s) IntraMuscular once  heparin   Injectable 5000 Unit(s) SubCutaneous every 8 hours  hydrALAZINE 75 milliGRAM(s) Oral three times a day  influenza   Vaccine 0.5 milliLiter(s) IntraMuscular once  insulin glargine Injectable (LANTUS) 28 Unit(s) SubCutaneous <User Schedule>  insulin lispro (ADMELOG) corrective regimen sliding scale   SubCutaneous three times a day before meals  insulin lispro (ADMELOG) corrective regimen sliding scale   SubCutaneous at bedtime  insulin lispro Injectable (ADMELOG) 8 Unit(s) SubCutaneous three times a day before meals  melatonin 3 milliGRAM(s) Oral at bedtime  polyethylene glycol 3350 17 Gram(s) Oral daily  senna 2 Tablet(s) Oral at bedtime  sodium chloride 0.9% lock flush 3 milliLiter(s) IV Push every 8 hours  tamsulosin 0.4 milliGRAM(s) Oral at bedtime    MEDICATIONS  (PRN):  acetaminophen   IVPB .. 1000 milliGRAM(s) IV Intermittent every 6 hours PRN Moderate Pain (4 - 6)  aluminum hydroxide/magnesium hydroxide/simethicone Suspension 30 milliLiter(s) Oral once PRN Dyspepsia  oxyCODONE    IR 5 milliGRAM(s) Oral every 4 hours PRN Moderate Pain (4 - 6)  oxyCODONE    IR 10 milliGRAM(s) Oral every 4 hours PRN Severe Pain (7 - 10)      LAB/STUDIES:                        7.5    5.91  )-----------( 342      ( 21 Oct 2022 06:50 )             23.8     10-21    135  |  104  |  30<H>  ----------------------------<  139<H>  4.6   |  22  |  3.16<H>    Ca    8.7      21 Oct 2022 06:50  Phos  3.4     10-21  Mg     2.00     10-21                            IMAGING:

## 2022-10-22 NOTE — PROGRESS NOTE ADULT - SUBJECTIVE AND OBJECTIVE BOX
Chief Complaint:  Uncontrolled T2DM c/b foot wounds requiring amputation, retinopathy    History: Patient seen at bedside. Pt tolerating oral diet. Pt denies nausea and vomiting/any sign of hypoglycemia  Pt reports a fair appetite.     MEDICATIONS  (STANDING):  amLODIPine   Tablet 10 milliGRAM(s) Oral daily  aspirin  chewable 81 milliGRAM(s) Oral daily  atorvastatin 40 milliGRAM(s) Oral at bedtime  dextrose 50% Injectable 25 Gram(s) IV Push once  dextrose 50% Injectable 12.5 Gram(s) IV Push once  dextrose 50% Injectable 25 Gram(s) IV Push once  dextrose Oral Gel 15 Gram(s) Oral once  ferrous    sulfate 325 milliGRAM(s) Oral daily  gabapentin 300 milliGRAM(s) Oral daily  glucagon  Injectable 1 milliGRAM(s) IntraMuscular once  heparin   Injectable 5000 Unit(s) SubCutaneous every 8 hours  hydrALAZINE 75 milliGRAM(s) Oral three times a day  influenza   Vaccine 0.5 milliLiter(s) IntraMuscular once  insulin glargine Injectable (LANTUS) 28 Unit(s) SubCutaneous <User Schedule>  insulin lispro (ADMELOG) corrective regimen sliding scale   SubCutaneous three times a day before meals  insulin lispro (ADMELOG) corrective regimen sliding scale   SubCutaneous at bedtime  insulin lispro Injectable (ADMELOG) 6 Unit(s) SubCutaneous three times a day before meals  melatonin 3 milliGRAM(s) Oral at bedtime  polyethylene glycol 3350 17 Gram(s) Oral daily  senna 2 Tablet(s) Oral at bedtime  sodium chloride 0.9% lock flush 3 milliLiter(s) IV Push every 8 hours  tamsulosin 0.4 milliGRAM(s) Oral at bedtime    MEDICATIONS  (PRN):  acetaminophen   IVPB .. 1000 milliGRAM(s) IV Intermittent every 6 hours PRN Moderate Pain (4 - 6)  aluminum hydroxide/magnesium hydroxide/simethicone Suspension 30 milliLiter(s) Oral once PRN Dyspepsia      Allergies: No Known Allergies      Review of Systems:  Respiratory: No SOB, no cough  GI: No nausea, vomiting, abdominal pain  Endocrine: no polyuria, polydipsia    PHYSICAL EXAM:  VITALS: T(C): 36.9 (10-22-22 @ 13:30)  T(F): 98.5 (10-22-22 @ 13:30), Max: 98.6 (10-22-22 @ 06:15)  HR: 86 (10-22-22 @ 13:30) (65 - 89)  BP: 158/72 (10-22-22 @ 13:30) (134/64 - 158/72)  RR:  (16 - 18)  SpO2:  (93% - 100%)  Wt(kg): --  GENERAL: NAD, well-groomed, well-developeds  RESPIRATORY: No labored breathing  GI: Soft, nontender, non distended  PSYCH: Alert and oriented x 3, normal affect, normal mood      CAPILLARY BLOOD GLUCOSE  POCT Blood Glucose.: 221 mg/dL (22 Oct 2022 12:23)  POCT Blood Glucose.: 172 mg/dL (22 Oct 2022 07:47)  POCT Blood Glucose.: 113 mg/dL (21 Oct 2022 23:39)  POCT Blood Glucose.: 86 mg/dL (21 Oct 2022 21:56)  POCT Blood Glucose.: 155 mg/dL (21 Oct 2022 16:56)    A1C with Estimated Average Glucose (10.08.22 @ 06:33)    A1C with Estimated Average Glucose Result: 8.5    Estimated Average Glucose: 197      10-22    139  |  106  |  30<H>  ----------------------------<  164<H>  4.8   |  23  |  3.11<H>    eGFR: 22<L>    Ca    8.9      10-22  Mg     1.90     10-22  Phos  3.5     10-22    Diet, Consistent Carbohydrate w/Evening Snack:   1000mL Fluid Restriction ([id number])  Supplement Feeding Modality:  Oral  Glucerna Shake Cans or Servings Per Day:  2       Frequency:  Two Times a day (10-20-22 @ 16:38) [Active]

## 2022-10-22 NOTE — PROGRESS NOTE ADULT - ASSESSMENT
62 year old male with a PMHx of HTN, DM2, HLD, BPH, PAD and right BKA secondary to unhealed DM2 ulcer who presented to [facility] for left lower extremity peripheral angiogram.  Patient is now S/P left lower extremity BKA on 10/7/22.

## 2022-10-22 NOTE — PROGRESS NOTE ADULT - PROVIDER SPECIALTY LIST ADULT
Nephrology
Vascular Surgery
Nephrology
SICU
Vascular Surgery
Cardiology
Nephrology
Nephrology
SICU
Vascular Surgery
Cardiology
Endocrinology
Cardiology

## 2022-10-22 NOTE — DISCHARGE NOTE NURSING/CASE MANAGEMENT/SOCIAL WORK - NSDCVIVACCINE_GEN_ALL_CORE_FT
Tdap; 29-Sep-2015 02:54; Halie James); [employer]; [id number]; IntraMuscular; Deltoid Right.; 0.5 milliLiter(s); VIS (VIS Published: 09-May-2013, VIS Presented: 29-Sep-2015);

## 2022-10-22 NOTE — PROGRESS NOTE ADULT - ASSESSMENT
63 y/o M with PMH of HTN, DM type II, HLD, BPH and PAD, Right BKA 2/2 to unhealed DM ulcer presented to [facility] for LLE peripheral angiogram, now s/p amputation    1. Uncontrolled T2DM c/b foot wounds requiring amputation, retinopathy  A1c 8.5% Goal < 7%  Home Regimen: Novolog 20-35 units qAC (typically eats 2 meals per day, takes Novolog twice). Does not take basal insulin     While inpatient:   BG target 100-180 mg/dl; pre-lunch fs elevated today as pt didn't receive premeal for breakfast and ate break fast.   Continue Lantus 28 units SQ qAM (0800 daily)   Decrease Admelog to 6 units SQ TID before meals (Hold if NPO/skips meal)   Continue Admelog low dose correctional scale before meals, low dose at bedtime  Consistent carb diet  BG before meals and bedtime  Hypoglycemia protocol     Discharge Plan:  Discharge to rehab on Lantus Solostar pen 28 units q 8am and Novolog Flex Pen 8 units before breakfast, 8 units before lunch, 6 units before dinner  (please hold if not eating )  Please check  basal insulin PEN (Lantus, Basaglar, Tresiba, Semglee); (please make sure its'covered by patients insurance  Did not tolerate Metformin  mg as an outpatient. Patient states possibly hx of pancreatitis, also with retinopathy- unclear if active as patient has not seen ophtho in a while. Will defer GLP1 agonist. Hold off on SGLT2 inhibitors given recent amputation  Followup with Endocrine Practice at [address], [address], [place] [address]; Ph # [phone]  OR if prefers a [place] location - Dr. Jo: [address], [address], [facility] [address], [phone]    2. Discharge Planning Issues  Patient expressing difficulty caring for himself, especially now s/p amputation   At home, he lives with elderly wife, reports difficulty with food prep, specifically healthy food, typically orders out  Limited resources for support  Recommend CM and SW consult   Discussed with team    3. HTN  BP Goal < 130/80  On Amlodipine and Hydralazine  Titration per primary team    4. HLD  LDL goal < 70  LDL 87 (10/2022)  Statin if no contraindications  Now started on Lipitor     D/w Primary team     Millie Wilburn  Nurse Practitioner  Division of Endocrinology & Diabetes  In house pager #[phone]    If before 9AM or after 6PM, or on weekends/holidays, please call endocrine answering service for assistance ([phone]).For nonurgent matters email [email or web address] for assistance.

## 2022-10-22 NOTE — PROGRESS NOTE ADULT - ASSESSMENT
62M with a PMHx of HTN, DM2, HLD, BPH, PAD and right BKA secondary to unhealed DM2 ulcer who presented to [facility] for left lower extremity peripheral angiogram.  Patient is now S/P left lower extremity BKA on 10/7/22. s/p LLE BKA Formalization 10/15/22.      Plan:   - Continue aspirin   - Continue knee immobilizer   - antiemetics PRN  - Pain control  - Regular diet   - DVT ppx: SQH  - Dispo: [facility] per PM&R      # [phone]  Vascular Surgery

## 2022-10-22 NOTE — PROGRESS NOTE ADULT - PROBLEM SELECTOR PROBLEM 2
Discharge planning issues
Uncontrolled type 2 diabetes mellitus with hyperglycemia
Discharge planning issues
Uncontrolled type 2 diabetes mellitus with hyperglycemia
Discharge planning issues
Uncontrolled type 2 diabetes mellitus with hyperglycemia

## 2022-10-22 NOTE — PROGRESS NOTE ADULT - PROBLEM SELECTOR PROBLEM 1
Uncontrolled type 2 diabetes mellitus with hyperglycemia
PVD (peripheral vascular disease)

## 2022-10-22 NOTE — PROGRESS NOTE ADULT - PROBLEM SELECTOR PLAN 3
c/w meds as ordered  continue to monitor
now better controlled   cont hydralazine 50 tid   Cont with norvasc.
c/w meds as ordered  continue to monitor
c/w meds as ordered    - increase hydralazine to 75mg PO TID  continue to monitor
c/w meds as ordered    - increase hydralazine to 75mg PO TID  continue to monitor

## 2022-10-22 NOTE — DISCHARGE NOTE NURSING/CASE MANAGEMENT/SOCIAL WORK - PATIENT PORTAL LINK FT
You can access the FollowMyHealth Patient Portal offered by [facility] by registering at the following website: [email or web address]. By joining [facility]’s FollowMyHealth portal, you will also be able to view your health information using other applications (apps) compatible with our system.

## 2022-10-22 NOTE — DISCHARGE NOTE NURSING/CASE MANAGEMENT/SOCIAL WORK - NSDCFUADDAPPT_GEN_ALL_CORE_FT
Followup with Endocrine Practice at [address], [address], [place] [address]; Ph # [phone]  OR if prefers a [place] location - Dr. Jo: [address], [address], [facility] [address], [phone]    Follow up w/ Nephrologist Rubio Chavez [facility]  [phone] call for appointment.

## 2022-10-22 NOTE — PROGRESS NOTE ADULT - PROBLEM SELECTOR PLAN 2
RISS   Low carb and sugar diet
RISS   Low carb and sugar diet.

## 2022-10-22 NOTE — PROGRESS NOTE ADULT - PROBLEM SELECTOR PLAN 1
s/p left lower extremity BKA on 10/7/22    - s/p LLE formalization  completed IV antibiotics  pain management   management as per surgery team
s/p left lower extremity BKA on 10/7/22    - s/p LLE formalization  completed IV antibiotics  pain management   management as per surgery team
s/p left lower extremity BKA on 10/7/22    - s/p LLE formalization  IV antibiotics  pain management   management as per surgery team
s/p left lower extremity BKA on 10/7/22    - s/p LLE formalization  completed IV antibiotics  pain management   management as per surgery team
S/P left lower extremity BKA on 10/7/22.  Plan for RTOR for formalization today. Acceptable cardiac risk to proceed   IV abx   vascular surgery following   Pain control.

## 2022-10-22 NOTE — PROGRESS NOTE ADULT - SUBJECTIVE AND OBJECTIVE BOX
Subjective: Patient seen and examined. No new events except as noted.     REVIEW OF SYSTEMS:    CONSTITUTIONAL: + weakness, fevers or chills  EYES/ENT: No visual changes;  No vertigo or throat pain   NECK: No pain or stiffness  RESPIRATORY: No cough, wheezing, hemoptysis; No shortness of breath  CARDIOVASCULAR: No chest pain or palpitations  GASTROINTESTINAL: No abdominal or epigastric pain. No nausea, vomiting, or hematemesis; No diarrhea or constipation. No melena or hematochezia.  GENITOURINARY: No dysuria, frequency or hematuria  NEUROLOGICAL: No numbness or weakness  SKIN: No itching, burning, rashes, or lesions   All other review of systems is negative unless indicated above.    MEDICATIONS:  MEDICATIONS  (STANDING):  amLODIPine   Tablet 10 milliGRAM(s) Oral daily  aspirin  chewable 81 milliGRAM(s) Oral daily  atorvastatin 40 milliGRAM(s) Oral at bedtime  dextrose 50% Injectable 25 Gram(s) IV Push once  dextrose 50% Injectable 12.5 Gram(s) IV Push once  dextrose 50% Injectable 25 Gram(s) IV Push once  dextrose Oral Gel 15 Gram(s) Oral once  gabapentin 300 milliGRAM(s) Oral daily  glucagon  Injectable 1 milliGRAM(s) IntraMuscular once  heparin   Injectable 5000 Unit(s) SubCutaneous every 8 hours  hydrALAZINE 75 milliGRAM(s) Oral three times a day  influenza   Vaccine 0.5 milliLiter(s) IntraMuscular once  insulin glargine Injectable (LANTUS) 28 Unit(s) SubCutaneous <User Schedule>  insulin lispro (ADMELOG) corrective regimen sliding scale   SubCutaneous three times a day before meals  insulin lispro (ADMELOG) corrective regimen sliding scale   SubCutaneous at bedtime  insulin lispro Injectable (ADMELOG) 8 Unit(s) SubCutaneous three times a day before meals  melatonin 3 milliGRAM(s) Oral at bedtime  polyethylene glycol 3350 17 Gram(s) Oral daily  senna 2 Tablet(s) Oral at bedtime  sodium chloride 0.9% lock flush 3 milliLiter(s) IV Push every 8 hours  tamsulosin 0.4 milliGRAM(s) Oral at bedtime    PHYSICAL EXAM:  Vital Signs Last 24 Hrs  T(C): 36.9 (22 Oct 2022 10:02), Max: 37 (22 Oct 2022 06:15)  T(F): 98.4 (22 Oct 2022 10:02), Max: 98.6 (22 Oct 2022 06:15)  HR: 71 (22 Oct 2022 10:02) (65 - 89)  BP: 134/64 (22 Oct 2022 10:02) (134/64 - 151/75)  BP(mean): 3 (22 Oct 2022 01:45) (3 - 3)  RR: 18 (22 Oct 2022 10:02) (16 - 18)  SpO2: 97% (22 Oct 2022 12:06) (93% - 100%)    Parameters below as of 22 Oct 2022 10:02  Patient On (Oxygen Delivery Method): room air    I&O's Summary    21 Oct 2022 07:01  -  22 Oct 2022 07:00  --------------------------------------------------------  IN: 480 mL / OUT: 1750 mL / NET: -1270 mL    Appearance: NAD	  HEENT: dry oral mucosa, PERRL, EOMI	  Lymphatic: No lymphadenopathy  Cardiovascular: Normal S1 S2, No JVD, No murmurs, No edema  Respiratory: Lungs clear to auscultation	  Psychiatry: A & O x 3, Mood & affect appropriate  Gastrointestinal: Soft, Non-tender, + BS	  Skin: No rashes, No ecchymoses, No cyanosis	  Neurologic: Non-focal  Extremities: bilateral lower extremity amputations - L BKA dressing c/d/i  Vascular: Peripheral pulses palpable 2+ bilaterally    LABS:    CARDIAC MARKERS:               8.2    5.82  )-----------( 359      ( 22 Oct 2022 06:42 )             26.5     10-22    139  |  106  |  30<H>  ----------------------------<  164<H>  4.8   |  23  |  3.11<H>    Ca    8.9      22 Oct 2022 06:42  Phos  3.5     10-22  Mg     1.90     10-22    TELEMETRY: 	    ECG:  	  RADIOLOGY:   DIAGNOSTIC TESTING:  [ ] Echocardiogram:  [ ]  Catheterization:  [ ] Stress Test:    OTHER:

## 2022-10-24 LAB — SURGICAL PATHOLOGY STUDY: SIGNIFICANT CHANGE UP

## 2022-10-26 ENCOUNTER — RESULT REVIEW (OUTPATIENT)
Age: 62
End: 2022-10-26

## 2022-10-26 LAB — SURGICAL PATHOLOGY STUDY: SIGNIFICANT CHANGE UP

## 2022-11-07 NOTE — PHYSICAL THERAPY INITIAL EVALUATION ADULT - PERTINENT HX OF CURRENT PROBLEM, REHAB EVAL
Advance Care Planning   I attest to discussing Advance Care Planning with patient and/or family member.  Education was provided including the importance of the Health Care Power of , Advance Directives, and/or [place] documentation.  The patient expressed understanding to the importance of this information and discussion.  We completed the [place] post at the visit today.  Goals of Care: The patient expressed that what is most important right now is to focus on:  Comfort focused care  Length of ACP conversation in minutes: 11 mns     
  Continue levothyroxine at 50 mcg p.o. daily  Take medicine on an empty stomach with water (no other medications or beverages). Wait 30 minutes to eat or drink.  Report any symptoms of thinning hair, breaking nails, fatigue, weight gain or loss, palpitations.   
-doing well, followed by Cardiology   -on Xarelto, continue  
-patient advised to avoid foods that trigger acid reflux and he had at least 2 hours before bedtime.  
CrCl cannot be calculated (Patient's most recent lab result is older than the maximum 7 days allowed.).  Last GFR - . Stable from renal standpoint.  Continue current management  Follow renoprotective measures including Renal Diet (reduce intake of nuts, peanut butter, milk, cheese, dried beans, peas) and Low Sodium Diet (less than 2 grams per day).  Avoid NSAIDs (Aleve, Mobic, Celebrex, Ibuprofen, Advil, Toradol and Diclofenac). May take Tylenol as needed for headache/pain.  Control DM with goal A1C <7. BP goal <130/80. LDL goal < 100.  Stay well hydrated. Avoid alcohol and soda. Limit tea and coffee.  Smoking Cessation recommended.  
Well controlled.   Continue current medications, seems to be doing  Low Sodium Diet (DASH Diet - Less than 2 grams of sodium per day).  Monitor blood pressure daily and log. Report consistent numbers greater than 140/90.  Maintain healthy weight with goal BMI <30. Exercise 30 minutes per day, 5 days per week.  Smoking cessation encouraged to aid in BP reduction.        
63 y/o M with PMH of HTN, DM type II, HLD, BPH and PAD, Right BKA 2/2 to unhealed DM ulcer presented to [facility] for LLE peripheral angiogram. Patient now s/p L BKA for necrotic left foot ulcer w/ concern for gas gangrene

## 2022-11-14 ENCOUNTER — APPOINTMENT (OUTPATIENT)
Dept: VASCULAR SURGERY | Facility: CLINIC | Age: 62
End: 2022-11-14

## 2022-11-14 VITALS
HEART RATE: 73 BPM | SYSTOLIC BLOOD PRESSURE: 145 MMHG | BODY MASS INDEX: 31.56 KG/M2 | TEMPERATURE: 98.2 F | DIASTOLIC BLOOD PRESSURE: 76 MMHG | WEIGHT: 233 LBS | HEIGHT: 72 IN

## 2022-11-14 PROCEDURE — 99024 POSTOP FOLLOW-UP VISIT: CPT

## 2022-11-14 NOTE — HISTORY OF PRESENT ILLNESS
[FreeTextEntry1] : Mr. Curry presents in follow up s/p left BKA, performed on 10/14/22. He denies any erythema, induration, fluctuance or drainage. He currently resides at [facility]. His wound is not being cleaned daily. \par \par PMH: PAD, DMII\par \par Medications: Flomax, Vitamins, Insulin, Neurontin, Norvasc, Aspirin, Lipitor, Hydralazine, Losartan, Procrit\par \par All: NKDA

## 2022-11-14 NOTE — PHYSICAL EXAM
[Normal Breath Sounds] : Normal breath sounds [Normal Heart Sounds] : normal heart sounds [2+] : left 2+ [de-identified] : NAD. Neurologically intact. [FreeTextEntry1] : Right BKA prosthesis in place.\par Left BKA stump with staples/sutures in place. No signs of infection.

## 2022-11-14 NOTE — ASSESSMENT
[FreeTextEntry1] : In summary, Mr. Curry presents s/p left BKA. All sutures and staples were removed from the stump. \par \par Please wash stump daily with soap and water. Apply gauze, Kerlix and ACE bandage. Patient may now be fit for s stump  and prosthesis (rx was provided to patient and I have contacted his prosthetist).

## 2022-12-23 NOTE — DISCHARGE NOTE NURSING/CASE MANAGEMENT/SOCIAL WORK - NSDPACMPNY_GEN_ALL_CORE
Arterial Line Insertion  Performed by: Kaya Braxton CRNA  Authorized by: Renetta Giordano MD   Consent: Verbal consent obtained  Written consent obtained  Preparation: Patient was prepped and draped in the usual sterile fashion    Indications: hemodynamic monitoring  Orientation:  Right  Location: radial artery  Procedure Details:  Needle gauge: 20  Seldinger technique: Seldinger technique used  Number of attempts: 3    Post-procedure:  Post-procedure: dressing applied  Waveform: good waveform  Post-procedure CNS: normal  Patient tolerance: patient tolerated the procedure well with no immediate complications
Family

## 2023-01-01 NOTE — PROGRESS NOTE ADULT - ASSESSMENT
dyspnea  -  secondary to fluid overload  - chest x ray done  - will d/w cards and renal weather to cont lasix 40 mg   -  lower ext doppler negative  -  troponin negative  - EKG without any acute changes  - d dimer elevated  - no need for v/q scan    diabetic foot ulcer with surrounding cellulitis   Fever resolved , leukocytosis persists but improved   -cont abx  -s/p debridement per podiatry 5/27  -DM control  -local care per podiatry  -s/p debridement/revision 6/5 -   - no overt purulence, debrided down to healthy tissue.   - vascular spoke with pt regarding possible BKA.  - picc line for long ter iv abx    PVD  - may need angiogram    uncontrolled Diabetes  - cont lantus 38 - novolog 22 units qac  - hgb a1c  11.9  - diabetic diet  - FS qid  - endo follow up appreciated    MARIKA   - slight improvement in cre  - follow creatinine  - nephrology following  - encouraged fluid intake    HTN   -   hold hydralazine     hyponatremia  - no HCTZ    anorexia  - add Glucerna  - cont Marinol    depression  - seen by psych    constipation  - had bm  - miralax bid    dispo  - d/c planning to [facility]

## 2023-02-13 NOTE — PROGRESS NOTE ADULT - GASTROINTESTINAL DETAILS
soft/no rebound tenderness/no distention/nontender
soft/no guarding/nontender/no distention/no rebound tenderness/no rigidity
no rebound tenderness/no rigidity/soft/nontender/no distention
soft/nontender/no rigidity/no rebound tenderness/no distention
no guarding/no rebound tenderness/nontender/no rigidity/no distention/soft
no rebound tenderness/soft/no rigidity/no distention/no guarding/nontender
nontender/no guarding/no distention/soft/no rebound tenderness/no rigidity
IV intact/Admission wristband placed
no guarding/soft/no rigidity/no rebound tenderness/no distention/nontender
no distention/no rebound tenderness/no guarding/soft/nontender/no rigidity
nontender/no distention/no rebound tenderness/soft/no guarding
nontender/no rigidity/no rebound tenderness/no guarding/no distention/soft
soft/nontender/no distention/no rebound tenderness/no guarding/no rigidity
no rebound tenderness/no rigidity/no guarding/nontender/soft/no distention
nontender/no guarding/no distention/soft/no rigidity/no rebound tenderness
no rebound tenderness/no rigidity/nontender/soft/no guarding/no distention
soft/no guarding/no distention/no rebound tenderness/nontender
no rebound tenderness/no rigidity/nontender/no distention/soft/no guarding
no rebound tenderness/no rigidity/soft/no guarding/nontender/no distention

## 2023-05-25 NOTE — ED ADULT TRIAGE NOTE - ACCOMPANIED BY
Hgba1c is 6 7%  this is excellent  Continue the same metformin  Continue to eat health  Try to get some exercise in your regimen  Eye doctor visit is due  The thyroid is good  No change in levothyroxine  The cholesterol is ok  The urine test shows no diabetes effects on the kidneys  Follow up in 6 months with blood work 
Spouse/Significant other

## 2023-06-28 NOTE — PRE-OP CHECKLIST - ALLERGIES REVIEWED
done
done
Yue Ash, PCP, [phone] seen this am;  dr Richards, cardio, [phone];  Deshaun Medina, pulm, [phone]

## 2023-07-31 ENCOUNTER — APPOINTMENT (OUTPATIENT)
Dept: VASCULAR SURGERY | Facility: CLINIC | Age: 63
End: 2023-07-31

## 2024-01-15 NOTE — PROGRESS NOTE ADULT - NEGATIVE GASTROINTESTINAL SYMPTOMS
no diarrhea/no nausea/no vomiting/no abdominal pain
no abdominal pain/no nausea/no vomiting/no diarrhea
no abdominal pain/no vomiting/no diarrhea
no vomiting/no abdominal pain/no diarrhea
no vomiting/no abdominal pain/no diarrhea
no nausea/no vomiting/no diarrhea/no abdominal pain
no abdominal pain/no nausea/no diarrhea/no vomiting
no abdominal pain/no diarrhea/no vomiting
no abdominal pain/no nausea/no vomiting/no diarrhea
no abdominal pain/no vomiting/no nausea/no diarrhea
no diarrhea/no nausea/no vomiting/no abdominal pain
no vomiting/no abdominal pain/no diarrhea
oral
no diarrhea/no abdominal pain/no vomiting
no vomiting/no diarrhea/no abdominal pain
no abdominal pain/no diarrhea/no nausea/no vomiting
no abdominal pain/no diarrhea/no vomiting
no vomiting/no diarrhea/no abdominal pain
no abdominal pain/no nausea/no vomiting/no diarrhea

## 2024-01-17 NOTE — ED PROVIDER NOTE - CARDIOVASCULAR [-], MLM
Thank you for connecting with us today on the [facility] service of [facility]. If you have any questions after your visit, please feel free to contact us at [phone].    What to do in an Emergency:  If you are experiencing a medical emergency, call 911 immediately. Symptoms that require immediate attention require a visit at Urgent Care ([place]), Im[facility] ([place]) or the Emergency Room of a nearby hospital.    When to contact a provider:  Seek in-person treatment if there is no improvement of symptoms.     Do not have a primary care provider?   If you do not have a primary care provider or have any questions about your visit, please call the Virtual Health RN at [phone].    Billing Questions:   If you have any billing concerns, please contact our Billing Department at [phone]. Hours: Mon. - Thu. 7:30 am - 6 pm and Friday 7:30 pm to 5 pm.    Thank you for entrusting us with your care.     You can connect by Video with a [facility] provider 24/7 for common and non-urgent symptoms. You can also get care by completing an E-Visit questionnaire. Complete a questionnaire by choosing from a list of common health concerns*. A [facility] provider will respond to your questionnaire answers within an 1 hour (24/7). You will receive a treatment plan, including a prescription if you need one, and/or testing for COVID, Influenza, Mono, RSV, Strep and urine, depending on your symptoms.     Cold Symptoms Behavioral   Health Skin Concerns Women's and Men's   Health   Everything Else   Cough*    Anxiety* Acne                                 Birth Control Abdominal Discomfort     COVID treatment* Depression *  Bug Bites   Emergency Contraception Acid Reflux   Nasal congestion* Insomnia Cold Sores Erectile Dysfunction                      Excessive Sweating (underarms)          Red/pink eye  Dandruff Smoking Cessation    Headache or migraine*        Sore throat*  Eczema Urinary  Symptoms* Joint pain or stiffness       Sinus infection*    Minor Skin Injuries Vaginal Itching*  Medication Refills*         Poison Ivy Vaginal Discharge* Nausea, vomiting and diarrhea         Rash   Neck and Back Pain*       Shingles  Seasonal Allergies          Tick bites         Bacterial Conjunctivitis    You have an infection in the membranes covering the white part of the eye. This part of the eye is called the conjunctiva. The infection is called conjunctivitis. The most common symptoms of conjunctivitis include a thick, pus-like discharge from the eye, swollen eyelids, redness, eyelids sticking together upon awakening, and a gritty or scratchy feeling in the eye. Your infection was caused by bacteria. It may be treated with medicine. With treatment, the infection takes about 7 to 10 days to resolve.   Home care  Use prescribed antibiotic eye drops or ointment as directed to treat the infection.  Apply a warm compress (towel soaked in warm water) to the affected eye 3 to 4 times a day. Do this just before applying medicine to the eye.  Use a warm, wet cloth to wipe away crusting of the eyelids in the morning. This is caused by mucus drainage during the night. You may also use saline irrigating solution or artificial tears to rinse away mucus in the eye. Do not put a patch over the eye.  Wash your hands before and after touching the infected eye. This is to prevent spreading the infection to the other eye, and to other people. Don't share your towels or washcloths with others.  You may use acetaminophen or ibuprofen to control pain, unless another medicine was prescribed. Talk with your healthcare provider before using these medicines if you have chronic liver or kidney disease. Also talk with your provider if you have ever had a stomach ulcer or digestive bleeding.  Don't wear contact lenses until your eyes have healed and all symptoms are gone.    Follow-up care  Follow up with your healthcare provider, or  as advised.  When to seek medical advice  Call your healthcare provider right away if any of these occur:  Worsening vision  Increasing pain in the eye  Increasing swelling or redness of the eyelid  Redness spreading around the eye  Kendra last reviewed this educational content on 4/1/2020  © 9550-7800 [employer]. All rights reserved. This information is not intended as a substitute for professional medical care. Always follow your healthcare professional's instructions.         no syncope/no chest pain/no palpitations

## 2024-02-06 NOTE — PATIENT PROFILE ADULT - NSTRANSFERBELONGINGSDISPO_GEN_A_NUR
Patient called in regarding surgery. Talked to him about surgery on 02/28 with Dr. Katz. Informed him that a surgical letter will be sent out as well.   
not applicable

## 2024-02-26 NOTE — PHYSICAL THERAPY INITIAL EVALUATION ADULT - ASSISTIVE DEVICE FOR TRANSFER: STAND/SIT, REHAB EVAL
Hypertension is stable and controlled  Continue current treatment regimen.  Dietary sodium restriction.  Weight loss.  Blood pressure will be reassessed  1 week .   rolling walker

## 2024-05-29 NOTE — PHYSICAL THERAPY INITIAL EVALUATION ADULT - SKIN COLOR/CHARACTERISTICS
[Dear  ___] : Dear  [unfilled], [Consult Letter:] : I had the pleasure of evaluating your patient, [unfilled]. [Please see my note below.] : Please see my note below. [Consult Closing:] : Thank you very much for allowing me to participate in the care of this patient.  If you have any questions, please do not hesitate to contact me. [Sincerely,] : Sincerely, [FreeTextEntry3] : Jesse Sofia MD Attending Physician, Division of Pediatric Pulmonology. without discoloration

## 2024-10-19 NOTE — BRIEF OPERATIVE NOTE - NSICDXBRIEFOPLAUNCH_GEN_ALL_CORE
<--- Click to Launch ICDx for PreOp, PostOp and Procedure
<--- Click to Launch ICDx for PreOp, PostOp and Procedure
Her/She

## 2025-05-16 NOTE — PROGRESS NOTE ADULT - SUBJECTIVE AND OBJECTIVE BOX
Subjective: Patient seen and examined. No new events except as noted.     REVIEW OF SYSTEMS:    CONSTITUTIONAL: + weakness, fevers or chills  EYES/ENT: No visual changes;  No vertigo or throat pain   NECK: No pain or stiffness  RESPIRATORY: No cough, wheezing, hemoptysis; No shortness of breath  CARDIOVASCULAR: No chest pain or palpitations  GASTROINTESTINAL: No abdominal or epigastric pain. No nausea, vomiting, or hematemesis; No diarrhea or constipation. No melena or hematochezia.  GENITOURINARY: No dysuria, frequency or hematuria  NEUROLOGICAL: No numbness or weakness  SKIN: No itching, burning, rashes, or lesions   All other review of systems is negative unless indicated above.    MEDICATIONS:  MEDICATIONS  (STANDING):  amLODIPine   Tablet 10 milliGRAM(s) Oral daily  atorvastatin 40 milliGRAM(s) Oral at bedtime  gabapentin 300 milliGRAM(s) Oral daily  heparin   Injectable 5000 Unit(s) SubCutaneous every 8 hours  hydrALAZINE 75 milliGRAM(s) Oral three times a day  influenza   Vaccine 0.5 milliLiter(s) IntraMuscular once  insulin glargine Injectable (LANTUS) 34 Unit(s) SubCutaneous <User Schedule>  insulin lispro (ADMELOG) corrective regimen sliding scale   SubCutaneous three times a day before meals  insulin lispro (ADMELOG) corrective regimen sliding scale   SubCutaneous at bedtime  insulin lispro Injectable (ADMELOG) 10 Unit(s) SubCutaneous three times a day before meals  magnesium sulfate  IVPB 2 Gram(s) IV Intermittent once  polyethylene glycol 3350 17 Gram(s) Oral daily  sodium chloride 0.9% lock flush 3 milliLiter(s) IV Push every 8 hours  tamsulosin 0.4 milliGRAM(s) Oral at bedtime    PHYSICAL EXAM:  Vital Signs Last 24 Hrs  T(C): 36.7 (18 Oct 2022 09:44), Max: 36.9 (17 Oct 2022 14:30)  T(F): 98 (18 Oct 2022 09:44), Max: 98.4 (17 Oct 2022 14:30)  HR: 72 (18 Oct 2022 09:44) (69 - 88)  BP: 143/61 (18 Oct 2022 09:44) (129/64 - 154/69)  BP(mean): --  RR: 18 (18 Oct 2022 09:44) (16 - 18)  SpO2: 100% (18 Oct 2022 09:44) (95% - 100%)    Parameters below as of 18 Oct 2022 09:44  Patient On (Oxygen Delivery Method): nasal cannula  O2 Flow (L/min): 2    I&O's Summary    17 Oct 2022 07:01  -  18 Oct 2022 07:00  --------------------------------------------------------  IN: 440 mL / OUT: 1050 mL / NET: -610 mL    Appearance: NAD	  HEENT: dry oral mucosa, PERRL, EOMI	  Lymphatic: No lymphadenopathy  Cardiovascular: Normal S1 S2, No JVD, No murmurs, No edema  Respiratory: Lungs clear to auscultation	  Psychiatry: A & O x 3, Mood & affect appropriate  Gastrointestinal: Soft, Non-tender, + BS	  Skin: No rashes, No ecchymoses, No cyanosis	  Neurologic: Non-focal  Extremities: bilateral lower extremity amputations - L BKA dressing c/d/i  Vascular: Peripheral pulses palpable 2+ bilaterally    LABS:    CARDIAC MARKERS:                             7.5    6.89  )-----------( 315      ( 18 Oct 2022 07:20 )             24.0     10-18    135  |  101  |  37<H>  ----------------------------<  135<H>  4.7   |  21<L>  |  3.32<H>    Ca    8.6      18 Oct 2022 07:20  Phos  4.2     10-18  Mg     2.20     10-18    proBNP:   Lipid Profile:   HgA1c:   TSH:     TELEMETRY: 	    ECG:  	  RADIOLOGY:   DIAGNOSTIC TESTING:  [ ] Echocardiogram:  [ ]  Catheterization:  [ ] Stress Test:    OTHER:  Gen: No fever, normal appetite  Eyes: No eye irritation or discharge  ENT: No ear pain, congestion, sore throat  Resp: No cough or trouble breathing  Cardiovascular: No chest pain or palpitation  Gastroenteric: No nausea/vomiting, diarrhea, constipation  :  No change in urine output; no dysuria  MS: No joint or muscle pain  Skin: No rashes  Neuro: No headache; no abnormal movements  Remainder negative, except as per the HPI

## (undated) DEVICE — DRAPE 3/4 SHEET 52X76"

## (undated) DEVICE — DRSG STOCKINETTE IMPERVIOUS XL

## (undated) DEVICE — SUT SILK 3-0 18" TIES

## (undated) DEVICE — DRSG KERLIX ROLL 4.5"

## (undated) DEVICE — WARMING BLANKET FULL ADULT

## (undated) DEVICE — SUT PROLENE 3-0 36" SH

## (undated) DEVICE — SUT VICRYL 2-0 27" CT-1 UNDYED

## (undated) DEVICE — DRSG COBAN 4"

## (undated) DEVICE — LABELS BLANK W PEN

## (undated) DEVICE — PREP BETADINE SPONGE STICKS

## (undated) DEVICE — SUT SILK 2-0 18" TIES

## (undated) DEVICE — DRSG CURITY GAUZE SPONGE 4 X 4" 12-PLY

## (undated) DEVICE — ELCTR GROUNDING PAD ADULT COVIDIEN

## (undated) DEVICE — BLADE 32 X 64

## (undated) DEVICE — LIJ/LIA-TOURNIQUET #2 4014EABF: Type: DURABLE MEDICAL EQUIPMENT

## (undated) DEVICE — SAW BLADE STRYKER SAGITTAL DUAL CUT 25X89X90

## (undated) DEVICE — DRSG COMBINE 5X9"

## (undated) DEVICE — STAPLER SKIN MULTI DIRECTION W35

## (undated) DEVICE — DRSG PREVENA PEEL & PLACE KIT 20CM

## (undated) DEVICE — DRSG PREVENA PEEL & PLACE KIT 13CM

## (undated) DEVICE — PACK LIJ BASIC ORTHO

## (undated) DEVICE — POSITIONER STRAP ARMBOARD VELCRO TS-30

## (undated) DEVICE — Device

## (undated) DEVICE — SUCTION YANKAUER NO CONTROL VENT

## (undated) DEVICE — VENODYNE/SCD SLEEVE CALF MEDIUM

## (undated) DEVICE — SOL IRR POUR NS 0.9% 500ML

## (undated) DEVICE — KIT POST MORTEM PEDS

## (undated) DEVICE — SUT PROLENE 4-0 36" SH

## (undated) DEVICE — LAP PAD 4 X 18"

## (undated) DEVICE — DRSG ACE BANDAGE 4"

## (undated) DEVICE — DRAPE TOWEL BLUE 17" X 24"